# Patient Record
Sex: MALE | Race: WHITE | NOT HISPANIC OR LATINO | Employment: UNEMPLOYED | URBAN - METROPOLITAN AREA
[De-identification: names, ages, dates, MRNs, and addresses within clinical notes are randomized per-mention and may not be internally consistent; named-entity substitution may affect disease eponyms.]

---

## 2017-01-11 ENCOUNTER — OFFICE VISIT (OUTPATIENT)
Dept: ENDOCRINOLOGY | Facility: CLINIC | Age: 57
End: 2017-01-11
Payer: COMMERCIAL

## 2017-01-11 VITALS
BODY MASS INDEX: 30.48 KG/M2 | DIASTOLIC BLOOD PRESSURE: 80 MMHG | SYSTOLIC BLOOD PRESSURE: 166 MMHG | HEART RATE: 64 BPM | RESPIRATION RATE: 14 BRPM | WEIGHT: 225 LBS | HEIGHT: 72 IN

## 2017-01-11 DIAGNOSIS — Z79.4 CONTROLLED TYPE 2 DIABETES MELLITUS WITHOUT COMPLICATION, WITH LONG-TERM CURRENT USE OF INSULIN (H): Primary | ICD-10-CM

## 2017-01-11 DIAGNOSIS — E11.9 CONTROLLED TYPE 2 DIABETES MELLITUS WITHOUT COMPLICATION, WITH LONG-TERM CURRENT USE OF INSULIN (H): Primary | ICD-10-CM

## 2017-01-11 PROCEDURE — 99214 OFFICE O/P EST MOD 30 MIN: CPT | Performed by: CLINICAL NURSE SPECIALIST

## 2017-01-11 RX ORDER — METFORMIN HCL 500 MG
2000 TABLET, EXTENDED RELEASE 24 HR ORAL
Qty: 360 TABLET | Refills: 1 | Status: SHIPPED | OUTPATIENT
Start: 2017-01-11 | End: 2018-03-27

## 2017-01-11 NOTE — MR AVS SNAPSHOT
After Visit Summary   1/11/2017    Saji Iqbal    MRN: [id number]           Patient Information     Date Of Birth          1960        Visit Information        Provider Department      1/11/2017 4:00 PM Radha Gomez APRN [facility]        Today's Diagnoses     Controlled type 2 diabetes mellitus without complication, with long-term current use of insulin (H)    -  1       Care Instructions      Once you use up your current supply of Metformin, you can change to the Metformin  mg tablets and take all 4 tablets (2000 gm) at one time with dinner.    Please increase the Lantus to 22 units twice daily.     If your blood sugars continue over 200, message me with recent readings and I'll let you know what changes to make.  Call to schedule an appointment with diabetes ed.    Follow up in 1 month        Follow-ups after your visit        Additional Services     DIABETES EDUCATOR REFERRAL       DIABETES SELF MANAGEMENT TRAINING (DSMT)      Your provider has referred you to Diabetes Education: [facility]: Diabetes Education - All [facility] [phone]   [email or web address]    Type of training and number of hours: Previous Diagnosis: Follow-up DSMT - 2 hours.    Medicare covers: 10 hours of initial DSMT in 12 month period from the time of first visit, plus 2 hours of follow-up DSMT annually, and additional hours as requested for insulin training.    Diabetes Type: Type 2 - On Insulin             Diabetes Co-Morbidities: none               A1C Goal:  <7.0       A1C is: A1C   *   12/9/2016    Value: >15.0  Results confirmed by repeat test      If an urgent visit is needed or A1C is above 12, Care Team to call the Diabetes Education Team at [phone] or send an In Basket message to the Diabetes Education Pool (P DIAB ED-PATIENT CARE).    Diabetes Education Topics: Comprehensive Knowledge Assessment and  Instruction    Special Educational Needs Requiring Individual DSMT: None       MEDICAL NUTRITION THERAPY (MNT) for Diabetes    Medical Nutrition Therapy with a Registered Dietitian can be provided in coordination with Diabetes Self-Management Training to assist in achieving optimal diabetes management.    MNT Type and Hours: Previous diagnosis: Annual follow-up MNT - 2 hours                       Medicare will cover: 3 hours initial MNT in 12 month period after first visit, plus 2 hours of follow-up MNT annually    Please be aware that coverage of these services is subject to the terms and limitations of your health insurance plan.  Call member services at your health plan to determine Diabetes Self-Management Training benefits and ask which blood glucose monitor brands are covered by your plan.      Please bring the following with you to your appointment:    (1)  List of current medications   (2)  List of Blood Glucose Monitor brands that are covered by your insurance plan  (3)  Blood Glucose Monitor and log book  (4)   Food records for the 3 days prior to your visit    The Certified Diabetes Educator may make diabetes medication adjustments per the CDE Protocol and Collaborative Practice Agreement.                  Who to contact     If you have questions or need follow up information about today's clinic visit or your schedule please contact [facility] directly at [phone].  Normal or non-critical lab and imaging results will be communicated to you by MyChart, letter or phone within 4 business days after the clinic has received the results. If you do not hear from us within 7 days, please contact the clinic through MyChart or phone. If you have a critical or abnormal lab result, we will notify you by phone as soon as possible.  Submit refill requests through [facility] or call your pharmacy and they will forward the refill request to us. Please allow 3 business days for your refill to be  "completed.          Additional Information About Your Visit        [employer] Information     [employer] lets you send messages to your doctor, view your test results, renew your prescriptions, schedule appointments and more. To sign up, go to [email or web address] . Click on \"Log in\" on the left side of the screen, which will take you to the Welcome page. Then click on \"Sign up Now\" on the right side of the page.     You will be asked to enter the access code listed below, as well as some personal information. Please follow the directions to create your username and password.     Your access code is: [email or web address]  Expires: 3/9/2017 10:50 AM     Your access code will  in 90 days. If you need help or a new code, please call your [facility] clinic or [phone].        Care EveryWhere ID     This is your Care EveryWhere ID. This could be used by other organizations to access your [facility] medical records  [id number]        Your Vitals Were     Pulse Respirations Height BMI (Body Mass Index)          64 14 1.829 m (6') 30.51 kg/m2         Blood Pressure from Last 3 Encounters:   17 166/80   16 147/98   16 150/98    Weight from Last 3 Encounters:   17 102.059 kg (225 lb)   16 100.472 kg (221 lb 8 oz)   16 97.523 kg (215 lb)              We Performed the Following     DIABETES EDUCATOR REFERRAL          Today's Medication Changes          These changes are accurate as of: 17  4:34 PM.  If you have any questions, ask your nurse or doctor.               Start taking these medicines.        Dose/Directions    metFORMIN 500 MG 24 hr tablet   Commonly known as:  GLUCOPHAGE-XR   Used for:  Controlled type 2 diabetes mellitus without complication, with long-term current use of insulin (H)   Replaces:  metFORMIN 500 MG tablet   Started by:  Radha Gomez APRN CNP        Dose:  2000 mg   Take 4 tablets (2,000 mg) by mouth daily (with dinner) Please do not dispense until requested " by patient.   Quantity:  360 tablet   Refills:  1         Stop taking these medicines if you haven't already. Please contact your care team if you have questions.     metFORMIN 500 MG tablet   Commonly known as:  GLUCOPHAGE   Replaced by:  metFORMIN 500 MG 24 hr tablet   Stopped by:  Radha Gomez APRN CNP                Where to get your medicines      These medications were sent to [facility] [address] [facility]  [address] [facility] [address]     Phone:  [phone]    - metFORMIN 500 MG 24 hr tablet             Primary Care Provider Office Phone # Fax #    Roxanne Shankar [phone]       [facility] [address] [facility] [address]        Thank you!     Thank you for choosing [facility]  for your care. Our goal is always to provide you with excellent care. Hearing back from our patients is one way we can continue to improve our services. Please take a few minutes to complete the written survey that you may receive in the mail after your visit with us. Thank you!             Your Updated Medication List - Protect others around you: Learn how to safely use, store and throw away your medicines at www.disposemymeds.org.          This list is accurate as of: 1/11/17  4:34 PM.  Always use your most recent med list.                   Brand Name Dispense Instructions for use    aspirin 81 MG tablet      Take by mouth daily       atorvastatin 20 MG tablet    LIPITOR    30 tablet    Take 1 tablet (20 mg) by mouth daily       blood glucose lancets standard    no brand specified    1 Box    Use to test blood sugar 1 times daily or as directed.       blood glucose monitoring lancets     2 Box    Use to test blood sugar 4 times daily or as directed.       blood glucose monitoring meter device kit     1 kit    Use to test blood sugar every morning prior to eating.       blood glucose monitoring test strip    no brand specified    150  Box    Use to test blood sugar 4 times daily or as directed. Please dispense Accu Chek Ana plus test strips or per patient preference if using a different brand       clopidogrel 75 MG tablet    PLAVIX    30 tablet    Take 1 tablet (75 mg) by mouth daily       insulin glargine 100 UNIT/ML injection    LANTUS SOLOSTAR    3 Month    Increase as directed to max dose of 20 units bid.       insulin pen needle 31G X 8 MM    ULTICARE SHORT    100 each    Use 19 daily or as directed, use with lantus, dispense per pt insurance per pt preference       lisinopril 5 MG tablet    PRINIVIL/ZESTRIL    30 tablet    Take 1 tablet (5 mg) by mouth daily       metFORMIN 500 MG 24 hr tablet    GLUCOPHAGE-XR    360 tablet    Take 4 tablets (2,000 mg) by mouth daily (with dinner) Please do not dispense until requested by patient.       metoprolol 25 MG tablet    LOPRESSOR    60 tablet    Take 1 tablet (25 mg) by mouth 2 times daily       omeprazole 20 MG CR capsule    priLOSEC    30 capsule    Take 1 capsule (20 mg) by mouth daily

## 2017-01-11 NOTE — PATIENT INSTRUCTIONS
Once you use up your current supply of Metformin, you can change to the Metformin  mg tablets and take all 4 tablets (2000 gm) at one time with dinner.    Please increase the Lantus to 22 units twice daily.     If your blood sugars continue over 200, message me with recent readings and I'll let you know what changes to make.  Call to schedule an appointment with diabetes ed.    Follow up in 1 month

## 2017-01-11 NOTE — PROGRESS NOTES
Name: Saji Iqbal  Seen at the request of Roxanne Shankar for Diabetes (Last seen12/26/2016).  HPI:  Saji Iqbal is a 56 year old male who presents for the evaluation/management of:    1. Type 2 DM:  Originally diagnosed:  In 2013.  Started Metformin and blood sugars well controlled,  and rarely over 140.  Thought he didn't need to worry about testing his blood sugars regularly so was not testing the past couple of years.  Recently noted unexplained weight loss, polyuria, polydipsia, and fatigue.  Presented to his PCP for further evaluation and was noted to have A1c > 15%.  Does report excess ETOH use due to depression since relocating to [place] for a job.  No ETOH use since severely uncontrolled diabetes noted.    Current Regimen: Metformin 1000 mg bid, Lantus 20 units bid (5-7am and 9pm) -   BS checks: 2 times per day  Average Meter Download: Unable to download meter.  Review of meter memory: 7-d average (n11) - 186, 14-d average 234 (n24), 30-d average 242 (n30).  Recent fasting am readings - 133, 185, 181, 185, 198, 224.  Daytime/evening readings: 195, 137, 112, 317, 181, 243.    Complications:   Diabetes Complications  Description / Detail    Diabetic Retinopathy  No retinopathy, last exam 2014   CAD / PAD  Yes: stent x 2, last stent 7/2015   Neuropathy  No   Nephropathy / Microalbuminuria  No   Gastroparesis  No   Hypoglycemia Unawareness  No     2. Hypertension: Blood Pressure today:   BP Readings from Last 3 Encounters:   01/11/17 166/80   12/26/16 147/98   12/09/16 150/98   .  Blood pressure medications include Metoprolol 25 mg bid and lisinopril 5 mg qd.  Takes medications everyday without forgetting a dose.  Denies feeling lightheaded or dizzy.    3. Hyperlipidemia: Takes Atorvastatin 20 mg qd for lipid control.  Denies muscle aches of pains.       4. Prevention:  Flu Shot- 10/2016 - received at work  Pneumovax- 23 valent 7/2012, PVC 13 on 10/2015  Opthalmology-Yes, overdue, last exam > 1 year  ago  Dental-recommended q 6 months  ASA-Yes, 81 mg qd  Smoking- No  PMH/PSH:  Past Medical History   Diagnosis Date     Coronary artery disease      Hypertension      Diabetes mellitus, type II (H)      Depression      Cluster headache      Rotator cuff arthropathy      Hyperlipidemia      Past Surgical History   Procedure Laterality Date     Stent, coronary, yadira  2014, 2015     Arthroscopy shoulder       Family Hx:  Family History   Problem Relation Age of Onset     Coronary Artery Disease Mother      Coronary Artery Disease Father      Thyroid disease:  No       DM2: ?possibly brother with DM2 but no other known family history        Autoimmune: DM1, SLE, RA, Vitiligo: No    Social Hx:  Social History     Social History     Marital Status: Single     Spouse Name: N/A     Number of Children: N/A     Years of Education: N/A     Occupational History     [employer]       Social History Main Topics     Smoking status: Former Smoker -- 0.33 packs/day for 20 years     Types: Cigarettes     Quit date: 07/17/2015     Smokeless tobacco: Former User     Types: Snuff     Alcohol Use: 0.6 oz/week     1 Standard drinks or equivalent per week      Comment: 2-6 occasionally     Drug Use: No     Sexual Activity: Yes     Other Topics Concern     Caffeine Concern No     0-1 coffee daily     Special Diet Yes     low sodium, no red meat, no butter     Exercise Yes     walking 2-3 days per week     Social History Narrative          MEDICATIONS:  has a current medication list which includes the following prescription(s): metformin, insulin glargine, blood glucose monitoring, blood glucose monitoring, atorvastatin, clopidogrel, lisinopril, metoprolol, omeprazole, blood glucose monitoring, blood glucose, insulin pen needle, and aspirin.    ROS     ROS: 10 point ROS neg other than the symptoms noted above in the HPI.    Physical Exam   VS: /80 mmHg  Pulse 64  Resp 14  Ht 1.829 m (6')  Wt 102.059 kg (225 lb)   BMI 30.51 kg/m2  GENERAL: AXOX3, NAD, well dressed, answering questions appropriately, appears stated age.  HEENT: no exophthalmos, no proptosis, EOMI, no lig lag, no retraction  CV: RRR, no rubs, gallops, no murmurs  LUNGS: CTAB, no wheezes, rales, or rhonchi  ABDOMEN: soft, nontender, nondistended  EXTREMITIES: no edema, +pulses, no rashes, no lesions  NEUROLOGY: CN grossly intact, no tremors  MSK: grossly intact  SKIN: no rashes, no lesions    LABS:  A1c:   Component  Latest Ref Rn 12/9/2016   Hemoglobin A1C  4.3 - 6.0 % >15.0 (H) . . .     Component  Latest Ref Rng 12/26/2016   C-Peptide  0.9 - 6.9 ng/mL 3.5   Glucose  70 - 99 mg/dL 266 (H)   Glutamic Acid Decarboxylase Antibody <5.0 . . .   Islet Cell Antibody IgG <1:4 . . .     Basic Metabolic Panel:  !COMPREHENSIVE Latest Ref Rn 12/9/2016   SODIUM 133 - 144 mmol/L 137   POTASSIUM 3.4 - 5.3 mmol/L 4.6   CHLORIDE 94 - 109 mmol/L 99   BUN 7 - 30 mg/dL 12   Creatinine 0.66 - 1.25 mg/dL 0.75   Glucose 70 - 99 mg/dL 359 (H)   ANION GAP 3 - 14 mmol/L 12   CALCIUM 8.5 - 10.1 mg/dL 9.5   ALBUMIN 3.4 - 5.0 g/dL 4.3   PROTEIN, TOTAL 6.8 - 8.8 g/dL 7.2     LFTS/Cholesterol Panel:  !LIPID/HEPATIC Latest Ref Rn 12/9/2016   CHOLESTEROL <200 mg/dL 247 (H)   TRIGLYCERIDES <150 mg/dL 632 (H)   HDL CHOLESTEROL >39 mg/dL 30 (L)   LDL CHOLESTEROL DIRECT <100 mg/dL 134 (H)   LDL CHOLESTEROL, CALCULATED <100 mg/dL Cannot estimate LDL when triglyceride exceeds 400 mg/dL   NON HDL CHOLESTEROL <130 mg/dL 217 (H)   AST 0 - 45 U/L 33   ALT 0 - 70 U/L 61     Thyroid Function:   !THYROID Latest Ref Rng 12/9/2016   TSH 0.40 - 4.00 mU/L 0.89     Urine MicroAlbumin:  Component    Latest Ref Rn 12/9/2016   Creatinine Urine     86   Albumin Urine mg/L     16   Albumin Urine mg/g Cr    0 - 17 mg/g Cr 19.12 (H)     Vitamin D:    All pertinent notes, labs, and images personally reviewed by me.     A/P  Mr.David Iqbal is a 56 year old here for the evaluation/management of diabetes:    1. DM2 -  Uncontrolled.  Recent c-peptide 3.5 with glucose 266, and pancreatic antibodies were not elevated.  Control improved since starting insulin.  Increase Lantus to 22 units bid.  Change metformin to XR and take 2000 mg once daily.  Referral to diabetes ed provided for comprehensive diabetes education.  F/u in one month.    There is some variability among people, most will usually develop symptoms suggestive of hypoglycemia when blood glucose levels are lowered to the mid 60's. The first set of symptoms are called adrenergic. Patients may experience any of the following nervousness, sweating, intense hunger, trembling, weakness, palpitations, and difficulty speaking.   The acute management of hypoglycemia involves the rapid delivery of a source of easily absorbed sugar. Regular soda, juice, lifesavers, table sugar, are good options. 15 grams of glucose is the dose that is given, followed by an assessment of symptoms and a blood glucose check if possible. If after 10 minutes there is no improvement, another 10-15 grams should be given. This can be repeated up to three times. The equivalency of 10-15 grams of glucose (approximate servings) are: 3-5 hard candies, 3 teaspoons of sugar, or 1/2 cup of regular soda or juice.      2. Hypertension - BP elevated today.  Recommend he see his PCP for this.    3. Hyperlipidemia - On statin therapy.    Labs ordered today:   Orders Placed This Encounter   Procedures     DIABETES EDUCATOR REFERRAL       Radiology/Consults ordered today: DIABETES EDUCATOR REFERRAL    All questions were answered.  The patient indicates understanding of the above issues and agrees with the plan set forth.  Total face to face time greater than or equal to 25 minutes.       Follow-up:  1 month    Radha Gomez NP  Endocrinology  [facility]  CC: Roxanne Shankar

## 2017-01-11 NOTE — NURSING NOTE
Chief Complaint   Patient presents with     Diabetes     recheck       Initial /80 mmHg  Pulse 64  Resp 14  Ht 1.829 m (6')  Wt 102.059 kg (225 lb)  BMI 30.51 kg/m2 Estimated body mass index is 30.51 kg/(m^2) as calculated from the following:    Height as of this encounter: 1.829 m (6').    Weight as of this encounter: 102.059 kg (225 lb).  BP completed using cuff size: romel Solis CMA

## 2017-01-16 ENCOUNTER — TELEPHONE (OUTPATIENT)
Dept: FAMILY MEDICINE | Facility: CLINIC | Age: 57
End: 2017-01-16

## 2017-01-16 ENCOUNTER — TELEPHONE (OUTPATIENT)
Dept: ENDOCRINOLOGY | Facility: CLINIC | Age: 57
End: 2017-01-16

## 2017-01-16 NOTE — TELEPHONE ENCOUNTER
Panel Management Review      Patient has the following on his problem list:     Diabetes    ASA: Passed    Last A1C  A1C   *   12/9/2016    Value: >15.0  Results confirmed by repeat test    A1C tested: Failed    Last LDL:    CHOL      247   12/9/2016  HDL       30   12/9/2016  LDL   Cannot estimate LDL when triglyceride exceeds 400 mg/dL   12/9/2016  LDL      134   12/9/2016  TRIG      632   12/9/2016  No results found for this basename: ty  NHDL      217   12/9/2016    Is the patient on a Statin? YES             Is the patient on Aspirin? YES    Medications     HMG CoA Reductase Inhibitors    atorvastatin (LIPITOR) 20 MG tablet    Salicylates    aspirin 81 MG tablet          Last three blood pressure readings:  BP Readings from Last 3 Encounters:   01/11/17 166/80   12/26/16 147/98   12/09/16 150/98       Date of last diabetes office visit: 1/11/17 with Endo  12/9/16 with Cesar   Tobacco History:     History   Smoking status     Former Smoker -- 0.33 packs/day for 20 years     Types: Cigarettes     Quit date: 07/17/2015   Smokeless tobacco     Former User     Types: Snuff             Composite cancer screening  Chart review shows that this patient is due/due soon for the following Colonoscopy  Summary:    Patient is due/failing the following:   BP CHECK and COLONOSCOPY    Action needed:   Patient needs nurse only appointment.    Type of outreach:    Phone, left message for patient to call back.     Questions for provider review:    None                                                                                                                                    Dayan Henriquez CMA       Chart routed to Care Team .

## 2017-01-19 ENCOUNTER — OFFICE VISIT (OUTPATIENT)
Dept: FAMILY MEDICINE | Facility: CLINIC | Age: 57
End: 2017-01-19
Payer: COMMERCIAL

## 2017-01-19 VITALS
HEART RATE: 73 BPM | SYSTOLIC BLOOD PRESSURE: 124 MMHG | WEIGHT: 227.5 LBS | RESPIRATION RATE: 20 BRPM | OXYGEN SATURATION: 96 % | DIASTOLIC BLOOD PRESSURE: 80 MMHG | BODY MASS INDEX: 30.85 KG/M2 | TEMPERATURE: 98.9 F

## 2017-01-19 DIAGNOSIS — R68.89 FLU-LIKE SYMPTOMS: ICD-10-CM

## 2017-01-19 LAB
FLUAV+FLUBV AG SPEC QL: NORMAL
FLUAV+FLUBV AG SPEC QL: NORMAL
SPECIMEN SOURCE: NORMAL

## 2017-01-19 PROCEDURE — 99213 OFFICE O/P EST LOW 20 MIN: CPT | Performed by: FAMILY MEDICINE

## 2017-01-19 PROCEDURE — 87804 INFLUENZA ASSAY W/OPTIC: CPT | Performed by: FAMILY MEDICINE

## 2017-01-19 RX ORDER — AZITHROMYCIN 250 MG/1
TABLET, FILM COATED ORAL
Qty: 6 TABLET | Refills: 0 | Status: SHIPPED | OUTPATIENT
Start: 2017-01-19 | End: 2017-05-17

## 2017-01-19 RX ORDER — CODEINE PHOSPHATE AND GUAIFENESIN 10; 100 MG/5ML; MG/5ML
1 SOLUTION ORAL EVERY 4 HOURS PRN
Qty: 120 ML | Refills: 0 | Status: SHIPPED | OUTPATIENT
Start: 2017-01-19 | End: 2017-05-17

## 2017-01-19 ASSESSMENT — PAIN SCALES - GENERAL: PAINLEVEL: EXTREME PAIN (9)

## 2017-01-19 NOTE — NURSING NOTE
Chief Complaint   Patient presents with     Fever     Initial /80 mmHg  Pulse 73  Temp(Src) 98.9  F (37.2  C) (Oral)  Resp 20  Wt 227 lb 8 oz (103.193 kg)  SpO2 96% Estimated body mass index is 30.85 kg/(m^2) as calculated from the following:    Height as of 1/11/17: 6' (1.829 m).    Weight as of this encounter: 227 lb 8 oz (103.193 kg).  BP completed using cuff size large right arm.    Lisa Magill, CMA

## 2017-01-19 NOTE — MR AVS SNAPSHOT
"              After Visit Summary   2017    Saji Iqbal    MRN: [id number]           Patient Information     Date Of Birth          1960        Visit Information        Provider Department      2017 9:00 AM Asya Vincent MD [facility]        Today's Diagnoses     Flu-like symptoms            Follow-ups after your visit        Who to contact     If you have questions or need follow up information about today's clinic visit or your schedule please contact [facility] directly at [phone].  Normal or non-critical lab and imaging results will be communicated to you by [email or web address], letter or phone within 4 business days after the clinic has received the results. If you do not hear from us within 7 days, please contact the clinic through [email or web address] or phone. If you have a critical or abnormal lab result, we will notify you by phone as soon as possible.  Submit refill requests through [employer] or call your pharmacy and they will forward the refill request to us. Please allow 3 business days for your refill to be completed.          Additional Information About Your Visit        MyChart Information     [employer] lets you send messages to your doctor, view your test results, renew your prescriptions, schedule appointments and more. To sign up, go to [email or web address] . Click on \"Log in\" on the left side of the screen, which will take you to the Welcome page. Then click on \"Sign up Now\" on the right side of the page.     You will be asked to enter the access code listed below, as well as some personal information. Please follow the directions to create your username and password.     Your access code is: [email or web address]  Expires: 3/9/2017 10:50 AM     Your access code will  in 90 days. If you need help or a new code, please call your [facility] or [phone].        Care EveryWhere ID     This is your Care EveryWhere ID. This could be used by other " organizations to access your [place] medical records  [id number]        Your Vitals Were     Pulse Temperature Respirations Pulse Oximetry          73 98.9  F (37.2  C) (Oral) 20 96%         Blood Pressure from Last 3 Encounters:   01/19/17 124/80   01/11/17 166/80   12/26/16 147/98    Weight from Last 3 Encounters:   01/19/17 227 lb 8 oz (103.193 kg)   01/11/17 225 lb (102.059 kg)   12/26/16 221 lb 8 oz (100.472 kg)              We Performed the Following     Influenza A/B antigen          Today's Medication Changes          These changes are accurate as of: 1/19/17 10:05 AM.  If you have any questions, ask your nurse or doctor.               Start taking these medicines.        Dose/Directions    azithromycin 250 MG tablet   Commonly known as:  ZITHROMAX   Used for:  Flu-like symptoms   Started by:  Asya Vincent MD        Two tablets first day, then one tablet daily for four days.   Quantity:  6 tablet   Refills:  0       guaiFENesin-codeine 100-10 MG/5ML Soln solution   Commonly known as:  ROBITUSSIN AC   Used for:  Flu-like symptoms   Started by:  Asya Vincent MD        Dose:  1 tsp.   Take 5 mLs by mouth every 4 hours as needed for cough   Quantity:  120 mL   Refills:  0            Where to get your medicines      These medications were sent to [place], [place] - [address] [place] [address]     Phone:  [phone]    - azithromycin 250 MG tablet      Some of these will need a paper prescription and others can be bought over the counter.  Ask your nurse if you have questions.     Bring a paper prescription for each of these medications    - guaiFENesin-codeine 100-10 MG/5ML Soln solution             Primary Care Provider Office Phone # Fax #    Roxanne ROSIO Shankar [phone]       [facility] [address]  [facility] [address]        Thank you!     Thank you for choosing [facility]  for your  care. Our goal is always to provide you with excellent care. Hearing back from our patients is one way we can continue to improve our services. Please take a few minutes to complete the written survey that you may receive in the mail after your visit with us. Thank you!             Your Updated Medication List - Protect others around you: Learn how to safely use, store and throw away your medicines at www.disposemymeds.org.          This list is accurate as of: 1/19/17 10:05 AM.  Always use your most recent med list.                   Brand Name Dispense Instructions for use    aspirin 81 MG tablet      Take by mouth daily       atorvastatin 20 MG tablet    LIPITOR    30 tablet    Take 1 tablet (20 mg) by mouth daily       azithromycin 250 MG tablet    ZITHROMAX    6 tablet    Two tablets first day, then one tablet daily for four days.       blood glucose lancets standard    no brand specified    1 Box    Use to test blood sugar 1 times daily or as directed.       blood glucose monitoring lancets     2 Box    Use to test blood sugar 4 times daily or as directed.       blood glucose monitoring meter device kit     1 kit    Use to test blood sugar every morning prior to eating.       blood glucose monitoring test strip    no brand specified    150 Box    Use to test blood sugar 4 times daily or as directed. Please dispense Accu Chek Ana plus test strips or per patient preference if using a different brand       clopidogrel 75 MG tablet    PLAVIX    30 tablet    Take 1 tablet (75 mg) by mouth daily       guaiFENesin-codeine 100-10 MG/5ML Soln solution    ROBITUSSIN AC    120 mL    Take 5 mLs by mouth every 4 hours as needed for cough       insulin glargine 100 UNIT/ML injection    LANTUS SOLOSTAR    15 mL    22 units bid.       insulin pen needle 31G X 5 MM    B-D U/F    100 each    Use 2 daily or as directed.       lisinopril 5 MG tablet    PRINIVIL/ZESTRIL    30 tablet    Take 1 tablet (5 mg) by mouth daily        metFORMIN 500 MG 24 hr tablet    GLUCOPHAGE-XR    360 tablet    Take 4 tablets (2,000 mg) by mouth daily (with dinner) Please do not dispense until requested by patient.       metoprolol 25 MG tablet    LOPRESSOR    60 tablet    Take 1 tablet (25 mg) by mouth 2 times daily       omeprazole 20 MG CR capsule    priLOSEC    30 capsule    Take 1 capsule (20 mg) by mouth daily

## 2017-01-30 ENCOUNTER — DOCUMENTATION ONLY (OUTPATIENT)
Dept: CARDIOLOGY | Facility: CLINIC | Age: 57
End: 2017-01-30

## 2017-03-27 DIAGNOSIS — I10 BENIGN ESSENTIAL HYPERTENSION: ICD-10-CM

## 2017-03-27 DIAGNOSIS — K21.9 GASTROESOPHAGEAL REFLUX DISEASE, ESOPHAGITIS PRESENCE NOT SPECIFIED: ICD-10-CM

## 2017-03-27 DIAGNOSIS — E78.5 HYPERLIPIDEMIA LDL GOAL <100: ICD-10-CM

## 2017-03-27 DIAGNOSIS — I25.10 CORONARY ARTERY DISEASE INVOLVING NATIVE CORONARY ARTERY OF NATIVE HEART WITHOUT ANGINA PECTORIS: ICD-10-CM

## 2017-03-27 NOTE — TELEPHONE ENCOUNTER
clopidogrel (PLAVIX) 75 MG tablet           Last Written Prescription Date: 12/9/16  Last Fill Quantity: 30, # refills: 2    Last Office Visit with [facility] or  Health prescribing provider:  1/19/2017     Future Office Visit:       Lab Results   Component Value Date    WBC 6.8 12/09/2016     Lab Results   Component Value Date    RBC 4.93 12/09/2016     Lab Results   Component Value Date    HGB 16.4 12/09/2016     Lab Results   Component Value Date    HCT 46.8 12/09/2016     No components found for: MCT  Lab Results   Component Value Date    MCV 95 12/09/2016     Lab Results   Component Value Date    MCH 33.3 12/09/2016     Lab Results   Component Value Date    MCHC 35.0 12/09/2016     Lab Results   Component Value Date    RDW 11.6 12/09/2016     Lab Results   Component Value Date     12/09/2016     Lab Results   Component Value Date    AST 33 12/09/2016     Lab Results   Component Value Date    ALT 61 12/09/2016     Creatinine   Date Value Ref Range Status   12/09/2016 0.75 0.66 - 1.25 mg/dL Final     ________________________________________________________________________________  metoprolol (LOPRESSOR) 25 MG tablet      Last Written Prescription Date: 12/9/16  Last Fill Quantity: 60, # refills: 2    Last Office Visit with [facility], [facility] or  Health prescribing provider:  1/19/2017     Future Office Visit:        BP Readings from Last 3 Encounters:   01/19/17 124/80   01/11/17 166/80   12/26/16 (!) 147/98     ________________________________________________________________________________  lisinopril (PRINIVIL/ZESTRIL) 5 MG tablet      Last Written Prescription Date: 12/9/16  Last Fill Quantity: 30, # refills: 2  Last Office Visit with [facility], [facility] or [facility] prescribing provider: 1/19/2017         Potassium   Date Value Ref Range Status   12/09/2016 4.6 3.4 - 5.3 mmol/L Final     Creatinine   Date Value Ref Range Status   12/09/2016 0.75 0.66 - 1.25 mg/dL Final     BP Readings from Last 3 Encounters:   01/19/17  124/80   01/11/17 166/80   12/26/16 (!) 147/98     ________________________________________________________________________________  atorvastatin (LIPITOR) 20 MG tablet     Last Written Prescription Date: 12/9/16  Last Fill Quantity: 30, # refills: 2  Last Office Visit with [facility] or [facility] prescribing provider: 1/19/2017         Lab Results   Component Value Date    CHOL 247 12/09/2016     Lab Results   Component Value Date    HDL 30 12/09/2016     Lab Results   Component Value Date    LDL  12/09/2016     Cannot estimate LDL when triglyceride exceeds 400 mg/dL     12/09/2016     Lab Results   Component Value Date    TRIG 632 12/09/2016     No results found for: CHOLHDLRATIO  ________________________________________________________________________________  omeprazole (PRILOSEC) 20 MG CR capsule  Last Written Prescription Date: 12/9/16  Last Fill Quantity: 30,  # refills: 2   Last Office Visit with [facility], [facility] or [facility] prescribing provider:  1/19/2017                                              BILLY Colón  March 27, 2017  9:59 AM

## 2017-03-28 RX ORDER — LISINOPRIL 5 MG/1
5 TABLET ORAL DAILY
Qty: 30 TABLET | Refills: 8 | Status: SHIPPED | OUTPATIENT
Start: 2017-03-28 | End: 2018-03-27

## 2017-03-28 RX ORDER — METOPROLOL TARTRATE 25 MG/1
25 TABLET, FILM COATED ORAL 2 TIMES DAILY
Qty: 60 TABLET | Refills: 8 | Status: SHIPPED | OUTPATIENT
Start: 2017-03-28 | End: 2018-03-27

## 2017-03-28 RX ORDER — ATORVASTATIN CALCIUM 20 MG/1
20 TABLET, FILM COATED ORAL DAILY
Qty: 30 TABLET | Refills: 2 | Status: SHIPPED | OUTPATIENT
Start: 2017-03-28 | End: 2018-03-27

## 2017-03-28 RX ORDER — CLOPIDOGREL BISULFATE 75 MG/1
75 TABLET ORAL DAILY
Qty: 30 TABLET | Refills: 8 | Status: SHIPPED | OUTPATIENT
Start: 2017-03-28 | End: 2018-03-27

## 2017-05-17 ENCOUNTER — OFFICE VISIT (OUTPATIENT)
Dept: FAMILY MEDICINE | Facility: CLINIC | Age: 57
End: 2017-05-17
Payer: COMMERCIAL

## 2017-05-17 VITALS
BODY MASS INDEX: 32.64 KG/M2 | HEART RATE: 86 BPM | DIASTOLIC BLOOD PRESSURE: 82 MMHG | SYSTOLIC BLOOD PRESSURE: 130 MMHG | TEMPERATURE: 98.2 F | HEIGHT: 72 IN | RESPIRATION RATE: 16 BRPM | OXYGEN SATURATION: 95 % | WEIGHT: 241 LBS

## 2017-05-17 DIAGNOSIS — J00 ACUTE NASOPHARYNGITIS: Primary | ICD-10-CM

## 2017-05-17 DIAGNOSIS — Z13.5 SCREENING FOR DIABETIC RETINOPATHY: ICD-10-CM

## 2017-05-17 DIAGNOSIS — Z12.11 SCREEN FOR COLON CANCER: ICD-10-CM

## 2017-05-17 PROCEDURE — 99213 OFFICE O/P EST LOW 20 MIN: CPT | Performed by: FAMILY MEDICINE

## 2017-05-17 RX ORDER — CODEINE PHOSPHATE AND GUAIFENESIN 10; 100 MG/5ML; MG/5ML
2 SOLUTION ORAL EVERY 4 HOURS PRN
Qty: 120 ML | Refills: 1 | Status: SHIPPED | OUTPATIENT
Start: 2017-05-17 | End: 2017-07-14

## 2017-05-17 ASSESSMENT — ENCOUNTER SYMPTOMS
ABDOMINAL PAIN: 0
HEADACHES: 1
VOMITING: 0
COUGH: 1
NAUSEA: 0
CARDIOVASCULAR NEGATIVE: 1
SPUTUM PRODUCTION: 1
DIARRHEA: 0
FEVER: 0
SORE THROAT: 1
CHILLS: 1
CONSTIPATION: 0

## 2017-05-17 NOTE — NURSING NOTE
Chief Complaint   Patient presents with     URI       Initial /82 (BP Location: Right arm, Patient Position: Chair, Cuff Size: Adult Regular)  Pulse 86  Temp 98.2  F (36.8  C) (Oral)  Resp 16  Ht 6' (1.829 m)  Wt 241 lb (109.3 kg)  SpO2 95%  BMI 32.69 kg/m2 Estimated body mass index is 32.69 kg/(m^2) as calculated from the following:    Height as of this encounter: 6' (1.829 m).    Weight as of this encounter: 241 lb (109.3 kg).  Medication Reconciliation: complete     Dayan Henriquez, CMA

## 2017-05-17 NOTE — MR AVS SNAPSHOT
"              After Visit Summary   5/17/2017    Saji Ibqal    MRN: [id number]           Patient Information     Date Of Birth          1960        Visit Information        Provider Department      5/17/2017 1:00 PM Naseem Esparza MD [facility]        Today's Diagnoses     Acute nasopharyngitis    -  1    Screen for colon cancer        Screening for diabetic retinopathy           Follow-ups after your visit        Follow-up notes from your care team     Return in about 1 week (around 5/24/2017), or if symptoms worsen or fail to improve.      Who to contact     If you have questions or need follow up information about today's clinic visit or your schedule please contact [facility] directly at [phone].  Normal or non-critical lab and imaging results will be communicated to you by MyChart, letter or phone within 4 business days after the clinic has received the results. If you do not hear from us within 7 days, please contact the clinic through [employer] or phone. If you have a critical or abnormal lab result, we will notify you by phone as soon as possible.  Submit refill requests through [employer] or call your pharmacy and they will forward the refill request to us. Please allow 3 business days for your refill to be completed.          Additional Information About Your Visit        MyChart Information     [employer] lets you send messages to your doctor, view your test results, renew your prescriptions, schedule appointments and more. To sign up, go to [email or web address] . Click on \"Log in\" on the left side of the screen, which will take you to the Welcome page. Then click on \"Sign up Now\" on the right side of the page.     You will be asked to enter the access code listed below, as well as some personal information. Please follow the directions to create your username and password.     Your access code is: [email or web address]  Expires: 8/15/2017  1:28 PM     Your access " code will  in 90 days. If you need help or a new code, please call your [place] clinic or [phone].        Care EveryWhere ID     This is your Care EveryWhere ID. This could be used by other organizations to access your [place] medical records  [id number]        Your Vitals Were     Pulse Temperature Respirations Height Pulse Oximetry BMI (Body Mass Index)    86 98.2  F (36.8  C) (Oral) 16 6' (1.829 m) 95% 32.69 kg/m2       Blood Pressure from Last 3 Encounters:   17 130/82   17 124/80   17 166/80    Weight from Last 3 Encounters:   17 241 lb (109.3 kg)   17 227 lb 8 oz (103.2 kg)   17 225 lb (102.1 kg)              Today, you had the following     No orders found for display         Today's Medication Changes          These changes are accurate as of: 17  1:28 PM.  If you have any questions, ask your nurse or doctor.               These medicines have changed or have updated prescriptions.        Dose/Directions    guaiFENesin-codeine 100-10 MG/5ML Soln solution   Commonly known as:  ROBITUSSIN AC   This may have changed:  how much to take   Used for:  Acute nasopharyngitis   Changed by:  Naseem Esparza MD        Dose:  2 tsp.   Take 10 mLs by mouth every 4 hours as needed for cough   Quantity:  120 mL   Refills:  1         Stop taking these medicines if you haven't already. Please contact your care team if you have questions.     azithromycin 250 MG tablet   Commonly known as:  ZITHROMAX   Stopped by:  Naseem Esparza MD           blood glucose monitoring meter device kit   Stopped by:  Naseem Esparza MD                Where to get your medicines      Some of these will need a paper prescription and others can be bought over the counter.  Ask your nurse if you have questions.     Bring a paper prescription for each of these medications     guaiFENesin-codeine 100-10 MG/5ML Soln solution                Primary Care Provider Office Phone #  Fax #    Roxanne Shankar [phone]       [facility] [address]  [facility] [address]        Thank you!     Thank you for choosing [facility]  for your care. Our goal is always to provide you with excellent care. Hearing back from our patients is one way we can continue to improve our services. Please take a few minutes to complete the written survey that you may receive in the mail after your visit with us. Thank you!             Your Updated Medication List - Protect others around you: Learn how to safely use, store and throw away your medicines at www.disposemymeds.org.          This list is accurate as of: 5/17/17  1:28 PM.  Always use your most recent med list.                   Brand Name Dispense Instructions for use    aspirin 81 MG tablet      Take by mouth daily       atorvastatin 20 MG tablet    LIPITOR    30 tablet    Take 1 tablet (20 mg) by mouth daily       blood glucose lancets standard    no brand specified    1 Box    Use to test blood sugar 1 times daily or as directed.       blood glucose monitoring lancets     2 Box    Use to test blood sugar 4 times daily or as directed.       blood glucose monitoring test strip    no brand specified    150 Box    Use to test blood sugar 4 times daily or as directed. Please dispense Accu Chek Ana plus test strips or per patient preference if using a different brand       clopidogrel 75 MG tablet    PLAVIX    30 tablet    Take 1 tablet (75 mg) by mouth daily       guaiFENesin-codeine 100-10 MG/5ML Soln solution    ROBITUSSIN AC    120 mL    Take 10 mLs by mouth every 4 hours as needed for cough       insulin glargine 100 UNIT/ML injection    LANTUS SOLOSTAR    15 mL    22 units bid.       insulin pen needle 31G X 5 MM    B-D U/F    100 each    Use 2 daily or as directed.       lisinopril 5 MG tablet    PRINIVIL/ZESTRIL    30 tablet    Take 1 tablet (5 mg) by mouth daily       metFORMIN 500 MG 24 hr tablet     GLUCOPHAGE-XR    360 tablet    Take 4 tablets (2,000 mg) by mouth daily (with dinner) Please do not dispense until requested by patient.       metoprolol 25 MG tablet    LOPRESSOR    60 tablet    Take 1 tablet (25 mg) by mouth 2 times daily       omeprazole 20 MG CR capsule    priLOSEC    30 capsule    Take 1 capsule (20 mg) by mouth daily

## 2017-05-17 NOTE — PROGRESS NOTES
HPI    SUBJECTIVE:                                                    Saji Iqbal is a 57 year old male who presents to clinic today for the following health issues:      RESPIRATORY SYMPTOMS      Duration: 5-6 days    Description  nasal congestion, rhinorrhea, sore throat, facial pain/pressure, cough and fatigue/malaise    Severity: moderate    Accompanying signs and symptoms: None    History (predisposing factors):  Former smoker    Precipitating or alleviating factors: None    Therapies tried and outcome:  dayquil     Feels like he gets this same cold every three months or so.  Wondering about mold exposure - feels he may have mold in his apartment.  Started with a sore throat, sinus congestion, slighlty productive cough.  No leni fever, n/v.  Started four days ago.  Sweats with pretty much any exertion.    Does follow with Archana Gomez, reports sugars are much better.  Seeing 120-160s.  Is due to f/u with Radha.     Review of Systems   Constitutional: Positive for chills and malaise/fatigue. Negative for fever.   HENT: Positive for congestion and sore throat.    Respiratory: Positive for cough and sputum production.    Cardiovascular: Negative.    Gastrointestinal: Negative for abdominal pain, constipation, diarrhea, nausea and vomiting.   Skin: Negative for rash.   Neurological: Positive for headaches.         Physical Exam   Constitutional: He is well-developed, well-nourished, and in no distress. No distress.   HENT:   Right Ear: Tympanic membrane, external ear and ear canal normal.   Left Ear: Tympanic membrane, external ear and ear canal normal.   Mouth/Throat: Oropharynx is clear and moist. No oropharyngeal exudate.   Eyes: Conjunctivae are normal.   Neck: Neck supple.   Cardiovascular: Normal rate, regular rhythm and normal heart sounds.    Pulmonary/Chest: Effort normal and breath sounds normal.   Lymphadenopathy:     He has cervical adenopathy.   Skin: Skin is warm and dry. No rash noted.   Nursing  note and vitals reviewed.    (J00) Acute nasopharyngitis  (primary encounter diagnosis)  Comment: early, will russel resolve.  Mika is OK w/no abx.  Can call in if still ill in one week.  Plan: guaiFENesin-codeine (ROBITUSSIN AC) 100-10         MG/5ML SOLN solution            RTC in 1w prn    Naseem Esparza MD

## 2017-07-12 ENCOUNTER — TELEPHONE (OUTPATIENT)
Dept: FAMILY MEDICINE | Facility: CLINIC | Age: 57
End: 2017-07-12

## 2017-07-12 NOTE — TELEPHONE ENCOUNTER
PANEL MANAGEMENT REVIEW      Patient has the following on his problem list:     Diabetes    ASA: Passed    Last A1C  Lab Results   Component Value Date    A1C >15.0  Results confirmed by repeat test   12/09/2016     A1C tested: FAILED    Last LDL:    Lab Results   Component Value Date    CHOL 247 12/09/2016     Lab Results   Component Value Date    HDL 30 12/09/2016     Lab Results   Component Value Date    LDL  12/09/2016     Cannot estimate LDL when triglyceride exceeds 400 mg/dL     12/09/2016     Lab Results   Component Value Date    TRIG 632 12/09/2016     No results found for: CHOLHDLRATIO  Lab Results   Component Value Date    NHDL 217 12/09/2016       Is the patient on a Statin? YES             Is the patient on Aspirin? YES    Medications     HMG CoA Reductase Inhibitors    atorvastatin (LIPITOR) 20 MG tablet    Salicylates    aspirin 81 MG tablet          Last three blood pressure readings:  BP Readings from Last 3 Encounters:   05/17/17 130/82   01/19/17 124/80   01/11/17 166/80       Date of last diabetes office visit: 5/17/17     Tobacco History:     History   Smoking Status     Former Smoker     Packs/day: 0.33     Years: 20.00     Types: Cigarettes     Quit date: 7/17/2015   Smokeless Tobacco     Former User     Types: Snuff           Composite cancer screening  Chart review shows that this patient is due/due soon for the following Colonoscopy and Fecal Colorectal (FIT)  Summary:    Patient is due/failing the following:   A1C, COLONOSCOPY, FOLLOW UP and FIT    Action needed:   Patient needs office visit for Diabetes follow up, Colonoscopy or FIT testing, A1c. and Patient needs fasting lab only appointment    Type of outreach:scheduled appointment for 7/14/17 at 3:40 pm with JERICHO Joyce    Questions for provider review:    None                                                                                                                                    Shruti Rdz  MA       Chart routed to Care Team .

## 2017-07-14 ENCOUNTER — OFFICE VISIT (OUTPATIENT)
Dept: FAMILY MEDICINE | Facility: CLINIC | Age: 57
End: 2017-07-14
Payer: COMMERCIAL

## 2017-07-14 VITALS
HEART RATE: 84 BPM | WEIGHT: 238 LBS | TEMPERATURE: 98.2 F | DIASTOLIC BLOOD PRESSURE: 100 MMHG | SYSTOLIC BLOOD PRESSURE: 152 MMHG | BODY MASS INDEX: 32.28 KG/M2 | RESPIRATION RATE: 20 BRPM

## 2017-07-14 DIAGNOSIS — F40.10 SOCIAL ANXIETY DISORDER: Primary | ICD-10-CM

## 2017-07-14 DIAGNOSIS — I10 BENIGN ESSENTIAL HYPERTENSION: ICD-10-CM

## 2017-07-14 PROCEDURE — 99214 OFFICE O/P EST MOD 30 MIN: CPT | Performed by: PHYSICIAN ASSISTANT

## 2017-07-14 RX ORDER — PAROXETINE 20 MG/1
20 TABLET, FILM COATED ORAL AT BEDTIME
Qty: 30 TABLET | Refills: 1 | Status: SHIPPED | OUTPATIENT
Start: 2017-07-14 | End: 2017-12-27 | Stop reason: ALTCHOICE

## 2017-07-14 ASSESSMENT — ANXIETY QUESTIONNAIRES
7. FEELING AFRAID AS IF SOMETHING AWFUL MIGHT HAPPEN: NOT AT ALL
IF YOU CHECKED OFF ANY PROBLEMS ON THIS QUESTIONNAIRE, HOW DIFFICULT HAVE THESE PROBLEMS MADE IT FOR YOU TO DO YOUR WORK, TAKE CARE OF THINGS AT HOME, OR GET ALONG WITH OTHER PEOPLE: VERY DIFFICULT
3. WORRYING TOO MUCH ABOUT DIFFERENT THINGS: SEVERAL DAYS
5. BEING SO RESTLESS THAT IT IS HARD TO SIT STILL: SEVERAL DAYS
GAD7 TOTAL SCORE: 10
1. FEELING NERVOUS, ANXIOUS, OR ON EDGE: MORE THAN HALF THE DAYS
2. NOT BEING ABLE TO STOP OR CONTROL WORRYING: SEVERAL DAYS
6. BECOMING EASILY ANNOYED OR IRRITABLE: NEARLY EVERY DAY

## 2017-07-14 ASSESSMENT — PATIENT HEALTH QUESTIONNAIRE - PHQ9: 5. POOR APPETITE OR OVEREATING: MORE THAN HALF THE DAYS

## 2017-07-14 NOTE — PROGRESS NOTES
"  SUBJECTIVE:                                                    Saji Iqbal is a 57 year old male who presents to clinic today for the following health issues:      History of Present Illness   Depression & Anxiety Follow-up:     Depression/Anxiety:  Depression & Anxiety    Status since last visit::  Stable    Other associated symptoms of depression and anxiety::  YES    Significant life event::  YES    Current substance use::  Alcohol    Patient is here due to not liking his lifestyle right now.      Saji is here to discuss mood changes.  He stated he has been hospitalized 4 times for suicidal thoughts but is not currently suicidal, states he feels bored.  He admits to feeling paranoid.  He feels at work he may be being watched.  He spent some time in treatment in [place], [place] during summer 2014.  After this he   moved two years from [place] due to foreclosure.  He does not like his apartment, used to have a house and woodshop.  Does not enjoy what he used to enjoy.  He felt better when he was in treatment.  \"felt like he was wrapped in a blanket\".  His job right now is \"ok\".  He feels himslef shying away from social situations now.  Feeling rejected by family and friends.  Is currently off all Psych meds.      BP high due to toothache today.  Has a fear of the dentist.  Is taking all the diabetes and BP meds, blood thinner etc.      Problem list and histories reviewed & adjusted, as indicated.  Additional history: as documented      ROS:  Constitutional, HEENT, cardiovascular, pulmonary, gi and gu systems are negative, except as otherwise noted.      OBJECTIVE:   BP (!) 152/100 (BP Location: Right arm, Patient Position: Sitting, Cuff Size: Adult Large)  Pulse 84  Temp 98.2  F (36.8  C) (Oral)  Resp 20  Wt 238 lb (108 kg)  BMI 32.28 kg/m2  Body mass index is 32.28 kg/(m^2).  GENERAL: healthy, alert and no distress  MS: no gross musculoskeletal defects noted, no edema  SKIN: no suspicious lesions or " rashes  PSYCH: mentation appears normal and affect flat    Diagnostic Test Results:  none     ASSESSMENT/PLAN:   1. Social anxiety disorder  He is agreeable to start Paxil today.  I placed a referral also for therapy which I believe will be most helpful for Saji.  Follow up with me one month for medication check.    - PARoxetine (PAXIL) 20 MG tablet; Take 1 tablet (20 mg) by mouth At Bedtime  Dispense: 30 tablet; Refill: 1  - MENTAL HEALTH REFERRAL    2. Benign essential hypertension  - not controlled.  Discussed that he needs to be on his medication.  Will recheck at medication check.      Alcides Toledo PA-C  [facility]

## 2017-07-14 NOTE — NURSING NOTE
Chief Complaint   Patient presents with     MOOD CHANGES       Initial BP (!) 152/100 (BP Location: Right arm, Patient Position: Sitting, Cuff Size: Adult Large)  Pulse 84  Temp 98.2  F (36.8  C) (Oral)  Resp 20  Wt 238 lb (108 kg)  BMI 32.28 kg/m2 Estimated body mass index is 32.28 kg/(m^2) as calculated from the following:    Height as of 5/17/17: 6' (1.829 m).    Weight as of this encounter: 238 lb (108 kg).  Medication Reconciliation: complete   Shruti Rdz MA

## 2017-07-14 NOTE — MR AVS SNAPSHOT
After Visit Summary   7/14/2017    Saji Iqbal    MRN: [id number]           Patient Information     Date Of Birth          1960        Visit Information        Provider Department      7/14/2017 3:40 PM Alcides Toledo PA-C [facility]        Today's Diagnoses     Social anxiety disorder    -  1       Follow-ups after your visit        Additional Services     MENTAL HEALTH REFERRAL       Your provider has referred you to: [facility]: [facility] - Counseling (Individual/Couples/Family) - [facility] [phone]   [email or web address]   *Patient will be contacted by [facility] scheduling partner, [facility]), to schedule an appointment.  Patients may also call [facility] to schedule.    All scheduling is subject to the client's specific insurance plan & benefits, provider/location availability, and provider clinical specialities.  Please arrive 15 minutes early for your first appointment and bring your completed paperwork.    Please be aware that coverage of these services is subject to the terms and limitations of your health insurance plan.  Call member services at your health plan with any benefit or coverage questions.                  Follow-up notes from your care team     Return in about 1 month (around 8/14/2017).      Who to contact     If you have questions or need follow up information about today's clinic visit or your schedule please contact [facility] directly at [phone].  Normal or non-critical lab and imaging results will be communicated to you by MyChart, letter or phone within 4 business days after the clinic has received the results. If you do not hear from us within 7 days, please contact the clinic through MyChart or phone. If you have a critical or abnormal lab result, we will notify you by phone as soon as possible.  Submit refill  "requests through [employer] or call your pharmacy and they will forward the refill request to us. Please allow 3 business days for your refill to be completed.          Additional Information About Your Visit        Familink Information     Familink lets you send messages to your doctor, view your test results, renew your prescriptions, schedule appointments and more. To sign up, go to [email or web address] . Click on \"Log in\" on the left side of the screen, which will take you to the Welcome page. Then click on \"Sign up Now\" on the right side of the page.     You will be asked to enter the access code listed below, as well as some personal information. Please follow the directions to create your username and password.     Your access code is: [email or web address]  Expires: 8/15/2017  1:28 PM     Your access code will  in 90 days. If you need help or a new code, please call your [place] clinic or [phone].        Care EveryWhere ID     This is your Care EveryWhere ID. This could be used by other organizations to access your [facility] medical records  [id number]        Your Vitals Were     Pulse Temperature Respirations BMI (Body Mass Index)          84 98.2  F (36.8  C) (Oral) 20 32.28 kg/m2         Blood Pressure from Last 3 Encounters:   17 (!) 152/100   17 130/82   17 124/80    Weight from Last 3 Encounters:   17 238 lb (108 kg)   17 241 lb (109.3 kg)   17 227 lb 8 oz (103.2 kg)              We Performed the Following     MENTAL HEALTH REFERRAL          Today's Medication Changes          These changes are accurate as of: 17  4:24 PM.  If you have any questions, ask your nurse or doctor.               Start taking these medicines.        Dose/Directions    PARoxetine 20 MG tablet   Commonly known as:  PAXIL   Used for:  Social anxiety disorder   Started by:  Alcides Toledo PA-C        Dose:  20 mg   Take 1 tablet (20 mg) by mouth At Bedtime   Quantity:  30 tablet "   Refills:  1            Where to get your medicines      These medications were sent to [place], [place] - [address]  [address] [address]     Phone:  [phone]     PARoxetine 20 MG tablet                Primary Care Provider Office Phone # Fax #    Roxanne Shankar [phone]       [facility] [address]  [facility] [address]        Equal Access to Services     SAL CATHERINE : Hadii aad ku hadasho Soomaali, waaxda luqadaha, qaybta kaalmada adeegyada, waxay idiin hayaan adeeg kharablayne laallan richardson. So [facility] [phone].    ATENCIÓN: Si habla español, tiene a orellana disposición servicios gratuitos de asistencia lingüística. [facility] [phone].    We comply with applicable federal civil rights laws and [place] laws. We do not discriminate on the basis of race, color, national origin, age, disability sex, sexual orientation or gender identity.            Thank you!     Thank you for choosing [facility]  for your care. Our goal is always to provide you with excellent care. Hearing back from our patients is one way we can continue to improve our services. Please take a few minutes to complete the written survey that you may receive in the mail after your visit with us. Thank you!             Your Updated Medication List - Protect others around you: Learn how to safely use, store and throw away your medicines at www.disposemymeds.org.          This list is accurate as of: 7/14/17  4:24 PM.  Always use your most recent med list.                   Brand Name Dispense Instructions for use Diagnosis    aspirin 81 MG tablet      Take by mouth daily        atorvastatin 20 MG tablet    LIPITOR    30 tablet    Take 1 tablet (20 mg) by mouth daily    Hyperlipidemia LDL goal <100       blood glucose lancets standard    no brand specified    1 Box    Use to test blood sugar 1 times daily or as directed.    Type 2 diabetes mellitus without  complication, without long-term current use of insulin (H)       blood glucose monitoring lancets     2 Box    Use to test blood sugar 4 times daily or as directed.        blood glucose monitoring test strip    no brand specified    150 Box    Use to test blood sugar 4 times daily or as directed. Please dispense Accu Chek Ana plus test strips or per patient preference if using a different brand        clopidogrel 75 MG tablet    PLAVIX    30 tablet    Take 1 tablet (75 mg) by mouth daily    Coronary artery disease involving native coronary artery of native heart without angina pectoris       insulin glargine 100 UNIT/ML injection    LANTUS SOLOSTAR    15 mL    22 units bid.    Controlled type 2 diabetes mellitus without complication, with long-term current use of insulin (H)       insulin pen needle 31G X 5 MM    B-D U/F    100 each    Use 2 daily or as directed.    Controlled type 2 diabetes mellitus without complication, with long-term current use of insulin (H)       lisinopril 5 MG tablet    PRINIVIL/ZESTRIL    30 tablet    Take 1 tablet (5 mg) by mouth daily    Benign essential hypertension, Coronary artery disease involving native coronary artery of native heart without angina pectoris       metFORMIN 500 MG 24 hr tablet    GLUCOPHAGE-XR    360 tablet    Take 4 tablets (2,000 mg) by mouth daily (with dinner) Please do not dispense until requested by patient.    Controlled type 2 diabetes mellitus without complication, with long-term current use of insulin (H)       metoprolol 25 MG tablet    LOPRESSOR    60 tablet    Take 1 tablet (25 mg) by mouth 2 times daily    Coronary artery disease involving native coronary artery of native heart without angina pectoris, Benign essential hypertension       omeprazole 20 MG CR capsule    priLOSEC    30 capsule    Take 1 capsule (20 mg) by mouth daily    Gastroesophageal reflux disease, esophagitis presence not specified       PARoxetine 20 MG tablet    PAXIL    30  tablet    Take 1 tablet (20 mg) by mouth At Bedtime    Social anxiety disorder

## 2017-07-15 ASSESSMENT — PATIENT HEALTH QUESTIONNAIRE - PHQ9: SUM OF ALL RESPONSES TO PHQ QUESTIONS 1-9: 13

## 2017-07-15 ASSESSMENT — ANXIETY QUESTIONNAIRES: GAD7 TOTAL SCORE: 10

## 2017-09-06 ENCOUNTER — TELEPHONE (OUTPATIENT)
Dept: ENDOCRINOLOGY | Facility: CLINIC | Age: 57
End: 2017-09-06

## 2017-09-06 DIAGNOSIS — Z79.4 CONTROLLED TYPE 2 DIABETES MELLITUS WITHOUT COMPLICATION, WITH LONG-TERM CURRENT USE OF INSULIN (H): Primary | ICD-10-CM

## 2017-09-06 DIAGNOSIS — E11.9 CONTROLLED TYPE 2 DIABETES MELLITUS WITHOUT COMPLICATION, WITH LONG-TERM CURRENT USE OF INSULIN (H): Primary | ICD-10-CM

## 2017-09-06 RX ORDER — INSULIN GLARGINE 100 [IU]/ML
INJECTION, SOLUTION SUBCUTANEOUS
Qty: 15 ML | Refills: 1 | Status: SHIPPED | OUTPATIENT
Start: 2017-09-06 | End: 2018-03-14

## 2017-09-06 NOTE — TELEPHONE ENCOUNTER
Called patient.  Left message on voicemail that recent prescription refill is no longer covered by the patient's insurance.  Informed prescription was changed and faxed to his pharmacy.  Advised patient to call back for further details as well as to schedule an appointment with Radha Gomez NP.  Advised patient to return call to the [facility].  Jessica Gonzalez M.A.

## 2017-09-06 NOTE — TELEPHONE ENCOUNTER
Prescription changed to Basaglar.  Please inform patient.  Also, he's due for follow up, see if he wants to schedule.  Radha Gomez NP  Endocrinology

## 2017-09-06 NOTE — TELEPHONE ENCOUNTER
Saji Iqbal is a 57 year old male whose [facility] pharmacist calls with   Lantus is not covered.  Pt called pharmacy for ASAP refill yesterday. A fax was sent to [facility] yesterday  Biju is covered.    Please advise.   Alexsander Llanes RN

## 2017-09-12 NOTE — TELEPHONE ENCOUNTER
KATYA - FYI - Pt informed and scheduled.  Pt states that thing are going well on the new med. Shari Schoenberger, CMA

## 2017-12-26 ENCOUNTER — HOSPITAL ENCOUNTER (EMERGENCY)
Facility: CLINIC | Age: 57
Discharge: HOME | End: 2017-12-26
Attending: EMERGENCY MEDICINE | Admitting: EMERGENCY MEDICINE
Payer: COMMERCIAL

## 2017-12-26 VITALS
HEART RATE: 82 BPM | DIASTOLIC BLOOD PRESSURE: 100 MMHG | TEMPERATURE: 97.4 F | SYSTOLIC BLOOD PRESSURE: 149 MMHG | RESPIRATION RATE: 20 BRPM | OXYGEN SATURATION: 95 %

## 2017-12-26 DIAGNOSIS — F41.9 ANXIETY: ICD-10-CM

## 2017-12-26 DIAGNOSIS — F10.10 ALCOHOL ABUSE: ICD-10-CM

## 2017-12-26 LAB
ALBUMIN SERPL-MCNC: 3.9 G/DL (ref 3.4–5)
ALP SERPL-CCNC: 83 U/L (ref 40–150)
ALT SERPL W P-5'-P-CCNC: 94 U/L (ref 0–70)
ANION GAP SERPL CALCULATED.3IONS-SCNC: 8 MMOL/L (ref 3–14)
AST SERPL W P-5'-P-CCNC: 36 U/L (ref 0–45)
BASOPHILS # BLD AUTO: 0.1 10E9/L (ref 0–0.2)
BASOPHILS NFR BLD AUTO: 1.2 %
BILIRUB SERPL-MCNC: 0.9 MG/DL (ref 0.2–1.3)
BUN SERPL-MCNC: 11 MG/DL (ref 7–30)
CALCIUM SERPL-MCNC: 8.5 MG/DL (ref 8.5–10.1)
CHLORIDE SERPL-SCNC: 94 MMOL/L (ref 94–109)
CO2 SERPL-SCNC: 30 MMOL/L (ref 20–32)
CREAT SERPL-MCNC: 0.71 MG/DL (ref 0.66–1.25)
DIFFERENTIAL METHOD BLD: ABNORMAL
EOSINOPHIL # BLD AUTO: 0.2 10E9/L (ref 0–0.7)
EOSINOPHIL NFR BLD AUTO: 2.2 %
ERYTHROCYTE [DISTWIDTH] IN BLOOD BY AUTOMATED COUNT: 11.4 % (ref 10–15)
ETHANOL SERPL-MCNC: <0.01 G/DL
GFR SERPL CREATININE-BSD FRML MDRD: >90 ML/MIN/1.7M2
GLUCOSE SERPL-MCNC: 329 MG/DL (ref 70–99)
HCT VFR BLD AUTO: 43.4 % (ref 40–53)
HGB BLD-MCNC: 15.9 G/DL (ref 13.3–17.7)
IMM GRANULOCYTES # BLD: 0.1 10E9/L (ref 0–0.4)
IMM GRANULOCYTES NFR BLD: 0.6 %
INTERPRETATION ECG - MUSE: NORMAL
LYMPHOCYTES # BLD AUTO: 1.3 10E9/L (ref 0.8–5.3)
LYMPHOCYTES NFR BLD AUTO: 15.7 %
MAGNESIUM SERPL-MCNC: 2 MG/DL (ref 1.6–2.3)
MCH RBC QN AUTO: 34.3 PG (ref 26.5–33)
MCHC RBC AUTO-ENTMCNC: 36.6 G/DL (ref 31.5–36.5)
MCV RBC AUTO: 94 FL (ref 78–100)
MONOCYTES # BLD AUTO: 1 10E9/L (ref 0–1.3)
MONOCYTES NFR BLD AUTO: 11.7 %
NEUTROPHILS # BLD AUTO: 5.7 10E9/L (ref 1.6–8.3)
NEUTROPHILS NFR BLD AUTO: 68.6 %
NRBC # BLD AUTO: 0 10*3/UL
NRBC BLD AUTO-RTO: 0 /100
PLATELET # BLD AUTO: 174 10E9/L (ref 150–450)
POTASSIUM SERPL-SCNC: 4.1 MMOL/L (ref 3.4–5.3)
PROT SERPL-MCNC: 6.9 G/DL (ref 6.8–8.8)
RBC # BLD AUTO: 4.63 10E12/L (ref 4.4–5.9)
SODIUM SERPL-SCNC: 132 MMOL/L (ref 133–144)
TROPONIN I SERPL-MCNC: <0.015 UG/L (ref 0–0.04)
WBC # BLD AUTO: 8.2 10E9/L (ref 4–11)

## 2017-12-26 PROCEDURE — 96374 THER/PROPH/DIAG INJ IV PUSH: CPT

## 2017-12-26 PROCEDURE — 83735 ASSAY OF MAGNESIUM: CPT | Performed by: EMERGENCY MEDICINE

## 2017-12-26 PROCEDURE — 93005 ELECTROCARDIOGRAM TRACING: CPT

## 2017-12-26 PROCEDURE — 80053 COMPREHEN METABOLIC PANEL: CPT | Performed by: EMERGENCY MEDICINE

## 2017-12-26 PROCEDURE — 25000132 ZZH RX MED GY IP 250 OP 250 PS 637: Performed by: EMERGENCY MEDICINE

## 2017-12-26 PROCEDURE — 99285 EMERGENCY DEPT VISIT HI MDM: CPT | Mod: 25

## 2017-12-26 PROCEDURE — 85025 COMPLETE CBC W/AUTO DIFF WBC: CPT | Performed by: EMERGENCY MEDICINE

## 2017-12-26 PROCEDURE — 25000128 H RX IP 250 OP 636: Performed by: EMERGENCY MEDICINE

## 2017-12-26 PROCEDURE — 80320 DRUG SCREEN QUANTALCOHOLS: CPT | Performed by: EMERGENCY MEDICINE

## 2017-12-26 PROCEDURE — 84484 ASSAY OF TROPONIN QUANT: CPT | Performed by: EMERGENCY MEDICINE

## 2017-12-26 RX ORDER — LORAZEPAM 2 MG/ML
0.5 INJECTION INTRAMUSCULAR ONCE
Status: COMPLETED | OUTPATIENT
Start: 2017-12-26 | End: 2017-12-26

## 2017-12-26 RX ORDER — ASPIRIN 325 MG
325 TABLET ORAL ONCE
Status: COMPLETED | OUTPATIENT
Start: 2017-12-26 | End: 2017-12-26

## 2017-12-26 RX ORDER — LORAZEPAM 0.5 MG/1
0.25 TABLET ORAL EVERY 8 HOURS PRN
Qty: 5 TABLET | Refills: 0 | Status: SHIPPED | OUTPATIENT
Start: 2017-12-26 | End: 2018-03-27

## 2017-12-26 RX ADMIN — LORAZEPAM 0.5 MG: 2 INJECTION INTRAMUSCULAR; INTRAVENOUS at 10:12

## 2017-12-26 RX ADMIN — ASPIRIN 325 MG ORAL TABLET 325 MG: 325 PILL ORAL at 10:11

## 2017-12-26 ASSESSMENT — ENCOUNTER SYMPTOMS
NERVOUS/ANXIOUS: 1
SLEEP DISTURBANCE: 1

## 2017-12-26 NOTE — ED AVS SNAPSHOT
[facility] Emergency Department    [address]    [facility] [address]    Phone:  [phone]    Fax:  [phone]                                       Saji Iqbal   MRN: [id number]    Department:  [facility] Emergency Department   Date of Visit:  12/26/2017           Patient Information     Date Of Birth          1960        Your diagnoses for this visit were:     Anxiety     Alcohol abuse        You were seen by Russell Harrison DO.      Follow-up Information     Follow up with Abhay Ronquillo MD. Call in 2 days.    Why:  To discuss anxiety and alcohol use    Contact information:    [address] [place] [address]  [phone]          Follow up with [facility] Emergency Department.    Specialty:  EMERGENCY MEDICINE    Why:  If symptoms worsen    Contact information:    [address]  [facility] [address]  [phone]        Call [facility].    Specialty:  Family Medicine    Why:  For follow up if you need to establish care with a primary care clinic    Contact information:    [address] [address] [facility] [id number] [phone]        Discharge Instructions         Alcohol Abuse  Alcoholic drinks are harmful when you have too many of them. There is no set number of drinks that defines too much. Drinking that disrupts your life or your health is called alcohol abuse. Alcohol abuse can hurt your relationships with others. You may lose friends, a spouse, or even your job. You may be abusing alcohol if any of the following are true for you:    Duties at home or with  suffer because of drinking.    Duties at work or in school suffer because of drinking.    You have missed work or school because of drinking.    You use alcohol while driving or operating machinery.    You have legal problems such as arrests due to drinking.    You keep drinking even though it causes  "serious problems in your life.  Health effects  Alcohol abuse causes health problems. Sometimes this can happen after only drinking a  little.\" There is no set number of drinks or amount of alcohol that defines too much. The more you drink at one time, and the more often you drink determine both the short-term and long-term health effects. It affects all parts of your body and your health, including your:    Brain. Alcohol is a central nervous system depressant. It can damage parts of the brain that affect your balance, memory, thinking, and emotions. It can cause memory loss, blackouts, depression, agitation, sleep cycle changes, and seizures. These changes may or may not be reversible.    Heart and vascular system. Alcohol affects multiple areas. It can damage heart muscle causing cardiomyopathy, which is a weakening and stretching of the heart muscle. This can lead to trouble breathing, an irregular heartbeat, atrial fibrillation, leg swelling, and heart failure. Alcohol use makes the blood vessels stiffen causing high blood pressure. All of these problems increase your risk of having heart attacks or strokes.    Liver. Alcohol causes fat to build up in the liver, affecting its normal function. This increases the risk for hepatitis, leading to abdominal pain, appetite loss, jaundice, bleeding problems, liver fibrosis, and cirrhosis. This, in turn, can affect your ability to fight off infections, and can cause diabetes. The liver changes prevent it from removing toxins in your blood that can cause encephalopathy, which may show with confusion, altered level of consciousness, personality changes, memory loss, seizures, coma, and death.    Pancreas. Alcohol can cause inflammation of the pancreas, or pancreatitis. This can cause abdominal pain, fever, and diabetes.    Immune system. Alcohol weakens your immune system in a number of ways. It suppresses your immune system making it harder to fight infections and colds. " It also increases the chance of getting pneumonia and tuberculosis.    Cancer. Alcohol is a risk factor for developing cancer of the mouth, esophagus, pharynx, larynx, liver, and breast.    Sexual function. Alcohol can lead to sexual problems.  Home care  The following guidelines will help you deal with alcohol abuse:    Admit you have a problem with alcohol.    Ask for help from your healthcare provider and trusted family members or close friends.    Get help from people trained in dealing with alcohol abuse. This may be individual counseling or group therapy, or it may be a supervised alcohol treatment program.    Join a self-help group for alcohol abuse such as Alcoholics Anonymous (AA).    Avoid people who abuse alcohol or tempt you to drink.  Follow-up care  Follow up as advised by the healthcare provider, or as advised. Contact these groups to get help:    Alcoholics Anonymous (AA): Go to www.aa.org or check the phone book for meetings near you.    National Alcohol and Substance Abuse Information Center (NASAIC): 882.627.9142 www.addictioncarePraedicat.[employer]    National [place]oba on Alcoholism and Drug Dependence (NCADD): 948-GBE-TYND (176-8764) www.ncadd.org  Call 911  Call 911 if any of these occur:    Trouble breathing or slow irregular breathing    Chest pain    Sudden weakness on one side of your body or sudden trouble speaking    Heavy bleeding or vomiting blood    Very drowsy or trouble awakening    Fainting or loss of consciousness    Rapid heart rate    Seizure  When to seek medical care  Call your healthcare provider right away if any of these occur:     Confusion    Hallucinations (seeing, hearing, or feeling things that aren t there)    Pain in your upper abdomen that gets worse    Repeated vomiting or black or tarry stools    Severe shakiness  Date Last Reviewed: 6/1/2016 2000-2017 [employer]. [address] [place] [address], [place] [address]. All rights reserved. This information is not  intended as a substitute for professional medical care. Always follow your healthcare professional's instructions.        Anxiety Reaction  Anxiety is the feeling we all get when we think something bad might happen. It is a normal response to stress and usually causes only a mild reaction. When anxiety becomes more severe, it can interfere with daily life. In some cases, you may not even be aware of what it is you re anxious about. There may also be a genetic link or it may be a learned behavior in the home.  Both psychological and physical triggers cause stress reaction. It's often a response to fear or emotional stress, real or imagined. This stress may come from home, family, work, or social relationships.  During an anxiety reaction, you may feel:    Helpless    Nervous    Depressed    Irritable  Your body may show signs of anxiety in many ways. You may experience:    Dry mouth    Shakiness    Dizziness    Weakness    Trouble breathing    Breathing fast (hyperventilating)    Chest pressure    Sweating    Headache    Nausea    Diarrhea    Tiredness    Inability to sleep    Sexual problems  Home care    Try to locate the sources of stress in your life. They may not be obvious. These may include:    Daily hassles of life (traffic jams, missed appointments, car troubles, etc.)    Major life changes, both good (new baby, job promotion) and bad (loss of job, loss of loved one)    Overload: feeling that you have too many responsibilities and can't take care of all of them at once    Feeling helpless, feeling that your problems are beyond what you re able to solve    Notice how your body reacts to stress. Learn to listen to your body signals. This will help you take action before the stress becomes severe.    When you can, do something about the source of your stress. (Avoid hassles, limit the amount of change that happens in your life at one time and take a break when you feel overloaded).    Unfortunately, many  stressful situations can't be avoided. It is necessary to learn how to better manage stress. There are many proven methods that will reduce your anxiety. These include simple things like exercise, good nutrition and adequate rest. Also, there are certain techniques that are helpful:    Relaxation    Breathing exercises    Visualization    Biofeedback    Meditation  For more information about this, consult your doctor or go to a local bookstore and review the many books and tapes available on this subject.  Follow-up care  If you feel that your anxiety is not responding to self-help measures, contact your doctor or make an appointment with a counselor. You may need short-term psychological counseling and temporary medicine to help you manage stress.  Call 911  Call your healthcare provider right away if any of these occur:    Trouble breathing    Confusion    Drowsiness or trouble wakening    Fainting or loss of consciousness    Rapid heart rate    Seizure    New chest pain that becomes more severe, lasts longer, or spreads into your shoulder, arm, neck, jaw, or back  When to seek medical advice  Call your healthcare provider right away if any of these occur:    Your symptoms get worse    Severe headache not relieved by rest and mild pain reliever  Date Last Reviewed: 9/29/2015 2000-2017 [employer]. [address] [place] [address]. All rights reserved. This information is not intended as a substitute for professional medical care. Always follow your healthcare professional's instructions.          24 Hour Appointment Hotline       To make an appointment at any [facility], call [phone] ([phone]). If you don't have a family doctor or clinic, we will help you find one. [facility] clinics are conveniently located to serve the needs of you and your family.             Review of your medicines      START taking        Dose / Directions Last dose taken    LORazepam 0.5 MG tablet    Commonly known as:  ATIVAN   Dose:  0.25 mg   Quantity:  5 tablet        Take 0.5 tablets (0.25 mg) by mouth every 8 hours as needed for anxiety   Refills:  0          Our records show that you are taking the medicines listed below. If these are incorrect, please call your family doctor or clinic.        Dose / Directions Last dose taken    aspirin 81 MG tablet        Take by mouth daily   Refills:  0        atorvastatin 20 MG tablet   Commonly known as:  LIPITOR   Dose:  20 mg   Quantity:  30 tablet        Take 1 tablet (20 mg) by mouth daily   Refills:  2        BASAGLAR 100 UNIT/ML injection   Quantity:  15 mL        22 units bid   Refills:  1        blood glucose lancets standard   Commonly known as:  no brand specified   Quantity:  1 Box        Use to test blood sugar 1 times daily or as directed.   Refills:  3        blood glucose monitoring lancets   Quantity:  2 Box        Use to test blood sugar 4 times daily or as directed.   Refills:  3        blood glucose monitoring test strip   Commonly known as:  no brand specified   Quantity:  150 Box        Use to test blood sugar 4 times daily or as directed. Please dispense Accu Chek Ana plus test strips or per patient preference if using a different brand   Refills:  3        clopidogrel 75 MG tablet   Commonly known as:  PLAVIX   Dose:  75 mg   Quantity:  30 tablet        Take 1 tablet (75 mg) by mouth daily   Refills:  8        insulin pen needle 31G X 5 MM   Commonly known as:  B-D U/F   Quantity:  100 each        Use 2 daily or as directed.   Refills:  prn        lisinopril 5 MG tablet   Commonly known as:  PRINIVIL/ZESTRIL   Dose:  5 mg   Quantity:  30 tablet        Take 1 tablet (5 mg) by mouth daily   Refills:  8        metFORMIN 500 MG 24 hr tablet   Commonly known as:  GLUCOPHAGE-XR   Dose:  2000 mg   Quantity:  360 tablet        Take 4 tablets (2,000 mg) by mouth daily (with dinner) Please do not dispense until requested by patient.   Refills:  1         metoprolol 25 MG tablet   Commonly known as:  LOPRESSOR   Dose:  25 mg   Quantity:  60 tablet        Take 1 tablet (25 mg) by mouth 2 times daily   Refills:  8        omeprazole 20 MG CR capsule   Commonly known as:  priLOSEC   Dose:  20 mg   Quantity:  30 capsule        Take 1 capsule (20 mg) by mouth daily   Refills:  8        PARoxetine 20 MG tablet   Commonly known as:  PAXIL   Dose:  20 mg   Quantity:  30 tablet        Take 1 tablet (20 mg) by mouth At Bedtime   Refills:  1                Prescriptions were sent or printed at these locations (1 Prescription)                   Other Prescriptions                Printed at Department/Unit printer (1 of 1)         LORazepam (ATIVAN) 0.5 MG tablet                Procedures and tests performed during your visit     Alcohol ethyl    CBC with platelets differential    Comprehensive metabolic panel    EKG 12-lead, tracing only    Magnesium    Troponin I      Orders Needing Specimen Collection     None      Pending Results     No orders found from 12/24/2017 to 12/27/2017.            Pending Culture Results     No orders found from 12/24/2017 to 12/27/2017.            Pending Results Instructions     If you had any lab results that were not finalized at the time of your Discharge, you can call the ED Lab Result RN at [phone]. You will be contacted by this team for any positive Lab results or changes in treatment. The nurses are available 7 days a week from 10A to 6:30P.  You can leave a message 24 hours per day and they will return your call.        Test Results From Your Hospital Stay        12/26/2017 10:14 AM      Component Results     Component Value Ref Range & Units Status    WBC 8.2 4.0 - 11.0 10e9/L Final    RBC Count 4.63 4.4 - 5.9 10e12/L Final    Hemoglobin 15.9 13.3 - 17.7 g/dL Final    Hematocrit 43.4 40.0 - 53.0 % Final    MCV 94 78 - 100 fl Final    MCH 34.3 (H) 26.5 - 33.0 pg Final    MCHC 36.6 (H) 31.5 - 36.5 g/dL Final    RDW 11.4 10.0 - 15.0  % Final    Platelet Count 174 150 - 450 10e9/L Final    Diff Method Automated Method  Final    % Neutrophils 68.6 % Final    % Lymphocytes 15.7 % Final    % Monocytes 11.7 % Final    % Eosinophils 2.2 % Final    % Basophils 1.2 % Final    % Immature Granulocytes 0.6 % Final    Nucleated RBCs 0 0 /100 Final    Absolute Neutrophil 5.7 1.6 - 8.3 10e9/L Final    Absolute Lymphocytes 1.3 0.8 - 5.3 10e9/L Final    Absolute Monocytes 1.0 0.0 - 1.3 10e9/L Final    Absolute Eosinophils 0.2 0.0 - 0.7 10e9/L Final    Absolute Basophils 0.1 0.0 - 0.2 10e9/L Final    Abs Immature Granulocytes 0.1 0 - 0.4 10e9/L Final    Absolute Nucleated RBC 0.0  Final         12/26/2017 10:33 AM      Component Results     Component Value Ref Range & Units Status    Sodium 132 (L) 133 - 144 mmol/L Final    Potassium 4.1 3.4 - 5.3 mmol/L Final    Chloride 94 94 - 109 mmol/L Final    Carbon Dioxide 30 20 - 32 mmol/L Final    Anion Gap 8 3 - 14 mmol/L Final    Glucose 329 (H) 70 - 99 mg/dL Final    Urea Nitrogen 11 7 - 30 mg/dL Final    Creatinine 0.71 0.66 - 1.25 mg/dL Final    GFR Estimate >90 >60 mL/min/1.7m2 Final    Non  GFR Calc    GFR Estimate If Black >90 >60 mL/min/1.7m2 Final    African American GFR Calc    Calcium 8.5 8.5 - 10.1 mg/dL Final    Bilirubin Total 0.9 0.2 - 1.3 mg/dL Final    Albumin 3.9 3.4 - 5.0 g/dL Final    Protein Total 6.9 6.8 - 8.8 g/dL Final    Alkaline Phosphatase 83 40 - 150 U/L Final    ALT 94 (H) 0 - 70 U/L Final    AST 36 0 - 45 U/L Final         12/26/2017 10:33 AM      Component Results     Component Value Ref Range & Units Status    Troponin I ES <0.015 0.000 - 0.045 ug/L Final    The 99th percentile for upper reference range is 0.045 ug/L.  Troponin values   in the range of 0.045 - 0.120 ug/L may be associated with risks of adverse   clinical events.           12/26/2017 10:33 AM      Component Results     Component Value Ref Range & Units Status    Ethanol g/dL <0.01 <0.01 g/dL Final          12/26/2017 10:33 AM      Component Results     Component Value Ref Range & Units Status    Magnesium 2.0 1.6 - 2.3 mg/dL Final                Clinical Quality Measure: Blood Pressure Screening     Your blood pressure was checked while you were in the emergency department today. The last reading we obtained was  BP: 131/87 . Please read the guidelines below about what these numbers mean and what you should do about them.  If your systolic blood pressure (the top number) is less than 120 and your diastolic blood pressure (the bottom number) is less than 80, then your blood pressure is normal. There is nothing more that you need to do about it.  If your systolic blood pressure (the top number) is 120-139 or your diastolic blood pressure (the bottom number) is 80-89, your blood pressure may be higher than it should be. You should have your blood pressure rechecked within a year by a primary care provider.  If your systolic blood pressure (the top number) is 140 or greater or your diastolic blood pressure (the bottom number) is 90 or greater, you may have high blood pressure. High blood pressure is treatable, but if left untreated over time it can put you at risk for heart attack, stroke, or kidney failure. You should have your blood pressure rechecked by a primary care provider within the next 4 weeks.  If your provider in the emergency department today gave you specific instructions to follow-up with your doctor or provider even sooner than that, you should follow that instruction and not wait for up to 4 weeks for your follow-up visit.        Thank you for choosing [facility]       Thank you for choosing [facility] for your care. Our goal is always to provide you with excellent care. Hearing back from our patients is one way we can continue to improve our services. Please take a few minutes to complete the written survey that you may receive in the mail after you visit with us. Thank you!        MyChart Information      "[employer]iri lets you send messages to your doctor, view your test results, renew your prescriptions, schedule appointments and more. To sign up, go to [email or web address] . Click on \"Log in\" on the left side of the screen, which will take you to the Welcome page. Then click on \"Sign up Now\" on the right side of the page.     You will be asked to enter the access code listed below, as well as some personal information. Please follow the directions to create your username and password.     Your access code is: [email or web address]  Expires: 3/26/2018 11:23 AM     Your access code will  in 90 days. If you need help or a new code, please call your [place] clinic or [phone].        Care EveryWhere ID     This is your Care EveryWhere ID. This could be used by other organizations to access your [place] medical records  [id number]        Equal Access to Services     SAL CATHERINE AH: Hadii sidney longo Soshanna, waaxda lueliadaha, qaybta kaalmada adeivy, lukas jamil . So [facility] [phone].    ATENCIÓN: Si habla español, tiene a orellana disposición servicios gratuitos de asistencia lingüística. Llame al [phone].    We comply with applicable federal civil rights laws and [place] laws. We do not discriminate on the basis of race, color, national origin, age, disability, sex, sexual orientation, or gender identity.            After Visit Summary       This is your record. Keep this with you and show to your community pharmacist(s) and doctor(s) at your next visit.                  "

## 2017-12-26 NOTE — ED PROVIDER NOTES
"  History     Chief Complaint:  Anxiety    HPI   Saji Iqbal is a 57 year old male who presents to the emergency department today for evaluation of anxiety. The patient reports he woke this morning at 0030, eight hours prior to arrival, having a panic attack and has \"never had one this bad before.\" He describes his as his whole body was shaking and can \"feel it inside my chest.\" I also affects his breathing as he has been yawning, \"quite a bit.\" The patient does not drink daily, but drank a pint of 90 proof vodka yesterday, last drink at 1700 last night. He usually drinks a pint a week. Due to persistent symptoms, he presents to the ED for further evaluation. Upon presentation, his symptoms are overall better but he \"wants to relax and can't.\" He initially presented to [facility], however, referred to the ED for further evaluation for possible alcohol withdrawls. The patient denies chest pain and any other concerns. Of note, he does not take medications at home for anxiety, but used to take ativan in the past. Furthermore, patient states he recently ran out of insulin.     Allergies:  Honey     Medications:    insulin glargine (BASAGLAR KWIKPEN) 100 UNIT/ML injection  PARoxetine (PAXIL) 20 MG tablet  atorvastatin (LIPITOR) 20 MG tablet  omeprazole (PRILOSEC) 20 MG CR capsule  clopidogrel (PLAVIX) 75 MG tablet  metoprolol (LOPRESSOR) 25 MG tablet  lisinopril (PRINIVIL/ZESTRIL) 5 MG tablet  metFORMIN (GLUCOPHAGE-XR) 500 MG 24 hr tablet  aspirin 81 MG tablet    Past Medical History:    Cluster headache   CAD  Depression  Diabetes mellitus, type II  Hyperlipidemia   Hypertension  Tobacco abuse  Chronic GERD    Past Surgical History:    Arthroscopy shoulder  Stent, coronary, yadira    Family History:    CAD    Social History:  The patient was alone.  Smoking Status: Former (smokes while drinking)  Smokeless Tobacco: Former  Alcohol Use: Yes  Marital Status:  Single     Review of Systems " "  Psychiatric/Behavioral: Positive for sleep disturbance. The patient is nervous/anxious.         \"whole body shaking\"   All other systems reviewed and are negative.    Physical Exam   First Vitals:  BP: (!) 145/110  Pulse: 82  Temp: 97.4  F (36.3  C)  Resp: 20  SpO2: 98 %    Physical Exam  Constitutional: Patient appears well-developed and well-nourished.   HENT:   Head: No external signs of trauma noted.  Eyes: Pupils are equal, round, and reactive to light.   Cardiovascular: Normal rate, regular rhythm, normal heart sounds and intact distal pulses.    Pulmonary/Chest: Effort normal and breath sounds normal. No respiratory distress. No wheezes noted.   Abdominal: Soft. There is no tenderness. There is no rebound.   Neurological:                         Patient is alert and oriented to person, place, and time.                         Speech is fluent, cognition is normal.                        CN 2-12 intact (PERRL, EOMI, symmetric smile, equal eye squeeze and forehead raise, normal and equal sensation to bilateral forehead/cheek/chin, equal hearing to finger rub, midline tongue protrusion with nl side-to-side movement, normal shoulder shrug).                         RUE strength 5/5: , finger abd, wrist flex/ext, elbow flex/ext.                         LUE strength 5/5: , finger abd, wrist flex/ext, elbow flex/ext.                         RLE strength 5/5: ankle flex/ext, knee flex/ext, hip flex.                         LLE strength 5/5: ankle flex/ext, knee flex/ext, hip flex.                         Sensation equal in all 4 extremities.                         No arm drift.                          Cerebellar: Normal rapid alternating movements                                               ( finger-nose-finger, rapid pronation/supination, hand rolling)                                              Normal heel-to-shin                        There is a mild, but notable resting tremor in the B/L hands.     "                    Normal gait.   Skin: Skin is warm and dry.   Psychiatric: The patient appears anxious    Emergency Department Course     ECG:  Indication: Anxiety  Completed at 0919.  Read at 0921.   Normal sinus rhythm  Normal ECG  Low amplitude QRS in III o/w no change compared to 5/19/2016   Rate 78 bpm. UT interval 168. QRS duration 88. QT/QTc 392/446. P-R-T axes 8 28 41.    Laboratory:  Laboratory findings were communicated with the patient who voiced understanding of the findings.  CBC: WNL. (WBC 8.2, HGB 15.9, )   CMP:  (L), Glucose 329 (H), ALT 94 (H) o/w WNL (Creatinine 0.71)  Troponin (Collected 0949): <0.015  Alcohol ethyl: <0.01   Magnesium: 2.0    Interventions:  1012 Ativan 0.5mg IV  1101 Aspirin 325mg PO     Emergency Department Course:  Nursing notes and vitals reviewed.  IV was inserted and blood was drawn for laboratory testing, results above.  0905: I performed an exam of the patient as documented above.   1107: Patient rechecked and updated.   Findings and plan explained to the Patient. Patient discharged home with instructions regarding supportive care, medications, and reasons to return. The importance of close follow-up was reviewed. The patient was prescribed ativan.   I personally reviewed the laboratory results with the Patient and answered all related questions prior to discharge.    Impression & Plan      Medical Decision Making:  The patient presents to the ER for evaluation of a panic attack. Please see the HPI and exam for specifics. The patient states that he did drink yesterday, but does not drink daily. He went to [facility] and they were concerned he could be withdrawing from alcohol, so they sent him here. The patient did have some mild tremors at rest, but his neurological exam was otherwise normal. He denied any chest pain outright, but stated that he felt shaky everywhere.  The patient's blood work was normal. His EKG was normal and his troponin  was negative. The rest of his blood work was unremarkable. The patient's heart score is 3 which would place him at low risk for an adverse cardiac event, but again, he denied chest pain. I will discharge the patient with 0.25mg ativan q8h prn anxiety, but I encouraged him to follow up closely with his primary care provider in the outpatient setting and try to abstain from alcohol as well. Anticipatory guidance given prior to discharge.     Diagnosis:    ICD-10-CM    1. Anxiety F41.9    2. Alcohol abuse F10.10        Disposition:  Discharged to home    Discharge Medications:  New Prescriptions    LORAZEPAM (ATIVAN) 0.5 MG TABLET    Take 0.5 tablets (0.25 mg) by mouth every 8 hours as needed for anxiety     Scribe Disclosure:  I, Shraddha Hernandez, am serving as a scribe at 9:02 AM on 12/26/2017 to document services personally performed by Russell Harrison DO based on my observations and the provider's statements to me.     12/26/2017   [facility] EMERGENCY DEPARTMENT       Russell Harrison DO  12/26/17 1207

## 2017-12-26 NOTE — DISCHARGE INSTRUCTIONS
"  Alcohol Abuse  Alcoholic drinks are harmful when you have too many of them. There is no set number of drinks that defines too much. Drinking that disrupts your life or your health is called alcohol abuse. Alcohol abuse can hurt your relationships with others. You may lose friends, a spouse, or even your job. You may be abusing alcohol if any of the following are true for you:    Duties at home or with  suffer because of drinking.    Duties at work or in school suffer because of drinking.    You have missed work or school because of drinking.    You use alcohol while driving or operating machinery.    You have legal problems such as arrests due to drinking.    You keep drinking even though it causes serious problems in your life.  Health effects  Alcohol abuse causes health problems. Sometimes this can happen after only drinking a  little.\" There is no set number of drinks or amount of alcohol that defines too much. The more you drink at one time, and the more often you drink determine both the short-term and long-term health effects. It affects all parts of your body and your health, including your:    Brain. Alcohol is a central nervous system depressant. It can damage parts of the brain that affect your balance, memory, thinking, and emotions. It can cause memory loss, blackouts, depression, agitation, sleep cycle changes, and seizures. These changes may or may not be reversible.    Heart and vascular system. Alcohol affects multiple areas. It can damage heart muscle causing cardiomyopathy, which is a weakening and stretching of the heart muscle. This can lead to trouble breathing, an irregular heartbeat, atrial fibrillation, leg swelling, and heart failure. Alcohol use makes the blood vessels stiffen causing high blood pressure. All of these problems increase your risk of having heart attacks or strokes.    Liver. Alcohol causes fat to build up in the liver, affecting its normal function. This " increases the risk for hepatitis, leading to abdominal pain, appetite loss, jaundice, bleeding problems, liver fibrosis, and cirrhosis. This, in turn, can affect your ability to fight off infections, and can cause diabetes. The liver changes prevent it from removing toxins in your blood that can cause encephalopathy, which may show with confusion, altered level of consciousness, personality changes, memory loss, seizures, coma, and death.    Pancreas. Alcohol can cause inflammation of the pancreas, or pancreatitis. This can cause abdominal pain, fever, and diabetes.    Immune system. Alcohol weakens your immune system in a number of ways. It suppresses your immune system making it harder to fight infections and colds. It also increases the chance of getting pneumonia and tuberculosis.    Cancer. Alcohol is a risk factor for developing cancer of the mouth, esophagus, pharynx, larynx, liver, and breast.    Sexual function. Alcohol can lead to sexual problems.  Home care  The following guidelines will help you deal with alcohol abuse:    Admit you have a problem with alcohol.    Ask for help from your healthcare provider and trusted family members or close friends.    Get help from people trained in dealing with alcohol abuse. This may be individual counseling or group therapy, or it may be a supervised alcohol treatment program.    Join a self-help group for alcohol abuse such as Alcoholics Anonymous (AA).    Avoid people who abuse alcohol or tempt you to drink.  Follow-up care  Follow up as advised by the healthcare provider, or as advised. Contact these groups to get help:    Alcoholics Anonymous (AA): Go to www.aa.org or check the phone book for meetings near you.    National Alcohol and Substance Abuse Information Center (NASAIC): 825.499.2826 www.addictioncareoptions.com    National Smiley on Alcoholism and Drug Dependence (NCADD): 145-SJE-UTSM (150-0893) www.ncadd.org  Call 911  Call 911 if any of these  occur:    Trouble breathing or slow irregular breathing    Chest pain    Sudden weakness on one side of your body or sudden trouble speaking    Heavy bleeding or vomiting blood    Very drowsy or trouble awakening    Fainting or loss of consciousness    Rapid heart rate    Seizure  When to seek medical care  Call your healthcare provider right away if any of these occur:     Confusion    Hallucinations (seeing, hearing, or feeling things that aren t there)    Pain in your upper abdomen that gets worse    Repeated vomiting or black or tarry stools    Severe shakiness  Date Last Reviewed: 6/1/2016 2000-2017 [employer]. [address] [place] [id number]. All rights reserved. This information is not intended as a substitute for professional medical care. Always follow your healthcare professional's instructions.        Anxiety Reaction  Anxiety is the feeling we all get when we think something bad might happen. It is a normal response to stress and usually causes only a mild reaction. When anxiety becomes more severe, it can interfere with daily life. In some cases, you may not even be aware of what it is you re anxious about. There may also be a genetic link or it may be a learned behavior in the home.  Both psychological and physical triggers cause stress reaction. It's often a response to fear or emotional stress, real or imagined. This stress may come from home, family, work, or social relationships.  During an anxiety reaction, you may feel:    Helpless    Nervous    Depressed    Irritable  Your body may show signs of anxiety in many ways. You may experience:    Dry mouth    Shakiness    Dizziness    Weakness    Trouble breathing    Breathing fast (hyperventilating)    Chest pressure    Sweating    Headache    Nausea    Diarrhea    Tiredness    Inability to sleep    Sexual problems  Home care    Try to locate the sources of stress in your life. They may not be obvious. These may  include:    Daily hassles of life (traffic jams, missed appointments, car troubles, etc.)    Major life changes, both good (new baby, job promotion) and bad (loss of job, loss of loved one)    Overload: feeling that you have too many responsibilities and can't take care of all of them at once    Feeling helpless, feeling that your problems are beyond what you re able to solve    Notice how your body reacts to stress. Learn to listen to your body signals. This will help you take action before the stress becomes severe.    When you can, do something about the source of your stress. (Avoid hassles, limit the amount of change that happens in your life at one time and take a break when you feel overloaded).    Unfortunately, many stressful situations can't be avoided. It is necessary to learn how to better manage stress. There are many proven methods that will reduce your anxiety. These include simple things like exercise, good nutrition and adequate rest. Also, there are certain techniques that are helpful:    Relaxation    Breathing exercises    Visualization    Biofeedback    Meditation  For more information about this, consult your doctor or go to a local bookstore and review the many books and tapes available on this subject.  Follow-up care  If you feel that your anxiety is not responding to self-help measures, contact your doctor or make an appointment with a counselor. You may need short-term psychological counseling and temporary medicine to help you manage stress.  Call 911  Call your healthcare provider right away if any of these occur:    Trouble breathing    Confusion    Drowsiness or trouble wakening    Fainting or loss of consciousness    Rapid heart rate    Seizure    New chest pain that becomes more severe, lasts longer, or spreads into your shoulder, arm, neck, jaw, or back  When to seek medical advice  Call your healthcare provider right away if any of these occur:    Your symptoms get worse    Severe  headache not relieved by rest and mild pain reliever  Date Last Reviewed: 9/29/2015 2000-2017 [employer] [address] [place] [address], [place] [address]. All rights reserved. This information is not intended as a substitute for professional medical care. Always follow your healthcare professional's instructions.

## 2017-12-26 NOTE — LETTER
December 26, 2017      To Whom It May Concern:      Saji Iqbal was seen in our Emergency Department today, 12/26/17.  I expect his condition to improve over the next day.  He may return to work/school when improved.    Sincerely,        Alysa Mackey RN

## 2017-12-26 NOTE — ED NOTES
"Patient arrives complaining of \"having a panic attack\" since midnight last night. He is alert and oriented, ABCs intact.  "

## 2017-12-27 ENCOUNTER — OFFICE VISIT (OUTPATIENT)
Dept: FAMILY MEDICINE | Facility: CLINIC | Age: 57
End: 2017-12-27
Payer: COMMERCIAL

## 2017-12-27 VITALS
SYSTOLIC BLOOD PRESSURE: 122 MMHG | DIASTOLIC BLOOD PRESSURE: 104 MMHG | HEART RATE: 114 BPM | WEIGHT: 221.3 LBS | OXYGEN SATURATION: 96 % | RESPIRATION RATE: 12 BRPM | TEMPERATURE: 98.2 F | BODY MASS INDEX: 30.01 KG/M2

## 2017-12-27 DIAGNOSIS — G47.33 OSA (OBSTRUCTIVE SLEEP APNEA): Primary | ICD-10-CM

## 2017-12-27 DIAGNOSIS — F40.10 SOCIAL ANXIETY DISORDER: ICD-10-CM

## 2017-12-27 PROCEDURE — 99214 OFFICE O/P EST MOD 30 MIN: CPT | Performed by: FAMILY MEDICINE

## 2017-12-27 RX ORDER — ESCITALOPRAM OXALATE 10 MG/1
10 TABLET ORAL DAILY
Qty: 30 TABLET | Refills: 1 | Status: SHIPPED | OUTPATIENT
Start: 2017-12-27 | End: 2018-01-31

## 2017-12-27 ASSESSMENT — ENCOUNTER SYMPTOMS
TREMORS: 1
PALPITATIONS: 0
NERVOUS/ANXIOUS: 1
SHORTNESS OF BREATH: 0

## 2017-12-27 NOTE — PROGRESS NOTES
HPI    SUBJECTIVE:   Saji Iqbal is a 57 year old male who presents to clinic today for the following health issues:    ED/UC Followup:    Facility:  [facility]  Date of visit: 12/26/17  Reason for visit: Panic Attack  Current Status: shaky, lethargic, off-balance, dry mouth, loss of appetite         Requesting a letter that states it is okay for him to go back to work.  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  Tremor is biggest issue.  Hand are really shaking.  Feeling really anxious.  Has had several of these in the last few weeks - will last for three days.  Yesterday's was worst, felt as if he was having an heart attack.  Does take Paxil daily.  Had thought that his meds were being filled through us, although we show the last refill for this was 7/17 for a month.  Has used Ativan and finds it makes him sleep, but doesn't help much.  Interested in a referral to psychiatry.  Will drink a pint of liquor on weekend days, but never drinks on week days.  Has had this drinking pattern for years.  Sleep is terrible, doesn't use CPAP.  Also trouble staying asleep, up multiple times during the night.  Nightmares are an issue.  Feels he would be most interested in witching medications - has been on Paxil for a long time.  Has done individual counseling, has been hospitalized due to mood.  [place] that counseling helped.    Does feel socially isolated.  Doesn't pursue hobbies, mostly just works.      Review of Systems   Respiratory: Negative for shortness of breath.    Cardiovascular: Negative for palpitations.   Neurological: Positive for tremors.   Psychiatric/Behavioral: The patient is nervous/anxious.          Physical Exam   Constitutional: He is oriented to person, place, and time and well-developed, well-nourished, and in no distress.   Eyes: Conjunctivae and EOM are normal.   Cardiovascular: Normal rate, regular rhythm and normal heart sounds.    Pulmonary/Chest:  Effort normal and breath sounds normal.   Musculoskeletal: He exhibits no edema.   Neurological: He is alert and oriented to person, place, and time.   Skin: Skin is warm and dry.   Vitals reviewed.    (G47.33) NIKKI (obstructive sleep apnea)  (primary encounter diagnosis)  Comment: needs updated equipment, new mask  Plan: SLEEP EVALUATION & MANAGEMENT REFERRAL - [facility]          [phone] (Age 18 and up)            (F40.10) Social anxiety disorder  Comment: will swithc over to escitalopram, referred to CBT  Plan: escitalopram (LEXAPRO) 10 MG tablet              RTC in 1m    Naseem Esparza MD

## 2017-12-27 NOTE — LETTER
[address], [address]  [facility] [address]  Phone: [phone]  Fax: [phone]    December 27, 2017        Saji Iqbal  [address] [address]          To whom it may concern:    RE: Saji Iqbal    Patient was seen and treated today at our clinic.  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  He can return to full duties at that time.    Please contact me for questions or concerns.      Sincerely,        Naseem Esparza MD

## 2017-12-27 NOTE — NURSING NOTE
Chief Complaint   Patient presents with     Hospital F/U     Letter Request       Initial BP (!) 122/104  Pulse 114  Temp 98.2  F (36.8  C) (Oral)  Resp 12  Wt 221 lb 4.8 oz (100.4 kg)  SpO2 96%  BMI 30.01 kg/m2 Estimated body mass index is 30.01 kg/(m^2) as calculated from the following:    Height as of 5/17/17: 6' (1.829 m).    Weight as of this encounter: 221 lb 4.8 oz (100.4 kg).  Medication Reconciliation: complete   Yuli Russo CMA (AAMA)

## 2018-01-03 ENCOUNTER — TELEPHONE (OUTPATIENT)
Dept: FAMILY MEDICINE | Facility: CLINIC | Age: 58
End: 2018-01-03

## 2018-01-03 NOTE — TELEPHONE ENCOUNTER
FMLA forms faxed to Stefanie in HR at [phone] then forms were sent to [employer] and a temporary copy is kept in a folder at the [facility].      Oscar Haile   01/03/18 3:12 PM

## 2018-01-31 ENCOUNTER — OFFICE VISIT (OUTPATIENT)
Dept: FAMILY MEDICINE | Facility: CLINIC | Age: 58
End: 2018-01-31
Payer: COMMERCIAL

## 2018-01-31 VITALS
SYSTOLIC BLOOD PRESSURE: 144 MMHG | DIASTOLIC BLOOD PRESSURE: 86 MMHG | WEIGHT: 219 LBS | OXYGEN SATURATION: 96 % | HEIGHT: 72 IN | TEMPERATURE: 98.1 F | HEART RATE: 83 BPM | RESPIRATION RATE: 20 BRPM | BODY MASS INDEX: 29.66 KG/M2

## 2018-01-31 DIAGNOSIS — F40.10 SOCIAL ANXIETY DISORDER: ICD-10-CM

## 2018-01-31 PROCEDURE — 99213 OFFICE O/P EST LOW 20 MIN: CPT | Performed by: FAMILY MEDICINE

## 2018-01-31 RX ORDER — ESCITALOPRAM OXALATE 10 MG/1
10 TABLET ORAL DAILY
Qty: 90 TABLET | Refills: 1 | Status: SHIPPED | OUTPATIENT
Start: 2018-01-31 | End: 2018-03-27

## 2018-01-31 ASSESSMENT — ENCOUNTER SYMPTOMS
CONSTITUTIONAL NEGATIVE: 1
INSOMNIA: 0
DEPRESSION: 0
NERVOUS/ANXIOUS: 0
RESPIRATORY NEGATIVE: 1
CARDIOVASCULAR NEGATIVE: 1

## 2018-01-31 ASSESSMENT — ANXIETY QUESTIONNAIRES
7. FEELING AFRAID AS IF SOMETHING AWFUL MIGHT HAPPEN: NOT AT ALL
2. NOT BEING ABLE TO STOP OR CONTROL WORRYING: NOT AT ALL
1. FEELING NERVOUS, ANXIOUS, OR ON EDGE: SEVERAL DAYS
GAD7 TOTAL SCORE: 4
6. BECOMING EASILY ANNOYED OR IRRITABLE: NOT AT ALL
3. WORRYING TOO MUCH ABOUT DIFFERENT THINGS: NOT AT ALL
5. BEING SO RESTLESS THAT IT IS HARD TO SIT STILL: SEVERAL DAYS

## 2018-01-31 ASSESSMENT — PATIENT HEALTH QUESTIONNAIRE - PHQ9: 5. POOR APPETITE OR OVEREATING: MORE THAN HALF THE DAYS

## 2018-01-31 NOTE — MR AVS SNAPSHOT
"              After Visit Summary   2018    Saji Iqbal    MRN: [id number]           Patient Information     Date Of Birth          1960        Visit Information        Provider Department      2018 4:00 PM Naseem Esparza MD [facility]        Today's Diagnoses     Social anxiety disorder           Follow-ups after your visit        Follow-up notes from your care team     Return in about 6 months (around 2018).      Who to contact     If you have questions or need follow up information about today's clinic visit or your schedule please contact [facility] directly at [phone].  Normal or non-critical lab and imaging results will be communicated to you by AMEND[employer]hart, letter or phone within 4 business days after the clinic has received the results. If you do not hear from us within 7 days, please contact the clinic through AMENDIAhart or phone. If you have a critical or abnormal lab result, we will notify you by phone as soon as possible.  Submit refill requests through [employer] or call your pharmacy and they will forward the refill request to us. Please allow 3 business days for your refill to be completed.          Additional Information About Your Visit        MyChart Information     [employer] lets you send messages to your doctor, view your test results, renew your prescriptions, schedule appointments and more. To sign up, go to [email or web address] . Click on \"Log in\" on the left side of the screen, which will take you to the Welcome page. Then click on \"Sign up Now\" on the right side of the page.     You will be asked to enter the access code listed below, as well as some personal information. Please follow the directions to create your username and password.     Your access code is: [email or web address]  Expires: 3/26/2018 11:23 AM     Your access code will  in 90 days. If you need help or a new code, please call your [facility] or [phone].      " "  Care EveryWhere ID     This is your Care EveryWhere ID. This could be used by other organizations to access your [facility] medical records  [id number]        Your Vitals Were     Pulse Temperature Respirations Height Pulse Oximetry BMI (Body Mass Index)    83 98.1  F (36.7  C) (Oral) 20 5' 11.5\" (1.816 m) 96% 30.12 kg/m2       Blood Pressure from Last 3 Encounters:   01/31/18 144/86   12/27/17 (!) 122/104   12/26/17 (!) 149/100    Weight from Last 3 Encounters:   01/31/18 219 lb (99.3 kg)   12/27/17 221 lb 4.8 oz (100.4 kg)   07/14/17 238 lb (108 kg)              Today, you had the following     No orders found for display         Where to get your medicines      These medications were sent to [address] [address]     Phone:  [phone]     escitalopram 10 MG tablet          Primary Care Provider Office Phone # Fax #    Abhay Ronquillo [phone]       [address] [place] [address]        Equal Access to Services     KWADWO CATHERINE AH: Hadii sidney longo Soshanna, waaxda luqadaha, qaybta kaalmada adeegyada, lukas richardson. So [facility] [phone].    ATENCIÓN: Si habla español, tiene a orellana disposición servicios gratuitos de asistencia lingüística. Llame al [phone].    We comply with applicable federal civil rights laws and [place] laws. We do not discriminate on the basis of race, color, national origin, age, disability, sex, sexual orientation, or gender identity.            Thank you!     Thank you for choosing [facility]  for your care. Our goal is always to provide you with excellent care. Hearing back from our patients is one way we can continue to improve our services. Please take a few minutes to complete the written survey that you may receive in the mail after your visit with us. Thank you!             Your Updated Medication " List - Protect others around you: Learn how to safely use, store and throw away your medicines at www.disposemymeds.org.          This list is accurate as of 1/31/18  4:43 PM.  Always use your most recent med list.                   Brand Name Dispense Instructions for use Diagnosis    aspirin 81 MG tablet      Take by mouth daily        atorvastatin 20 MG tablet    LIPITOR    30 tablet    Take 1 tablet (20 mg) by mouth daily    Hyperlipidemia LDL goal <100       BASAGLAR 100 UNIT/ML injection     15 mL    22 units bid    Controlled type 2 diabetes mellitus without complication, with long-term current use of insulin (H)       blood glucose lancets standard    no brand specified    1 Box    Use to test blood sugar 1 times daily or as directed.    Type 2 diabetes mellitus without complication, without long-term current use of insulin (H)       blood glucose monitoring lancets     2 Box    Use to test blood sugar 4 times daily or as directed.        blood glucose monitoring test strip    no brand specified    150 Box    Use to test blood sugar 4 times daily or as directed. Please dispense Accu Chek Ana plus test strips or per patient preference if using a different brand        clopidogrel 75 MG tablet    PLAVIX    30 tablet    Take 1 tablet (75 mg) by mouth daily    Coronary artery disease involving native coronary artery of native heart without angina pectoris       escitalopram 10 MG tablet    LEXAPRO    90 tablet    Take 1 tablet (10 mg) by mouth daily    Social anxiety disorder       insulin pen needle 31G X 5 MM    B-D U/F    100 each    Use 2 daily or as directed.    Controlled type 2 diabetes mellitus without complication, with long-term current use of insulin (H)       lisinopril 5 MG tablet    PRINIVIL/ZESTRIL    30 tablet    Take 1 tablet (5 mg) by mouth daily    Benign essential hypertension, Coronary artery disease involving native coronary artery of native heart without angina pectoris       LORazepam  0.5 MG tablet    ATIVAN    5 tablet    Take 0.5 tablets (0.25 mg) by mouth every 8 hours as needed for anxiety        metFORMIN 500 MG 24 hr tablet    GLUCOPHAGE-XR    360 tablet    Take 4 tablets (2,000 mg) by mouth daily (with dinner) Please do not dispense until requested by patient.    Controlled type 2 diabetes mellitus without complication, with long-term current use of insulin (H)       metoprolol tartrate 25 MG tablet    LOPRESSOR    60 tablet    Take 1 tablet (25 mg) by mouth 2 times daily    Coronary artery disease involving native coronary artery of native heart without angina pectoris, Benign essential hypertension       omeprazole 20 MG CR capsule    priLOSEC    30 capsule    Take 1 capsule (20 mg) by mouth daily    Gastroesophageal reflux disease, esophagitis presence not specified

## 2018-01-31 NOTE — PROGRESS NOTES
History of Present Illness     Depression & Anxiety Follow-up:     Depression/Anxiety:  Anxiety only    Status since last visit::  Improved    Other associated symptoms of anxiety::  None    Significant life event::  No    Current substance use::  None    Depression symptoms::  None       Today's PHQ-9         PHQ-9 Total Score:         PHQ-9 Q9 Suicidal ideation:       Thoughts of suicide or self harm:      Self-harm Plan:        Self-harm Action:          Safety concerns for self or others:            Diet:  Low fat/cholesterol  Taking medications regularly:  Yes  Medication side effects:  Other       Generally feeling more at ease around people, less anxiety.  But notes persistent urge to yawn that doesn't ever go away.  Also feel likes he has trouble swallowing, gets worse the more he thinks about about it.  Does concede that the good outweighs the bad.  Also notes less alcohol consumption since starting.      Review of Systems   Constitutional: Negative.    HENT:        Yawning, difficulty swallowing   Respiratory: Negative.    Cardiovascular: Negative.    Psychiatric/Behavioral: Negative for depression. The patient is not nervous/anxious and does not have insomnia.          Physical Exam   Constitutional: He is oriented to person, place, and time and well-developed, well-nourished, and in no distress.   Eyes: Conjunctivae and EOM are normal.   Cardiovascular: Normal rate, regular rhythm and normal heart sounds.    Pulmonary/Chest: Effort normal and breath sounds normal.   Musculoskeletal: He exhibits no edema.   Neurological: He is alert and oriented to person, place, and time.   Skin: Skin is warm and dry.   Vitals reviewed.    (F40.10) Social anxiety disorder  Comment: will continue on this dose for now, if side fx become bothersome we can switch  Plan: escitalopram (LEXAPRO) 10 MG tablet              RTC in 6m    Naseem Esparza MD

## 2018-02-01 ASSESSMENT — PATIENT HEALTH QUESTIONNAIRE - PHQ9: SUM OF ALL RESPONSES TO PHQ QUESTIONS 1-9: 4

## 2018-02-01 ASSESSMENT — ANXIETY QUESTIONNAIRES: GAD7 TOTAL SCORE: 4

## 2018-02-12 ENCOUNTER — TELEPHONE (OUTPATIENT)
Dept: FAMILY MEDICINE | Facility: CLINIC | Age: 58
End: 2018-02-12

## 2018-02-12 PROBLEM — E78.1 HYPERTRIGLYCERIDEMIA: Status: ACTIVE | Noted: 2018-02-12

## 2018-02-12 NOTE — TELEPHONE ENCOUNTER
Panel Management Review      Patient has the following on his problem list:     Diabetes    ASA: Passed    Last A1C  Lab Results   Component Value Date    A1C >15.0  Results confirmed by repeat test   12/09/2016     A1C tested: FAILED    Last LDL:    Lab Results   Component Value Date    CHOL 247 12/09/2016     Lab Results   Component Value Date    HDL 30 12/09/2016     Lab Results   Component Value Date    LDL  12/09/2016     Cannot estimate LDL when triglyceride exceeds 400 mg/dL     12/09/2016     Lab Results   Component Value Date    TRIG 632 12/09/2016     No results found for: CHOLHDLRATIO  Lab Results   Component Value Date    NHDL 217 12/09/2016       Is the patient on a Statin? NO             Is the patient on Aspirin? YES    Medications     HMG CoA Reductase Inhibitors    atorvastatin (LIPITOR) 20 MG tablet    Salicylates    aspirin 81 MG tablet          Last three blood pressure readings:  BP Readings from Last 3 Encounters:   01/31/18 144/86   12/27/17 (!) 122/104   12/26/17 (!) 149/100       Date of last diabetes office visit: 12/9/16; seen through endocrinology on 1/11/17.     Tobacco History:     History   Smoking Status     Former Smoker     Packs/day: 0.33     Years: 20.00     Types: Cigarettes     Quit date: 7/17/2015   Smokeless Tobacco     Former User     Types: Snuff           Composite cancer screening  Chart review shows that this patient is due/due soon for the following Colonoscopy  Summary:    Patient is due/failing the following:   A1C and COLONOSCOPY    Action needed:   Patient needs office visit for Diabetes follow up.    Type of outreach:    Phone, left message for patient to call back.     Questions for provider review:    None                                                                                                                                    Yuli Russo CMA ([facility])    Chart routed to Care Team.

## 2018-02-12 NOTE — TELEPHONE ENCOUNTER
Pt returned call, given message below. Appointment scheduled.    Next 5 appointments (look out 90 days)     Feb 16, 2018  2:40 PM CST   Office Visit with Naseem Esparza MD   [facility]    [address] [place] [address], [facility] [address]  [facility] [address] [phone]                   Oscar Haile   02/12/18 3:10 PM

## 2018-03-14 ENCOUNTER — OFFICE VISIT (OUTPATIENT)
Dept: FAMILY MEDICINE | Facility: CLINIC | Age: 58
End: 2018-03-14
Payer: COMMERCIAL

## 2018-03-14 VITALS
OXYGEN SATURATION: 96 % | TEMPERATURE: 98.3 F | DIASTOLIC BLOOD PRESSURE: 80 MMHG | BODY MASS INDEX: 29.29 KG/M2 | WEIGHT: 213 LBS | RESPIRATION RATE: 16 BRPM | SYSTOLIC BLOOD PRESSURE: 132 MMHG | HEART RATE: 88 BPM

## 2018-03-14 DIAGNOSIS — F40.10 SOCIAL ANXIETY DISORDER: Primary | ICD-10-CM

## 2018-03-14 PROCEDURE — 99214 OFFICE O/P EST MOD 30 MIN: CPT | Performed by: NURSE PRACTITIONER

## 2018-03-14 RX ORDER — SULFAMETHOXAZOLE/TRIMETHOPRIM 800-160 MG
TABLET ORAL
COMMUNITY
Start: 2018-03-11 | End: 2018-03-27

## 2018-03-14 RX ORDER — HYDROXYZINE HYDROCHLORIDE 25 MG/1
25-50 TABLET, FILM COATED ORAL EVERY 6 HOURS PRN
Qty: 60 TABLET | Refills: 0 | Status: SHIPPED | OUTPATIENT
Start: 2018-03-14 | End: 2018-03-27

## 2018-03-14 RX ORDER — INSULIN GLARGINE 100 [IU]/ML
INJECTION, SOLUTION SUBCUTANEOUS
Qty: 15 ML | Refills: 0 | Status: SHIPPED | OUTPATIENT
Start: 2018-03-14 | End: 2018-03-27

## 2018-03-14 RX ORDER — INSULIN GLARGINE 100 [IU]/ML
INJECTION, SOLUTION SUBCUTANEOUS
Qty: 15 ML | Refills: 1 | Status: CANCELLED | OUTPATIENT
Start: 2018-03-14

## 2018-03-14 RX ORDER — BUPROPION HYDROCHLORIDE 150 MG/1
150 TABLET ORAL EVERY MORNING
Qty: 30 TABLET | Refills: 1 | Status: SHIPPED | OUTPATIENT
Start: 2018-03-14 | End: 2018-03-27

## 2018-03-14 ASSESSMENT — ANXIETY QUESTIONNAIRES
7. FEELING AFRAID AS IF SOMETHING AWFUL MIGHT HAPPEN: MORE THAN HALF THE DAYS
2. NOT BEING ABLE TO STOP OR CONTROL WORRYING: MORE THAN HALF THE DAYS
GAD7 TOTAL SCORE: 11
IF YOU CHECKED OFF ANY PROBLEMS ON THIS QUESTIONNAIRE, HOW DIFFICULT HAVE THESE PROBLEMS MADE IT FOR YOU TO DO YOUR WORK, TAKE CARE OF THINGS AT HOME, OR GET ALONG WITH OTHER PEOPLE: EXTREMELY DIFFICULT
6. BECOMING EASILY ANNOYED OR IRRITABLE: MORE THAN HALF THE DAYS
3. WORRYING TOO MUCH ABOUT DIFFERENT THINGS: SEVERAL DAYS
1. FEELING NERVOUS, ANXIOUS, OR ON EDGE: SEVERAL DAYS
5. BEING SO RESTLESS THAT IT IS HARD TO SIT STILL: SEVERAL DAYS

## 2018-03-14 ASSESSMENT — PATIENT HEALTH QUESTIONNAIRE - PHQ9: 5. POOR APPETITE OR OVEREATING: MORE THAN HALF THE DAYS

## 2018-03-14 NOTE — PROGRESS NOTES
"    SUBJECTIVE:   Saji Iqbal is a 57 year old male who presents to clinic today for the following health issues:      Anxiety Follow-Up    Status since last visit: Worsened woke up on Monday with panic    Other associated symptoms:shaking, sweating, chills, not sleeping    Complicating factors:   Significant life event: No   Current substance abuse: None  Depression symptoms: No  RADHA-7 SCORE 7/14/2017 1/31/2018 3/14/2018   Total Score 10 4 11     PHQ-9 SCORE 7/14/2017 1/31/2018 3/14/2018   Total Score 13 4 12       RADHA-7  Anxiety and mood has been bad the last three days.  It has been disrupting his sleep schedule.  Sleeping poorly; waking frequently.  Therapy has helped in the past.  His job is stressful, thinks this is a part of the problem.  He has to do other tasks he doesn't know as well when co-workers are out.      Has has not been checking his blood sugars regularly.  When he was taking his insulin blood sugars ranged from 110-140.  He did check one the other day and it was in the low 300s.  He has been out of insulin for \"months\" because \"I was being stupid about it\".  He reports he has not been checking his blood sugars because he is afraid of the numbers he will see.  On bactrim for a UTI.  Told infection is due to sugar in his urine; uncontrolled diabetes.  Knows he needs to see Dr. Esparza.          Problem list and histories reviewed & adjusted, as indicated.  Additional history: as documented    Current Outpatient Prescriptions   Medication Sig Dispense Refill     sulfamethoxazole-trimethoprim (BACTRIM DS/SEPTRA DS) 800-160 MG per tablet        buPROPion (WELLBUTRIN XL) 150 MG 24 hr tablet Take 1 tablet (150 mg) by mouth every morning 30 tablet 1     hydrOXYzine (ATARAX) 25 MG tablet Take 1-2 tablets (25-50 mg) by mouth every 6 hours as needed for anxiety 60 tablet 0     BASAGLAR 100 UNIT/ML injection 20 units twice daily 15 mL 0     omeprazole (PRILOSEC) 20 MG CR capsule Take 1 capsule (20 mg) by " mouth daily 90 capsule 1     escitalopram (LEXAPRO) 10 MG tablet Take 1 tablet (10 mg) by mouth daily 90 tablet 1     atorvastatin (LIPITOR) 20 MG tablet Take 1 tablet (20 mg) by mouth daily 30 tablet 2     clopidogrel (PLAVIX) 75 MG tablet Take 1 tablet (75 mg) by mouth daily 30 tablet 8     metoprolol (LOPRESSOR) 25 MG tablet Take 1 tablet (25 mg) by mouth 2 times daily 60 tablet 8     lisinopril (PRINIVIL/ZESTRIL) 5 MG tablet Take 1 tablet (5 mg) by mouth daily 30 tablet 8     insulin pen needle (B-D U/F) 31G X 5 MM Use 2 daily or as directed. 100 each prn     metFORMIN (GLUCOPHAGE-XR) 500 MG 24 hr tablet Take 4 tablets (2,000 mg) by mouth daily (with dinner) Please do not dispense until requested by patient. 360 tablet 1     blood glucose monitoring (NO BRAND SPECIFIED) test strip Use to test blood sugar 4 times daily or as directed. Please dispense Accu Chek Ana plus test strips or per patient preference if using a different brand 150 Box 3     blood glucose monitoring (ACCU-CHEK FASTCLIX) lancets Use to test blood sugar 4 times daily or as directed. 2 Box 3     blood glucose (NO BRAND SPECIFIED) lancets standard Use to test blood sugar 1 times daily or as directed. 1 Box 3     aspirin 81 MG tablet Take by mouth daily       LORazepam (ATIVAN) 0.5 MG tablet Take 0.5 tablets (0.25 mg) by mouth every 8 hours as needed for anxiety (Patient not taking: Reported on 3/14/2018) 5 tablet 0     Allergies   Allergen Reactions     Honey Other (See Comments)     Throat irritation       Reviewed and updated as needed this visit by clinical staff  Tobacco  Allergies  Meds  Problems  Med Hx  Surg Hx  Fam Hx  Soc Hx        Reviewed and updated as needed this visit by Provider  Tobacco  Allergies  Meds  Problems  Med Hx  Surg Hx  Fam Hx  Soc Hx          ROS:  Constitutional, HEENT, cardiovascular, pulmonary, gi and gu systems are negative, except as otherwise noted.    OBJECTIVE:     /80 (BP Location: Right  arm, Patient Position: Sitting, Cuff Size: Adult Regular)  Pulse 88  Temp 98.3  F (36.8  C) (Oral)  Resp 16  Wt 213 lb (96.6 kg)  SpO2 96%  BMI 29.29 kg/m2  Body mass index is 29.29 kg/(m^2).  GENERAL: well nourished, alert and no distress  EYES: Eyes grossly normal to inspection, PERRL and conjunctivae and sclerae normal  MS: no gross musculoskeletal defects noted  NEURO: Normal strength and tone, mentation intact and speech normal  PSYCH: mentation appears normal, affect normal/bright    Diagnostic Test Results:  none     ASSESSMENT/PLAN:   1. Social anxiety disorder  Worsening mood and anxiety.  Will add in wellbutrin.  Monitor anxiety closely; if worsening anxiety will need to try something else.  Follow up in 1 month.   - buPROPion (WELLBUTRIN XL) 150 MG 24 hr tablet; Take 1 tablet (150 mg) by mouth every morning  Dispense: 30 tablet; Refill: 1  - hydrOXYzine (ATARAX) 25 MG tablet; Take 1-2 tablets (25-50 mg) by mouth every 6 hours as needed for anxiety  Dispense: 60 tablet; Refill: 0    2. Uncontrolled diabetes mellitus type 2 without complications, unspecified long term insulin use status (H)  Due for follow up.  Needs to be checking blood sugars.  He made appt today to see his PCP next week.    - BASAGLAR 100 UNIT/ML injection; 20 units twice daily  Dispense: 15 mL; Refill: 0        JACKIE [facility]

## 2018-03-14 NOTE — MR AVS SNAPSHOT
"              After Visit Summary   3/14/2018    Saji Iqbal    MRN: [id number]           Patient Information     Date Of Birth          1960        Visit Information        Provider Department      3/14/2018 9:00 AM Deann Campos APRN CNP [facility]        Today's Diagnoses     Social anxiety disorder    -  1    Controlled type 2 diabetes mellitus without complication, with long-term current use of insulin (H)           Follow-ups after your visit        Follow-up notes from your care team     Return in about 4 weeks (around 4/11/2018) for anxiety .      Who to contact     If you have questions or need follow up information about today's clinic visit or your schedule please contact [facility] directly at [phone].  Normal or non-critical lab and imaging results will be communicated to you by [employer], letter or phone within 4 business days after the clinic has received the results. If you do not hear from us within 7 days, please contact the clinic through [employer] or phone. If you have a critical or abnormal lab result, we will notify you by phone as soon as possible.  Submit refill requests through [employer] or call your pharmacy and they will forward the refill request to us. Please allow 3 business days for your refill to be completed.          Additional Information About Your Visit        MyChart Information     [employer] lets you send messages to your doctor, view your test results, renew your prescriptions, schedule appointments and more. To sign up, go to [email or web address] . Click on \"Log in\" on the left side of the screen, which will take you to the Welcome page. Then click on \"Sign up Now\" on the right side of the page.     You will be asked to enter the access code listed below, as well as some personal information. Please follow the directions to create your username and password.     Your access code is: [email or web address]  Expires: 3/26/2018 12:23 PM   "   Your access code will  in 90 days. If you need help or a new code, please call your [place] clinic or [phone].        Care EveryWhere ID     This is your Care EveryWhere ID. This could be used by other organizations to access your [place] medical records  [id number]        Your Vitals Were     Pulse Temperature Respirations Pulse Oximetry BMI (Body Mass Index)       88 98.3  F (36.8  C) (Oral) 16 96% 29.29 kg/m2        Blood Pressure from Last 3 Encounters:   18 132/80   18 144/86   17 (!) 122/104    Weight from Last 3 Encounters:   18 213 lb (96.6 kg)   18 219 lb (99.3 kg)   17 221 lb 4.8 oz (100.4 kg)              Today, you had the following     No orders found for display         Today's Medication Changes          These changes are accurate as of 3/14/18  9:41 AM.  If you have any questions, ask your nurse or doctor.               Start taking these medicines.        Dose/Directions    buPROPion 150 MG 24 hr tablet   Commonly known as:  WELLBUTRIN XL   Used for:  Social anxiety disorder   Started by:  Deann Campos APRN CNP        Dose:  150 mg   Take 1 tablet (150 mg) by mouth every morning   Quantity:  30 tablet   Refills:  1       hydrOXYzine 25 MG tablet   Commonly known as:  ATARAX   Used for:  Social anxiety disorder   Started by:  Deann Campos APRN CNP        Dose:  25-50 mg   Take 1-2 tablets (25-50 mg) by mouth every 6 hours as needed for anxiety   Quantity:  60 tablet   Refills:  0         These medicines have changed or have updated prescriptions.        Dose/Directions    BASAGLAR 100 UNIT/ML injection   This may have changed:  additional instructions   Used for:  Controlled type 2 diabetes mellitus without complication, with long-term current use of insulin (H)   Changed by:  Deann Campos APRN CNP        20 units twice daily   Quantity:  15 mL   Refills:  0            Where to get your medicines      These medications  were sent to [place], [place] - [address] [facility]  [address] [facility] [address]     Phone:  [phone]     BASAGLAR 100 UNIT/ML injection    buPROPion 150 MG 24 hr tablet    hydrOXYzine 25 MG tablet                Primary Care Provider Office Phone # Fax #    Abhay Ronquillo [phone]       [address] [place] [address]        Equal Access to Services     SAL CATHERINE : Hadii aad ku hadasho Soomaali, waaxda luqadaha, qaybta kaalmada adeegyada, waxay idiin hayaan adeeg kharash la'louisa . So [facility] [phone].    ATENCIÓN: Si habla español, tiene a orellana disposición servicios gratuitos de asistencia lingüística. [facility] [phone].    We comply with applicable federal civil rights laws and [place] laws. We do not discriminate on the basis of race, color, national origin, age, disability, sex, sexual orientation, or gender identity.            Thank you!     Thank you for choosing [facility]  for your care. Our goal is always to provide you with excellent care. Hearing back from our patients is one way we can continue to improve our services. Please take a few minutes to complete the written survey that you may receive in the mail after your visit with us. Thank you!             Your Updated Medication List - Protect others around you: Learn how to safely use, store and throw away your medicines at www.disposemymeds.org.          This list is accurate as of 3/14/18  9:41 AM.  Always use your most recent med list.                   Brand Name Dispense Instructions for use Diagnosis    aspirin 81 MG tablet      Take by mouth daily        atorvastatin 20 MG tablet    LIPITOR    30 tablet    Take 1 tablet (20 mg) by mouth daily    Hyperlipidemia LDL goal <100       BASAGLAR 100 UNIT/ML injection     15 mL    20 units twice daily    Controlled type 2 diabetes mellitus without complication, with long-term current use of  insulin (H)       blood glucose lancets standard    no brand specified    1 Box    Use to test blood sugar 1 times daily or as directed.    Type 2 diabetes mellitus without complication, without long-term current use of insulin (H)       blood glucose monitoring lancets     2 Box    Use to test blood sugar 4 times daily or as directed.        blood glucose monitoring test strip    no brand specified    150 Box    Use to test blood sugar 4 times daily or as directed. Please dispense Accu Chek Ana plus test strips or per patient preference if using a different brand        buPROPion 150 MG 24 hr tablet    WELLBUTRIN XL    30 tablet    Take 1 tablet (150 mg) by mouth every morning    Social anxiety disorder       clopidogrel 75 MG tablet    PLAVIX    30 tablet    Take 1 tablet (75 mg) by mouth daily    Coronary artery disease involving native coronary artery of native heart without angina pectoris       escitalopram 10 MG tablet    LEXAPRO    90 tablet    Take 1 tablet (10 mg) by mouth daily    Social anxiety disorder       hydrOXYzine 25 MG tablet    ATARAX    60 tablet    Take 1-2 tablets (25-50 mg) by mouth every 6 hours as needed for anxiety    Social anxiety disorder       insulin pen needle 31G X 5 MM    B-D U/F    100 each    Use 2 daily or as directed.    Controlled type 2 diabetes mellitus without complication, with long-term current use of insulin (H)       lisinopril 5 MG tablet    PRINIVIL/ZESTRIL    30 tablet    Take 1 tablet (5 mg) by mouth daily    Benign essential hypertension, Coronary artery disease involving native coronary artery of native heart without angina pectoris       LORazepam 0.5 MG tablet    ATIVAN    5 tablet    Take 0.5 tablets (0.25 mg) by mouth every 8 hours as needed for anxiety        metFORMIN 500 MG 24 hr tablet    GLUCOPHAGE-XR    360 tablet    Take 4 tablets (2,000 mg) by mouth daily (with dinner) Please do not dispense until requested by patient.    Controlled type 2 diabetes  mellitus without complication, with long-term current use of insulin (H)       metoprolol tartrate 25 MG tablet    LOPRESSOR    60 tablet    Take 1 tablet (25 mg) by mouth 2 times daily    Coronary artery disease involving native coronary artery of native heart without angina pectoris, Benign essential hypertension       omeprazole 20 MG CR capsule    priLOSEC    90 capsule    Take 1 capsule (20 mg) by mouth daily    Gastroesophageal reflux disease, esophagitis presence not specified       sulfamethoxazole-trimethoprim 800-160 MG per tablet    BACTRIM DS/SEPTRA DS

## 2018-03-15 ASSESSMENT — PATIENT HEALTH QUESTIONNAIRE - PHQ9: SUM OF ALL RESPONSES TO PHQ QUESTIONS 1-9: 12

## 2018-03-15 ASSESSMENT — ANXIETY QUESTIONNAIRES: GAD7 TOTAL SCORE: 11

## 2018-03-27 ENCOUNTER — OFFICE VISIT (OUTPATIENT)
Dept: FAMILY MEDICINE | Facility: CLINIC | Age: 58
End: 2018-03-27
Payer: COMMERCIAL

## 2018-03-27 VITALS
TEMPERATURE: 98.6 F | DIASTOLIC BLOOD PRESSURE: 88 MMHG | BODY MASS INDEX: 30.24 KG/M2 | WEIGHT: 219.9 LBS | SYSTOLIC BLOOD PRESSURE: 136 MMHG | RESPIRATION RATE: 14 BRPM | OXYGEN SATURATION: 97 % | HEART RATE: 68 BPM

## 2018-03-27 DIAGNOSIS — Z13.89 SCREENING FOR DIABETIC PERIPHERAL NEUROPATHY: ICD-10-CM

## 2018-03-27 DIAGNOSIS — Z79.4 CONTROLLED TYPE 2 DIABETES MELLITUS WITH HYPERGLYCEMIA, WITH LONG-TERM CURRENT USE OF INSULIN (H): Primary | ICD-10-CM

## 2018-03-27 DIAGNOSIS — E11.65 CONTROLLED TYPE 2 DIABETES MELLITUS WITH HYPERGLYCEMIA, WITH LONG-TERM CURRENT USE OF INSULIN (H): Primary | ICD-10-CM

## 2018-03-27 DIAGNOSIS — Z12.11 SCREEN FOR COLON CANCER: ICD-10-CM

## 2018-03-27 DIAGNOSIS — F40.10 SOCIAL ANXIETY DISORDER: ICD-10-CM

## 2018-03-27 DIAGNOSIS — K21.9 GASTROESOPHAGEAL REFLUX DISEASE, ESOPHAGITIS PRESENCE NOT SPECIFIED: ICD-10-CM

## 2018-03-27 DIAGNOSIS — E78.5 HYPERLIPIDEMIA LDL GOAL <100: ICD-10-CM

## 2018-03-27 DIAGNOSIS — I10 BENIGN ESSENTIAL HYPERTENSION: ICD-10-CM

## 2018-03-27 DIAGNOSIS — I25.10 CORONARY ARTERY DISEASE INVOLVING NATIVE CORONARY ARTERY OF NATIVE HEART WITHOUT ANGINA PECTORIS: ICD-10-CM

## 2018-03-27 LAB — HBA1C MFR BLD: 11.2 % (ref 4.3–6)

## 2018-03-27 PROCEDURE — 36415 COLL VENOUS BLD VENIPUNCTURE: CPT | Performed by: FAMILY MEDICINE

## 2018-03-27 PROCEDURE — 82043 UR ALBUMIN QUANTITATIVE: CPT | Performed by: FAMILY MEDICINE

## 2018-03-27 PROCEDURE — 83036 HEMOGLOBIN GLYCOSYLATED A1C: CPT | Performed by: FAMILY MEDICINE

## 2018-03-27 PROCEDURE — 99214 OFFICE O/P EST MOD 30 MIN: CPT | Performed by: FAMILY MEDICINE

## 2018-03-27 PROCEDURE — 80061 LIPID PANEL: CPT | Performed by: FAMILY MEDICINE

## 2018-03-27 PROCEDURE — 99207 C FOOT EXAM  NO CHARGE: CPT | Performed by: FAMILY MEDICINE

## 2018-03-27 RX ORDER — BUPROPION HYDROCHLORIDE 150 MG/1
150 TABLET ORAL EVERY MORNING
Qty: 30 TABLET | Refills: 1 | Status: SHIPPED | OUTPATIENT
Start: 2018-03-27 | End: 2018-03-27

## 2018-03-27 RX ORDER — BUPROPION HYDROCHLORIDE 150 MG/1
300 TABLET ORAL EVERY MORNING
Qty: 60 TABLET | Refills: 1 | Status: SHIPPED | OUTPATIENT
Start: 2018-03-27 | End: 2018-12-14

## 2018-03-27 RX ORDER — LISINOPRIL 5 MG/1
5 TABLET ORAL DAILY
Qty: 30 TABLET | Refills: 8 | Status: SHIPPED | OUTPATIENT
Start: 2018-03-27 | End: 2019-01-11

## 2018-03-27 RX ORDER — METFORMIN HCL 500 MG
2000 TABLET, EXTENDED RELEASE 24 HR ORAL
Qty: 360 TABLET | Refills: 1 | Status: SHIPPED | OUTPATIENT
Start: 2018-03-27 | End: 2018-08-06

## 2018-03-27 RX ORDER — METOPROLOL TARTRATE 25 MG/1
25 TABLET, FILM COATED ORAL 2 TIMES DAILY
Qty: 60 TABLET | Refills: 8 | Status: SHIPPED | OUTPATIENT
Start: 2018-03-27 | End: 2019-01-11

## 2018-03-27 RX ORDER — INSULIN GLARGINE 100 [IU]/ML
INJECTION, SOLUTION SUBCUTANEOUS
Qty: 15 ML | Refills: 0 | Status: SHIPPED | OUTPATIENT
Start: 2018-03-27 | End: 2018-08-06

## 2018-03-27 RX ORDER — ESCITALOPRAM OXALATE 10 MG/1
10 TABLET ORAL DAILY
Qty: 90 TABLET | Refills: 1 | Status: SHIPPED | OUTPATIENT
Start: 2018-03-27 | End: 2018-12-14

## 2018-03-27 RX ORDER — CLOPIDOGREL BISULFATE 75 MG/1
75 TABLET ORAL DAILY
Qty: 30 TABLET | Refills: 8 | Status: SHIPPED | OUTPATIENT
Start: 2018-03-27 | End: 2019-01-11

## 2018-03-27 RX ORDER — ATORVASTATIN CALCIUM 20 MG/1
20 TABLET, FILM COATED ORAL DAILY
Qty: 30 TABLET | Refills: 2 | Status: SHIPPED | OUTPATIENT
Start: 2018-03-27 | End: 2019-01-11

## 2018-03-27 ASSESSMENT — ENCOUNTER SYMPTOMS
CARDIOVASCULAR NEGATIVE: 1
BLURRED VISION: 0
TINGLING: 0
SENSORY CHANGE: 0
DOUBLE VISION: 1
RESPIRATORY NEGATIVE: 1
CONSTITUTIONAL NEGATIVE: 1

## 2018-03-27 NOTE — NURSING NOTE
"Chief Complaint   Patient presents with     Diabetes       Initial /88 (BP Location: Right arm, Patient Position: Chair, Cuff Size: Adult Large)  Pulse 68  Temp 98.6  F (37  C) (Oral)  Resp 14  Wt 219 lb 14.4 oz (99.7 kg)  SpO2 97%  BMI 30.24 kg/m2 Estimated body mass index is 30.24 kg/(m^2) as calculated from the following:    Height as of 1/31/18: 5' 11.5\" (1.816 m).    Weight as of this encounter: 219 lb 14.4 oz (99.7 kg).  Medication Reconciliation: complete   Yuli Russo CMA (AAMA)      "

## 2018-03-27 NOTE — MR AVS SNAPSHOT
After Visit Summary   3/27/2018    Saji Iqbal    MRN: [id number]           Patient Information     Date Of Birth          1960        Visit Information        Provider Department      3/27/2018 4:00 PM Naseem Esparza MD [facility]        Today's Diagnoses     Controlled type 2 diabetes mellitus with hyperglycemia, with long-term current use of insulin (H)    -  1    Screen for colon cancer        Screening for diabetic peripheral neuropathy        Social anxiety disorder        Gastroesophageal reflux disease, esophagitis presence not specified        Hyperlipidemia LDL goal <100        Coronary artery disease involving native coronary artery of native heart without angina pectoris        Benign essential hypertension           Follow-ups after your visit        Additional Services     GASTROENTEROLOGY ADULT REF PROCEDURE ONLY [facility] [phone]; [facility]       Last Lab Result: Creatinine (mg/dL)       Date                     Value                 12/26/2017               0.71             ----------  There is no height or weight on file to calculate BMI.     Needed:  No  Language:  English    Patient will be contacted to schedule procedure.     Please be aware that coverage of these services is subject to the terms and limitations of your health insurance plan.  Call member services at your health plan with any benefit or coverage questions.  Any procedures must be performed at a [facility] facility OR coordinated by your clinic's referral office.    Please bring the following with you to your appointment:    (1) Any X-Rays, CTs or MRIs which have been performed.  Contact the facility where they were done to arrange for  prior to your scheduled appointment.    (2) List of current medications   (3) This referral request   (4) Any documents/labs given to you for this referral                  Follow-up notes from your care team   "   Return in about 1 month (around 2018).      Who to contact     If you have questions or need follow up information about today's clinic visit or your schedule please contact [facility] directly at [phone].  Normal or non-critical lab and imaging results will be communicated to you by [employer], letter or phone within 4 business days after the clinic has received the results. If you do not hear from us within 7 days, please contact the clinic through [employer] or phone. If you have a critical or abnormal lab result, we will notify you by phone as soon as possible.  Submit refill requests through [employer] or call your pharmacy and they will forward the refill request to us. Please allow 3 business days for your refill to be completed.          Additional Information About Your Visit        [employer] Information     [employer] lets you send messages to your doctor, view your test results, renew your prescriptions, schedule appointments and more. To sign up, go to [email or web address] . Click on \"Log in\" on the left side of the screen, which will take you to the Welcome page. Then click on \"Sign up Now\" on the right side of the page.     You will be asked to enter the access code listed below, as well as some personal information. Please follow the directions to create your username and password.     Your access code is: [email or web address]  Expires: 2018  4:55 PM     Your access code will  in 90 days. If you need help or a new code, please call your [facility] or [phone].        Care EveryWhere ID     This is your Care EveryWhere ID. This could be used by other organizations to access your [place] medical records  [id number]        Your Vitals Were     Pulse Temperature Respirations Pulse Oximetry BMI (Body Mass Index)       68 98.6  F (37  C) (Oral) 14 97% 30.24 kg/m2        Blood Pressure from Last 3 Encounters:   18 136/88   18 132/80   18 144/86    Weight " from Last 3 Encounters:   03/27/18 219 lb 14.4 oz (99.7 kg)   03/14/18 213 lb (96.6 kg)   01/31/18 219 lb (99.3 kg)              We Performed the Following     Albumin Random Urine Quantitative with Creat Ratio     FOOT EXAM  NO CHARGE [17283.114]     GASTROENTEROLOGY ADULT REF PROCEDURE ONLY [facility] Joseph [phone]; [facility]     HEMOGLOBIN A1C     Lipid panel reflex to direct LDL Non-fasting          Today's Medication Changes          These changes are accurate as of 3/27/18  4:55 PM.  If you have any questions, ask your nurse or doctor.               Start taking these medicines.        Dose/Directions    buPROPion 150 MG 24 hr tablet   Commonly known as:  WELLBUTRIN XL   Used for:  Social anxiety disorder   Started by:  Naseem Esparza MD        Dose:  300 mg   Take 2 tablets (300 mg) by mouth every morning   Quantity:  60 tablet   Refills:  1            Where to get your medicines      These medications were sent to [place] [id number]     Phone:  [phone]     atorvastatin 20 MG tablet    BASAGLAR 100 UNIT/ML injection    buPROPion 150 MG 24 hr tablet    clopidogrel 75 MG tablet    escitalopram 10 MG tablet    insulin pen needle 31G X 5 MM    lisinopril 5 MG tablet    metFORMIN 500 MG 24 hr tablet    metoprolol tartrate 25 MG tablet    omeprazole 20 MG CR capsule                Primary Care Provider Office Phone # Fax #    Naseem Esparza [phone]  [facility] [address]        Equal Access to Services     [facility] FLORIN AH: Hadii aad ku hadasho Soomaali, waaxda luqadaha, qaybta kaalmada adeegyada, waxay idiin hayqianan sabina padilla'louisa richardson. So [facility] [phone].    ATENCIÓN: Si habla español, tiene a orellana disposición servicios gratuitos de asistencia lingüística. Llame al [phone].    We comply with applicable federal civil rights laws and  [place] laws. We do not discriminate on the basis of race, color, national origin, age, disability, sex, sexual orientation, or gender identity.            Thank you!     Thank you for choosing [facility]  for your care. Our goal is always to provide you with excellent care. Hearing back from our patients is one way we can continue to improve our services. Please take a few minutes to complete the written survey that you may receive in the mail after your visit with us. Thank you!             Your Updated Medication List - Protect others around you: Learn how to safely use, store and throw away your medicines at www.disposemymeds.org.          This list is accurate as of 3/27/18  4:55 PM.  Always use your most recent med list.                   Brand Name Dispense Instructions for use Diagnosis    aspirin 81 MG tablet      Take by mouth daily        atorvastatin 20 MG tablet    LIPITOR    30 tablet    Take 1 tablet (20 mg) by mouth daily    Hyperlipidemia LDL goal <100       BASAGLAR 100 UNIT/ML injection     15 mL    20 units twice daily    Controlled type 2 diabetes mellitus with hyperglycemia, with long-term current use of insulin (H)       blood glucose lancets standard    no brand specified    1 Box    Use to test blood sugar 1 times daily or as directed.    Type 2 diabetes mellitus without complication, without long-term current use of insulin (H)       blood glucose monitoring lancets     2 Box    Use to test blood sugar 4 times daily or as directed.        blood glucose monitoring test strip    no brand specified    150 Box    Use to test blood sugar 4 times daily or as directed. Please dispense Accu Chek Ana plus test strips or per patient preference if using a different brand        buPROPion 150 MG 24 hr tablet    WELLBUTRIN XL    60 tablet    Take 2 tablets (300 mg) by mouth every morning    Social anxiety disorder       clopidogrel 75 MG tablet    PLAVIX    30 tablet    Take 1  tablet (75 mg) by mouth daily    Coronary artery disease involving native coronary artery of native heart without angina pectoris       escitalopram 10 MG tablet    LEXAPRO    90 tablet    Take 1 tablet (10 mg) by mouth daily    Social anxiety disorder       insulin pen needle 31G X 5 MM    B-D U/F    100 each    Use 2 daily or as directed.    Controlled type 2 diabetes mellitus with hyperglycemia, with long-term current use of insulin (H)       lisinopril 5 MG tablet    PRINIVIL/ZESTRIL    30 tablet    Take 1 tablet (5 mg) by mouth daily    Benign essential hypertension, Coronary artery disease involving native coronary artery of native heart without angina pectoris       metFORMIN 500 MG 24 hr tablet    GLUCOPHAGE-XR    360 tablet    Take 4 tablets (2,000 mg) by mouth daily (with dinner) Please do not dispense until requested by patient.    Controlled type 2 diabetes mellitus with hyperglycemia, with long-term current use of insulin (H)       metoprolol tartrate 25 MG tablet    LOPRESSOR    60 tablet    Take 1 tablet (25 mg) by mouth 2 times daily    Coronary artery disease involving native coronary artery of native heart without angina pectoris, Benign essential hypertension       omeprazole 20 MG CR capsule    priLOSEC    90 capsule    Take 1 capsule (20 mg) by mouth daily    Gastroesophageal reflux disease, esophagitis presence not specified

## 2018-03-27 NOTE — PROGRESS NOTES
HPI    SUBJECTIVE:   Saji Iqbal is a 57 year old male who presents to clinic today for the following health issues:    Diabetes Follow-up    Patient is checking blood sugars: once daily.  Results are as follows:         am - 169    Diabetic concerns: None and other - metformin causing diarrhea     Symptoms of hypoglycemia (low blood sugar): none     Paresthesias (numbness or burning in feet) or sores: No     Date of last diabetic eye exam: 2014    BP Readings from Last 2 Encounters:   03/14/18 132/80   01/31/18 144/86     Hemoglobin A1C (%)   Date Value   12/09/2016 >15.0  Results confirmed by repeat test   (H)     LDL Cholesterol Calculated (mg/dL)   Date Value   12/09/2016     Cannot estimate LDL when triglyceride exceeds 400 mg/dL     LDL Cholesterol Direct (mg/dL)   Date Value   12/09/2016 134 (H)     Seen a couple of weeks ago for UTI, better after abx.    Did recently restart insulin.  Notes that he has sleep walking issues and suspects he eats at night.  Only recently started checking blood sugars.  Noting that since restarting insulin BS are better.  Is taking 20u BID.  Did restart about 10 days ago.  Notes that he often has diarrhea, suspects from metformin.  More likely to happen in the evening. Minimal dairy intake.  On Metformin XR, takes 1000mg BID.    Last full eye exam was about 4 years ago.    No numbness or burning in feet, no CP, dyspnea, HA, vision changes.    Did do a FIT test several years ago, willing to do colonoscopy.    More issues with anxiety, did start Wellbutrin about two weeks ago, not noticing much benefit as of yet.      Review of Systems   Constitutional: Negative.    Eyes: Positive for double vision. Negative for blurred vision.   Respiratory: Negative.    Cardiovascular: Negative.    Neurological: Negative for tingling and sensory change.         Physical Exam   Constitutional: He is oriented to person, place, and time and well-developed, well-nourished, and in no distress.    Eyes: Conjunctivae and EOM are normal.   Cardiovascular: Normal rate, regular rhythm and normal heart sounds.    Pulses:       Dorsalis pedis pulses are 2+ on the right side, and 2+ on the left side.   Pulmonary/Chest: Effort normal and breath sounds normal.   Musculoskeletal: He exhibits no edema.   Neurological: He is alert and oriented to person, place, and time. He displays no weakness. Gait normal.   Reflex Scores:       Patellar reflexes are 2+ on the right side and 2+ on the left side.       Achilles reflexes are 2+ on the right side and 2+ on the left side.  Nl filament and proprioception in feet FABIO   Skin: Skin is warm and dry.   Skin on feet healthy.  No wounds, callous, onychomycosis.   Vitals reviewed.    (E11.65,  Z79.4) Controlled type 2 diabetes mellitus with hyperglycemia, with long-term current use of insulin (H)  (primary encounter diagnosis)  Comment: just restarted LA insulin, will wait and see how this affects Ac, which is improved although still quite high.  Can try taking metfomrin as single am dose.  Plan: BASAGLAR 100 UNIT/ML injection, metFORMIN         (GLUCOPHAGE-XR) 500 MG 24 hr tablet, insulin         pen needle (B-D U/F) 31G X 5 MM            (Z12.11) Screen for colon cancer  Comment:   Plan: GASTROENTEROLOGY ADULT REF PROCEDURE ONLY         [facility] [phone]; [facility]            (Z13.89) Screening for diabetic peripheral neuropathy  Comment:   Plan: FOOT EXAM  NO CHARGE [19204.114], HEMOGLOBIN         A1C, Lipid panel reflex to direct LDL         Non-fasting, Albumin Random Urine Quantitative         with Creat Ratio            (F40.10) Social anxiety disorder  Comment: try doubling wellbutrin  Plan: escitalopram (LEXAPRO) 10 MG tablet, buPROPion         (WELLBUTRIN XL) 150 MG 24 hr tablet,         DISCONTINUED: buPROPion (WELLBUTRIN XL) 150 MG         24 hr tablet            (K21.9) Gastroesophageal reflux disease, esophagitis presence not  specified  Comment: refil  Plan: omeprazole (PRILOSEC) 20 MG CR capsule            (E78.5) Hyperlipidemia LDL goal <100  Comment: rechecking today, refill  Plan: atorvastatin (LIPITOR) 20 MG tablet            (I25.10) Coronary artery disease involving native coronary artery of native heart without angina pectoris  Comment: refills  Plan: clopidogrel (PLAVIX) 75 MG tablet, metoprolol         tartrate (LOPRESSOR) 25 MG tablet, lisinopril         (PRINIVIL/ZESTRIL) 5 MG tablet            (I10) Benign essential hypertension  Comment: refills  Plan: metoprolol tartrate (LOPRESSOR) 25 MG tablet,         lisinopril (PRINIVIL/ZESTRIL) 5 MG tablet              RTC in 1m for mood, 3m for DM    Naseem Esparza MD

## 2018-03-28 LAB
CHOLEST SERPL-MCNC: 228 MG/DL
CREAT UR-MCNC: 247 MG/DL
HDLC SERPL-MCNC: 32 MG/DL
LDLC SERPL CALC-MCNC: 121 MG/DL
MICROALBUMIN UR-MCNC: 18 MG/L
MICROALBUMIN/CREAT UR: 7.29 MG/G CR (ref 0–17)
NONHDLC SERPL-MCNC: 196 MG/DL
TRIGL SERPL-MCNC: 374 MG/DL

## 2018-06-11 ENCOUNTER — TELEPHONE (OUTPATIENT)
Dept: FAMILY MEDICINE | Facility: CLINIC | Age: 58
End: 2018-06-11

## 2018-07-10 ENCOUNTER — TRANSFERRED RECORDS (OUTPATIENT)
Dept: HEALTH INFORMATION MANAGEMENT | Facility: CLINIC | Age: 58
End: 2018-07-10

## 2018-07-11 ENCOUNTER — TRANSFERRED RECORDS (OUTPATIENT)
Dept: HEALTH INFORMATION MANAGEMENT | Facility: CLINIC | Age: 58
End: 2018-07-11

## 2018-07-24 ENCOUNTER — TELEPHONE (OUTPATIENT)
Dept: FAMILY MEDICINE | Facility: CLINIC | Age: 58
End: 2018-07-24

## 2018-07-24 NOTE — TELEPHONE ENCOUNTER
Panel Management Review      Patient has the following on his problem list:     Depression / Dysthymia review    Measure:  Needs PHQ-9 score of 4 or less during index window.  Administer PHQ-9 and if score is 5 or more, send encounter to provider for next steps.    5 - 7 month window range:     PHQ-9 SCORE 7/14/2017 1/31/2018 3/14/2018   Total Score 13 4 12       If PHQ-9 recheck is 5 or more, route to provider for next steps.    Patient is due for:  PHQ9    Diabetes    ASA: Passed    Last A1C  Lab Results   Component Value Date    A1C 11.2 03/27/2018    A1C >15.0  Results confirmed by repeat test   12/09/2016     A1C tested: FAILED    Last LDL:    Lab Results   Component Value Date    CHOL 228 03/27/2018     Lab Results   Component Value Date    HDL 32 03/27/2018     Lab Results   Component Value Date     03/27/2018     Lab Results   Component Value Date    TRIG 374 03/27/2018     No results found for: DRE  Lab Results   Component Value Date    NHDL 196 03/27/2018       Is the patient on a Statin? YES             Is the patient on Aspirin? YES    Medications     HMG CoA Reductase Inhibitors    atorvastatin (LIPITOR) 20 MG tablet    Salicylates    aspirin 81 MG tablet          Last three blood pressure readings:  BP Readings from Last 3 Encounters:   03/27/18 136/88   03/14/18 132/80   01/31/18 144/86       Date of last diabetes office visit: 3/27/18     Tobacco History:     History   Smoking Status     Former Smoker     Packs/day: 0.33     Years: 20.00     Types: Cigarettes     Quit date: 7/17/2015   Smokeless Tobacco     Former User     Types: Snuff           IVD   ASA: Passed    Last LDL:    Lab Results   Component Value Date    CHOL 228 03/27/2018     Lab Results   Component Value Date    HDL 32 03/27/2018     Lab Results   Component Value Date     03/27/2018     Lab Results   Component Value Date    TRIG 374 03/27/2018      No results found for: DRE     Is the patient on a  Statin? YES   Is the patient on Aspirin? YES                  Medications     HMG CoA Reductase Inhibitors    atorvastatin (LIPITOR) 20 MG tablet    Salicylates    aspirin 81 MG tablet          Last three blood pressure readings:  BP Readings from Last 3 Encounters:   03/27/18 136/88   03/14/18 132/80   01/31/18 144/86        Tobacco History:     History   Smoking Status     Former Smoker     Packs/day: 0.33     Years: 20.00     Types: Cigarettes     Quit date: 7/17/2015   Smokeless Tobacco     Former User     Types: Snuff       Hypertension   Last three blood pressure readings:  BP Readings from Last 3 Encounters:   03/27/18 136/88   03/14/18 132/80   01/31/18 144/86     Blood pressure: Passed    HTN Guidelines:  Age 18-59 BP range:  Less than 140/90  Age 60-85 with Diabetes:  Less than 140/90  Age 60-85 without Diabetes:  less than 150/90      Composite cancer screening  Chart review shows that this patient is due/due soon for the following Colonoscopy  Summary:    Patient is due/failing the following:   A1C and COLONOSCOPY    Action needed:   Patient needs office visit for diabetes follow up with A1C.    Type of outreach:    Phone, left message for patient to call back.     Questions for provider review:    None                                                                                                                                    Yuli Russo CMA (AAMA)    Chart routed to Care Team.

## 2018-08-03 NOTE — PROGRESS NOTES
"HPI    SUBJECTIVE:   Saji Iqbal is a 58 year old male who presents to clinic today for the following health issues:    Diabetes Follow-up      Patient is checking blood sugars: { :351253}    Diabetic concerns: {Diabetic Concerns:969684::\"None\"}     Symptoms of hypoglycemia (low blood sugar): { :146298::\"none\"}     Paresthesias (numbness or burning in feet) or sores: { :448064::\"No\"}     Date of last diabetic eye exam: ***    BP Readings from Last 2 Encounters:   03/27/18 136/88   03/14/18 132/80     Hemoglobin A1C (%)   Date Value   03/27/2018 11.2 (H)   12/09/2016 >15.0  Results confirmed by repeat test   (H)     LDL Cholesterol Calculated (mg/dL)   Date Value   03/27/2018 121 (H)   12/09/2016     Cannot estimate LDL when triglyceride exceeds 400 mg/dL     LDL Cholesterol Direct (mg/dL)   Date Value   12/09/2016 134 (H)       Diabetes Management Resources  {Depression and Anxiety Followup:492508}    Amount of exercise or physical activity: {Exercise frequency days per week:970347}    Problems taking medications regularly: {Med Problems:974969::\"No\"}    Medication side effects: {CHRONIC MED SIDE EFFECTS:962956::\"none\"}    Diet: { :881632}        {additional problems for provider to add:400586}    Problem list and histories reviewed & adjusted, as indicated.  Additional history: {NONE - AS DOCUMENTED:904404::\"as documented\"}    {HIST REVIEW/ LINKS 2:031250}    Reviewed and updated as needed this visit by clinical staff       Reviewed and updated as needed this visit by Provider         {PROVIDER CHARTING PREFERENCE:057673}      ROS      Physical Exam      "

## 2018-08-04 DIAGNOSIS — E11.65 CONTROLLED TYPE 2 DIABETES MELLITUS WITH HYPERGLYCEMIA, WITH LONG-TERM CURRENT USE OF INSULIN (H): ICD-10-CM

## 2018-08-04 DIAGNOSIS — Z79.4 CONTROLLED TYPE 2 DIABETES MELLITUS WITH HYPERGLYCEMIA, WITH LONG-TERM CURRENT USE OF INSULIN (H): ICD-10-CM

## 2018-08-06 ENCOUNTER — OFFICE VISIT (OUTPATIENT)
Dept: FAMILY MEDICINE | Facility: CLINIC | Age: 58
End: 2018-08-06
Payer: COMMERCIAL

## 2018-08-06 VITALS
TEMPERATURE: 98.1 F | DIASTOLIC BLOOD PRESSURE: 88 MMHG | BODY MASS INDEX: 30.93 KG/M2 | SYSTOLIC BLOOD PRESSURE: 150 MMHG | WEIGHT: 224.9 LBS | RESPIRATION RATE: 16 BRPM | OXYGEN SATURATION: 96 % | HEART RATE: 94 BPM

## 2018-08-06 DIAGNOSIS — F40.10 SOCIAL ANXIETY DISORDER: ICD-10-CM

## 2018-08-06 DIAGNOSIS — Z79.4 TYPE 2 DIABETES MELLITUS WITH HYPERGLYCEMIA, WITH LONG-TERM CURRENT USE OF INSULIN (H): Primary | ICD-10-CM

## 2018-08-06 DIAGNOSIS — I10 BENIGN ESSENTIAL HYPERTENSION: ICD-10-CM

## 2018-08-06 DIAGNOSIS — E11.65 TYPE 2 DIABETES MELLITUS WITH HYPERGLYCEMIA, WITH LONG-TERM CURRENT USE OF INSULIN (H): Primary | ICD-10-CM

## 2018-08-06 PROBLEM — I71.40 ABDOMINAL AORTIC ANEURYSM (AAA) WITHOUT RUPTURE (H): Status: ACTIVE | Noted: 2018-08-06

## 2018-08-06 LAB — HBA1C MFR BLD: 12.6 % (ref 0–5.6)

## 2018-08-06 PROCEDURE — 99214 OFFICE O/P EST MOD 30 MIN: CPT | Performed by: NURSE PRACTITIONER

## 2018-08-06 PROCEDURE — 36415 COLL VENOUS BLD VENIPUNCTURE: CPT | Performed by: NURSE PRACTITIONER

## 2018-08-06 PROCEDURE — 83036 HEMOGLOBIN GLYCOSYLATED A1C: CPT | Performed by: NURSE PRACTITIONER

## 2018-08-06 RX ORDER — METFORMIN HCL 500 MG
2000 TABLET, EXTENDED RELEASE 24 HR ORAL
Qty: 360 TABLET | Refills: 1 | Status: SHIPPED | OUTPATIENT
Start: 2018-08-06 | End: 2019-01-11 | Stop reason: SINTOL

## 2018-08-06 RX ORDER — INSULIN GLARGINE 100 [IU]/ML
INJECTION, SOLUTION SUBCUTANEOUS
Qty: 15 ML | Refills: 1 | Status: SHIPPED | OUTPATIENT
Start: 2018-08-06 | End: 2018-11-17

## 2018-08-06 NOTE — NURSING NOTE
"Chief Complaint   Patient presents with     Diabetes     Depression     Initial BP (!) 156/98 (BP Location: Left arm, Patient Position: Chair, Cuff Size: Adult Regular)  Pulse 94  Temp 98.1  F (36.7  C) (Oral)  Resp 16  Wt 224 lb 14.4 oz (102 kg)  SpO2 96%  BMI 30.93 kg/m2 Estimated body mass index is 30.93 kg/(m^2) as calculated from the following:    Height as of 1/31/18: 5' 11.5\" (1.816 m).    Weight as of this encounter: 224 lb 14.4 oz (102 kg).  BP completed using cuff size regular left arm    Lisa Magill, CMA    "

## 2018-08-06 NOTE — MR AVS SNAPSHOT
After Visit Summary   8/6/2018    Saji Iqbal    MRN: [id number]           Patient Information     Date Of Birth          1960        Visit Information        Provider Department      8/6/2018 3:20 PM Deann Campos APRN [facility]        Today's Diagnoses     Screen for colon cancer    -  1    Screening for HIV (human immunodeficiency virus)        Type 2 diabetes mellitus with hyperglycemia, with long-term current use of insulin (H)        Controlled type 2 diabetes mellitus with hyperglycemia, with long-term current use of insulin (H)        Social anxiety disorder        Abdominal aortic aneurysm (AAA) without rupture (H)           Follow-ups after your visit        Additional Services     DIABETES EDUCATOR REFERRAL       DIABETES SELF MANAGEMENT TRAINING (DSMT)      Your provider has referred you to Diabetes Education: [facility]: Diabetes Education - [facility] [phone]   [email or web address]     If an urgent visit is needed or A1C is above 12, Care Team to call the Diabetes  Education Team at [phone] or send an In Basket message to the Diabetes Education Pool (P DIAB ED-PATIENT CARE).    A  will call you to make your appointment. If it has been more than 3 business days since your referral was placed, please call the above phone number to schedule.    Type of training and number of hours: Previous Diagnosis: Follow-up DSMT - 2 hours.    Diabetes Type: Type 2 - On Insulin   Medicare covers: 10 hours of initial DSMT in 12 month period from the time of first visit, plus 2 hours of follow-up DSMT annually, and additional hours as requested for insulin training.         Diabetes Co-Morbidities: atherosclerotic cardiovascular disease, dyslipidemia and hypertension               A1C Goal:  <8.0       A1C is: Lab Results       Component                Value               Date                       A1C                       12.6                08/06/2018              MEDICAL NUTRITION THERAPY (MNT) for Diabetes    Medical Nutrition Therapy with a Registered Dietitian can be provided in coordination with Diabetes Self-Management Training to assist in achieving optimal diabetes management.    MNT Type and Hours: Do not initiate MNT at this time.                       Medicare will cover: 3 hours initial MNT in 12 month period after first visit, plus 2 hours of follow-up MNT annually        Diabetes Education Topics: Comprehensive Knowledge Assessment and Instruction    Special Educational Needs Requiring Individual DSMT: None      Please be aware that coverage of these services is subject to the terms and limitations of your health insurance plan.  Call member services at your health plan to determine Diabetes Self-Management Training (Codes  and ) and Medical Nutrition Therapy (Codes 84352 and 92501) benefits and ask which blood glucose monitor brands are covered by your plan.  Please bring the following with you to your appointment:    (1)  List of current medications   (2)  List of Blood Glucose Monitor brands that are covered by your insurance plan  (3)  Blood Glucose Monitor and log book  (4)   Food records for the 3 days prior to your visit    The Certified Diabetes Educator may make diabetes medication adjustments per the CDE Protocol and Collaborative Practice Agreement.            MENTAL HEALTH REFERRAL  - Adult; Psychiatry and Medication Management; Psychiatry; Other: [facility] [phone]; We will contact you to schedule the appointment or please call with any questions       All scheduling is subject to the client's specific insurance plan & benefits, provider/location availability, and provider clinical specialities.  Please arrive 15 minutes early for your first appointment and bring your completed paperwork.    Please be aware that coverage of these services is subject  to the terms and limitations of your health insurance plan.  Call member services at your health plan with any benefit or coverage questions.                            Follow-up notes from your care team     Return in about 3 months (around 11/6/2018) for diabetes follow up.      Future tests that were ordered for you today     Open Future Orders        Priority Expected Expires Ordered    US abdominal aorta limited Routine  8/6/2019 8/6/2018            Who to contact     If you have questions or need follow up information about today's clinic visit or your schedule please contact [facility] directly at [phone].  Normal or non-critical lab and imaging results will be communicated to you by MyChart, letter or phone within 4 business days after the clinic has received the results. If you do not hear from us within 7 days, please contact the clinic through MyChart or phone. If you have a critical or abnormal lab result, we will notify you by phone as soon as possible.  Submit refill requests through [employer] or call your pharmacy and they will forward the refill request to us. Please allow 3 business days for your refill to be completed.          Additional Information About Your Visit        Care EveryWhere ID     This is your Care EveryWhere ID. This could be used by other organizations to access your [facility] medical records  [id number]        Your Vitals Were     Pulse Temperature Respirations Pulse Oximetry BMI (Body Mass Index)       94 98.1  F (36.7  C) (Oral) 16 96% 30.93 kg/m2        Blood Pressure from Last 3 Encounters:   08/06/18 150/88   03/27/18 136/88   03/14/18 132/80    Weight from Last 3 Encounters:   08/06/18 224 lb 14.4 oz (102 kg)   03/27/18 219 lb 14.4 oz (99.7 kg)   03/14/18 213 lb (96.6 kg)              We Performed the Following     DIABETES EDUCATOR REFERRAL     HEMOGLOBIN A1C     MENTAL HEALTH REFERRAL  - Adult; Psychiatry and Medication Management; Psychiatry; Other:  [facility] [phone]; We will contact you to schedule the appointment or please call with any questions          Today's Medication Changes          These changes are accurate as of 8/6/18  4:28 PM.  If you have any questions, ask your nurse or doctor.               These medicines have changed or have updated prescriptions.        Dose/Directions    metFORMIN 500 MG 24 hr tablet   Commonly known as:  GLUCOPHAGE-XR   This may have changed:  additional instructions   Used for:  Controlled type 2 diabetes mellitus with hyperglycemia, with long-term current use of insulin (H)   Changed by:  Deann Campos APRN CNP        Dose:  2000 mg   Take 4 tablets (2,000 mg) by mouth daily (with dinner)   Quantity:  360 tablet   Refills:  1            Where to get your medicines      These medications were sent to [place] [id number]     Phone:  [phone]     BASAGLAR 100 UNIT/ML injection    insulin pen needle 31G X 5 MM    metFORMIN 500 MG 24 hr tablet         Some of these will need a paper prescription and others can be bought over the counter.  Ask your nurse if you have questions.     Bring a paper prescription for each of these medications     blood glucose monitoring test strip                Primary Care Provider Office Phone # Fax #    Naseem Esparza [phone]       [id number]  [address] [facility] [address]        Equal Access to Services     SAL CATHERINE AH: Hadii sidney boston hadasho Soomaali, waaxda luqadaha, qaybta kaalmada adeegyada, lukas palomares haylouisa richardson. So [facility] [phone].    ATENCIÓN: Si habla español, tiene a orellana disposición servicios gratuitos de asistencia lingüística. Llame al [phone].    We comply with applicable federal civil rights laws and [place] laws. We do not discriminate on the basis of race, color, national origin, age, disability,  sex, sexual orientation, or gender identity.            Thank you!     Thank you for choosing [facility]  for your care. Our goal is always to provide you with excellent care. Hearing back from our patients is one way we can continue to improve our services. Please take a few minutes to complete the written survey that you may receive in the mail after your visit with us. Thank you!             Your Updated Medication List - Protect others around you: Learn how to safely use, store and throw away your medicines at www.disposemymeds.org.          This list is accurate as of 8/6/18  4:28 PM.  Always use your most recent med list.                   Brand Name Dispense Instructions for use Diagnosis    aspirin 81 MG tablet      Take by mouth daily        atorvastatin 20 MG tablet    LIPITOR    30 tablet    Take 1 tablet (20 mg) by mouth daily    Hyperlipidemia LDL goal <100       BASAGLAR 100 UNIT/ML injection     15 mL    20 units twice daily    Controlled type 2 diabetes mellitus with hyperglycemia, with long-term current use of insulin (H)       blood glucose lancets standard    no brand specified    1 Box    Use to test blood sugar 1 times daily or as directed.    Type 2 diabetes mellitus without complication, without long-term current use of insulin (H)       blood glucose monitoring lancets     2 Box    Use to test blood sugar 4 times daily or as directed.        blood glucose monitoring test strip    no brand specified    150 Box    Use to test blood sugar 4 times daily or as directed. Please dispense Accu Chek Ana plus test strips or per patient preference if using a different brand    Type 2 diabetes mellitus with hyperglycemia, with long-term current use of insulin (H)       buPROPion 150 MG 24 hr tablet    WELLBUTRIN XL    60 tablet    Take 2 tablets (300 mg) by mouth every morning    Social anxiety disorder       clopidogrel 75 MG tablet    PLAVIX    30 tablet    Take 1 tablet (75 mg) by  mouth daily    Coronary artery disease involving native coronary artery of native heart without angina pectoris       escitalopram 10 MG tablet    LEXAPRO    90 tablet    Take 1 tablet (10 mg) by mouth daily    Social anxiety disorder       insulin pen needle 31G X 5 MM    B-D U/F    100 each    Use 2 daily or as directed.    Type 2 diabetes mellitus with hyperglycemia, with long-term current use of insulin (H)       lisinopril 5 MG tablet    PRINIVIL/ZESTRIL    30 tablet    Take 1 tablet (5 mg) by mouth daily    Benign essential hypertension, Coronary artery disease involving native coronary artery of native heart without angina pectoris       metFORMIN 500 MG 24 hr tablet    GLUCOPHAGE-XR    360 tablet    Take 4 tablets (2,000 mg) by mouth daily (with dinner)    Controlled type 2 diabetes mellitus with hyperglycemia, with long-term current use of insulin (H)       metoprolol tartrate 25 MG tablet    LOPRESSOR    60 tablet    Take 1 tablet (25 mg) by mouth 2 times daily    Coronary artery disease involving native coronary artery of native heart without angina pectoris, Benign essential hypertension       omeprazole 20 MG CR capsule    priLOSEC    90 capsule    Take 1 capsule (20 mg) by mouth daily    Gastroesophageal reflux disease, esophagitis presence not specified

## 2018-08-06 NOTE — Clinical Note
Please abstract the following data from this visit with this patient into the appropriate field in Epic:  Eye exam with ophthalmology on this date: 7/11/2018 at [facility]

## 2018-08-06 NOTE — PROGRESS NOTES
SUBJECTIVE:   Saji Iqbal is a 58 year old male who presents to clinic today for the following health issues:    Diabetes     Diabetes:     Frequency of checking blood sugars::  Rarely    Diabetic concerns::  None    Hypoglycemia symptoms::  None    Paraesthesia present::  No    Eye Exam in the last year::  NO    Diabetes Management Resources    Diet:  Low fat/cholesterol  Taking medications regularly:  Yes  Medication side effects:  Other  Additional concerns today:  YES  Depression     Diabetes:     Frequency of checking blood sugars::  Rarely    Diabetic concerns::  None    Hypoglycemia symptoms::  None    Paraesthesia present::  No    Eye Exam in the last year::  NO    Diabetes Management Resources    Diet:  Low fat/cholesterol  Taking medications regularly:  Yes  Medication side effects:  Other  Additional concerns today:  YES  History of Present Illness     Diabetes:     Frequency of checking blood sugars::  Rarely    Diabetic concerns::  None    Hypoglycemia symptoms::  None    Paraesthesia present::  No    Eye Exam in the last year::  NO    Diabetes Management Resources    Diet:  Low fat/cholesterol  Taking medications regularly:  Yes  Medication side effects:  Other  Additional concerns today:  YES    Lab Results   Component Value Date    A1C 12.6 08/06/2018    A1C 11.2 03/27/2018    A1C >15.0  Results confirmed by repeat test   12/09/2016       Has only been using his insulin once a day as opposed to twice daily.  He is taking metformin about 4 days/week.  He has not been checking his blood sugars lately.  He does not have any test strips.  He knows he needs to be taking better care of his diabetes and would really like to do this.  He has stopped taking his depression medications because he was having trouble swallowing and this was believed to be a side effect.  He has tried several medications for mood in the past.  He reports his mood and lack of motivation are what keeps him from taking care of his  health.  He would really like to improve his overall health.  He recently had an eye exam at the [facility].      Lab Results   Component Value Date    A1C 12.6 08/06/2018    A1C 11.2 03/27/2018    A1C >15.0  Results confirmed by repeat test   12/09/2016         Problem list and histories reviewed & adjusted, as indicated.  Additional history: as documented        Current Outpatient Prescriptions   Medication Sig Dispense Refill     aspirin 81 MG tablet Take by mouth daily       atorvastatin (LIPITOR) 20 MG tablet Take 1 tablet (20 mg) by mouth daily 30 tablet 2     BASAGLAR 100 UNIT/ML injection 20 units twice daily 15 mL 1     blood glucose monitoring (NO BRAND SPECIFIED) test strip Use to test blood sugar 4 times daily or as directed. Please dispense Accu Chek Ana plus test strips or per patient preference if using a different brand 150 Box 3     clopidogrel (PLAVIX) 75 MG tablet Take 1 tablet (75 mg) by mouth daily 30 tablet 8     insulin pen needle (B-D U/F) 31G X 5 MM Use 2 daily or as directed. 100 each prn     lisinopril (PRINIVIL/ZESTRIL) 5 MG tablet Take 1 tablet (5 mg) by mouth daily 30 tablet 8     metFORMIN (GLUCOPHAGE-XR) 500 MG 24 hr tablet Take 4 tablets (2,000 mg) by mouth daily (with dinner) 360 tablet 1     metoprolol tartrate (LOPRESSOR) 25 MG tablet Take 1 tablet (25 mg) by mouth 2 times daily 60 tablet 8     omeprazole (PRILOSEC) 20 MG CR capsule Take 1 capsule (20 mg) by mouth daily 90 capsule 1     blood glucose (NO BRAND SPECIFIED) lancets standard Use to test blood sugar 1 times daily or as directed. (Patient not taking: Reported on 8/6/2018) 1 Box 3     blood glucose monitoring (ACCU-CHEK FASTCLIX) lancets Use to test blood sugar 4 times daily or as directed. (Patient not taking: Reported on 8/6/2018) 2 Box 3     buPROPion (WELLBUTRIN XL) 150 MG 24 hr tablet Take 2 tablets (300 mg) by mouth every morning (Patient not taking: Reported on 8/6/2018) 60 tablet 1     escitalopram  (LEXAPRO) 10 MG tablet Take 1 tablet (10 mg) by mouth daily (Patient not taking: Reported on 8/6/2018) 90 tablet 1     Allergies   Allergen Reactions     Honey Other (See Comments)     Throat irritation     BP Readings from Last 3 Encounters:   08/06/18 150/88   03/27/18 136/88   03/14/18 132/80    Wt Readings from Last 3 Encounters:   08/06/18 224 lb 14.4 oz (102 kg)   03/27/18 219 lb 14.4 oz (99.7 kg)   03/14/18 213 lb (96.6 kg)                    ROS:  Constitutional, HEENT, cardiovascular, pulmonary, gi and gu systems are negative, except as otherwise noted.    OBJECTIVE:     /88  Pulse 94  Temp 98.1  F (36.7  C) (Oral)  Resp 16  Wt 224 lb 14.4 oz (102 kg)  SpO2 96%  BMI 30.93 kg/m2  Body mass index is 30.93 kg/(m^2).  GENERAL: healthy, alert and no distress  EYES: Eyes grossly normal to inspection, PERRL and conjunctivae and sclerae normal  NECK: no adenopathy, no asymmetry, masses, or scars and thyroid normal to palpation  RESP: lungs clear to auscultation - no rales, rhonchi or wheezes  CV: regular rate and rhythm, normal S1 S2, no S3 or S4, no murmur, click or rub, no peripheral edema and peripheral pulses strong  MS: no gross musculoskeletal defects noted  PSYCH: mentation appears normal, affect normal/bright, appears frustrated, anxious    Diagnostic Test Results:  A1c 12.6    ASSESSMENT/PLAN:   1. Type 2 diabetes mellitus with hyperglycemia, with long-term current use of insulin (H)  He is not taking his medications as he should.  Long discussion with Saji about the importance of adhering to treatment plan to achieve better control of his diabetes.  He verbalizes understanding.    - HEMOGLOBIN A1C  - DIABETES EDUCATOR REFERRAL  - blood glucose monitoring (NO BRAND SPECIFIED) test strip; Use to test blood sugar 4 times daily or as directed. Please dispense Accu Chek Ana plus test strips or per patient preference if using a different brand  Dispense: 150 Box; Refill: 3  - insulin pen needle  (B-D U/F) 31G X 5 MM; Use 2 daily or as directed.  Dispense: 100 each; Refill: prn  - BASAGLAR 100 UNIT/ML injection; 20 units twice daily  Dispense: 15 mL; Refill: 1  - metFORMIN (GLUCOPHAGE-XR) 500 MG 24 hr tablet; Take 4 tablets (2,000 mg) by mouth daily (with dinner)  Dispense: 360 tablet; Refill: 1    2. Social anxiety disorder  Off medications.  Would like to see psych.  Referral placed.    - MENTAL HEALTH REFERRAL  - Adult; Psychiatry and Medication Management; Psychiatry; Other: [facility] [phone]; We will contact you to schedule the appointment or please call with any questions    3. Benign essential hypertension  Elevated today.  Suspect anxiety is contributing to this.  Recheck BP in 2 weeks.      Please abstract the following data from this visit with this patient into the appropriate field in Epic:    Eye exam with ophthalmology on this date: 7/11/2018 at [facility]    F/u 3 mos    JACKIE [facility]

## 2018-08-06 NOTE — TELEPHONE ENCOUNTER
"Requested Prescriptions   Pending Prescriptions Disp Refills     BASAGLAR 100 UNIT/ML injection [Pharmacy Med Name: BASAGLAR KWIKPEN 100UNIT/ML SOPN] 15 mL 0    Last Written Prescription Date:  3/27/18  Last Fill Quantity: 15mL  ,  # refills: 0   Last Office Visit: 3/27/2018   Future Office Visit:    Next 5 appointments (look out 90 days)     Aug 06, 2018  3:20 PM CDT   SHORT with JACKIE Monaco CNP   [facility]    [address] [place] [address], [address]  [facility] [address] [phone]                  Sig: INJECT 20 UNITS UNDER THE SKIN TWICE A DAY    Long Acting Insulin Protocol Failed    8/4/2018 12:16 PM       Failed - HgbA1C in past 3 or 6 months    If HgbA1C is 8 or greater, it needs to be on file within the past 3 months.  If less than 8, must be on file within the past 6 months.     Recent Labs   Lab Test  03/27/18   1616   A1C  11.2*            Passed - Blood pressure less than 140/90 in past 6 months    BP Readings from Last 3 Encounters:   03/27/18 136/88   03/14/18 132/80   01/31/18 144/86                Passed - LDL on file in past 12 months    Recent Labs   Lab Test  03/27/18   1616   LDL  121*            Passed - Microalbumin on file in past 12 months    Recent Labs   Lab Test  03/27/18   1618   MICROL  18   UMALCR  7.29            Passed - Serum creatinine on file in past 12 months    Recent Labs   Lab Test  12/26/17   0949   CR  0.71            Passed - Patient is age 18 or older       Passed - Recent (6 mo) or future (30 days) visit within the authorizing provider's specialty    Patient had office visit in the last 6 months or has a visit in the next 30 days with authorizing provider or within the authorizing provider's specialty.  See \"Patient Info\" tab in inbasket, or \"Choose Columns\" in Meds & Orders section of the refill encounter.              "

## 2018-08-07 RX ORDER — INSULIN GLARGINE 100 [IU]/ML
INJECTION, SOLUTION SUBCUTANEOUS
Qty: 15 ML | Refills: 0
Start: 2018-08-07

## 2018-09-04 PROBLEM — I71.40 ABDOMINAL AORTIC ANEURYSM (AAA) WITHOUT RUPTURE (H): Status: RESOLVED | Noted: 2018-08-06 | Resolved: 2018-09-04

## 2018-10-06 ENCOUNTER — NURSE TRIAGE (OUTPATIENT)
Dept: NURSING | Facility: CLINIC | Age: 58
End: 2018-10-06

## 2018-10-06 NOTE — TELEPHONE ENCOUNTER
Reason for Disposition    [1] Eye has been washed out > 30 minutes ago AND [2] tearing or blinking persists    Additional Information    Negative: Foreign body (FB) stuck on eyeball  (Exception: contact lens)    Negative: [1] Sharp FB (even if FB was removed) AND [2] any pain present now    Negative: [1] Eye has been washed out > 30 minutes ago AND [2] feels like FB is still present    Negative: [1] Eye has been washed out > 30 minutes ago AND [2] pain persists AND [3] more than mild    Protocols used: EYE - FOREIGN BODY-ADULT-

## 2018-10-06 NOTE — TELEPHONE ENCOUNTER
This morning something got into his eye right.  Saji is having constant blinking and does not want to go to ED.  Saji states he will go to urgent care or clinic on Monday but will not go to Ed.

## 2018-11-17 DIAGNOSIS — E11.65 TYPE 2 DIABETES MELLITUS WITH HYPERGLYCEMIA, WITH LONG-TERM CURRENT USE OF INSULIN (H): ICD-10-CM

## 2018-11-17 DIAGNOSIS — Z79.4 TYPE 2 DIABETES MELLITUS WITH HYPERGLYCEMIA, WITH LONG-TERM CURRENT USE OF INSULIN (H): ICD-10-CM

## 2018-11-19 NOTE — TELEPHONE ENCOUNTER
"Requested Prescriptions   Pending Prescriptions Disp Refills     insulin glargine (BASAGLAR KWIKPEN) 100 UNIT/ML pen [Pharmacy Med Name: BASAGLAR KWIKPEN 100UNIT/ML SOPN] 15 mL 1    Last Written Prescription Date:  8/6/18  Last Fill Quantity: 15mL   ,  # refills: 1   Last Office Visit: 8/6/2018 Strong       Return in about 3 months (around 11/6/2018) for diabetes follow up.     Future Office Visit:      Sig: INJECT 20 UNITS UNDER THE SKIN TWICE A DAY    Long Acting Insulin Protocol Failed    11/17/2018 11:02 AM       Failed - Blood pressure less than 140/90 in past 6 months    BP Readings from Last 3 Encounters:   08/06/18 150/88   03/27/18 136/88   03/14/18 132/80                Failed - HgbA1C in past 3 or 6 months    If HgbA1C is 8 or greater, it needs to be on file within the past 3 months.  If less than 8, must be on file within the past 6 months.     Recent Labs   Lab Test  08/06/18   1518   A1C  12.6*            Passed - LDL on file in past 12 months    Recent Labs   Lab Test  03/27/18   1616   LDL  121*            Passed - Microalbumin on file in past 12 months    Recent Labs   Lab Test  03/27/18   1618   MICROL  18   UMALCR  7.29            Passed - Serum creatinine on file in past 12 months    Recent Labs   Lab Test  12/26/17   0949   CR  0.71            Passed - Patient is age 18 or older       Passed - Recent (6 mo) or future (30 days) visit within the authorizing provider's specialty    Patient had office visit in the last 6 months or has a visit in the next 30 days with authorizing provider or within the authorizing provider's specialty.  See \"Patient Info\" tab in inbasket, or \"Choose Columns\" in Meds & Orders section of the refill encounter.              "

## 2018-11-20 RX ORDER — BLOOD-GLUCOSE METER
EACH MISCELLANEOUS
Qty: 1 KIT | Refills: 0 | Status: SHIPPED | OUTPATIENT
Start: 2018-11-20 | End: 2023-02-17

## 2018-11-20 RX ORDER — INSULIN GLARGINE 100 [IU]/ML
INJECTION, SOLUTION SUBCUTANEOUS
Qty: 15 ML | Refills: 0 | Status: SHIPPED | OUTPATIENT
Start: 2018-11-20 | End: 2019-01-23

## 2018-11-20 NOTE — TELEPHONE ENCOUNTER
Spoke with patient.  Appointment scheduled for 11/23/2018.  Will give refill to get patient through.    Patient states that his glucose machine is not working.  Keeps losing data and is not staying charged.  Requesting rx to be sent to pharmacy for new machine.  Uses Accu-chek Ana Plus.    Kyleigh Gallego RN

## 2018-11-23 ENCOUNTER — OFFICE VISIT (OUTPATIENT)
Dept: FAMILY MEDICINE | Facility: CLINIC | Age: 58
End: 2018-11-23
Payer: COMMERCIAL

## 2018-11-23 VITALS
DIASTOLIC BLOOD PRESSURE: 102 MMHG | HEART RATE: 100 BPM | RESPIRATION RATE: 20 BRPM | SYSTOLIC BLOOD PRESSURE: 162 MMHG | WEIGHT: 228.3 LBS | BODY MASS INDEX: 31.4 KG/M2 | TEMPERATURE: 98.6 F

## 2018-11-23 DIAGNOSIS — E11.9 CONTROLLED TYPE 2 DIABETES MELLITUS WITHOUT COMPLICATION, WITH LONG-TERM CURRENT USE OF INSULIN (H): Primary | ICD-10-CM

## 2018-11-23 DIAGNOSIS — L40.9 PSORIASIS: ICD-10-CM

## 2018-11-23 DIAGNOSIS — Z79.4 CONTROLLED TYPE 2 DIABETES MELLITUS WITHOUT COMPLICATION, WITH LONG-TERM CURRENT USE OF INSULIN (H): Primary | ICD-10-CM

## 2018-11-23 LAB — HBA1C MFR BLD: 10.6 % (ref 0–5.6)

## 2018-11-23 PROCEDURE — 84443 ASSAY THYROID STIM HORMONE: CPT | Performed by: FAMILY MEDICINE

## 2018-11-23 PROCEDURE — 83036 HEMOGLOBIN GLYCOSYLATED A1C: CPT | Performed by: FAMILY MEDICINE

## 2018-11-23 PROCEDURE — 36415 COLL VENOUS BLD VENIPUNCTURE: CPT | Performed by: FAMILY MEDICINE

## 2018-11-23 PROCEDURE — 99214 OFFICE O/P EST MOD 30 MIN: CPT | Performed by: FAMILY MEDICINE

## 2018-11-23 RX ORDER — PREDNISONE 20 MG/1
40 TABLET ORAL DAILY
Qty: 10 TABLET | Refills: 0 | Status: SHIPPED | OUTPATIENT
Start: 2018-11-23 | End: 2018-11-28

## 2018-11-23 RX ORDER — BETAMETHASONE DIPROPIONATE 0.5 MG/G
CREAM TOPICAL
Qty: 50 G | Refills: 1 | Status: SHIPPED | OUTPATIENT
Start: 2018-11-23 | End: 2020-10-20

## 2018-11-23 ASSESSMENT — ENCOUNTER SYMPTOMS
CONSTITUTIONAL NEGATIVE: 1
RESPIRATORY NEGATIVE: 1
NEUROLOGICAL NEGATIVE: 1
CARDIOVASCULAR NEGATIVE: 1

## 2018-11-23 NOTE — PROGRESS NOTES
HPI    SUBJECTIVE:   Saji Iqbal is a 58 year old male who presents to clinic today for the following health issues:    Concern - Psoriasis outbreak  Onset: began approximately 2 years ago    Description:   Red, scaly, itchy patches    Intensity: severe    Progression of Symptoms:  worsening    Accompanying Signs & Symptoms:  Stress & anxiety    Previous history of similar problem:   ongoing    Precipitating factors:   Worsened by: hot shower, stress    Alleviating factors:  Improved by: None    Therapies Tried and outcome: cortisone cream, Bendadryl    Long standing psoriasis.  Flared in the last 6m.  OTCs had previously been helpful, not working so well right now.  Notes that mostly on forearms, lower legs, scalp.  Also notes two sores on tongue that tend to weep.  Not tender or sore.      Feels that anti-depressant contribute to having some difficulty swallowing.    Notes that he still has persistent GI issues from metformin.  Bad diarrhea.  Has tried various formulations and dosing schedule, not helpful    Review of Systems   Constitutional: Negative.    Respiratory: Negative.    Cardiovascular: Negative.    Skin: Positive for itching and rash.   Neurological: Negative.          Physical Exam   Constitutional: He is oriented to person, place, and time and well-developed, well-nourished, and in no distress.   Eyes: Conjunctivae and EOM are normal.   Cardiovascular: Normal rate, regular rhythm and normal heart sounds.    Pulmonary/Chest: Effort normal and breath sounds normal.   Musculoskeletal: He exhibits no edema.   Neurological: He is alert and oriented to person, place, and time.   Skin: Skin is warm and dry.   Numerous psoriatic plaques on fore arms, legs, scalp, EAC   Vitals reviewed.    (E11.9,  Z79.4) Controlled type 2 diabetes mellitus without complication, with long-term current use of insulin (H)  (primary encounter diagnosis)  Comment: switching to invokana, may stop metformin  Plan: HEMOGLOBIN A1C,  TSH WITH FREE T4 REFLEX,         canagliflozin (INVOKANA) 300 MG tablet            (L40.9) Psoriasis  Comment: will do short oral burst, topcal.  If not imprvied w/i 2w will refer to derm  Plan: augmented betamethasone dipropionate         (DIPROLENE-AF) 0.05 % cream, predniSONE         (DELTASONE) 20 MG tablet              RTC in     Naseem Esparza MD

## 2018-11-23 NOTE — MR AVS SNAPSHOT
After Visit Summary   11/23/2018    Saji Iqbal    MRN: [id number]           Patient Information     Date Of Birth          1960        Visit Information        Provider Department      11/23/2018 9:20 AM Naseem Esparza MD [facility]        Today's Diagnoses     Controlled type 2 diabetes mellitus without complication, with long-term current use of insulin (H)    -  1    Psoriasis           Follow-ups after your visit        Follow-up notes from your care team     Return in about 3 months (around 2/23/2019) for depression/anxiety.      Who to contact     If you have questions or need follow up information about today's clinic visit or your schedule please contact [facility] directly at [phone].  Normal or non-critical lab and imaging results will be communicated to you by MyChart, letter or phone within 4 business days after the clinic has received the results. If you do not hear from us within 7 days, please contact the clinic through MyChart or phone. If you have a critical or abnormal lab result, we will notify you by phone as soon as possible.  Submit refill requests through [employer] or call your pharmacy and they will forward the refill request to us. Please allow 3 business days for your refill to be completed.          Additional Information About Your Visit        Care EveryWhere ID     This is your Care EveryWhere ID. This could be used by other organizations to access your [facility] medical records  [id number]        Your Vitals Were     Pulse Temperature Respirations BMI (Body Mass Index)          100 98.6  F (37  C) (Oral) 20 31.4 kg/m2         Blood Pressure from Last 3 Encounters:   11/23/18 (!) 162/102   08/06/18 150/88   03/27/18 136/88    Weight from Last 3 Encounters:   11/23/18 228 lb 4.8 oz (103.6 kg)   08/06/18 224 lb 14.4 oz (102 kg)   03/27/18 219 lb 14.4 oz (99.7 kg)              We Performed the Following     HEMOGLOBIN A1C      TSH WITH FREE T4 REFLEX          Today's Medication Changes          These changes are accurate as of 11/23/18  9:53 AM.  If you have any questions, ask your nurse or doctor.               Start taking these medicines.        Dose/Directions    augmented betamethasone dipropionate 0.05 % cream   Commonly known as:  DIPROLENE-AF   Used for:  Psoriasis        Apply sparingly to affected area  twice daily for 14 days.  Do not apply to face.   Quantity:  50 g   Refills:  1       canagliflozin 300 MG tablet   Commonly known as:  INVOKANA   Used for:  Controlled type 2 diabetes mellitus without complication, with long-term current use of insulin (H)        Dose:  300 mg   Take 1 tablet (300 mg) by mouth every morning (before breakfast)   Quantity:  90 tablet   Refills:  1       predniSONE 20 MG tablet   Commonly known as:  DELTASONE   Used for:  Psoriasis        Dose:  40 mg   Take 2 tablets (40 mg) by mouth daily for 5 days   Quantity:  10 tablet   Refills:  0            Where to get your medicines      These medications were sent to [place] [address]     Phone:  [phone]     augmented betamethasone dipropionate 0.05 % cream    canagliflozin 300 MG tablet    predniSONE 20 MG tablet                Primary Care Provider Office Phone # Fax #    Naseem Keny Esparza [phone]       [address] [place] [address] [facility] [address]        Equal Access to Services     SAL CATHERINE AH: Hadii sidney longo Soshanna, waaxda luqadaha, qaybta kaalmada adeegyada, lukas richardson. So [facility] [phone].    ATENCIÓN: Si habla español, tiene a orellana disposición servicios gratuitos de asistencia lingüística. Llame al [phone].    We comply with applicable federal civil rights laws and [place] laws. We do not discriminate on the basis of race, color, national origin, age, disability, sex, sexual orientation,  or gender identity.            Thank you!     Thank you for choosing [facility]  for your care. Our goal is always to provide you with excellent care. Hearing back from our patients is one way we can continue to improve our services. Please take a few minutes to complete the written survey that you may receive in the mail after your visit with us. Thank you!             Your Updated Medication List - Protect others around you: Learn how to safely use, store and throw away your medicines at www.disposemymeds.org.          This list is accurate as of 11/23/18  9:53 AM.  Always use your most recent med list.                   Brand Name Dispense Instructions for use Diagnosis    aspirin 81 MG tablet    ASA     Take by mouth daily        atorvastatin 20 MG tablet    LIPITOR    30 tablet    Take 1 tablet (20 mg) by mouth daily    Hyperlipidemia LDL goal <100       augmented betamethasone dipropionate 0.05 % cream    DIPROLENE-AF    50 g    Apply sparingly to affected area  twice daily for 14 days.  Do not apply to face.    Psoriasis       blood glucose lancets standard    no brand specified    1 Box    Use to test blood sugar 1 times daily or as directed.    Type 2 diabetes mellitus without complication, without long-term current use of insulin (H)       blood glucose monitoring lancets     2 Box    Use to test blood sugar 4 times daily or as directed.        blood glucose monitoring meter device kit     1 kit    Use to test blood sugar 4 times daily or as directed. (pts machine is not working)    Type 2 diabetes mellitus with hyperglycemia, with long-term current use of insulin (H)       blood glucose monitoring test strip    no brand specified    150 Box    Use to test blood sugar 4 times daily or as directed. Please dispense Accu Chek Ana plus test strips or per patient preference if using a different brand    Type 2 diabetes mellitus with hyperglycemia, with long-term current use of insulin (H)        buPROPion 150 MG 24 hr tablet    WELLBUTRIN XL    60 tablet    Take 2 tablets (300 mg) by mouth every morning    Social anxiety disorder       canagliflozin 300 MG tablet    INVOKANA    90 tablet    Take 1 tablet (300 mg) by mouth every morning (before breakfast)    Controlled type 2 diabetes mellitus without complication, with long-term current use of insulin (H)       clopidogrel 75 MG tablet    PLAVIX    30 tablet    Take 1 tablet (75 mg) by mouth daily    Coronary artery disease involving native coronary artery of native heart without angina pectoris       escitalopram 10 MG tablet    LEXAPRO    90 tablet    Take 1 tablet (10 mg) by mouth daily    Social anxiety disorder       insulin glargine 100 UNIT/ML pen     15 mL    INJECT 20 UNITS UNDER THE SKIN TWICE A DAY    Type 2 diabetes mellitus with hyperglycemia, with long-term current use of insulin (H)       insulin pen needle 31G X 5 MM miscellaneous    B-D U/F    100 each    Use 2 daily or as directed.    Type 2 diabetes mellitus with hyperglycemia, with long-term current use of insulin (H)       lisinopril 5 MG tablet    PRINIVIL/ZESTRIL    30 tablet    Take 1 tablet (5 mg) by mouth daily    Benign essential hypertension, Coronary artery disease involving native coronary artery of native heart without angina pectoris       metFORMIN 500 MG 24 hr tablet    GLUCOPHAGE-XR    360 tablet    Take 4 tablets (2,000 mg) by mouth daily (with dinner)    Type 2 diabetes mellitus with hyperglycemia, with long-term current use of insulin (H)       metoprolol tartrate 25 MG tablet    LOPRESSOR    60 tablet    Take 1 tablet (25 mg) by mouth 2 times daily    Coronary artery disease involving native coronary artery of native heart without angina pectoris, Benign essential hypertension       omeprazole 20 MG CR capsule    priLOSEC    90 capsule    Take 1 capsule (20 mg) by mouth daily    Gastroesophageal reflux disease, esophagitis presence not specified       predniSONE 20  MG tablet    DELTASONE    10 tablet    Take 2 tablets (40 mg) by mouth daily for 5 days    Psoriasis

## 2018-11-24 LAB — TSH SERPL DL<=0.005 MIU/L-ACNC: 1.69 MU/L (ref 0.4–4)

## 2018-12-12 ENCOUNTER — TELEPHONE (OUTPATIENT)
Dept: FAMILY MEDICINE | Facility: CLINIC | Age: 58
End: 2018-12-12

## 2018-12-12 NOTE — TELEPHONE ENCOUNTER
"Pt calling woke up this am feeling dizzy, lightheaded and nauseous.     When asked about BS readings pt states \"I would guess they are pretty low I have not eaten anything\"       Pt has not been able to get out of bed due to symptoms.  He has take all daily DM medications insulin.      Advised with symptoms pt is reporting, not eating, unable to stand or get out of bed should call 911 as he is home alone.   Pt declined this.  Writer advised if he has low BS could go into diabetic coma or have seizures.  Pt still declines 911.   He states he will call daughter to take him to ER.       Writer advised if he does this needs to go in RUBY Ibrahim RN    "

## 2018-12-14 ENCOUNTER — OFFICE VISIT (OUTPATIENT)
Dept: URGENT CARE | Facility: URGENT CARE | Age: 58
End: 2018-12-14
Payer: COMMERCIAL

## 2018-12-14 VITALS
TEMPERATURE: 98.6 F | SYSTOLIC BLOOD PRESSURE: 128 MMHG | OXYGEN SATURATION: 98 % | DIASTOLIC BLOOD PRESSURE: 80 MMHG | HEART RATE: 104 BPM

## 2018-12-14 DIAGNOSIS — A08.4 VIRAL GASTROENTERITIS: Primary | ICD-10-CM

## 2018-12-14 LAB
ALBUMIN SERPL-MCNC: 3.6 G/DL (ref 3.4–5)
ALP SERPL-CCNC: 65 U/L (ref 40–150)
ALT SERPL W P-5'-P-CCNC: 74 U/L (ref 0–70)
ANION GAP SERPL CALCULATED.3IONS-SCNC: 13 MMOL/L (ref 3–14)
AST SERPL W P-5'-P-CCNC: 51 U/L (ref 0–45)
BILIRUB SERPL-MCNC: 0.9 MG/DL (ref 0.2–1.3)
BUN SERPL-MCNC: 13 MG/DL (ref 7–30)
CALCIUM SERPL-MCNC: 9.1 MG/DL (ref 8.5–10.1)
CHLORIDE SERPL-SCNC: 100 MMOL/L (ref 94–109)
CO2 SERPL-SCNC: 26 MMOL/L (ref 20–32)
CREAT SERPL-MCNC: 1 MG/DL (ref 0.66–1.25)
ERYTHROCYTE [DISTWIDTH] IN BLOOD BY AUTOMATED COUNT: 11.8 % (ref 10–15)
FLUAV+FLUBV AG SPEC QL: NEGATIVE
FLUAV+FLUBV AG SPEC QL: NEGATIVE
GFR SERPL CREATININE-BSD FRML MDRD: 77 ML/MIN/1.7M2
GLUCOSE SERPL-MCNC: 222 MG/DL (ref 70–99)
HCT VFR BLD AUTO: 49.5 % (ref 40–53)
HGB BLD-MCNC: 17 G/DL (ref 13.3–17.7)
MCH RBC QN AUTO: 34.1 PG (ref 26.5–33)
MCHC RBC AUTO-ENTMCNC: 34.3 G/DL (ref 31.5–36.5)
MCV RBC AUTO: 99 FL (ref 78–100)
PLATELET # BLD AUTO: 160 10E9/L (ref 150–450)
POTASSIUM SERPL-SCNC: 4.3 MMOL/L (ref 3.4–5.3)
PROT SERPL-MCNC: 7 G/DL (ref 6.8–8.8)
RBC # BLD AUTO: 4.98 10E12/L (ref 4.4–5.9)
SODIUM SERPL-SCNC: 139 MMOL/L (ref 133–144)
SPECIMEN SOURCE: NORMAL
WBC # BLD AUTO: 7 10E9/L (ref 4–11)

## 2018-12-14 PROCEDURE — 99214 OFFICE O/P EST MOD 30 MIN: CPT | Performed by: PHYSICIAN ASSISTANT

## 2018-12-14 PROCEDURE — 36415 COLL VENOUS BLD VENIPUNCTURE: CPT | Performed by: PHYSICIAN ASSISTANT

## 2018-12-14 PROCEDURE — 85027 COMPLETE CBC AUTOMATED: CPT | Performed by: PHYSICIAN ASSISTANT

## 2018-12-14 PROCEDURE — 87804 INFLUENZA ASSAY W/OPTIC: CPT | Performed by: PHYSICIAN ASSISTANT

## 2018-12-14 PROCEDURE — 80053 COMPREHEN METABOLIC PANEL: CPT | Performed by: PHYSICIAN ASSISTANT

## 2018-12-14 NOTE — PROGRESS NOTES
SUBJECTIVE:  Chief Complaint   Patient presents with     Urgent Care     Abdominal Pain     stomach cramps x wednesday, chills, fatigue, body aches, diarrhea, nausea on and off     Saji Iqbal is a 58 year old male whose symptoms began 2 days ago and include nausea and diarrhea. Also admits to some fatigue and body aches.   Symptoms are sudden onset, still present and constant and moderate.    Aggravating factors: Eating causes him to have a bowel movement shortly after. He feels like he is not digesting his food.    Alleviating factors:Nothing. Has been drinking Gatorade and eating soup  Associated symptoms:  Pain:No  Fever: no noted fevers  Diarrhea:  consists of 12+ stools/day and is persisting  Stools: frequent and watery. Notes that they are yellow in color.   Appetite: decreased  Risk factors: A coworker had similar symptoms a few weeks ago. Patient denies possible bad food exposure, travel , recent antibiotic use, recent hospitalization, recent medication changes and hx of IBS.   He does not have abdominal pain, Chest pain, URI symptoms, SOB, urinary frequency or urgency, numbness or tingling in extremities or rash.       Past Medical History:   Diagnosis Date     Cluster headache      Coronary artery disease      Depression      Diabetes mellitus, type II (H)      Hyperlipidemia      Hypertension      Rotator cuff arthropathy    .  Current Outpatient Medications   Medication Sig Dispense Refill     aspirin 81 MG tablet Take by mouth daily       atorvastatin (LIPITOR) 20 MG tablet Take 1 tablet (20 mg) by mouth daily 30 tablet 2     augmented betamethasone dipropionate (DIPROLENE-AF) 0.05 % cream Apply sparingly to affected area  twice daily for 14 days.  Do not apply to face. 50 g 1     blood glucose (NO BRAND SPECIFIED) lancets standard Use to test blood sugar 1 times daily or as directed. 1 Box 3     blood glucose monitoring (ACCU-CHEK NORI PLUS) meter device kit Use to test blood sugar 4 times daily or  as directed. (pts machine is not working) 1 kit 0     blood glucose monitoring (ACCU-CHEK FASTCLIX) lancets Use to test blood sugar 4 times daily or as directed. 2 Box 3     blood glucose monitoring (NO BRAND SPECIFIED) test strip Use to test blood sugar 4 times daily or as directed. Please dispense Accu Chek Ana plus test strips or per patient preference if using a different brand 150 Box 3     canagliflozin (INVOKANA) 300 MG tablet Take 1 tablet (300 mg) by mouth every morning (before breakfast) 90 tablet 1     clopidogrel (PLAVIX) 75 MG tablet Take 1 tablet (75 mg) by mouth daily 30 tablet 8     insulin glargine (BASAGLAR KWIKPEN) 100 UNIT/ML pen INJECT 20 UNITS UNDER THE SKIN TWICE A DAY 15 mL 0     insulin pen needle (B-D U/F) 31G X 5 MM Use 2 daily or as directed. 100 each prn     lisinopril (PRINIVIL/ZESTRIL) 5 MG tablet Take 1 tablet (5 mg) by mouth daily 30 tablet 8     metFORMIN (GLUCOPHAGE-XR) 500 MG 24 hr tablet Take 4 tablets (2,000 mg) by mouth daily (with dinner) 360 tablet 1     metoprolol tartrate (LOPRESSOR) 25 MG tablet Take 1 tablet (25 mg) by mouth 2 times daily 60 tablet 8     omeprazole (PRILOSEC) 20 MG CR capsule Take 1 capsule (20 mg) by mouth daily 90 capsule 1     Social History     Tobacco Use     Smoking status: Former Smoker     Packs/day: 0.33     Years: 20.00     Pack years: 6.60     Types: Cigarettes     Last attempt to quit: 7/17/2015     Years since quitting: 3.4     Smokeless tobacco: Former User     Types: Snuff   Substance Use Topics     Alcohol use: Yes     Alcohol/week: 0.6 oz     Types: 1 Standard drinks or equivalent per week     Comment: 2-6 occasionally       ROS:  Review of systems negative except as stated above.    OBJECTIVE:  /80 (BP Location: Right arm, Patient Position: Chair, Cuff Size: Adult Regular)   Pulse 112   Temp 98.6  F (37  C) (Tympanic)   SpO2 98%   GENERAL APPEARANCE: healthy, alert and no distress  EYES: EOMI,  PERRL, conjunctiva clear  HENT:  ear canals and TM's normal.  Nose and mouth without ulcers, erythema or lesions  NECK: supple, nontender, no lymphadenopathy  RESP: lungs clear to auscultation - no rales, rhonchi or wheezes  CV: regular rates and rhythm, normal S1 S2, no murmur noted  ABDOMEN:  soft, nontender, no HSM or masses and bowel sounds normal. NO rebound tenderness, rigidity or guarding.   NEURO: Normal strength and tone, sensory exam grossly normal,  normal speech and mentation  SKIN: Multiple erythematous scaly plaques on UE B/L. NO secondary sign of infection.     Results for orders placed or performed in visit on 12/14/18   CBC with platelets   Result Value Ref Range    WBC 7.0 4.0 - 11.0 10e9/L    RBC Count 4.98 4.4 - 5.9 10e12/L    Hemoglobin 17.0 13.3 - 17.7 g/dL    Hematocrit 49.5 40.0 - 53.0 %    MCV 99 78 - 100 fl    MCH 34.1 (H) 26.5 - 33.0 pg    MCHC 34.3 31.5 - 36.5 g/dL    RDW 11.8 10.0 - 15.0 %    Platelet Count 160 150 - 450 10e9/L   Comprehensive metabolic panel   Result Value Ref Range    Sodium 139 133 - 144 mmol/L    Potassium 4.3 3.4 - 5.3 mmol/L    Chloride 100 94 - 109 mmol/L    Carbon Dioxide 26 20 - 32 mmol/L    Anion Gap 13 3 - 14 mmol/L    Glucose 222 (H) 70 - 99 mg/dL    Urea Nitrogen 13 7 - 30 mg/dL    Creatinine 1.00 0.66 - 1.25 mg/dL    GFR Estimate 77 >60 mL/min/1.7m2    GFR Estimate If Black >90 >60 mL/min/1.7m2    Calcium 9.1 8.5 - 10.1 mg/dL    Bilirubin Total 0.9 0.2 - 1.3 mg/dL    Albumin 3.6 3.4 - 5.0 g/dL    Protein Total 7.0 6.8 - 8.8 g/dL    Alkaline Phosphatase 65 40 - 150 U/L    ALT 74 (H) 0 - 70 U/L    AST 51 (H) 0 - 45 U/L   Influenza A/B antigen   Result Value Ref Range    Influenza A/B Agn Specimen Nasal     Influenza A Negative NEG^Negative    Influenza B Negative NEG^Negative       ASSESSMENT / PLAN:  1. Viral gastroenteritis  Patient with diarrhea for the last 2 days. Patient has been drinking fluids, but has not really been eating. A broad differential was considered including  diverticulitis, appendicitis and cholecystitis which I think are all less likely given benign abdominal exam, absense of fever and normal WBC count. I also considered bacterial diarrhea, but no hemorrhagic stools. He does not have signs of dehydration, MMM. I advised taking Imodium starting today for the next couple of days. Follow-up in clinic if diarrhea persists for 5+ days. To ED if development of severe abdominal pain, unable to hold down fluids or fever present.   - Influenza A/B antigen  - CBC with platelets  - Comprehensive metabolic panel    I have discussed the patient's diagnosis and my plan of treatment with the patient. We went over any labs or imaging. Patient is aware to come back in with worsening symptoms or if no relief despite treatment plan.  Patient verbalizes understanding. All questions were addressed and answered.     Sophie Nowak PA-C

## 2018-12-14 NOTE — LETTER
[facility]  [address]  [address]  [place] [address]  Phone: [phone]  Fax: [phone]    December 14, 2018        Saji Iqbal  [address] [address]          To whom it may concern:    RE: Saji Iqbal    Patient was seen and treated today at our clinic. Please excuse from work on 12/12/2018 - 12/14/2018.    Please contact me for questions or concerns.      Sincerely,        Sophie Nowak PA-C

## 2018-12-14 NOTE — PATIENT INSTRUCTIONS
"  Patient Education     Viral Gastroenteritis (Adult)    Gastroenteritis is commonly called the \"stomach flu,\" although it has nothing to do with influenza. It is most often caused by a virus that affects the stomach and intestinal tract and usually lasts from 2 to 7 days. Common viruses causing gastroenteritis include norovirus, rotavirus, and hepatitis A. Non-viral causes of gastroenteritis include bacteria, parasites, and toxins.  The danger from repeated vomiting or diarrhea is dehydration. This is the loss of too much fluid from the body. When this occurs, body fluids must be replaced. Antibiotics don't help with this illness because it is usually viral. Simple home treatment will be helpful.  Symptoms of viral gastroenteritis may include:    Watery, loose stools    Stomach pain or abdominal cramps    Fever and chills    Nausea and vomiting    Loss of bowel control    Headache  Home care  Gastroenteritis is transmitted by contact with the stool or vomit of an infected person. This can occur from person to person or from contact with a contaminated surface.  Follow these guidelines when caring for yourself at home:    If symptoms are severe, rest at home for the next 24 hours or until you are feeling better.    Wash your hands with soap and water or use alcohol-based  to prevent the spread of infection. Wash your hands after touching anyone who is sick.    Wash your hands or use alcohol-based  after using the toilet and before meals. Clean the toilet after each use.  Remember these tips when preparing food:    People with diarrhea should not prepare or serve food to others. When preparing foods, wash your hands before and after.    Wash your hands after using cutting boards, countertops, knives, or utensils that have been in contact with raw food.    Dry your hands with a single use towel.    Keep uncooked meats away from cooked and ready-to-eat foods.  Medicine  You may use acetaminophen or " NSAID medicines like ibuprofen or naproxen to control fever unless another medicine was given. If you have chronic liver or kidney disease, talk with your healthcare provider before using these medicines. Also talk with your provider if you've had a stomach ulcer or gastrointestinal bleeding. Don't give aspirin to anyone under 18 years of age who is ill with a fever. It may cause severe liver damage. Don't use NSAIDS is you are already taking one for another condition (like arthritis) or are on aspirin (such as for heart disease or after a stroke).  If medicine for vomiting or diarrhea are prescribed, take these only as directed. Nausea and diarrhea medicines are generally OK unless you have bleeding, fever, or severe abdominal pain.  Diet  Follow these guidelines for food:    Water and liquids are important so you don't get dehydrated. Drink a small amount at a time or suck on ice chips if you are vomiting.    If you eat, avoid fatty, greasy, spicy, or fried foods.    Don't eat dairy if you have diarrhea. This can make diarrhea worse.    Avoid tobacco, alcohol, and caffeine which may worsen symptoms.  During the first 24 hours (the first full day), follow the diet below:    Beverages. Sports drinks, soft drinks without caffeine, ginger ale, mineral water (plain or flavored), decaffeinated tea and coffee. If you are very dehydrated, sports drinks aren't a good choice. They have too much sugar and not enough electrolytes. In this case, commercially available products called oral rehydration solutions, are best.    Soups. Eat clear broth, consommé, and bouillon.    Desserts. Eat gelatin, ice pops, and fruit juice bars.  During the next 24 hours (the second day), you may add the following to the above:    Hot cereal, plain toast, bread, rolls, and crackers    Plain noodles, rice, mashed potatoes, chicken noodle or rice soup    Unsweetened canned fruit (avoid pineapple), bananas    Limit fat intake to less than 15 grams  per day. Do this by avoiding margarine, butter, oils, mayonnaise, sauces, gravies, fried foods, peanut butter, meat, poultry, and fish.    Limit fiber and avoid raw or cooked vegetables, fresh fruits (except bananas), and bran cereals.    Limit caffeine and chocolate. Don't use spices or seasonings other than salt.    Limit dairy products.    Avoid alcohol.  During the next 24 hours:    Gradually resume a normal diet as you feel better and your symptoms improve.    If at any time it starts getting worse again, go back to clear liquids until you feel better.  Follow-up care  Follow up with your healthcare provider, or as advised. Call your provider if you don't get better within 24 hours or if diarrhea lasts more than a week. Also follow up if you are unable to keep down liquids and get dehydrated. If a stool (diarrhea) sample was taken, call as directed for the results.  Call 911  Call 911 if any of these occur:    Trouble breathing    Chest pain    Confused    Severe drowsiness or trouble awakening    Fainting or loss of consciousness    Rapid heart rate    Seizure    Stiff neck   When to seek medical advice  Call your healthcare provider right away if any of these occur:    Abdominal pain that gets worse    Continued vomiting (unable to keep liquids down)    Frequent diarrhea (more than 5 times a day)    Blood in vomit or stool (black or red color)    Dark urine, reduced urine output, or extreme thirst    Weakness or dizziness    Drowsiness    Fever of 100.4 F (38 C) or higher, or as directed by your healthcare provider    New rash  Date Last Reviewed: 6/1/2018 2000-2018 [employer]. [address] [place] [address], [place] [address]. All rights reserved. This information is not intended as a substitute for professional medical care. Always follow your healthcare professional's instructions.

## 2019-01-11 ENCOUNTER — OFFICE VISIT (OUTPATIENT)
Dept: FAMILY MEDICINE | Facility: CLINIC | Age: 59
End: 2019-01-11
Payer: COMMERCIAL

## 2019-01-11 VITALS
RESPIRATION RATE: 20 BRPM | WEIGHT: 229.2 LBS | HEART RATE: 100 BPM | TEMPERATURE: 98.6 F | BODY MASS INDEX: 31.04 KG/M2 | DIASTOLIC BLOOD PRESSURE: 86 MMHG | HEIGHT: 72 IN | OXYGEN SATURATION: 95 % | SYSTOLIC BLOOD PRESSURE: 138 MMHG

## 2019-01-11 DIAGNOSIS — L40.9 PSORIASIS: ICD-10-CM

## 2019-01-11 DIAGNOSIS — I10 BENIGN ESSENTIAL HYPERTENSION: ICD-10-CM

## 2019-01-11 DIAGNOSIS — Z79.4 CONTROLLED TYPE 2 DIABETES MELLITUS WITHOUT COMPLICATION, WITH LONG-TERM CURRENT USE OF INSULIN (H): ICD-10-CM

## 2019-01-11 DIAGNOSIS — K21.9 GASTROESOPHAGEAL REFLUX DISEASE, ESOPHAGITIS PRESENCE NOT SPECIFIED: ICD-10-CM

## 2019-01-11 DIAGNOSIS — Z12.11 SCREEN FOR COLON CANCER: Primary | ICD-10-CM

## 2019-01-11 DIAGNOSIS — E78.5 HYPERLIPIDEMIA LDL GOAL <100: ICD-10-CM

## 2019-01-11 DIAGNOSIS — E11.9 CONTROLLED TYPE 2 DIABETES MELLITUS WITHOUT COMPLICATION, WITH LONG-TERM CURRENT USE OF INSULIN (H): ICD-10-CM

## 2019-01-11 DIAGNOSIS — I25.10 CORONARY ARTERY DISEASE INVOLVING NATIVE CORONARY ARTERY OF NATIVE HEART WITHOUT ANGINA PECTORIS: ICD-10-CM

## 2019-01-11 LAB — HBA1C MFR BLD: 9.4 % (ref 0–5.6)

## 2019-01-11 PROCEDURE — 83036 HEMOGLOBIN GLYCOSYLATED A1C: CPT | Performed by: FAMILY MEDICINE

## 2019-01-11 PROCEDURE — 99214 OFFICE O/P EST MOD 30 MIN: CPT | Performed by: FAMILY MEDICINE

## 2019-01-11 PROCEDURE — 36415 COLL VENOUS BLD VENIPUNCTURE: CPT | Performed by: FAMILY MEDICINE

## 2019-01-11 RX ORDER — CLOPIDOGREL BISULFATE 75 MG/1
75 TABLET ORAL DAILY
Qty: 90 TABLET | Refills: 1 | Status: SHIPPED | OUTPATIENT
Start: 2019-01-11 | End: 2019-05-13

## 2019-01-11 RX ORDER — ATORVASTATIN CALCIUM 20 MG/1
20 TABLET, FILM COATED ORAL DAILY
Qty: 90 TABLET | Refills: 3 | Status: SHIPPED | OUTPATIENT
Start: 2019-01-11 | End: 2020-09-17

## 2019-01-11 RX ORDER — LISINOPRIL 5 MG/1
5 TABLET ORAL DAILY
Qty: 90 TABLET | Refills: 3 | Status: SHIPPED | OUTPATIENT
Start: 2019-01-11 | End: 2020-02-06

## 2019-01-11 RX ORDER — METOPROLOL TARTRATE 25 MG/1
25 TABLET, FILM COATED ORAL 2 TIMES DAILY
Qty: 180 TABLET | Refills: 1 | Status: SHIPPED | OUTPATIENT
Start: 2019-01-11 | End: 2019-07-26

## 2019-01-11 ASSESSMENT — ANXIETY QUESTIONNAIRES
6. BECOMING EASILY ANNOYED OR IRRITABLE: SEVERAL DAYS
7. FEELING AFRAID AS IF SOMETHING AWFUL MIGHT HAPPEN: NOT AT ALL
2. NOT BEING ABLE TO STOP OR CONTROL WORRYING: NOT AT ALL
GAD7 TOTAL SCORE: 3
5. BEING SO RESTLESS THAT IT IS HARD TO SIT STILL: NOT AT ALL
3. WORRYING TOO MUCH ABOUT DIFFERENT THINGS: NOT AT ALL
1. FEELING NERVOUS, ANXIOUS, OR ON EDGE: MORE THAN HALF THE DAYS

## 2019-01-11 ASSESSMENT — ENCOUNTER SYMPTOMS
BLURRED VISION: 0
DOUBLE VISION: 0
ROS SKIN COMMENTS: PSORIASIS
CONSTITUTIONAL NEGATIVE: 1
SHORTNESS OF BREATH: 0
PALPITATIONS: 0
HEADACHES: 0

## 2019-01-11 ASSESSMENT — PATIENT HEALTH QUESTIONNAIRE - PHQ9
SUM OF ALL RESPONSES TO PHQ QUESTIONS 1-9: 3
5. POOR APPETITE OR OVEREATING: NOT AT ALL

## 2019-01-11 ASSESSMENT — MIFFLIN-ST. JEOR: SCORE: 1898.67

## 2019-01-11 NOTE — NURSING NOTE
"Chief Complaint   Patient presents with     Diabetes     Initial /86 (BP Location: Right arm, Patient Position: Sitting, Cuff Size: Adult Regular)   Pulse 100   Temp 98.6  F (37  C) (Oral)   Resp 20   Ht 1.822 m (5' 11.75\")   Wt 104 kg (229 lb 3.2 oz)   SpO2 95%   BMI 31.30 kg/m   Estimated body mass index is 31.3 kg/m  as calculated from the following:    Height as of this encounter: 1.822 m (5' 11.75\").    Weight as of this encounter: 104 kg (229 lb 3.2 oz).  BP completed using cuff size regular right arm    Lisa Magill, CMA    "

## 2019-01-11 NOTE — PROGRESS NOTES
History of Present Illness     Diabetes:     Frequency of checking blood sugars::  2 times a day    Diabetic concerns::  Blood sugar frequently over 200    Hypoglycemia symptoms::  None    Paraesthesia present::  No    Eye Exam in the last year::  NO    Diabetes Management Resources    Diet:  Diabetic  Frequency of exercise:  None  Taking medications regularly:  Yes  Additional concerns today:  Yes        BP Readings from Last 2 Encounters:   01/11/19 138/86   12/14/18 128/80     Hemoglobin A1C (%)   Date Value   11/23/2018 10.6 (H)   08/06/2018 12.6 (H)     LDL Cholesterol Calculated (mg/dL)   Date Value   03/27/2018 121 (H)   12/09/2016     Cannot estimate LDL when triglyceride exceeds 400 mg/dL     LDL Cholesterol Direct (mg/dL)   Date Value   12/09/2016 134 (H)       Admits that diet is really bad, is trying to improve.    New med is much better from side effect poit of view.  Noting a bit more and darker urine, otherwise no concerns.    Psoraisis has gotten really bad.  Recent steroid burst not helpful.  Would like derm referral.        Review of Systems   Constitutional: Negative.    Eyes: Negative for blurred vision and double vision.   Respiratory: Negative for shortness of breath.    Cardiovascular: Negative for chest pain and palpitations.   Skin:        psoriasis   Neurological: Negative for headaches.         Physical Exam   Constitutional: He is oriented to person, place, and time.   Eyes: Conjunctivae and EOM are normal.   Cardiovascular: Normal rate, regular rhythm and normal heart sounds.   Pulmonary/Chest: Effort normal and breath sounds normal.   Musculoskeletal: He exhibits no edema.   Neurological: He is alert and oriented to person, place, and time.   Skin: Skin is warm and dry.   Extensive psoriatic plaques noted, zara on FABIO lower arms.   Vitals reviewed.      (Z12.11) Screen for colon cancer  (primary encounter diagnosis)  Comment:   Plan:     (K21.9) Gastroesophageal reflux disease,  esophagitis presence not specified  Comment: refill,   Plan: omeprazole (PRILOSEC) 20 MG DR capsule            (I25.10) Coronary artery disease involving native coronary artery of native heart without angina pectoris  Comment: refills, no sx  Plan: metoprolol tartrate (LOPRESSOR) 25 MG tablet,         lisinopril (PRINIVIL/ZESTRIL) 5 MG tablet,         clopidogrel (PLAVIX) 75 MG tablet            (I10) Benign essential hypertension  Comment:   Plan: metoprolol tartrate (LOPRESSOR) 25 MG tablet,         lisinopril (PRINIVIL/ZESTRIL) 5 MG tablet            (E11.9,  Z79.4) Controlled type 2 diabetes mellitus without complication, with long-term current use of insulin (H)  Comment: tolerating med well, check intermediate A1c  Plan: canagliflozin (INVOKANA) 300 MG tablet,         Hemoglobin A1c            (E78.5) Hyperlipidemia LDL goal <100  Comment:   Plan: atorvastatin (LIPITOR) 20 MG tablet            (L40.9) Psoriasis  Comment:   Plan: DERMATOLOGY REFERRAL              RTC in 6w for 3m f/u    Naseem Esparza MD

## 2019-01-12 ASSESSMENT — ANXIETY QUESTIONNAIRES: GAD7 TOTAL SCORE: 3

## 2019-01-15 ENCOUNTER — TELEPHONE (OUTPATIENT)
Dept: FAMILY MEDICINE | Facility: CLINIC | Age: 59
End: 2019-01-15

## 2019-01-15 NOTE — TELEPHONE ENCOUNTER
Patient wants to know if he can take Benadryl for itching that he is having.  Please call to advise.

## 2019-01-15 NOTE — TELEPHONE ENCOUNTER
That would be fine.  It might make him pretty drowsy.  If so Claritin or Allegra might work and not make him sleepy.

## 2019-01-15 NOTE — TELEPHONE ENCOUNTER
Patient called back, informed him of Dr. Paul response. Patient will try medications suggested. Ruth Behrens.

## 2019-01-23 DIAGNOSIS — Z79.4 TYPE 2 DIABETES MELLITUS WITH HYPERGLYCEMIA, WITH LONG-TERM CURRENT USE OF INSULIN (H): ICD-10-CM

## 2019-01-23 DIAGNOSIS — E11.65 TYPE 2 DIABETES MELLITUS WITH HYPERGLYCEMIA, WITH LONG-TERM CURRENT USE OF INSULIN (H): ICD-10-CM

## 2019-01-23 RX ORDER — INSULIN GLARGINE 100 [IU]/ML
21 INJECTION, SOLUTION SUBCUTANEOUS 2 TIMES DAILY
Qty: 15 ML | Refills: 3 | Status: SHIPPED | OUTPATIENT
Start: 2019-01-23 | End: 2019-11-12

## 2019-01-23 NOTE — TELEPHONE ENCOUNTER
"Requested Prescriptions   Pending Prescriptions Disp Refills     insulin glargine (BASAGLAR KWIKPEN) 100 UNIT/ML pen [Pharmacy Med Name: BASAGLAR KWIKPEN 100UNIT/ML SOPN] 15 mL 0     Sig: INJECT 20 UNITS UNDER THE SKIN TWICE A DAY    Long Acting Insulin Protocol Passed - 1/23/2019  3:13 PM       Passed - Blood pressure less than 140/90 in past 6 months    BP Readings from Last 3 Encounters:   01/11/19 138/86   12/14/18 128/80   11/23/18 (!) 162/102                Passed - LDL on file in past 12 months    Recent Labs   Lab Test 03/27/18  1616   *            Passed - Microalbumin on file in past 12 months    Recent Labs   Lab Test 03/27/18  1618   MICROL 18   UMALCR 7.29            Passed - Serum creatinine on file in past 12 months    Recent Labs   Lab Test 12/14/18  0942   CR 1.00            Passed - HgbA1C in past 3 or 6 months    If HgbA1C is 8 or greater, it needs to be on file within the past 3 months.  If less than 8, must be on file within the past 6 months.     Recent Labs   Lab Test 01/11/19  1526   A1C 9.4*            Passed - Medication is active on med list       Passed - Patient is age 18 or older       Passed - Recent (6 mo) or future (30 days) visit within the authorizing provider's specialty    Patient had office visit in the last 6 months or has a visit in the next 30 days with authorizing provider or within the authorizing provider's specialty.  See \"Patient Info\" tab in inbasket, or \"Choose Columns\" in Meds & Orders section of the refill encounter.            "

## 2019-01-23 NOTE — TELEPHONE ENCOUNTER
Routing refill request to provider for review/approval because:  Pt states he is using 21 U BID     He is out and needs today     Please advise     Briana Ibrahim RN

## 2019-02-04 ENCOUNTER — OFFICE VISIT (OUTPATIENT)
Dept: FAMILY MEDICINE | Facility: CLINIC | Age: 59
End: 2019-02-04
Payer: COMMERCIAL

## 2019-02-04 VITALS
OXYGEN SATURATION: 96 % | DIASTOLIC BLOOD PRESSURE: 78 MMHG | SYSTOLIC BLOOD PRESSURE: 136 MMHG | HEART RATE: 95 BPM | WEIGHT: 226 LBS | BODY MASS INDEX: 30.61 KG/M2 | TEMPERATURE: 98.2 F | HEIGHT: 72 IN

## 2019-02-04 DIAGNOSIS — F41.0 PANIC: Primary | ICD-10-CM

## 2019-02-04 DIAGNOSIS — F41.9 ANXIETY: ICD-10-CM

## 2019-02-04 PROCEDURE — 99214 OFFICE O/P EST MOD 30 MIN: CPT | Performed by: NURSE PRACTITIONER

## 2019-02-04 RX ORDER — HYDROXYZINE HYDROCHLORIDE 25 MG/1
TABLET, FILM COATED ORAL
Qty: 60 TABLET | Refills: 0 | Status: SHIPPED | OUTPATIENT
Start: 2019-02-04 | End: 2020-10-16

## 2019-02-04 RX ORDER — BUPROPION HYDROCHLORIDE 150 MG/1
150 TABLET ORAL EVERY MORNING
Qty: 30 TABLET | Refills: 1 | Status: SHIPPED | OUTPATIENT
Start: 2019-02-04 | End: 2020-10-15

## 2019-02-04 ASSESSMENT — ANXIETY QUESTIONNAIRES
2. NOT BEING ABLE TO STOP OR CONTROL WORRYING: SEVERAL DAYS
7. FEELING AFRAID AS IF SOMETHING AWFUL MIGHT HAPPEN: SEVERAL DAYS
3. WORRYING TOO MUCH ABOUT DIFFERENT THINGS: SEVERAL DAYS
1. FEELING NERVOUS, ANXIOUS, OR ON EDGE: SEVERAL DAYS
5. BEING SO RESTLESS THAT IT IS HARD TO SIT STILL: SEVERAL DAYS
6. BECOMING EASILY ANNOYED OR IRRITABLE: NOT AT ALL
GAD7 TOTAL SCORE: 6
IF YOU CHECKED OFF ANY PROBLEMS ON THIS QUESTIONNAIRE, HOW DIFFICULT HAVE THESE PROBLEMS MADE IT FOR YOU TO DO YOUR WORK, TAKE CARE OF THINGS AT HOME, OR GET ALONG WITH OTHER PEOPLE: VERY DIFFICULT

## 2019-02-04 ASSESSMENT — MIFFLIN-ST. JEOR: SCORE: 1884.16

## 2019-02-04 ASSESSMENT — PATIENT HEALTH QUESTIONNAIRE - PHQ9: 5. POOR APPETITE OR OVEREATING: SEVERAL DAYS
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
## 2019-02-05 ASSESSMENT — ANXIETY QUESTIONNAIRES: GAD7 TOTAL SCORE: 6

## 2019-04-01 ENCOUNTER — TELEPHONE (OUTPATIENT)
Dept: FAMILY MEDICINE | Facility: CLINIC | Age: 59
End: 2019-04-01

## 2019-04-01 DIAGNOSIS — E11.65 TYPE 2 DIABETES MELLITUS WITH HYPERGLYCEMIA, WITH LONG-TERM CURRENT USE OF INSULIN (H): ICD-10-CM

## 2019-04-01 DIAGNOSIS — Z79.4 TYPE 2 DIABETES MELLITUS WITH HYPERGLYCEMIA, WITH LONG-TERM CURRENT USE OF INSULIN (H): ICD-10-CM

## 2019-04-01 NOTE — TELEPHONE ENCOUNTER
Message from Pharm- New insurance, Lantus is preferred.  Please send new RX.      insulin glargine (BASAGLAR KWIKPEN) 100 UNIT/ML pen         Last Written Prescription Date: 1/23/19  Last Fill Quantity: 15mL  , # refills: 3  Last Office Visit with G, P or [facility] prescribing provider:  2/4/2019  Strong      Return in about 2 weeks (around 2/18/2019) for anxiety/panic.             BP Readings from Last 3 Encounters:   02/04/19 136/78   01/11/19 138/86   12/14/18 128/80     Lab Results   Component Value Date    MICROL 18 03/27/2018     Lab Results   Component Value Date    UMALCR 7.29 03/27/2018     Creatinine   Date Value Ref Range Status   12/14/2018 1.00 0.66 - 1.25 mg/dL Final   ]  GFR Estimate   Date Value Ref Range Status   12/14/2018 77 >60 mL/min/1.7m2 Final   12/26/2017 >90 >60 mL/min/1.7m2 Final     Comment:     Non  GFR Calc   12/09/2016 >90  Non  GFR Calc   >60 mL/min/1.7m2 Final     GFR Estimate If Black   Date Value Ref Range Status   12/14/2018 >90 >60 mL/min/1.7m2 Final   12/26/2017 >90 >60 mL/min/1.7m2 Final     Comment:      GFR Calc   12/09/2016 >90   GFR Calc   >60 mL/min/1.7m2 Final     Lab Results   Component Value Date    CHOL 228 03/27/2018     Lab Results   Component Value Date    HDL 32 03/27/2018     Lab Results   Component Value Date     03/27/2018     Lab Results   Component Value Date    TRIG 374 03/27/2018     No results found for: CHOLHDLRATIO  Lab Results   Component Value Date    AST 51 12/14/2018     Lab Results   Component Value Date    ALT 74 12/14/2018     Lab Results   Component Value Date    A1C 9.4 01/11/2019    A1C 10.6 11/23/2018    A1C 12.6 08/06/2018    A1C 11.2 03/27/2018    A1C >15.0  Results confirmed by repeat test   12/09/2016     Potassium   Date Value Ref Range Status   12/14/2018 4.3 3.4 - 5.3 mmol/L Final         BILLY Colón  April 1, 2019  10:29 AM

## 2019-05-02 ENCOUNTER — TELEPHONE (OUTPATIENT)
Dept: FAMILY MEDICINE | Facility: CLINIC | Age: 59
End: 2019-05-02

## 2019-05-02 NOTE — TELEPHONE ENCOUNTER
Panel Management Review      Patient has the following on his problem list:     Diabetes    ASA: Passed    Last A1C  Lab Results   Component Value Date    A1C 9.4 01/11/2019    A1C 10.6 11/23/2018    A1C 12.6 08/06/2018    A1C 11.2 03/27/2018    A1C >15.0  Results confirmed by repeat test   12/09/2016     A1C tested: FAILED    Last LDL:    Lab Results   Component Value Date    CHOL 228 03/27/2018     Lab Results   Component Value Date    HDL 32 03/27/2018     Lab Results   Component Value Date     03/27/2018     Lab Results   Component Value Date    TRIG 374 03/27/2018     No results found for: CHOLJO-ANNO  Lab Results   Component Value Date    NHDL 196 03/27/2018       Is the patient on a Statin? YES             Is the patient on Aspirin? YES    Medications     HMG CoA Reductase Inhibitors     atorvastatin (LIPITOR) 20 MG tablet       Salicylates     aspirin 81 MG tablet             Last three blood pressure readings:  BP Readings from Last 3 Encounters:   02/04/19 136/78   01/11/19 138/86   12/14/18 128/80       Date of last diabetes office visit: 11/23/18     Tobacco History:     History   Smoking Status     Former Smoker     Packs/day: 0.33     Years: 20.00     Types: Cigarettes     Quit date: 7/17/2015   Smokeless Tobacco     Former User     Types: Snuff           IVD   ASA: Passed    Last LDL:    Lab Results   Component Value Date    CHOL 228 03/27/2018     Lab Results   Component Value Date    HDL 32 03/27/2018     Lab Results   Component Value Date     03/27/2018     Lab Results   Component Value Date    TRIG 374 03/27/2018      No results found for: CHOLDARIENLRATIO     Is the patient on a Statin? YES   Is the patient on Aspirin? YES                  Medications     HMG CoA Reductase Inhibitors     atorvastatin (LIPITOR) 20 MG tablet       Salicylates     aspirin 81 MG tablet             Last three blood pressure readings:  BP Readings from Last 3 Encounters:   02/04/19 136/78   01/11/19 138/86    12/14/18 128/80        Tobacco History:     History   Smoking Status     Former Smoker     Packs/day: 0.33     Years: 20.00     Types: Cigarettes     Quit date: 7/17/2015   Smokeless Tobacco     Former User     Types: Snuff       Hypertension   Last three blood pressure readings:  BP Readings from Last 3 Encounters:   02/04/19 136/78   01/11/19 138/86   12/14/18 128/80     Blood pressure: Passed    HTN Guidelines:  Less than 140/90      Composite cancer screening  Chart review shows that this patient is due/due soon for the following Colonoscopy  Summary:    Patient is due/failing the following:   A1C and COLONOSCOPY    Action needed:   Patient needs office visit for diabetes follow up.    Type of outreach:    Phone, spoke to patient.  Appointment schedule.    Questions for provider review:    None                                                                                                                                    Yuli Russo CMA ([facility])

## 2019-05-13 ENCOUNTER — OFFICE VISIT (OUTPATIENT)
Dept: FAMILY MEDICINE | Facility: CLINIC | Age: 59
End: 2019-05-13
Payer: COMMERCIAL

## 2019-05-13 VITALS
SYSTOLIC BLOOD PRESSURE: 162 MMHG | BODY MASS INDEX: 31 KG/M2 | DIASTOLIC BLOOD PRESSURE: 88 MMHG | WEIGHT: 227 LBS | TEMPERATURE: 99.2 F | HEART RATE: 88 BPM | RESPIRATION RATE: 24 BRPM

## 2019-05-13 DIAGNOSIS — Z79.4 CONTROLLED TYPE 2 DIABETES MELLITUS WITHOUT COMPLICATION, WITH LONG-TERM CURRENT USE OF INSULIN (H): ICD-10-CM

## 2019-05-13 DIAGNOSIS — I25.10 CORONARY ARTERY DISEASE INVOLVING NATIVE CORONARY ARTERY OF NATIVE HEART WITHOUT ANGINA PECTORIS: ICD-10-CM

## 2019-05-13 DIAGNOSIS — L40.9 PSORIASIS: ICD-10-CM

## 2019-05-13 DIAGNOSIS — E11.65 TYPE 2 DIABETES MELLITUS WITH HYPERGLYCEMIA, WITH LONG-TERM CURRENT USE OF INSULIN (H): ICD-10-CM

## 2019-05-13 DIAGNOSIS — Z12.11 SCREEN FOR COLON CANCER: ICD-10-CM

## 2019-05-13 DIAGNOSIS — Z79.4 CONTROLLED TYPE 2 DIABETES MELLITUS WITH HYPERGLYCEMIA, WITH LONG-TERM CURRENT USE OF INSULIN (H): ICD-10-CM

## 2019-05-13 DIAGNOSIS — E11.9 CONTROLLED TYPE 2 DIABETES MELLITUS WITHOUT COMPLICATION, WITH LONG-TERM CURRENT USE OF INSULIN (H): ICD-10-CM

## 2019-05-13 DIAGNOSIS — Z13.89 SCREENING FOR DIABETIC PERIPHERAL NEUROPATHY: ICD-10-CM

## 2019-05-13 DIAGNOSIS — E11.65 CONTROLLED TYPE 2 DIABETES MELLITUS WITH HYPERGLYCEMIA, WITH LONG-TERM CURRENT USE OF INSULIN (H): ICD-10-CM

## 2019-05-13 DIAGNOSIS — Z79.4 TYPE 2 DIABETES MELLITUS WITH HYPERGLYCEMIA, WITH LONG-TERM CURRENT USE OF INSULIN (H): ICD-10-CM

## 2019-05-13 DIAGNOSIS — E78.5 HYPERLIPIDEMIA LDL GOAL <100: Primary | ICD-10-CM

## 2019-05-13 LAB — HBA1C MFR BLD: 8.4 % (ref 0–5.6)

## 2019-05-13 PROCEDURE — 36415 COLL VENOUS BLD VENIPUNCTURE: CPT | Performed by: FAMILY MEDICINE

## 2019-05-13 PROCEDURE — 83036 HEMOGLOBIN GLYCOSYLATED A1C: CPT | Performed by: FAMILY MEDICINE

## 2019-05-13 PROCEDURE — 80061 LIPID PANEL: CPT | Performed by: FAMILY MEDICINE

## 2019-05-13 PROCEDURE — 99214 OFFICE O/P EST MOD 30 MIN: CPT | Performed by: FAMILY MEDICINE

## 2019-05-13 PROCEDURE — 99207 C FOOT EXAM  NO CHARGE: CPT | Mod: 25 | Performed by: FAMILY MEDICINE

## 2019-05-13 PROCEDURE — 82043 UR ALBUMIN QUANTITATIVE: CPT | Performed by: FAMILY MEDICINE

## 2019-05-13 RX ORDER — ERGOCALCIFEROL 1.25 MG/1
50000 CAPSULE, LIQUID FILLED ORAL WEEKLY
Qty: 50 CAPSULE | Refills: 0 | Status: ON HOLD | OUTPATIENT
Start: 2019-05-13 | End: 2020-10-23

## 2019-05-13 RX ORDER — CLOPIDOGREL BISULFATE 75 MG/1
75 TABLET ORAL DAILY
Qty: 90 TABLET | Refills: 1 | Status: SHIPPED | OUTPATIENT
Start: 2019-05-13 | End: 2019-11-12

## 2019-05-13 ASSESSMENT — ENCOUNTER SYMPTOMS
DOUBLE VISION: 0
BLURRED VISION: 0
TINGLING: 0
PALPITATIONS: 0
CONSTITUTIONAL NEGATIVE: 1
HEADACHES: 0
SENSORY CHANGE: 0
SHORTNESS OF BREATH: 0

## 2019-05-13 NOTE — PROGRESS NOTES
"HPI    SUBJECTIVE:   Saji Iqbal is a 59 year old male who presents to clinic today for the following   health issues:    Diabetes Follow-up      Patient is checking blood sugars: rarely.  Results range from 60 to 260    Diabetic concerns: None     Symptoms of hypoglycemia (low blood sugar): dizzy, confusion     Paresthesias (numbness or burning in feet) or sores: Yes both feet     Date of last diabetic eye exam: one year ago    BP Readings from Last 2 Encounters:   05/13/19 162/88   02/04/19 136/78     Hemoglobin A1C (%)   Date Value   05/13/2019 8.4 (H)   01/11/2019 9.4 (H)     LDL Cholesterol Calculated (mg/dL)   Date Value   03/27/2018 121 (H)   12/09/2016     Cannot estimate LDL when triglyceride exceeds 400 mg/dL     LDL Cholesterol Direct (mg/dL)   Date Value   12/09/2016 134 (H)       Diabetes Management Resources    Amount of exercise or physical activity: \"not enough\"    Problems taking medications regularly: No    Medication side effects: none    Diet: diabetic    No issues with meds, tolerating Invokana well.  Denies CP, palpitations, edema, dyspnea, HA, vision changes.  No numbness and tingling in feet.  Does exercise by walking at work - can be up to 8 miles a day.  Admits that diet is poor, eats for convenience more than anything else.    Was told that previous derm referral was peds only and needing new referral for derm.  Would also like to try high dose weekly vitamin D as this has been helpful    Review of Systems   Constitutional: Negative.    Eyes: Negative for blurred vision and double vision.   Respiratory: Negative for shortness of breath.    Cardiovascular: Negative for chest pain and palpitations.   Neurological: Negative for tingling, sensory change and headaches.         Physical Exam   Constitutional: He is oriented to person, place, and time.   Eyes: Conjunctivae and EOM are normal.   Cardiovascular: Normal rate, regular rhythm and normal heart sounds.   Pulses:       Dorsalis pedis " pulses are 2+ on the right side, and 2+ on the left side.   Pulmonary/Chest: Effort normal and breath sounds normal.   Musculoskeletal: He exhibits no edema.   Neurological: He is alert and oriented to person, place, and time. Gait normal.   Reflex Scores:       Patellar reflexes are 2+ on the right side and 2+ on the left side.       Achilles reflexes are 2+ on the right side and 2+ on the left side.  Nl filament and proprioception in feet FABIO   Skin: Skin is warm and dry.   Skin on feet healthy.  No wounds, callous, onychomycosis.  Extensive psoriatic plaques noted on fore arms, lower legs.   Vitals reviewed.    (E78.5) Hyperlipidemia LDL goal <100  (primary encounter diagnosis)  Comment: testing today  Plan: Lipid panel reflex to direct LDL Fasting            (Z12.11) Screen for colon cancer  Comment: Had previously sent in this last winter, frozen in transit, will redo.  Plan: Fecal colorectal cancer screen FIT - Future         (S+30)            (Z13.89) Screening for diabetic peripheral neuropathy  Comment: normal exam  Plan: FOOT EXAM  NO CHARGE [85016.114], HEMOGLOBIN         A1C, Albumin Random Urine Quantitative with         Creat Ratio            (L40.9) Psoriasis  Comment: will refer to [facility]  Plan: DERMATOLOGY REFERRAL, vitamin D2         (ERGOCALCIFEROL) 99159 units (1250 mcg) capsule            (E11.65,  Z79.4) Controlled type 2 diabetes mellitus with hyperglycemia, with long-term current use of insulin (H)  Comment: improved with invokana, will focus on diet, may consider Viktoza or bydureon as next step  Plan:canagliflozin (INVOKANA) 300 MG tablet      (I25.10) Coronary artery disease involving native coronary artery of native heart without angina pectoris  Comment:   Plan: clopidogrel (PLAVIX) 75 MG tablet            (E11.65,  Z79.4) Type 2 diabetes mellitus with hyperglycemia, with long-term current use of insulin (H)  Comment:   Plan: insulin glargine (LANTUS SOLOSTAR PEN) 100         UNIT/ML  pen              RTC in     Naseem Esparza MD

## 2019-05-13 NOTE — LETTER
May 20, 2019      Saji Iqbal  [address]   [place] [address]        Dear ,    We are writing to inform you of your test results.    Your cholesterol jumped quite a bit since last year.  How have you been with taking you atorvastatin (Lipitor)?  If you are experiencing problems with this, let me know.  If you are taking it regularly we should check your cholesterol again in 3 months.     Resulted Orders   HEMOGLOBIN A1C   Result Value Ref Range    Hemoglobin A1C 8.4 (H) 0 - 5.6 %      Comment:      Normal <5.7% Prediabetes 5.7-6.4%  Diabetes 6.5% or higher - adopted from ADA   consensus guidelines.     Lipid panel reflex to direct LDL Fasting   Result Value Ref Range    Cholesterol 299 (H) <200 mg/dL      Comment:      Desirable:       <200 mg/dl    Triglycerides 322 (H) <150 mg/dL      Comment:      Borderline high:  150-199 mg/dl  High:             200-499 mg/dl  Very high:       >499 mg/dl  Fasting specimen      HDL Cholesterol 53 >39 mg/dL    LDL Cholesterol Calculated 182 (H) <100 mg/dL      Comment:      Above desirable:  100-129 mg/dl  Borderline High:  130-159 mg/dL  High:             160-189 mg/dL  Very high:       >189 mg/dl      Non HDL Cholesterol 246 (H) <130 mg/dL      Comment:      Above Desirable:  130-159 mg/dl  Borderline high:  160-189 mg/dl  High:             190-219 mg/dl  Very high:       >219 mg/dl     Albumin Random Urine Quantitative with Creat Ratio   Result Value Ref Range    Creatinine Urine 167 mg/dL    Albumin Urine mg/L 30 mg/L    Albumin Urine mg/g Cr 18.20 (H) 0 - 17 mg/g Cr       If you have any questions or concerns, please call the clinic at the number listed above.       Sincerely,        Naseem Esparza MD

## 2019-05-15 LAB
CHOLEST SERPL-MCNC: 299 MG/DL
CREAT UR-MCNC: 167 MG/DL
HDLC SERPL-MCNC: 53 MG/DL
LDLC SERPL CALC-MCNC: 182 MG/DL
MICROALBUMIN UR-MCNC: 30 MG/L
MICROALBUMIN/CREAT UR: 18.2 MG/G CR (ref 0–17)
NONHDLC SERPL-MCNC: 246 MG/DL
TRIGL SERPL-MCNC: 322 MG/DL

## 2019-07-09 ENCOUNTER — OFFICE VISIT (OUTPATIENT)
Dept: FAMILY MEDICINE | Facility: CLINIC | Age: 59
End: 2019-07-09
Payer: COMMERCIAL

## 2019-07-09 VITALS — DIASTOLIC BLOOD PRESSURE: 80 MMHG | SYSTOLIC BLOOD PRESSURE: 138 MMHG

## 2019-07-09 DIAGNOSIS — Z51.81 ENCOUNTER FOR THERAPEUTIC DRUG MONITORING: ICD-10-CM

## 2019-07-09 DIAGNOSIS — L40.9 PSORIASIS: Primary | ICD-10-CM

## 2019-07-09 PROCEDURE — 99214 OFFICE O/P EST MOD 30 MIN: CPT | Performed by: FAMILY MEDICINE

## 2019-07-09 RX ORDER — CLOBETASOL PROPIONATE 0.5 MG/G
CREAM TOPICAL 2 TIMES DAILY
Qty: 120 G | Refills: 3 | Status: SHIPPED | OUTPATIENT
Start: 2019-07-09 | End: 2020-10-20

## 2019-07-09 NOTE — PATIENT INSTRUCTIONS
FUTURE APPOINTMENTS  Follow up per insurance coverage decision of phototherapy.  Follow up in 6 weeks with Dr. Lala    Check with your insurance company for coverage of narrowband (NBUVB) phototherapy, CPT billing codes 98651 and 60726, to treat psoriasis on the trunk, arms, legs with a TBSA% of 30% recalcitrant to multiple topical treatments, oral steroids, and oral antihistamines.    Plan for approximately 25-29 treatments at a frequency of 2-3 times per week.    Call back to the [facility] RN directly at [phone] once you have found out.    TOPICAL STEROID INSTRUCTIONS  Clobetasol propionate 0.05% cream.  Apply two times per day for 10-14 days. Then, use only when needed.  1. Wash hands before applying topical steroid.  2. Apply sparingly (just enough to rub in) onto affected areas of the arms, trunk, and legs.    This higher strength steroid should never be used on face nor groin.    After the initial treatment, topical steroid may be used as needed for flare-ups but only for short-term treatment. If you are using this for prolonged periods of time to control flare-ups, return to clinic for re-evaluation of treatment.    Keep in mind to also regularly use moisturizer, as this preventative measure can help maintain your skin's natural protective moisture barrier.    Continue taking your Vitamin D supplement.

## 2019-07-09 NOTE — LETTER
7/9/2019         RE: Saji Iqbal  [address] [address]        Dear Colleague,    Thank you for referring your patient, Saji Iqbal, to the [facility]. Please see a copy of my visit note below.    [facility] - PRIMARY CARE SKIN    CC: psoriasis  SUBJECTIVE:   Saji Iqbal is a(n) 59 year old male who presents to clinic today because of psoriasis that started approximately 10 years ago.    Areas of skin involvement: Symptoms first began on the scalp. Forearms over the last 3 years. Generalized including the groin and buttocks in the last 1 year.  Areas on the shins are most intense and keep him awake at night.    Joint aches: cramping in his hands but no other joint aches.  Co-morbidities: type 2 diabetes mellitus, tobacco use, alcohol use. He admits to consuming a couple strong drinks every day.    Symptoms appear to be: worsening.  Aggravating factors: none identified.  Relieving factors: none identified.    Therapies tried:   Topical steroids - augmented betamethasone dipropionate 0.05% cream   Antihistamines - Benadryl  Oral steroid - prednisone for 1 week  Vitamin D supplementation 54279 international unit(s)/week    He notes that his job is very challenging and he has difficulty sleeping and regulating body temperature due to the effects of psoriasis.    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    Patient Active Problem List   Diagnosis     Tobacco abuse     Coronary artery disease involving native coronary artery without angina pectoris     Hyperlipidemia LDL goal <100     Benign essential hypertension     Obesity due to excess calories     Type 2 diabetes mellitus without complication (H)     Chronic gastroesophageal reflux disease     Controlled type 2 diabetes mellitus with hyperglycemia, with long-term current use of  insulin (H)     NIKKI (obstructive sleep apnea)     Hypertriglyceridemia     Psoriasis       Past Medical History:   Diagnosis Date     Cluster headache      Coronary artery disease      Depression      Diabetes mellitus, type II (H)      Hyperlipidemia      Hypertension      Rotator cuff arthropathy     Past Surgical History:   Procedure Laterality Date     ARTHROSCOPY SHOULDER       STENT, CORONARY, ESAU  2014, 2015      Social History     Tobacco Use     Smoking status: Former Smoker     Packs/day: 0.33     Years: 20.00     Pack years: 6.60     Types: Cigarettes     Last attempt to quit: 7/17/2015     Years since quitting: 3.9     Smokeless tobacco: Former User     Types: Snuff   Substance Use Topics     Alcohol use: Yes     Alcohol/week: 0.6 oz     Types: 1 Standard drinks or equivalent per week     Comment: 2-6 occasionally     Drug use: No    Family History     Problem (# of Occurrences) Relation (Name,Age of Onset)    Anuerysm (1) Sister    Cerebrovascular Disease (1) Brother    Coronary Artery Disease (2) Mother, Father           Current Outpatient Medications   Medication Sig Dispense Refill     aspirin 81 MG tablet Take by mouth daily       atorvastatin (LIPITOR) 20 MG tablet Take 1 tablet (20 mg) by mouth daily 90 tablet 3     augmented betamethasone dipropionate (DIPROLENE-AF) 0.05 % cream Apply sparingly to affected area  twice daily for 14 days.  Do not apply to face. 50 g 1     blood glucose (NO BRAND SPECIFIED) lancets standard Use to test blood sugar 1 times daily or as directed. 1 Box 3     blood glucose monitoring (ACCU-CHEK NORI PLUS) meter device kit Use to test blood sugar 4 times daily or as directed. (pts machine is not working) 1 kit 0     blood glucose monitoring (ACCU-CHEK FASTCLIX) lancets Use to test blood sugar 4 times daily or as directed. 2 Box 3     blood glucose monitoring (NO BRAND SPECIFIED) test strip Use to test blood sugar 4 times daily or as directed. Please dispense Accu Chek  Ana plus test strips or per patient preference if using a different brand 150 Box 3     buPROPion (WELLBUTRIN XL) 150 MG 24 hr tablet Take 1 tablet (150 mg) by mouth every morning 30 tablet 1     canagliflozin (INVOKANA) 300 MG tablet Take 1 tablet (300 mg) by mouth every morning (before breakfast) 90 tablet 1     clobetasol (TEMOVATE) 0.05 % external cream Apply topically 2 times daily Apply to AA on arms, trunk, legs bid 120 g 3     clopidogrel (PLAVIX) 75 MG tablet Take 1 tablet (75 mg) by mouth daily 90 tablet 1     hydrOXYzine (ATARAX) 25 MG tablet Take 1-2 tablets by mouth every 6 hours as needed for anxiety 60 tablet 0     insulin glargine (BASAGLAR KWIKPEN) 100 UNIT/ML pen Inject 21 Units Subcutaneous 2 times daily 15 mL 3     insulin glargine (LANTUS SOLOSTAR PEN) 100 UNIT/ML pen Inject 21 Units Subcutaneous 2 times daily 15 mL 11     insulin pen needle (B-D U/F) 31G X 5 MM Use 2 daily or as directed. 100 each prn     lisinopril (PRINIVIL/ZESTRIL) 5 MG tablet Take 1 tablet (5 mg) by mouth daily 90 tablet 3     metoprolol tartrate (LOPRESSOR) 25 MG tablet Take 1 tablet (25 mg) by mouth 2 times daily 180 tablet 1     omeprazole (PRILOSEC) 20 MG DR capsule Take 1 capsule (20 mg) by mouth daily 90 capsule 1     vitamin D2 (ERGOCALCIFEROL) 60521 units (1250 mcg) capsule Take 1 capsule (50,000 Units) by mouth once a week 50 capsule 0     amoxicillin-clavulanate (AUGMENTIN) 875-125 MG tablet            Allergies   Allergen Reactions     Metformin Diarrhea     Honey Other (See Comments)     Throat irritation        INTEGUMENTARY/SKIN: POSITIVE for pruritus, rash and scaling  ROS: 14 point review of systems was negative except the symptoms listed above in the HPI.    This document serves as a record of the services and decisions personally performed and made by Jessica Lala MD and was created by Kei Cerda, a trained medical scribe, based on personal observations and provider statements to the medical  claire.  July 9, 2019 2:33 PM   Kei Cerda    OBJECTIVE:   GENERAL: alert and no distress.  SKIN: Workman Skin Type - II.  Scalp, Face, Neck, Trunk, Arms, Legs, Buttocks and Groin examined. The dermatoscope was used to help evaluate pigmented lesions.  Skin Pertinent Findings:  Generalized thick scaly erythematous plaques on the legs, arms, back, chest, abdomen, and frontal and lateral sides of scalp. Scaling in ears.    Fingernails: no pitting    Total Body Surface Area % involved: 15%    Diagnostic Test Results:  Labs reviewed in Epic  No results found for this or any previous visit (from the past 24 hour(s)).    ASSESSMENT:     Encounter Diagnoses   Name Primary?     Psoriasis Yes     Encounter for therapeutic drug monitoring      MDM: Because of the BSA involved with the psoriasis topical treatment is not realistic as a primary treatment.  Discussion regarding etiology, spectrum of psoriasis, treatment options, aggravating factors.  Saji requests treatment with NBUVB phototherapy after discussion of treatment options also including methotrexate, biologics.    PLAN:   Patient Instructions   FUTURE APPOINTMENTS  Follow up per insurance coverage decision of phototherapy.  Follow up in 6 weeks with Dr. Lala    Check with your insurance company for coverage of narrowband (NBUVB) phototherapy, CPT billing codes 66290 and 46016, to treat psoriasis on the trunk, arms, legs with a TBSA% of 30% recalcitrant to multiple topical treatments, oral steroids, and oral antihistamines.    Plan for approximately 25-29 treatments at a frequency of 2-3 times per week.    Call back to the [facility] RN directly at [phone] once you have found out.    TOPICAL STEROID INSTRUCTIONS  Clobetasol propionate 0.05% cream.  Apply two times per day for 10-14 days. Then, use only when needed.  1. Wash hands before applying topical steroid.  2. Apply sparingly (just enough to rub in) onto affected areas  of the arms, trunk, and legs.    This higher strength steroid should never be used on face nor groin.    After the initial treatment, topical steroid may be used as needed for flare-ups but only for short-term treatment. If you are using this for prolonged periods of time to control flare-ups, return to clinic for re-evaluation of treatment.    Keep in mind to also regularly use moisturizer, as this preventative measure can help maintain your skin's natural protective moisture barrier.    Continue taking your Vitamin D supplement.    The patient was counseled to use products free of fragrance, dyes, and plants. The importance of using bland cleansers and the regular use of heavy bland emollient creams was impressed upon the patient.    TT: 25 minutes.  CT: 20 minutes.    The information in this document, created by the medical scribe for me, accurately reflects the services I personally performed and the decisions made by me. I have reviewed and approved this document for accuracy prior to leaving the patient care area.  July 9, 2019 2:33 PM  Jessica Lala MD  [facility] [place]    Again, thank you for allowing me to participate in the care of your patient.        Sincerely,        Jessica Lala MD

## 2019-07-09 NOTE — PROGRESS NOTES
[facility] - PRIMARY CARE SKIN    CC: psoriasis  SUBJECTIVE:   Saji Iqbal is a(n) 59 year old male who presents to clinic today because of psoriasis that started approximately 10 years ago.    Areas of skin involvement: Symptoms first began on the scalp. Forearms over the last 3 years. Generalized including the groin and buttocks in the last 1 year.  Areas on the shins are most intense and keep him awake at night.    Joint aches: cramping in his hands but no other joint aches.  Co-morbidities: type 2 diabetes mellitus, tobacco use, alcohol use. He admits to consuming a couple strong drinks every day.    Symptoms appear to be: worsening.  Aggravating factors: none identified.  Relieving factors: none identified.    Therapies tried:   Topical steroids - augmented betamethasone dipropionate 0.05% cream   Antihistamines - Benadryl  Oral steroid - prednisone for 1 week  Vitamin D supplementation 58017 international unit(s)/week    He notes that his job is very challenging and he has difficulty sleeping and regulating body temperature due to the effects of psoriasis.    Family Medical History  Skin cancer: NO  Eczema Psoriasis Autoimmune   NO YES - in mother on scalp and in father on shins NO     Occupation:  for [employer] (indoor).    Patient Active Problem List   Diagnosis     Tobacco abuse     Coronary artery disease involving native coronary artery without angina pectoris     Hyperlipidemia LDL goal <100     Benign essential hypertension     Obesity due to excess calories     Type 2 diabetes mellitus without complication (H)     Chronic gastroesophageal reflux disease     Controlled type 2 diabetes mellitus with hyperglycemia, with long-term current use of insulin (H)     NIKKI (obstructive sleep apnea)     Hypertriglyceridemia     Psoriasis       Past Medical History:   Diagnosis Date     Cluster headache      Coronary artery disease      Depression      Diabetes mellitus, type II (H)       Hyperlipidemia      Hypertension      Rotator cuff arthropathy     Past Surgical History:   Procedure Laterality Date     ARTHROSCOPY SHOULDER       STENT, CORONARY, ESAU  2014, 2015      Social History     Tobacco Use     Smoking status: Former Smoker     Packs/day: 0.33     Years: 20.00     Pack years: 6.60     Types: Cigarettes     Last attempt to quit: 7/17/2015     Years since quitting: 3.9     Smokeless tobacco: Former User     Types: Snuff   Substance Use Topics     Alcohol use: Yes     Alcohol/week: 0.6 oz     Types: 1 Standard drinks or equivalent per week     Comment: 2-6 occasionally     Drug use: No    Family History     Problem (# of Occurrences) Relation (Name,Age of Onset)    Anuerysm (1) Sister    Cerebrovascular Disease (1) Brother    Coronary Artery Disease (2) Mother, Father           Current Outpatient Medications   Medication Sig Dispense Refill     aspirin 81 MG tablet Take by mouth daily       atorvastatin (LIPITOR) 20 MG tablet Take 1 tablet (20 mg) by mouth daily 90 tablet 3     augmented betamethasone dipropionate (DIPROLENE-AF) 0.05 % cream Apply sparingly to affected area  twice daily for 14 days.  Do not apply to face. 50 g 1     blood glucose (NO BRAND SPECIFIED) lancets standard Use to test blood sugar 1 times daily or as directed. 1 Box 3     blood glucose monitoring (ACCU-CHEK NORI PLUS) meter device kit Use to test blood sugar 4 times daily or as directed. (pts machine is not working) 1 kit 0     blood glucose monitoring (ACCU-CHEK FASTCLIX) lancets Use to test blood sugar 4 times daily or as directed. 2 Box 3     blood glucose monitoring (NO BRAND SPECIFIED) test strip Use to test blood sugar 4 times daily or as directed. Please dispense Accu Chek Nori plus test strips or per patient preference if using a different brand 150 Box 3     buPROPion (WELLBUTRIN XL) 150 MG 24 hr tablet Take 1 tablet (150 mg) by mouth every morning 30 tablet 1     canagliflozin (INVOKANA) 300 MG tablet  Take 1 tablet (300 mg) by mouth every morning (before breakfast) 90 tablet 1     clobetasol (TEMOVATE) 0.05 % external cream Apply topically 2 times daily Apply to AA on arms, trunk, legs bid 120 g 3     clopidogrel (PLAVIX) 75 MG tablet Take 1 tablet (75 mg) by mouth daily 90 tablet 1     hydrOXYzine (ATARAX) 25 MG tablet Take 1-2 tablets by mouth every 6 hours as needed for anxiety 60 tablet 0     insulin glargine (BASAGLAR KWIKPEN) 100 UNIT/ML pen Inject 21 Units Subcutaneous 2 times daily 15 mL 3     insulin glargine (LANTUS SOLOSTAR PEN) 100 UNIT/ML pen Inject 21 Units Subcutaneous 2 times daily 15 mL 11     insulin pen needle (B-D U/F) 31G X 5 MM Use 2 daily or as directed. 100 each prn     lisinopril (PRINIVIL/ZESTRIL) 5 MG tablet Take 1 tablet (5 mg) by mouth daily 90 tablet 3     metoprolol tartrate (LOPRESSOR) 25 MG tablet Take 1 tablet (25 mg) by mouth 2 times daily 180 tablet 1     omeprazole (PRILOSEC) 20 MG DR capsule Take 1 capsule (20 mg) by mouth daily 90 capsule 1     vitamin D2 (ERGOCALCIFEROL) 82086 units (1250 mcg) capsule Take 1 capsule (50,000 Units) by mouth once a week 50 capsule 0     amoxicillin-clavulanate (AUGMENTIN) 875-125 MG tablet            Allergies   Allergen Reactions     Metformin Diarrhea     Honey Other (See Comments)     Throat irritation        INTEGUMENTARY/SKIN: POSITIVE for pruritus, rash and scaling  ROS: 14 point review of systems was negative except the symptoms listed above in the HPI.    This document serves as a record of the services and decisions personally performed and made by Jessica Lala MD and was created by Kei Cerda, a trained medical scribe, based on personal observations and provider statements to the medical scribe.  July 9, 2019 2:33 PM   Kei Cerda    OBJECTIVE:   GENERAL: alert and no distress.  SKIN: Workman Skin Type - II.  Scalp, Face, Neck, Trunk, Arms, Legs, Buttocks and Groin examined. The dermatoscope was used to help evaluate  pigmented lesions.  Skin Pertinent Findings:  Generalized thick scaly erythematous plaques on the legs, arms, back, chest, abdomen, and frontal and lateral sides of scalp. Scaling in ears.    Fingernails: no pitting    Total Body Surface Area % involved: 15%    Diagnostic Test Results:  Labs reviewed in Epic  No results found for this or any previous visit (from the past 24 hour(s)).    ASSESSMENT:     Encounter Diagnoses   Name Primary?     Psoriasis Yes     Encounter for therapeutic drug monitoring      MDM: Because of the BSA involved with the psoriasis topical treatment is not realistic as a primary treatment.  Discussion regarding etiology, spectrum of psoriasis, treatment options, aggravating factors.  Saji requests treatment with NBUVB phototherapy after discussion of treatment options also including methotrexate, biologics.    PLAN:   Patient Instructions   FUTURE APPOINTMENTS  Follow up per insurance coverage decision of phototherapy.  Follow up in 6 weeks with Dr. Lala    Check with your insurance company for coverage of narrowband (NBUVB) phototherapy, CPT billing codes 31455 and 27095, to treat psoriasis on the trunk, arms, legs with a TBSA% of 30% recalcitrant to multiple topical treatments, oral steroids, and oral antihistamines.    Plan for approximately 25-29 treatments at a frequency of 2-3 times per week.    Call back to the [facility] RN directly at [phone] once you have found out.    TOPICAL STEROID INSTRUCTIONS  Clobetasol propionate 0.05% cream.  Apply two times per day for 10-14 days. Then, use only when needed.  1. Wash hands before applying topical steroid.  2. Apply sparingly (just enough to rub in) onto affected areas of the arms, trunk, and legs.    This higher strength steroid should never be used on face nor groin.    After the initial treatment, topical steroid may be used as needed for flare-ups but only for short-term treatment. If you are using  this for prolonged periods of time to control flare-ups, return to clinic for re-evaluation of treatment.    Keep in mind to also regularly use moisturizer, as this preventative measure can help maintain your skin's natural protective moisture barrier.    Continue taking your Vitamin D supplement.    The patient was counseled to use products free of fragrance, dyes, and plants. The importance of using bland cleansers and the regular use of heavy bland emollient creams was impressed upon the patient.    TT: 25 minutes.  CT: 20 minutes.    The information in this document, created by the medical scribe for me, accurately reflects the services I personally performed and the decisions made by me. I have reviewed and approved this document for accuracy prior to leaving the patient care area.  July 9, 2019 2:33 PM  Jessica Lala MD  [facility]

## 2019-07-10 ENCOUNTER — TELEPHONE (OUTPATIENT)
Dept: FAMILY MEDICINE | Facility: CLINIC | Age: 59
End: 2019-07-10

## 2019-07-11 NOTE — TELEPHONE ENCOUNTER
Left voice mail for patient to call back to schedule light thibodeaux therapy. Advised patient Yasmeen is only in clinic Mon-Thursday.    Thank you  Yasmeen Mcclellan

## 2019-07-22 ENCOUNTER — TELEPHONE (OUTPATIENT)
Dept: FAMILY MEDICINE | Facility: CLINIC | Age: 59
End: 2019-07-22

## 2019-07-22 NOTE — TELEPHONE ENCOUNTER
Left voice message for patient to return skin call to schedule light thibodeaux appts.     Thank you  Yasmeen Mcclellan

## 2019-07-22 NOTE — TELEPHONE ENCOUNTER
Patient notified of providers message, patient has no further questions.    Mitzi JACKSONRN BSN  [facility]  [phone]

## 2019-07-22 NOTE — TELEPHONE ENCOUNTER
Patient asking if she should shave his head for the photo therapy. Advised patient no, not necessary, but patient would like to clarify with Dr. Lala. Will the light be able to get through to his skin on the scalp? His issue mainly is on his scalp.  [phone] (home)   Ok to leave detailed message: yes  Thank you  Yasmeen Mcclellan

## 2019-07-22 NOTE — TELEPHONE ENCOUNTER
Reason for call:  Form   Our goal is to have forms completed within 72 hours, however some forms may require a visit or additional information.     Who is the form from? Patient  Where did the form come from? form was faxed in  What clinic location was the form placed at? [facility]   Where was the form placed? Given to physician  What number is listed as a contact on the form? Call pt    Phone call message - patient request for a letter, form or note:     Date needed: as soon as possible, due by 7/28  Please call the patient when completed, [phone]. Pt plans to  7/23 PM  Has the patient signed a consent form for release of information? NO    Additional comments: call patient when form is complete, have pt sign and make copy for chart.    Type of letter, form or note: medical    Phone number to reach patient:  Cell number on file:    Telephone Information:   Mobile [phone]   Best Time:  Any  Can we leave a detailed message on this number?  YES      Oscar Haile   07/22/19 1:48 PM

## 2019-07-22 NOTE — TELEPHONE ENCOUNTER
Please call back and let him know that he could shave his hair, altho his hair is short there still would be good exposure.     THanks,   Jessica Lala M.D.

## 2019-07-23 NOTE — TELEPHONE ENCOUNTER
Patient called to check status.  Let him know that it was just completed and should be ready to pickup.  He said he would come by tomorrow to pickup.

## 2019-07-26 DIAGNOSIS — I10 BENIGN ESSENTIAL HYPERTENSION: ICD-10-CM

## 2019-07-26 DIAGNOSIS — K21.9 GASTROESOPHAGEAL REFLUX DISEASE, ESOPHAGITIS PRESENCE NOT SPECIFIED: ICD-10-CM

## 2019-07-26 DIAGNOSIS — I25.10 CORONARY ARTERY DISEASE INVOLVING NATIVE CORONARY ARTERY OF NATIVE HEART WITHOUT ANGINA PECTORIS: ICD-10-CM

## 2019-07-26 NOTE — TELEPHONE ENCOUNTER
"Requested Prescriptions   Pending Prescriptions Disp Refills     metoprolol tartrate (LOPRESSOR) 25 MG tablet [Pharmacy Med Name: METOPROLOL TARTRATE 25MG TABS] 180 tablet 1     Sig: TAKE ONE TABLET BY MOUTH TWICE A DAY   Last Written Prescription Date:  1/11/19  Last Fill Quantity: 180,  # refills: 1   Last Office Visit: 7/9/2019 [place]      Return in about 3 months (around 8/13/2019) for Diabetes.     Future Office Visit:    Next 5 appointments (look out 90 days)    Aug 13, 2019  4:40 PM CDT  SHORT with Naseem Esparza MD  [facility] [address] [place] [address], [facility] [address]  [facility] [address] [phone]   Aug 20, 2019 11:00 AM CDT  [place] Visit with Jessica Lala MD  [facility] [place] [address] [place] [id number] [phone]             Beta-Blockers Protocol Passed - 7/26/2019  5:38 AM        Passed - Blood pressure under 140/90 in past 12 months     BP Readings from Last 3 Encounters:   07/09/19 138/80   05/13/19 162/88   02/04/19 136/78                 Passed - Patient is age 6 or older        Passed - Recent (12 mo) or future (30 days) visit within the authorizing provider's specialty     Patient had office visit in the last 12 months or has a visit in the next 30 days with authorizing provider or within the authorizing provider's specialty.  See \"Patient Info\" tab in inbasket, or \"Choose Columns\" in Meds & Orders section of the refill encounter.              Passed - Medication is active on med list   ________________________________________________________________________       omeprazole (PRILOSEC) 20 MG DR capsule [Pharmacy Med Name: OMEPRAZOLE 20MG CPDR] 90 capsule 1     Sig: TAKE ONE CAPSULE BY MOUTH EVERY DAY   Last Written Prescription Date:  1/11/19  Last Fill Quantity: 90,  # refills: 1   Last Office Visit: 7/9/2019   Future Office Visit:    Next 5 appointments " "(look out 90 days)    Aug 13, 2019  4:40 PM CDT  SHORT with Naseem Esparza MD  [facility] ([facility]) [address] [place] [address], [address]  [facility] [address] [phone]   Aug 20, 2019 11:00 AM CDT  Return Visit with Jessica Lala MD  [facility] ([facility]) [address] [place] [address] [phone]             PPI Protocol Failed - 7/26/2019  5:38 AM        Failed - Not on Clopidogrel (unless Pantoprazole ordered)        Passed - No diagnosis of osteoporosis on record        Passed - Recent (12 mo) or future (30 days) visit within the authorizing provider's specialty     Patient had office visit in the last 12 months or has a visit in the next 30 days with authorizing provider or within the authorizing provider's specialty.  See \"Patient Info\" tab in inbasket, or \"Choose Columns\" in Meds & Orders section of the refill encounter.              Passed - Medication is active on med list        Passed - Patient is age 18 or older        "

## 2019-07-30 RX ORDER — METOPROLOL TARTRATE 25 MG/1
TABLET, FILM COATED ORAL
Qty: 180 TABLET | Refills: 0 | Status: SHIPPED | OUTPATIENT
Start: 2019-07-30 | End: 2019-11-12

## 2019-07-30 NOTE — TELEPHONE ENCOUNTER
Prescription approved per [facility] Refill Protocol  Roselyn Doss RN BS

## 2019-08-20 ENCOUNTER — OFFICE VISIT (OUTPATIENT)
Dept: FAMILY MEDICINE | Facility: CLINIC | Age: 59
End: 2019-08-20
Payer: COMMERCIAL

## 2019-08-20 VITALS — SYSTOLIC BLOOD PRESSURE: 138 MMHG | DIASTOLIC BLOOD PRESSURE: 88 MMHG

## 2019-08-20 DIAGNOSIS — L40.9 PSORIASIS: Primary | ICD-10-CM

## 2019-08-20 DIAGNOSIS — Z51.89 TREATMENT: ICD-10-CM

## 2019-08-20 PROCEDURE — 99213 OFFICE O/P EST LOW 20 MIN: CPT | Performed by: FAMILY MEDICINE

## 2019-08-20 NOTE — LETTER
8/20/2019         RE: Saji Iqbal  [address] [address]        Dear Colleague,    Thank you for referring your patient, Saji Iqbal, to the [facility]. Please see a copy of my visit note below.    [facility] - PRIMARY CARE SKIN    CC: psoriasis  SUBJECTIVE:   Saji Iqbal is a(n) 59 year old male who presents to clinic today for follow-up of chronic psoriasis that started approximately 10 years ago.    Current treatment: clobetasol propionate 0.05% cream  Insurance has approved NBUVB  Response to treatment: He has not had any improvement although he has had mild relief of itchiness.  Side effects of treatment noted: None.     Areas of skin involvement: generalized - scalp, arms, groin, buttocks, legs    Joint aches: cramping in his hands but no other joint aches.  Co-morbidities: type 2 diabetes mellitus, tobacco use, alcohol use. He admits to consuming a couple strong drinks every day.    Previous Therapies tried:   Topical steroids - augmented betamethasone dipropionate 0.05% cream, clobetasol propionate 0.05% cream  Antihistamines - Benadryl  Oral steroid - prednisone for 1 week  Vitamin D supplementation 44788 international unit(s)/week    He has previously noted that his job is very challenging and he has difficulty sleeping and regulating body temperature due to the effects of psoriasis.    Family Medical History  Skin cancer: NO  Eczema Psoriasis Autoimmune   NO YES - in mother on scalp and in father on shins NO     Occupation:  for [employer] (indoor).    Patient Active Problem List   Diagnosis     Tobacco abuse     Coronary artery disease involving native coronary artery without angina pectoris     Hyperlipidemia LDL goal <100     Benign essential hypertension     Obesity due to excess calories     Type 2 diabetes mellitus without complication (H)     Chronic gastroesophageal reflux disease     Controlled type 2 diabetes mellitus with  hyperglycemia, with long-term current use of insulin (H)     NIKKI (obstructive sleep apnea)     Hypertriglyceridemia     Psoriasis       Past Medical History:   Diagnosis Date     Cluster headache      Coronary artery disease      Depression      Diabetes mellitus, type II (H)      Hyperlipidemia      Hypertension      Rotator cuff arthropathy     Past Surgical History:   Procedure Laterality Date     ARTHROSCOPY SHOULDER       STENT, CORONARY, ESAU  ,       Social History     Tobacco Use     Smoking status: Former Smoker     Packs/day: 0.33     Years: 20.00     Pack years: 6.60     Types: Cigarettes     Last attempt to quit: 2015     Years since quittin.0     Smokeless tobacco: Former User     Types: Snuff   Substance Use Topics     Alcohol use: Yes     Alcohol/week: 0.6 oz     Types: 1 Standard drinks or equivalent per week     Comment: 2-6 occasionally     Drug use: No    Family History     Problem (# of Occurrences) Relation (Name,Age of Onset)    Anuerysm (1) Sister    Cerebrovascular Disease (1) Brother    Coronary Artery Disease (2) Mother, Father           Current Outpatient Medications   Medication Sig Dispense Refill     aspirin 81 MG tablet Take by mouth daily       atorvastatin (LIPITOR) 20 MG tablet Take 1 tablet (20 mg) by mouth daily 90 tablet 3     blood glucose (NO BRAND SPECIFIED) lancets standard Use to test blood sugar 1 times daily or as directed. 1 Box 3     blood glucose monitoring (ACCU-CHEK NORI PLUS) meter device kit Use to test blood sugar 4 times daily or as directed. (pts machine is not working) 1 kit 0     blood glucose monitoring (ACCU-CHEK FASTCLIX) lancets Use to test blood sugar 4 times daily or as directed. 2 Box 3     blood glucose monitoring (NO BRAND SPECIFIED) test strip Use to test blood sugar 4 times daily or as directed. Please dispense Accu Chek Nori plus test strips or per patient preference if using a different brand 150 Box 3     canagliflozin (INVOKANA)  300 MG tablet Take 1 tablet (300 mg) by mouth every morning (before breakfast) 90 tablet 1     clobetasol (TEMOVATE) 0.05 % external cream Apply topically 2 times daily Apply to AA on arms, trunk, legs bid 120 g 3     insulin glargine (BASAGLAR KWIKPEN) 100 UNIT/ML pen Inject 21 Units Subcutaneous 2 times daily 15 mL 3     insulin glargine (LANTUS SOLOSTAR PEN) 100 UNIT/ML pen Inject 21 Units Subcutaneous 2 times daily 15 mL 11     insulin pen needle (B-D U/F) 31G X 5 MM Use 2 daily or as directed. 100 each prn     lisinopril (PRINIVIL/ZESTRIL) 5 MG tablet Take 1 tablet (5 mg) by mouth daily 90 tablet 3     metoprolol tartrate (LOPRESSOR) 25 MG tablet TAKE ONE TABLET BY MOUTH TWICE A  tablet 0     omeprazole (PRILOSEC) 20 MG DR capsule TAKE ONE CAPSULE BY MOUTH EVERY DAY 90 capsule 0     vitamin D2 (ERGOCALCIFEROL) 30204 units (1250 mcg) capsule Take 1 capsule (50,000 Units) by mouth once a week 50 capsule 0     amoxicillin-clavulanate (AUGMENTIN) 875-125 MG tablet        augmented betamethasone dipropionate (DIPROLENE-AF) 0.05 % cream Apply sparingly to affected area  twice daily for 14 days.  Do not apply to face. (Patient not taking: Reported on 8/20/2019) 50 g 1     buPROPion (WELLBUTRIN XL) 150 MG 24 hr tablet Take 1 tablet (150 mg) by mouth every morning (Patient not taking: Reported on 8/20/2019) 30 tablet 1     clopidogrel (PLAVIX) 75 MG tablet Take 1 tablet (75 mg) by mouth daily (Patient not taking: Reported on 8/20/2019) 90 tablet 1     hydrOXYzine (ATARAX) 25 MG tablet Take 1-2 tablets by mouth every 6 hours as needed for anxiety (Patient not taking: Reported on 8/20/2019) 60 tablet 0         Allergies   Allergen Reactions     Metformin Diarrhea     Honey Other (See Comments)     Throat irritation        ROS: 14 point review of systems was negative except the symptoms listed above in the HPI.    This document serves as a record of the services and decisions personally performed and made by Jessica  MARIBELL Lala MD and was created by Kei Cerda, a trained medical scribe, based on personal observations and provider statements to the medical scribe.  August 20, 2019 10:43 AM   Kei Cerda    OBJECTIVE:   GENERAL: alert and no distress.  SKIN: Workman Skin Type - I.  Face, Arms, Trunk, Legs examined. The dermatoscope was used to help evaluate pigmented lesions.  Skin Pertinent Findings:  Multiple erythematous scaly plaques on elbows, trunk, legs, scalp.    Diagnostic Test Results:  No results found for this or any previous visit (from the past 24 hour(s)).    ASSESSMENT:     Encounter Diagnoses   Name Primary?     Psoriasis Yes     Treatment      MDM: Because of the BSA involved with the psoriasis topical treatment is not realistic as a primary treatment.  Discussion regarding etiology, spectrum of psoriasis, treatment options, aggravating factors.    PLAN:   Patient Instructions   FUTURE APPOINTMENTS  Follow up three times weekly for NBUVB phototherapy.    Continue taking your Vitamin D supplement.    Continue using clobetasol propionate 0.05% cream as previously instructed.    TT: 20 minutes.  CT: 15 minutes.    The information in this document, created by the medical scribe for me, accurately reflects the services I personally performed and the decisions made by me. I have reviewed and approved this document for accuracy prior to leaving the patient care area.  August 20, 2019 10:43 AM  Jessica Lala MD  [facility], thank you for allowing me to participate in the care of your patient.        Sincerely,        Jessica Lala MD

## 2019-08-20 NOTE — PROGRESS NOTES
[facility] - PRIMARY CARE SKIN    CC: psoriasis  SUBJECTIVE:   Saji Iqbal is a(n) 59 year old male who presents to clinic today for follow-up of chronic psoriasis that started approximately 10 years ago.    Current treatment: clobetasol propionate 0.05% cream  Insurance has approved NBUVB  Response to treatment: He has not had any improvement although he has had mild relief of itchiness.  Side effects of treatment noted: None.     Areas of skin involvement: generalized - scalp, arms, groin, buttocks, legs    Joint aches: cramping in his hands but no other joint aches.  Co-morbidities: type 2 diabetes mellitus, tobacco use, alcohol use. He admits to consuming a couple strong drinks every day.    Previous Therapies tried:   Topical steroids - augmented betamethasone dipropionate 0.05% cream, clobetasol propionate 0.05% cream  Antihistamines - Benadryl  Oral steroid - prednisone for 1 week  Vitamin D supplementation 92396 international unit(s)/week    He has previously noted that his job is very challenging and he has difficulty sleeping and regulating body temperature due to the effects of psoriasis.    Family Medical History  Skin cancer: NO  Eczema Psoriasis Autoimmune   NO YES - in mother on scalp and in father on shins NO     Occupation:  for [employer] (indoor).    Patient Active Problem List   Diagnosis     Tobacco abuse     Coronary artery disease involving native coronary artery without angina pectoris     Hyperlipidemia LDL goal <100     Benign essential hypertension     Obesity due to excess calories     Type 2 diabetes mellitus without complication (H)     Chronic gastroesophageal reflux disease     Controlled type 2 diabetes mellitus with hyperglycemia, with long-term current use of insulin (H)     NIKKI (obstructive sleep apnea)     Hypertriglyceridemia     Psoriasis       Past Medical History:   Diagnosis Date     Cluster headache      Coronary artery disease      Depression       Diabetes mellitus, type II (H)      Hyperlipidemia      Hypertension      Rotator cuff arthropathy     Past Surgical History:   Procedure Laterality Date     ARTHROSCOPY SHOULDER       STENT, CORONARY, ESAU  ,       Social History     Tobacco Use     Smoking status: Former Smoker     Packs/day: 0.33     Years: 20.00     Pack years: 6.60     Types: Cigarettes     Last attempt to quit: 2015     Years since quittin.0     Smokeless tobacco: Former User     Types: Snuff   Substance Use Topics     Alcohol use: Yes     Alcohol/week: 0.6 oz     Types: 1 Standard drinks or equivalent per week     Comment: 2-6 occasionally     Drug use: No    Family History     Problem (# of Occurrences) Relation (Name,Age of Onset)    Anuerysm (1) Sister    Cerebrovascular Disease (1) Brother    Coronary Artery Disease (2) Mother, Father           Current Outpatient Medications   Medication Sig Dispense Refill     aspirin 81 MG tablet Take by mouth daily       atorvastatin (LIPITOR) 20 MG tablet Take 1 tablet (20 mg) by mouth daily 90 tablet 3     blood glucose (NO BRAND SPECIFIED) lancets standard Use to test blood sugar 1 times daily or as directed. 1 Box 3     blood glucose monitoring (ACCU-CHEK NORI PLUS) meter device kit Use to test blood sugar 4 times daily or as directed. (pts machine is not working) 1 kit 0     blood glucose monitoring (ACCU-CHEK FASTCLIX) lancets Use to test blood sugar 4 times daily or as directed. 2 Box 3     blood glucose monitoring (NO BRAND SPECIFIED) test strip Use to test blood sugar 4 times daily or as directed. Please dispense Accu Chek Nori plus test strips or per patient preference if using a different brand 150 Box 3     canagliflozin (INVOKANA) 300 MG tablet Take 1 tablet (300 mg) by mouth every morning (before breakfast) 90 tablet 1     clobetasol (TEMOVATE) 0.05 % external cream Apply topically 2 times daily Apply to AA on arms, trunk, legs bid 120 g 3     insulin glargine (BASAGLAR  KWIKPEN) 100 UNIT/ML pen Inject 21 Units Subcutaneous 2 times daily 15 mL 3     insulin glargine (LANTUS SOLOSTAR PEN) 100 UNIT/ML pen Inject 21 Units Subcutaneous 2 times daily 15 mL 11     insulin pen needle (B-D U/F) 31G X 5 MM Use 2 daily or as directed. 100 each prn     lisinopril (PRINIVIL/ZESTRIL) 5 MG tablet Take 1 tablet (5 mg) by mouth daily 90 tablet 3     metoprolol tartrate (LOPRESSOR) 25 MG tablet TAKE ONE TABLET BY MOUTH TWICE A  tablet 0     omeprazole (PRILOSEC) 20 MG DR capsule TAKE ONE CAPSULE BY MOUTH EVERY DAY 90 capsule 0     vitamin D2 (ERGOCALCIFEROL) 14137 units (1250 mcg) capsule Take 1 capsule (50,000 Units) by mouth once a week 50 capsule 0     amoxicillin-clavulanate (AUGMENTIN) 875-125 MG tablet        augmented betamethasone dipropionate (DIPROLENE-AF) 0.05 % cream Apply sparingly to affected area  twice daily for 14 days.  Do not apply to face. (Patient not taking: Reported on 8/20/2019) 50 g 1     buPROPion (WELLBUTRIN XL) 150 MG 24 hr tablet Take 1 tablet (150 mg) by mouth every morning (Patient not taking: Reported on 8/20/2019) 30 tablet 1     clopidogrel (PLAVIX) 75 MG tablet Take 1 tablet (75 mg) by mouth daily (Patient not taking: Reported on 8/20/2019) 90 tablet 1     hydrOXYzine (ATARAX) 25 MG tablet Take 1-2 tablets by mouth every 6 hours as needed for anxiety (Patient not taking: Reported on 8/20/2019) 60 tablet 0         Allergies   Allergen Reactions     Metformin Diarrhea     Honey Other (See Comments)     Throat irritation        ROS: 14 point review of systems was negative except the symptoms listed above in the HPI.    This document serves as a record of the services and decisions personally performed and made by Jessica Lala MD and was created by Kei Cerda, a trained medical scribe, based on personal observations and provider statements to the medical scribe.  August 20, 2019 10:43 AM   Kei Cerda    OBJECTIVE:   GENERAL: alert and no distress.  SKIN:  Workman Skin Type - I.  Face, Arms, Trunk, Legs examined. The dermatoscope was used to help evaluate pigmented lesions.  Skin Pertinent Findings:  Multiple erythematous scaly plaques on elbows, trunk, legs, scalp.    Diagnostic Test Results:  No results found for this or any previous visit (from the past 24 hour(s)).    ASSESSMENT:     Encounter Diagnoses   Name Primary?     Psoriasis Yes     Treatment      MDM: Because of the BSA involved with the psoriasis topical treatment is not realistic as a primary treatment.  Discussion regarding etiology, spectrum of psoriasis, treatment options, aggravating factors.    PLAN:   Patient Instructions   FUTURE APPOINTMENTS  Follow up three times weekly for NBUVB phototherapy.    Continue taking your Vitamin D supplement.    Continue using clobetasol propionate 0.05% cream as previously instructed.    TT: 20 minutes.  CT: 15 minutes.    The information in this document, created by the medical scribe for me, accurately reflects the services I personally performed and the decisions made by me. I have reviewed and approved this document for accuracy prior to leaving the patient care area.  August 20, 2019 10:43 AM  Jessica Lala MD  [facility]

## 2019-08-20 NOTE — PATIENT INSTRUCTIONS
FUTURE APPOINTMENTS  Follow up three times weekly for NBUVB phototherapy.    Continue taking your Vitamin D supplement.    Continue using clobetasol propionate 0.05% cream as previously instructed.

## 2019-08-28 ENCOUNTER — OFFICE VISIT (OUTPATIENT)
Dept: FAMILY MEDICINE | Facility: CLINIC | Age: 59
End: 2019-08-28
Payer: COMMERCIAL

## 2019-08-28 DIAGNOSIS — L40.9 PSORIASIS: ICD-10-CM

## 2019-08-28 DIAGNOSIS — Z51.89 TREATMENT: ICD-10-CM

## 2019-08-28 PROCEDURE — 96910 PHOTCHMTX TAR&UVB/PTRLTM&UVB: CPT | Performed by: FAMILY MEDICINE

## 2019-08-28 NOTE — LETTER
8/28/2019         RE: Saij Iqbal  [address] [address]        Dear Colleague,    Thank you for referring your patient, Saji Iqbal, to the [facility] [place]. Please see a copy of my visit note below.    NB-UVB treatment for psoriasis  Treatment #1  No issues   photoproection on eyes, lips, nipples  111.0 mj 0min 12 seconds today  Mineral oil applied  Goal three times weekly    No complications      Again, thank you for allowing me to participate in the care of your patient.        Sincerely,        Jessica Lala MD

## 2019-08-28 NOTE — PROGRESS NOTES
NB-UVB treatment for psoriasis  Treatment #1  No issues   photoproection on eyes, lips, nipples  111.0 mj 0min 12 seconds today  Mineral oil applied  Goal three times weekly    No complications

## 2019-09-04 ENCOUNTER — OFFICE VISIT (OUTPATIENT)
Dept: FAMILY MEDICINE | Facility: CLINIC | Age: 59
End: 2019-09-04
Payer: COMMERCIAL

## 2019-09-04 DIAGNOSIS — L40.9 PSORIASIS: ICD-10-CM

## 2019-09-04 DIAGNOSIS — Z51.89 TREATMENT: ICD-10-CM

## 2019-09-04 PROCEDURE — 96910 PHOTCHMTX TAR&UVB/PTRLTM&UVB: CPT | Performed by: FAMILY MEDICINE

## 2019-09-04 NOTE — LETTER
9/4/2019         RE: Saji Iqbal  [address] [address]        Dear Colleague,    Thank you for referring your patient, Saji Iqbal, to the [facility] [place]. Please see a copy of my visit note below.    NB-UVB treatment for psoriasis  Treatment #2  No issues   photoproection on eyes, lips, nipples  118.0 mj 0min 13 seconds today  Mineral oil applied  Goal three times weekly    No complications      Again, thank you for allowing me to participate in the care of your patient.        Sincerely,        Jessica Lala MD

## 2019-09-09 ENCOUNTER — OFFICE VISIT (OUTPATIENT)
Dept: FAMILY MEDICINE | Facility: CLINIC | Age: 59
End: 2019-09-09
Payer: COMMERCIAL

## 2019-09-09 DIAGNOSIS — L40.9 PSORIASIS: ICD-10-CM

## 2019-09-09 DIAGNOSIS — Z51.89 TREATMENT: ICD-10-CM

## 2019-09-09 PROCEDURE — 96910 PHOTCHMTX TAR&UVB/PTRLTM&UVB: CPT | Performed by: FAMILY MEDICINE

## 2019-09-09 NOTE — LETTER
9/9/2019         RE: Saji Iqbal  [address] [address]        Dear Colleague,    Thank you for referring your patient, Saji Iqbal, to the [facility] [place]. Please see a copy of my visit note below.    NB-UVB treatment for psoriasis  Treatment #2  No issues   photoproection on eyes, lips, nipples  129  mj 0min 13 seconds today  Mineral oil applied  Goal three times weekly    No complications      Again, thank you for allowing me to participate in the care of your patient.        Sincerely,        Jessica Lala MD

## 2019-09-10 NOTE — PROGRESS NOTES
NB-UVB treatment for psoriasis  Treatment #2  No issues   photoproection on eyes, lips, nipples  129  mj 0min 13 seconds today  Mineral oil applied  Goal three times weekly    No complications

## 2019-09-13 ENCOUNTER — OFFICE VISIT (OUTPATIENT)
Dept: FAMILY MEDICINE | Facility: CLINIC | Age: 59
End: 2019-09-13
Payer: COMMERCIAL

## 2019-09-13 DIAGNOSIS — L40.9 PSORIASIS: Primary | ICD-10-CM

## 2019-09-13 PROCEDURE — 96910 PHOTCHMTX TAR&UVB/PTRLTM&UVB: CPT | Performed by: PHYSICIAN ASSISTANT

## 2019-09-13 NOTE — LETTER
9/13/2019         RE: Saji Iqbal  [address] [address]        Dear Colleague,    Thank you for referring your patient, Saji Iqbal, to the [facility]. Please see a copy of my visit note below.    NB-UVB treatment for psoriasis  Treatment #3  No issues   photoprotection on eyes, lips, nipples  141 mj 0min 15 seconds today  Mineral oil applied  Goal 2-3 times weekly for a total of 12 weeks    Last treatment:  No burning  No complications      Again, thank you for allowing me to participate in the care of your patient.        Sincerely,        Sherri Juan PA-C

## 2019-09-13 NOTE — PROGRESS NOTES
NB-UVB treatment for psoriasis  Treatment #3  No issues   photoprotection on eyes, lips, nipples  141 mj 0min 15 seconds today  Mineral oil applied  Goal 2-3 times weekly for a total of 12 weeks    Last treatment:  No burning  No complications

## 2019-09-19 ENCOUNTER — TELEPHONE (OUTPATIENT)
Dept: FAMILY MEDICINE | Facility: CLINIC | Age: 59
End: 2019-09-19

## 2019-09-19 NOTE — TELEPHONE ENCOUNTER
Reason for Call:  Other appointment    Detailed comments: Patient calling re: [facility] appointment. He is going out of town 9/20/19 and would like to know if he can be rescheduled for earlier in the day around 10AM instead.    Phone Number Patient can be reached at: Other phone number:  [phone] (work phone) does not have cell phone with him today    Best Time: anytime    Can we leave a detailed message on this number? NO    Call taken on 9/19/2019 at 11:56 AM by Alex MCGEE

## 2019-09-19 NOTE — TELEPHONE ENCOUNTER
Patient returned phone call, appointment rescheduled to 11:20AM 9/20/19. Closing encounter.    Alex MCGEE  Patient Representative - Prior Diaz

## 2019-09-19 NOTE — TELEPHONE ENCOUNTER
Called patient. No answer, mailbox full.   appt can be moved to any time, as long as no patient is scheduled.    Thank you  Yasmeen Mcclellan

## 2019-09-20 ENCOUNTER — OFFICE VISIT (OUTPATIENT)
Dept: FAMILY MEDICINE | Facility: CLINIC | Age: 59
End: 2019-09-20
Payer: COMMERCIAL

## 2019-09-20 DIAGNOSIS — L40.9 PSORIASIS: Primary | ICD-10-CM

## 2019-09-20 PROCEDURE — 96910 PHOTCHMTX TAR&UVB/PTRLTM&UVB: CPT | Performed by: PHYSICIAN ASSISTANT

## 2019-09-20 NOTE — PROGRESS NOTES
NB-UVB treatment for psoriasis  Treatment #4  No issues   photoprotection on eyes, lips, nipples  130 mj 0min 14 seconds today  Mineral oil applied  Goal 2-3 times weekly for a total of 12 weeks    Last treatment:  Some burning on legs  No complications

## 2019-09-20 NOTE — LETTER
9/20/2019         RE: Saji Iqbal  [address] [address]        Dear Colleague,    Thank you for referring your patient, Saji Iqbal, to the [facility]. Please see a copy of my visit note below.    NB-UVB treatment for psoriasis  Treatment #4  No issues   photoprotection on eyes, lips, nipples  130 mj 0min 14 seconds today  Mineral oil applied  Goal 2-3 times weekly for a total of 12 weeks    Last treatment:  Some burning on legs  No complications      Again, thank you for allowing me to participate in the care of your patient.        Sincerely,        Sherri Juan PA-C

## 2019-09-23 DIAGNOSIS — E11.65 TYPE 2 DIABETES MELLITUS WITH HYPERGLYCEMIA, WITH LONG-TERM CURRENT USE OF INSULIN (H): ICD-10-CM

## 2019-09-23 DIAGNOSIS — Z79.4 TYPE 2 DIABETES MELLITUS WITH HYPERGLYCEMIA, WITH LONG-TERM CURRENT USE OF INSULIN (H): ICD-10-CM

## 2019-09-23 NOTE — LETTER
[facility]  September 24, 2019      [place] [address]           Phone :  [phone]          Fax:  [phone]  Saji Rasheed  [address] [address]      Dear Saji,    We recently received a call from your pharmacy requesting a refill of your medication - insulin needles.    A review of your chart indicates that an appointment is required with your provider for your 3 month diabetes check. Please call the clinic at [phone] to schedule your appointment.    We have authorized one refill of your medication to allow time for you to schedule your appointment.    Taking care of your health is important to us, and ongoing visits with your provider are vital to your care.  We look forward to seeing you in the near future.        Sincerely,        Naseem Esparza MD / Kyleigh Gallego RN

## 2019-09-24 RX ORDER — PEN NEEDLE, DIABETIC 29 G X1/2"
NEEDLE, DISPOSABLE MISCELLANEOUS
Qty: 100 EACH | Refills: 0 | Status: SHIPPED | OUTPATIENT
Start: 2019-09-24 | End: 2019-12-03

## 2019-09-24 NOTE — TELEPHONE ENCOUNTER
"Requested Prescriptions   Pending Prescriptions Disp Refills     ULTICARE PEN NEEDLES 31G X 5 MM miscellaneous [Pharmacy Med Name: ULTICARE PEN NEEDLE 31G X 5 MM MISC] 100 each prn     Sig: USE 2 DAILY OR AS DIRECTED   Last Written Prescription Date:  8/6/18  Last Fill Quantity: 100 each ,  # refills: PRN   Last Office Visit:5/13/19 Esparza      Return in about 3 months (around 8/13/2019) for Diabetes.     Future Office Visit:    Next 5 appointments (look out 90 days)    Sep 25, 2019  4:00 PM CDT  Return Visit with Jessica Lala MD  [facility] ([facility] [address] [place] [id number]  [phone]   Sep 27, 2019  1:40 PM CDT  Return Visit with EC SKIN PROC RM  [facility] ([facility]) [address] [place] [id number]  [phone]   Dec 04, 2019  4:00 PM CST  Return Visit with Jessica Lala MD  [facility] ([facility]) [address] [place] [id number]  [phone]             Diabetic Supplies Protocol Passed - 9/23/2019  4:25 PM        Passed - Medication is active on med list        Passed - Patient is 18 years of age or older        Passed - Recent (6 mo) or future (30 days) visit within the authorizing provider's specialty     Patient had office visit in the last 6 months or has a visit in the next 30 days with authorizing provider.  See \"Patient Info\" tab in inbasket, or \"Choose Columns\" in Meds & Orders section of the refill encounter.            "

## 2019-09-24 NOTE — TELEPHONE ENCOUNTER
Medication is being filled for 1 time refill only due to:  Patient needs to be seen because he is due for his 3 month diabetes check. Letter sent to patient.   Kyleigh Gallego RN

## 2019-09-25 ENCOUNTER — OFFICE VISIT (OUTPATIENT)
Dept: FAMILY MEDICINE | Facility: CLINIC | Age: 59
End: 2019-09-25
Payer: COMMERCIAL

## 2019-09-25 DIAGNOSIS — Z51.89 TREATMENT: ICD-10-CM

## 2019-09-25 DIAGNOSIS — L40.9 PSORIASIS: ICD-10-CM

## 2019-09-25 PROCEDURE — 96910 PHOTCHMTX TAR&UVB/PTRLTM&UVB: CPT | Performed by: FAMILY MEDICINE

## 2019-09-25 NOTE — LETTER
9/25/2019         RE: Saji Iqbal  [address] [facility] [address]  [facility] [address]        Dear Colleague,    [facility] Please see a copy of my visit note below.    NB-UVB treatment for psoriasis  Treatment #5  No issues   photoprotection on eyes, lips, nipples  149mj 0min 16seconds today  Mineral oil applied  Goal 2-3 times weekly for a total of 12 weeks    Last treatment:  Some burning on legs  No complications    Plan : making the treatment appointments has been difficult may need to consider biologic.      Again, thank you for allowing me to participate in the care of your patient.        Sincerely,        Jessica Lala MD

## 2019-09-25 NOTE — PROGRESS NOTES
NB-UVB treatment for psoriasis  Treatment #5  No issues   photoprotection on eyes, lips, nipples  149mj 0min 16seconds today  Mineral oil applied  Goal 2-3 times weekly for a total of 12 weeks    Last treatment:  Some burning on legs  No complications    Plan : making the treatment appointments has been difficult may need to consider biologic.

## 2019-10-01 ENCOUNTER — OFFICE VISIT (OUTPATIENT)
Dept: FAMILY MEDICINE | Facility: CLINIC | Age: 59
End: 2019-10-01
Payer: COMMERCIAL

## 2019-10-01 DIAGNOSIS — L40.9 PSORIASIS: ICD-10-CM

## 2019-10-01 DIAGNOSIS — Z51.89 TREATMENT: ICD-10-CM

## 2019-10-01 PROCEDURE — 96910 PHOTCHMTX TAR&UVB/PTRLTM&UVB: CPT | Performed by: FAMILY MEDICINE

## 2019-10-01 NOTE — LETTER
10/1/2019         RE: Saji Iqbal  [address] [address]        Dear Colleague,    Thank you for referring your patient, Saji Iqbal, to the [facility]. Please see a copy of my visit note below.    NB-UVB treatment for psoriasis  Treatment #6  No issues   photoprotection on eyes, lips, nipples  159 mJ 0min 17 seconds today  Mineral oil applied  Goal 2-3 times weekly for a total of 12 weeks    Last treatment:  No complications    Plan : making the treatment appointments has been difficult may need to consider biologic.      Again, thank you for allowing me to participate in the care of your patient.        Sincerely,        Jessica Lala MD

## 2019-10-01 NOTE — PROGRESS NOTES
NB-UVB treatment for psoriasis  Treatment #6  No issues   photoprotection on eyes, lips, nipples  159 mJ 0min 17 seconds today  Mineral oil applied  Goal 2-3 times weekly for a total of 12 weeks    Last treatment:  No complications    Plan : making the treatment appointments has been difficult may need to consider biologic.

## 2019-10-03 ENCOUNTER — OFFICE VISIT (OUTPATIENT)
Dept: FAMILY MEDICINE | Facility: CLINIC | Age: 59
End: 2019-10-03
Payer: COMMERCIAL

## 2019-10-03 DIAGNOSIS — L40.9 PSORIASIS: ICD-10-CM

## 2019-10-03 DIAGNOSIS — Z51.89 TREATMENT: ICD-10-CM

## 2019-10-03 PROCEDURE — 96910 PHOTCHMTX TAR&UVB/PTRLTM&UVB: CPT | Performed by: FAMILY MEDICINE

## 2019-10-03 NOTE — PROGRESS NOTES
NB-UVB treatment for psoriasis  Treatment #6  No issues   photoprotection on eyes, lips, nipples  174 mJ 0min 18 seconds today  Mineral oil applied  Goal 2-3 times weekly for a total of 12 weeks    Last treatment:  No complications    Plan : making the treatment appointments has been difficult may need to consider biologic.

## 2019-10-03 NOTE — LETTER
10/3/2019         RE: Saji Iqbal  [address] [facility] [address]  [facility] [address]        Dear Colleague,    Thank you for referring your patient, Saji Iqbal, to the [facility]. Please see a copy of my visit note below.    NB-UVB treatment for psoriasis  Treatment #6  No issues   photoprotection on eyes, lips, nipples  174 mJ 0min 18 seconds today  Mineral oil applied  Goal 2-3 times weekly for a total of 12 weeks    Last treatment:  No complications    Plan : making the treatment appointments has been difficult may need to consider biologic.      Again, thank you for allowing me to participate in the care of your patient.        Sincerely,        Jessica Lala MD

## 2019-10-04 ENCOUNTER — OFFICE VISIT (OUTPATIENT)
Dept: FAMILY MEDICINE | Facility: CLINIC | Age: 59
End: 2019-10-04
Payer: COMMERCIAL

## 2019-10-04 DIAGNOSIS — L40.9 PSORIASIS: Primary | ICD-10-CM

## 2019-10-04 PROCEDURE — 96910 PHOTCHMTX TAR&UVB/PTRLTM&UVB: CPT | Performed by: PHYSICIAN ASSISTANT

## 2019-10-04 NOTE — LETTER
10/4/2019         RE: Saji Iqbal  [address] [address]        Dear Colleague,    Thank you for referring your patient, Saji Iqbal, to the [facility]. Please see a copy of my visit note below.    NB-UVB treatment for psoriasis  Treatment #7  No issues   photoprotection on eyes, lips, nipples  188 mj 0min 21 seconds today  Mineral oil applied  Goal 2-3 times weekly for a total of 12 weeks    Last treatment:  No burning  No complications      Again, thank you for allowing me to participate in the care of your patient.        Sincerely,        Sherri Juan PA-C

## 2019-10-04 NOTE — PROGRESS NOTES
NB-UVB treatment for psoriasis  Treatment #7  No issues   photoprotection on eyes, lips, nipples  188 mj 0min 21 seconds today  Mineral oil applied  Goal 2-3 times weekly for a total of 12 weeks    Last treatment:  No burning  No complications

## 2019-10-10 ENCOUNTER — OFFICE VISIT (OUTPATIENT)
Dept: FAMILY MEDICINE | Facility: CLINIC | Age: 59
End: 2019-10-10
Payer: COMMERCIAL

## 2019-10-10 VITALS — SYSTOLIC BLOOD PRESSURE: 122 MMHG | DIASTOLIC BLOOD PRESSURE: 78 MMHG

## 2019-10-10 DIAGNOSIS — L40.9 PSORIASIS: Primary | ICD-10-CM

## 2019-10-10 PROCEDURE — 87389 HIV-1 AG W/HIV-1&-2 AB AG IA: CPT | Performed by: FAMILY MEDICINE

## 2019-10-10 PROCEDURE — 99213 OFFICE O/P EST LOW 20 MIN: CPT | Performed by: FAMILY MEDICINE

## 2019-10-10 PROCEDURE — 87340 HEPATITIS B SURFACE AG IA: CPT | Performed by: FAMILY MEDICINE

## 2019-10-10 PROCEDURE — 86803 HEPATITIS C AB TEST: CPT | Performed by: FAMILY MEDICINE

## 2019-10-10 PROCEDURE — 86481 TB AG RESPONSE T-CELL SUSP: CPT | Performed by: FAMILY MEDICINE

## 2019-10-10 PROCEDURE — 86706 HEP B SURFACE ANTIBODY: CPT | Performed by: FAMILY MEDICINE

## 2019-10-10 PROCEDURE — 36415 COLL VENOUS BLD VENIPUNCTURE: CPT | Performed by: FAMILY MEDICINE

## 2019-10-10 NOTE — LETTER
10/10/2019         RE: Saji Iqbal  19 [address] [address]        Dear Colleague,    Thank you for referring your patient, Saji Iqbal, to the [facility] [place]. Please see a copy of my visit note below.    [facility] - PRIMARY CARE SKIN    CC: psoriasis  SUBJECTIVE:   Saji Iqbal is a(n) 59 year old male who presents to clinic today for follow-up of chronic psoriasis that started approximately 10 years ago. Recently started NBUVB treatments but difficult to make the appointment and has continued to develop more plaques. Here to discuss other alternatives.    Current treatment: clobetasol propionate 0.05% cream  NBUVB treatments : he  has not seen a difference, developing more areas but has been very difficult to get in to the clinic for treatments.  Response to treatment: He has not had any improvement although he has had mild relief of itchiness.  Side effects of treatment noted: None.     Areas of skin involvement: generalized - scalp, arms, groin, buttocks, legs    Joint aches: cramping in his hands but no other joint aches.  Co-morbidities: type 2 diabetes mellitus, tobacco use, alcohol use. He admits to consuming a couple strong drinks every day.    Previous Therapies tried:   Topical steroids - augmented betamethasone dipropionate 0.05% cream, clobetasol propionate 0.05% cream  Antihistamines - Benadryl  Oral steroid - prednisone for 1 week  Vitamin D supplementation 37030 international unit(s)/week      Family Medical History  Skin cancer: NO  Eczema Psoriasis Autoimmune   NO YES - in mother on scalp and in father on shins NO     Occupation:  for [employer] (indoor).  Does drink alcohol on the weekends    Patient Active Problem List   Diagnosis     Tobacco abuse     Coronary artery disease involving native coronary artery without angina pectoris     Hyperlipidemia LDL goal <100     Benign essential hypertension     Obesity due to excess calories      Type 2 diabetes mellitus without complication (H)     Chronic gastroesophageal reflux disease     Controlled type 2 diabetes mellitus with hyperglycemia, with long-term current use of insulin (H)     NIKKI (obstructive sleep apnea)     Hypertriglyceridemia     Psoriasis       Past Medical History:   Diagnosis Date     Cluster headache      Coronary artery disease      Depression      Diabetes mellitus, type II (H)      Hyperlipidemia      Hypertension      Rotator cuff arthropathy     Past Surgical History:   Procedure Laterality Date     ARTHROSCOPY SHOULDER       STENT, CORONARY, ESAU  ,       Social History     Tobacco Use     Smoking status: Former Smoker     Packs/day: 0.33     Years: 20.00     Pack years: 6.60     Types: Cigarettes     Last attempt to quit: 2015     Years since quittin.2     Smokeless tobacco: Former User     Types: Snuff   Substance Use Topics     Alcohol use: Yes     Alcohol/week: 1.0 standard drinks     Types: 1 Standard drinks or equivalent per week     Comment: 2-6 occasionally     Drug use: No    Family History     Problem (# of Occurrences) Relation (Name,Age of Onset)    Anuerysm (1) Sister    Cerebrovascular Disease (1) Brother    Coronary Artery Disease (2) Mother, Father           Current Outpatient Medications   Medication Sig Dispense Refill     amoxicillin-clavulanate (AUGMENTIN) 875-125 MG tablet        aspirin 81 MG tablet Take by mouth daily       atorvastatin (LIPITOR) 20 MG tablet Take 1 tablet (20 mg) by mouth daily 90 tablet 3     augmented betamethasone dipropionate (DIPROLENE-AF) 0.05 % cream Apply sparingly to affected area  twice daily for 14 days.  Do not apply to face. 50 g 1     blood glucose (NO BRAND SPECIFIED) lancets standard Use to test blood sugar 1 times daily or as directed. 1 Box 3     blood glucose monitoring (ACCU-CHEK NORI PLUS) meter device kit Use to test blood sugar 4 times daily or as directed. (pts machine is not working) 1 kit 0      blood glucose monitoring (ACCU-CHEK FASTCLIX) lancets Use to test blood sugar 4 times daily or as directed. 2 Box 3     blood glucose monitoring (NO BRAND SPECIFIED) test strip Use to test blood sugar 4 times daily or as directed. Please dispense Accu Chek Ana plus test strips or per patient preference if using a different brand 150 Box 3     buPROPion (WELLBUTRIN XL) 150 MG 24 hr tablet Take 1 tablet (150 mg) by mouth every morning 30 tablet 1     canagliflozin (INVOKANA) 300 MG tablet Take 1 tablet (300 mg) by mouth every morning (before breakfast) 90 tablet 1     clobetasol (TEMOVATE) 0.05 % external cream Apply topically 2 times daily Apply to AA on arms, trunk, legs bid 120 g 3     clopidogrel (PLAVIX) 75 MG tablet Take 1 tablet (75 mg) by mouth daily 90 tablet 1     hydrOXYzine (ATARAX) 25 MG tablet Take 1-2 tablets by mouth every 6 hours as needed for anxiety 60 tablet 0     insulin glargine (BASAGLAR KWIKPEN) 100 UNIT/ML pen Inject 21 Units Subcutaneous 2 times daily 15 mL 3     insulin glargine (LANTUS SOLOSTAR PEN) 100 UNIT/ML pen Inject 21 Units Subcutaneous 2 times daily 15 mL 11     lisinopril (PRINIVIL/ZESTRIL) 5 MG tablet Take 1 tablet (5 mg) by mouth daily 90 tablet 3     metoprolol tartrate (LOPRESSOR) 25 MG tablet TAKE ONE TABLET BY MOUTH TWICE A  tablet 0     omeprazole (PRILOSEC) 20 MG DR capsule TAKE ONE CAPSULE BY MOUTH EVERY DAY 90 capsule 0     ULTICARE PEN NEEDLES 31G X 5 MM miscellaneous USE 2 DAILY OR AS DIRECTED 100 each 0     vitamin D2 (ERGOCALCIFEROL) 39425 units (1250 mcg) capsule Take 1 capsule (50,000 Units) by mouth once a week 50 capsule 0         Allergies   Allergen Reactions     Metformin Diarrhea     Honey Other (See Comments)     Throat irritation        ROS: 14 point review of systems was negative except the symptoms listed above in the HPI.        OBJECTIVE:   GENERAL: alert and no distress.  SKIN: Workman Skin Type - I.  Face, Arms, Trunk, Legs examined. The  dermatoscope was used to help evaluate pigmented lesions.  Skin Pertinent Findings:  Multiple erythematous scaly plaques on elbows, trunk, legs, scalp.    Diagnostic Test Results:  No results found for this or any previous visit (from the past 24 hour(s)).  MDM : discussed methotrexate, otezla, Humira treatments, potential complications or side effects.  ASSESSMENT:     Encounter Diagnosis   Name Primary?     Psoriasis Yes     MDM: Because of the BSA involved with the psoriasis topical treatment is not realistic as a primary treatment.  Discussion regarding etiology, spectrum of psoriasis, treatment options, aggravating factors.    PLAN:   Patient Instructions   When I start the methotrexate:          12.5 mg methotrexate ONCE PER WEEK           Folic acid 1 gm every day except the day you take the methotrexate           No alcohol intake when prescribed methotrexate  Side effects:    MOST patients do not experience side effects, and if present, these minor side effects improve over time as the body adjusts.    Increased sensitivity of the skin to the sunlight. Therefore, limit sun exposure and use at least a 50-75 SPF and reapply every 2 hours.    Nausea or vomiting    Abnormalities of liver function tests - liver function blood tests (LFT) will be ordered to watch your liver. These side effects are more likely to occur at higher doses.    About 1-3% of patients develop mouth sores, rashes, diarrhea or abnormalities of their blood counts.    Methotrexate may cause scarring (cirrhosis) of the liver, but this side effects is rare and is most likely to occur in patients who already have liver problems or are already taking drugs that are toxic to the liver.    Lung problems (persistent cough or unexplained shortness of breath) can occur while taking methotrexate. These symptoms are more common in people with poor lung function. Persistent cough or shortness of breath should be reported to your doctor.    Slow hair  loss is seen in some patients, but the hair grows back after the medication is stopped.        TT: 20 minutes.  CT: 15 minutes.    The information in this document, created by the medical scribe for me, accurately reflects the services I personally performed and the decisions made by me. I have reviewed and approved this document for accuracy prior to leaving the patient care area.  August 20, 2019 10:43 AM  Jessica Lala MD  [facility] [place]    Again, thank you for allowing me to participate in the care of your patient.        Sincerely,        Jessica Lala MD

## 2019-10-10 NOTE — PROGRESS NOTES
[facility] - PRIMARY CARE SKIN    CC: psoriasis  SUBJECTIVE:   Saji Iqbal is a(n) 59 year old male who presents to clinic today for follow-up of chronic psoriasis that started approximately 10 years ago. Recently started NBUVB treatments but difficult to make the appointment and has continued to develop more plaques. Here to discuss other alternatives.    Current treatment: clobetasol propionate 0.05% cream  NBUVB treatments : he  has not seen a difference, developing more areas but has been very difficult to get in to the clinic for treatments.  Response to treatment: He has not had any improvement although he has had mild relief of itchiness.  Side effects of treatment noted: None.     Areas of skin involvement: generalized - scalp, arms, groin, buttocks, legs    Joint aches: cramping in his hands but no other joint aches.  Co-morbidities: type 2 diabetes mellitus, tobacco use, alcohol use. He admits to consuming a couple strong drinks every day.    Previous Therapies tried:   Topical steroids - augmented betamethasone dipropionate 0.05% cream, clobetasol propionate 0.05% cream  Antihistamines - Benadryl  Oral steroid - prednisone for 1 week  Vitamin D supplementation 40178 international unit(s)/week      Family Medical History  Skin cancer: NO  Eczema Psoriasis Autoimmune   NO YES - in mother on scalp and in father on shins NO     Occupation:  for [employer] (indoor).  Does drink alcohol on the weekends    Patient Active Problem List   Diagnosis     Tobacco abuse     Coronary artery disease involving native coronary artery without angina pectoris     Hyperlipidemia LDL goal <100     Benign essential hypertension     Obesity due to excess calories     Type 2 diabetes mellitus without complication (H)     Chronic gastroesophageal reflux disease     Controlled type 2 diabetes mellitus with hyperglycemia, with long-term current use of insulin (H)     NIKKI (obstructive sleep apnea)      Hypertriglyceridemia     Psoriasis       Past Medical History:   Diagnosis Date     Cluster headache      Coronary artery disease      Depression      Diabetes mellitus, type II (H)      Hyperlipidemia      Hypertension      Rotator cuff arthropathy     Past Surgical History:   Procedure Laterality Date     ARTHROSCOPY SHOULDER       STENT, CORONARY, ESAU  ,       Social History     Tobacco Use     Smoking status: Former Smoker     Packs/day: 0.33     Years: 20.00     Pack years: 6.60     Types: Cigarettes     Last attempt to quit: 2015     Years since quittin.2     Smokeless tobacco: Former User     Types: Snuff   Substance Use Topics     Alcohol use: Yes     Alcohol/week: 1.0 standard drinks     Types: 1 Standard drinks or equivalent per week     Comment: 2-6 occasionally     Drug use: No    Family History     Problem (# of Occurrences) Relation (Name,Age of Onset)    Anuerysm (1) Sister    Cerebrovascular Disease (1) Brother    Coronary Artery Disease (2) Mother, Father           Current Outpatient Medications   Medication Sig Dispense Refill     amoxicillin-clavulanate (AUGMENTIN) 875-125 MG tablet        aspirin 81 MG tablet Take by mouth daily       atorvastatin (LIPITOR) 20 MG tablet Take 1 tablet (20 mg) by mouth daily 90 tablet 3     augmented betamethasone dipropionate (DIPROLENE-AF) 0.05 % cream Apply sparingly to affected area  twice daily for 14 days.  Do not apply to face. 50 g 1     blood glucose (NO BRAND SPECIFIED) lancets standard Use to test blood sugar 1 times daily or as directed. 1 Box 3     blood glucose monitoring (ACCU-CHEK NORI PLUS) meter device kit Use to test blood sugar 4 times daily or as directed. (pts machine is not working) 1 kit 0     blood glucose monitoring (ACCU-CHEK FASTCLIX) lancets Use to test blood sugar 4 times daily or as directed. 2 Box 3     blood glucose monitoring (NO BRAND SPECIFIED) test strip Use to test blood sugar 4 times daily or as directed.  Please dispense Accu Chek Ana plus test strips or per patient preference if using a different brand 150 Box 3     buPROPion (WELLBUTRIN XL) 150 MG 24 hr tablet Take 1 tablet (150 mg) by mouth every morning 30 tablet 1     canagliflozin (INVOKANA) 300 MG tablet Take 1 tablet (300 mg) by mouth every morning (before breakfast) 90 tablet 1     clobetasol (TEMOVATE) 0.05 % external cream Apply topically 2 times daily Apply to AA on arms, trunk, legs bid 120 g 3     clopidogrel (PLAVIX) 75 MG tablet Take 1 tablet (75 mg) by mouth daily 90 tablet 1     hydrOXYzine (ATARAX) 25 MG tablet Take 1-2 tablets by mouth every 6 hours as needed for anxiety 60 tablet 0     insulin glargine (BASAGLAR KWIKPEN) 100 UNIT/ML pen Inject 21 Units Subcutaneous 2 times daily 15 mL 3     insulin glargine (LANTUS SOLOSTAR PEN) 100 UNIT/ML pen Inject 21 Units Subcutaneous 2 times daily 15 mL 11     lisinopril (PRINIVIL/ZESTRIL) 5 MG tablet Take 1 tablet (5 mg) by mouth daily 90 tablet 3     metoprolol tartrate (LOPRESSOR) 25 MG tablet TAKE ONE TABLET BY MOUTH TWICE A  tablet 0     omeprazole (PRILOSEC) 20 MG DR capsule TAKE ONE CAPSULE BY MOUTH EVERY DAY 90 capsule 0     ULTICARE PEN NEEDLES 31G X 5 MM miscellaneous USE 2 DAILY OR AS DIRECTED 100 each 0     vitamin D2 (ERGOCALCIFEROL) 85653 units (1250 mcg) capsule Take 1 capsule (50,000 Units) by mouth once a week 50 capsule 0         Allergies   Allergen Reactions     Metformin Diarrhea     Honey Other (See Comments)     Throat irritation        ROS: 14 point review of systems was negative except the symptoms listed above in the HPI.        OBJECTIVE:   GENERAL: alert and no distress.  SKIN: Workman Skin Type - I.  Face, Arms, Trunk, Legs examined. The dermatoscope was used to help evaluate pigmented lesions.  Skin Pertinent Findings:  Multiple erythematous scaly plaques on elbows, trunk, legs, scalp.    Diagnostic Test Results:  No results found for this or any previous visit  (from the past 24 hour(s)).  MDM : discussed methotrexate, otezla, Humira treatments, potential complications or side effects.  ASSESSMENT:     Encounter Diagnosis   Name Primary?     Psoriasis Yes     MDM: Because of the BSA involved with the psoriasis topical treatment is not realistic as a primary treatment.  Discussion regarding etiology, spectrum of psoriasis, treatment options, aggravating factors.    PLAN:   Patient Instructions   When I start the methotrexate:          12.5 mg methotrexate ONCE PER WEEK           Folic acid 1 gm every day except the day you take the methotrexate           No alcohol intake when prescribed methotrexate  Side effects:    MOST patients do not experience side effects, and if present, these minor side effects improve over time as the body adjusts.    Increased sensitivity of the skin to the sunlight. Therefore, limit sun exposure and use at least a 50-75 SPF and reapply every 2 hours.    Nausea or vomiting    Abnormalities of liver function tests - liver function blood tests (LFT) will be ordered to watch your liver. These side effects are more likely to occur at higher doses.    About 1-3% of patients develop mouth sores, rashes, diarrhea or abnormalities of their blood counts.    Methotrexate may cause scarring (cirrhosis) of the liver, but this side effects is rare and is most likely to occur in patients who already have liver problems or are already taking drugs that are toxic to the liver.    Lung problems (persistent cough or unexplained shortness of breath) can occur while taking methotrexate. These symptoms are more common in people with poor lung function. Persistent cough or shortness of breath should be reported to your doctor.    Slow hair loss is seen in some patients, but the hair grows back after the medication is stopped.        TT: 20 minutes.  CT: 15 minutes.    The information in this document, created by the medical scribe for me, accurately reflects the  services I personally performed and the decisions made by me. I have reviewed and approved this document for accuracy prior to leaving the patient care area.  August 20, 2019 10:43 AM  Jessica Lala MD  [facility] [place]

## 2019-10-10 NOTE — PATIENT INSTRUCTIONS
When I start the methotrexate:          12.5 mg methotrexate ONCE PER WEEK           Folic acid 1 gm every day except the day you take the methotrexate           No alcohol intake when prescribed methotrexate  Side effects:    MOST patients do not experience side effects, and if present, these minor side effects improve over time as the body adjusts.    Increased sensitivity of the skin to the sunlight. Therefore, limit sun exposure and use at least a 50-75 SPF and reapply every 2 hours.    Nausea or vomiting    Abnormalities of liver function tests - liver function blood tests (LFT) will be ordered to watch your liver. These side effects are more likely to occur at higher doses.    About 1-3% of patients develop mouth sores, rashes, diarrhea or abnormalities of their blood counts.    Methotrexate may cause scarring (cirrhosis) of the liver, but this side effects is rare and is most likely to occur in patients who already have liver problems or are already taking drugs that are toxic to the liver.    Lung problems (persistent cough or unexplained shortness of breath) can occur while taking methotrexate. These symptoms are more common in people with poor lung function. Persistent cough or shortness of breath should be reported to your doctor.    Slow hair loss is seen in some patients, but the hair grows back after the medication is stopped.

## 2019-10-11 LAB
HBV SURFACE AB SERPL IA-ACNC: 0.03 M[IU]/ML
HBV SURFACE AG SERPL QL IA: NONREACTIVE
HCV AB SERPL QL IA: NONREACTIVE
HIV 1+2 AB+HIV1 P24 AG SERPL QL IA: NONREACTIVE

## 2019-10-13 LAB
GAMMA INTERFERON BACKGROUND BLD IA-ACNC: 0.04 IU/ML
M TB IFN-G BLD-IMP: NEGATIVE
M TB IFN-G CD4+ BCKGRND COR BLD-ACNC: >10 IU/ML
MITOGEN IGNF BCKGRD COR BLD-ACNC: 0 IU/ML
MITOGEN IGNF BCKGRD COR BLD-ACNC: 0.01 IU/ML

## 2019-10-14 ENCOUNTER — TELEPHONE (OUTPATIENT)
Dept: FAMILY MEDICINE | Facility: CLINIC | Age: 59
End: 2019-10-14

## 2019-10-14 DIAGNOSIS — L40.9 PSORIASIS: Primary | ICD-10-CM

## 2019-10-14 RX ORDER — METHOTREXATE 2.5 MG/1
TABLET ORAL
Qty: 20 TABLET | Refills: 0 | Status: SHIPPED | OUTPATIENT
Start: 2019-10-14 | End: 2020-04-13

## 2019-10-14 RX ORDER — FOLIC ACID 1 MG/1
1 TABLET ORAL DAILY
Qty: 100 TABLET | Refills: 3 | Status: SHIPPED | OUTPATIENT
Start: 2019-10-14 | End: 2020-04-13

## 2019-10-14 NOTE — TELEPHONE ENCOUNTER
Mail box is full unable to leave a voice mail    Mitzi HUGHESRN BSN  [facility]  [phone]

## 2019-10-14 NOTE — TELEPHONE ENCOUNTER
----- Message from Jessica Lala MD sent at 10/14/2019  9:56 AM CDT -----  Start methotrexate 12.5 mg once per week  Folic acid 1 gm supplement every day of the week except for the day taking the methotrexate  Recheck labs one week after first dose MUST BE DONE !      Thank you,  Jessica Lala M.D.

## 2019-10-14 NOTE — LETTER
October 15, 2019    Saji Iqbal  [address] [address]        Dear Saji,    This is a letter regarding your completed lab results. The tested values are below and were normal.    Office Visit on 10/10/2019   Component Date Value Ref Range Status     Hepatitis B Surface Antibody 10/10/2019 0.03  <8.00 m[IU]/mL Final     Hep B Surface Agn 10/10/2019 Nonreactive  NR^Nonreactive Final     Hepatitis C Antibody 10/10/2019 Nonreactive  NR^Nonreactive Final     HIV Antigen Antibody Combo 10/10/2019 Nonreactive  NR^Nonreactive     Final     Quantiferon-TB Gold Plus Result 10/10/2019 Negative  NEG^Negative Final     TB1 Ag minus Nil Value 10/10/2019 0.00  IU/mL Final     TB2 Ag minus Nil Value 10/10/2019 0.01  IU/mL Final     Mitogen minus Nil Result 10/10/2019 >10.00  IU/mL Final     Nil Result 10/10/2019 0.04  IU/mL Final         Thank you for allowing me to be involved in your health care and for choosing [facility].  If you have any questions or concerns please feel free to contact me at [phone].      Sincerely,      Jessica Lala M.D.

## 2019-10-15 NOTE — TELEPHONE ENCOUNTER
Called patient and advised of normal labs- he wants the labs sent to him-letter created  patient is going start the medication on Friday 10/18/19- due to potential potential effects- doesn't want to miss work  Advised of the need for lab one week later- appointment scheduled at the [place] lab for 10/25/19  Patient also wants to know if he had any dietary restriction while taking the methotrexate other than alcohol?  Advised I would check with provider and call him back.    Ok to leave a detailed voicemail    Mitzi HUGHESRN BSN  [facility]  [phone]

## 2019-10-16 NOTE — TELEPHONE ENCOUNTER
Patient notified of test results and providers message, patient has no further questions.    Mitzi HUGHESRN BSN  [facility]  [phone]

## 2019-11-12 ENCOUNTER — OFFICE VISIT (OUTPATIENT)
Dept: FAMILY MEDICINE | Facility: CLINIC | Age: 59
End: 2019-11-12
Payer: COMMERCIAL

## 2019-11-12 VITALS
RESPIRATION RATE: 18 BRPM | HEIGHT: 72 IN | HEART RATE: 94 BPM | DIASTOLIC BLOOD PRESSURE: 82 MMHG | BODY MASS INDEX: 30.75 KG/M2 | OXYGEN SATURATION: 93 % | SYSTOLIC BLOOD PRESSURE: 134 MMHG | TEMPERATURE: 98.6 F | WEIGHT: 227 LBS

## 2019-11-12 DIAGNOSIS — K21.9 GASTROESOPHAGEAL REFLUX DISEASE, ESOPHAGITIS PRESENCE NOT SPECIFIED: ICD-10-CM

## 2019-11-12 DIAGNOSIS — I25.10 CORONARY ARTERY DISEASE INVOLVING NATIVE CORONARY ARTERY OF NATIVE HEART WITHOUT ANGINA PECTORIS: ICD-10-CM

## 2019-11-12 DIAGNOSIS — E11.65 TYPE 2 DIABETES MELLITUS WITH HYPERGLYCEMIA, WITH LONG-TERM CURRENT USE OF INSULIN (H): Primary | ICD-10-CM

## 2019-11-12 DIAGNOSIS — I10 BENIGN ESSENTIAL HYPERTENSION: ICD-10-CM

## 2019-11-12 DIAGNOSIS — Z79.4 TYPE 2 DIABETES MELLITUS WITH HYPERGLYCEMIA, WITH LONG-TERM CURRENT USE OF INSULIN (H): Primary | ICD-10-CM

## 2019-11-12 LAB — HBA1C MFR BLD: 8.3 % (ref 0–5.6)

## 2019-11-12 PROCEDURE — 99214 OFFICE O/P EST MOD 30 MIN: CPT | Performed by: FAMILY MEDICINE

## 2019-11-12 PROCEDURE — 83036 HEMOGLOBIN GLYCOSYLATED A1C: CPT | Performed by: FAMILY MEDICINE

## 2019-11-12 PROCEDURE — 36415 COLL VENOUS BLD VENIPUNCTURE: CPT | Performed by: FAMILY MEDICINE

## 2019-11-12 RX ORDER — CLOPIDOGREL BISULFATE 75 MG/1
75 TABLET ORAL DAILY
Qty: 90 TABLET | Refills: 1 | Status: SHIPPED | OUTPATIENT
Start: 2019-11-12 | End: 2020-10-20

## 2019-11-12 RX ORDER — METOPROLOL TARTRATE 25 MG/1
25 TABLET, FILM COATED ORAL 2 TIMES DAILY
Qty: 180 TABLET | Refills: 1 | Status: SHIPPED | OUTPATIENT
Start: 2019-11-12 | End: 2020-02-06

## 2019-11-12 ASSESSMENT — ENCOUNTER SYMPTOMS
PALPITATIONS: 0
CONSTITUTIONAL NEGATIVE: 1
HEADACHES: 0
SHORTNESS OF BREATH: 0

## 2019-11-12 ASSESSMENT — MIFFLIN-ST. JEOR: SCORE: 1883.7

## 2019-11-12 NOTE — PATIENT INSTRUCTIONS
Check morning (fasting) blood sugar.  For readings over 120, add 2u to your insulin (1u with the morning dose, 1u with the afternoon).  Do not make another adjustment for at least 3 days.

## 2019-11-12 NOTE — PROGRESS NOTES
"Subjective     Saji Iqbal is a 59 year old male who presents to clinic today for the following health issues:    History of Present Illness        Diabetes:   He presents for follow up of diabetes.  He is checking home blood glucose a few times a month. He checks blood glucose before and after meals.  Blood glucose is sometimes over 200 and never under 70. When his blood glucose is low, the patient is asymptomatic for confusion, blurred vision, lethargy and reports not feeling dizzy, shaky, or weak.  He has no concerns regarding his diabetes at this time.  He is having numbness in feet. The patient has not had a diabetic eye exam in the last 12 months.     Diabetes Management Resources    He eats 2-3 servings of fruits and vegetables daily.He consumes 2 sweetened beverage(s) daily.  He is taking medications regularly.     Denies CP, palpitations, edema, dyspnea, HA, vision changes.  Noting often feeling like sock balling up on his feet, but they never are.  Notes that FBG this am was 156.  Also checks before bed.    Reviewed and updated as needed this visit by Provider         Review of Systems   Constitutional: Negative.    Eyes: Negative for visual disturbance.   Respiratory: Negative for shortness of breath.    Cardiovascular: Negative for chest pain, palpitations and peripheral edema.   Neurological: Negative for headaches.            Objective    /82 (BP Location: Right arm, Patient Position: Sitting, Cuff Size: Adult Large)   Pulse 94   Temp 98.6  F (37  C) (Oral)   Resp 18   Ht 1.822 m (5' 11.75\")   Wt 103 kg (227 lb)   SpO2 93%   BMI 31.00 kg/m    Body mass index is 31 kg/m .  Physical Exam  Vitals signs reviewed.   Eyes:      Conjunctiva/sclera: Conjunctivae normal.   Cardiovascular:      Rate and Rhythm: Normal rate and regular rhythm.      Pulses:           Dorsalis pedis pulses are 2+ on the right side and 2+ on the left side.      Heart sounds: Normal heart sounds.   Pulmonary:      " Effort: Pulmonary effort is normal.      Breath sounds: Normal breath sounds.   Skin:     General: Skin is warm and dry.      Comments: Skin on feet healthy.  No wounds, callous, onychomycosis.   Neurological:      Mental Status: He is alert and oriented to person, place, and time.      Gait: Gait normal.      Deep Tendon Reflexes:      Reflex Scores:       Patellar reflexes are 2+ on the right side and 2+ on the left side.       Achilles reflexes are 2+ on the right side and 2+ on the left side.     Comments: Nl filament and proprioception in feet FABIO          Assessment and Plan    (E11.65,  Z79.4) Type 2 diabetes mellitus with hyperglycemia, with long-term current use of insulin (H)  (primary encounter diagnosis)  Comment: intstructions to increase LA insulin 2u, q3d for FBG> 120  Plan: Hemoglobin A1c, canagliflozin (INVOKANA) 300 MG        tablet            (I25.10) Coronary artery disease involving native coronary artery of native heart without angina pectoris  Comment: refills  Plan: metoprolol tartrate (LOPRESSOR) 25 MG tablet,         clopidogrel (PLAVIX) 75 MG tablet            (I10) Benign essential hypertension  Comment:   Plan: metoprolol tartrate (LOPRESSOR) 25 MG tablet            (K21.9) Gastroesophageal reflux disease, esophagitis presence not specified  Comment:   Plan: omeprazole (PRILOSEC) 20 MG DR capsule              RTC in     Naseem Esparza MD

## 2019-12-03 DIAGNOSIS — Z79.4 TYPE 2 DIABETES MELLITUS WITH HYPERGLYCEMIA, WITH LONG-TERM CURRENT USE OF INSULIN (H): ICD-10-CM

## 2019-12-03 DIAGNOSIS — E11.65 TYPE 2 DIABETES MELLITUS WITH HYPERGLYCEMIA, WITH LONG-TERM CURRENT USE OF INSULIN (H): ICD-10-CM

## 2019-12-04 RX ORDER — PEN NEEDLE, DIABETIC 29 G X1/2"
NEEDLE, DISPOSABLE MISCELLANEOUS
Qty: 100 EACH | Refills: 3 | Status: SHIPPED | OUTPATIENT
Start: 2019-12-04 | End: 2020-08-24

## 2019-12-04 NOTE — TELEPHONE ENCOUNTER
Prescription approved per [facility] Refill Protocol.  Ben Benítez RN, BSN

## 2019-12-27 ENCOUNTER — TELEPHONE (OUTPATIENT)
Dept: FAMILY MEDICINE | Facility: CLINIC | Age: 59
End: 2019-12-27

## 2019-12-27 DIAGNOSIS — Z79.4 TYPE 2 DIABETES MELLITUS WITH HYPERGLYCEMIA, WITH LONG-TERM CURRENT USE OF INSULIN (H): ICD-10-CM

## 2019-12-27 DIAGNOSIS — E11.65 TYPE 2 DIABETES MELLITUS WITH HYPERGLYCEMIA, WITH LONG-TERM CURRENT USE OF INSULIN (H): ICD-10-CM

## 2019-12-28 NOTE — TELEPHONE ENCOUNTER
"Requested Prescriptions   Pending Prescriptions Disp Refills     insulin glargine (LANTUS PEN) 100 UNIT/ML pen 15 mL 11     Sig: Inject 21 Units Subcutaneous 2 times daily       Long Acting Insulin Protocol Failed - 12/27/2019  4:09 PM        Failed - Serum creatinine on file in past 12 months     Recent Labs   Lab Test 12/14/18  0942   CR 1.00             Passed - Blood pressure less than 140/90 in past 6 months     BP Readings from Last 3 Encounters:   11/12/19 134/82   10/10/19 122/78   08/20/19 138/88                 Passed - LDL on file in past 12 months     Recent Labs   Lab Test 05/13/19  1547   *             Passed - Microalbumin on file in past 12 months     Recent Labs   Lab Test 05/13/19  1548   MICROL 30   UMALCR 18.20*             Passed - HgbA1C in past 3 or 6 months     If HgbA1C is 8 or greater, it needs to be on file within the past 3 months.  If less than 8, must be on file within the past 6 months.     Recent Labs   Lab Test 11/12/19  1439   A1C 8.3*             Passed - Medication is active on med list        Passed - Patient is age 18 or older        Passed - Recent (6 mo) or future (30 days) visit within the authorizing provider's specialty     Patient had office visit in the last 6 months or has a visit in the next 30 days with authorizing provider or within the authorizing provider's specialty.  See \"Patient Info\" tab in inbasket, or \"Choose Columns\" in Meds & Orders section of the refill encounter.            Prescription approved per [facility] Refill Protocol.  Kyleigh Gallego RN  "

## 2020-02-06 ENCOUNTER — TELEPHONE (OUTPATIENT)
Dept: FAMILY MEDICINE | Facility: CLINIC | Age: 60
End: 2020-02-06

## 2020-02-06 DIAGNOSIS — I10 BENIGN ESSENTIAL HYPERTENSION: ICD-10-CM

## 2020-02-06 DIAGNOSIS — Z79.4 TYPE 2 DIABETES MELLITUS WITH HYPERGLYCEMIA, WITH LONG-TERM CURRENT USE OF INSULIN (H): ICD-10-CM

## 2020-02-06 DIAGNOSIS — K21.9 GASTROESOPHAGEAL REFLUX DISEASE, ESOPHAGITIS PRESENCE NOT SPECIFIED: ICD-10-CM

## 2020-02-06 DIAGNOSIS — I25.10 CORONARY ARTERY DISEASE INVOLVING NATIVE CORONARY ARTERY OF NATIVE HEART WITHOUT ANGINA PECTORIS: ICD-10-CM

## 2020-02-06 DIAGNOSIS — E11.65 TYPE 2 DIABETES MELLITUS WITH HYPERGLYCEMIA, WITH LONG-TERM CURRENT USE OF INSULIN (H): ICD-10-CM

## 2020-02-06 RX ORDER — METOPROLOL TARTRATE 25 MG/1
25 TABLET, FILM COATED ORAL 2 TIMES DAILY
Qty: 180 TABLET | Refills: 1 | Status: SHIPPED | OUTPATIENT
Start: 2020-02-06 | End: 2021-01-07

## 2020-02-06 RX ORDER — LISINOPRIL 5 MG/1
5 TABLET ORAL DAILY
Qty: 90 TABLET | Refills: 1 | Status: SHIPPED | OUTPATIENT
Start: 2020-02-06 | End: 2020-04-09

## 2020-02-06 NOTE — TELEPHONE ENCOUNTER
Pt calls needs meds transferred to pharmacy   Next 5 appointments (look out 90 days)    Feb 17, 2020  3:40 PM CST  SHORT with Naseem Esp[facility]za [facility]  [facility] [address] [place] [address], [address] [address]  [phone]      Prescription approved per [facility] refill protocol  Pauly Black RN on 2/6/2020 at 11:28 AM

## 2020-02-27 DIAGNOSIS — Z79.4 TYPE 2 DIABETES MELLITUS WITH HYPERGLYCEMIA, WITH LONG-TERM CURRENT USE OF INSULIN (H): ICD-10-CM

## 2020-02-27 DIAGNOSIS — E11.65 TYPE 2 DIABETES MELLITUS WITH HYPERGLYCEMIA, WITH LONG-TERM CURRENT USE OF INSULIN (H): ICD-10-CM

## 2020-02-27 NOTE — TELEPHONE ENCOUNTER
"Patient due for 3 month diabetes check with lab work.    Left a detailed message on patients voice mail to call the clinic back and schedule an appointment.    Kyleigh Gallego RN      LANTUS SOLOSTAR 100 UNIT/ML soln  Last Written Prescription Date:  12/28/2019  Last Fill Quantity: 15ml,  # refills: 1   Last office visit:   11/12/2019   Future Office Visit:    Requested Prescriptions   Pending Prescriptions Disp Refills     LANTUS SOLOSTAR 100 UNIT/ML soln [Pharmacy Med Name: Lantus SoloStar Subcutaneous Solution Pen-injector 100 UNIT/ML] 15 mL 0     Sig: Inject 21 Units Subcutaneous 2 times daily       Long Acting Insulin Protocol Failed - 2/27/2020  7:03 AM        Failed - Serum creatinine on file in past 12 months     Recent Labs   Lab Test 12/14/18  0942   CR 1.00             Failed - HgbA1C in past 3 or 6 months     If HgbA1C is 8 or greater, it needs to be on file within the past 3 months.  If less than 8, must be on file within the past 6 months.     Recent Labs   Lab Test 11/12/19  1439   A1C 8.3*             Passed - Blood pressure less than 140/90 in past 6 months     BP Readings from Last 3 Encounters:   11/12/19 134/82   10/10/19 122/78   08/20/19 138/88                 Passed - LDL on file in past 12 months     Recent Labs   Lab Test 05/13/19  1547   *             Passed - Microalbumin on file in past 12 months     Recent Labs   Lab Test 05/13/19  1548   MICROL 30   UMALCR 18.20*             Passed - Medication is active on med list        Passed - Patient is age 18 or older        Passed - Recent (6 mo) or future (30 days) visit within the authorizing provider's specialty     Patient had office visit in the last 6 months or has a visit in the next 30 days with authorizing provider or within the authorizing provider's specialty.  See \"Patient Info\" tab in inbasket, or \"Choose Columns\" in Meds & Orders section of the refill encounter.              "

## 2020-03-03 ENCOUNTER — TELEPHONE (OUTPATIENT)
Dept: FAMILY MEDICINE | Facility: CLINIC | Age: 60
End: 2020-03-03

## 2020-03-03 NOTE — TELEPHONE ENCOUNTER
Panel Management Review      Patient has the following on his problem list:     Diabetes    ASA: Passed    Last A1C  Lab Results   Component Value Date    A1C 8.3 11/12/2019    A1C 8.4 05/13/2019    A1C 9.4 01/11/2019    A1C 10.6 11/23/2018    A1C 12.6 08/06/2018     A1C tested: FAILED    Last LDL:    Lab Results   Component Value Date    CHOL 299 05/13/2019     Lab Results   Component Value Date    HDL 53 05/13/2019     Lab Results   Component Value Date     05/13/2019     Lab Results   Component Value Date    TRIG 322 05/13/2019     No results found for: CHOLHDLRATIO  Lab Results   Component Value Date    NHDL 246 05/13/2019       Is the patient on a Statin? YES             Is the patient on Aspirin? YES    Medications     HMG CoA Reductase Inhibitors     atorvastatin (LIPITOR) 20 MG tablet       Salicylates     aspirin 81 MG tablet             Last three blood pressure readings:  BP Readings from Last 3 Encounters:   11/12/19 134/82   10/10/19 122/78   08/20/19 138/88       Date of last diabetes office visit: 11/12/19     Tobacco History:     History   Smoking Status     Former Smoker     Packs/day: 0.33     Years: 20.00     Types: Cigarettes     Quit date: 7/17/2015   Smokeless Tobacco     Never Used         Hypertension   Last three blood pressure readings:  BP Readings from Last 3 Encounters:   11/12/19 134/82   10/10/19 122/78   08/20/19 138/88     Blood pressure: Passed    HTN Guidelines:  Less than 140/90      Composite cancer screening  Chart review shows that this patient is due/due soon for the following Fecal Colorectal (FIT)  Summary:    Patient is due/failing the following:   BMP, A1C, FOLLOW UP and FIT    Action needed:   Patient needs office visit for DIABETES MANAGEMENT.    Type of outreach:    Phone, left message for patient to call back.     Questions for provider review:    None                                                                                                                                     Lisa Magill, [facility]       Chart routed to Care Team .

## 2020-03-13 RX ORDER — INSULIN GLARGINE 100 [IU]/ML
INJECTION, SOLUTION SUBCUTANEOUS
Qty: 15 ML | Refills: 0 | Status: SHIPPED | OUTPATIENT
Start: 2020-03-13 | End: 2020-05-15

## 2020-03-16 ENCOUNTER — NURSE TRIAGE (OUTPATIENT)
Dept: NURSING | Facility: CLINIC | Age: 60
End: 2020-03-16

## 2020-03-16 ENCOUNTER — NURSE TRIAGE (OUTPATIENT)
Dept: FAMILY MEDICINE | Facility: CLINIC | Age: 60
End: 2020-03-16

## 2020-03-16 NOTE — TELEPHONE ENCOUNTER
On Monday he got a headache and a cough. No fever . Should I go to work ?  The headache is gone. The cough is a lot better, but there I feel like I am getting over it.  Dry  Non productive cough.  Info about good handwashing and no hand shaking at work.    Loretta Farrell RN/ [facility]        Additional Information    Negative: Bluish (or gray) lips or face    Negative: Severe difficulty breathing (e.g., struggling for each breath, speaks in single words)    Negative: Rapid onset of cough and has hives    Negative: Coughing started suddenly after medicine, an allergic food or bee sting    Negative: Difficulty breathing after exposure to flames, smoke, or fumes    Negative: Sounds like a life-threatening emergency to the triager    Negative: Previous asthma attacks and this feels like asthma attack    Negative: Chest pain present when not coughing    Negative: Difficulty breathing    Negative: Passed out (i.e., fainted, collapsed and was not responding)    Negative: Patient sounds very sick or weak to the triager    Negative: Coughed up > 1 tablespoon (15 ml) blood (Exception: blood-tinged sputum)    Negative: Fever > 100.0 F (37.8 C) and bedridden (e.g., nursing home patient, stroke, chronic illness, recovering from surgery)    Negative: Fever > 101 F (38.3 C) and over 60 years of age    Negative: Fever > 103 F (39.4 C)    Negative: Fever > 100.0 F (37.8 C) and has diabetes mellitus or a weak immune system (e.g., HIV positive, cancer chemotherapy, organ transplant, splenectomy, chronic steroids)    Negative: Wheezing is present    Negative: Increasing ankle swelling    SEVERE coughing spells (e.g., whooping sound after coughing, vomiting after coughing)    Protocols used: COUGH-A-[place]

## 2020-03-16 NOTE — TELEPHONE ENCOUNTER
Pt advised to try [employer] as his employer does not want him to work with cough.     Pt was coughing almost non stop during phone call     Pt declined to try OTC cough med or not any medication for SAHA    Briana Ibrahim RN

## 2020-03-16 NOTE — TELEPHONE ENCOUNTER
"  Answer Assessment - Initial Assessment Questions  1. ONSET: \"When did the cough begin?\"       Less than 24 hours  2. SEVERITY: \"How bad is the cough today?\"       Not too bad. Dry cough   3. RESPIRATORY DISTRESS: \"Describe your breathing.\"       None   4. FEVER: \"Do you have a fever?\" If so, ask: \"What is your temperature, how was it measured, and when did it start?\"      Not checked - no chills or sweats no body aches   5. HEMOPTYSIS: \"Are you coughing up any blood?\" If so ask: \"How much?\" (flecks, streaks, tablespoons, etc.)      None   6. TREATMENT: \"What have you done so far to treat the cough?\" (e.g., meds, fluids, humidifier)      None   7. CARDIAC HISTORY: \"Do you have any history of heart disease?\" (e.g., heart attack, congestive heart failure)      2 stents   8. LUNG HISTORY: \"Do you have any history of lung disease?\"  (e.g., pulmonary embolus, asthma, emphysema)      None   9. PE RISK FACTORS: \"Do you have a history of blood clots?\" (or: recent major surgery, recent prolonged travel, bedridden )      Pt is sleeping a lot -  No recent travel   10. OTHER SYMPTOMS: \"Do you have any other symptoms? (e.g., runny nose, wheezing, chest pain)        Pt sleeping a lot has had shift in work hours   HA   11. PREGNANCY: \"Is there any chance you are pregnant?\" \"When was your last menstrual period?\"        NA  12. TRAVEL: \"Have you traveled out of the country in the last month?\" (e.g., travel history, exposures)        [place]    Answer Assessment - Initial Assessment Questions  1. LOCATION: \"Where does it hurt?\"       Forehead   2. ONSET: \"When did the headache start?\" (Minutes, hours or days)       This am last 3 hours improved with sleep   3. PATTERN: \"Does the pain come and go, or has it been constant since it started?\"      Steady   4. SEVERITY: \"How bad is the pain?\" and \"What does it keep you from doing?\"  (e.g., Scale 1-10; mild, moderate, or severe)    - MILD (1-3): doesn't interfere with normal activities     - " "MODERATE (4-7): interferes with normal activities or awakens from sleep     - SEVERE (8-10): excruciating pain, unable to do any normal activities         3/10   5. RECURRENT SYMPTOM: \"Have you ever had headaches before?\" If so, ask: \"When was the last time?\" and \"What happened that time?\"       Pt use to have cluster HA   6. CAUSE: \"What do you think is causing the headache?\"      Pt feels may be from coughing   7. MIGRAINE: \"Have you been diagnosed with migraine headaches?\" If so, ask: \"Is this headache similar?\"       Cluster HA in past   8. HEAD INJURY: \"Has there been any recent injury to the head?\"       No   9. OTHER SYMPTOMS: \"Do you have any other symptoms?\" (fever, stiff neck, eye pain, sore throat, cold symptoms)      Diarrhea Sunday night, cough,  DENIES- light sound sensitivity, no N/V    10. PREGNANCY: \"Is there any chance you are pregnant?\" \"When was your last menstrual period?\"        NA    Protocols used: COUGH-A-[place], HEADACHE-A-[place]    "

## 2020-03-16 NOTE — TELEPHONE ENCOUNTER
General Call:   Who is calling:  Mika  Reason for Call:  Patient is calling in he has a cough and headache and needs medical advise. He has not traveled and is unsure if he has been exposed to COVID-19.  Phone:[phone]  Annika Streeter

## 2020-04-09 ENCOUNTER — TELEPHONE (OUTPATIENT)
Dept: FAMILY MEDICINE | Facility: CLINIC | Age: 60
End: 2020-04-09

## 2020-04-09 DIAGNOSIS — I25.10 CORONARY ARTERY DISEASE INVOLVING NATIVE CORONARY ARTERY OF NATIVE HEART WITHOUT ANGINA PECTORIS: ICD-10-CM

## 2020-04-09 DIAGNOSIS — I10 BENIGN ESSENTIAL HYPERTENSION: ICD-10-CM

## 2020-04-09 RX ORDER — LISINOPRIL 5 MG/1
5 TABLET ORAL DAILY
Qty: 90 TABLET | Refills: 1 | COMMUNITY
Start: 2020-04-09 | End: 2020-08-21

## 2020-04-09 NOTE — TELEPHONE ENCOUNTER
Reason for Call:  Other prescription    Detailed comments: Pt is calling to speak with a nurse. Wondering if they can get a topical cream or some type of script for psoriasis. States it is getting worse, most likely due to stress. Would prefer not to have to come into the clinic for an OV. Please call back when possible. Thanks!    Phone Number Patient can be reached at: Cell number on file:    Telephone Information:   Mobile [phone]       Best Time: any    Can we leave a detailed message on this number? YES    Call taken on 4/9/2020 at 12:12 PM by Yael Gilmore

## 2020-04-09 NOTE — TELEPHONE ENCOUNTER
Called patient he did not continue methotrexate- states that it worked well but he did not continue due to all of his other medical problems  Advised that he will need to schedule with the provider to discuss treatment options.  Send mychart code via text. Advised that he will need to send photos- I will call him to schedule an appointment when I get photos    Thank you,    Mitzi JACKSONRN BSN  [facility]  [phone]  [facility]  [phone]

## 2020-04-13 ENCOUNTER — VIRTUAL VISIT (OUTPATIENT)
Dept: FAMILY MEDICINE | Facility: CLINIC | Age: 60
End: 2020-04-13
Payer: COMMERCIAL

## 2020-04-13 ENCOUNTER — MYC MEDICAL ADVICE (OUTPATIENT)
Dept: FAMILY MEDICINE | Facility: CLINIC | Age: 60
End: 2020-04-13

## 2020-04-13 DIAGNOSIS — L40.9 PSORIASIS: Primary | ICD-10-CM

## 2020-04-13 PROCEDURE — 99214 OFFICE O/P EST MOD 30 MIN: CPT | Mod: TEL | Performed by: FAMILY MEDICINE

## 2020-04-13 RX ORDER — METHOTREXATE 2.5 MG/1
TABLET ORAL
Qty: 20 TABLET | Refills: 0 | Status: SHIPPED | OUTPATIENT
Start: 2020-04-13 | End: 2020-04-28

## 2020-04-13 RX ORDER — BETAMETHASONE DIPROPIONATE 0.5 MG/G
OINTMENT, AUGMENTED TOPICAL
Qty: 50 G | Refills: 3 | Status: SHIPPED | OUTPATIENT
Start: 2020-04-13 | End: 2022-07-07

## 2020-04-13 RX ORDER — FOLIC ACID 1 MG/1
1 TABLET ORAL DAILY
Qty: 100 TABLET | Refills: 3 | Status: SHIPPED | OUTPATIENT
Start: 2020-04-13 | End: 2020-10-15

## 2020-04-13 NOTE — PROGRESS NOTES
"Saji Iqbal is a 60 year old male who is being evaluated via a billable telephone visit.      The patient has been notified of following:     \"This telephone visit will be conducted via a call between you and your physician/provider. We have found that certain health care needs can be provided without the need for a physical exam.  This service lets us provide the care you need with a short phone conversation.  If a prescription is necessary we can send it directly to your pharmacy.  If lab work is needed we can place an order for that and you can then stop by our lab to have the test done at a later time.    Telephone visits are billed at different rates depending on your insurance coverage. During this emergency period, for some insurers they may be billed the same as an in-person visit.  Please reach out to your insurance provider with any questions.    If during the course of the call the physician/provider feels a telephone visit is not appropriate, you will not be charged for this service.\"    Patient has given verbal consent for Telephone visit?  Yes    How would you [facility] your [facility]? [facility]      [facility] - PRIMARY CARE SK[facility]    CC: psoriasis  SUBJECTIVE:   Saji Iqbal is a(n) 59 year old male who is on billable phone visit because of the COVID-19 pandemic.He has had chronic psoriasis that started approximately 10 years ago. Most recently was taking methotrexate which was effective but he stopped for some reason. He is willing to restart the methotrexate but would also like to start topical steroids. The itching has been intense. He admits that compliance has been an issue.     Current treatment:  NBUVB treatments : he  has not seen a difference, developing more areas but has been very difficult to get in to the clinic for treatments.  Response to treatment: He has not had any improvement although he has had mild relief of itchiness.  Side effects of treatment noted: None.     Areas of skin " involvement: generalized - scalp, arms, legs    Joint aches: ?  Co-morbidities: type 2 diabetes mellitus, tobacco use, alcohol use. He admits to consuming a couple strong drinks every day.    Previous Therapies tried:   Topical steroids - augmented betamethasone dipropionate 0.05% cream, clobetasol propionate 0.05% cream , NBVUB ( hard to make repeated visits ) , methotrexate ( was helping the psoriasis but stopped because of noncompliance )    Antihistamines - Benadryl  Oral steroid - prednisone for 1 week  Vitamin D supplementation 65468 international unit(s)/week      Family Medical History  Skin cancer: NO  Eczema Psoriasis Autoimmune   NO YES - in mother on scalp and in father on shins NO     Occupation:  for [email or web address] (indoor).  Does drink alcohol on the weekends    Patient Active Problem List   Diagnosis     Tobacco abuse     Coronary artery disease involving native coronary artery without angina pectoris     Hyperlipidemia LDL goal <100     Benign essential hypertension     Obesity due to excess calories     Type 2 diabetes mellitus without complication (H)     Chronic gastroesophageal reflux disease     Controlled type 2 diabetes mellitus with hyperglycemia, with long-term current use of insulin (H)     NIKKI (obstructive sleep apnea)     Hypertriglyceridemia     Psoriasis       Past Medical History:   Diagnosis Date     Cluster headache      Coronary artery disease      Depression      Diabetes mellitus, type II (H)      Hyperlipidemia      Hypertension      Rotator cuff arthropathy     Past Surgical History:   Procedure Laterality Date     ARTHROSCOPY SHOULDER       STENT, CORONARY, ESAU  ,       Social History     Tobacco Use     Smoking status: Former Smoker     Packs/day: 0.33     Years: 20.00     Pack years: 6.60     Types: Cigarettes     Last attempt to quit: 2015     Years since quittin.7     Smokeless tobacco: Never Used   Substance Use Topics     Alcohol use: Yes      Alcohol/week: 1.0 standard drinks     Types: 1 Standard drinks or equivalent per week     Comment: 2-6 occasionally     Drug use: No    Family History     Problem (# of Occurrences) Relation (Name,Age of Onset)    Anuerysm (1) Sister    Cerebrovascular Disease (1) Brother    Coronary Artery Disease (2) Mother, Father           Current Outpatient Medications   Medication Sig Dispense Refill     aspirin 81 MG tablet Take by mouth daily       atorvastatin (LIPITOR) 20 MG tablet Take 1 tablet (20 mg) by mouth daily 90 tablet 3     augmented betamethasone dipropionate (DIPROLENE-AF) 0.05 % cream Apply sparingly to affected area  twice daily for 14 days.  Do not apply to face. (Patient not taking: Reported on 11/12/2019) 50 g 1     blood glucose (NO BRAND SPECIFIED) lancets standard Use to test blood sugar 1 times daily or as directed. 1 Box 3     blood glucose monitoring (ACCU-CHEK NORI PLUS) meter device kit Use to test blood sugar 4 times daily or as directed. (pts machine is not working) 1 kit 0     blood glucose monitoring (ACCU-CHEK FASTCLIX) lancets Use to test blood sugar 4 times daily or as directed. 2 Box 3     blood glucose monitoring (NO BRAND SPECIFIED) test strip Use to test blood sugar 4 times daily or as directed. Please dispense Accu Chek Nori plus test strips or per patient preference if using a different brand 150 Box 3     buPROPion (WELLBUTRIN XL) 150 MG 24 hr tablet Take 1 tablet (150 mg) by mouth every morning 30 tablet 1     canagliflozin (INVOKANA) 300 MG tablet Take 1 tablet (300 mg) by mouth every morning (before breakfast) . 90 tablet 0     clobetasol (TEMOVATE) 0.05 % external cream Apply topically 2 times daily Apply to AA on arms, trunk, legs bid 120 g 3     clopidogrel (PLAVIX) 75 MG tablet Take 1 tablet (75 mg) by mouth daily 90 tablet 1     folic acid (FOLVITE) 1 MG tablet Take 1 tablet (1 mg) by mouth daily 100 tablet 3     hydrOXYzine (ATARAX) 25 MG tablet Take 1-2 tablets by mouth  every 6 hours as needed for anxiety (Patient not taking: Reported on 11/12/2019) 60 tablet 0     LANTUS SOLOSTAR 100 UNIT/ML soln Inject 21 Units Subcutaneous 2 times daily  15 mL 0     lisinopril (ZESTRIL) 5 MG tablet Take 1 tablet (5 mg) by mouth daily 90 tablet 1     methotrexate sodium 2.5 MG TABS 5 tablets ( 12.5 mg ) once per week 20 tablet 0     metoprolol tartrate (LOPRESSOR) 25 MG tablet Take 1 tablet (25 mg) by mouth 2 times daily 180 tablet 1     omeprazole (PRILOSEC) 20 MG DR capsule Take 1 capsule (20 mg) by mouth daily 90 capsule 0     ULTICARE PEN NEEDLES 31G X 5 MM miscellaneous USE 2 DAILY OR AS DIRECTED 100 each 3     vitamin D2 (ERGOCALCIFEROL) 00090 units (1250 mcg) capsule Take 1 capsule (50,000 Units) by mouth once a week 50 capsule 0         Allergies   Allergen Reactions     Metformin Diarrhea     Honey Other (See Comments)     Throat irritation        ROS: 14 point review of systems was negative except the symptoms listed above in the HPI.        OBJECTIVE:   GENERAL: no distress, seems a little groggy because of a hydroxyzine 25 mg he recently took for anxiety.  SKIN: Workman Skin Type - I.  Face, Arms, Trunk, Legs examined. The dermatoscope was used to help evaluate pigmented lesions.  Skin Pertinent Findings:  Multiple large erythematous scaly plaques on elbows, trunk, legs     Diagnostic Test Results:  No results found for this or any previous visit (from the past 24 hour(s)).  MDM : discussed methotrexate, otezla, Humira treatments, potential complications or side effects.  ASSESSMENT:     Encounter Diagnosis   Name Primary?     Psoriasis Yes     MDM: Because of the BSA involved with the psoriasis topical treatment is not realistic as a primary treatment is not realistic and will restart the methotrexate, as long as compliance remains consistent.  Discussion regarding etiology, spectrum of psoriasis, treatment options, aggravating factors.    PLAN:   Patient Instructions    Methotrexate 12.5 mg once per week   Folic acid 1 gm daily except the day you take the methotrexate  LAB DRAW in one week from the time you start the methotrexate  Schedule virtual visit in two weeks    TT: 30 minutes.with completion of the note.

## 2020-04-13 NOTE — TELEPHONE ENCOUNTER
Called patient- advised to download AW touch point.    appt scheduled    Mitzi HUGHESRN BSN  [facility]  [phone]

## 2020-04-28 ENCOUNTER — VIRTUAL VISIT (OUTPATIENT)
Dept: FAMILY MEDICINE | Facility: CLINIC | Age: 60
End: 2020-04-28
Payer: COMMERCIAL

## 2020-04-28 ENCOUNTER — MYC MEDICAL ADVICE (OUTPATIENT)
Dept: FAMILY MEDICINE | Facility: CLINIC | Age: 60
End: 2020-04-28

## 2020-04-28 DIAGNOSIS — L40.9 PSORIASIS: Primary | ICD-10-CM

## 2020-04-28 PROCEDURE — 99213 OFFICE O/P EST LOW 20 MIN: CPT | Mod: TEL | Performed by: FAMILY MEDICINE

## 2020-04-28 RX ORDER — METHOTREXATE 2.5 MG/1
TABLET ORAL
Qty: 24 TABLET | Refills: 0 | Status: SHIPPED | OUTPATIENT
Start: 2020-04-28 | End: 2020-09-17

## 2020-04-28 NOTE — PROGRESS NOTES
"Saji Iqbal is a 60 year old male who is being evaluated via a billable telephone visit.      The patient has been notified of following:     \"This telephonevisit will be conducted via a call between you and your physician/provider. We have found that certain health care needs can be provided without the need for an in-person physical exam.  This service lets us provide the care you need with a video conversation.  If a prescription is necessary we can send it directly to your pharmacy.  If lab work is needed we can place an order for that and you can then stop by our lab to have the test done at a later time.    Trlrphonr visits are billed at different rates depending on your insurance coverage.  Please reach out to your insurance provider with any questions.    If during the course of the call the physician/provider feels a video visit is not appropriate, you will not be charged for this service.\"    Patient has given verbal consent for Telephone visit? Yes    How would you like to obtain your AVS? [facility] - PRIMARY CARE SKIN    CC: psoriasis  SUBJECTIVE:   Saji Iqbal is a(n) 59 year old male who is on billable phone visit  because of the COVID-19 pandemic.He has had chronic psoriasis that started approximately 10 years ago.Methotrexate was started 2 weeks ago.     Current treatment: methotrexate 2.5 mg 5 tablets ( 12.5 mg )- 04/13/2020 start date . He takes methotrexate on Mondays . Trying to keep up with the topical steroids but this has been difficult because of the area involved.    Response to treatment: no change  Side effects of treatment noted: no GI side effects, cough     Areas of skin involvement: generalized - scalp, arms, legs    Joint aches: ?  Co-morbidities: type 2 diabetes mellitus, tobacco use, alcohol use. He admits to consuming a couple strong drinks every day.    Previous Therapies tried:   Topical steroids - augmented betamethasone dipropionate 0.05% cream, clobetasol " propionate 0.05% cream , NBVUB ( hard to make repeated visits ) , methotrexate ( was helping the psoriasis but stopped because of noncompliance )    Antihistamines - Benadryl  Oral steroid - prednisone for 1 week  Vitamin D supplementation 41587 international unit(s)/week              NBUVB treatments : he  has not seen a difference, developing more areas but has been very difficult to get in to the clinic for treatments.      Family Medical History  Skin cancer: NO  Eczema Psoriasis Autoimmune   NO YES - in mother on scalp and in father on shins NO     Occupation:  for [employer] (indoor).  Does drink alcohol on the weekends    Patient Active Problem List   Diagnosis     Tobacco abuse     Coronary artery disease involving native coronary artery without angina pectoris     Hyperlipidemia LDL goal <100     Benign essential hypertension     Obesity due to excess calories     Type 2 diabetes mellitus without complication (H)     Chronic gastroesophageal reflux disease     Controlled type 2 diabetes mellitus with hyperglycemia, with long-term current use of insulin (H)     NIKKI (obstructive sleep apnea)     Hypertriglyceridemia     Psoriasis       Past Medical History:   Diagnosis Date     Cluster headache      Coronary artery disease      Depression      Diabetes mellitus, type II (H)      Hyperlipidemia      Hypertension      Rotator cuff arthropathy     Past Surgical History:   Procedure Laterality Date     ARTHROSCOPY SHOULDER       STENT, CORONARY, ESAU  ,       Social History     Tobacco Use     Smoking status: Former Smoker     Packs/day: 0.33     Years: 20.00     Pack years: 6.60     Types: Cigarettes     Last attempt to quit: 2015     Years since quittin.7     Smokeless tobacco: Never Used   Substance Use Topics     Alcohol use: Yes     Alcohol/week: 1.0 standard drinks     Types: 1 Standard drinks or equivalent per week     Comment: 2-6 occasionally     Drug use: No    Family  History     Problem (# of Occurrences) Relation (Name,Age of Onset)    Avinashysm (1) Sister    Cerebrovascular Disease (1) Brother    Coronary Artery Disease (2) Mother, Father           Current Outpatient Medications   Medication Sig Dispense Refill     aspirin 81 MG tablet Take by mouth daily       atorvastatin (LIPITOR) 20 MG tablet Take 1 tablet (20 mg) by mouth daily 90 tablet 3     augmented betamethasone dipropionate (DIPROLENE-AF) 0.05 % cream Apply sparingly to affected area  twice daily for 14 days.  Do not apply to face. 50 g 1     augmented betamethasone dipropionate (DIPROLENE-AF) 0.05 % external ointment Apply two times per day for 14 days to arms, legs or trunk. Then when needed 50 g 3     blood glucose (NO BRAND SPECIFIED) lancets standard Use to test blood sugar 1 times daily or as directed. 1 Box 3     blood glucose monitoring (ACCU-CHEK NORI PLUS) meter device kit Use to test blood sugar 4 times daily or as directed. (pts machine is not working) 1 kit 0     blood glucose monitoring (ACCU-CHEK FASTCLIX) lancets Use to test blood sugar 4 times daily or as directed. 2 Box 3     blood glucose monitoring (NO BRAND SPECIFIED) test strip Use to test blood sugar 4 times daily or as directed. Please dispense Accu Chek Nori plus test strips or per patient preference if using a different brand 150 Box 3     buPROPion (WELLBUTRIN XL) 150 MG 24 hr tablet Take 1 tablet (150 mg) by mouth every morning 30 tablet 1     canagliflozin (INVOKANA) 300 MG tablet Take 1 tablet (300 mg) by mouth every morning (before breakfast) . 90 tablet 0     clobetasol (TEMOVATE) 0.05 % external cream Apply topically 2 times daily Apply to AA on arms, trunk, legs bid 120 g 3     clopidogrel (PLAVIX) 75 MG tablet Take 1 tablet (75 mg) by mouth daily 90 tablet 1     folic acid (FOLVITE) 1 MG tablet Take 1 tablet (1 mg) by mouth daily 100 tablet 3     hydrOXYzine (ATARAX) 25 MG tablet Take 1-2 tablets by mouth every 6 hours as needed  for anxiety 60 tablet 0     LANTUS SOLOSTAR 100 UNIT/ML soln Inject 21 Units Subcutaneous 2 times daily  15 mL 0     lisinopril (ZESTRIL) 5 MG tablet Take 1 tablet (5 mg) by mouth daily 90 tablet 1     methotrexate sodium 2.5 MG TABS 5 tablets ( 12.5 mg ) once per week 20 tablet 0     metoprolol tartrate (LOPRESSOR) 25 MG tablet Take 1 tablet (25 mg) by mouth 2 times daily 180 tablet 1     omeprazole (PRILOSEC) 20 MG DR capsule Take 1 capsule (20 mg) by mouth daily 90 capsule 0     ULTICARE PEN NEEDLES 31G X 5 MM miscellaneous USE 2 DAILY OR AS DIRECTED 100 each 3     vitamin D2 (ERGOCALCIFEROL) 59229 units (1250 mcg) capsule Take 1 capsule (50,000 Units) by mouth once a week 50 capsule 0         Allergies   Allergen Reactions     Metformin Diarrhea     Honey Other (See Comments)     Throat irritation        ROS: 14 point review of systems was negative except the symptoms listed above in the HPI.        OBJECTIVE:   GENERAL: no distress, seems a little groggy because of a hydroxyzine 25 mg he recently took for anxiety.  SKIN: Workman Skin Type - I.  Face, Arms, Trunk, Legs examined. The dermatoscope was used to help evaluate pigmented lesions.  Skin Pertinent Findings:  Digital photos reviewed : Multiple large erythematous scaly plaques on elbows, trunk, legs     Diagnostic Test Results:  No results found for this or any previous visit (from the past 24 hour(s)).  MDM : discussed methotrexate, otezla, Humira treatments, potential complications or side effects.  ASSESSMENT:     Encounter Diagnosis   Name Primary?     Psoriasis Yes     MDM:continue with methotrexate    PLAN:   Patient Instructions   Change dosage methotrexate to 2.5 mg 6 tablets ( 15 mg ) on Mondays .  Lab work one week after the change dosage  Virtual / phone call in 3 weeks.     TT: 20 minutes

## 2020-04-28 NOTE — PATIENT INSTRUCTIONS
Change dosage methotrexate to 2.5 mg 6 tablets ( 15 mg ) on Mondays .  Lab work one week after the change dosage  Virtual / phone call in 3 weeks.

## 2020-05-05 ENCOUNTER — TELEPHONE (OUTPATIENT)
Dept: FAMILY MEDICINE | Facility: CLINIC | Age: 60
End: 2020-05-05

## 2020-05-05 DIAGNOSIS — L29.9 PRURITIC DISORDER: Primary | ICD-10-CM

## 2020-05-05 RX ORDER — GABAPENTIN 100 MG/1
100 CAPSULE ORAL 3 TIMES DAILY
Qty: 90 CAPSULE | Refills: 0 | Status: SHIPPED | OUTPATIENT
Start: 2020-05-05 | End: 2020-06-01

## 2020-05-05 NOTE — TELEPHONE ENCOUNTER
Mika called back for FMLA forms, he would like to MD to use the date on the fax which is 11/20/19.  SKYLAR KIMBROUGH MA

## 2020-05-05 NOTE — TELEPHONE ENCOUNTER
Reason for Call:  Mika thinks the increased anxiety he is feeling due to Covid-19 is making his psoriasis worse. He would like a recommendation of something he could use to alleviate the itching. He said it gets so bad he is having trouble concentrating at work.    He also would like to know how essential it is to have his labs checked right now. He would like to know if he could put it off so that he doesn't need to come into the clinic yet.    Please call to advise.    Best phone number to reach pt at is: [phone]  Ok to leave a message with medical info? yes    Dolores Hand  Patient Representative

## 2020-05-05 NOTE — TELEPHONE ENCOUNTER
Called and talked to Mika :      Add gabapentin 100 mg tid for help with the pruritis- will need to increase the dosage     Methotrexate was just started 2 weeks ago, continue to increase dosage as needed, explained it will take 4-6 weeks to see an improvement    FMLA form is filled out.         Jessica Lala M.D.

## 2020-05-05 NOTE — TELEPHONE ENCOUNTER
Pt called back and states he also sent [facility] papers to fill out for his psoriasis. Please call him back if there is an issue filing them out. He can be reached at [phone] Thank you  Kimberly Llamas,

## 2020-05-06 NOTE — TELEPHONE ENCOUNTER
faxed completed forms to number listed        Mitzi HUGHESRN  [facility]  [phone]

## 2020-05-11 DIAGNOSIS — L40.9 PSORIASIS: ICD-10-CM

## 2020-05-11 LAB
BASOPHILS # BLD AUTO: 0.1 10E9/L (ref 0–0.2)
BASOPHILS NFR BLD AUTO: 0.9 %
DIFFERENTIAL METHOD BLD: ABNORMAL
EOSINOPHIL # BLD AUTO: 0.3 10E9/L (ref 0–0.7)
EOSINOPHIL NFR BLD AUTO: 3.4 %
ERYTHROCYTE [DISTWIDTH] IN BLOOD BY AUTOMATED COUNT: 12.5 % (ref 10–15)
HCT VFR BLD AUTO: 43.4 % (ref 40–53)
HGB BLD-MCNC: 15.1 G/DL (ref 13.3–17.7)
LYMPHOCYTES # BLD AUTO: 1.4 10E9/L (ref 0.8–5.3)
LYMPHOCYTES NFR BLD AUTO: 15.7 %
MCH RBC QN AUTO: 34.7 PG (ref 26.5–33)
MCHC RBC AUTO-ENTMCNC: 34.8 G/DL (ref 31.5–36.5)
MCV RBC AUTO: 100 FL (ref 78–100)
MONOCYTES # BLD AUTO: 1 10E9/L (ref 0–1.3)
MONOCYTES NFR BLD AUTO: 11 %
NEUTROPHILS # BLD AUTO: 6.2 10E9/L (ref 1.6–8.3)
NEUTROPHILS NFR BLD AUTO: 69 %
PLATELET # BLD AUTO: 137 10E9/L (ref 150–450)
RBC # BLD AUTO: 4.35 10E12/L (ref 4.4–5.9)
WBC # BLD AUTO: 9 10E9/L (ref 4–11)

## 2020-05-11 PROCEDURE — 80053 COMPREHEN METABOLIC PANEL: CPT | Performed by: FAMILY MEDICINE

## 2020-05-11 PROCEDURE — 36415 COLL VENOUS BLD VENIPUNCTURE: CPT | Performed by: FAMILY MEDICINE

## 2020-05-11 PROCEDURE — 85025 COMPLETE CBC W/AUTO DIFF WBC: CPT | Performed by: FAMILY MEDICINE

## 2020-05-12 ENCOUNTER — TELEPHONE (OUTPATIENT)
Dept: FAMILY MEDICINE | Facility: CLINIC | Age: 60
End: 2020-05-12

## 2020-05-12 LAB
ALBUMIN SERPL-MCNC: 3.9 G/DL (ref 3.4–5)
ALP SERPL-CCNC: 70 U/L (ref 40–150)
ALT SERPL W P-5'-P-CCNC: 133 U/L (ref 0–70)
ANION GAP SERPL CALCULATED.3IONS-SCNC: 9 MMOL/L (ref 3–14)
AST SERPL W P-5'-P-CCNC: 68 U/L (ref 0–45)
BILIRUB SERPL-MCNC: 0.6 MG/DL (ref 0.2–1.3)
BUN SERPL-MCNC: 16 MG/DL (ref 7–30)
CALCIUM SERPL-MCNC: 9 MG/DL (ref 8.5–10.1)
CHLORIDE SERPL-SCNC: 103 MMOL/L (ref 94–109)
CO2 SERPL-SCNC: 24 MMOL/L (ref 20–32)
CREAT SERPL-MCNC: 0.65 MG/DL (ref 0.66–1.25)
GFR SERPL CREATININE-BSD FRML MDRD: >90 ML/MIN/{1.73_M2}
GLUCOSE SERPL-MCNC: 233 MG/DL (ref 70–99)
POTASSIUM SERPL-SCNC: 4.3 MMOL/L (ref 3.4–5.3)
PROT SERPL-MCNC: 7.6 G/DL (ref 6.8–8.8)
SODIUM SERPL-SCNC: 136 MMOL/L (ref 133–144)

## 2020-05-12 NOTE — TELEPHONE ENCOUNTER
Called patient:  States that he has never been spoken to about his LFT's being elevated- advised that they were elevated in 2018 and Dr. Lala discussed with him about alcohol and methotrexate and LFT's    Advised patient that methotrexate/alchol/tylenol/virus' can all cause an elevated LFT- but he has admitted to drinking alcohol- so this may be the cause    Patient did verbalize understanding he will try to avoid alcohol for the next week and get repeat labs on Monday 5/18/20- appointment scheduled      Advised to avoid alcohol and acetaminophen    Patient wants to know if he should take the methotrexate on Monday- labs will not be back by then?    Ok to leave a voice mail if patient doesn't     Mitzi JACKSON RN  [facility]  [phone]

## 2020-05-12 NOTE — TELEPHONE ENCOUNTER
----- Message from Jessica Lala MD sent at 5/12/2020 10:00 AM CDT -----  Please call Mika     His liver function tests are elevated therefore I would like him to do repeat lab work in one week. No alcohol for this week. Just FYI - this issue of alcohol usage with taking methotrexate was discussed prior to starting the medication.       Thank you,   Jessica Lala M.D.

## 2020-05-13 NOTE — TELEPHONE ENCOUNTER
Left detailed message on voice mail- Ok per patient- to hold methotrexate until labs are back.    Mitzi HUGHESRN BSN  [facility]  [phone]

## 2020-05-15 DIAGNOSIS — E11.65 TYPE 2 DIABETES MELLITUS WITH HYPERGLYCEMIA, WITH LONG-TERM CURRENT USE OF INSULIN (H): ICD-10-CM

## 2020-05-15 DIAGNOSIS — Z79.4 TYPE 2 DIABETES MELLITUS WITH HYPERGLYCEMIA, WITH LONG-TERM CURRENT USE OF INSULIN (H): ICD-10-CM

## 2020-05-15 DIAGNOSIS — K21.9 GASTROESOPHAGEAL REFLUX DISEASE, ESOPHAGITIS PRESENCE NOT SPECIFIED: ICD-10-CM

## 2020-05-15 RX ORDER — INSULIN GLARGINE 100 [IU]/ML
INJECTION, SOLUTION SUBCUTANEOUS
Qty: 15 ML | Refills: 0 | Status: SHIPPED | OUTPATIENT
Start: 2020-05-15 | End: 2020-06-16

## 2020-05-15 NOTE — TELEPHONE ENCOUNTER
Pt calls, has one dose of lantus left, TC will schedule video visit now, refill extended  Prescription approved per [facility] Refill Protocol.  Joycelyn Tubbs RN, BSN  Message handled by Nurse Triage.

## 2020-05-26 DIAGNOSIS — L40.9 PSORIASIS: ICD-10-CM

## 2020-05-26 LAB
BASOPHILS # BLD AUTO: 0.1 10E9/L (ref 0–0.2)
BASOPHILS NFR BLD AUTO: 1.2 %
DIFFERENTIAL METHOD BLD: ABNORMAL
EOSINOPHIL # BLD AUTO: 0.3 10E9/L (ref 0–0.7)
EOSINOPHIL NFR BLD AUTO: 2.6 %
ERYTHROCYTE [DISTWIDTH] IN BLOOD BY AUTOMATED COUNT: 11.9 % (ref 10–15)
HCT VFR BLD AUTO: 49.2 % (ref 40–53)
HGB BLD-MCNC: 17 G/DL (ref 13.3–17.7)
LYMPHOCYTES # BLD AUTO: 1.3 10E9/L (ref 0.8–5.3)
LYMPHOCYTES NFR BLD AUTO: 13.1 %
MCH RBC QN AUTO: 33.9 PG (ref 26.5–33)
MCHC RBC AUTO-ENTMCNC: 34.6 G/DL (ref 31.5–36.5)
MCV RBC AUTO: 98 FL (ref 78–100)
MONOCYTES # BLD AUTO: 0.9 10E9/L (ref 0–1.3)
MONOCYTES NFR BLD AUTO: 9.2 %
NEUTROPHILS # BLD AUTO: 7 10E9/L (ref 1.6–8.3)
NEUTROPHILS NFR BLD AUTO: 73.9 %
PLATELET # BLD AUTO: 203 10E9/L (ref 150–450)
RBC # BLD AUTO: 5.02 10E12/L (ref 4.4–5.9)
WBC # BLD AUTO: 9.5 10E9/L (ref 4–11)

## 2020-05-26 PROCEDURE — 85025 COMPLETE CBC W/AUTO DIFF WBC: CPT | Performed by: FAMILY MEDICINE

## 2020-05-26 PROCEDURE — 80053 COMPREHEN METABOLIC PANEL: CPT | Performed by: FAMILY MEDICINE

## 2020-05-26 PROCEDURE — 36415 COLL VENOUS BLD VENIPUNCTURE: CPT | Performed by: FAMILY MEDICINE

## 2020-05-27 LAB
ALBUMIN SERPL-MCNC: 4 G/DL (ref 3.4–5)
ALP SERPL-CCNC: 76 U/L (ref 40–150)
ALT SERPL W P-5'-P-CCNC: 112 U/L (ref 0–70)
ANION GAP SERPL CALCULATED.3IONS-SCNC: 10 MMOL/L (ref 3–14)
AST SERPL W P-5'-P-CCNC: 81 U/L (ref 0–45)
BILIRUB SERPL-MCNC: 1 MG/DL (ref 0.2–1.3)
BUN SERPL-MCNC: 19 MG/DL (ref 7–30)
CALCIUM SERPL-MCNC: 9 MG/DL (ref 8.5–10.1)
CHLORIDE SERPL-SCNC: 102 MMOL/L (ref 94–109)
CO2 SERPL-SCNC: 22 MMOL/L (ref 20–32)
CREAT SERPL-MCNC: 0.87 MG/DL (ref 0.66–1.25)
GFR SERPL CREATININE-BSD FRML MDRD: >90 ML/MIN/{1.73_M2}
GLUCOSE SERPL-MCNC: 234 MG/DL (ref 70–99)
POTASSIUM SERPL-SCNC: 4.5 MMOL/L (ref 3.4–5.3)
PROT SERPL-MCNC: 7.7 G/DL (ref 6.8–8.8)
SODIUM SERPL-SCNC: 134 MMOL/L (ref 133–144)

## 2020-05-28 ENCOUNTER — TELEPHONE (OUTPATIENT)
Dept: FAMILY MEDICINE | Facility: CLINIC | Age: 60
End: 2020-05-28

## 2020-05-28 NOTE — TELEPHONE ENCOUNTER
Reason for Call:  Other returning call    Detailed comments: Mika is returning a call left for him. He would like a call back. Okay to leave a message as he's at work.    Phone Number Patient can be reached at: Cell number on file:    Telephone Information:   Mobile [phone]     Best Time: Anytime    Can we leave a detailed message on this number? YES    Call taken on 5/28/2020 at 1:28 PM by Mali Coombs

## 2020-05-28 NOTE — TELEPHONE ENCOUNTER
Notes recorded by Jessica Lala MD on 5/27/2020 at 4:04 PM CDT   Please call Mika ,     The LFT test are still elevated. Because of the persistent elevation I am not comfortable with continued use of the methotrexate , so next step would be biologic humira or Otezla. Set up virtual of phone visit to discuss. Office visit would also be ok.     Thank you,   Jessica Lala M.D.

## 2020-05-28 NOTE — TELEPHONE ENCOUNTER
Left detailed message on voicemail for patient with provider instruction.    Mitzi HUGHESRN BSN  [facility]  [phone]

## 2020-05-28 NOTE — TELEPHONE ENCOUNTER
Left message on answering machine for patient to call back.    Mitzi JACKSONRN  [facility]  [phone]

## 2020-05-30 DIAGNOSIS — L29.9 PRURITIC DISORDER: ICD-10-CM

## 2020-05-30 DIAGNOSIS — L40.9 PSORIASIS: ICD-10-CM

## 2020-06-01 RX ORDER — GABAPENTIN 100 MG/1
CAPSULE ORAL
Qty: 90 CAPSULE | Refills: 0 | Status: SHIPPED | OUTPATIENT
Start: 2020-06-01 | End: 2020-08-24

## 2020-06-01 NOTE — TELEPHONE ENCOUNTER
Requested Prescriptions   Pending Prescriptions Disp Refills     gabapentin (NEURONTIN) 100 MG capsule [Pharmacy Med Name: Gabapentin Oral Capsule 100 MG] 90 capsule 0     Sig: TAKE ONE CAPSULE BY MOUTH THREE TIMES DAILY       There is no refill protocol information for this order   Last Written Prescription Date:  5/5/20  Last Fill Quantity: 90,  # refills: 0   Last office visit: 10/10/2019 with prescribing provider: 4/28/20 Virtual    Future Office Visit:

## 2020-06-03 ENCOUNTER — NURSE TRIAGE (OUTPATIENT)
Dept: NURSING | Facility: CLINIC | Age: 60
End: 2020-06-03

## 2020-06-03 NOTE — TELEPHONE ENCOUNTER
"He feels \"off\". His employer requires him to report sx. He has had fellow employees that have been ill with coronavirus.   Since 10 am today he has had:   HA pain=\"1\" in the front of the head  Body aches: shoulders, neck, back and hips, collarbone=\"2\".   He has a \"fever blister\" in his mouth.   Plaque psoriasis, more red than usual.   He had a cough earlier today, no difficulty breathing.   No thermometer, he does not think he has a fever. No sweating, or chills noted.   Hx of type 2 diabetes and 2 coronary stents. -140 in the am.   Has access to the internet: [employer].   Triaged to a disposition of contact PCP now: He intends to do a virtual visit via [employer] now.     Reason for Disposition    HIGH RISK patient (e.g., age > 64 years, diabetes, heart or lung disease, weak immune system)    Additional Information    Negative: SEVERE difficulty breathing (e.g., struggling for each breath, speaks in single words)    Negative: Difficult to awaken or acting confused (e.g., disoriented, slurred speech)    Negative: Bluish (or gray) lips or face now    Negative: Shock suspected (e.g., cold/pale/clammy skin, too weak to stand, low BP, rapid pulse)    Negative: Sounds like a life-threatening emergency to the triager    Negative: [1] COVID-19 suspected (e.g., cough, fever, shortness of breath) AND [2] mild symptoms AND [3] public health department recommends testing    Negative: [1] COVID-19 exposure AND [2] no symptoms    Negative: COVID-19 and Breastfeeding, questions about    Negative: SEVERE or constant chest pain (Exception: mild central chest pain, present only when coughing)    Negative: MODERATE difficulty breathing (e.g., speaks in phrases, SOB even at rest, pulse 100-120)    Negative: Patient sounds very sick or weak to the triager    Negative: MILD difficulty breathing (e.g., minimal/no SOB at rest, SOB with walking, pulse <100)    Negative: Chest pain    Negative: Fever > 103 F (39.4 C)    Negative: [1] " Fever > 101 F (38.3 C) AND [2] age > 60    Negative: [1] Fever > 100.0 F (37.8 C) AND [2] bedridden (e.g., nursing home patient, CVA, chronic illness, recovering from surgery)    Protocols used: CORONAVIRUS (COVID-19) DIAGNOSED OR [id number] 4.22.20  COVID 19 Nurse Triage Plan/Patient Instructions    Please be aware that novel coronavirus (COVID-19) may be circulating in the community. If you develop symptoms such as fever, cough, or SOB or if you have concerns about the presence of another infection including coronavirus (COVID-19), please contact your health care provider or visit [email or web address].     Disposition/Instructions    Patient to have an [facility] Visit with a provider (Preferred option). Follow System Ambulatory Workflow for COVID 19.     To do this follow these instructions:    1. Go to the website [email or web address]  2. Create an account (you will need your insurance information)  3. Start a new visit  4. Choose your diagnosis (e.g. COVID19)  5. Fill out the information about your symptoms  6. A provider will reach out to you by text, phone call or video visit based on your request    Call Back If: Your symptoms worsen before you are able to complete your On[facility] Visit with a provider.    Thank you for limiting contact with others, wearing a simple mask to cover your cough, practice good hand hygiene habits and accessing our virtual services where possible to limit the spread of this virus.    For more information about COVID19 and options for caring for yourself at home, please visit the CDC website at https://www.cdc.gov/coronavirus/2019-ncov/about/steps-when-sick.html  For more options for care at [facility], please visit our website at [email or web address]    For more information, please use the [place] Department of Health COVID-19 Website: https://www.health.state.[place]/diseases/coronavirus/index.html  [place] Department of Health ([facility]) COVID-19 Hotlines  (Interpreters available):      Health questions: Phone Number: [phone] or [phone] and Hours: 7 a.m. to 7 p.m.    Schools and  questions: Phone Number: [phone] or [phone] and Hours 7 a.m. to 7 p.m.

## 2020-06-04 ENCOUNTER — VIRTUAL VISIT (OUTPATIENT)
Dept: URGENT CARE | Facility: CLINIC | Age: 60
End: 2020-06-04
Payer: COMMERCIAL

## 2020-06-04 DIAGNOSIS — Z20.822 SUSPECTED COVID-19 VIRUS INFECTION: Primary | ICD-10-CM

## 2020-06-04 PROCEDURE — 99213 OFFICE O/P EST LOW 20 MIN: CPT | Mod: TEL | Performed by: FAMILY MEDICINE

## 2020-06-04 ASSESSMENT — ENCOUNTER SYMPTOMS
VOMITING: 0
LIGHT-HEADEDNESS: 0
DIZZINESS: 0
COUGH: 1
FATIGUE: 1
SHORTNESS OF BREATH: 0
DIARRHEA: 0
ABDOMINAL PAIN: 0
SORE THROAT: 1
CHILLS: 0
NERVOUS/ANXIOUS: 1
FEVER: 0
CONFUSION: 0
NAUSEA: 0

## 2020-06-04 NOTE — PROGRESS NOTES
I was present during this telephone visit with the medical student who participated in the service and in the documentation of the note. I have verified the history and personally performed the physical exam and medical decision making. I agree with the assessment and plan of care as documented in the note with the following additions:   Strange time course for any illness, particular COVID-19. Agree with student that should Mika test negative, I would have a very low suspicion for COVID-19 and I don't think it would be a terrible idea for him to return to work afterwards, particularly given that he won't receive a negative result for at least 4 days from now (assuming tested within the next 24 hours, test resulting on average after 60 hours currently, and then [facility]). Recommended quarantining himself until test results back.     I personally spent a total of 9 minutes speaking with Saji Iqbal during today s visit.     Jordy Baldwin MD  10:27 AM, June 4, 2020

## 2020-06-04 NOTE — PATIENT INSTRUCTIONS
Possible COVID-19 Symptoms, Testing, Get Well Loop, and Information on Quarantining    Your symptoms show that you may have coronavirus (COVID-19). This illness can cause fever, cough and trouble breathing. Many people get a mild case and get better on their own. Some people can get very sick.     Not all patients are tested for COVID-19. If you need to be tested, your care team will let you know.    How can I protect others?    Without a test, we can t know for sure that you have COVID-19. For safety, it s very important to follow these rules.    Stay home and away from others (self-isolate) until:    You ve had no fever--and no medicine that reduces fever--for 3 full days (72 hours). And      Your other symptoms have resolved (gotten better). For example, your cough or breathing has improved. And     At least 10 days have passed since your symptoms started.    During this time:    Stay in your own room (and use your own bathroom), if you can.    Stay away from others in your home. No hugging, kissing or shaking hands.    No visitors.    Don t go to work, school or anywhere else.     Clean  high touch  surfaces often (doorknobs, counters, handles, etc.). Use a household cleaning spray or wipes.    Cover your mouth and nose with a mask, tissue or washcloth to avoid spreading germs.    Wash your hands and face often. Use soap and water.    For more tips, go to https://www.cdc.gov/coronavirus/2019-ncov/downloads/10Things.pdf.    How can I take care of myself?    1. Get lots of rest. Drink extra fluids (unless a doctor has told you not to).     2. Take Tylenol (acetaminophen) for fever or pain. If you have liver or kidney problems, ask your family doctor if it's okay to take Tylenol.     Adults can take either:     650 mg (two 325 mg pills) every 4 to 6 hours, or     1,000 mg (two 500 mg pills) every 8 hours as needed.     Note: Don't take more than 3,000 mg in one day.   Acetaminophen is found in many medicines (both  prescribed and over-the-counter medicines). Read all labels to be sure you don't take too much.   For children, check the Tylenol bottle for the right dose. The dose is based on  the child's age or weight.    3. If you have other health problems (like cancer, heart failure, an organ transplant or severe kidney disease): Call your specialty clinic if you don't feel better in the next 2 days.    4. Know when to call 911: If your breathing is so bad that it keeps you from doing normal activities, call 911 or go to the emergency room. Tell them that you've been staying home and may have COVID-19.    To schedule an appointment for curbside testing:    Provider/Staff to call and schedule the patient for the appointment per the System Ambulatory Workflow recommendations    Be sure to obtain details about the patients  care for the      Your symptoms show that you may have coronavirus (COVID-19). This illness can cause fever, cough and trouble breathing.     We would like to test you for this virus. This will be a curbside test--you will drive to the clinic, and we will test you in your car.    Please follow these steps:    1. We will call to schedule your test. Be ready to share details about the car you ll be in.   2. A member of our care team will ask you some questions. Then, they will use a swab to collect samples from your nose and throat.     We will test your samples for COVID-19, the flu and maybe other illnesses as well. We will call to share your test results.    How can I protect others?    Stay home and away from others (self-isolate) until:    You ve had no fever--and no medicine that reduces fever--for 3 full days (72 hours). And      Your other symptoms have resolved (gotten better). For example, your cough or breathing has improved. And     At least 10 days have passed since your symptoms started.    Stay at least 6 feet away from others. (If someone will drive you to your test, stay in the  backseat, as far away from the  as you can.)     Don t go to work, school or anywhere else. When it s time for your test, go straight to the testing site. Don t make any stops on the way there or back.     Wash your hands and face often. Use soap and water.     Cover your mouth and nose with a mask, tissue or washcloth.     Don t touch anyone. No hugging, kissing or handshakes.    How can I take care of myself?    5. Get lots of rest. Drink extra fluids (unless a doctor has told you not to).     6. Take Tylenol (acetaminophen) for fever or pain. If you have liver or kidney problems, ask your family doctor if it's okay to take Tylenol.     Adults can take either:     650 mg (two 325 mg pills) every 4 to 6 hours, or     1,000 mg (two 500 mg pills) every 8 hours as needed.     Note: Don't take more than 3,000 mg in one day.   Acetaminophen is found in many medicines (both prescribed and over-the-counter medicines). Read all labels to be sure you don't take too much.   For children, check the Tylenol bottle for the right dose. The dose is based on  the child's age or weight.    7. If you have other health problems (like cancer, heart failure, an organ transplant or severe kidney disease): Call your specialty clinic if you don't feel better in the next 2 days.    8. Know when to call 911: If your breathing is so bad that it keeps you from doing normal activities, call 911 or go to the emergency room. Tell them that you've been staying home and may have COVID-19.          Patient Education   Sign Up for GetWell Loop  COVID-19 Symptoms  We know it's scary to hear you might have COVID-19. We want to track your symptoms to make sure you're OK over the next 2 weeks.    Please look for an email from [employer]. This is a free, online program that we'll use to keep in touch. To sign up, click the link in the email you get.    If you don't get an email, please call your [facility] clinic. Ask them to sign you up  for Harjeet Siu.    You can learn more at [email or web address].  For informational purposes only. Not to replace the advice of your health care provider.   Copyright   2020 [facility]. Clinically reviewed by Sherri Méndez. All rights reserved. [employer] [id number] - 04/20.             How can I protect others?  If you have symptoms (fever, cough, body aches or trouble breathing): Stay home and away from others (self-isolate) until:    At least 10 days have passed since your symptoms started. And     You ve had no fever--and no medicine that reduces fever--for 3 full days (72 hours). And      Your other symptoms have resolved (gotten better).     If you don t have symptoms, but a test showed that you have COVID-19 (you tested positive):    Stay home and away from others (self-isolate) until at least 10 days have passed since the date of your first positive COVID-19 test.    During this time:    Stay in your own room, even for meals. Use your own bathroom if you can.     Stay away from others in your home. No hugging, kissing or shaking hands. No visitors.    Don t go to work, school or anywhere else.     Clean  high touch  surfaces often (doorknobs, counters, handles, etc.). Use a household cleaning spray or wipes. You ll find a full list on the EPA website:  www.epa.gov/pesticide-registration/list-n-disinfectants-use-against-sars-cov-2.    Cover your mouth and nose with a mask, tissue or washcloth to avoid spreading germs.    Wash your hands and face often. Use soap and water.    Caregivers in these groups are at risk for severe illness due to COVID-19:  o People 65 years and older  o People who live in a nursing home or long-term care facility  o People with chronic disease (lung, heart, cancer, diabetes, kidney, liver, immunologic)  o People who have a weakened immune system, including those who:  - Are in cancer treatment  - Take medicine that weakens the immune system, such as  corticosteroids  - Had a bone marrow or organ transplant  - Have an immune deficiency  - Have poorly controlled HIV or AIDS  - Are obese (body mass index of 40 or higher)  - Smoke regularly    Caregivers should wear gloves while washing dishes, handling laundry and cleaning bedrooms and bathrooms.    Use caution when washing and drying laundry: Don t shake dirty laundry, and use the warmest water setting that you can.    For more tips, go to www.cdc.gov/coronavirus/2019-ncov/downloads/10Things.pdf.    How can I take care of myself?  1. Get lots of rest. Drink extra fluids (unless a doctor has told you not to).     Take Tylenol (acetaminophen) for fever or pain. If you have liver or kidney problems, ask your family doctor if it s okay to take Tylenol.     Adults can take either:     650 mg (two 325 mg pills) every 4 to 6 hours, or     1,000 mg (two 500 mg pills) every 8 hours as needed.     Note: Don t take more than 3,000 mg in one day.   Acetaminophen is found in many medicines (both prescribed and over-the-counter medicines). Read all labels to be sure you don t take too much.     For children, check the Tylenol bottle for the right dose. The dose is based on the child s age or weight.    2. If you have other health problems (like cancer, heart failure, an organ transplant or severe kidney disease): Call your specialty clinic if you don t feel better in the next 2 days.    3. Know when to call 911: Emergency warning signs include:    Trouble breathing or shortness of breath    Pain or pressure in the chest that doesn t go away    Feeling confused like you haven t felt before, or not being able to wake up    Bluish-colored lips or face    What are the symptoms of COVID-19?     The most common symptoms are cough, fever and trouble breathing.     Less common symptoms include body aches, chills, diarrhea (loose, watery poops), fatigue (feeling very tired), headache, runny nose, sore throat and loss of  smell.    COVID-19 can cause severe coughing (bronchitis) and lung infection (pneumonia).    How does it spread?     The virus may spread when a person coughs or sneezes into the air. The virus can travel about 6 feet this way, and it can live on surfaces.      Common  (household disinfectants) will kill the virus.    Who is at risk?  Anyone can catch COVID-19 if they re around someone who has the virus.    How can others protect themselves?     Stay away from people who have COVID-19 (or symptoms of COVID-19).    Wash hands often with soap and water. Or, use hand  with at least 60% alcohol.    Avoid touching the eyes, nose or mouth.     Wear a face mask when you go out in public, when sick or when caring for a sick person.    Where can I get more information?     "[employer] [place]: About COVID-19: [email or web address]/[email or web address]/    CDC: What to Do If You re Sick: www.cdc.gov/coronavirus/2019-ncov/about/steps-when-sick.html    CDC: Ending Home Isolation: www.cdc.gov/coronavirus/2019-ncov/hcp/disposition-in-home-patients.html     CDC: Caring for Someone: www.cdc.gov/coronavirus/2019-ncov/if-you-are-sick/care-for-someone.html     [facility]: Interim Guidance for Hospital Discharge to Home: www.health.[facility].mn.us/diseases/coronavirus/hcp/hospdischarge.pdf    [facility] clinical trials (COVID-19 research studies): clinicalaffairs.[facility]-clinical-trials     Below are the COVID-19 hotlines at the [place] Department of Health ([facility]). Interpreters are available.   o For health questions: Call 367-736-7869 or 1-609.846.4589 (7 a.m. to 7 p.m.)  o For questions about schools and childcare: Call 926-193-9170 or 1-630.437.7390 (7 a.m. to 7 p.m.)        Thank you for limiting contact with others, wearing a simple mask to cover your cough, practice good hand hygiene habits and accessing our virtual services where possible to limit the spread of this virus.    For more information about COVID19 and  options for caring for yourself at home, please visit the CDC website at https://www.cdc.gov/coronavirus/2019-ncov/about/steps-when-sick.html  For more options for care at [facility], please visit our website at [email or web address]   As you recover from COVID-19    Patients who have symptoms (cough, fever, or shortness of breath), need to isolate for 10 days from when symptoms started AND 72 hours after fever resolves (without fever reducing medications) AND improvement of respiratory symptoms (whichever is longer).    During this time:    Isolate yourself at home (in own room/own bathroom if possible)    Do not allow any visitors    Do not go to work or school    Do not go to [employer],  centers, shopping, or other public places    Do not shake hands    Avoid close and intimate contact with others (hugging, kissing)    Follow CDC recommendations for household cleaning of frequently touched services    After the initial 10 days, continue to isolate yourself from household members as much as possible. To continue decrease the risk of community spread and exposure, you and any members of your household should limit activities in public for 14 days after starting home isolation.     You can reference the following CDC link for helpful home isolation/care tips:  https://www.cdc.gov/coronavirus/2019-ncov/downloads/10Things.pdf    Protect Others:    Cover your mouth and nose with a mask, disposable tissue or wash cloth to avoid spreading germs.    Wash your hands and face often. Use soap and water    Call Back If: Breathing difficulty develops or you become worse.      For more information about COVID19 and options for caring for yourself at home, please visit the CDC website at https://www.cdc.gov/coronavirus/2019-ncov/about/steps-when-sick.html  For more options for care at [facility], please visit our website at [email or web address]    For  information about [facility] COVID-19 Clinical Trials, please visit [email or web address]    For more information, please use the [place] Department of Health COVID-19 Website: https://www.health.[facility].mn.us/diseases/coronavirus/index.html  [place] Department [facility] ([facility]) COVID-19 Hotlines (Interpreters  available):      Health questions: Phone Number: 299.465.4941 or 1-646.631.9119 and Hours: 7 a.m. to 7 p.m.    Schools and  questions: Phone Number: [phone] or 1-344.172.2389 and Hours 7 a.m. to 7 p.m.

## 2020-06-04 NOTE — PROGRESS NOTES
"  The patient has been notified of following: Yes     \"This telephone visit will be conducted via a call between you and your physician/provider. We have found that certain health care needs can be provided without the need for a physical exam.  This service lets us provide the care you need with a short phone conversation.  If a prescription is necessary we can send it directly to your pharmacy.  If lab work is needed we can place an order for that and you can then stop by our lab to have the test done at a later time.    If during the course of the call the physician/provider feels a telephone visit is not appropriate, you will not be charged for this service.\"     Subjective     CC: Saji Iqbal  is a 60 year old male who presents to clinic today for the following health issues:   Chief Complaint   Patient presents with     Suspected Covid     Infection      History:  In the 14 days before your symptoms started, have you had close contact with a COVID-19 (Coronavirus) patient? Has had contact at work with 6 to 10 people with COVID who had been cleared to go back to work.      In the 14 days before your symptoms started, have you traveled internationally or to a state with high rates of COVID19? No    Do you have a fever? No. Does not have thermometer.     Are you having difficulty breathing? No    Do you have a cough? Yes, yesterday it came and went. No cough today.     Is your coughing worse when you are exposed to pollen, dust, or other things in the environment? No      Are you coughing up any mucus? No, it's a dry cough    Are you experiencing any of the following? None of these    What other symptoms have you experienced? Sore Throat, Headache and Body aches yesterday, have resolved entirely.     Denies  Unexpected weight loss, Sweating at night, Loss of appetite and Fatigue    Have you ever been diagnosed with asthma, bronchitis, or lung disease? No    Do you smoke? No     Have you ever smoked? I smoked in " the past, but quit 4 years ago.     Are there people you know with similar symptoms? No    Have you recently been hospitalized? No    Patient Active Problem List   Diagnosis     Tobacco abuse     Coronary artery disease involving native coronary artery without angina pectoris     Hyperlipidemia LDL goal <100     Benign essential hypertension     Obesity due to excess calories     Type 2 diabetes mellitus without complication (H)     Chronic gastroesophageal reflux disease     Controlled type 2 diabetes mellitus with hyperglycemia, with long-term current use of insulin (H)     NIKKI (obstructive sleep apnea)     Hypertriglyceridemia     Psoriasis     Past Surgical History:   Procedure Laterality Date     ARTHROSCOPY SHOULDER       STENT, CORONARY, ESAU  ,        Social History     Tobacco Use     Smoking status: Former Smoker     Packs/day: 0.33     Years: 20.00     Pack years: 6.60     Types: Cigarettes     Last attempt to quit: 2015     Years since quittin.8     Smokeless tobacco: Never Used   Substance Use Topics     Alcohol use: Yes     Alcohol/week: 1.0 standard drinks     Types: 1 Standard drinks or equivalent per week     Comment: 2-6 occasionally     Family History   Problem Relation Age of Onset     Coronary Artery Disease Mother      Coronary Artery Disease Father      Cerebrovascular Disease Brother      Anuerysm Sister          Current Outpatient Medications   Medication Sig Dispense Refill     aspirin 81 MG tablet Take by mouth daily       atorvastatin (LIPITOR) 20 MG tablet Take 1 tablet (20 mg) by mouth daily 90 tablet 3     augmented betamethasone dipropionate (DIPROLENE-AF) 0.05 % cream Apply sparingly to affected area  twice daily for 14 days.  Do not apply to face. 50 g 1     augmented betamethasone dipropionate (DIPROLENE-AF) 0.05 % external ointment Apply two times per day for 14 days to arms, legs or trunk. Then when needed 50 g 3     blood glucose (NO BRAND SPECIFIED) lancets  standard Use to test blood sugar 1 times daily or as directed. 1 Box 3     blood glucose monitoring (ACCU-CHEK NORI PLUS) meter device kit Use to test blood sugar 4 times daily or as directed. (pts machine is not working) 1 kit 0     blood glucose monitoring (ACCU-CHEK FASTCLIX) lancets Use to test blood sugar 4 times daily or as directed. 2 Box 3     blood glucose monitoring (NO BRAND SPECIFIED) test strip Use to test blood sugar 4 times daily or as directed. Please dispense Accu Chek Nori plus test strips or per patient preference if using a different brand 150 Box 3     buPROPion (WELLBUTRIN XL) 150 MG 24 hr tablet Take 1 tablet (150 mg) by mouth every morning 30 tablet 1     canagliflozin (INVOKANA) 300 MG tablet Take 1 tablet (300 mg) by mouth every morning (before breakfast) . 90 tablet 0     clobetasol (TEMOVATE) 0.05 % external cream Apply topically 2 times daily Apply to AA on arms, trunk, legs bid 120 g 3     clopidogrel (PLAVIX) 75 MG tablet Take 1 tablet (75 mg) by mouth daily 90 tablet 1     folic acid (FOLVITE) 1 MG tablet Take 1 tablet (1 mg) by mouth daily 100 tablet 3     gabapentin (NEURONTIN) 100 MG capsule TAKE ONE CAPSULE BY MOUTH THREE TIMES DAILY  90 capsule 0     hydrOXYzine (ATARAX) 25 MG tablet Take 1-2 tablets by mouth every 6 hours as needed for anxiety 60 tablet 0     LANTUS SOLOSTAR 100 UNIT/ML soln INJECT 21 UNITS SUBCUTANEOUSLY 2 TIMES DAILY   15 mL 0     lisinopril (ZESTRIL) 5 MG tablet Take 1 tablet (5 mg) by mouth daily 90 tablet 1     methotrexate sodium 2.5 MG TABS 6 tablets ( 15 mg ) once per week 24 tablet 0     metoprolol tartrate (LOPRESSOR) 25 MG tablet Take 1 tablet (25 mg) by mouth 2 times daily 180 tablet 1     omeprazole (PRILOSEC) 20 MG DR capsule TAKE 1 CAPSULE BY MOUTH ONE TIME DAILY  90 capsule 0     ULTICARE PEN NEEDLES 31G X 5 MM miscellaneous USE 2 DAILY OR AS DIRECTED 100 each 3     vitamin D2 (ERGOCALCIFEROL) 21895 units (1250 mcg) capsule Take 1 capsule  "(50,000 Units) by mouth once a week 50 capsule 0     Recent Labs   Lab Test 05/26/20  1526 05/11/20  1458 11/12/19  1439 05/13/19  1547 01/11/19  1526 12/14/18  0942 11/23/18  0923  03/27/18  1616  12/09/16  1015   A1C  --   --  8.3* 8.4* 9.4*  --  10.6*   < > 11.2*  --  >15.0  Results confirmed by repeat test  *   LDL  --   --   --  182*  --   --   --   --  121*  --  Cannot estimate LDL when triglyceride exceeds 400 mg/dL  134*   HDL  --   --   --  53  --   --   --   --  32*  --  30*   TRIG  --   --   --  322*  --   --   --   --  374*  --  632*   * 133*  --   --   --  74*  --   --   --    < > 61   CR 0.87 0.65*  --   --   --  1.00  --   --   --    < > 0.75   GFRESTIMATED >90 >90  --   --   --  77  --   --   --    < > >90  Non  GFR Calc     GFRESTBLACK >90 >90  --   --   --  >90  --   --   --    < > >90   GFR Calc     POTASSIUM 4.5 4.3  --   --   --  4.3  --   --   --    < > 4.6   TSH  --   --   --   --   --   --  1.69  --   --   --  0.89    < > = values in this interval not displayed.      Reviewed and updated as needed this visit by Provider       Review of Systems   Constitutional: Positive for fatigue. Negative for chills and fever.   HENT: Positive for sore throat. Negative for congestion.    Respiratory: Positive for cough. Negative for shortness of breath.    Cardiovascular: Negative for chest pain.   Gastrointestinal: Negative for abdominal pain, diarrhea, nausea and vomiting.   Neurological: Negative for dizziness and light-headedness.   Psychiatric/Behavioral: Negative for confusion. The patient is nervous/anxious.           Objective    Gen: Patient is alert, oriented  Resp: Speaking full sentence, no audible shortness of breath.  No cough of wheeze.  Psych: mentation appears normal and affect normal. Reports mood is \"a little anxious\" about symptoms.           Assessment/Plan:  Patient is a 60 year old man with a past medical history significant for CAD, type II DM " (most recent A1c of 8.3 in 11/2019) presenting to [facility] urgent care with abrupt onset of profound fatigue, sore throat, and cough yesterday that completely improved overnight. Symptom progression to complete resolution in short time frame atypical for COVID 19 and suspicion for infection is low, but have low threshold to test given patient's moderately high risk for complications of COVID 19 given age and comorbidities. Offered patient COVID 19 PCR testing and he is agreeable. Discussed 3-4 day turnaround for test result and importance of quarantining at home until resulted. Also discussed how to interpret potentially negative test; given symptom course and prompt resolution, have low pretest probability and negative test would be interpreted as true. Issues may arise with employer's policy on symptoms and returning to work. Patient agreeable to enrolling in [facility] for continued symptom monitoring and to address return to work issues as they arise.     1. Potential COVID 19 infection  - PCR testing ordered  - Get Well Loop referral    Phone call duration:  24 minutes    Teddy Andrews, MS4  [employer] [place] [employer]  6/4/2020 9:02 AM

## 2020-06-09 DIAGNOSIS — Z20.822 SUSPECTED COVID-19 VIRUS INFECTION: ICD-10-CM

## 2020-06-09 LAB
SARS-COV-2 RNA SPEC QL NAA+PROBE: NOT DETECTED
SPECIMEN SOURCE: NORMAL

## 2020-06-09 PROCEDURE — 87635 SARS-COV-2 COVID-19 AMP PRB: CPT | Mod: 90 | Performed by: FAMILY MEDICINE

## 2020-06-09 PROCEDURE — 99000 SPECIMEN HANDLING OFFICE-LAB: CPT | Performed by: FAMILY MEDICINE

## 2020-06-09 PROCEDURE — 99207 ZZC NO BILLABLE SERVICE THIS VISIT: CPT

## 2020-06-11 ENCOUNTER — TELEPHONE (OUTPATIENT)
Dept: FAMILY MEDICINE | Facility: CLINIC | Age: 60
End: 2020-06-11

## 2020-06-16 ENCOUNTER — OFFICE VISIT (OUTPATIENT)
Dept: FAMILY MEDICINE | Facility: CLINIC | Age: 60
End: 2020-06-16
Payer: COMMERCIAL

## 2020-06-16 ENCOUNTER — PATIENT OUTREACH (OUTPATIENT)
Dept: CARE COORDINATION | Facility: CLINIC | Age: 60
End: 2020-06-16

## 2020-06-16 VITALS
DIASTOLIC BLOOD PRESSURE: 86 MMHG | SYSTOLIC BLOOD PRESSURE: 130 MMHG | WEIGHT: 227 LBS | RESPIRATION RATE: 14 BRPM | OXYGEN SATURATION: 96 % | TEMPERATURE: 98.3 F | HEART RATE: 88 BPM | BODY MASS INDEX: 31 KG/M2

## 2020-06-16 DIAGNOSIS — F10.10 ETOH ABUSE: ICD-10-CM

## 2020-06-16 DIAGNOSIS — Z79.4 TYPE 2 DIABETES MELLITUS WITH HYPERGLYCEMIA, WITH LONG-TERM CURRENT USE OF INSULIN (H): ICD-10-CM

## 2020-06-16 DIAGNOSIS — M75.41 IMPINGEMENT SYNDROME, SHOULDER, RIGHT: Primary | ICD-10-CM

## 2020-06-16 DIAGNOSIS — Z12.11 SPECIAL SCREENING FOR MALIGNANT NEOPLASMS, COLON: ICD-10-CM

## 2020-06-16 DIAGNOSIS — E78.5 HYPERLIPIDEMIA LDL GOAL <100: ICD-10-CM

## 2020-06-16 DIAGNOSIS — E11.65 TYPE 2 DIABETES MELLITUS WITH HYPERGLYCEMIA, WITH LONG-TERM CURRENT USE OF INSULIN (H): ICD-10-CM

## 2020-06-16 LAB — HBA1C MFR BLD: 9.1 % (ref 0–5.6)

## 2020-06-16 PROCEDURE — 80061 LIPID PANEL: CPT | Performed by: FAMILY MEDICINE

## 2020-06-16 PROCEDURE — 36415 COLL VENOUS BLD VENIPUNCTURE: CPT | Performed by: FAMILY MEDICINE

## 2020-06-16 PROCEDURE — 83036 HEMOGLOBIN GLYCOSYLATED A1C: CPT | Performed by: FAMILY MEDICINE

## 2020-06-16 PROCEDURE — 99214 OFFICE O/P EST MOD 30 MIN: CPT | Performed by: FAMILY MEDICINE

## 2020-06-16 PROCEDURE — 80048 BASIC METABOLIC PNL TOTAL CA: CPT | Performed by: FAMILY MEDICINE

## 2020-06-16 ASSESSMENT — ANXIETY QUESTIONNAIRES
5. BEING SO RESTLESS THAT IT IS HARD TO SIT STILL: SEVERAL DAYS
1. FEELING NERVOUS, ANXIOUS, OR ON EDGE: NEARLY EVERY DAY
IF YOU CHECKED OFF ANY PROBLEMS ON THIS QUESTIONNAIRE, HOW DIFFICULT HAVE THESE PROBLEMS MADE IT FOR YOU TO DO YOUR WORK, TAKE CARE OF THINGS AT HOME, OR GET ALONG WITH OTHER PEOPLE: SOMEWHAT DIFFICULT
GAD7 TOTAL SCORE: 14
6. BECOMING EASILY ANNOYED OR IRRITABLE: NEARLY EVERY DAY
7. FEELING AFRAID AS IF SOMETHING AWFUL MIGHT HAPPEN: SEVERAL DAYS
3. WORRYING TOO MUCH ABOUT DIFFERENT THINGS: MORE THAN HALF THE DAYS
2. NOT BEING ABLE TO STOP OR CONTROL WORRYING: MORE THAN HALF THE DAYS

## 2020-06-16 ASSESSMENT — ENCOUNTER SYMPTOMS
HEADACHES: 0
NECK PAIN: 0
CONSTITUTIONAL NEGATIVE: 1
SHORTNESS OF BREATH: 0
ARTHRALGIAS: 1
PALPITATIONS: 0

## 2020-06-16 ASSESSMENT — PATIENT HEALTH QUESTIONNAIRE - PHQ9
SUM OF ALL RESPONSES TO PHQ QUESTIONS 1-9: 6
5. POOR APPETITE OR OVEREATING: MORE THAN HALF THE DAYS

## 2020-06-16 NOTE — PROGRESS NOTES
Clinic Care Coordination Contact  Community Health Worker Initial Outreach    Talked with patient    Chart Review: Referral from PCP for chemical health assessments and treatment options. Patient is not interested in inpatient but would be interested in other options.    CHW Initial Information Gathering:  Referral Source: PCP  Preferred Hospital: Other(N/A)  Preferred Urgent Care: Other(N/A)  Current living arrangement:: Not Assessed  Type of residence:: Other(N/A)  Community Resources: Other (see comment)(N/A)  Supplies used at home:: Other(N/A)  Equipment Currently Used at Home: other (see comments)(N/A)  Informal Support system:: Other(N/A)  No PCP office visit in Past Year: No  Transportation means:: Other(N/A)    CHW spoke with patient  Patient states that things in his life are getting out of control when they should be in control  Mika said that his brother is asking for money weekly and his daughter is getting  so he cannot give any of his money away.  Mika says he is mad about this and it is affecting his work performance.   Patient states that he is at work and would not like to talk right now.   Patient would like CHW to call him back at 3:00pm on Friday 6/19.    Patient accepts CC: Yes     SHAWNEE Nair   Clinic Care Coordination  [facility] Clinics:  [place], [place], [place], and [place]  Phone: [phone]
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
## 2020-06-17 LAB
ANION GAP SERPL CALCULATED.3IONS-SCNC: 7 MMOL/L (ref 3–14)
BUN SERPL-MCNC: 19 MG/DL (ref 7–30)
CALCIUM SERPL-MCNC: 9.5 MG/DL (ref 8.5–10.1)
CHLORIDE SERPL-SCNC: 103 MMOL/L (ref 94–109)
CHOLEST SERPL-MCNC: 265 MG/DL
CO2 SERPL-SCNC: 27 MMOL/L (ref 20–32)
CREAT SERPL-MCNC: 0.92 MG/DL (ref 0.66–1.25)
GFR SERPL CREATININE-BSD FRML MDRD: >90 ML/MIN/{1.73_M2}
GLUCOSE SERPL-MCNC: 212 MG/DL (ref 70–99)
HDLC SERPL-MCNC: 42 MG/DL
LDLC SERPL CALC-MCNC: 175 MG/DL
NONHDLC SERPL-MCNC: 223 MG/DL
POTASSIUM SERPL-SCNC: 3.9 MMOL/L (ref 3.4–5.3)
SODIUM SERPL-SCNC: 137 MMOL/L (ref 133–144)
TRIGL SERPL-MCNC: 241 MG/DL

## 2020-06-17 ASSESSMENT — ANXIETY QUESTIONNAIRES: GAD7 TOTAL SCORE: 14

## 2020-06-19 ENCOUNTER — PATIENT OUTREACH (OUTPATIENT)
Dept: CARE COORDINATION | Facility: CLINIC | Age: 60
End: 2020-06-19

## 2020-06-19 SDOH — HEALTH STABILITY: MENTAL HEALTH
STRESS IS WHEN SOMEONE FEELS TENSE, NERVOUS, ANXIOUS, OR CAN'T SLEEP AT NIGHT BECAUSE THEIR MIND IS TROUBLED. HOW STRESSED ARE YOU?: TO SOME EXTENT

## 2020-06-19 SDOH — ECONOMIC STABILITY: FOOD INSECURITY: WITHIN THE PAST 12 MONTHS, YOU WORRIED THAT YOUR FOOD WOULD RUN OUT BEFORE YOU GOT MONEY TO BUY MORE.: NEVER TRUE

## 2020-06-19 SDOH — ECONOMIC STABILITY: FOOD INSECURITY: WITHIN THE PAST 12 MONTHS, THE FOOD YOU BOUGHT JUST DIDN'T LAST AND YOU DIDN'T HAVE MONEY TO GET MORE.: NEVER TRUE

## 2020-06-19 SDOH — SOCIAL STABILITY: SOCIAL INSECURITY
WITHIN THE LAST YEAR, HAVE YOU BEEN KICKED, HIT, SLAPPED, OR OTHERWISE PHYSICALLY HURT BY YOUR PARTNER OR EX-PARTNER?: NO

## 2020-06-19 SDOH — ECONOMIC STABILITY: INCOME INSECURITY: HOW HARD IS IT FOR YOU TO PAY FOR THE VERY BASICS LIKE FOOD, HOUSING, MEDICAL CARE, AND HEATING?: NOT HARD AT ALL

## 2020-06-19 SDOH — ECONOMIC STABILITY: TRANSPORTATION INSECURITY
IN THE PAST 12 MONTHS, HAS LACK OF TRANSPORTATION KEPT YOU FROM MEETINGS, WORK, OR FROM GETTING THINGS NEEDED FOR DAILY LIVING?: NO

## 2020-06-19 SDOH — SOCIAL STABILITY: SOCIAL INSECURITY: WITHIN THE LAST YEAR, HAVE YOU BEEN HUMILIATED OR EMOTIONALLY ABUSED IN OTHER WAYS BY YOUR PARTNER OR EX-PARTNER?: NO

## 2020-06-19 SDOH — SOCIAL STABILITY: SOCIAL INSECURITY
WITHIN THE LAST YEAR, HAVE TO BEEN RAPED OR FORCED TO HAVE ANY KIND OF SEXUAL ACTIVITY BY YOUR PARTNER OR EX-PARTNER?: NO

## 2020-06-19 SDOH — SOCIAL STABILITY: SOCIAL INSECURITY: WITHIN THE LAST YEAR, HAVE YOU BEEN AFRAID OF YOUR PARTNER OR EX-PARTNER?: NO

## 2020-06-19 SDOH — ECONOMIC STABILITY: TRANSPORTATION INSECURITY
IN THE PAST 12 MONTHS, HAS THE LACK OF TRANSPORTATION KEPT YOU FROM MEDICAL APPOINTMENTS OR FROM GETTING MEDICATIONS?: NO

## 2020-06-19 SDOH — HEALTH STABILITY: PHYSICAL HEALTH: ON AVERAGE, HOW MANY DAYS PER WEEK DO YOU ENGAGE IN MODERATE TO STRENUOUS EXERCISE (LIKE A BRISK WALK)?: 5 DAYS

## 2020-06-19 ASSESSMENT — ACTIVITIES OF DAILY LIVING (ADL): DEPENDENT_IADLS:: INDEPENDENT

## 2020-06-19 NOTE — PROGRESS NOTES
Clinic Care Coordination Contact    Clinic Care Coordination Contact  OUTREACH    Referral Information:  Referral Source: PCP    Primary Diagnosis: CD    Chief Complaint   Patient presents with     Clinic Care Coordination - Initial     Chemical Health Support        Universal Utilization: 12% Admission or ED Risk  Clinic Utilization  Difficulty keeping appointments:: No  Compliance Concerns: No  No-Show Concerns: No  No PCP office visit in Past Year: No  Utilization    Last refreshed: 6/19/2020  2:54 PM:  Hospital Admissions 0           Last refreshed: 6/19/2020  2:54 PM:  ED Visits 0           Last refreshed: 6/19/2020  2:54 PM:  No Show Count (past year) 10              Current as of: 6/19/2020  2:54 PM              Clinical Concerns:  Current Medical Concerns:   Mika is concerned about his alcohol use. Feels that A1c is higher because he is drinking more in the last 3 months.  Knows that this is bad for his diabetes. Reports that his Psoriasis has flared up covering about 40% of his body   Notes that he hasn't been able to use methotrexate because his liver labs are high, which he attributes to EtOH use.       Current Behavioral Concerns: He is concerned about his alcohol use.    Education Provided to patient: Care coordination, CD rersources including outpatient programs offered at [facility] and [facility]. AA virtual meetings.  Pain  Pain (GOAL):: No  Health Maintenance Reviewed: Due/Overdue   PREVENTIVE CARE VISIT  ADVANCE CARE PLANNING  ZOSTER IMMUNIZATION (1 of 2)  EYE EXAM  DIABETIC FOOT EXAM    Clinical Pathway: Diabetes    Medication Management:  Manages independently     Functional Status:  Dependent ADLs:: Independent  Dependent IADLs:: Independent  Bed or wheelchair confined:: No  Mobility Status: Independent    Living Situation:  Current living arrangement:: I live alone  Type of residence:: Apartment(N/A)    Lifestyle & Psychosocial Needs:    Mika is originally from [place] and has lived in  [place] for the past six years.He feels like he has been more isolated since moving. He has been under much stress recently after being ill for one week and thinking that he had COCID 19.  He is concerned that his brother is always asking for money and we discussed strategies for how he can set boundaries with him.  He is not interested in mental health support at this time.  His main concern right now is  that his drinking is effecting his health and he is interested in going to outpatient treatment. He has not had a drink since 6/16/20. He would like to go to a program close to home and NEFTALI PERLA gave him information on [place] and [place].  Also provided him with virtual AA meetings which he was agreeable to trying.      Lifestyle     Physical activity     Days per week: 5 days     Minutes per session: Not on file     Stress: To some extent     Social Needs     Financial resource strain: Not hard at all     Food insecurity     Worry: Never true     Inability: Never true     Transportation needs     Medical: No     Non-medical: No     Inadequate nutrition (GOAL):: No  Tube Feeding: No  Inadequate activity/exercise (GOAL):: No  Significant changes in sleep pattern (GOAL): No  Transportation means:: Other, Accessible car(N/A)     Hindu or spiritual beliefs that impact treatment:: No  Mental health DX:: No  Mental health management concern (GOAL):: No  Informal Support system:: Other(N/A)   Socioeconomic History     Marital status: Single     Spouse name: Not on file     Number of children: Not on file     Years of education: Not on file     Highest education level: Not on file   Occupational History     Occupation: Manufactor    Relationships     Social connections     Talks on phone: Not on file     Gets together: Not on file     Attends Jain service: Not on file     Active member of club or organization: Not on file     Attends meetings of clubs or organizations: Not on file      Relationship status: Not on file     Intimate partner violence     Fear of current or ex partner: No     Emotionally abused: No     Physically abused: No     Forced sexual activity: No     Tobacco Use     Smoking status: Former Smoker     Packs/day: 0.33     Years: 20.00     Pack years: 6.60     Types: Cigarettes     Last attempt to quit: 2015     Years since quittin.9     Smokeless tobacco: Never Used   Substance and Sexual Activity     Alcohol use: Yes     Alcohol/week: 1.0 standard drinks     Types: 1 Standard drinks or equivalent per week     Comment: 2-6 occasionally     Drug use: No     Sexual activity: Yes        esources and Interventions:  Current Resources:      Community Resources: Other (see comment), None(N/A)  Supplies used at home:: Other(N/A)  Equipment Currently Used at Home: other (see comments)(N/A)         Referrals Placed: CD     Goals:   Goals        General    1.  Chemical Health Support (pt-stated)     Notes - Note edited  2020  3:02 PM by Lesley Flannery LSW    Goal Statement: In the next six months I will get support to stop drinking alcohol.  Date Goal set: 20  Barriers: Finding a program that works for me.  Strengths: I am motivated to stop drinking to improve my health.  Date to Achieve By: 20  Patient expressed understanding of goal: yes  Action steps to achieve this goal:  1. I will have a chemical health assessment completed and Care Coordinator provided numbers for [facility] ([phone]) or [facility] ( [phone]).   2. I will begin an outpatient program   3. I will participate in  AA meetings and can try virtual meetings by going to [email or web address] , Zoom AA meetings, and [employer]  4.  The Care Coordinator will assist me with finding additional resources as needed.             Patient/Caregiver understanding: Mika expressed understanding of what was discussed and AIDET communication was used during the encounter.    Outreach  Frequency: monthly  Future Appointments              In 3 months Naseem Esparza MD [facility]          Plan: Mika made a plan to contact [facility] today  to schedule a chemical health assessment and to look in to their outpatient treatment program. He is agreeable to trying virtual AA meetings and NEFTLAI LEIVA explained how to access those.  He will let NEFTALI LEIVA know if he runs in to any barriers finding a treatment program. NEFTALI Leiva will follow up in one week.    SHAWNA Bear   Care Coordination Team  [phone]

## 2020-06-19 NOTE — LETTER
[facility]  Complex Care Plan  About Me:    Patient Name:  Saji Iqbal    YOB: 1960  Age:         60 year old   [place] MRN:    [id number] Telephone Information:  Home Phone [phone]   Mobile [phone]       Address:  [address] [place] [address] Email address:  [email or web address]      Emergency Contact(s)    Name Relationship Lgl Grd Work Phone Home Phone Mobile Phone   1. KIKI IQBAL Daughter  None [phone]   2. DECLINED, PER * Declined               Primary language:  English     needed? No   [employer]:  [phone] op. 1  Other communication barriers: None  Preferred Method of Communication:  Mail  Current living arrangement: I live alone  Mobility Status/ Medical Equipment: Independent    Health Maintenance  Health Maintenance Reviewed: Due/Overdue   Care Gaps  PREVENTIVE CARE VISIT  ADVANCE CARE PLANNING  ZOSTER IMMUNIZATION (1 of 2)  EYE EXAM      My Access Plan  Medical Emergency 911   Primary Clinic Line [facility] - [phone]   24 Hour Appointment Line [phone] or  [phone]) (toll-free)   24 Hour Nurse Line [phone] (toll-free)   Preferred Urgent Care Other(N/A)   Preferred Hospital [facility]  [phone](N/A)   Preferred Pharmacy [facility] [place], [place] - [address] [facility]     Behavioral Health Crisis Line The National Suicide Prevention Lifeline at 1-289.124.1368 or 911             My Care Team Members  Patient Care Team       Relationship Specialty Notifications Start End    Naseem Esparza MD PCP - General Family Practice  3/27/18     Phone: [phone] Fax: [phone]         [id number] [facility] [address]    Naseem Esparza MD Assigned PCP   12/31/17     Phone: [phone] Fax: [phone]         [id number] [facility] [address]    Lesley Flannery, KATYAW Lead Care  Coordinator Primary Care - CC  6/19/20     Phone: [phone]                 My Care Plans  Self Management and Treatment Plan  Goals and (Comments)  Goals        General    1.  Chemical Health Support (pt-stated)     Notes - Note edited  6/19/2020  3:02 PM by Lesley Flannery LSW    Goal Statement: In the next six months I will get support to stop drinking alcohol.  Date Goal set: 6/19/20  Barriers: Finding a program that works for me.  Strengths: I am motivated to stop drinking to improve my health.  Date to Achieve By: 12/30/20  Patient expressed understanding of goal: yes  Action steps to achieve this goal:  1. I will have a chemical health assessment completed and Care Coordinator provided numbers for [facility] ([phone]) or [facility] ( [phone]).   2. I will begin an outpatient program   3. I will participate in  AA meetings and can try virtual meetings by going to [email or web address] , Zoom AA meetings, and [employer]  4.  The Care Coordinator will assist me with finding additional resources as needed.              Action Plans on File:                       Advance Care Plans/Directives Type:        My Medical and Care Information  Problem List   Patient Active Problem List   Diagnosis     Tobacco abuse     Coronary artery disease involving native coronary artery without angina pectoris     Hyperlipidemia LDL goal <100     Benign essential hypertension     Obesity due to excess calories     Chronic gastroesophageal reflux disease     Uncontrolled type 2 diabetes mellitus with hyperglycemia (H)     NIKKI (obstructive sleep apnea)     Hypertriglyceridemia     Psoriasis     ETOH abuse      Current Medications and Allergies:  See printed Medication Report.    Care Coordination Start Date: 6/19/2020   Frequency of Care Coordination: monthly   Form Last Updated: 06/19/2020

## 2020-06-19 NOTE — LETTER
[place] CARE COORDINATION  [facility]   June 19, 2020    Saji Josuear  [address] [address]      Dear Saji,    I am a clinic care coordinator who works with Naseem Esparza MD at [facility] . I wanted to thank you for spending the time to talk with me.  Below is a description of clinic care coordination and how I can further assist you.      The clinic care coordination team is made up of a registered nurse,  and community health worker who understand the health care system. The goal of clinic care coordination is to help you manage your health and improve access to the health care system in the most efficient manner. The team can assist you in meeting your health care goals by providing education, coordinating services, strengthening the communication among your providers and supporting you with any resource needs.    Please feel free to contact me at [phone] with any questions or concerns. We are focused on providing you with the highest-quality healthcare experience possible and that all starts with you.     Sincerely,     SHAWNA Bear   Care Coordination Team  [phone]      Enclosed: I have enclosed a copy of the Complex Care Plan. This has helpful information and goals that we have talked about. Please keep this in an easy to access place to use as needed.

## 2020-06-30 ENCOUNTER — PATIENT OUTREACH (OUTPATIENT)
Dept: CARE COORDINATION | Facility: CLINIC | Age: 60
End: 2020-06-30

## 2020-06-30 ASSESSMENT — ACTIVITIES OF DAILY LIVING (ADL): DEPENDENT_IADLS:: INDEPENDENT

## 2020-06-30 NOTE — PROGRESS NOTES
Clinic Care Coordination Contact  [facility]/Voicemail    Referral Source: PCP  Clinical Data: Care Coordinator Outreach  Outreach attempted x 1.  Left message on patient's voicemail with call back information and requested return call.  Plan: Care Coordinator will send unable to contact letter with care coordinator contact information via [employer]. Care Coordinator will try to reach patient again in 10 business days.    SHAWNA Bear   Care Coordination Team  [phone]

## 2020-07-21 ENCOUNTER — PATIENT OUTREACH (OUTPATIENT)
Dept: NURSING | Facility: CLINIC | Age: 60
End: 2020-07-21
Payer: COMMERCIAL

## 2020-07-21 ASSESSMENT — ACTIVITIES OF DAILY LIVING (ADL): DEPENDENT_IADLS:: INDEPENDENT

## 2020-07-21 NOTE — PROGRESS NOTES
Clinic Care Coordination Contact    Follow Up Progress Note      Assessment: Mika reports  that he has been drinking today. This past weekend  two of his friends ended their lives with one person hanging themself in his daughters ely. He states he is having a hard time dealing with this and when asked reports no thoughts of harming himself and he doesn't understand why people choose to do this.  He did not go to work yesterday or today. NEFTALI PERLA suggested connecting him to a counselor and said that would consider that. He states he is still open to getting support for his alcohol abuse and wonders if that contributes to his Psoriasis condition.    He hopes to begin seeing a new Dermatologist out of [facility] and is waiting for a call back to schedule.  He states that his psoriasis is a significant concern and is covering over 70% of his body which makes him very self conscious.  At this point he ended call abruptly . NEFTALI PERLA tried calling him back and there was no answer.          Goals addressed this encounter:   Goals Addressed                 This Visit's Progress       Patient Stated      1.  Chemical Health Support (pt-stated)   0%     Goal Statement: In the next six months I will get support to stop drinking alcohol.  Date Goal set: 6/19/20  Barriers: Finding a program that works for me.  Strengths: I am motivated to stop drinking to improve my health.  Date to Achieve By: 12/30/20  Patient expressed understanding of goal: yes  Action steps to achieve this goal:  1. I will have a chemical health assessment completed and Care Coordinator provided numbers for [facility] ([phone]) or [facility] ( [phone]).   2. I will begin an outpatient program   3. I will participate in  AA meetings and can try virtual meetings by going to [email or web address] , Zoom AA meetings, and [employer]  4.  The Care Coordinator will assist me with finding additional resources as needed.               Intervention/Education provided during outreach: NA     Outreach Frequency: monthly    Plan:   NEFTALI CC will follow up with him about his concerns related to grief and alcohol abuse as this call was ended abruptly by Mika.      Care Coordinator will follow up in one month    SHAWNA Bear Care Coordination Team  [phone]

## 2020-07-23 ENCOUNTER — PATIENT OUTREACH (OUTPATIENT)
Dept: FAMILY MEDICINE | Facility: CLINIC | Age: 60
End: 2020-07-23

## 2020-07-23 NOTE — TELEPHONE ENCOUNTER
Called and left message for patient to call RN PAL back to schedule f/u DM appointment.     Korey REYES RN

## 2020-08-18 ENCOUNTER — TELEPHONE (OUTPATIENT)
Dept: FAMILY MEDICINE | Facility: CLINIC | Age: 60
End: 2020-08-18

## 2020-08-18 NOTE — TELEPHONE ENCOUNTER
Summary:    Patient is due/failing the following:   Eye exam    Reviewed:  [x] CARE EVERYWHERE  [x] LAST OV NOTE INCLUDING ENDO  [x] FYI TAB  [x] MYCHART ACTIVE?  [x] LAST PANEL ENCOUNTER  [x] FUTURE APPTS  [x] IMMUNIZATIONS          Action needed:   Eye exam    Type of outreach:    contacted [facility] [phone] to have them fax over the last eye exam                                                                               Jacquelin Garay/ULISES  [place]

## 2020-08-20 DIAGNOSIS — L29.9 PRURITIC DISORDER: ICD-10-CM

## 2020-08-20 NOTE — TELEPHONE ENCOUNTER
In chart review- it looks like patient is seeing Dermatology through [facility] 8/4/20    Last Written Prescription Date:  06/1/20  Last Fill Quantity: 90,  # refills: 0   Last office visit: 10/10/2019 with prescribing provider:Virtual visit 4/28/20     Future Office Visit:   Next 5 appointments (look out 90 days)    Sep 17, 2020 11:10 AM CDT  (Arrive by 10:50 AM)  Office Visit with Naseem Esparza [facility]  [facility] [address] [place] [id number] [phone]               Requested Prescriptions   Pending Prescriptions Disp Refills     gabapentin (NEURONTIN) 100 MG capsule [Pharmacy Med Name: Gabapentin Oral Capsule 100 MG] 90 capsule 0     Sig: TAKE ONE CAPSULE BY MOUTH THREE TIMES DAILY       There is no refill protocol information for this order

## 2020-08-21 DIAGNOSIS — Z79.4 TYPE 2 DIABETES MELLITUS WITH HYPERGLYCEMIA, WITH LONG-TERM CURRENT USE OF INSULIN (H): ICD-10-CM

## 2020-08-21 DIAGNOSIS — E11.65 TYPE 2 DIABETES MELLITUS WITH HYPERGLYCEMIA, WITH LONG-TERM CURRENT USE OF INSULIN (H): ICD-10-CM

## 2020-08-24 RX ORDER — GABAPENTIN 100 MG/1
CAPSULE ORAL
Qty: 90 CAPSULE | Refills: 0 | Status: SHIPPED | OUTPATIENT
Start: 2020-08-24 | End: 2021-01-07

## 2020-08-24 RX ORDER — PEN NEEDLE, DIABETIC 29 G X1/2"
NEEDLE, DISPOSABLE MISCELLANEOUS
Qty: 100 EACH | Refills: 0 | Status: SHIPPED | OUTPATIENT
Start: 2020-08-24 | End: 2020-11-20

## 2020-08-25 ENCOUNTER — TELEPHONE (OUTPATIENT)
Dept: FAMILY MEDICINE | Facility: CLINIC | Age: 60
End: 2020-08-25

## 2020-08-25 NOTE — TELEPHONE ENCOUNTER
"    Called patient and he states he went to Dr. Monique at [facility] because he wanted a 2nd opinion and did not like how things were going with Dr. Lala.  Advised that patient that Dr. Lala wants a more complete lab work up tomorrow if possible regarding his elevated liver funtions-  Patient refused- states he is too busy and has too much going on in his life right now to get this done at any time    I asked patient if he was drinking alcohol again and he affirmed that he is.  I strongly advised against taking methotrexate - patient again stated he has never taken this medication. Per pharmacy last fill date was 05/5/20.    I advised patient that he cannot have two providers treating the same issue with different medications- it is dangerous to take methotrexate and otezla together along with his elevated LFT's.     I will route this note to patients PCP as that is who Dr. Monique was trying to reach - she wants to discuss etiology for LFT's    Huddled with Dr. Lala regarding Dr. Sutton phone call and concernsPer Dr. Lala- stop methotrexate and needs hepatic profile/ comp metabolic asap    Called and spoke with Dr. Monique. She thought we were his pcp- she is wondering why she is seeing patient as well as us-   Advised that we just refilled his methotrexate and she was very surprised and stated he should not be on that and Otezla- she wanted his liver issues worked up with PCP- I advised that this patient has a known alcohol problem and was counseled by Dr. Lala and myself regarding drinking and being    08/24/20    AST 73      Telephone  8/25/2020  [facility]  Location   Jump to Section ?  Document InformationEncounter DetailsMiscellaneous NotesPatient ContactsPatient DemographicsPlan of TreatmentReason for VisitSocial HistoryVisit Diagnoses  Saji Iqbal \"CHAD\" - 60 y.o. Male; born [birth year] Encounter Summary, generated on Aug. 25, 2020August 25, 2020   Reason for Visit    Reason Onset Date Comments   Results 08/25/2020     Encounter Details    Date Type Department Care Team Description   08/25/2020 Telephone [facility]    [address]    [place], [place] [id number] [phone]   Dyana Monique MD    [address]    [address]    [place], [place] [id number] [phone] (Fax)   Results   Social History  - documented as of this encounter  Tobacco Use Types Packs/Day Years Used Date   Former Smoker Cigarettes 0.25   Quit: 08/26/2008   Smokeless Tobacco: Never Used           Alcohol Use Drinks/Week oz/Week Comments   No           Sex Assigned at Birth Date Recorded   Not on file       COVID-19 Exposure Response Date Recorded   In the last month, have you been in contact with someone who was confirmed or suspected to have Coronavirus / COVID-19? No / Unsure 8/20/2020 10:09 AM CDT   Miscellaneous Notes  - documented in this encounter  Table of Contents for Miscellaneous Notes   Telephone Encounter - Анна Sexton - 08/25/2020 10:52 AM CDT   Telephone Encounter - Rhonda Greer - 08/25/2020 9:08 AM CDT      Telephone Encounter - Анна Sexton - 08/25/2020 10:52 AM CDT  Called patient to inform him that his labs that Dr. Monique had drawn at his last appointment were abnormally high. She would like him to follow up with his PCP on the abnormal liver results. When calling the patient to inform him of this he swore and me and then hung up the phone stating that he was F*ing miserable and could not get into see his PCP until mid September. I tried to reply but the patient hung up the phone.     Анна Sexton CMA 8/25/2020 11:04 AM         Electronically signed by Анна Sexton at 08/25/2020 11:06 AM CDT    Back to top of Miscellaneous Notes  Telephone Encounter - Rhonda Greer - 08/25/2020 9:08 AM CDT  ----- Message  from Dyana Monique MD sent at 8/25/2020 7:47 AM CDT -----  Please notify patient that liver tests were abnormal-- he needs to follow up with his PCP regarding this and make sure okay to take otzela.    Thanks!  Dyana Monique MD .................... 8/25/2020 7:46 AM      Electronically signed by Rhonda Greer at 08/25/2020 9:08 AM CDT

## 2020-08-25 NOTE — TELEPHONE ENCOUNTER
General Call:     Who is calling:  Анна from [facility] (Dr. Monique)    Reason for Call:  Анна is calling to speak to Dr. Lala's nurse regarding Mika. She would like a call back at [phone]. Анна will be there until 4:00 p.m. today. If it's after that time, please ask for the dermatology nurse.

## 2020-08-26 NOTE — TELEPHONE ENCOUNTER
TATYANA PA received call from Crista with Dr. Monique's office asking Dr. Esparza's opinion on restarting Otezla. RN huddled with Dr. Esparza and Dr. Esparza will defer prescribing specialty medications to the dermatologist specialist at this time.    Korey REYES RN

## 2020-09-03 NOTE — TELEPHONE ENCOUNTER
Pt had exam 2 years ago, will contact to check if had more recent exam    Jacquelin Garay/ULISES  [place]

## 2020-09-08 NOTE — PROGRESS NOTES
Pre-Visit Planning     Future Appointments   Date Time Provider Department Center   9/17/2020 11:10 AM Naseem Esparza MD [facility]     Arrival Time for this Appointment: 10:50 AM   Appointment Notes for this encounter:   diabetes    Questionnaires Reviewed/Assigned  No additional questionnaires are needed    Patient preferred phone number: [phone]    Visit Summaries:  06/16/20 OV w/ S.B. for R shoulder pain; patient declined steroid injection   Referred to CC to assist in reducing ETOH use  06/19/20 CC per note:  Mika is concerned about his alcohol use. Feels that A1c is higher because he is drinking more in the last 3 months.  Knows that this is bad for his diabetes. Reports that his Psoriasis has flared up covering about 40% of his body. Notes that he hasn't been able to use methotrexate because his liver labs are high, which he attributes to EtOH use.  07/21/20 CC per note: This past weekend  two of his friends ended their lives with one person hanging themself in his daughters garage. He states he is having a hard time dealing with this and when asked reports no thoughts of harming himself and he doesn't understand why people choose to do this.   08/04/20 OV w/ Dermatology for psoriasis: advised to start wet wraps, natural UVB, and otezla  08/25/20 Telephone encounter w/ dermatology - patient advised to hold off on otezla until liver enzymes in better range  Component      Latest Ref Rng & Units 5/26/2020   ALT      0 - 70 U/L 112 (H)   AST      0 - 45 U/L 81 (H)   Labs done w/ [facility]: 08/24/20    AST = 133 international unit(s)/L   ALT = 73 international unit(s)/L    Attempt #1, LVM. Attempt #2, LVM w/appointment reminder.  Korey REYES RN

## 2020-09-17 ENCOUNTER — OFFICE VISIT (OUTPATIENT)
Dept: FAMILY MEDICINE | Facility: CLINIC | Age: 60
End: 2020-09-17
Payer: COMMERCIAL

## 2020-09-17 VITALS
HEART RATE: 76 BPM | SYSTOLIC BLOOD PRESSURE: 150 MMHG | DIASTOLIC BLOOD PRESSURE: 93 MMHG | BODY MASS INDEX: 30.84 KG/M2 | TEMPERATURE: 98 F | WEIGHT: 225.8 LBS

## 2020-09-17 DIAGNOSIS — Z71.89 ADVANCED CARE PLANNING/COUNSELING DISCUSSION: ICD-10-CM

## 2020-09-17 DIAGNOSIS — E78.5 HYPERLIPIDEMIA LDL GOAL <100: ICD-10-CM

## 2020-09-17 DIAGNOSIS — E11.65 UNCONTROLLED TYPE 2 DIABETES MELLITUS WITH HYPERGLYCEMIA (H): Primary | ICD-10-CM

## 2020-09-17 DIAGNOSIS — R79.89 ELEVATED LFTS: ICD-10-CM

## 2020-09-17 DIAGNOSIS — Z23 NEED FOR VACCINATION: ICD-10-CM

## 2020-09-17 LAB — HBA1C MFR BLD: 7.1 % (ref 0–5.6)

## 2020-09-17 PROCEDURE — 83036 HEMOGLOBIN GLYCOSYLATED A1C: CPT | Performed by: FAMILY MEDICINE

## 2020-09-17 PROCEDURE — 82043 UR ALBUMIN QUANTITATIVE: CPT | Performed by: FAMILY MEDICINE

## 2020-09-17 PROCEDURE — 99214 OFFICE O/P EST MOD 30 MIN: CPT | Performed by: FAMILY MEDICINE

## 2020-09-17 PROCEDURE — 99207 C FOOT EXAM  NO CHARGE: CPT | Performed by: FAMILY MEDICINE

## 2020-09-17 PROCEDURE — 80076 HEPATIC FUNCTION PANEL: CPT | Performed by: FAMILY MEDICINE

## 2020-09-17 PROCEDURE — 36415 COLL VENOUS BLD VENIPUNCTURE: CPT | Performed by: FAMILY MEDICINE

## 2020-09-17 RX ORDER — ATORVASTATIN CALCIUM 40 MG/1
40 TABLET, FILM COATED ORAL DAILY
Qty: 90 TABLET | Refills: 1 | Status: SHIPPED | OUTPATIENT
Start: 2020-09-17 | End: 2020-10-22

## 2020-09-17 ASSESSMENT — ENCOUNTER SYMPTOMS
HEADACHES: 0
CONSTITUTIONAL NEGATIVE: 1
NERVOUS/ANXIOUS: 1
PALPITATIONS: 0
DYSPHORIC MOOD: 1
SHORTNESS OF BREATH: 0

## 2020-09-17 NOTE — PROGRESS NOTES
Subjective     Saji Iqbal is a 60 year old male who presents to clinic today for the following health issues:    History of Present Illness        Diabetes:   He presents for follow up of diabetes.  He is checking home blood glucose one time daily. He checks blood glucose before meals.  Blood glucose is sometimes over 200 and never under 70. He is aware of hypoglycemia symptoms including shakiness. He is concerned about other.  He is having numbness in feet. The patient has not had a diabetic eye exam in the last 12 months.         Vascular Disease:  He presents for follow up of vascular disease.  He never takes nitroglycerin. He takes daily aspirin.    He eats 0-1 servings of fruits and vegetables daily.He consumes 1 sweetened beverage(s) daily.He exercises with enough effort to increase his heart rate 9 or less minutes per day.  He exercises with enough effort to increase his heart rate 3 or less days per week. He is missing 1 dose(s) of medications per week.  He is not taking prescribed medications regularly due to remembering to take.     At last appointment commented that he was working to cut back on drinking, as he felt that it was worsening his diabetes.  Admits drinking is largely due to stress, and for something to do.  Admits that he is also depressed, not going ouch much.     Would be interested in meds, but mostly would like to do gene testing first.    Is feeling very self-conscious about psoriasis, last saw derm about 6 weeks ago, looking into a new derm provider.  Is not currently doing oral meds d/t high liver enzymes (which is from his drinking).    Review of Systems   Constitutional: Negative.    Eyes: Negative for visual disturbance.   Respiratory: Negative for shortness of breath.    Cardiovascular: Negative for chest pain, palpitations and peripheral edema.   Skin: Positive for rash.   Neurological: Negative for headaches.   Psychiatric/Behavioral: Positive for dysphoric mood. The patient is  nervous/anxious.         Objective    BP (!) 150/93 (BP Location: Right arm, Patient Position: Chair, Cuff Size: Adult Large)   Pulse 76   Temp 98  F (36.7  C) (Oral)   Wt 102.4 kg (225 lb 12.8 oz)   BMI 30.84 kg/m    Body mass index is 30.84 kg/m .  Physical Exam  Vitals signs reviewed.   Eyes:      Conjunctiva/sclera: Conjunctivae normal.   Cardiovascular:      Rate and Rhythm: Normal rate and regular rhythm.      Pulses:           Dorsalis pedis pulses are 2+ on the right side and 2+ on the left side.      Heart sounds: Normal heart sounds.   Pulmonary:      Effort: Pulmonary effort is normal.      Breath sounds: Normal breath sounds.   Musculoskeletal:      Comments: FABIO bunions   Skin:     General: Skin is warm and dry.      Comments: Skin on feet healthy.  No wounds, callous.  Psoriatic nail dystrophy.   Neurological:      Mental Status: He is alert and oriented to person, place, and time.      Gait: Gait normal.      Deep Tendon Reflexes:      Reflex Scores:       Patellar reflexes are 2+ on the right side and 2+ on the left side.       Achilles reflexes are 2+ on the right side and 2+ on the left side.     Comments: Nl filament and proprioception in feet FABIO        Assessment and Plan    (E11.65) Uncontrolled type 2 diabetes mellitus with hyperglycemia (H)  (primary encounter diagnosis)  Comment:   Plan: Albumin Random Urine Quantitative with Creat         Ratio, HEMOGLOBIN A1C, REVIEW OF HEALTH         MAINTENANCE PROTOCOL ORDERS, FOOT EXAM, JUST IN        CASE            (Z71.89) Advanced care planning/counseling discussion  Comment:   Plan:     (Z23) Need for vaccination  Comment: has had flu shot  Plan:     (E78.5) Hyperlipidemia LDL goal <100  Comment: increasing dose to 40mg  Plan: atorvastatin (LIPITOR) 40 MG tablet            (R79.89) Elevated LFTs  Comment: retesting  Plan: Hepatic panel              RTC in      Naseem Esparza MD

## 2020-09-18 LAB
ALBUMIN SERPL-MCNC: 4.1 G/DL (ref 3.4–5)
ALP SERPL-CCNC: 71 U/L (ref 40–150)
ALT SERPL W P-5'-P-CCNC: 158 U/L (ref 0–70)
AST SERPL W P-5'-P-CCNC: 139 U/L (ref 0–45)
BILIRUB DIRECT SERPL-MCNC: 0.2 MG/DL (ref 0–0.2)
BILIRUB SERPL-MCNC: 0.8 MG/DL (ref 0.2–1.3)
CREAT UR-MCNC: 169 MG/DL
MICROALBUMIN UR-MCNC: 19 MG/L
MICROALBUMIN/CREAT UR: 11.42 MG/G CR (ref 0–17)
PROT SERPL-MCNC: 7.3 G/DL (ref 6.8–8.8)

## 2020-09-24 ENCOUNTER — PATIENT OUTREACH (OUTPATIENT)
Dept: CARE COORDINATION | Facility: CLINIC | Age: 60
End: 2020-09-24

## 2020-09-24 ASSESSMENT — ACTIVITIES OF DAILY LIVING (ADL): DEPENDENT_IADLS:: INDEPENDENT

## 2020-09-24 NOTE — LETTER
[facility] CARE COORDINATION  [facility]   September 24, 2020    Saji Iqbal  [address]  [place] [address]      Dear Saji,    I have been unsuccessful in reaching you since our last contact. At this time the Care Coordination team will make no further attempts to reach you, however this does not change your ability to continue receiving care from your providers at your primary care clinic. If you need additional support from a care coordinator in the future please contact me at [phone].    All of us at [facility] are invested in your health and are here to assist you in meeting your goals.     Sincerely,    SHAWNA Bear   Care Coordination Team  [phone]

## 2020-09-24 NOTE — PROGRESS NOTES
Clinic Care Coordination Contact  [facility]/Voicemail    Referral Source: PCP  Clinical Data: Care Coordinator Outreach  Outreach attempted x 3.  Left message on patient's voicemail with call back information and requested return call.  Plan: Care Coordinator will send disenrollment letter with care coordinator contact information via [employer]. Care Coordinator will do no further outreaches at this time.    SHAWNA Bear   Care Coordination Team  [phone]

## 2020-10-04 ENCOUNTER — TRANSFERRED RECORDS (OUTPATIENT)
Dept: HEALTH INFORMATION MANAGEMENT | Facility: CLINIC | Age: 60
End: 2020-10-04

## 2020-10-05 ENCOUNTER — TELEPHONE (OUTPATIENT)
Dept: NURSING | Facility: CLINIC | Age: 60
End: 2020-10-05

## 2020-10-05 ENCOUNTER — HOSPITAL ENCOUNTER (EMERGENCY)
Facility: CLINIC | Age: 60
Discharge: HOME | End: 2020-10-05
Attending: EMERGENCY MEDICINE | Admitting: EMERGENCY MEDICINE
Payer: COMMERCIAL

## 2020-10-05 ENCOUNTER — NURSE TRIAGE (OUTPATIENT)
Dept: NURSING | Facility: CLINIC | Age: 60
End: 2020-10-05

## 2020-10-05 ENCOUNTER — TELEPHONE (OUTPATIENT)
Dept: FAMILY MEDICINE | Facility: CLINIC | Age: 60
End: 2020-10-05

## 2020-10-05 VITALS
DIASTOLIC BLOOD PRESSURE: 102 MMHG | HEART RATE: 91 BPM | TEMPERATURE: 98.2 F | RESPIRATION RATE: 18 BRPM | SYSTOLIC BLOOD PRESSURE: 168 MMHG | OXYGEN SATURATION: 96 %

## 2020-10-05 DIAGNOSIS — M79.674 GREAT TOE PAIN, RIGHT: ICD-10-CM

## 2020-10-05 PROCEDURE — 96372 THER/PROPH/DIAG INJ SC/IM: CPT | Performed by: EMERGENCY MEDICINE

## 2020-10-05 PROCEDURE — 99284 EMERGENCY DEPT VISIT MOD MDM: CPT

## 2020-10-05 PROCEDURE — 250N000011 HC RX IP 250 OP 636: Performed by: EMERGENCY MEDICINE

## 2020-10-05 RX ORDER — HYDROCODONE BITARTRATE AND ACETAMINOPHEN 5; 325 MG/1; MG/1
1 TABLET ORAL EVERY 6 HOURS PRN
Qty: 6 TABLET | Refills: 0 | Status: SHIPPED | OUTPATIENT
Start: 2020-10-05 | End: 2020-10-15

## 2020-10-05 RX ORDER — KETOROLAC TROMETHAMINE 30 MG/ML
30 INJECTION, SOLUTION INTRAMUSCULAR; INTRAVENOUS ONCE
Status: COMPLETED | OUTPATIENT
Start: 2020-10-05 | End: 2020-10-05

## 2020-10-05 RX ADMIN — KETOROLAC TROMETHAMINE 30 MG: 30 INJECTION, SOLUTION INTRAMUSCULAR at 14:37

## 2020-10-05 ASSESSMENT — ENCOUNTER SYMPTOMS
WOUND: 1
NUMBNESS: 1

## 2020-10-05 NOTE — TELEPHONE ENCOUNTER
Patient calling and stating that yesterday he dropped knife accidentally while cooking and it landed on his right foot above big toe, and wounded.  He states that yesterday he went to [facility] ER and they placed 3 sutures.   Patient is reporting that his wound is still bleeding, very painful and area around the wound is numb, and skin around wound is purple.    Patient advised to go to urgent care or ER for more assessment and evaluation. Patient agrees and he verbalized that is will go to urgent care.    Routed to Naseem Camacho, please review and advise    David Moulton RN

## 2020-10-05 NOTE — ED PROVIDER NOTES
"  History     Chief Complaint:  Toe Pain     HPI   Saji Iqbal is a 60 year old male with history of Coronary Artery Disease, type II diabetes, hypertension, hyperlipidemia, cluster headaches, ETOH abuse, anticoagulated on Plavix, who presents for evaluation of toe pain. The patient reports that yesterday he was cooking when he accidentally dropped a knife on his right foot landing onto his right greater toe and causing a laceration. He was seen at a facility last night where he had a laceration repair. Since then, he has continued to experience pain to the area and is reporting that he can not move the toe \"at all\" with numbness. The patient is concerned that the facility last night could have missed something during his evaluation. He notes that the knife did not shatter or break.    Allergies:  Metformin  Honey    Medications:   Aspirin  Lipitor   Diprolene   Wellbutrin XL   Canagliflozin   Temovate   Plavix   Folvite   Gabapentin   Hydroxyzine   Lantus Solostar   Lisinopril   Lopressor  Prilosec    Past Medical History:    Cluster headache   Coronary artery disease   Depression   Diabetes mellitus, type II   Hyperlipidemia   Hypertension   Rotator cuff arthropathy  Tobacco abuse  Obesity   GERD  NIKKI   Hypertriglyceridemia  Psoriasis  ETOH abuse     Past Surgical History:    Arthroscopy shoulder  Stent coronary      Family History:    Mother: Coronary Artery Disease  Father: Coronary Artery Disease  Brother: Cerebrovascular Disease  Sister: aneurysm     Social History:  The patient was unaccompanied to the ED.  Smoking Status: Never Smoker  Smokeless Tobacco: Never Used  Alcohol Use: Positive  Drug Use: Negative  PCP: Naseem Esparza     Review of Systems   Musculoskeletal:        Right foot pain - greater toe   Skin: Positive for wound.   Neurological: Positive for numbness (right great toe).   All other systems reviewed and are negative.    Physical Exam     Patient Vitals for the past 24 hrs:   BP Temp " Temp src Pulse Resp SpO2   10/05/20 1400 (!) 168/102 98.2  F (36.8  C) Oral 91 18 96 %       Physical Exam    Constitutional: Alert  HENT:    Nose: Nose normal.    Mouth/Throat: Oropharynx is clear, mucous membranes are moist  Eyes: EOM are normal. Pupils are equal, round, and reactive to light.   CV: Regular rate and rhythm.  MSK: Normal range of motion. No deformity. Right great toe has a laceration that is sutured. Sutures appear intact. There is no drainage or erythema around the laceration. He has good extension of the proximal phalanx of the great toe but no extension against resistance of his distal phalanx of his great toe.   Neurological:   A/Ox3  5/5 strength is symmetric to the upper and lower extremities;   Sensation intact to light touch throughout the upper and lower extremities;   Skin: Skin is warm and dry.    Emergency Department Course     Interventions:  Medications   ketorolac (TORADOL) injection 30 mg (has no administration in time range)      Emergency Department Course:    1410 Nursing notes and vitals reviewed. I performed an exam of the patient as documented above.     1429 I spoke with Dr. Diaz of the podiatry service regarding patient's presentation, findings, and plan of care.     1435 Prior to discharge, I personally reviewed the results with the patient and all related questions were answered. The patient verbalized understanding and is amenable to plan.     Impression & Plan      Medical Decision Making:  This is a 60-year-old male who comes in with right great toe pain.  He actually dropped a knife to his foot and cut his toe yesterday.  He had it sewn up and repaired at another location.  Exam as above today.  I do have a concern for potential tendon laceration versus traumatic hematoma or inflammation causing decreased range of motion.  I discussed the patient with Dr. Diaz who agreed to see the patient in clinic tomorrow morning.  I did make an appointment for the  patient at 815 tomorrow morning at the office and spoke with the office staff to confirm this.  The patient had left prior to this as I was on hold for a minute or 2 and he was discharged.  We did contact him by phone and made sure he was aware of the appointment time.  Patient expressed understanding and was in agreement with the plan of care.    Diagnosis:    ICD-10-CM    1. Great toe pain, right  M79.674      Disposition:   The patient is discharged to home.    Discharge Medications:  New Prescriptions    HYDROCODONE-ACETAMINOPHEN (NORCO) 5-325 MG TABLET    Take 1 tablet by mouth every 6 hours as needed for severe pain     Scribe Disclosure:  I, Orla Severson, am serving as a scribe at 2:07 PM on 10/5/2020 to document services personally performed by Saji Watters DO based on my observations and the provider's statements to me.     [facility] EMERGENCY DEPT         Saji Watters DO  10/05/20 1521

## 2020-10-05 NOTE — TELEPHONE ENCOUNTER
Mika calls in to report that post ED visit with stitches on his great toe he is having bleeding.  He did not do anything to cause the bleeding.  Read his discharge directions and told him to put pressure on the site. If it does not stop bleeding in 30 minutes he should return to ER. He agrees to this plan.   It seems by end of call as though it is stopping.      Reason for Disposition    Dressing soaked with blood or body fluid (e.g., drainage)     Got the  Bleeding to stop with discharge directions> Has follow up visit tomorrow.    Protocols used: POST-OP INCISION SYMPTOMS-A-AH

## 2020-10-05 NOTE — ED AVS SNAPSHOT
[facility]  [address]  [place] [address]  Phone: [phone]  Fax: [phone]                                    Saji Iqbal   MRN: [id number]    Department: [facility]   Date of Visit: 10/5/2020           After Visit Summary Signature Page    I have received my discharge instructions, and my questions have been answered. I have discussed any challenges I see with this plan with the nurse or doctor.    ..........................................................................................................................................  Patient/Patient Representative Signature      ..........................................................................................................................................  Patient Representative Print Name and Relationship to Patient    ..................................................               ................................................  Date                                   Time    ..........................................................................................................................................  Reviewed by Signature/Title    ...................................................              ..............................................  Date                                               Time          22EPIC Rev 08/18

## 2020-10-05 NOTE — TELEPHONE ENCOUNTER
"Patient calling, states he dropped a kitchen knife on toe yesterday and went to [facility] via ambulance. States he had 3 sutures places in the toe. Went to work today and now states the toe \"won't move\" and it is \"folded up\". Says he does not feel well because of this and thinks he needs to take an ambulance to another hospital because \"something is not right\".  Berna Joaquin RN  [facility]    "

## 2020-10-05 NOTE — ED TRIAGE NOTES
Pt with cut to right foot last night and went to [place] [facility] ER for stiches. States today toe numb and unable to extend greater toe. ABC's intact, alert and oriented X3.

## 2020-10-06 ENCOUNTER — OFFICE VISIT (OUTPATIENT)
Dept: PODIATRY | Facility: CLINIC | Age: 60
End: 2020-10-06
Payer: COMMERCIAL

## 2020-10-06 VITALS
SYSTOLIC BLOOD PRESSURE: 143 MMHG | WEIGHT: 225 LBS | HEIGHT: 72 IN | DIASTOLIC BLOOD PRESSURE: 87 MMHG | HEART RATE: 80 BPM | BODY MASS INDEX: 30.48 KG/M2

## 2020-10-06 DIAGNOSIS — S91.311A LACERATION OF RIGHT FOOT WITH TENDON INVOLVEMENT, INITIAL ENCOUNTER: Primary | ICD-10-CM

## 2020-10-06 DIAGNOSIS — S96.921A LACERATION OF RIGHT FOOT WITH TENDON INVOLVEMENT, INITIAL ENCOUNTER: Primary | ICD-10-CM

## 2020-10-06 PROCEDURE — 99204 OFFICE O/P NEW MOD 45 MIN: CPT | Performed by: PODIATRIST

## 2020-10-06 ASSESSMENT — MIFFLIN-ST. JEOR: SCORE: 1873.59

## 2020-10-06 ASSESSMENT — PAIN SCALES - GENERAL: PAINLEVEL: SEVERE PAIN (7)

## 2020-10-06 NOTE — PROGRESS NOTES
"Foot & Ankle Surgery  October 6, 2020    CC: \"cut on right foot\"    I was asked to see Saji Iqbal regarding the chief complaint by:  ER    HPI:  Pt is a 60 year old male who presents with above complaint.  Was making jalapeno poppers on Sunday, dropped a knife on his foot.  He was seen at [facility] facility, \"they didn't do anything for me\".  Put 3 stitches in the laceration, no repair of underlying damage, no imaging done.  He presented to a [facility] yesterday where there was concerns for tendon laceration.  Patient states his toe is drooping.  Going to [place] Thursday for his daughter's wedding.      ROS:   Pos for CC.  The patient denies current nausea, vomiting, chills, fevers, belly pain, calf pain, chest pain or SOB.  Complete remainder of ROS is otherwise neg.    VITALS:    Vitals:    10/06/20 0847   BP: (!) 143/87   Pulse: 80   Weight: 102.1 kg (225 lb)   Height: 1.829 m (6')       PMH:    Past Medical History:   Diagnosis Date     Cluster headache      Coronary artery disease      Depression      Diabetes mellitus, type II (H)      Hyperlipidemia      Hypertension      Rotator cuff arthropathy        SXHX:    Past Surgical History:   Procedure Laterality Date     ARTHROSCOPY SHOULDER       STENT, CORONARY, ESAU  2014, 2015        MEDS:    Current Outpatient Medications   Medication     gabapentin (NEURONTIN) 100 MG capsule     aspirin 81 MG tablet     atorvastatin (LIPITOR) 40 MG tablet     augmented betamethasone dipropionate (DIPROLENE-AF) 0.05 % cream     augmented betamethasone dipropionate (DIPROLENE-AF) 0.05 % external ointment     blood glucose (NO BRAND SPECIFIED) lancets standard     blood glucose monitoring (ACCU-CHEK NORI PLUS) meter device kit     blood glucose monitoring (ACCU-CHEK FASTCLIX) lancets     blood glucose monitoring (NO BRAND SPECIFIED) test strip     buPROPion (WELLBUTRIN XL) 150 MG 24 hr tablet     canagliflozin (INVOKANA) 300 MG tablet     clobetasol (TEMOVATE) " 0.05 % external cream     clopidogrel (PLAVIX) 75 MG tablet     folic acid (FOLVITE) 1 MG tablet     HYDROcodone-acetaminophen (NORCO) 5-325 MG tablet     hydrOXYzine (ATARAX) 25 MG tablet     insulin glargine (LANTUS SOLOSTAR) 100 UNIT/ML pen     insulin pen needle (ULTICARE PEN NEEDLES) 31G X 5 MM miscellaneous     lisinopril (ZESTRIL) 5 MG tablet     metoprolol tartrate (LOPRESSOR) 25 MG tablet     omeprazole (PRILOSEC) 20 MG DR capsule     vitamin D2 (ERGOCALCIFEROL) 77751 units (1250 mcg) capsule     No current facility-administered medications for this visit.        ALL:     Allergies   Allergen Reactions     Metformin Diarrhea     Honey Other (See Comments)     Throat irritation       FMH:    Family History   Problem Relation Age of Onset     Coronary Artery Disease Mother      Coronary Artery Disease Father      Cerebrovascular Disease Brother      Anuerysm Sister        SocHx:    Social History     Socioeconomic History     Marital status: Single     Spouse name: Not on file     Number of children: Not on file     Years of education: Not on file     Highest education level: Not on file   Occupational History     Occupation: [employer]    Social Needs     Financial resource strain: Not hard at all     Food insecurity     Worry: Never true     Inability: Never true     Transportation needs     Medical: No     Non-medical: No   Tobacco Use     Smoking status: Former Smoker     Packs/day: 0.33     Years: 20.00     Pack years: 6.60     Types: Cigarettes     Quit date: 2015     Years since quittin.2     Smokeless tobacco: Never Used   Substance and Sexual Activity     Alcohol use: Yes     Alcohol/week: 1.0 standard drinks     Types: 1 Standard drinks or equivalent per week     Comment: 2-6 occasionally     Drug use: No     Sexual activity: Yes   Lifestyle     Physical activity     Days per week: 5 days     Minutes per session: Not on file     Stress: To some extent   Relationships      Social connections     Talks on phone: Not on file     Gets together: Not on file     Attends Sabianism service: Not on file     Active member of club or organization: Not on file     Attends meetings of clubs or organizations: Not on file     Relationship status: Not on file     Intimate partner violence     Fear of current or ex partner: No     Emotionally abused: No     Physically abused: No     Forced sexual activity: No   Other Topics Concern      Service Not Asked     Blood Transfusions Not Asked     Caffeine Concern No     Comment: 0-1 coffee daily     Occupational Exposure Not Asked     Hobby Hazards Not Asked     Sleep Concern Not Asked     Stress Concern Not Asked     Weight Concern Not Asked     Special Diet Yes     Comment: low sodium, no red meat, no butter     Back Care Not Asked     Exercise Yes     Comment: walking 2-3 days per week     Bike Helmet Not Asked     Seat Belt Not Asked     Self-Exams Not Asked     Parent/sibling w/ CABG, MI or angioplasty before 65F 55M? Not Asked   Social History Narrative     Not on file           EXAMINATION:  Gen:   No apparent distress  Neuro:   A&Ox3, no deficits  Psych:    Answering questions appropriately for age and situation with normal affect  Head:    NCAT  Eye:    Visual scanning without deficit  Ear:    Response to auditory stimuli wnl  Lung:    Non-labored breathing on RA noted  Abd:    NTND per patient report  Lymph:    Neg for pitting/non-pitting edema BLE  Vasc:    Pulses palpable, CFT minimally delayed  Neuro:    Light touch sensation intact to all sensory nerve distributions without paresthesias  Derm:    Laceration dorsal R 1st ray, near MPJ, skin edges well approximated without bleeding noted today.  No SOI  MSK:    Right lower extremity - extension noted at MPJ, but extension at IPJ of the hallux limited.  Calf:    Neg for redness, swelling or tenderness    Assessment:  60 year old male with suspected laceration to the extensor tendon  complex right great toe      Plan:  Discussed etiologies, anatomy and options  1.  Suspected laceration to the extensor tendon complex right great toe  -bandage applied.  He has been doing dressing changes to the area  -walking boot dispensed.  Advised not to wear during his drive/flight to [place]  -MRI to assess laceration, will call  -surg piedad handout dispensed.  I suspect this will need repair.    -his tetanus status was previously updated  -no indication for PO abx course  -advised scheduling this for next week Wednesday    Follow up:  Based on imaging/plan or sooner with acute issues      Patient's medical history was reviewed today      Gerard Diaz DPM FACFAS FACFAOM  Podiatric Foot & Ankle Surgeon  [facility]  [phone]

## 2020-10-06 NOTE — PATIENT INSTRUCTIONS
Thank you for choosing [facility] Podiatry / Foot & Ankle Surgery!    DR. ONEAL'S CLINIC LOCATIONS:   MONDAY - [place] TUESDAY AM - [place]   [address] [facility]  [address]   [place] [address] [place] [address]   [phone]       THURSDAY AM - [place] THURSDAY PM - [place]   [address]   [place] [address] [place], [place] [address] [phone]       FRIDAY AM - [place] SET UP SURGERY: [phone] [address] APPOINTMENTS: [phone]   [place] [address] BILLING QUESTIONS: [phone] FAX NUMBER: [phone]     Follow up:     Next steps: get MRI done, call to schedule surgery.    Please read through the following handouts and if you have any questions, please feel free to call us or send a StatusPage message!              Mika to follow up with Primary Care provider regarding elevated blood pressure. (if equal or greater than 140/90)    Body Mass Index (BMI)  Many things can cause foot and ankle problems. Foot structure, activity level, foot mechanics and injuries are common causes of pain.    One very important issue that often goes unmentioned, is body weight.  Extra weight can cause increased stress on muscles, ligaments, bones and tendons. Sometimes just a few extra pounds is all it takes to put one over her/his threshold. Without reducing that stress, it can be difficult to alleviate pain.      Some people are uncomfortable addressing this issue, but we feel it is important for you to think about it. As Foot & Ankle specialists, our job is addressing the lower extremity problem and possible causes.     Regarding extra body weight, we encourage patients to discuss diet and weight management plans with their primary care doctors. It is this team approach that gives you the best opportunity for pain relief and getting you back on your feet.        Surgical planning.  If you have decided to  "have surgery, follow these few steps to get the procedure scheduled and to have the proper paperwork filled out.  If you are unsure about surgery, or would like to sit down and further discuss your issue and treatment options, please make a clinic appointment with Dr Diaz.    1.  Pick the date that you would like to have surgery.  Keep in mind that you will likely need at least 2 weeks off after the procedure for proper rest and healing.    2.  Call the surgery scheduling line at [phone] to get the procedure scheduled.  My , Tu, will assist you in getting surgery set up.      3.  Make an appointment to see Dr Diaz within 1-2 weeks of the date of surgery for your pre-operative consult.  When making the appointment, say \"I need to make a 30 minute surgical consult with Dr Diaz\".  All the paperwork will be ready for you during the visit.  It is recommended that you bring a spouse, family member or friend with you.  There will be lots of information presented.  It can be overwhelming, and it is better to have someone there to help sort out the details.    4.  Make an appointment to see your Primary Care Physician within 4 weeks of the date of surgery for your \"Pre-operative History and Physical\".  This is done to make sure you are medically healthy to undergo surgery.        If you have any post-operative questions regarding your procedure, call our triage nurses at [phone].        "

## 2020-10-06 NOTE — LETTER
"    10/6/2020         RE: Saji Iqbal  [address]  [place] [address]        Dear Colleague,    Thank you for referring your patient, Saji Iqbal, to the [facility] POD[facility]. Please see a copy of my visit note below.    Foot & Ankle Surgery  October 6, 2020    CC: \"cut on right foot\"    I was asked to see Saji Iqbal regarding the chief complaint by:  ER    HPI:  Pt is a 60 year old male who presents with above complaint.  Was making jalapeno poppers on Sunday, dropped a knife on his foot.  He was seen at [facility], \"they didn't do anything for me\".  Put 3 stitches in the laceration, no repair of underlying damage, no imaging done.  He presented to a [facility] yesterday where there was concerns for tendon laceration.  Patient states his toe is drooping.  Going to [place] Thursday for his daughter's wedding.      ROS:   Pos for CC.  The patient denies current nausea, vomiting, chills, fevers, belly pain, calf pain, chest pain or SOB.  Complete remainder of ROS is otherwise neg.    VITALS:    Vitals:    10/06/20 0847   BP: (!) 143/87   Pulse: 80   Weight: 102.1 kg (225 lb)   Height: 1.829 m (6')       PMH:    Past Medical History:   Diagnosis Date     Cluster headache      Coronary artery disease      Depression      Diabetes mellitus, type II (H)      Hyperlipidemia      Hypertension      Rotator cuff arthropathy        SXHX:    Past Surgical History:   Procedure Laterality Date     ARTHROSCOPY SHOULDER       STENT, CORONARY, ESAU  2014, 2015        MEDS:    Current Outpatient Medications   Medication     gabapentin (NEURONTIN) 100 MG capsule     aspirin 81 MG tablet     atorvastatin (LIPITOR) 40 MG tablet     augmented betamethasone dipropionate (DIPROLENE-AF) 0.05 % cream     augmented betamethasone dipropionate (DIPROLENE-AF) 0.05 % external ointment     blood glucose (NO BRAND SPECIFIED) lancets standard     blood glucose monitoring (ACCU-CHEK NORI PLUS) " meter device kit     blood glucose monitoring (ACCU-CHEK FASTCLIX) lancets     blood glucose monitoring (NO BRAND SPECIFIED) test strip     buPROPion (WELLBUTRIN XL) 150 MG 24 hr tablet     canagliflozin (INVOKANA) 300 MG tablet     clobetasol (TEMOVATE) 0.05 % external cream     clopidogrel (PLAVIX) 75 MG tablet     folic acid (FOLVITE) 1 MG tablet     HYDROcodone-acetaminophen (NORCO) 5-325 MG tablet     hydrOXYzine (ATARAX) 25 MG tablet     insulin glargine (LANTUS SOLOSTAR) 100 UNIT/ML pen     insulin pen needle (ULTICARE PEN NEEDLES) 31G X 5 MM miscellaneous     lisinopril (ZESTRIL) 5 MG tablet     metoprolol tartrate (LOPRESSOR) 25 MG tablet     omeprazole (PRILOSEC) 20 MG DR capsule     vitamin D2 (ERGOCALCIFEROL) 90675 units (1250 mcg) capsule     No current facility-administered medications for this visit.        ALL:     Allergies   Allergen Reactions     Metformin Diarrhea     Honey Other (See Comments)     Throat irritation       FMH:    Family History   Problem Relation Age of Onset     Coronary Artery Disease Mother      Coronary Artery Disease Father      Cerebrovascular Disease Brother      Anuerysm Sister        SocHx:    Social History     Socioeconomic History     Marital status: Single     Spouse name: Not on file     Number of children: Not on file     Years of education: Not on file     Highest education level: Not on file   Occupational History     Occupation: Manufactor    Social Needs     Financial resource strain: Not hard at all     Food insecurity     Worry: Never true     Inability: Never true     Transportation needs     Medical: No     Non-medical: No   Tobacco Use     Smoking status: Former Smoker     Packs/day: 0.33     Years: 20.00     Pack years: 6.60     Types: Cigarettes     Quit date: 2015     Years since quittin.2     Smokeless tobacco: Never Used   Substance and Sexual Activity     Alcohol use: Yes     Alcohol/week: 1.0 standard drinks     Types:  1 Standard drinks or equivalent per week     Comment: 2-6 occasionally     Drug use: No     Sexual activity: Yes   Lifestyle     Physical activity     Days per week: 5 days     Minutes per session: Not on file     Stress: To some extent   Relationships     Social connections     Talks on phone: Not on file     Gets together: Not on file     Attends Catholic service: Not on file     Active member of club or organization: Not on file     Attends meetings of clubs or organizations: Not on file     Relationship status: Not on file     Intimate partner violence     Fear of current or ex partner: No     Emotionally abused: No     Physically abused: No     Forced sexual activity: No   Other Topics Concern      Service Not Asked     Blood Transfusions Not Asked     Caffeine Concern No     Comment: 0-1 coffee daily     Occupational Exposure Not Asked     Hobby Hazards Not Asked     Sleep Concern Not Asked     Stress Concern Not Asked     Weight Concern Not Asked     Special Diet Yes     Comment: low sodium, no red meat, no butter     Back Care Not Asked     Exercise Yes     Comment: walking 2-3 days per week     Bike Helmet Not Asked     Seat Belt Not Asked     Self-Exams Not Asked     Parent/sibling w/ CABG, MI or angioplasty before 65F 55M? Not Asked   Social History Narrative     Not on file           EXAMINATION:  Gen:   No apparent distress  Neuro:   A&Ox3, no deficits  Psych:    Answering questions appropriately for age and situation with normal affect  Head:    NCAT  Eye:    Visual scanning without deficit  Ear:    Response to auditory stimuli wnl  Lung:    Non-labored breathing on RA noted  Abd:    NTND per patient report  Lymph:    Neg for pitting/non-pitting edema BLE  Vasc:    Pulses palpable, CFT minimally delayed  Neuro:    Light touch sensation intact to all sensory nerve distributions without paresthesias  Derm:    Laceration dorsal R 1st ray, near MPJ, skin edges well approximated without bleeding  noted today.  No SOI  MSK:    Right lower extremity - extension noted at MPJ, but extension at IPJ of the hallux limited.  Calf:    Neg for redness, swelling or tenderness    Assessment:  60 year old male with suspected laceration to the extensor tendon complex right great toe      Plan:  Discussed etiologies, anatomy and options  1.  Suspected laceration to the extensor tendon complex right great toe  -bandage applied.  He has been doing dressing changes to the area  -walking boot dispensed.  Advised not to wear during his drive/flight to [place]  -MRI to assess laceration, will call  -surg piedad handout dispensed.  I suspect this will need repair.    -his tetanus status was previously updated  -no indication for PO abx course  -advised scheduling this for next week Wednesday    Follow up:  Based on imaging/plan or sooner with acute issues      Patient's medical history was reviewed today      Gerard Diaz DPM FACFAS FACFAOM  Podiatric Foot & Ankle Surgeon  [facility]  [phone]          Again, thank you for allowing me to participate in the care of your patient.        Sincerely,        Gerard Diaz DPM, DPROSIO

## 2020-10-12 ENCOUNTER — NURSE TRIAGE (OUTPATIENT)
Dept: NURSING | Facility: CLINIC | Age: 60
End: 2020-10-12

## 2020-10-12 ENCOUNTER — HOSPITAL ENCOUNTER (OUTPATIENT)
Dept: MRI IMAGING | Facility: CLINIC | Age: 60
Discharge: HOME | End: 2020-10-12
Attending: PODIATRIST | Admitting: PODIATRIST
Payer: COMMERCIAL

## 2020-10-12 ENCOUNTER — TELEPHONE (OUTPATIENT)
Dept: PODIATRY | Facility: CLINIC | Age: 60
End: 2020-10-12

## 2020-10-12 ENCOUNTER — PREP FOR PROCEDURE (OUTPATIENT)
Dept: PODIATRY | Facility: CLINIC | Age: 60
End: 2020-10-12

## 2020-10-12 DIAGNOSIS — S96.921A LACERATION OF RIGHT FOOT WITH TENDON INVOLVEMENT, INITIAL ENCOUNTER: ICD-10-CM

## 2020-10-12 DIAGNOSIS — S91.311A LACERATION OF RIGHT FOOT WITH TENDON INVOLVEMENT, INITIAL ENCOUNTER: ICD-10-CM

## 2020-10-12 DIAGNOSIS — S96.821A LACERATION OF EXTENSOR TENDON OF RIGHT FOOT, INITIAL ENCOUNTER: Primary | ICD-10-CM

## 2020-10-12 PROCEDURE — 73718 MRI LOWER EXTREMITY W/O DYE: CPT | Mod: 26 | Performed by: RADIOLOGY

## 2020-10-12 PROCEDURE — 73718 MRI LOWER EXTREMITY W/O DYE: CPT | Mod: RT

## 2020-10-12 NOTE — TELEPHONE ENCOUNTER
Pt is calling.    Seen in the ED on 10/05/2020 for a laceration on his right toe on the first knuckle. Seen on 10/06/2020 by podiatrist.  It is swollen and red and he sees a red line going down his toe to the top of his foot all the way up to his ankle, per patient. Type 2 diabetic so at increase risk for infection. He was seen for this in the ED, and has stitches in it. Bleeding earlier today, and now the swelling has started after that. No other discharge seen from the wound.   I advised him that he needs to be seen in urgent care or the ED tonight.  He is unable to drive at all and has no one to drive him to the ED or urgent care. I advised him to take an Uber or other service if able. If not able, then my need to be taken by ambulance to the ED. He stated that he refuses to do that now.   I offered to contact the doctor on call. He stated that he cannot do anything for him.   Pharmacy-[facility] in [place] on . But he stated that he has no way to get to the pharmacy to  the prescription and they may be closed now as well.  He stated that he will go in and be seen tomorrow.  Care advice reviewed with him.  He verbalized understanding.    Additional Information    Negative: [1] Widespread rash AND [2] bright red, sunburn-like AND [3] too weak to stand    Negative: Sounds like a life-threatening emergency to the triager    Negative: Stitches (or staples) and  not  infected    Negative: Skin glue used to close wound and not infected    Negative:  surgical wound infection suspected    Negative: Surgical wound infection suspected (post-op)    Negative: Animal bite wound infection suspected    Negative: [1] Widespread rash AND [2] bright red, sunburn-like    Negative: Severe pain in the wound    Negative: Black (necrotic) or blisters develop in wound    Negative: Patient sounds very sick or weak to the triager    Negative: [1] Looks infected (spreading redness, red streak, pus) AND [2]  fever    [1] Red streak runs from the wound AND [2] longer than 1 inch (2.5 cm)    Protocols used: WOUND INFECTION-A-    Annika Valentine RN  [facility] Nurse Advisor  10/12/2020 at 6:32 PM

## 2020-10-12 NOTE — TELEPHONE ENCOUNTER
Foot & Ankle Surgery  October 12, 2020    I called and LVM for Mika regarding his MRI results:    Impression:     1. Full-thickness complete lacerations of the extensor pollicis longus  and extensor pollicis brevis tendons at the level of the first  metatarsal phalangeal joint.     2. Extensive edema of the intrinsic muscles of the feet and  predominantly dorsal soft tissues of the foot.    He has a trip for a wedding.  Recommend follow up as soon as possible afterwards for surgical repair.  Advised calling with any further questions.    Gerard Diaz DPM [facility]  Podiatric Foot & Ankle Surgeon  [facility]  [phone]

## 2020-10-12 NOTE — TELEPHONE ENCOUNTER
Patient also called the surgery scheduling line.  Patient sounded flustered stating he thinks he needs surgery, but wasn't sure.     If surgery is needed, please place surgery order.      Tu Mills, Surgery Scheduler

## 2020-10-12 NOTE — TELEPHONE ENCOUNTER
Message from Saji, regarding Right foot injury and MRI results from this morning.  Please call back.

## 2020-10-12 NOTE — PROGRESS NOTES
"Orders signed for \"extensor tendon repair right foot\"    MAC    Supine    No vendor    Gerard Diaz DPM FACFAS FACFAOM  Podiatric Foot & Ankle Surgeon  [facility]  [phone]      "

## 2020-10-13 ENCOUNTER — MYC MEDICAL ADVICE (OUTPATIENT)
Dept: ORTHOPEDICS | Facility: CLINIC | Age: 60
End: 2020-10-13

## 2020-10-13 DIAGNOSIS — Z11.59 ENCOUNTER FOR SCREENING FOR OTHER VIRAL DISEASES: Primary | ICD-10-CM

## 2020-10-13 PROBLEM — S96.821A: Status: ACTIVE | Noted: 2020-10-13

## 2020-10-13 NOTE — TELEPHONE ENCOUNTER
Scheduled surgery.     Type of surgery: right foot tendon repair  Location of surgery: [facility] OR  Date and time of surgery: 10/23/20 @ 1315  Surgeon: Joe  Pre-Op Appt Date: 10/20/20  Post-Op Appt Date: 11/6/20   Packet sent out: Yes  Pre-cert/Authorization completed:  No  Date: 10/13/20    Tu Mills, Surgery Scheduler

## 2020-10-14 ENCOUNTER — ALLIED HEALTH/NURSE VISIT (OUTPATIENT)
Dept: FAMILY MEDICINE | Facility: CLINIC | Age: 60
End: 2020-10-14
Payer: COMMERCIAL

## 2020-10-14 DIAGNOSIS — S96.821A: Primary | ICD-10-CM

## 2020-10-14 PROCEDURE — 99207 PR NO CHARGE NURSE ONLY: CPT

## 2020-10-14 NOTE — PROGRESS NOTES
"Chart review shows no orders for suture removal and notes indicating tendon and muscle tissue were involved. Advised patient he should reach out to podiatrist he saw regarding this injury, he refuses, stating \"there's no way I'm seeing the podiatrist any time soon.\" Patient was offered appointment with provider at this clinic to determine if safe to remove sutures. He reluctantly agrees, stating he is \"going to have to pay another copay for this,\" and \"you (RN) can tell just as easily as PA if stitches should be removed,\" advised patient it was out of scope of practice for RN to remove without provider's orders, that he will not be billed for this nurse only appointment today. Scheduled for appt with Shania Watters for tomorrow for her to assess. Patient agrees to plan of care and denies further questions or concerns.  Yuli Escobedo, BSN, RN, PHN    "

## 2020-10-15 ENCOUNTER — TELEPHONE (OUTPATIENT)
Dept: PODIATRY | Facility: CLINIC | Age: 60
End: 2020-10-15

## 2020-10-15 ENCOUNTER — OFFICE VISIT (OUTPATIENT)
Dept: FAMILY MEDICINE | Facility: CLINIC | Age: 60
End: 2020-10-15
Payer: COMMERCIAL

## 2020-10-15 ENCOUNTER — VIRTUAL VISIT (OUTPATIENT)
Dept: PODIATRY | Facility: CLINIC | Age: 60
End: 2020-10-15
Payer: COMMERCIAL

## 2020-10-15 VITALS
RESPIRATION RATE: 22 BRPM | HEART RATE: 90 BPM | HEIGHT: 71 IN | TEMPERATURE: 98.5 F | OXYGEN SATURATION: 95 % | DIASTOLIC BLOOD PRESSURE: 78 MMHG | BODY MASS INDEX: 31.44 KG/M2 | SYSTOLIC BLOOD PRESSURE: 128 MMHG | WEIGHT: 224.6 LBS

## 2020-10-15 DIAGNOSIS — Z48.02 ENCOUNTER FOR REMOVAL OF SUTURES: ICD-10-CM

## 2020-10-15 DIAGNOSIS — S91.311A LACERATION OF RIGHT FOOT WITH TENDON INVOLVEMENT, INITIAL ENCOUNTER: Primary | ICD-10-CM

## 2020-10-15 DIAGNOSIS — S96.921A LACERATION OF RIGHT FOOT WITH TENDON INVOLVEMENT, INITIAL ENCOUNTER: Primary | ICD-10-CM

## 2020-10-15 DIAGNOSIS — S96.821D LACERATION OF EXTENSOR TENDON OF RIGHT FOOT, SUBSEQUENT ENCOUNTER: Primary | ICD-10-CM

## 2020-10-15 PROCEDURE — 99207 PR NO CHARGE LOS: CPT | Performed by: PHYSICIAN ASSISTANT

## 2020-10-15 PROCEDURE — 99214 OFFICE O/P EST MOD 30 MIN: CPT | Mod: TEL | Performed by: PODIATRIST

## 2020-10-15 ASSESSMENT — MIFFLIN-ST. JEOR: SCORE: 1855.91

## 2020-10-15 NOTE — PROGRESS NOTES
"Subjective     Saji Iqbal is a 60 year old male who presents to clinic today for the following health issues:    HPI          Patient is a 60-year-old male with a history of coronary artery disease, type 2 diabetes, hypertension, hyperlipidemia anticoagulated on Plavix who presents today for follow-up from the ER and suture removal.  The patient was seen in the [facility] ER on 10/5/2020.  He indicates he dropped a knife on his right foot on 10/4/2020 and was seen in the [facility] ER.  He had sutures placed at that time however he felt he could not move the toe and was having numbness.  He was found to have full-thickness complete lacerations of 2 tendons in the foot.  He has seen podiatry and is set for surgery in approximately 1 week.  Patient is here today for suture removal.    Review of Systems   GENERAL:  No fevers  MUSCULOSKELETAL: As noted in HPI          Objective    /78 (BP Location: Right arm, Patient Position: Chair, Cuff Size: Adult Large)   Pulse 90   Temp 98.5  F (36.9  C) (Oral)   Resp 22   Ht 1.803 m (5' 11\")   Wt 101.9 kg (224 lb 9.6 oz)   SpO2 95%   BMI 31.33 kg/m    Body mass index is 31.33 kg/m .  Physical Exam   GENERAL: No acute distress  HEENT: Normocephalic  SKIN: Healing laceration over the dorsal aspect of the 1st MTP joint, base of the 1st toe. 3 sutures in place.  NEURO: Alert and non-focal        Assessment & Plan     Laceration of extensor tendon of right foot, subsequent encounter  Sutures removed, healing well. Patient has follow up with podiatry to move forward with surgery to repair the tendons.           Return if symptoms worsen or fail to improve.    Shania Watters PA-C  [facility]    "

## 2020-10-15 NOTE — PROGRESS NOTES
"Saji Iqbal is a 60 year old male who is being evaluated via a billable telephone visit.      The patient has been notified of following:     \"This telephone visit will be conducted via a call between you and your physician/provider. We have found that certain health care needs can be provided without the need for a physical exam.  This service lets us provide the care you need with a short phone conversation.  If a prescription is necessary we can send it directly to your pharmacy.  If lab work is needed we can place an order for that and you can then stop by our lab to have the test done at a later time.     Telephone visits are billed at different rates depending on your insurance coverage. During this emergency period, for some insurers they may be billed the same as an in-person visit.  Please reach out to your insurance provider with any questions.    If during the course of the call the physician/provider feels a telephone visit is not appropriate, you will not be charged for this service.\"    Patient has given verbal consent for Telephone visit?  Yes    What phone number would you like to be contacted at? [phone]    How would you like to obtain your AVS? Mail a copy    Phone call duration: 22 minutes    Telephone surgical consult for extensor tendon laceration right foot.  Discussed procedure, generic post-op course and risks    Plan:  The surgical procedure(s) was discussed in detail today.  Risks that were discussed include, but are not limited to, infection, wound healing complications, temporary or permanent nerve damage/numbness, painful scarring, possible recurrence of/new deformity, over-correction, under-correction, possible abnormal bone healing, fixation/hardware failure, continued or new pain, the need to revise the procedure, as well as blood clots, limb loss, pain syndrome and death.  After a discussion of the procedure, risks, and post-operative care and course, the patient has elected to " forego further non-operative management in favor of surgical intervention.  No guarantees were given.  The patient was informed that swelling and generalized discomfort can persist for months, and full recovery can often take up to a year.  I anticipate discontinuation of prescription pain medication at/shortly after suture removal.    Diagnosis:  above  Procedure:  Repair of extensor tendon to hallux  Activity Level:  NWB 2-6 weeks  Pain Management:  Norco, atarax, ibuprofen  DVT prophylaxis:  .  Patient instructed on ankle ROM/calf massage exercises; patient advised on early frequent ambulation  Allergies:   [unfilled]  Dispo:  Same day  WB assistive device:  PFS splint; will order boot/crutches  Smoking:  neg  Vit D status:  n/a  Clot/bleeding disorder history:   neg  Job duties/anticipated time off:  2 weeks min    Billing today is based on a time of >25 minutes, more than 50% of which was spent on counseling and coordinating care.      Gerard Diaz DPM FACFAS FACFAOM  Podiatric Foot & Ankle Surgeon  [facility]  [phone]      Juliette MUÑOZ CMA

## 2020-10-15 NOTE — TELEPHONE ENCOUNTER
Per chart review, patient called [facility] on 10/12/20 with concerns for infection of his right great toe incision. He declined urgent care due to no transportation. Patient then walked into [facility] yesterday wanting sutures removed. He was then scheduled with a provider today and seen at 1:10 and had the sutures removed. He states there was no concern for infection and sutures were removed.   Informed he has a telephone appointment with Dr. Diaz today at 4:30. He states that we scheduled that for him and not sure what it is for. Informed he should keep that appointment to go over surgery and to inform Dr. Diaz of what was done today. He was in agreement.   He states he will be at work but will take the call and try to get to a private area. He can be reached at: [phone]  Ok to leave message : yes.     Routing to provider as MARIELY White RN

## 2020-10-15 NOTE — PROGRESS NOTES
Pre-Visit Planning :     Future Appointments   Date Time Provider Department Center   10/15/2020  4:30 PM Gerard Diaz DPM UPPOD UP   10/20/2020  1:30 PM Nathan Ernst PA-C [facility] CR   10/20/2020  2:30 PM CR COVID LAB [facility] CR   10/30/2020 10:30 AM Gerard Diaz DPM [facility]   11/6/2020 10:00 AM Gerard Diaz DPM [facility]   12/17/2020  7:10 AM Naseem Esparza MD [facility] CR      Arrival Time for this Appointment:  1:10 PM      Appointment Notes for this encounter:    Pre op surgery DOS: 10/23  AN[facility]BAL Logan;Surgeon;Gerard Diaz DPM Procedure ;  Extensor tendon repair, right foot  DX: Laceration of extensor tendon of right foot,     Questionnaires Reviewed/Assigned:   YES-PHQ-9, RADHA-7    Spoke to patient via phone. Are there any additional questions or concerns you'd like to review with your provider during your visit?       Last OV with provider:  9/17/2020; Uncontrolled type 2 diabetes mellitus with hyperglycemia (H), Advanced care planning/counseling discussion, Need for vaccination, Hyperlipidemia LDL goal <100, Elevated LFTs          Hospital ER Visits:  10/5/2020; [facility] Logan; Dr. Saji Watters; Great toe pain, right    Is the visit preventive? No-PRE-OP               Specialty Visits:  10/12/2020; [facility], Dr.Jeremy Diaz; Laceration of extensor tendon of right foot, initial encounter                  Imaging and Lab Review:10/12/2020; MR right foot without  contrast ; History: laceration dorsal R 1st MPJ, weakness in extension at hallux IPJ; Laceration of right foot with tendon involvement, initial encounter; Laceration of right foot with                         tendon involvement, initial encounter; Impression: 1. Full-thickness complete lacerations of the extensor pollicis longus and extensor pollicis brevis tendons at the level of the first metatarsal phalangeal joint. 2. Extensive edema of the intrinsic                 muscles of the  "feet and predominantly dorsal soft tissues of the foot.     Recent Procedures:  N/A    MEDS ;    Current Outpatient Medications   Medication     aspirin 81 MG tablet     atorvastatin (LIPITOR) 40 MG tablet     augmented betamethasone dipropionate (DIPROLENE-AF) 0.05 % cream     augmented betamethasone dipropionate (DIPROLENE-AF) 0.05 % external ointment     blood glucose (NO BRAND SPECIFIED) lancets standard     blood glucose monitoring (ACCU-CHEK NORI PLUS) meter device kit     blood glucose monitoring (ACCU-CHEK FASTCLIX) lancets     blood glucose monitoring (NO BRAND SPECIFIED) test strip     canagliflozin (INVOKANA) 300 MG tablet     clobetasol (TEMOVATE) 0.05 % external cream     clopidogrel (PLAVIX) 75 MG tablet     gabapentin (NEURONTIN) 100 MG capsule     HYDROcodone-acetaminophen (NORCO) 5-325 MG tablet     hydrOXYzine (ATARAX) 25 MG tablet     insulin glargine (LANTUS SOLOSTAR) 100 UNIT/ML pen     insulin pen needle (ULTICARE PEN NEEDLES) 31G X 5 MM miscellaneous     lisinopril (ZESTRIL) 5 MG tablet     metoprolol tartrate (LOPRESSOR) 25 MG tablet     omeprazole (PRILOSEC) 20 MG DR capsule     vitamin D2 (ERGOCALCIFEROL) 38726 units (1250 mcg) capsule     No current facility-administered medications for this visit.       Is there anything on your medication list that needs to be updated?  Ask pt @ check-in     Health Maintenance Due   Topic Date Due     PREVENTIVE CARE VISIT  1960     COLORECTAL CANCER SCREENING  04/07/1971     HEPATITIS B IMMUNIZATION (1 of 3 - Risk 3-dose series) 04/07/1980     ZOSTER IMMUNIZATION (1 of 2) 04/07/2011     EYE EXAM  07/11/2019     Preferred pharmacy: [id number] - [facility] [address]    MyChart: YES    Questionnaire Review :  {\"Lastly, it appears there are some questions your care team has for you.    If MyChart ACTIVE \"If you get a chance to do your questions on [employer], your appointment will be much faster and smoother so feel free to sign " "in and go through those, otherwise please plan on arriving early so that you have time to complete them.\"        Patient preferred phone number: [phone]    Dasia Torres, Registered Nurse, [facility]   [phone]    "

## 2020-10-15 NOTE — LETTER
"    10/15/2020         RE: Saji Iqbal  [address] [address]        Dear Colleague,    Thank you for referring your patient, Saji Iqbal, to the [facility]. Please see a copy of my visit note below.    Saji Iqbal is a 60 year old male who is being evaluated via a billable telephone visit.      The patient has been notified of following:     \"This telephone visit will be conducted via a call between you and your physician/provider. We have found that certain health care needs can be provided without the need for a physical exam.  This service lets us provide the care you need with a short phone conversation.  If a prescription is necessary we can send it directly to your pharmacy.  If lab work is needed we can place an order for that and you can then stop by our lab to have the test done at a later time.     Telephone visits are billed at different rates depending on your insurance coverage. During this emergency period, for some insurers they may be billed the same as an in-person visit.  Please reach out to your insurance provider with any questions.    If during the course of the call the physician/provider feels a telephone visit is not appropriate, you will not be charged for this service.\"    Patient has given verbal consent for Telephone visit?  Yes    What phone number would you like to be contacted at? [phone]    How would you like to obtain your AVS? Mail a copy    Phone call duration: 22 minutes    Telephone surgical consult for extensor tendon laceration right foot.  Discussed procedure, generic post-op course and risks    Plan:  The surgical procedure(s) was discussed in detail today.  Risks that were discussed include, but are not limited to, infection, wound healing complications, temporary or permanent nerve damage/numbness, painful scarring, possible recurrence of/new deformity, over-correction, under-correction, possible abnormal bone healing, " fixation/hardware failure, continued or new pain, the need to revise the procedure, as well as blood clots, limb loss, pain syndrome and death.  After a discussion of the procedure, risks, and post-operative care and course, the patient has elected to forego further non-operative management in favor of surgical intervention.  No guarantees were given.  The patient was informed that swelling and generalized discomfort can persist for months, and full recovery can often take up to a year.  I anticipate discontinuation of prescription pain medication at/shortly after suture removal.    Diagnosis:  above  Procedure:  Repair of extensor tendon to hallux  Activity Level:  NWB 2-6 weeks  Pain Management:  Norco, atarax, ibuprofen  DVT prophylaxis:  .  Patient instructed on ankle ROM/calf massage exercises; patient advised on early frequent ambulation  Allergies:   [unfilled]  Dispo:  Same day  WB assistive device:  PFS splint; will order boot/crutches  Smoking:  neg  Vit D status:  n/a  Clot/bleeding disorder history:   neg  Job duties/anticipated time off:  2 weeks min    Billing today is based on a time of >25 minutes, more than 50% of which was spent on counseling and coordinating care.      Gerard Diaz DPM FACFAS FACFAOM  Podiatric Foot & Ankle Surgeon  [facility]  [phone]      Juliette MUÑOZ CMA            Again, thank you for allowing me to participate in the care of your patient.        Sincerely,        Gerard Diaz DPM, SARAH

## 2020-10-15 NOTE — PATIENT INSTRUCTIONS
Thank you for choosing [facility] / Foot & Ankle Surgery!    DR. ONEAL'S CLINIC LOCATIONS:   [id number] [facility] [address]   [place] [address]                        [place] [address]   [phone]      SET UP SURGERY: [phone]   APPOINTMENTS: [phone]   BILLING QUESTIONS: [phone]   FAX NUMBER: [phone]     FOOT & ANKLE SURGICAL RISKS  Undergoing surgical procedure involves a certain amount of risks. Risks of complications vary, depending on the complexity of the surgery and how you take care of the surgical area during the healing process. Complications can range from minor infection to death. Some complications are temporary while others will be permanent. Your surgeon weighs the risk of complications versus the potential benefit of undergoing the procedure. You need to consider your tolerance for unexpected problems as you decide whether to undergo surgery.    Foot and ankle surgery involves cutting skin, bone, ligaments, blood vessels, and joints. These structures heal well but not without consequence. Any break in the skin can lead to infection. A deep infection can involve bone or joints, which can be devastating. Deep infection can lead to further surgeries such as amputation or could spread to other parts of the body. Most infections are minor and easily treated with oral antibiotics. Infections are often times from bacteria already present on your skin. Proper care of the surgical site is an essential component of avoiding infection. Do not get the bandage wet and take proper care of external pins to avoid these complications.    Joint stiffness is inherent to any foot or ankle surgery. Joint surgery is a major component of reconstructive foot and ankle procedures. The ligaments and tissue around the joint are released for  exposure and/or correction of alignment. Scar tissue limits joint mobility. This can lead to hypersensitivity to touch, pain, problems wearing shoes, and need for revision surgery.    Bones do not always heal after surgery. Poor healing after a bone cut of joint fusion can lead to an extended period of casting, bone stimulators, or repeat surgery. A surgical nonunion is when bones do not heal properly. Smoking will almost always increase your risks of having postoperative complications. So if you smoke, quit now.    Bone grafting is sometimes necessary during the original or repeat surgery. Bone is sometimes taken from other parts of the body, freeze-dried cadaveric bone, or synthetic bone grafts may be used as needed.    BLOOD CLOT PREVENTION & EDUCATION  Foot and ankle surgery can lead to blood clots in the large veins of your legs. This is called a deep venous thrombosis or DVT. A DVT can be life threatening if a portion of the clot breaks away and travels to the lungs. A clot in the lungs is called a pulmonary embolism or PE.    Your risk of developing a DVT is dependent on many factors. Risk factors associated with surgery include the type of surgery you are having (foot and ankle surgery is considered a much lower risk that knee, hip, or abdominal surgery), how long you are in a cast/boot, restricted ambulation, inability to move the ankle, and if you are hospitalized after surgery.    A number of medical conditions also increase your risk of DVT, including diabetes, use of estrogen medications such as birth control pills or postmenopausal medications, obesity, pregnancy, heart failure, cancer, etc.    Other risk factors include heavy smoking, advanced age (over 60 years old, but even those over 40 have increased risk), family of personal history of blood clots or clotting disorders. Symptoms of a DVT in the leg may include swelling, tenderness, a warm feeling or redness. A DVT can occur in both legs even if  only one side is being treated. If you have these symptoms, call your doctor immediately.    Symptoms of a PE include chest pain, shortness of breath or the need to breath rapidly that is not associated with exercise, difficulty breathing, rapid heart rate, a feeling of passing out, and coughing or coughing up blood. A PE is an emergency so you need to be evaluated in the ER immediately if any of these symptoms occur. You could die from a PE. Call 911 right away. PE and DVT can occur without any symptoms in the leg and chest.    Prevention of DVT and PE is important. Your doctor may apply various types of compression to your legs before and after surgery. In addition, high risk patients may be place on a short course of blood thinning medication after surgery.    You can reduce your risk for DVT after surgery by getting up and moving/crawling/crutching around the house once or twice each hour while awake in the first few weeks. Seated range of motion exercises of your ankle and leg may also help. Moving your legs keeps blood flowing through your leg veins and reduced any pooling of blood that may clot. Be sure to follow your doctor's instructions on elevation and weight bearing restrictions.      SURGICAL DRESSING  Your surgical dressing is a sterile dressing and should be left in place until removed by your doctor or their assistant at your first postop visit in clinic. Keep the dressing dry by covering it with a plastic bag during showers, taking baths with your surgical foot hanging outside of the tub, or by sponge bathing. If the dressing does become wet or dirty, call the clinic as it most likely needs to be changed. Do not change it yourself unless told otherwise by your surgeon. The safest plan is to wait to shower for three days after surgery. So take a long shower the morning of surgery.    Do not wear regular shoes with a surgical bandage and/or external pins in your foot. Wear loose fitting clothing that  will easily slide over the dressing. Do not cover your surgical foot with blankets as it can contaminate the dressing. Also, remember to keep it away from your pets as they may try to chew on it.    If your surgeon places external pins in your foot, you must keep your foot dry until the pins are removed at six to eight weeks after surgery. Pins should be covered for protection but still examine the pin sites for loosening, movement, infection, or drainage whenever possible. Do not apply ointment on the pin sites and never push a loose pin back into place.    PRESURGICAL MEDICATION RECOMMENDATIONS  Certain prescription, over-the-counter and herbal medications interfere with healing after an operation. The main concern related to medications that increase bleeding at the surgical site. Excess blood under the incision results in poor wound healing, excess pain, increased scarring, and a higher risk of infection.    Some medications slow the healing process of bone. Medications can also interfere with anesthesia drugs that keep you asleep during the operation. It is important to ensure that these medications are out of your system prior to the operation. The list below details a number of medications that are of concern. Pay special attention to how long you should avoid these medications prior to surgery. Please note that this list is not complete. You should ask your surgeon, pharmacist, or primary care physician if you are uncertain about other medications. Any herbal supplement not listed should be discontinued at least one week prior to surgery.    Aspirin: stop one week prior and may restart the day after surgery. This medication promotes bleeding.  Motrin/Ibuprofen/Aleve/Advil/NSAIDS: stop one week prior to surgery. These medications promote bleeding and may delay bone healing. Avoid these medications at least six weeks postop.  Coumadin: your primary care doctor will manage your coumadin in relation to  surgery.  Enbrel: stop two weeks prior and may restart two weeks after. It may affect soft tissue healing and increase infection risk.  Remicade: stop eight to twelve weeks prior and may restart two weeks after. It can affect soft tissue healing and increase infection risk.  Humera: stop four weeks prior and may restart two weeks after. It can affect soft tissue healing and increase infection risk.  Methotrexate: stop taking on dose prior to surgery. It may be restarted when the wound is healing well.  Kava: stop at least one day prior and may restart one day after. It can cause increased sedative effects of anesthetics.  Ephedra (ma martinez): stop at least one day prior and may restart one day after. It can increase risk of heart attack or stroke and bleeding at the surgical site.  Munira's Wort: stop at least five days prior and may restart one day after. It can diminish the effects of medications given during surgery.  Ginseng: stop at least one week prior and may restart one day after. It lowers blood sugar and can increase bleeding at the surgical site.  Ginkgo: stop 36 hours prior and may restart one day after. It can increase bleeding at the surgical site.  Garlic: stop at least one week prior and may restart one day after.  Valerian: slowly decrease the dose over a few weeks prior to avoid withdrawal symptoms. It can increase sedative effects of anesthetics.  Echinacea: no stop/start date. It can be associated with allergic reactions and suppression of your immune system.  Vitamin E/Omgea-3/Fish Oil/Flax/Glucosamine/Chondroitin: stop two weeks prior and may restart one day after. They can increase bleeding at the surgical site.      TIPS FOR SUCCESSFUL POST-OP HEALING  How you care for your surgical site is critically important to achieve successful results. Avoidance of injury, infection, excess swelling, scar tissue, and stiffness are completely dependent on the care you provide over the next six weeks  after surgery.    Your foot requires significant rest and elevation. Sitting for long hours with your foot elevated, however, will create its own problems. Expect muscle aches, back pain, cramps etc. Optimal posture, lumbar support, back exercises, ice, and heat may help with your new aches and pains. DO not apply a heating pad to your foot or leg, as this can worsen pain or swelling. Instead apply ice packs behind the involved knee. Do not apply it directly to the skin.    Narcotic pain medications and inactivity may also cause constipation. Limiting the use of these narcotics will help minimize this problem. The pain medication will not completely alleviate your pain. The purpose of pain pills is to take the edge off and help you get through the first few days. You can substitute extra strength Tylenol if pain is milder. Do not take Tylenol and prescription pain pills at the same time. Do not take more than 4,000mg of Tylenol in 24 hours. You can also minimize constipation by drinking plenty of water, eating lots of fruits and veggies, and taking the appropriate amount of Metamucil or other fiber supplements. These measures should be continued while on narcotic pain medications and if you remain inactive.    Showering can be a major challenge after surgery. Your incision requires about three days to become sealed from water. Your bandage should not get wet or removed. Attempt to avoid showing for three days after surgery and try a sponge bath instead. It can be dangerous showering while standing on only one foot so be careful. Consider borrowing a shower chair. If the bandage does get wet, call us immediately for a dressing change as this can slow the healing process or cause infection.    External pins need to be kept dry without ointment or moisture. Keep them covered at all times. Protect them from clothing, blankets, and pets.  Any change or movement of the pins deserves a call to clinic. A loose pin will need  to be removed. Never push them back into your foot.    Stiffness will develop after any operation due to scarring. The scar tissue begins to form immediately after the surgery. Inactivity can cause excess stiffness and may lead to blood clots, scar tissue, and adhesions.        SCAR CARE  Scarring is an unfortunate but unavoidable part of surgery. Every person scars differently and there is no way to predict how an individual's scar will look. Once the sutures are removed, there are a few things you can do to help minimize the scar tissue formation.    As soon as the skin is incised during surgery, the body is taking steps to prepare for healing. After about three days, the body has sent cells to the incision to begin the healing process. These cells, called fibroblasts, make collagen, a protein in the skin that helps provide strength. Once the skin has been sufficiently strengthened, the sutures are removed. Over the next year, the body synthesizes new collagen and breaks down old collagen to help achieve a strong scar that allows the foot and ankle to function appropriately. This is where patients can help the appearance of the scare, as it will change over the next year.    1. Do not expose the scar to the sun for one year.  2. Wear shoes/socks or cover your scar with zinc oxide  3. Any sun exposure can permanently darken the scar  4. Massage the scar 2-3 times a day to break down the collagen  5. Apply lotion/Vitamin E on the scar using some pressure  6. Try over the counter products like Mederma/Scar Zone    SHOWERING BEFORE SURGERY  You must wash with the soap twice before coming to the hospital for your surgery. This will decrease bacteria (germs) on your skin. It will also help reduce your chance of infection (illness caused by germs) after surgery.  Read the directions and safety tips on the bottle of soap. Wash once the evening before surgery and once the morning of surgery. Use 4 ounces of soap each time.  When showering, it is best to use 2 fresh washcloths and a fresh towel.   Items you will need for showerin newly washed washcloths    2 newly washed towels    8 ounces of soap  FOLLOW THESE INSTRUCTIONS:  The evening before surgery   1. Shower or bathe as you normally would, and use your regular soap and a clean washcloth. Give special attention to places where your incision (surgical cut) or catheters will be. This includes your groin area. Rinse well. You may wash your hair with your regular shampoo.  2. Next, wash your entire body from your chin down with the antiseptic soap. See the next page for directions about how to do this.  3. Rinse well and dry off using a newly washed towel.  The morning of surgery  Repeat steps 1, 2 and 3.   Other suggestions:    Wear freshly washed pajamas or clothing after your evening shower.    Wear freshly washed clothes the day of surgery.    Wash and change your bed sheets the day before surgery to have clean bed sheets after you shower and when you get home from surgery.    If you have trouble washing all areas, make sure someone helps you.    Don t use any deodorant, lotion or powder after your shower.    Women who are menstruating should wear a fresh sanitary pad to the hospital.    Hibiclens - 4% CHG  1. Put about 1 ounce of soap onto a clean, damp washcloth. Wash your skin from your chin down to your toes. Pay special attention to your incision areas.  2. Gently rub your incision area and surrounding areas.  3. Repeat steps 1 and 2 until you have used 4 ounces. Make sure you gently rub your incision area and surrounding areas for a full 5 minutes.   4. Rinse with water and towel dry with a clean towel.       * If you have any post-operative questions regarding your procedure, call our triage team at the [facility] at [phone] (option 2 > option 3).

## 2020-10-16 ASSESSMENT — MIFFLIN-ST. JEOR: SCORE: 1848.65

## 2020-10-20 ENCOUNTER — TELEPHONE (OUTPATIENT)
Dept: FAMILY MEDICINE | Facility: CLINIC | Age: 60
End: 2020-10-20

## 2020-10-20 ENCOUNTER — OFFICE VISIT (OUTPATIENT)
Dept: FAMILY MEDICINE | Facility: CLINIC | Age: 60
End: 2020-10-20
Payer: COMMERCIAL

## 2020-10-20 VITALS
HEART RATE: 84 BPM | RESPIRATION RATE: 20 BRPM | DIASTOLIC BLOOD PRESSURE: 92 MMHG | HEIGHT: 71 IN | WEIGHT: 226 LBS | SYSTOLIC BLOOD PRESSURE: 150 MMHG | BODY MASS INDEX: 31.64 KG/M2 | TEMPERATURE: 99.1 F

## 2020-10-20 DIAGNOSIS — Z01.818 PREOP GENERAL PHYSICAL EXAM: Primary | ICD-10-CM

## 2020-10-20 DIAGNOSIS — Z11.59 ENCOUNTER FOR SCREENING FOR OTHER VIRAL DISEASES: ICD-10-CM

## 2020-10-20 DIAGNOSIS — I44.7 LEFT BUNDLE BRANCH BLOCK (LBBB) DETERMINED BY ELECTROCARDIOGRAPHY: ICD-10-CM

## 2020-10-20 LAB
BASOPHILS # BLD AUTO: 0.1 10E9/L (ref 0–0.2)
BASOPHILS NFR BLD AUTO: 1 %
DIFFERENTIAL METHOD BLD: ABNORMAL
EOSINOPHIL # BLD AUTO: 0.2 10E9/L (ref 0–0.7)
EOSINOPHIL NFR BLD AUTO: 2 %
ERYTHROCYTE [DISTWIDTH] IN BLOOD BY AUTOMATED COUNT: 12 % (ref 10–15)
HBA1C MFR BLD: 6.9 % (ref 0–5.6)
HCT VFR BLD AUTO: 45 % (ref 40–53)
HGB BLD-MCNC: 15.4 G/DL (ref 13.3–17.7)
LYMPHOCYTES # BLD AUTO: 1.4 10E9/L (ref 0.8–5.3)
LYMPHOCYTES NFR BLD AUTO: 15.3 %
MCH RBC QN AUTO: 33.6 PG (ref 26.5–33)
MCHC RBC AUTO-ENTMCNC: 34.2 G/DL (ref 31.5–36.5)
MCV RBC AUTO: 98 FL (ref 78–100)
MONOCYTES # BLD AUTO: 1 10E9/L (ref 0–1.3)
MONOCYTES NFR BLD AUTO: 10.9 %
NEUTROPHILS # BLD AUTO: 6.3 10E9/L (ref 1.6–8.3)
NEUTROPHILS NFR BLD AUTO: 70.8 %
PLATELET # BLD AUTO: 169 10E9/L (ref 150–450)
RBC # BLD AUTO: 4.58 10E12/L (ref 4.4–5.9)
WBC # BLD AUTO: 9 10E9/L (ref 4–11)

## 2020-10-20 PROCEDURE — U0003 INFECTIOUS AGENT DETECTION BY NUCLEIC ACID (DNA OR RNA); SEVERE ACUTE RESPIRATORY SYNDROME CORONAVIRUS 2 (SARS-COV-2) (CORONAVIRUS DISEASE [COVID-19]), AMPLIFIED PROBE TECHNIQUE, MAKING USE OF HIGH THROUGHPUT TECHNOLOGIES AS DESCRIBED BY CMS-2020-01-R: HCPCS | Performed by: PODIATRIST

## 2020-10-20 PROCEDURE — 99214 OFFICE O/P EST MOD 30 MIN: CPT | Performed by: PHYSICIAN ASSISTANT

## 2020-10-20 PROCEDURE — 36415 COLL VENOUS BLD VENIPUNCTURE: CPT | Performed by: PHYSICIAN ASSISTANT

## 2020-10-20 PROCEDURE — 93000 ELECTROCARDIOGRAM COMPLETE: CPT | Performed by: PHYSICIAN ASSISTANT

## 2020-10-20 PROCEDURE — 83036 HEMOGLOBIN GLYCOSYLATED A1C: CPT | Performed by: PHYSICIAN ASSISTANT

## 2020-10-20 PROCEDURE — 85025 COMPLETE CBC W/AUTO DIFF WBC: CPT | Performed by: PHYSICIAN ASSISTANT

## 2020-10-20 ASSESSMENT — ANXIETY QUESTIONNAIRES
GAD7 TOTAL SCORE: 6
GAD7 TOTAL SCORE: 6
5. BEING SO RESTLESS THAT IT IS HARD TO SIT STILL: NOT AT ALL
4. TROUBLE RELAXING: SEVERAL DAYS
6. BECOMING EASILY ANNOYED OR IRRITABLE: SEVERAL DAYS
2. NOT BEING ABLE TO STOP OR CONTROL WORRYING: SEVERAL DAYS
1. FEELING NERVOUS, ANXIOUS, OR ON EDGE: SEVERAL DAYS
7. FEELING AFRAID AS IF SOMETHING AWFUL MIGHT HAPPEN: SEVERAL DAYS
3. WORRYING TOO MUCH ABOUT DIFFERENT THINGS: SEVERAL DAYS
7. FEELING AFRAID AS IF SOMETHING AWFUL MIGHT HAPPEN: SEVERAL DAYS
GAD7 TOTAL SCORE: 6

## 2020-10-20 ASSESSMENT — MIFFLIN-ST. JEOR: SCORE: 1862.26

## 2020-10-20 NOTE — TELEPHONE ENCOUNTER
Please call patient. I spoke with cardiology and they will not clear him for surgery without a consult first. I placed a cardiology referral. Please give him cardiology number: [phone]. He will likely need to cancel surgery as this is unlikely to happen in the next couple days.     Nathan Ernst PA-C on 10/20/2020 at 3:19 PM

## 2020-10-20 NOTE — TELEPHONE ENCOUNTER
And the stress echo should be cancelled. Cardiology will decide what additional testing is needed.    Nathan Ernst PA-C on 10/20/2020 at 3:25 PM

## 2020-10-20 NOTE — TELEPHONE ENCOUNTER
Patient informed. Mika Melchor is scheduled with a cardiologist 10/22/20.   Alexsander Llanes RN

## 2020-10-20 NOTE — PATIENT INSTRUCTIONS

## 2020-10-21 LAB
SARS-COV-2 RNA SPEC QL NAA+PROBE: NOT DETECTED
SPECIMEN SOURCE: NORMAL

## 2020-10-21 ASSESSMENT — ANXIETY QUESTIONNAIRES: GAD7 TOTAL SCORE: 6

## 2020-10-22 ENCOUNTER — OFFICE VISIT (OUTPATIENT)
Dept: CARDIOLOGY | Facility: CLINIC | Age: 60
End: 2020-10-22
Attending: PHYSICIAN ASSISTANT
Payer: COMMERCIAL

## 2020-10-22 ENCOUNTER — TELEPHONE (OUTPATIENT)
Dept: FAMILY MEDICINE | Facility: CLINIC | Age: 60
End: 2020-10-22

## 2020-10-22 VITALS
HEART RATE: 76 BPM | BODY MASS INDEX: 31.11 KG/M2 | DIASTOLIC BLOOD PRESSURE: 82 MMHG | SYSTOLIC BLOOD PRESSURE: 142 MMHG | HEIGHT: 71 IN | WEIGHT: 222.2 LBS

## 2020-10-22 DIAGNOSIS — F41.1 GENERALIZED ANXIETY DISORDER: Primary | ICD-10-CM

## 2020-10-22 DIAGNOSIS — I10 BENIGN ESSENTIAL HYPERTENSION: ICD-10-CM

## 2020-10-22 DIAGNOSIS — Z01.818 PREOP GENERAL PHYSICAL EXAM: ICD-10-CM

## 2020-10-22 DIAGNOSIS — I44.7 LEFT BUNDLE BRANCH BLOCK (LBBB) DETERMINED BY ELECTROCARDIOGRAPHY: ICD-10-CM

## 2020-10-22 DIAGNOSIS — I25.10 CORONARY ARTERY DISEASE INVOLVING NATIVE CORONARY ARTERY OF NATIVE HEART WITHOUT ANGINA PECTORIS: ICD-10-CM

## 2020-10-22 PROCEDURE — 99204 OFFICE O/P NEW MOD 45 MIN: CPT | Performed by: INTERNAL MEDICINE

## 2020-10-22 RX ORDER — CLOPIDOGREL BISULFATE 75 MG/1
75 TABLET ORAL DAILY
Status: ON HOLD | COMMUNITY
End: 2020-10-23

## 2020-10-22 RX ORDER — ROSUVASTATIN CALCIUM 40 MG/1
40 TABLET, COATED ORAL AT BEDTIME
Qty: 90 TABLET | Refills: 11 | Status: SHIPPED | OUTPATIENT
Start: 2020-10-22 | End: 2022-07-07 | Stop reason: SINTOL

## 2020-10-22 RX ORDER — LISINOPRIL 10 MG/1
10 TABLET ORAL DAILY
Qty: 90 TABLET | Refills: 11 | Status: SHIPPED | OUTPATIENT
Start: 2020-10-22 | End: 2021-07-08

## 2020-10-22 RX ORDER — CLOBETASOL PROPIONATE 0.5 MG/G
OINTMENT TOPICAL 2 TIMES DAILY
Status: ON HOLD | COMMUNITY
End: 2020-10-23

## 2020-10-22 ASSESSMENT — MIFFLIN-ST. JEOR: SCORE: 1845.02

## 2020-10-22 NOTE — TELEPHONE ENCOUNTER
We talked with [facility] admitting nurse [phone]. Informed cardiology visit 1:15 today will determine if surgery is a go tomorrow.   She is unable to see Nathan's note (font color is light blue). Nathan corrected font color in visit. Left message for nurse to call back if questions.   Message handled by EMBER RN with huddle - provider name: ANICETO Deshpande RN (direct phone [phone])

## 2020-10-22 NOTE — TELEPHONE ENCOUNTER
Nurse from [facility] is requesting the physical notes to be completed for his surgery tomorrow.

## 2020-10-22 NOTE — PROGRESS NOTES
Cardiology Clinic Consultation:    October 22, 2020   Patient Name: Saji Iqbal  Patient MRN: [id number]    Consult reason: pre-op cardiac assessment     HPI and Assessment and Plan/Recommendations:    Please see dictation. #[id number]    Thank you for allowing our team to participate in the care of Saji Iqbal.  Please do not hesitate to call or page me with any questions or concerns.    Sincerely,     Edgar Grande MD, [facility]  Cardiology  Text Page   October 22, 2020    cc  Nathan Ernst PA-C  [address] [place] [address]    Past Medical History:     Past Medical History:   Diagnosis Date     Cluster headache      Coronary artery disease      Depression      Diabetes mellitus, type II (H)      Gastroesophageal reflux disease      Heart attack (H)      Hyperlipidemia      Hypertension      Renal disease      hx kidney stones     Rotator cuff arthropathy      Sleep apnea     doesn't use cpap-is broken     Stented coronary artery         Past Surgical History:   Past Surgical History:   Procedure Laterality Date     ARTHROSCOPY SHOULDER       STENT, CORONARY, ESAU  2014, 2015       Medications (outpatient):  Current Outpatient Medications   Medication Sig Dispense Refill     aspirin 81 MG tablet Take by mouth daily       atorvastatin (LIPITOR) 40 MG tablet Take 1 tablet (40 mg) by mouth daily 90 tablet 1     augmented betamethasone dipropionate (DIPROLENE-AF) 0.05 % external ointment Apply two times per day for 14 days to arms, legs or trunk. Then when needed 50 g 3     blood glucose (NO BRAND SPECIFIED) lancets standard Use to test blood sugar 1 times daily or as directed. 1 Box 3     blood glucose monitoring (ACCU-CHEK NORI PLUS) meter device kit Use to test blood sugar 4 times daily or as directed. (pts machine is not working) 1 kit 0     blood glucose monitoring (ACCU-CHEK FASTCLIX) lancets Use to test blood sugar 4 times daily or as directed. 2 Box 3     blood glucose monitoring (NO  BRAND SPECIFIED) test strip Use to test blood sugar 4 times daily or as directed. Please dispense Accu Chek Ana plus test strips or per patient preference if using a different brand 150 Box 3     canagliflozin (INVOKANA) 300 MG tablet Take 1 tablet (300 mg) by mouth every morning (before breakfast) . 90 tablet 1     gabapentin (NEURONTIN) 100 MG capsule TAKE ONE CAPSULE BY MOUTH THREE TIMES DAILY  90 capsule 0     insulin glargine (LANTUS SOLOSTAR) 100 UNIT/ML pen Inject 24 Units Subcutaneous 2 times daily 45 mL 1     insulin pen needle (ULTICARE PEN NEEDLES) 31G X 5 MM miscellaneous USE 2 DAILY OR AS DIRECTED 100 each 0     lisinopril (ZESTRIL) 5 MG tablet TAKE 1 TABLET BY MOUTH ONE TIME DAILY  90 tablet 0     metoprolol tartrate (LOPRESSOR) 25 MG tablet Take 1 tablet (25 mg) by mouth 2 times daily 180 tablet 1     omeprazole (PRILOSEC) 20 MG DR capsule TAKE 1 CAPSULE BY MOUTH ONE TIME DAILY  90 capsule 0     vitamin D2 (ERGOCALCIFEROL) 75072 units (1250 mcg) capsule Take 1 capsule (50,000 Units) by mouth once a week (Patient not taking: Reported on 10/22/2020) 50 capsule 0       Allergies:  Allergies   Allergen Reactions     Metformin Diarrhea     Honey Other (See Comments)     Throat irritation       Social History:   History   Drug Use No      History   Smoking Status     Former Smoker     Packs/day: 0.33     Years: 20.00     Types: Cigarettes     Quit date: 7/17/2015   Smokeless Tobacco     Never Used     Social History    Substance and Sexual Activity      Alcohol use: Yes        Alcohol/week: 1.0 standard drinks        Types: 1 Standard drinks or equivalent per week        Comment: 4-5 drinks nightly on weekend       Family History:  Family History   Problem Relation Age of Onset     Coronary Artery Disease Mother      Coronary Artery Disease Father      Cerebrovascular Disease Brother      Anuerysm Sister        Review of Systems:   A complete review of systems was negative except as mentioned in the  "History of Present Illness.     Objective & Physical Exam:  BP (!) 142/82 (BP Location: Right arm, Patient Position: Sitting, Cuff Size: Adult Large)   Pulse 76   Ht 1.803 m (5' 11\")   Wt 100.8 kg (222 lb 3.2 oz)   BMI 30.99 kg/m    Wt Readings from Last 2 Encounters:   10/22/20 100.8 kg (222 lb 3.2 oz)   10/20/20 102.5 kg (226 lb)     Body mass index is 30.99 kg/m .   Body surface area is 2.25 meters squared.     Constitutional: appears stated age, appears to be well nourished, anxious  Eyes: sclera anicteric, conjunctiva normal  ENT: normocephalic, without obvious abnormality, atraumatic  Pulmonary: clear to auscultation bilaterally, no wheezes, no rales, no increased work of breathing  Cardiovascular: JVP normal, regular rate, regular rhythm, 2/6 PAUL at the RUSB, no lower extremity edema  Gastrointestinal: abdominal exam benign  Neurologic: awake, alert, face symmetrical, moves all extremities  Skin: no jaundice  Psychiatric: affect is normal, answers questions appropriately, oriented to self and place    Data reviewed:  Lab Results   Component Value Date    WBC 9.0 10/20/2020    RBC 4.58 10/20/2020    HGB 15.4 10/20/2020    HCT 45.0 10/20/2020    MCV 98 10/20/2020    MCH 33.6 (H) 10/20/2020    MCHC 34.2 10/20/2020    RDW 12.0 10/20/2020     10/20/2020     Sodium   Date Value Ref Range Status   06/16/2020 137 133 - 144 mmol/L Final     Potassium   Date Value Ref Range Status   06/16/2020 3.9 3.4 - 5.3 mmol/L Final     Chloride   Date Value Ref Range Status   06/16/2020 103 94 - 109 mmol/L Final     Carbon Dioxide   Date Value Ref Range Status   06/16/2020 27 20 - 32 mmol/L Final     Anion Gap   Date Value Ref Range Status   06/16/2020 7 3 - 14 mmol/L Final     Glucose   Date Value Ref Range Status   06/16/2020 212 (H) 70 - 99 mg/dL Final     Urea Nitrogen   Date Value Ref Range Status   06/16/2020 19 7 - 30 mg/dL Final     Creatinine   Date Value Ref Range Status   06/16/2020 0.92 0.66 - 1.25 mg/dL " Final     GFR Estimate   Date Value Ref Range Status   06/16/2020 >90 >60 mL/min/[1.73_m2] Final     Comment:     Non  GFR Calc  Starting 12/18/2018, serum creatinine based estimated GFR (eGFR) will be   calculated using the Chronic Kidney Disease Epidemiology Collaboration   (CKD-EPI) equation.       Calcium   Date Value Ref Range Status   06/16/2020 9.5 8.5 - 10.1 mg/dL Final     Bilirubin Total   Date Value Ref Range Status   09/17/2020 0.8 0.2 - 1.3 mg/dL Final     Alkaline Phosphatase   Date Value Ref Range Status   09/17/2020 71 40 - 150 U/L Final     ALT   Date Value Ref Range Status   09/17/2020 158 (H) 0 - 70 U/L Final     AST   Date Value Ref Range Status   09/17/2020 139 (H) 0 - 45 U/L Final     Recent Labs   Lab Test 06/16/20  1048 05/13/19  1547   CHOL 265* 299*   HDL 42 53   * 182*   TRIG 241* 322*      Lab Results   Component Value Date    A1C 6.9 10/20/2020    A1C 7.1 09/17/2020    A1C 9.1 06/16/2020    A1C 8.3 11/12/2019    A1C 8.4 05/13/2019

## 2020-10-22 NOTE — LETTER
10/22/2020      Naseem Esparza MD  [address]  [facility] [address]      RE: Saji Iqbal       Dear Colleague,    I had the pleasure of seeing Saji Salasar in the [facility].    Service Date: 10/22/2020      CARDIOLOGY CLINIC CONSULT NOTE      CONSULT INDICATION:  Preoperative cardiac risk assessment, new left bundle branch block on ECG.      HISTORY OF PRESENT ILLNESS:      I had the opportunity to see patient Mika Iqbal today in Cardiology Clinic for consultation.  As you know, he is a 60-year-old male with a past medical history significant for coronary artery disease with NSTEMI (status post PCI with BMS to LAD 01/2014, PCI with BMS to LAD 07/2015), prior smoking, current alcohol dependence, sleep apnea not on CPAP therapy, abdominal aortic aneurysm, hypertension, diabetes, hyperlipidemia, anxiety, who presents for preoperative cardiac risk assessment prior to right foot surgery.      The patient previously lived in [place] and received much of his cardiac care through the [facility].  Per chart review, the patient presented in [place] with left shoulder pain 01/2014 and ultimately underwent cardiac catheterization with PCI, BMS to LAD.  Subsequently, he developed similar left shoulder discomfort 07/2015, and repeat coronary angiography showed a 90% LAD stenosis proximal to the initial stent, which was stented again with a bare-metal stent.  The reasoning for bare-metal stents was concern for medication compliance.      The patient reports that at baseline he is extremely physically active, works a physically demanding job, walks approximately 6-8 miles daily.  He denies any chest pain, chest pressure or abnormal shortness of breath.  He denies any recurrence of his anginal symptoms.  He denies any palpitations, dizziness or lightheadedness.  He denies any abnormal weight gain, lower extremity swelling.      Unfortunately, approximately 1 month ago while  cutting vegetables, his knife dropped, and he suffered a laceration to his right foot.  He is planned for surgery with Dr. Diaz.      He was seen for preoperative cardiac risk assessment by SARAH Deshpande, on 10/20/2020.  An ECG was done at that time, which showed a normal sinus rhythm with a left bundle branch block.  The left bundle branch block was new from an ECG 12/26/2017.      Last TTE through [facility] 01/30/2014 showed normal biventricular function.  There was noted to be hypokinesis of the interseptum.  He was noted to have a mildly elevated estimated RVSP of 40 mmHg plus mean right atrial pressure.  He was also noted to have left ventricular hypertrophy.      ASSESSMENT AND PLAN/RECOMMENDATIONS:      1.  Preoperative cardiac risk assessment.  The patient reports that prior to sustaining this right foot injury approximately 1 month ago, he was able to walk for 6-8 miles daily without abnormal symptoms, able to scale 2-3 flights of stairs without abnormal shortness of breath or chest discomfort.  Per guidelines, no further cardiac diagnostic testing is necessary prior to proceeding.  However, with his significant history of coronary artery disease with multiple stents to his LAD as well as this new left bundle branch block on ECG, we will proceed with a repeat TTE.  He denies any symptoms concerning for angina.   2.  Coronary artery disease with NSTEMI, status post BMS to LAD in 2014, BMS to LAD in 2015.  Treated by the Cardiology group at [facility] in [place].  The reason for bare-metal stent versus drug-eluting stent was concern for medication adherence.   3.  Hypertension.   4.  Hyperlipidemia.   5.  Diabetes.   6.  Alcohol dependence, drinks approximately 4-5 beverages daily on the weekends.   7.  Sleep apnea, not on CPAP therapy.   8.  Anxiety.      - Patient was anxious during our encounter today.  He denied any suicidal ideation or homicidal ideation.  I offered to have the patient  be seen in the emergency department versus monitoring here.  He decided to wait here, and so we had the patient rest on the examination bed with the lights off, and on reassessment, the patient reported that he felt significantly improved.   - Referral to Mental Health.   - Increase lisinopril 5 mg daily to 10 mg daily, goal blood pressure less than 130/80 mmHg.   - Switch atorvastatin 40 mg daily to rosuvastatin 40 mg at bedtime, goal LDL less than 70.   - Continue current cardiac regimen of aspirin 81 mg daily, metoprolol tartrate 25 mg b.i.d.   - TTE.   - Follow up in 1 year with fasting lipids or sooner as needed.   - Patient advised to seek medical care if he develops any significant anxiety, chest pain, chest pressure, abnormal shortness of breath or any other symptoms that are concerning for him.      Thank you for allowing our team to participate in the care of patient Mika Iqbal.  Please do not hesitate to page or call with any questions or concerns.     Edgar Grande MD, [facility]  Cardiology  Text Page   2020           D: 10/22/2020   T: 10/22/2020   MT: freedom      Name:     TESHA IQBAL   MRN:      [id number]        Account:      [id number]   :      1960           Service Date: 10/22/2020      Document: [id number]        Outpatient Encounter Medications as of 10/22/2020   Medication Sig Dispense Refill     aspirin 81 MG tablet Take by mouth daily       augmented betamethasone dipropionate (DIPROLENE-AF) 0.05 % external ointment Apply two times per day for 14 days to arms, legs or trunk. Then when needed 50 g 3     blood glucose (NO BRAND SPECIFIED) lancets standard Use to test blood sugar 1 times daily or as directed. 1 Box 3     blood glucose monitoring (ACCU-CHEK NORI PLUS) meter device kit Use to test blood sugar 4 times daily or as directed. (pts machine is not working) 1 kit 0     blood glucose monitoring (ACCU-CHEK FASTCLIX) lancets Use to test blood sugar 4 times  daily or as directed. 2 Box 3     blood glucose monitoring (NO BRAND SPECIFIED) test strip Use to test blood sugar 4 times daily or as directed. Please dispense Accu Chek Ana plus test strips or per patient preference if using a different brand 150 Box 3     canagliflozin (INVOKANA) 300 MG tablet Take 1 tablet (300 mg) by mouth every morning (before breakfast) . 90 tablet 1     gabapentin (NEURONTIN) 100 MG capsule TAKE ONE CAPSULE BY MOUTH THREE TIMES DAILY  90 capsule 0     insulin glargine (LANTUS SOLOSTAR) 100 UNIT/ML pen Inject 24 Units Subcutaneous 2 times daily 45 mL 1     insulin pen needle (ULTICARE PEN NEEDLES) 31G X 5 MM miscellaneous USE 2 DAILY OR AS DIRECTED 100 each 0     lisinopril (ZESTRIL) 10 MG tablet Take 1 tablet (10 mg) by mouth daily 90 tablet 11     metoprolol tartrate (LOPRESSOR) 25 MG tablet Take 1 tablet (25 mg) by mouth 2 times daily 180 tablet 1     omeprazole (PRILOSEC) 20 MG DR capsule TAKE 1 CAPSULE BY MOUTH ONE TIME DAILY  90 capsule 0     rosuvastatin (CRESTOR) 40 MG tablet Take 1 tablet (40 mg) by mouth At Bedtime 90 tablet 11     [DISCONTINUED] atorvastatin (LIPITOR) 40 MG tablet Take 1 tablet (40 mg) by mouth daily 90 tablet 1     [DISCONTINUED] lisinopril (ZESTRIL) 5 MG tablet TAKE 1 TABLET BY MOUTH ONE TIME DAILY  90 tablet 0     [DISCONTINUED] vitamin D2 (ERGOCALCIFEROL) 29643 units (1250 mcg) capsule Take 1 capsule (50,000 Units) by mouth once a week (Patient not taking: Reported on 10/22/2020) 50 capsule 0     No facility-administered encounter medications on file as of 10/22/2020.        Again, thank you for allowing me to participate in the care of your patient.      Sincerely,    Edgar Grande MD     [facility]    cc:   Nathan Ernst PA-C  [address] [place] [address]

## 2020-10-22 NOTE — PATIENT INSTRUCTIONS
October 22, 2020    Thank you for allowing our Cardiology team to participate in your care.     Please note the following changes to your heart treatment plan:     Medication changes:   - stop atorvastatin 40mg daily  - start rosuvastatin 40mg every evening  - increase lisinopril to 10mg daily     Tests to be done:  - TTE (heart ultrasound)  - FASTING lipids in 1 year    Follow up:  - Follow up in 1 year, or sooner as needed.      Please contact our team at [phone] for any questions or concerns.   If you are having a medical emergency, please call 911.       Sincerely,    Edgar Grande MD, [facility]  Cardiology    [facility] - [facility] and [facility] - [facility] - [place]

## 2020-10-22 NOTE — LETTER
10/22/2020    Naseem Esparza MD  [address]  [facility] [address]    RE: Saji Iqbal       Dear Colleague,    I had the pleasure of seeing Saji Iqbal in the [facility].        Cardiology Clinic Consultation:    October 22, 2020   Patient Name: Saji Iqbal  Patient MRN: [id number]    Consult reason: pre-op cardiac assessment     HPI and Assessment and Plan/Recommendations:    Please see dictation. #[id number]    Thank you for allowing our team to participate in the care of Saji Iqbal.  Please do not hesitate to call or page me with any questions or concerns.    Sincerely,     Edgar Grande MD, [facility]  Cardiology  Text Page   October 22, 2020    cc  LARON CarrollC  [address] [place] [address]    Past Medical History:     Past Medical History:   Diagnosis Date     Cluster headache      Coronary artery disease      Depression      Diabetes mellitus, type II (H)      Gastroesophageal reflux disease      Heart attack (H)      Hyperlipidemia      Hypertension      Renal disease      hx kidney stones     Rotator cuff arthropathy      Sleep apnea     doesn't use cpap-is broken     Stented coronary artery         Past Surgical History:   Past Surgical History:   Procedure Laterality Date     ARTHROSCOPY SHOULDER       STENT, CORONARY, ESAU  2014, 2015       Medications (outpatient):  Current Outpatient Medications   Medication Sig Dispense Refill     aspirin 81 MG tablet Take by mouth daily       atorvastatin (LIPITOR) 40 MG tablet Take 1 tablet (40 mg) by mouth daily 90 tablet 1     augmented betamethasone dipropionate (DIPROLENE-AF) 0.05 % external ointment Apply two times per day for 14 days to arms, legs or trunk. Then when needed 50 g 3     blood glucose (NO BRAND SPECIFIED) lancets standard Use to test blood sugar 1 times daily or as directed. 1 Box 3     blood glucose monitoring (ACCU-CHEK NORI PLUS) meter device kit Use to test blood sugar 4  times daily or as directed. (pts machine is not working) 1 kit 0     blood glucose monitoring (ACCU-CHEK FASTCLIX) lancets Use to test blood sugar 4 times daily or as directed. 2 Box 3     blood glucose monitoring (NO BRAND SPECIFIED) test strip Use to test blood sugar 4 times daily or as directed. Please dispense Accu Chek Ana plus test strips or per patient preference if using a different brand 150 Box 3     canagliflozin (INVOKANA) 300 MG tablet Take 1 tablet (300 mg) by mouth every morning (before breakfast) . 90 tablet 1     gabapentin (NEURONTIN) 100 MG capsule TAKE ONE CAPSULE BY MOUTH THREE TIMES DAILY  90 capsule 0     insulin glargine (LANTUS SOLOSTAR) 100 UNIT/ML pen Inject 24 Units Subcutaneous 2 times daily 45 mL 1     insulin pen needle (ULTICARE PEN NEEDLES) 31G X 5 MM miscellaneous USE 2 DAILY OR AS DIRECTED 100 each 0     lisinopril (ZESTRIL) 5 MG tablet TAKE 1 TABLET BY MOUTH ONE TIME DAILY  90 tablet 0     metoprolol tartrate (LOPRESSOR) 25 MG tablet Take 1 tablet (25 mg) by mouth 2 times daily 180 tablet 1     omeprazole (PRILOSEC) 20 MG DR capsule TAKE 1 CAPSULE BY MOUTH ONE TIME DAILY  90 capsule 0     vitamin D2 (ERGOCALCIFEROL) 22784 units (1250 mcg) capsule Take 1 capsule (50,000 Units) by mouth once a week (Patient not taking: Reported on 10/22/2020) 50 capsule 0       Allergies:  Allergies   Allergen Reactions     Metformin Diarrhea     Honey Other (See Comments)     Throat irritation       Social History:   History   Drug Use No      History   Smoking Status     Former Smoker     Packs/day: 0.33     Years: 20.00     Types: Cigarettes     Quit date: 7/17/2015   Smokeless Tobacco     Never Used     Social History    Substance and Sexual Activity      Alcohol use: Yes        Alcohol/week: 1.0 standard drinks        Types: 1 Standard drinks or equivalent per week        Comment: 4-5 drinks nightly on weekend       Family History:  Family History   Problem Relation Age of Onset     Coronary  "Artery Disease Mother      Coronary Artery Disease Father      Cerebrovascular Disease Brother      Anuerysm Sister        Review of Systems:   A complete review of systems was negative except as mentioned in the History of Present Illness.     Objective & Physical Exam:  BP (!) 142/82 (BP Location: Right arm, Patient Position: Sitting, Cuff Size: Adult Large)   Pulse 76   Ht 1.803 m (5' 11\")   Wt 100.8 kg (222 lb 3.2 oz)   BMI 30.99 kg/m    Wt Readings from Last 2 Encounters:   10/22/20 100.8 kg (222 lb 3.2 oz)   10/20/20 102.5 kg (226 lb)     Body mass index is 30.99 kg/m .   Body surface area is 2.25 meters squared.     Constitutional: appears stated age, appears to be well nourished, anxious  Eyes: sclera anicteric, conjunctiva normal  ENT: normocephalic, without obvious abnormality, atraumatic  Pulmonary: clear to auscultation bilaterally, no wheezes, no rales, no increased work of breathing  Cardiovascular: JVP normal, regular rate, regular rhythm, 2/6 PAUL at the RUSB, no lower extremity edema  Gastrointestinal: abdominal exam benign  Neurologic: awake, alert, face symmetrical, moves all extremities  Skin: no jaundice  Psychiatric: affect is normal, answers questions appropriately, oriented to self and place    Data reviewed:  Lab Results   Component Value Date    WBC 9.0 10/20/2020    RBC 4.58 10/20/2020    HGB 15.4 10/20/2020    HCT 45.0 10/20/2020    MCV 98 10/20/2020    MCH 33.6 (H) 10/20/2020    MCHC 34.2 10/20/2020    RDW 12.0 10/20/2020     10/20/2020     Sodium   Date Value Ref Range Status   06/16/2020 137 133 - 144 mmol/L Final     Potassium   Date Value Ref Range Status   06/16/2020 3.9 3.4 - 5.3 mmol/L Final     Chloride   Date Value Ref Range Status   06/16/2020 103 94 - 109 mmol/L Final     Carbon Dioxide   Date Value Ref Range Status   06/16/2020 27 20 - 32 mmol/L Final     Anion Gap   Date Value Ref Range Status   06/16/2020 7 3 - 14 mmol/L Final     Glucose   Date Value Ref Range " Status   06/16/2020 212 (H) 70 - 99 mg/dL Final     Urea Nitrogen   Date Value Ref Range Status   06/16/2020 19 7 - 30 mg/dL Final     Creatinine   Date Value Ref Range Status   06/16/2020 0.92 0.66 - 1.25 mg/dL Final     GFR Estimate   Date Value Ref Range Status   06/16/2020 >90 >60 mL/min/[1.73_m2] Final     Comment:     Non  GFR Calc  Starting 12/18/2018, serum creatinine based estimated GFR (eGFR) will be   calculated using the Chronic Kidney Disease Epidemiology Collaboration   (CKD-EPI) equation.       Calcium   Date Value Ref Range Status   06/16/2020 9.5 8.5 - 10.1 mg/dL Final     Bilirubin Total   Date Value Ref Range Status   09/17/2020 0.8 0.2 - 1.3 mg/dL Final     Alkaline Phosphatase   Date Value Ref Range Status   09/17/2020 71 40 - 150 U/L Final     ALT   Date Value Ref Range Status   09/17/2020 158 (H) 0 - 70 U/L Final     AST   Date Value Ref Range Status   09/17/2020 139 (H) 0 - 45 U/L Final     Recent Labs   Lab Test 06/16/20  1048 05/13/19  1547   CHOL 265* 299*   HDL 42 53   * 182*   TRIG 241* 322*      Lab Results   Component Value Date    A1C 6.9 10/20/2020    A1C 7.1 09/17/2020    A1C 9.1 06/16/2020    A1C 8.3 11/12/2019    A1C 8.4 05/13/2019            Thank you for allowing me to participate in the care of your patient.      Sincerely,     Edgar Grande MD     [facility]    cc:   Nathan Ernst PA-C  [address] [place] [address]

## 2020-10-22 NOTE — PROGRESS NOTES
Service Date: 10/22/2020      CARDIOLOGY CLINIC CONSULT NOTE      CONSULT INDICATION:  Preoperative cardiac risk assessment, new left bundle branch block on ECG.      HISTORY OF PRESENT ILLNESS:      I had the opportunity to see patient Mika Iqbal today in Cardiology Clinic for consultation.  As you know, he is a 60-year-old male with a past medical history significant for coronary artery disease with NSTEMI (status post PCI with BMS to LAD 01/2014, PCI with BMS to LAD 07/2015), prior smoking, current alcohol dependence, sleep apnea not on CPAP therapy, abdominal aortic aneurysm, hypertension, diabetes, hyperlipidemia, anxiety, who presents for preoperative cardiac risk assessment prior to right foot surgery.      The patient previously lived in [place] and received much of his cardiac care through the [facility].  Per chart review, the patient presented in [place] with left shoulder pain 01/2014 and ultimately underwent cardiac catheterization with PCI, BMS to LAD.  Subsequently, he developed similar left shoulder discomfort 07/2015, and repeat coronary angiography showed a 90% LAD stenosis proximal to the initial stent, which was stented again with a bare-metal stent.  The reasoning for bare-metal stents was concern for medication compliance.      The patient reports that at baseline he is extremely physically active, works a physically demanding job, walks approximately 6-8 miles daily.  He denies any chest pain, chest pressure or abnormal shortness of breath.  He denies any recurrence of his anginal symptoms.  He denies any palpitations, dizziness or lightheadedness.  He denies any abnormal weight gain, lower extremity swelling.      Unfortunately, approximately 1 month ago while cutting vegetables, his knife dropped, and he suffered a laceration to his right foot.  He is planned for surgery with Dr. Diaz.      He was seen for preoperative cardiac risk assessment by SARAH Deshpande, on  10/20/2020.  An ECG was done at that time, which showed a normal sinus rhythm with a left bundle branch block.  The left bundle branch block was new from an ECG 12/26/2017.      Last TTE through [facility] 01/30/2014 showed normal biventricular function.  There was noted to be hypokinesis of the interseptum.  He was noted to have a mildly elevated estimated RVSP of 40 mmHg plus mean right atrial pressure.  He was also noted to have left ventricular hypertrophy.      ASSESSMENT AND PLAN/RECOMMENDATIONS:      1.  Preoperative cardiac risk assessment.  The patient reports that prior to sustaining this right foot injury approximately 1 month ago, he was able to walk for 6-8 miles daily without abnormal symptoms, able to scale 2-3 flights of stairs without abnormal shortness of breath or chest discomfort.  Per guidelines, no further cardiac diagnostic testing is necessary prior to proceeding.  However, with his significant history of coronary artery disease with multiple stents to his LAD as well as this new left bundle branch block on ECG, we will proceed with a repeat TTE.  He denies any symptoms concerning for angina.   2.  Coronary artery disease with NSTEMI, status post BMS to LAD in 2014, BMS to LAD in 2015.  Treated by the Cardiology group at [facility] in [place].  The reason for bare-metal stent versus drug-eluting stent was concern for medication adherence.   3.  Hypertension.   4.  Hyperlipidemia.   5.  Diabetes.   6.  Alcohol dependence, drinks approximately 4-5 beverages daily on the weekends.   7.  Sleep apnea, not on CPAP therapy.   8.  Anxiety.      - Patient was anxious during our encounter today.  He denied any suicidal ideation or homicidal ideation.  I offered to have the patient be seen in the emergency department versus monitoring here.  He decided to wait here, and so we had the patient rest on the examination bed with the lights off, and on reassessment, the patient reported that he felt  significantly improved.   - Referral to Mental Health.   - Increase lisinopril 5 mg daily to 10 mg daily, goal blood pressure less than 130/80 mmHg.   - Switch atorvastatin 40 mg daily to rosuvastatin 40 mg at bedtime, goal LDL less than 70.   - Continue current cardiac regimen of aspirin 81 mg daily, metoprolol tartrate 25 mg b.i.d.   - TTE.   - Follow up in 1 year with fasting lipids or sooner as needed.   - Patient advised to seek medical care if he develops any significant anxiety, chest pain, chest pressure, abnormal shortness of breath or any other symptoms that are concerning for him.      Thank you for allowing our team to participate in the care of patient Mika Iqbal.  Please do not hesitate to page or call with any questions or concerns.     Edgar Grande MD, [facility]  Cardiology  Text Page   2020           D: 10/22/2020   T: 10/22/2020   MT: freedom      Name:     TESHA IQBAL   MRN:      [id number]        Account:      [id number]   :      1960           Service Date: 10/22/2020      Document: [id number]

## 2020-10-22 NOTE — PROGRESS NOTES
Cardiology Clinic Consultation:    October 22, 2020   Patient Name: Saji Iqbal  Patient MRN: [id number]    Consult reason: ***    HPI and Assessment and Plan/Recommendations:    Please see dictation. #***    Thank you for allowing our team to participate in the care of Saji Iqbal.  Please do not hesitate to call or page me with any questions or concerns.    Sincerely,     Edgar Grande MD, [facility]  Cardiology  Text Page   October 22, 2020    cc  LARON CarrollC  [address] [place] [address]    Past Medical History:     The 10-year ASCVD risk score (Jose Alberto RAM Jr., et al., 2013) is: 28.9%    Values used to calculate the score:      Age: 60 years      Sex: Male      Is Non- : No      Diabetic: Yes      Tobacco smoker: No      Systolic Blood Pressure: 142 mmHg      Is BP treated: Yes      HDL Cholesterol: 42 mg/dL      Total Cholesterol: 265 mg/dL  Past Medical History:   Diagnosis Date     Cluster headache      Coronary artery disease      Depression      Diabetes mellitus, type II (H)      Gastroesophageal reflux disease      Heart attack (H)      Hyperlipidemia      Hypertension      Renal disease      hx kidney stones     Rotator cuff arthropathy      Sleep apnea     doesn't use cpap-is broken     Stented coronary artery         Past Surgical History:   Past Surgical History:   Procedure Laterality Date     ARTHROSCOPY SHOULDER       STENT, CORONARY, ESAU  2014, 2015       Medications (outpatient):  Current Outpatient Medications   Medication Sig Dispense Refill     aspirin 81 MG tablet Take by mouth daily       atorvastatin (LIPITOR) 40 MG tablet Take 1 tablet (40 mg) by mouth daily 90 tablet 1     augmented betamethasone dipropionate (DIPROLENE-AF) 0.05 % external ointment Apply two times per day for 14 days to arms, legs or trunk. Then when needed 50 g 3     blood glucose (NO BRAND SPECIFIED) lancets standard Use to test blood sugar 1 times daily or as  directed. 1 Box 3     blood glucose monitoring (ACCU-CHEK NORI PLUS) meter device kit Use to test blood sugar 4 times daily or as directed. (pts machine is not working) 1 kit 0     blood glucose monitoring (ACCU-CHEK FASTCLIX) lancets Use to test blood sugar 4 times daily or as directed. 2 Box 3     blood glucose monitoring (NO BRAND SPECIFIED) test strip Use to test blood sugar 4 times daily or as directed. Please dispense Accu Chek Nori plus test strips or per patient preference if using a different brand 150 Box 3     canagliflozin (INVOKANA) 300 MG tablet Take 1 tablet (300 mg) by mouth every morning (before breakfast) . 90 tablet 1     gabapentin (NEURONTIN) 100 MG capsule TAKE ONE CAPSULE BY MOUTH THREE TIMES DAILY  90 capsule 0     insulin glargine (LANTUS SOLOSTAR) 100 UNIT/ML pen Inject 24 Units Subcutaneous 2 times daily 45 mL 1     insulin pen needle (ULTICARE PEN NEEDLES) 31G X 5 MM miscellaneous USE 2 DAILY OR AS DIRECTED 100 each 0     lisinopril (ZESTRIL) 5 MG tablet TAKE 1 TABLET BY MOUTH ONE TIME DAILY  90 tablet 0     metoprolol tartrate (LOPRESSOR) 25 MG tablet Take 1 tablet (25 mg) by mouth 2 times daily 180 tablet 1     omeprazole (PRILOSEC) 20 MG DR capsule TAKE 1 CAPSULE BY MOUTH ONE TIME DAILY  90 capsule 0     vitamin D2 (ERGOCALCIFEROL) 43295 units (1250 mcg) capsule Take 1 capsule (50,000 Units) by mouth once a week (Patient not taking: Reported on 10/22/2020) 50 capsule 0       Allergies:  Allergies   Allergen Reactions     Metformin Diarrhea     Honey Other (See Comments)     Throat irritation       Social History:   History   Drug Use No      History   Smoking Status     Former Smoker     Packs/day: 0.33     Years: 20.00     Types: Cigarettes     Quit date: 7/17/2015   Smokeless Tobacco     Never Used     Social History    Substance and Sexual Activity      Alcohol use: Yes        Alcohol/week: 1.0 standard drinks        Types: 1 Standard drinks or equivalent per week        Comment:  "4-5 drinks nightly on weekend       Family History:  Family History   Problem Relation Age of Onset     Coronary Artery Disease Mother      Coronary Artery Disease Father      Cerebrovascular Disease Brother      Anuerysm Sister        Review of Systems:   A complete review of systems was negative except as mentioned in the History of Present Illness.     Objective & Physical Exam:  BP (!) 142/82 (BP Location: Right arm, Patient Position: Sitting, Cuff Size: Adult Large)   Pulse 76   Ht 1.803 m (5' 11\")   Wt 100.8 kg (222 lb 3.2 oz)   BMI 30.99 kg/m    Wt Readings from Last 2 Encounters:   10/22/20 100.8 kg (222 lb 3.2 oz)   10/20/20 102.5 kg (226 lb)     Body mass index is 30.99 kg/m .   Body surface area is 2.25 meters squared.  ***  Constitutional: appears stated age, in no apparent distress, appears to be well nourished  Eyes: sclera anicteric, conjunctiva normal  ENT: normocephalic, without obvious abnormality, atraumatic  Pulmonary: clear to auscultation bilaterally, no wheezes, no rales, no increased work of breathing  Cardiovascular: JVP normal, regular rate, regular rhythm, normal S1 and S2, no S3, S4, no murmur appreciated, no lower extremity edema***  Gastrointestinal: abdominal exam benign  Neurologic: awake, alert, face symmetrical, moves all extremities  Skin: no jaundice  Psychiatric: affect is normal, answers questions appropriately, oriented to self and place    Data reviewed:  Lab Results   Component Value Date    WBC 9.0 10/20/2020    RBC 4.58 10/20/2020    HGB 15.4 10/20/2020    HCT 45.0 10/20/2020    MCV 98 10/20/2020    MCH 33.6 (H) 10/20/2020    MCHC 34.2 10/20/2020    RDW 12.0 10/20/2020     10/20/2020     Sodium   Date Value Ref Range Status   06/16/2020 137 133 - 144 mmol/L Final     Potassium   Date Value Ref Range Status   06/16/2020 3.9 3.4 - 5.3 mmol/L Final     Chloride   Date Value Ref Range Status   06/16/2020 103 94 - 109 mmol/L Final     Carbon Dioxide   Date Value Ref " Range Status   06/16/2020 27 20 - 32 mmol/L Final     Anion Gap   Date Value Ref Range Status   06/16/2020 7 3 - 14 mmol/L Final     Glucose   Date Value Ref Range Status   06/16/2020 212 (H) 70 - 99 mg/dL Final     Urea Nitrogen   Date Value Ref Range Status   06/16/2020 19 7 - 30 mg/dL Final     Creatinine   Date Value Ref Range Status   06/16/2020 0.92 0.66 - 1.25 mg/dL Final     GFR Estimate   Date Value Ref Range Status   06/16/2020 >90 >60 mL/min/[1.73_m2] Final     Comment:     Non  GFR Calc  Starting 12/18/2018, serum creatinine based estimated GFR (eGFR) will be   calculated using the Chronic Kidney Disease Epidemiology Collaboration   (CKD-EPI) equation.       Calcium   Date Value Ref Range Status   06/16/2020 9.5 8.5 - 10.1 mg/dL Final     Bilirubin Total   Date Value Ref Range Status   09/17/2020 0.8 0.2 - 1.3 mg/dL Final     Alkaline Phosphatase   Date Value Ref Range Status   09/17/2020 71 40 - 150 U/L Final     ALT   Date Value Ref Range Status   09/17/2020 158 (H) 0 - 70 U/L Final     AST   Date Value Ref Range Status   09/17/2020 139 (H) 0 - 45 U/L Final     Recent Labs   Lab Test 06/16/20  1048 05/13/19  1547   CHOL 265* 299*   HDL 42 53   * 182*   TRIG 241* 322*      Lab Results   Component Value Date    A1C 6.9 10/20/2020    A1C 7.1 09/17/2020    A1C 9.1 06/16/2020    A1C 8.3 11/12/2019    A1C 8.4 05/13/2019

## 2020-10-23 ENCOUNTER — ANESTHESIA EVENT (OUTPATIENT)
Dept: SURGERY | Facility: CLINIC | Age: 60
End: 2020-10-23
Payer: COMMERCIAL

## 2020-10-23 ENCOUNTER — ANESTHESIA (OUTPATIENT)
Dept: SURGERY | Facility: CLINIC | Age: 60
End: 2020-10-23
Payer: COMMERCIAL

## 2020-10-23 ENCOUNTER — HOSPITAL ENCOUNTER (OUTPATIENT)
Facility: CLINIC | Age: 60
Discharge: HOME | End: 2020-10-23
Attending: PODIATRIST | Admitting: PODIATRIST
Payer: COMMERCIAL

## 2020-10-23 VITALS
OXYGEN SATURATION: 97 % | BODY MASS INDEX: 30.8 KG/M2 | WEIGHT: 220 LBS | DIASTOLIC BLOOD PRESSURE: 89 MMHG | RESPIRATION RATE: 16 BRPM | TEMPERATURE: 98.1 F | HEART RATE: 76 BPM | HEIGHT: 71 IN | SYSTOLIC BLOOD PRESSURE: 144 MMHG

## 2020-10-23 DIAGNOSIS — Z98.890 S/P FOOT SURGERY, RIGHT: Primary | ICD-10-CM

## 2020-10-23 DIAGNOSIS — S96.821A LACERATION OF EXTENSOR TENDON OF RIGHT FOOT, INITIAL ENCOUNTER: ICD-10-CM

## 2020-10-23 LAB
CREAT SERPL-MCNC: 0.96 MG/DL (ref 0.66–1.25)
GFR SERPL CREATININE-BSD FRML MDRD: 85 ML/MIN/{1.73_M2}
GLUCOSE BLDC GLUCOMTR-MCNC: 164 MG/DL (ref 70–99)
GLUCOSE BLDC GLUCOMTR-MCNC: 169 MG/DL (ref 70–99)
POTASSIUM SERPL-SCNC: 3.8 MMOL/L (ref 3.4–5.3)

## 2020-10-23 PROCEDURE — 360N000021 HC SURGERY LEVEL 3 EA 15 ADDTL MIN: Performed by: PODIATRIST

## 2020-10-23 PROCEDURE — 36415 COLL VENOUS BLD VENIPUNCTURE: CPT | Performed by: ANESTHESIOLOGY

## 2020-10-23 PROCEDURE — 250N000011 HC RX IP 250 OP 636: Performed by: PODIATRIST

## 2020-10-23 PROCEDURE — 999N000136 HC STATISTIC PRE PROC ASSESS II: Performed by: PODIATRIST

## 2020-10-23 PROCEDURE — 761N000002 HC RECOVERY PHASE 1 LEVEL 1 EA ADDTL HR: Performed by: PODIATRIST

## 2020-10-23 PROCEDURE — 258N000003 HC RX IP 258 OP 636: Performed by: ANESTHESIOLOGY

## 2020-10-23 PROCEDURE — 250N000011 HC RX IP 250 OP 636

## 2020-10-23 PROCEDURE — 761N000001 HC RECOVERY PHASE 1 LEVEL 1 FIRST HR: Performed by: PODIATRIST

## 2020-10-23 PROCEDURE — 84132 ASSAY OF SERUM POTASSIUM: CPT | Performed by: ANESTHESIOLOGY

## 2020-10-23 PROCEDURE — 28208 REPAIR OF FOOT TENDON: CPT | Mod: RT | Performed by: PODIATRIST

## 2020-10-23 PROCEDURE — 370N000002 HC ANESTHESIA TECHNICAL FEE, EACH ADDTL 15 MIN: Performed by: PODIATRIST

## 2020-10-23 PROCEDURE — 272N000001 HC OR GENERAL SUPPLY STERILE: Performed by: PODIATRIST

## 2020-10-23 PROCEDURE — 250N000013 HC RX MED GY IP 250 OP 250 PS 637: Performed by: PODIATRIST

## 2020-10-23 PROCEDURE — 370N000001 HC ANESTHESIA TECHNICAL FEE, 1ST 30 MIN: Performed by: PODIATRIST

## 2020-10-23 PROCEDURE — 761N000007 HC RECOVERY PHASE 2 EACH 15 MINS: Performed by: PODIATRIST

## 2020-10-23 PROCEDURE — 250N000011 HC RX IP 250 OP 636: Performed by: ANESTHESIOLOGY

## 2020-10-23 PROCEDURE — 999N001017 HC STATISTIC GLUCOSE BY METER IP

## 2020-10-23 PROCEDURE — 250N000009 HC RX 250

## 2020-10-23 PROCEDURE — 82565 ASSAY OF CREATININE: CPT | Performed by: ANESTHESIOLOGY

## 2020-10-23 PROCEDURE — 360N000020 HC SURGERY LEVEL 3 1ST 30 MIN: Performed by: PODIATRIST

## 2020-10-23 RX ORDER — LIDOCAINE HYDROCHLORIDE 10 MG/ML
INJECTION, SOLUTION INFILTRATION; PERINEURAL PRN
Status: DISCONTINUED | OUTPATIENT
Start: 2020-10-23 | End: 2020-10-23

## 2020-10-23 RX ORDER — BUPIVACAINE HYDROCHLORIDE 2.5 MG/ML
INJECTION, SOLUTION EPIDURAL; INFILTRATION; INTRACAUDAL PRN
Status: DISCONTINUED | OUTPATIENT
Start: 2020-10-23 | End: 2020-10-23 | Stop reason: HOSPADM

## 2020-10-23 RX ORDER — HYDROMORPHONE HYDROCHLORIDE 1 MG/ML
.3-.5 INJECTION, SOLUTION INTRAMUSCULAR; INTRAVENOUS; SUBCUTANEOUS EVERY 10 MIN PRN
Status: DISCONTINUED | OUTPATIENT
Start: 2020-10-23 | End: 2020-10-23 | Stop reason: HOSPADM

## 2020-10-23 RX ORDER — PROPOFOL 10 MG/ML
INJECTION, EMULSION INTRAVENOUS CONTINUOUS PRN
Status: DISCONTINUED | OUTPATIENT
Start: 2020-10-23 | End: 2020-10-23

## 2020-10-23 RX ORDER — CEFAZOLIN SODIUM 2 G/100ML
2 INJECTION, SOLUTION INTRAVENOUS
Status: COMPLETED | OUTPATIENT
Start: 2020-10-23 | End: 2020-10-23

## 2020-10-23 RX ORDER — LIDOCAINE 40 MG/G
CREAM TOPICAL
Status: DISCONTINUED | OUTPATIENT
Start: 2020-10-23 | End: 2020-10-23 | Stop reason: HOSPADM

## 2020-10-23 RX ORDER — HYDROXYZINE HYDROCHLORIDE 25 MG/1
TABLET, FILM COATED ORAL
Qty: 20 TABLET | Refills: 0 | Status: SHIPPED | OUTPATIENT
Start: 2020-10-23 | End: 2021-07-08

## 2020-10-23 RX ORDER — SODIUM CHLORIDE, SODIUM LACTATE, POTASSIUM CHLORIDE, CALCIUM CHLORIDE 600; 310; 30; 20 MG/100ML; MG/100ML; MG/100ML; MG/100ML
INJECTION, SOLUTION INTRAVENOUS CONTINUOUS
Status: DISCONTINUED | OUTPATIENT
Start: 2020-10-23 | End: 2020-10-23 | Stop reason: HOSPADM

## 2020-10-23 RX ORDER — LABETALOL 20 MG/4 ML (5 MG/ML) INTRAVENOUS SYRINGE
10
Status: DISCONTINUED | OUTPATIENT
Start: 2020-10-23 | End: 2020-10-23 | Stop reason: HOSPADM

## 2020-10-23 RX ORDER — GLYCOPYRROLATE 0.2 MG/ML
INJECTION, SOLUTION INTRAMUSCULAR; INTRAVENOUS PRN
Status: DISCONTINUED | OUTPATIENT
Start: 2020-10-23 | End: 2020-10-23

## 2020-10-23 RX ORDER — MEPERIDINE HYDROCHLORIDE 25 MG/ML
12.5 INJECTION INTRAMUSCULAR; INTRAVENOUS; SUBCUTANEOUS
Status: DISCONTINUED | OUTPATIENT
Start: 2020-10-23 | End: 2020-10-23 | Stop reason: HOSPADM

## 2020-10-23 RX ORDER — ONDANSETRON 4 MG/1
4 TABLET, ORALLY DISINTEGRATING ORAL EVERY 30 MIN PRN
Status: DISCONTINUED | OUTPATIENT
Start: 2020-10-23 | End: 2020-10-23 | Stop reason: HOSPADM

## 2020-10-23 RX ORDER — DEXMEDETOMIDINE HYDROCHLORIDE 4 UG/ML
INJECTION, SOLUTION INTRAVENOUS PRN
Status: DISCONTINUED | OUTPATIENT
Start: 2020-10-23 | End: 2020-10-23

## 2020-10-23 RX ORDER — EPHEDRINE SULFATE 50 MG/ML
INJECTION, SOLUTION INTRAVENOUS PRN
Status: DISCONTINUED | OUTPATIENT
Start: 2020-10-23 | End: 2020-10-23

## 2020-10-23 RX ORDER — NALOXONE HYDROCHLORIDE 0.4 MG/ML
.1-.4 INJECTION, SOLUTION INTRAMUSCULAR; INTRAVENOUS; SUBCUTANEOUS
Status: DISCONTINUED | OUTPATIENT
Start: 2020-10-23 | End: 2020-10-23 | Stop reason: HOSPADM

## 2020-10-23 RX ORDER — OXYCODONE HYDROCHLORIDE 5 MG/1
5 TABLET ORAL
Status: COMPLETED | OUTPATIENT
Start: 2020-10-23 | End: 2020-10-23

## 2020-10-23 RX ORDER — FENTANYL CITRATE 50 UG/ML
25-50 INJECTION, SOLUTION INTRAMUSCULAR; INTRAVENOUS
Status: DISCONTINUED | OUTPATIENT
Start: 2020-10-23 | End: 2020-10-23 | Stop reason: HOSPADM

## 2020-10-23 RX ORDER — ONDANSETRON 2 MG/ML
4 INJECTION INTRAMUSCULAR; INTRAVENOUS EVERY 30 MIN PRN
Status: DISCONTINUED | OUTPATIENT
Start: 2020-10-23 | End: 2020-10-23 | Stop reason: HOSPADM

## 2020-10-23 RX ORDER — PROPOFOL 10 MG/ML
INJECTION, EMULSION INTRAVENOUS PRN
Status: DISCONTINUED | OUTPATIENT
Start: 2020-10-23 | End: 2020-10-23

## 2020-10-23 RX ORDER — ACETAMINOPHEN 325 MG/1
975 TABLET ORAL ONCE
Status: DISCONTINUED | OUTPATIENT
Start: 2020-10-23 | End: 2020-10-23 | Stop reason: HOSPADM

## 2020-10-23 RX ORDER — ONDANSETRON 2 MG/ML
INJECTION INTRAMUSCULAR; INTRAVENOUS PRN
Status: DISCONTINUED | OUTPATIENT
Start: 2020-10-23 | End: 2020-10-23

## 2020-10-23 RX ORDER — IBUPROFEN 600 MG/1
TABLET, FILM COATED ORAL
Qty: 30 TABLET | Refills: 0 | Status: SHIPPED | OUTPATIENT
Start: 2020-10-23 | End: 2023-02-02

## 2020-10-23 RX ORDER — PHENYLEPHRINE HYDROCHLORIDE 10 MG/ML
INJECTION INTRAVENOUS PRN
Status: DISCONTINUED | OUTPATIENT
Start: 2020-10-23 | End: 2020-10-23

## 2020-10-23 RX ORDER — KETAMINE HYDROCHLORIDE 10 MG/ML
INJECTION INTRAMUSCULAR; INTRAVENOUS PRN
Status: DISCONTINUED | OUTPATIENT
Start: 2020-10-23 | End: 2020-10-23

## 2020-10-23 RX ORDER — CEFAZOLIN SODIUM 1 G/3ML
1 INJECTION, POWDER, FOR SOLUTION INTRAMUSCULAR; INTRAVENOUS SEE ADMIN INSTRUCTIONS
Status: DISCONTINUED | OUTPATIENT
Start: 2020-10-23 | End: 2020-10-23 | Stop reason: HOSPADM

## 2020-10-23 RX ORDER — HYDROCODONE BITARTRATE AND ACETAMINOPHEN 5; 325 MG/1; MG/1
TABLET ORAL
Qty: 20 TABLET | Refills: 0 | Status: SHIPPED | OUTPATIENT
Start: 2020-10-23 | End: 2021-01-07

## 2020-10-23 RX ORDER — FENTANYL CITRATE 50 UG/ML
INJECTION, SOLUTION INTRAMUSCULAR; INTRAVENOUS PRN
Status: DISCONTINUED | OUTPATIENT
Start: 2020-10-23 | End: 2020-10-23

## 2020-10-23 RX ADMIN — SODIUM CHLORIDE, POTASSIUM CHLORIDE, SODIUM LACTATE AND CALCIUM CHLORIDE: 600; 310; 30; 20 INJECTION, SOLUTION INTRAVENOUS at 13:39

## 2020-10-23 RX ADMIN — SODIUM CHLORIDE, POTASSIUM CHLORIDE, SODIUM LACTATE AND CALCIUM CHLORIDE: 600; 310; 30; 20 INJECTION, SOLUTION INTRAVENOUS at 12:09

## 2020-10-23 RX ADMIN — PHENYLEPHRINE HYDROCHLORIDE 100 MCG: 10 INJECTION INTRAVENOUS at 13:13

## 2020-10-23 RX ADMIN — Medication 20 MG: at 13:00

## 2020-10-23 RX ADMIN — PHENYLEPHRINE HYDROCHLORIDE 100 MCG: 10 INJECTION INTRAVENOUS at 13:05

## 2020-10-23 RX ADMIN — CEFAZOLIN SODIUM 2 G: 2 INJECTION, SOLUTION INTRAVENOUS at 12:43

## 2020-10-23 RX ADMIN — GLYCOPYRROLATE 0.2 MG: 0.2 INJECTION, SOLUTION INTRAMUSCULAR; INTRAVENOUS at 12:53

## 2020-10-23 RX ADMIN — EPHEDRINE SULFATE 10 MG: 50 INJECTION, SOLUTION INTRAVENOUS at 13:21

## 2020-10-23 RX ADMIN — FENTANYL CITRATE 50 MCG: 50 INJECTION, SOLUTION INTRAMUSCULAR; INTRAVENOUS at 13:23

## 2020-10-23 RX ADMIN — DEXMEDETOMIDINE HYDROCHLORIDE 8 MCG: 4 INJECTION, SOLUTION INTRAVENOUS at 14:16

## 2020-10-23 RX ADMIN — ONDANSETRON HYDROCHLORIDE 4 MG: 2 INJECTION, SOLUTION INTRAVENOUS at 12:53

## 2020-10-23 RX ADMIN — LIDOCAINE HYDROCHLORIDE 50 MG: 10 INJECTION, SOLUTION INFILTRATION; PERINEURAL at 12:53

## 2020-10-23 RX ADMIN — OXYCODONE HYDROCHLORIDE 5 MG: 5 TABLET ORAL at 15:22

## 2020-10-23 RX ADMIN — PROPOFOL 200 MG: 10 INJECTION, EMULSION INTRAVENOUS at 13:11

## 2020-10-23 RX ADMIN — FENTANYL CITRATE 50 MCG: 50 INJECTION, SOLUTION INTRAMUSCULAR; INTRAVENOUS at 15:05

## 2020-10-23 RX ADMIN — FENTANYL CITRATE 50 MCG: 50 INJECTION, SOLUTION INTRAMUSCULAR; INTRAVENOUS at 12:55

## 2020-10-23 RX ADMIN — PHENYLEPHRINE HYDROCHLORIDE 50 MCG: 10 INJECTION INTRAVENOUS at 14:04

## 2020-10-23 RX ADMIN — PROPOFOL 120 MCG/KG/MIN: 10 INJECTION, EMULSION INTRAVENOUS at 12:53

## 2020-10-23 RX ADMIN — MIDAZOLAM 2 MG: 1 INJECTION INTRAMUSCULAR; INTRAVENOUS at 12:37

## 2020-10-23 RX ADMIN — EPHEDRINE SULFATE 10 MG: 50 INJECTION, SOLUTION INTRAVENOUS at 13:58

## 2020-10-23 RX ADMIN — PHENYLEPHRINE HYDROCHLORIDE 100 MCG: 10 INJECTION INTRAVENOUS at 13:53

## 2020-10-23 RX ADMIN — FENTANYL CITRATE 50 MCG: 50 INJECTION, SOLUTION INTRAMUSCULAR; INTRAVENOUS at 14:49

## 2020-10-23 RX ADMIN — DEXMEDETOMIDINE HYDROCHLORIDE 12 MCG: 4 INJECTION, SOLUTION INTRAVENOUS at 14:12

## 2020-10-23 RX ADMIN — SODIUM CHLORIDE, POTASSIUM CHLORIDE, SODIUM LACTATE AND CALCIUM CHLORIDE: 600; 310; 30; 20 INJECTION, SOLUTION INTRAVENOUS at 14:46

## 2020-10-23 RX ADMIN — PHENYLEPHRINE HYDROCHLORIDE 200 MCG: 10 INJECTION INTRAVENOUS at 13:21

## 2020-10-23 RX ADMIN — Medication 20 MG: at 12:53

## 2020-10-23 RX ADMIN — GLYCOPYRROLATE 0.2 MG: 0.2 INJECTION, SOLUTION INTRAMUSCULAR; INTRAVENOUS at 13:20

## 2020-10-23 SDOH — HEALTH STABILITY: MENTAL HEALTH: CURRENT SMOKER: 0

## 2020-10-23 ASSESSMENT — MIFFLIN-ST. JEOR: SCORE: 1835.04

## 2020-10-23 NOTE — ANESTHESIA CARE TRANSFER NOTE
Patient: Saji Iqbal    Procedure(s):  Extensor tendon repair, right foot    Diagnosis: Laceration of extensor tendon of right foot, initial encounter [S96.821A]  Diagnosis Additional Information: No value filed.    Anesthesia Type:   MAC     Note:  Airway :Face Mask  Patient transferred to:PACU  Comments: Pt to PACU, VSS, ,reporpt to RNHandoff Report: Identifed the Patient, Identified the Reponsible Provider, Reviewed the pertinent medical history, Discussed the surgical course, Reviewed Intra-OP anesthesia mangement and issues during anesthesia, Set expectations for post-procedure period and Allowed opportunity for questions and acknowledgement of understanding      Vitals: (Last set prior to Anesthesia Care Transfer)    CRNA VITALS  10/23/2020 1356 - 10/23/2020 1439      10/23/2020             Resp Rate (observed):  12                Electronically Signed By: JACKIE Jose CRNA  October 23, 2020  2:39 PM

## 2020-10-23 NOTE — DISCHARGE INSTRUCTIONS
HOME CARE FOLLOWING MINOR SURGERY        DRESSING  Keep dressing dry and in place until your doctor instructs you to remove the dressing.    DRAINAGE  There should be minimal drainage. If bleeding should occur and soaks through the dressing apply a sterile, dry dressing over it and tape in place. If bleeding persists, apply gentle, steady pressure with your hand over the dressing for 5 minutes. If the bleeding does not stop, call your doctor.    SKIN CLOSURE  You may have stitches or special skin closures. You will be given an appointment for the removal of any external stitches. You may have stitches under the skin which will absorb. You may have steri-strips over the incision. These look like thin tapes and will peel off in 5-7 days. If they don t after 7 days, you may carefully remove them. You may shower with the steri-strips but do not soak them, as with swimming or taking a bath. A protective covering of plastic may be placed over external stitches for showering.    NOTIFY YOUR PHYSICIAN IF YOU HAVE ANY OF THE FOLLOWING SYMPTOMS:    1. Fever greater than 101 degrees  2. Excessive bleeding or drainage  3. Disruption of the skin closure  4. Swelling, redness or excessive tenderness at the site  5. Severe pain  6. Drainage that is green, yellow, thick white or has a bad odor            GENERAL ANESTHESIA OR SEDATION ADULT DISCHARGE INSTRUCTIONS   SPECIAL PRECAUTIONS FOR 24 HOURS AFTER SURGERY    IT IS NOT UNUSUAL TO FEEL LIGHT-HEADED OR FAINT, UP TO 24 HOURS AFTER SURGERY OR WHILE TAKING PAIN MEDICATION.  IF YOU HAVE THESE SYMPTOMS; SIT FOR A FEW MINUTES BEFORE STANDING AND HAVE SOMEONE ASSIST YOU WHEN YOU GET UP TO WALK OR USE THE BATHROOM.    YOU SHOULD REST AND RELAX FOR THE NEXT 24 HOURS AND YOU MUST MAKE ARRANGEMENTS TO HAVE SOMEONE STAY WITH YOU FOR AT LEAST 24 HOURS AFTER YOUR DISCHARGE.  AVOID HAZARDOUS AND STRENUOUS ACTIVITIES.  DO NOT MAKE IMPORTANT DECISIONS FOR 24 HOURS.    DO NOT DRIVE ANY VEHICLE OR  OPERATE MECHANICAL EQUIPMENT FOR 24 HOURS FOLLOWING THE END OF YOUR SURGERY.  EVEN THOUGH YOU MAY FEEL NORMAL, YOUR REACTIONS MAY BE AFFECTED BY THE MEDICATION YOU HAVE RECEIVED.    DO NOT DRINK ALCOHOLIC BEVERAGES FOR 24 HOURS FOLLOWING YOUR SURGERY.    DRINK CLEAR LIQUIDS (APPLE JUICE, GINGER ALE, 7-UP, BROTH, ETC.).  PROGRESS TO YOUR REGULAR DIET AS YOU FEEL ABLE.    YOU MAY HAVE A DRY MOUTH, A SORE THROAT, MUSCLES ACHES OR TROUBLE SLEEPING.  THESE SHOULD GO AWAY AFTER 24 HOURS.    CALL YOUR DOCTOR FOR ANY OF THE FOLLOWING:  SIGNS OF INFECTION (FEVER, GROWING TENDERNESS AT THE SURGERY SITE, A LARGE AMOUNT OF DRAINAGE OR BLEEDING, SEVERE PAIN, FOUL-SMELLING DRAINAGE, REDNESS OR SWELLING.    IT HAS BEEN OVER 8 TO 10 HOURS SINCE SURGERY AND YOU ARE STILL NOT ABLE TO URINATE (PASS WATER).

## 2020-10-23 NOTE — ANESTHESIA POSTPROCEDURE EVALUATION
Patient: Saji Iqbal    Procedure(s):  Extensor tendon repair, right foot    Diagnosis:Laceration of extensor tendon of right foot, initial encounter [S96.821A]  Diagnosis Additional Information: No value filed.    Anesthesia Type:  MAC    Note:  Anesthesia Post Evaluation    Patient location during evaluation: PACU  Patient participation: Able to fully participate in evaluation  Level of consciousness: awake and alert  Pain management: adequate  multimodal analgesia used between 6 hours prior to anesthesia start to PACU dischargeAirway patency: patent  Cardiovascular status: acceptable  Respiratory status: acceptable  two or more mitigation strategies used for obstructive sleep apneaHydration status: acceptable  PONV: none     Anesthetic complications: None          Last vitals:  Vitals:    10/23/20 1435 10/23/20 1440 10/23/20 1445   BP: 98/74 118/88 (!) 129/91   Pulse: 87 87 89   Resp: 13 12 17   Temp: 97.1  F (36.2  C)     SpO2: 97% 97% 97%         Electronically Signed By: Teddy Gilbert MD  October 23, 2020  2:51 PM

## 2020-10-23 NOTE — BRIEF OP NOTE
[facility]    Brief Operative Note    Pre-operative diagnosis: Laceration of extensor tendon of right foot, initial encounter [D61.747R]  Post-operative diagnosis sp extensor tendon repair right foot    Procedure: Procedure(s):  Extensor tendon repair, right foot  Surgeon: Surgeon(s) and Role:     * Gerard Diaz DPM - Primary  Anesthesia: Monitor Anesthesia Care   Estimated blood loss: Less than 10 ml  Drains: None  Specimens: * No specimens in log *  Findings:   full laceration of extensor tendon without violation of MPJ joint.  Complications: None.  Implants: * No implants in log *
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
## 2020-10-23 NOTE — ANESTHESIA PREPROCEDURE EVALUATION
Anesthesia Pre-Procedure Evaluation    Patient: Saji Iqbal   MRN: [id number] : 1960          Preoperative Diagnosis: Laceration of extensor tendon of right foot, initial encounter [D58.709R]    Procedure(s):  Extensor tendon repair, right foot    Past Medical History:   Diagnosis Date     Cluster headache      Coronary artery disease      Depression      Diabetes mellitus, type II (H)      Gastroesophageal reflux disease      Heart attack (H)      Hyperlipidemia      Hypertension      Renal disease      hx kidney stones     Rotator cuff arthropathy      Sleep apnea     doesn't use cpap-is broken     Stented coronary artery      Past Surgical History:   Procedure Laterality Date     ARTHROSCOPY SHOULDER       STENT, CORONARY, ESAU  ,      Anesthesia Evaluation     . Pt has had prior anesthetic. Type: General and Regional    No history of anesthetic complications          ROS/MED HX    ENT/Pulmonary:     (+)sleep apnea, uses CPAP , . .    Neurologic:  - neg neurologic ROS     Cardiovascular:     (+) hypertension--CAD, --. : . . . :. .       METS/Exercise Tolerance:     Hematologic:  - neg hematologic  ROS       Musculoskeletal:   (+) arthritis,  -       GI/Hepatic:     (+) GERD Asymptomatic on medication,       Renal/Genitourinary:     (+) chronic renal disease, type: CRI,       Endo:     (+) type II DM Obesity, .      Psychiatric:  - neg psychiatric ROS       Infectious Disease:  - neg infectious disease ROS       Malignancy:         Other:    - neg other ROS                      Physical Exam  Normal systems: cardiovascular and pulmonary    Airway   Mallampati: II  TM distance: >3 FB  Neck ROM: full    Dental   (+) missing    Cardiovascular       Pulmonary             Lab Results   Component Value Date    WBC 9.0 10/20/2020    HGB 15.4 10/20/2020    HCT 45.0 10/20/2020     10/20/2020     2020    POTASSIUM 3.9 2020    CHLORIDE 103 2020    CO2 27 2020    BUN 19  "06/16/2020    CR 0.92 06/16/2020     (H) 06/16/2020    BENY 9.5 06/16/2020    MAG 2.0 12/26/2017    ALBUMIN 4.1 09/17/2020    PROTTOTAL 7.3 09/17/2020     (H) 09/17/2020     (H) 09/17/2020    ALKPHOS 71 09/17/2020    BILITOTAL 0.8 09/17/2020    TSH 1.69 11/23/2018       Preop Vitals  BP Readings from Last 3 Encounters:   10/22/20 (!) 142/82   10/20/20 (!) 150/92   10/15/20 128/78    Pulse Readings from Last 3 Encounters:   10/22/20 76   10/20/20 84   10/15/20 90      Resp Readings from Last 3 Encounters:   10/20/20 20   10/15/20 22   10/05/20 18    SpO2 Readings from Last 3 Encounters:   10/15/20 95%   10/05/20 96%   06/16/20 96%      Temp Readings from Last 1 Encounters:   10/20/20 99.1  F (37.3  C) (Oral)    Ht Readings from Last 1 Encounters:   10/16/20 1.803 m (5' 11\")      Wt Readings from Last 1 Encounters:   10/16/20 101.2 kg (223 lb)    Estimated body mass index is 31.1 kg/m  as calculated from the following:    Height as of this encounter: 1.803 m (5' 11\").    Weight as of this encounter: 101.2 kg (223 lb).       Anesthesia Plan      History & Physical Review      ASA Status:  3 .    NPO Status:  > 8 hours    Plan for MAC with Intravenous induction. Maintenance will be Balanced.    PONV prophylaxis:  Ondansetron (or other 5HT-3) and Dexamethasone or Solumedrol    The patient is not a current smoker      Postoperative Care  Postoperative pain management:  IV analgesics, Oral pain medications and Multi-modal analgesia.      Consents  Anesthetic plan, risks, benefits and alternatives discussed with:  Patient..                 Teddy Gilbert MD                    .  "

## 2020-10-27 ENCOUNTER — TELEPHONE (OUTPATIENT)
Dept: PODIATRY | Facility: CLINIC | Age: 60
End: 2020-10-27

## 2020-10-27 NOTE — TELEPHONE ENCOUNTER
1:10pm on 10/27/2020.  Pt. Had surgery with Dr. Diaz on 10/23/2020 and states that his HR person at work has called him to say that they have not received paperwork or payment for his short-term disability and FMLA.  Please call pt. At [phone] regarding this information.

## 2020-10-29 ENCOUNTER — TELEPHONE (OUTPATIENT)
Dept: PODIATRY | Facility: CLINIC | Age: 60
End: 2020-10-29

## 2020-10-29 NOTE — TELEPHONE ENCOUNTER
Received FMLA and short-term disability forms and placed in provider's basket at [facility].    Juliette MUÑOZ CMA

## 2020-10-29 NOTE — TELEPHONE ENCOUNTER
I called and spoke with patient regarding [facility] paperwork. Patient states that he is unable to drive or walk so he is not able to pick it up. Patient will have employer resend paperwork. Patient was given [facility] fax number to be sure it is sent to the correct location ([phone]).    Juliette MUÑOZ CMA

## 2020-10-29 NOTE — TELEPHONE ENCOUNTER
Please let him know that I have filled out all paperwork I have received, and if his employer has not received it, either I did not get any from him or it was misplaced by his employer.  In either case, have him send another copy to us, or he can simply bring it to his appointment on Friday.     Thanks     Gerard

## 2020-10-30 ENCOUNTER — MYC MEDICAL ADVICE (OUTPATIENT)
Dept: PODIATRY | Facility: CLINIC | Age: 60
End: 2020-10-30

## 2020-10-30 ENCOUNTER — OFFICE VISIT (OUTPATIENT)
Dept: PODIATRY | Facility: CLINIC | Age: 60
End: 2020-10-30
Payer: COMMERCIAL

## 2020-10-30 VITALS — DIASTOLIC BLOOD PRESSURE: 80 MMHG | SYSTOLIC BLOOD PRESSURE: 122 MMHG

## 2020-10-30 DIAGNOSIS — Z98.890 S/P FOOT SURGERY, RIGHT: ICD-10-CM

## 2020-10-30 DIAGNOSIS — S96.921A LACERATION OF RIGHT FOOT WITH TENDON INVOLVEMENT, INITIAL ENCOUNTER: Primary | ICD-10-CM

## 2020-10-30 DIAGNOSIS — S91.311A LACERATION OF RIGHT FOOT WITH TENDON INVOLVEMENT, INITIAL ENCOUNTER: Primary | ICD-10-CM

## 2020-10-30 PROCEDURE — 99024 POSTOP FOLLOW-UP VISIT: CPT | Performed by: PODIATRIST

## 2020-10-30 NOTE — PATIENT INSTRUCTIONS
Thank you for choosing [facility] / Foot & Ankle Surgery!    DR. ONEAL'S CLINIC LOCATIONS:   [address] [facility] [address]   [place] [address]                        [place] [address]   [phone]      SET UP SURGERY: [phone]   APPOINTMENTS: [phone]   BILLING QUESTIONS: [phone]   FAX NUMBER: [phone]       Follow up: as scheduled on 11/6/20

## 2020-10-30 NOTE — LETTER
"    10/30/2020         RE: Saji Iqbal  [address]  [place] [address]        Dear Colleague,    Thank you for referring your patient, Saji Iqbal, to the [facility] PODIATRY. Please see a copy of my visit note below.    Foot & Ankle Surgery  October 30, 2020    S:  Patient in today sp repair of right EHL/EHB laceration on 10/23/20.  Pain levels tolerable.  Following post-op instructions    /80       ROS - positive for CC.  Patient denies current nausea, vomiting, chills, fevers, belly pain, calf pain, chest pain or SOB.  Complete remainder of ROS is otherwise neg.    PE - bruising along incision across dorsal R 1st MPJ without drainage or SOI.  Hallux in line with lesser toes in sagittal plane with active extension at the MPJ.  Skin shows no trophic, color or temperature changes otherwise.  No calf redness, swelling or pain noted otherwise.    A/P - 60 year old yo patient approx 1 week sp above procedure  -reviewed procedure, including loss of correction when patient violently moved when coming out of anesthesia, as well as subsequent repair.  No evidence we have lost correction.  He has concerns about how this will affect the cost of the procedure.  I mentioned that while he will not be billed twice for \"double repair\" from my end, he may incur increased cost 2/2 to increased time in the OR  -rebandaged foot, with coban splint around the toe to minimize plantarflexion/dorsiflexion  -continue all post-op instructions without change; reviewed  -has appt next Friday for follow up, anticipate suture removal    Follow up  -  1 week or sooner with acute issues    Plan for [id number] - once sutures are removed, if seated/NWB duties are available.  Otherwise, may be 3-5 mo before healed sufficiently to resume duties       Gerard Diaz DPM FACFAS FACFAOM  Podiatric Foot & Ankle Surgeon  [facility]  [phone]          Again, thank you for allowing me " to participate in the care of your patient.        Sincerely,        Gerard Diaz DPM, SARAH

## 2020-10-30 NOTE — TELEPHONE ENCOUNTER
Completed FMLA and short-term disability forms faxed to [employer] admin Emilia Araujo at [phone].    Separately, faxed short-term disability to [employer] at [phone].    Originals sent to abstracting.    Patient informed via [employer].    Juliette MUÑOZ CMA

## 2020-10-30 NOTE — PROGRESS NOTES
"Foot & Ankle Surgery  October 30, 2020    S:  Patient in today sp repair of right EHL/EHB laceration on 10/23/20.  Pain levels tolerable.  Following post-op instructions    /80       ROS - positive for CC.  Patient denies current nausea, vomiting, chills, fevers, belly pain, calf pain, chest pain or SOB.  Complete remainder of ROS is otherwise neg.    PE - bruising along incision across dorsal R 1st MPJ without drainage or SOI.  Hallux in line with lesser toes in sagittal plane with active extension at the MPJ.  Skin shows no trophic, color or temperature changes otherwise.  No calf redness, swelling or pain noted otherwise.    A/P - 60 year old yo patient approx 1 week sp above procedure  -reviewed procedure, including loss of correction when patient violently moved when coming out of anesthesia, as well as subsequent repair.  No evidence we have lost correction.  He has concerns about how this will affect the cost of the procedure.  I mentioned that while he will not be billed twice for \"double repair\" from my end, he may incur increased cost 2/2 to increased time in the OR  -rebandaged foot, with coban splint around the toe to minimize plantarflexion/dorsiflexion  -continue all post-op instructions without change; reviewed  -has appt next Friday for follow up, anticipate suture removal    Follow up  -  1 week or sooner with acute issues    Plan for [id number] - once sutures are removed, if seated/NWB duties are available.  Otherwise, may be 3-5 mo before healed sufficiently to resume duties       Gerard Diaz DPM FACFAS FACFAOM  Podiatric Foot & Ankle Surgeon  [facility]  [phone]      "

## 2020-11-05 ENCOUNTER — TELEPHONE (OUTPATIENT)
Dept: PODIATRY | Facility: CLINIC | Age: 60
End: 2020-11-05

## 2020-11-05 NOTE — TELEPHONE ENCOUNTER
Message from Mika, states he has an appointment tomorrow to have stiches removed but this foot is all of a sudden having a lot of pain. Says he is concerned there may be infection and unsure if it can wait until tomorrow. Please call back ASAP.

## 2020-11-05 NOTE — TELEPHONE ENCOUNTER
Phone call to patient. He denies new injury that he is aware of. Woke up today having increased pain at incision site. Rates as +7/10. Yesterday pain was +2/10.   Denies drainage on dressing or increased swelling that he is able to tell. Has remained NWB with crutches.   He states he has a condition where he rocks and moves his feet to get to sleep. He also has restless leg syndrome. He thinks he may have done something to his incision in his sleep. He fears he has pulled a suture loose of the tendon and may have infection. Denies fever.   Stopped narcotics because they kept him awake for 24 hrs, even if when reduces to 1/2 tablet of Norco. He has taken only 4 ibuprofen since surgery.   He took 600mg ibuprofen around 8:30 am and it has not helped yet.   Recommended he take the ibuprofen every 6 hrs with food to help with increased pain. Also suggested he ice his foot or behind his knee and keep foot elevated as much as possible to help manage pain.   Offered appointment at 11:00 today at [facility]. Patient does not have a ride and declines. He feels he will be ok to wait until his appointment tomorrow. He would like Dr. Diaz to be informed of his call in the meantime.   Recommended if he developed fever or unable to manage the pain that he go to the ED. He verbalized understanding.     Routing to provider as MARIELY White RN

## 2020-11-06 ENCOUNTER — OFFICE VISIT (OUTPATIENT)
Dept: PODIATRY | Facility: CLINIC | Age: 60
End: 2020-11-06
Payer: COMMERCIAL

## 2020-11-06 VITALS
DIASTOLIC BLOOD PRESSURE: 72 MMHG | SYSTOLIC BLOOD PRESSURE: 116 MMHG | WEIGHT: 220 LBS | BODY MASS INDEX: 30.8 KG/M2 | HEIGHT: 71 IN

## 2020-11-06 DIAGNOSIS — Z98.890 S/P FOOT SURGERY, RIGHT: ICD-10-CM

## 2020-11-06 DIAGNOSIS — S96.921A LACERATION OF RIGHT FOOT WITH TENDON INVOLVEMENT, INITIAL ENCOUNTER: Primary | ICD-10-CM

## 2020-11-06 DIAGNOSIS — S91.311A LACERATION OF RIGHT FOOT WITH TENDON INVOLVEMENT, INITIAL ENCOUNTER: Primary | ICD-10-CM

## 2020-11-06 PROCEDURE — 99024 POSTOP FOLLOW-UP VISIT: CPT | Performed by: PODIATRIST

## 2020-11-06 ASSESSMENT — MIFFLIN-ST. JEOR: SCORE: 1835.04

## 2020-11-06 NOTE — PATIENT INSTRUCTIONS
Thank you for choosing [facility] Podiatry / Foot & Ankle Surgery!    DR. ONEAL'S CLINIC LOCATIONS:   [place] [id number]   [place] [address]                        [place], [place] [id number] [phone]      SET UP SURGERY: [phone]   APPOINTMENTS: [phone]   BILLING QUESTIONS: [phone]   FAX NUMBER: [phone]     Follow up:     Next steps:     Please read through the following handouts and if you have any questions, please feel free to call us or send a [employer] message!            (BMI) Body Mass Index  Many things can cause foot and ankle problems. Foot structure, activity level, foot mechanics and injuries are common causes of pain.One very important issue that often goes unmentioned, is body weight.  Extra weight can cause increased stress on muscles, ligaments, bones and tendons. Sometimes just a few extra pounds is all it takes to put one over her/his threshold. Without reducing that stress, it can be difficult to alleviate pain. Some people are uncomfortable addressing this issue, but we feel it is important for you to think about it. As Foot & Ankle specialists, our job is addressing the lower extremity problem and possible causes. Regarding extra body weight, we encourage patients to discuss diet and weight management plans with their primary care doctors. It is this team approach that gives you the best opportunity for pain relief and getting you back on your feet.

## 2020-11-06 NOTE — PROGRESS NOTES
"Foot & Ankle Surgery  November 6, 2020    S:  Patient in today sp extensor tendon(s) repair on 10/23/20 for laceration.  Pain levels low.  NWB, following post-op instructoins.  Very anxious today about possible suture removal    /72   Ht 1.803 m (5' 11\")   Wt 99.8 kg (220 lb)   BMI 30.68 kg/m        ROS - positive for CC.  Patient denies current nausea, vomiting, chills, fevers, belly pain, calf pain, chest pain or SOB.  Complete remainder of ROS is otherwise neg.    PE - sutures removed, slight scabbing along incision, but no dehiscence, drainage or SOI.  Hallux with slight dorsiflexion, similar to lesser toes, with active dorsiflexion noted at MPJ.  Skin shows no trophic, color or temperature changes otherwise.  No calf redness, swelling or pain noted otherwise.    A/P - 60 year old yo patient approx 2 weeks sp above procedure  -sutures removed, s-s applied.  Remove in 1 week if still present  -continue - DVT exercises; compression with tensogrip  -change - ok to start heel WB in boot with crutches; ice/elevate/rest prn based on pain; ok to wash foot but no soaking/submerging.    -note to RTW 11/9/20 for seated/NWB job duties.  States his HR told him he could not get a ride to work, as it would be a HIPPA violation, as his friends would \"ask questions\" about what he had done.  I told him this is nonsense, he can get a ride to work without it being a HIPPA violatoin  -4 week follow up, likely PT referral    Follow up  -  4 weeks or sooner with acute issues    Plan for [id number] - 11/9/20 from my standpoint for seated/NWB duties       Gerard Diaz, SARAH FACFAS FACFAOM  Podiatric Foot & Ankle Surgeon  [facility]  [phone]      "

## 2020-11-06 NOTE — LETTER
[facility] PODIATRY  [address]  [address]  [place] [address]  [phone]          November 6, 2020    RE:  Saji Iqbal                                                                                                                                                       [address] [address]            To whom it may concern:    Saji Iqbal is under my professional care after undergoing right foot surgery.  He is cleared to return to work 11/9/20 for seated/non weight bearing job duties.  We will see him back in 4 weeks and update his status at that time.    Sincerely,        Gerard Diaz, SARAH FACFAS FACFAOM  Podiatric Foot & Ankle Surgeon  [facility]  [phone]

## 2020-11-06 NOTE — LETTER
"    11/6/2020         RE: Saji Iqbal  [address]  [place] [address]        Dear Colleague,    Thank you for referring your patient, Saji Iqbal, to the [facility] PODIATRY. Please see a copy of my visit note below.    Foot & Ankle Surgery  November 6, 2020    S:  Patient in today sp extensor tendon(s) repair on 10/23/20 for laceration.  Pain levels low.  NWB, following post-op instructoins.  Very anxious today about possible suture removal    /72   Ht 1.803 m (5' 11\")   Wt 99.8 kg (220 lb)   BMI 30.68 kg/m        ROS - positive for CC.  Patient denies current nausea, vomiting, chills, fevers, belly pain, calf pain, chest pain or SOB.  Complete remainder of ROS is otherwise neg.    PE - sutures removed, slight scabbing along incision, but no dehiscence, drainage or SOI.  Hallux with slight dorsiflexion, similar to lesser toes, with active dorsiflexion noted at MPJ.  Skin shows no trophic, color or temperature changes otherwise.  No calf redness, swelling or pain noted otherwise.    A/P - 60 year old yo patient approx 2 weeks sp above procedure  -sutures removed, s-s applied.  Remove in 1 week if still present  -continue - DVT exercises; compression with tensogrip  -change - ok to start heel WB in boot with crutches; ice/elevate/rest prn based on pain; ok to wash foot but no soaking/submerging.    -note to RTW 11/9/20 for seated/NWB job duties.  States his HR told him he could not get a ride to work, as it would be a HIPPA violation, as his friends would \"ask questions\" about what he had done.  I told him this is nonsense, he can get a ride to work without it being a HIPPA violatoin  -4 week follow up, likely PT referral    Follow up  -  4 weeks or sooner with acute issues    Plan for [id number] - 11/9/20 from my standpoint for seated/NWB duties       Gerard Diaz, SARAH FACFAS FACFAOM  Podiatric Foot & Ankle Surgeon  [facility]  [phone]          Again, thank you for allowing me to participate in the care of your patient.        Sincerely,        Gerard Diaz DPM, SARAH

## 2020-11-11 ENCOUNTER — TELEPHONE (OUTPATIENT)
Dept: PSYCHIATRY | Facility: CLINIC | Age: 60
End: 2020-11-11

## 2020-11-11 NOTE — TELEPHONE ENCOUNTER
"PSYCHIATRY CLINIC PHONE INTAKE     SERVICES REQUESTED / INTERESTED IN          Med Management    Presenting Problem and Brief History                              What would you like to be seen for? (brief description):  Patient was referred for psychiatry/an evaluation because of a panic attack during a cardiology appointment. He reports this was likely due to an upcoming surgery, which was completed at the end of October. He does not experience many symptoms of depression anymore, mainly the anxiety. Patient noted his mother had a history of depression and anxiety, and likely postpartum depression. He is the youngest of 4 children and feels he and his mother did not really connect.   Have you received a mental health diagnosis? Yes   Which one (s): Anxiety  Is there any history of developmental delay?  No   Are you currently seeing a mental health provider?  No            Who / month last seen:    Do you have mental health records elsewhere?  Yes - [facility], and \"somewhere in [place], [place]\"  Will you sign a release so we can obtain them?  Yes    Have you ever been hospitalized for psychiatric reasons?  Yes  Describe:  2014 - [facility] for 3 weeks for  and then an adult treatment facility in [place] for 3 weeks.    Do you have current thoughts of self-harm?  No    Do you currently have thoughts of harming others?  No       Substance Use History     Do you have any history of alcohol / illicit drug use?  Yes  Describe:  Alcohol. Has not used since about 10/20/20.  Have you ever received treatment for this?  No    Describe:       Social History     Who is the patient's a guardian?  No    Name / number:   Have you had an ACT team in last 12 months?  No  Describe:    Do you have any current or past legal issues?  No  Describe:    OK to leave a detailed voicemail?  Yes    Medical/ Surgical History                                   Patient Active Problem List   Diagnosis     Tobacco abuse     Coronary artery disease " involving native coronary artery without angina pectoris     Hyperlipidemia LDL goal <100     Benign essential hypertension     Obesity due to excess calories     Chronic gastroesophageal reflux disease     Uncontrolled type 2 diabetes mellitus with hyperglycemia (H)     NIKKI (obstructive sleep apnea)     Hypertriglyceridemia     Psoriasis     ETOH abuse     Laceration of extensor tendon of right foot, initial encounter          Medications             Current Outpatient Medications   Medication Sig Dispense Refill     aspirin 81 MG tablet Take by mouth daily       augmented betamethasone dipropionate (DIPROLENE-AF) 0.05 % external ointment Apply two times per day for 14 days to arms, legs or trunk. Then when needed 50 g 3     blood glucose (NO BRAND SPECIFIED) lancets standard Use to test blood sugar 1 times daily or as directed. 1 Box 3     blood glucose monitoring (ACCU-CHEK NORI PLUS) meter device kit Use to test blood sugar 4 times daily or as directed. (pts machine is not working) 1 kit 0     blood glucose monitoring (ACCU-CHEK FASTCLIX) lancets Use to test blood sugar 4 times daily or as directed. 2 Box 3     blood glucose monitoring (NO BRAND SPECIFIED) test strip Use to test blood sugar 4 times daily or as directed. Please dispense Accu Chek Nori plus test strips or per patient preference if using a different brand 150 Box 3     canagliflozin (INVOKANA) 300 MG tablet Take 1 tablet (300 mg) by mouth every morning (before breakfast) . 90 tablet 1     gabapentin (NEURONTIN) 100 MG capsule TAKE ONE CAPSULE BY MOUTH THREE TIMES DAILY  90 capsule 0     HYDROcodone-acetaminophen (NORCO) 5-325 MG tablet Take 1-2 tablets every 4-6 hours as needed for pain.  Do not take other tylenol products with this medication, as too much tylenol can be damaging to the liver. 20 tablet 0     hydrOXYzine (ATARAX) 25 MG tablet Take 1-2 tablets every 4-6 hours as needed for pain control. 20 tablet 0     ibuprofen (ADVIL/MOTRIN) 600  MG tablet Take 600mg of ibuprofen every 6 hours x 7 days to assist in pain control.  If you are not tolerating the medication, you are ok to stop. 30 tablet 0     insulin glargine (LANTUS SOLOSTAR) 100 UNIT/ML pen Inject 24 Units Subcutaneous 2 times daily 45 mL 1     insulin pen needle (ULTICARE PEN NEEDLES) 31G X 5 MM miscellaneous USE 2 DAILY OR AS DIRECTED 100 each 0     lisinopril (ZESTRIL) 10 MG tablet Take 1 tablet (10 mg) by mouth daily 90 tablet 11     metoprolol tartrate (LOPRESSOR) 25 MG tablet Take 1 tablet (25 mg) by mouth 2 times daily 180 tablet 1     omeprazole (PRILOSEC) 20 MG DR capsule TAKE 1 CAPSULE BY MOUTH ONE TIME DAILY  90 capsule 0     rosuvastatin (CRESTOR) 40 MG tablet Take 1 tablet (40 mg) by mouth At Bedtime 90 tablet 11         DISPOSITION      Completed phone screen with patient. Added to AGE wait list. Patient declined scheduling CDE because he has someone else he can call.    Marcella Woody,

## 2020-11-20 ENCOUNTER — MYC MEDICAL ADVICE (OUTPATIENT)
Dept: PODIATRY | Facility: CLINIC | Age: 60
End: 2020-11-20

## 2020-11-20 DIAGNOSIS — E11.65 TYPE 2 DIABETES MELLITUS WITH HYPERGLYCEMIA, WITH LONG-TERM CURRENT USE OF INSULIN (H): ICD-10-CM

## 2020-11-20 DIAGNOSIS — Z79.4 TYPE 2 DIABETES MELLITUS WITH HYPERGLYCEMIA, WITH LONG-TERM CURRENT USE OF INSULIN (H): ICD-10-CM

## 2020-11-20 RX ORDER — PEN NEEDLE, DIABETIC 29 G X1/2"
NEEDLE, DISPOSABLE MISCELLANEOUS
Qty: 100 EACH | Refills: 0 | Status: SHIPPED | OUTPATIENT
Start: 2020-11-20 | End: 2021-02-08

## 2020-11-20 NOTE — TELEPHONE ENCOUNTER
Prescription approved per [facility] Refill Protocol.  Ben Benítez RN, BSN

## 2020-11-20 NOTE — TELEPHONE ENCOUNTER
Please see [employer] message and photo.   Left voicemail asking for a return call to discuss the great toe stiffness. Informed he may take the boot off when sleeping and at rest, but any time he is up moving, he should wear the boot.     RENETTA White RN

## 2020-11-20 NOTE — TELEPHONE ENCOUNTER
Discussed with Dr. Daiz.   Ok for patient to wear boot when sleeping for protection.   Sandal not advised.   Phone call to patient and informed of the above.   Asked that he make sure to take the boot off several times during the day when at rest for ROM. He had only been taking it off in the evening and will do so.   Will follow up at appointment next week.     RENETTA White RN

## 2020-11-20 NOTE — TELEPHONE ENCOUNTER
"Patient returns call about [employer] message. He asks if he should be seen sooner than 11/30/20 with Dr. Diaz.   Reviewed photo patient sent. Asked if the redness and discoloration in the picture is about the same as it has been since surgery. He states it is but less purple than it was initially. Area is warm and toe appears larger than his left great toe. Informed that is not uncommon to have some swelling. He denies pain or fever.   He asks about wearing the boot at night because in the morning after having the boot off, his right great toe \"feels like it is dead\". He states he has very little sensation first thing in the morning and feels it is related to it being cold.   After wearing the boot for awhile he is able to have more normal sensation again.     His other concern is that in the am his right great toe appears to curl downwards at what he describes as the first MTP joint. After wearing the boot it becomes more straight. He is very concerned about this.   He is afraid he is going to re-injure his foot in his sleep.   He feels that wearing the boot at night would help or asks if he could wear a sandal with a strap to protect it when sleeping.   Video appointment scheduled for 11/24/20 to discuss further.   Will also discuss with provider to see if there are further recommendations before appointment and get back with him.     Please advise if it is ok for patient to wear the boot at bedtime or if he may wear a sandal instead to protect the toe if he thrashes around at night.      RENETTA White RN    "

## 2020-11-25 NOTE — TELEPHONE ENCOUNTER
----- Message from Jordyn Garcia sent at 11/24/2020  4:30 PM CST -----  Patient is concerned with how the surgery site look like where  did surgery on. He would like to speak with someone about it as soon as possible as stated he is very concerned about it. If you could give him a call back.     Thank you!    Sincerely,    Jordyn Garcia

## 2020-11-25 NOTE — TELEPHONE ENCOUNTER
Talked to patient and patient stated that he will keep his appointment on Monday November 30 and go to the Hospital if he feels he needs to.    SHIREEN Tejada.

## 2020-11-30 ENCOUNTER — OFFICE VISIT (OUTPATIENT)
Dept: PODIATRY | Facility: CLINIC | Age: 60
End: 2020-11-30
Payer: COMMERCIAL

## 2020-11-30 VITALS
HEIGHT: 71 IN | DIASTOLIC BLOOD PRESSURE: 80 MMHG | BODY MASS INDEX: 30.8 KG/M2 | WEIGHT: 220 LBS | SYSTOLIC BLOOD PRESSURE: 132 MMHG

## 2020-11-30 DIAGNOSIS — S96.921A LACERATION OF RIGHT FOOT WITH TENDON INVOLVEMENT, INITIAL ENCOUNTER: Primary | ICD-10-CM

## 2020-11-30 DIAGNOSIS — Z98.890 S/P FOOT SURGERY, RIGHT: ICD-10-CM

## 2020-11-30 DIAGNOSIS — S91.311A LACERATION OF RIGHT FOOT WITH TENDON INVOLVEMENT, INITIAL ENCOUNTER: Primary | ICD-10-CM

## 2020-11-30 PROCEDURE — 99024 POSTOP FOLLOW-UP VISIT: CPT | Performed by: PODIATRIST

## 2020-11-30 ASSESSMENT — MIFFLIN-ST. JEOR: SCORE: 1835.04

## 2020-11-30 NOTE — PROGRESS NOTES
"Foot & Ankle Surgery  November 30, 2020    S:  Patient in today sp EHL/EHB right lower extremity repair for knife laceration on 10/23/20.  Pain levels improving.  States it can ache and wake him up.  He was supposed to have a video visit last week but we weren't able to make it work.  Putting some weight on the foot in the boot with crutches, but not much    /80   Ht 1.803 m (5' 11\")   Wt 99.8 kg (220 lb)   BMI 30.68 kg/m        ROS - positive for CC.  Patient denies current nausea, vomiting, chills, fevers, belly pain, calf pain, chest pain or SOB.  Complete remainder of ROS is otherwise neg.    PE - small superficial scab dorsal R 1st MPJ, but no drainage or SOI.  Active extnsion at MPJ and hallux IPJ.  Skin shows no trophic, color or temperature changes otherwise.  No calf redness, swelling or pain noted otherwise.    A/P - 60 year old yo patient approx 5+ weeks sp above procedure  -WBAT in boot with crutches; increase weight to tolerance  -RICE/tylenol prn   -[facility] PT referral for functional rehab  -ok to drive; not to wear boot, and practice in empty parking lot prior to going on the road    Follow up  -  4 weeks or sooner with acute issues    Plan for [id number] - when healed sufficiently to return to job duties       Gerard Diaz, EUNM FACFAS FACFAOM  Podiatric Foot & Ankle Surgeon  [facility]  [phone]    "

## 2020-12-03 ENCOUNTER — THERAPY VISIT (OUTPATIENT)
Dept: PHYSICAL THERAPY | Facility: CLINIC | Age: 60
End: 2020-12-03
Attending: PODIATRIST
Payer: COMMERCIAL

## 2020-12-03 DIAGNOSIS — Z98.890 S/P FOOT SURGERY, RIGHT: ICD-10-CM

## 2020-12-03 DIAGNOSIS — S96.921A LACERATION OF RIGHT FOOT WITH TENDON INVOLVEMENT, INITIAL ENCOUNTER: ICD-10-CM

## 2020-12-03 DIAGNOSIS — Z47.89 AFTERCARE FOLLOWING SURGERY OF THE MUSCULOSKELETAL SYSTEM: ICD-10-CM

## 2020-12-03 DIAGNOSIS — S91.311A LACERATION OF RIGHT FOOT WITH TENDON INVOLVEMENT, INITIAL ENCOUNTER: ICD-10-CM

## 2020-12-03 DIAGNOSIS — M25.571 ACUTE RIGHT ANKLE PAIN: ICD-10-CM

## 2020-12-03 PROCEDURE — 97110 THERAPEUTIC EXERCISES: CPT | Mod: GP | Performed by: PHYSICAL THERAPIST

## 2020-12-03 PROCEDURE — 97161 PT EVAL LOW COMPLEX 20 MIN: CPT | Mod: GP | Performed by: PHYSICAL THERAPIST

## 2020-12-03 NOTE — PROGRESS NOTES
[facility] Initial Evaluation  Subjective:  The history is provided by the patient. No  was used.   Patient Health History  Saji Iqbal being seen for Right foot surgery to repair great toe extensor on 10/4/2020.     Date of Onset: 10/4/2020.   Problem occurred: dropped a knife on foot    Pain is reported as 1/10 on pain scale.  General health as reported by patient is good.  Pertinent medical history includes: depression, diabetes, heart problems, high blood pressure, migraines/headaches, numbness/tingling and overweight.   Red flags:  None as reported by patient.   Other medical allergies details: See EPIC.   Surgeries include:  Heart surgery and orthopedic surgery. Other surgery history details: 2 Stents in heart.    Current medications:  Cardiac medication and high blood pressure medication.    Current occupation is .   Primary job tasks include:  Computer work.                  Therapist Generated HPI Evaluation  Problem details: Pt. complains of right great toe pain after having surgery to repair severed great toe extensor tendon on 10/23/2020.  Injury occurred on on 10/4/1010.  PT order dated 11/30/2020.  Currently using 2 crutches and CAM boot.  He is cleared to progress away from boot and into shoe as tolerated.      .         Type of problem:  Right foot.    This is a new condition.  Occurance: accident at home.  Where condition occurred: at home.  Site of Pain: great toe.  Pain is described as aching and is intermittent.  Pain radiates to:  No radiation. Pain is worse during the day.  Since onset symptoms are gradually improving.  Associated symptoms:  Loss of motion/stiffness, loss of strength and numbness. Symptoms are exacerbated by activity, ascending stairs, bending/squatting, kneeling, descending stairs, walking, weight bearing and standing  and relieved by rest.      Restrictions due to condition include:  Working in normal job without  restrictions.  Barriers include:  None as reported by patient.                        Objective:    Gait:    Gait Type:  Antalgic   Weight Bearing Status:  WBAT   Assistive Devices:  Crutches and CAM            Ankle/Foot Evaluation  ROM:  AROM is normal.PROM is normal.    PROM:            Great Toe Flexion:  Left:  35     Right:  15    Great Toe Extension:  Left:    62     Right: 30            PALPATION: normal    EDEMA: normal                                                              General     ROS    Assessment/Plan:    Patient is a 60 year old male with right side ankle complaints.    Patient has the following significant findings with corresponding treatment plan.                Diagnosis 1:  S/p R toe surgery  Pain -  self management, education, directional preference exercise and home program  Decreased ROM/flexibility - manual therapy and therapeutic exercise  Decreased strength - therapeutic exercise and therapeutic activities  Decreased proprioception - neuro re-education and therapeutic activities  Impaired gait - gait training  Impaired muscle performance - neuro re-education  Decreased function - therapeutic activities    Therapy Evaluation Codes:   1) Clinical presentation characteristics are:   Stable/Uncomplicated.  2) Decision-Making    Low complexity using standardized patient assessment instrument and/or measureable assessment of functional outcome.  Cumulative Therapy Evaluation is: Low complexity.    Previous and current functional limitations:  (See Goal Flow Sheet for this information)    Short term and Long term goals: (See Goal Flow Sheet for this information)     Communication ability:  Patient appears to be able to clearly communicate and understand verbal and written communication and follow directions correctly.  Treatment Explanation - The following has been discussed with the patient:   RX ordered/plan of care  Anticipated outcomes  Possible risks and side effects  This patient  would benefit from PT intervention to resume normal activities.   Rehab potential is good.    Frequency:  2 X week, once daily  Duration:  for 6 weeks  Discharge Plan:  Achieve all LTG.  Independent in home treatment program.  Reach maximal therapeutic benefit.    Please refer to the daily flowsheet for treatment today, total treatment time and time spent performing 1:1 timed codes.

## 2020-12-16 ENCOUNTER — HOSPITAL ENCOUNTER (EMERGENCY)
Facility: CLINIC | Age: 60
Discharge: HOME | End: 2020-12-16
Attending: EMERGENCY MEDICINE | Admitting: EMERGENCY MEDICINE
Payer: COMMERCIAL

## 2020-12-16 ENCOUNTER — APPOINTMENT (OUTPATIENT)
Dept: GENERAL RADIOLOGY | Facility: CLINIC | Age: 60
End: 2020-12-16
Attending: EMERGENCY MEDICINE
Payer: COMMERCIAL

## 2020-12-16 VITALS
OXYGEN SATURATION: 95 % | TEMPERATURE: 98.1 F | SYSTOLIC BLOOD PRESSURE: 126 MMHG | DIASTOLIC BLOOD PRESSURE: 53 MMHG | RESPIRATION RATE: 28 BRPM | HEART RATE: 93 BPM

## 2020-12-16 DIAGNOSIS — R06.00 DYSPNEA, UNSPECIFIED TYPE: ICD-10-CM

## 2020-12-16 DIAGNOSIS — Z20.822 EXPOSURE TO COVID-19 VIRUS: ICD-10-CM

## 2020-12-16 DIAGNOSIS — R25.1 SHAKING: ICD-10-CM

## 2020-12-16 LAB
ANION GAP SERPL CALCULATED.3IONS-SCNC: 9 MMOL/L (ref 3–14)
BASOPHILS # BLD AUTO: 0.1 10E9/L (ref 0–0.2)
BASOPHILS NFR BLD AUTO: 1.5 %
BUN SERPL-MCNC: 19 MG/DL (ref 7–30)
CALCIUM SERPL-MCNC: 9.8 MG/DL (ref 8.5–10.1)
CHLORIDE SERPL-SCNC: 104 MMOL/L (ref 94–109)
CO2 SERPL-SCNC: 23 MMOL/L (ref 20–32)
CREAT SERPL-MCNC: 1.01 MG/DL (ref 0.66–1.25)
D DIMER PPP FEU-MCNC: 0.4 UG/ML FEU (ref 0–0.5)
DIFFERENTIAL METHOD BLD: ABNORMAL
EOSINOPHIL # BLD AUTO: 0.1 10E9/L (ref 0–0.7)
EOSINOPHIL NFR BLD AUTO: 1 %
ERYTHROCYTE [DISTWIDTH] IN BLOOD BY AUTOMATED COUNT: 13.6 % (ref 10–15)
GFR SERPL CREATININE-BSD FRML MDRD: 81 ML/MIN/{1.73_M2}
GLUCOSE SERPL-MCNC: 166 MG/DL (ref 70–99)
HCT VFR BLD AUTO: 44 % (ref 40–53)
HGB BLD-MCNC: 15.4 G/DL (ref 13.3–17.7)
IMM GRANULOCYTES # BLD: 0 10E9/L (ref 0–0.4)
IMM GRANULOCYTES NFR BLD: 0.4 %
INTERPRETATION ECG - MUSE: NORMAL
LYMPHOCYTES # BLD AUTO: 0.8 10E9/L (ref 0.8–5.3)
LYMPHOCYTES NFR BLD AUTO: 14.8 %
MCH RBC QN AUTO: 34.1 PG (ref 26.5–33)
MCHC RBC AUTO-ENTMCNC: 35 G/DL (ref 31.5–36.5)
MCV RBC AUTO: 98 FL (ref 78–100)
MONOCYTES # BLD AUTO: 0.7 10E9/L (ref 0–1.3)
MONOCYTES NFR BLD AUTO: 13.4 %
NEUTROPHILS # BLD AUTO: 3.6 10E9/L (ref 1.6–8.3)
NEUTROPHILS NFR BLD AUTO: 68.9 %
NRBC # BLD AUTO: 0 10*3/UL
NRBC BLD AUTO-RTO: 0 /100
PLATELET # BLD AUTO: 154 10E9/L (ref 150–450)
POTASSIUM SERPL-SCNC: 4.4 MMOL/L (ref 3.4–5.3)
RBC # BLD AUTO: 4.51 10E12/L (ref 4.4–5.9)
SODIUM SERPL-SCNC: 136 MMOL/L (ref 133–144)
TROPONIN I SERPL-MCNC: <0.015 UG/L (ref 0–0.04)
WBC # BLD AUTO: 5.2 10E9/L (ref 4–11)

## 2020-12-16 PROCEDURE — 85025 COMPLETE CBC W/AUTO DIFF WBC: CPT | Performed by: EMERGENCY MEDICINE

## 2020-12-16 PROCEDURE — 96374 THER/PROPH/DIAG INJ IV PUSH: CPT

## 2020-12-16 PROCEDURE — 93005 ELECTROCARDIOGRAM TRACING: CPT

## 2020-12-16 PROCEDURE — 250N000011 HC RX IP 250 OP 636: Performed by: EMERGENCY MEDICINE

## 2020-12-16 PROCEDURE — 71045 X-RAY EXAM CHEST 1 VIEW: CPT

## 2020-12-16 PROCEDURE — 96375 TX/PRO/DX INJ NEW DRUG ADDON: CPT

## 2020-12-16 PROCEDURE — 85379 FIBRIN DEGRADATION QUANT: CPT | Performed by: EMERGENCY MEDICINE

## 2020-12-16 PROCEDURE — 99285 EMERGENCY DEPT VISIT HI MDM: CPT | Mod: 25

## 2020-12-16 PROCEDURE — 80048 BASIC METABOLIC PNL TOTAL CA: CPT | Performed by: EMERGENCY MEDICINE

## 2020-12-16 PROCEDURE — 84484 ASSAY OF TROPONIN QUANT: CPT | Performed by: EMERGENCY MEDICINE

## 2020-12-16 RX ORDER — LORAZEPAM 2 MG/ML
1 INJECTION INTRAMUSCULAR ONCE
Status: COMPLETED | OUTPATIENT
Start: 2020-12-16 | End: 2020-12-16

## 2020-12-16 RX ORDER — ONDANSETRON 2 MG/ML
4 INJECTION INTRAMUSCULAR; INTRAVENOUS ONCE
Status: COMPLETED | OUTPATIENT
Start: 2020-12-16 | End: 2020-12-16

## 2020-12-16 RX ADMIN — ONDANSETRON 4 MG: 2 INJECTION INTRAMUSCULAR; INTRAVENOUS at 11:44

## 2020-12-16 RX ADMIN — LORAZEPAM 1 MG: 2 INJECTION INTRAMUSCULAR; INTRAVENOUS at 11:42

## 2020-12-16 ASSESSMENT — ENCOUNTER SYMPTOMS
NAUSEA: 1
NERVOUS/ANXIOUS: 1
VOMITING: 1
FEVER: 1
CHILLS: 1
DIAPHORESIS: 1
ABDOMINAL PAIN: 0
SHORTNESS OF BREATH: 1

## 2020-12-16 NOTE — ED PROVIDER NOTES
History   Chief Complaint:  COVID Exposure and Nausea & Vomiting       HPI   Saji Iqbal is a 60 year old male with history of diabetes, GERD, hypertension, CAD, and myocardial infarction with coronary stents placed who presents with nausea and vomiting as well as a COVID exposure. The patient says that on 12/14 he had some heartburn and vomiting. He got some Omeprazole over the counter that gave some relief. The patient says that today he has been dry heaving. He says that he has been unable to eat anything solid since yesterday. He notes that he was recently in contact with a co-worker that tested positive for COVID.  He went to urgent care today due to chills last night but denies fever.  He denied a cough, sore throat, or shortness of breath to them.  He told her he felt shaky and thought his blood sugar might be low though it was normal in urgent care.  The patient says that he had a COVID test performed at clinic today, but has not received results yet.  Because of how the patient felt, the urgent care was concerned and wanted the patient to be seen in the emergency department.    The patient endorses increased anxiety, chills, subjective fevers, minor shortness of breath, and diaphoresis. EMS notes that the patient's current anxiety is in part due to being seen in clinic earlier (he notes that he has some residual anxiety due to waking up during surgery of his right ankle) and needing multiple attempts to place an IV. He denies any chest pain or abdominal pain.       Review of Systems   Constitutional: Positive for chills, diaphoresis and fever (subjective).   Respiratory: Positive for shortness of breath.    Cardiovascular: Negative for chest pain.   Gastrointestinal: Positive for nausea and vomiting. Negative for abdominal pain.   Psychiatric/Behavioral: The patient is nervous/anxious.    All other systems reviewed and are  negative.    Allergies:  Metformin  Honey    Medications:  Crestor  Prilosec  Lopressor  Lisinopril  Lantus  Ibuprofen  Atarax  Holiday  Gabapentin   Invokana  Diprolene   Aspirin     Past Medical History:     Cluster headache  CAD  Depression  Diabetes  GERD  Heart attack   Hyperlipidemia   Hypertension   Renal disease  Rotator cuff arthropathy  Sleep apnea   Stented coronary artery   Tobacco abuse  Obesity  Hypertriglyceridemia   Psoriasis   ETOH abuse      Past Surgical History:    Arthroscopy shoulder  Repair tendon foot  Coronary stent      Family History:    CAD  Aneurysm   Cerebrovascular disease    Social History:  Patient presents to the ED via EMS.    Physical Exam     Patient Vitals for the past 24 hrs:   BP Temp Temp src Pulse Resp SpO2   12/16/20 1327 126/53 -- -- -- -- --   12/16/20 1215 (!) 160/101 -- -- 80 -- 95 %   12/16/20 1139 -- -- -- -- 28 --   12/16/20 1101 (!) 187/100 98.1  F (36.7  C) Oral 84 -- 96 %       Physical Exam  Constitutional: Vital signs reviewed as above.   Head: No external signs of trauma noted.  Eyes: Pupils are equal, round, and reactive to light.   Neck: No JVD noted  Cardiovascular: Normal rate, regular rhythm and normal heart sounds.  No murmur heard. Equal B/L peripheral pulses.  Pulmonary/Chest: Effort normal and breath sounds normal. No respiratory distress. Patient has no wheezes. Patient has no rales.   Gastrointestinal: Soft. There is no tenderness.   Musculoskeletal/Extremities: No edema noted. Normal tone.  Neurological: Patient is alert and oriented to person, place, and time.   Skin: Skin is warm and dry. There is no diaphoresis noted.   Psychiatric: The patient appears calm.      Emergency Department Course     ECG:  ECG taken at 1144, ECG read at 1151  Normal sinus rhythm  left bundle branch block   Abnormal ECG  No significant change compared to EKG dated 10/20/2020  Rate 75 bpm. WV interval 172 ms. QRS duration 152 ms. QT/QTc 426/475 ms. P-R-T axes -1 -6 106.      Imaging:  XR Chest Port 1 View   Preliminary Result   IMPRESSION: Heart size is normal. Tortuous aorta. Pulmonary   vasculature normal. Lungs are clear. No pleural fluid.          Laboratory:    CBC: WBC 5.2, HGB 15.4,   BMP:  (H) o/w WNL (Creatinine 1.01)  D Dimer: 0.4  Troponin (Collected 1145): <0.015       Emergency Department Course:    ED Course as of Dec 16 1328   Wed Dec 16, 2020   1055 Patient notes and history reviewed. Patient exam performed.      1145 IV was inserted and blood was drawn for laboratory testing, results above.       1224 The patient was sent for a XR while in the emergency department, results above.        1319 Patient rechecked and updated via iPad.  Discussed plan of care.  Patient is comfortable with discharge.           Interventions:    1142 Ativan 1 mg IV  1144 Zofran 4 mg IV    Disposition:  The patient was discharged to home.       Impression & Plan       Medical Decision Making:  This 60-year-old male patient was sent to the ED due to concerns for shaking.  Please see the HPI and exam for specifics.  The patient has improved in the ED.  His blood pressure, of note, has markedly improved as well.  The patient feels less anxious.  A broad differential was considered though I have a low suspicion for ACS and the patient's D-dimer was negative making it unlikely that he has a PE.  The patient himself stated that he felt more short of breath and anxious because of prior healthcare experiences as well as from 3 failed attempts for an IV by EMS.  At this time, the patient is comfortable going home.  He notes that he did have a primary care appointment scheduled tomorrow though he states that because of his pending Covid test he will be unable to go there.  I have encouraged him to still follow with them even if it is a virtual visit.  The patient should return with any new or worsening symptoms.  Anticipatory guidance given prior to discharge.    Diagnosis:    ICD-10-CM     1. Dyspnea, unspecified type  R06.00    2. Shaking  R25.1    3. Exposure to COVID-19 virus  Z20.828        Discharge Medications:  New Prescriptions    No medications on file       Scribe Disclosure:  I, Jeff Everett, am serving as a scribe at 10:54 AM on 12/16/2020 to document services personally performed by Russell Harrison DO based on my observations and the provider's statements to me.            Russell Harrison DO  12/16/20 [id number]

## 2020-12-16 NOTE — ED AVS SNAPSHOT
[facility] Emergency Dept  [address]  [facility] [address]  Phone: [phone]  Fax: [phone]                                    Saji Iqbal   MRN: [id number]    Department: [facility] Emergency Dept   Date of Visit: 12/16/2020           After Visit Summary Signature Page    I have received my discharge instructions, and my questions have been answered. I have discussed any challenges I see with this plan with the nurse or doctor.    ..........................................................................................................................................  Patient/Patient Representative Signature      ..........................................................................................................................................  Patient Representative Print Name and Relationship to Patient    ..................................................               ................................................  Date                                   Time    ..........................................................................................................................................  Reviewed by Signature/Title    ...................................................              ..............................................  Date                                               Time          22EPIC Rev 08/18

## 2020-12-16 NOTE — ED NOTES
Bed: ED10  Expected date: 12/16/20  Expected time: 10:44 AM  Means of arrival: Ambulance  Comments:  [id number]  59yo PUI

## 2020-12-16 NOTE — ED TRIAGE NOTES
"Pt arrives with nausea and vomiting the past few days and reports he was exposed to someone who tested COVID positive. Pt went to urgent care today to get a test and was sent here because pt was \"shaking\" and a sepsis eval. Pt denies recent fevers, cough, and endorses mild SOB from anxiety. Pt is very anxious upon arrival to ED.  "

## 2020-12-16 NOTE — DISCHARGE INSTRUCTIONS
What do you do next:   Continue your home medications unless we have specifically changed them  Follow up as indicated below    When do you return: If you have severe chest pain, severe shortness of breath, lightheadedness or fainting, or any other symptoms that concern you, please return to the ED for reevaluation.    Thank you for allowing us to care for you today.

## 2020-12-17 ENCOUNTER — TELEPHONE (OUTPATIENT)
Dept: FAMILY MEDICINE | Facility: CLINIC | Age: 60
End: 2020-12-17

## 2020-12-17 ENCOUNTER — PATIENT OUTREACH (OUTPATIENT)
Dept: NURSING | Facility: CLINIC | Age: 60
End: 2020-12-17
Payer: COMMERCIAL

## 2020-12-17 ENCOUNTER — TELEPHONE (OUTPATIENT)
Dept: CARE COORDINATION | Facility: CLINIC | Age: 60
End: 2020-12-17

## 2020-12-17 DIAGNOSIS — E11.65 TYPE 2 DIABETES MELLITUS WITH HYPERGLYCEMIA, WITH LONG-TERM CURRENT USE OF INSULIN (H): ICD-10-CM

## 2020-12-17 DIAGNOSIS — Z79.4 TYPE 2 DIABETES MELLITUS WITH HYPERGLYCEMIA, WITH LONG-TERM CURRENT USE OF INSULIN (H): ICD-10-CM

## 2020-12-17 DIAGNOSIS — Z71.89 OTHER SPECIFIED COUNSELING: ICD-10-CM

## 2020-12-17 NOTE — PROGRESS NOTES
Clinic Care Coordination Contact  Community Health Worker Initial Outreach  Spoke with patient     Chart Review: Referral from discharge report. In ED for COVID exposure, nausea and vomiting. COVID test pending. Discharged to home. Follow up with PCP as needed.     CHW introduced self and role with CC  Patient states he is feeling better this morning, able to eat food. Getting food via [employer].   Missed PCP appointment this morning at 7am due to not feeling well or knowing results of COVID test. Patient needs to cancel PT appointment tomorrow, requests CHW to do this.   Patient reports having plenty of resources to be able to self-isolate, talked to daughter yesterday who is 24 and doesn't think COVID is real which upset him.  Patient states that [facility] in [place] did the test. He is concerned about the gag reflux, may need to see ENT however it's completely better today. He states that he needs a refill of Invokana prescription along with a battery for his glucose meter.   Shops at [facility], CHW encouraged patient to call pharmacy and request new battery which can be picked up through drive thru with medications.   Patient states that he feels what he had yesterday is a stomach bug, he is checking his MyChart frequently for an update on his COVID test.   CHW inquired if patient is in need of any additional support or resources, patient declines.   CHW will send message to care team to assist with prescription refill and to reschedule PCP visit.   CHW called and cancelled patients PT appointment for tomorrow.    Patient accepts CC: No, patient declines CC follow up for connection to resources. Patient will be sent Care Coordination introduction letter for future reference.     SHAWNEE Nair, B.S. [facility] Care Coordination  [facility] Clinics:  [place], [place], [place], [place], and [place]  Phone: [phone]

## 2020-12-17 NOTE — TELEPHONE ENCOUNTER
Patient left voicemail for CHW requesting to speak to a nurse today regarding his ED visit yesterday.     CHW spoke to patient this morning and answered medication questions, unsure what else needs to discussed. Can a nurse please contact patient? Thank you!     PAN Cano CHW

## 2020-12-17 NOTE — LETTER
[place] CARE COORDINATION  [facility]  December 17, 2020    Saji Iqbal  [address]  [place] [address]      Dear Saji,    I am a clinic community health worker who works with Naseem Esparza MD at the [facility]. I wanted to thank you for spending the time to talk with me.  Below is a description of clinic care coordination and how I can further assist you.      The clinic care coordination team is made up of a registered nurse,  and community health worker who understand the health care system. The goal of clinic care coordination is to help you manage your health and improve access to the health care system in the most efficient manner. The team can assist you in meeting your health care goals by providing education, coordinating services, strengthening the communication among your providers and supporting you with any resource needs.    Please feel free to contact me at [phone] with any questions or concerns. We are focused on providing you with the highest-quality healthcare experience possible and that all starts with you.     Sincerely,     SHAWNEE Nair, B.S. Public Health  Clinic Care Coordination  [facility]:  [facility], [place], [place], and [place]  Phone: [phone]

## 2020-12-17 NOTE — TELEPHONE ENCOUNTER
Reason for call:  Medication   If this is a refill request, has the caller requested the refill from the pharmacy already? N/A  Will the patient be using a [facility]? No  Name of the pharmacy and phone number for the current request: [facility] pharmacy    Name of the medication requested: Invokana, was given to him 06/2020    Other request: none    Phone number to reach patient:  Home number on file [phone] (home)    Best Time:  any    Can we leave a detailed message on this number?  YES    Travel screening: Not Applicable

## 2020-12-17 NOTE — TELEPHONE ENCOUNTER
Sent RX request to PCP since it is no longer in med list.    Will need to wait for results to come back to r/s patient.

## 2020-12-17 NOTE — TELEPHONE ENCOUNTER
Patient is awaiting COVID test results obtained on 12/16. He is requesting a refill of Invokana, last ordered on 6/16/20.     Patient also needs to reschedule PCP appointment, missed this morning at 7:00am. Please assist patient.     Thank you  PAN Cano

## 2020-12-19 ENCOUNTER — NURSE TRIAGE (OUTPATIENT)
Dept: NURSING | Facility: CLINIC | Age: 60
End: 2020-12-19

## 2020-12-19 NOTE — TELEPHONE ENCOUNTER
Alc[facility]  [facility] pharmacy [facility] called, patient is looking for a script for Invokana. Pharmacy unclear if patient received elsewhere as should have been out in September and is just asking now. If is to be on, needs script sent.     Will forward message to Dr. Esparza. Pharmacy is letting patient know and he will check next week.

## 2020-12-21 DIAGNOSIS — Z79.4 TYPE 2 DIABETES MELLITUS WITH HYPERGLYCEMIA, WITH LONG-TERM CURRENT USE OF INSULIN (H): ICD-10-CM

## 2020-12-21 DIAGNOSIS — E11.65 TYPE 2 DIABETES MELLITUS WITH HYPERGLYCEMIA, WITH LONG-TERM CURRENT USE OF INSULIN (H): ICD-10-CM

## 2020-12-21 NOTE — TELEPHONE ENCOUNTER
Prescription approved per [facility] protocol.    Called patient and scheduled a diabetic f/u.     Lenka Jacobson RN on 12/21/2020 at 9:09 AM

## 2020-12-21 NOTE — TELEPHONE ENCOUNTER
Reason for call:  Medication   If this is a refill request, has the caller requested the refill from the pharmacy already? N/A  Will the patient be using a [facility]? No  Name of the pharmacy and phone number for the current request: [facility] pharmacy    Name of the medication requested: Invokana, was given to him 06/2020    Other request: none    Phone number to reach patient:  Home number on file [phone] (home)    Best Time:  any    Can we leave a detailed message on this number?  YES    Travel screening: Not Applicable

## 2020-12-27 ENCOUNTER — HEALTH MAINTENANCE LETTER (OUTPATIENT)
Age: 60
End: 2020-12-27

## 2020-12-28 ENCOUNTER — OFFICE VISIT (OUTPATIENT)
Dept: PODIATRY | Facility: CLINIC | Age: 60
End: 2020-12-28
Payer: COMMERCIAL

## 2020-12-28 VITALS
BODY MASS INDEX: 30.8 KG/M2 | HEIGHT: 71 IN | WEIGHT: 220 LBS | SYSTOLIC BLOOD PRESSURE: 136 MMHG | DIASTOLIC BLOOD PRESSURE: 80 MMHG

## 2020-12-28 DIAGNOSIS — S91.311A LACERATION OF RIGHT FOOT WITH TENDON INVOLVEMENT, INITIAL ENCOUNTER: Primary | ICD-10-CM

## 2020-12-28 DIAGNOSIS — Z98.890 S/P FOOT SURGERY, RIGHT: ICD-10-CM

## 2020-12-28 DIAGNOSIS — S96.921A LACERATION OF RIGHT FOOT WITH TENDON INVOLVEMENT, INITIAL ENCOUNTER: Primary | ICD-10-CM

## 2020-12-28 PROCEDURE — 99024 POSTOP FOLLOW-UP VISIT: CPT | Performed by: PODIATRIST

## 2020-12-28 RX ORDER — ONDANSETRON 4 MG/1
4 TABLET, ORALLY DISINTEGRATING ORAL
COMMUNITY
Start: 2020-12-15 | End: 2023-02-02

## 2020-12-28 ASSESSMENT — MIFFLIN-ST. JEOR: SCORE: 1835.04

## 2020-12-28 NOTE — LETTER
[facility]  [address]  [address]  [place] [address]  [phone]          December 28, 2020    RE:  Saji Iqbal                                                                                                                                                       [address] [address]            To whom it may concern:    Saji Iqbal is under my professional care for after undergoing right foot surgery.  He is cleared to return to work, weight bearing as tolerated in his walking boot or comfortable shoe, starting 12/28/20.  These restrictions will lapse in 4 weeks unless otherwise noted.    Sincerely,        Gerard Diaz DPM FACFAS FACFAOM  Podiatric Foot & Ankle Surgeon  [facility]

## 2020-12-28 NOTE — PROGRESS NOTES
"Foot & Ankle Surgery  December 28, 2020    S:  Patient in today sp extensor tendon laceration repair right great toe on 10/23/20.  Pain levels low.  He's concerned that the incision hasn't healed.  He mentioned \"bleeding again\", but when asked if it's been draining/bleeding, he states it has not been.  Has done 1 PT session since he was seen last.  Full WB in boot with minimal pain    /80   Ht 1.803 m (5' 11\")   Wt 99.8 kg (220 lb)   BMI 30.68 kg/m        ROS - positive for CC.  Patient denies current nausea, vomiting, chills, fevers, belly pain, calf pain, chest pain or SOB.  Complete remainder of ROS is otherwise neg.    PE - incision healed.  There is a scab, and underneath there is a punctate but deep invagination.  This appears to be nearly fully epithelialized.  There certainly is not drainage or SOI, and there is no exposed fat or deep fascia.  R great toe extension noted without pain.  Skin shows no trophic, color or temperature changes otherwise.  No calf redness, swelling or pain noted otherwise.    A/P - 60 year old yo patient approx 9+ weeks sp above procedure  -while there is no visible open wound, but rather a deep invagination that appears nearly fully epithelialized, I advised wash/dry, abx ointment/bandaid daily x 2 weeks just to cover our bases.  No indication for PO abx  -continue WBAT in boot; once fully WB without pain(currently is), ok to transition to comfortable shoe gear  -RICE/NSAID vs tylenol prn based on pain  -letter to RTW with boot/comfortable shoe gear starting 12/28/20 x 4 weeks    Follow up  -  2 weeks for incision check or sooner with acute issues    Plan for [id number] - 12/28/20       Gerard Diaz DPM FACFAS FACFAOM  Podiatric Foot & Ankle Surgeon  [facility]  [phone]      "

## 2021-01-07 ENCOUNTER — OFFICE VISIT (OUTPATIENT)
Dept: FAMILY MEDICINE | Facility: CLINIC | Age: 61
End: 2021-01-07
Payer: COMMERCIAL

## 2021-01-07 VITALS
TEMPERATURE: 98.1 F | DIASTOLIC BLOOD PRESSURE: 70 MMHG | RESPIRATION RATE: 20 BRPM | OXYGEN SATURATION: 96 % | HEART RATE: 95 BPM | SYSTOLIC BLOOD PRESSURE: 110 MMHG | BODY MASS INDEX: 31.38 KG/M2 | WEIGHT: 225 LBS

## 2021-01-07 DIAGNOSIS — Z79.4 TYPE 2 DIABETES MELLITUS WITH HYPERGLYCEMIA, WITH LONG-TERM CURRENT USE OF INSULIN (H): ICD-10-CM

## 2021-01-07 DIAGNOSIS — E11.65 TYPE 2 DIABETES MELLITUS WITH HYPERGLYCEMIA, WITH LONG-TERM CURRENT USE OF INSULIN (H): ICD-10-CM

## 2021-01-07 DIAGNOSIS — I10 BENIGN ESSENTIAL HYPERTENSION: ICD-10-CM

## 2021-01-07 DIAGNOSIS — F33.1 MODERATE EPISODE OF RECURRENT MAJOR DEPRESSIVE DISORDER (H): ICD-10-CM

## 2021-01-07 DIAGNOSIS — I25.10 CORONARY ARTERY DISEASE INVOLVING NATIVE CORONARY ARTERY OF NATIVE HEART WITHOUT ANGINA PECTORIS: ICD-10-CM

## 2021-01-07 DIAGNOSIS — L29.9 PRURITIC DISORDER: ICD-10-CM

## 2021-01-07 DIAGNOSIS — E11.65 UNCONTROLLED TYPE 2 DIABETES MELLITUS WITH HYPERGLYCEMIA (H): Primary | ICD-10-CM

## 2021-01-07 LAB — HBA1C MFR BLD: 7.8 % (ref 0–5.6)

## 2021-01-07 PROCEDURE — 83036 HEMOGLOBIN GLYCOSYLATED A1C: CPT | Performed by: FAMILY MEDICINE

## 2021-01-07 PROCEDURE — 36415 COLL VENOUS BLD VENIPUNCTURE: CPT | Performed by: FAMILY MEDICINE

## 2021-01-07 PROCEDURE — 99214 OFFICE O/P EST MOD 30 MIN: CPT | Performed by: FAMILY MEDICINE

## 2021-01-07 RX ORDER — METOPROLOL TARTRATE 25 MG/1
25 TABLET, FILM COATED ORAL 2 TIMES DAILY
Qty: 180 TABLET | Refills: 1 | Status: SHIPPED | OUTPATIENT
Start: 2021-01-07 | End: 2021-07-02

## 2021-01-07 RX ORDER — HYDROXYZINE PAMOATE 50 MG/1
50 CAPSULE ORAL 3 TIMES DAILY PRN
Qty: 30 CAPSULE | Refills: 1 | Status: SHIPPED | OUTPATIENT
Start: 2021-01-07 | End: 2022-07-07

## 2021-01-07 RX ORDER — GABAPENTIN 100 MG/1
200 CAPSULE ORAL DAILY
Qty: 180 CAPSULE | Refills: 1 | Status: SHIPPED | OUTPATIENT
Start: 2021-01-07 | End: 2021-07-08

## 2021-01-07 ASSESSMENT — ENCOUNTER SYMPTOMS
DECREASED CONCENTRATION: 1
PALPITATIONS: 0
DYSPHORIC MOOD: 1
NERVOUS/ANXIOUS: 1
HEADACHES: 0
SHORTNESS OF BREATH: 0
CONSTITUTIONAL NEGATIVE: 1

## 2021-01-07 ASSESSMENT — ANXIETY QUESTIONNAIRES
1. FEELING NERVOUS, ANXIOUS, OR ON EDGE: MORE THAN HALF THE DAYS
GAD7 TOTAL SCORE: 12
3. WORRYING TOO MUCH ABOUT DIFFERENT THINGS: SEVERAL DAYS
2. NOT BEING ABLE TO STOP OR CONTROL WORRYING: SEVERAL DAYS
6. BECOMING EASILY ANNOYED OR IRRITABLE: NEARLY EVERY DAY
IF YOU CHECKED OFF ANY PROBLEMS ON THIS QUESTIONNAIRE, HOW DIFFICULT HAVE THESE PROBLEMS MADE IT FOR YOU TO DO YOUR WORK, TAKE CARE OF THINGS AT HOME, OR GET ALONG WITH OTHER PEOPLE: SOMEWHAT DIFFICULT
7. FEELING AFRAID AS IF SOMETHING AWFUL MIGHT HAPPEN: MORE THAN HALF THE DAYS
5. BEING SO RESTLESS THAT IT IS HARD TO SIT STILL: SEVERAL DAYS

## 2021-01-07 ASSESSMENT — PATIENT HEALTH QUESTIONNAIRE - PHQ9
5. POOR APPETITE OR OVEREATING: MORE THAN HALF THE DAYS
SUM OF ALL RESPONSES TO PHQ QUESTIONS 1-9: 9

## 2021-01-07 NOTE — PROGRESS NOTES
Assessment and Plan    (E11.65) Uncontrolled type 2 diabetes mellitus with hyperglycemia (H)  (primary encounter diagnosis)  Comment: has been under better controll recently  Plan: HEMOGLOBIN A1C            (L29.9) Pruritic disorder  Comment: refilling, initially prescribed by derm  Plan: gabapentin (NEURONTIN) 100 MG capsule            (E11.65,  Z79.4) Type 2 diabetes mellitus with hyperglycemia, with long-term current use of insulin (H)  Comment: refilling  Plan: canagliflozin (INVOKANA) 300 MG tablet, insulin        glargine (LANTUS SOLOSTAR) 100 UNIT/ML pen            (I25.10) Coronary artery disease involving native coronary artery of native heart without angina pectoris  Comment: refilling  Plan: metoprolol tartrate (LOPRESSOR) 25 MG tablet            (I10) Benign essential hypertension  Comment: BP well controlled  Plan: metoprolol tartrate (LOPRESSOR) 25 MG tablet            (F33.1) Moderate episode of recurrent major depressive disorder (H)  Comment: starting med, has been on in distant past, PRN provided  Plan: FLUoxetine (PROZAC) 20 MG capsule, hydrOXYzine         (VISTARIL) 50 MG capsule              RTC in 1m for mood f/u    Naseem Esparza MD    Lisa Brennan is a 60 year old who presents to clinic today for the following health issues     HPI       Diabetes Follow-up      How often are you checking your blood sugar? Not at all    What concerns do you have today about your diabetes? None     Do you have any of these symptoms? (Select all that apply)  No numbness or tingling in feet.  No redness, sores or blisters on feet.  No complaints of excessive thirst.  No reports of blurry vision.  No significant changes to weight.    Have you had a diabetic eye exam in the last 12 months? No        BP Readings from Last 2 Encounters:   01/07/21 110/70   12/28/20 136/80     Hemoglobin A1C (%)   Date Value   10/20/2020 6.9 (H)   09/17/2020 7.1 (H)     LDL Cholesterol Calculated (mg/dL)   Date Value  "  06/16/2020 175 (H)   05/13/2019 182 (H)           ED/UC Followup:    Facility:  [facility]  Date of visit: 12/16/20  Reason for visit: shaking, nausea, dyspnea  Current Status: not doing well, not feeling good about being isolated,\" not one to handle or be equipped to deal with this stuff\"       Following up from recent panic attack.  Struggling with isolation and social distancing.  Notes he does have a history of depression, had tried several med and never really found them helpful.  Although he admits that he didn't usually give them very long to be helpful.  Had side effects with sertraline.  Had been on citalopram but not helpful.  Buspirone - doesn't recall.    Review of Systems   Constitutional: Negative.    Eyes: Negative for visual disturbance.   Respiratory: Negative for shortness of breath.    Cardiovascular: Negative for chest pain, palpitations and peripheral edema.   Neurological: Negative for headaches.   Psychiatric/Behavioral: Positive for decreased concentration and dysphoric mood. The patient is nervous/anxious.           Objective    /70 (BP Location: Right arm, Patient Position: Sitting, Cuff Size: Adult Regular)   Pulse 95   Temp 98.1  F (36.7  C) (Oral)   Resp 20   Wt 102.1 kg (225 lb)   SpO2 96%   BMI 31.38 kg/m    Body mass index is 31.38 kg/m .  Physical Exam  Vitals signs reviewed.   Eyes:      Conjunctiva/sclera: Conjunctivae normal.   Cardiovascular:      Rate and Rhythm: Normal rate and regular rhythm.      Heart sounds: Normal heart sounds.   Pulmonary:      Effort: Pulmonary effort is normal.      Breath sounds: Normal breath sounds.   Skin:     General: Skin is warm and dry.   Neurological:      Mental Status: He is alert and oriented to person, place, and time.   Psychiatric:         Attention and Perception: Attention normal.         Mood and Affect: Mood is depressed.         Behavior: Behavior normal.                  "

## 2021-01-08 ASSESSMENT — ANXIETY QUESTIONNAIRES: GAD7 TOTAL SCORE: 12

## 2021-01-11 ENCOUNTER — OFFICE VISIT (OUTPATIENT)
Dept: PODIATRY | Facility: CLINIC | Age: 61
End: 2021-01-11
Payer: COMMERCIAL

## 2021-01-11 VITALS
HEIGHT: 71 IN | SYSTOLIC BLOOD PRESSURE: 140 MMHG | DIASTOLIC BLOOD PRESSURE: 90 MMHG | WEIGHT: 227 LBS | BODY MASS INDEX: 31.78 KG/M2

## 2021-01-11 DIAGNOSIS — S96.921A LACERATION OF RIGHT FOOT WITH TENDON INVOLVEMENT, INITIAL ENCOUNTER: Primary | ICD-10-CM

## 2021-01-11 DIAGNOSIS — Z98.890 S/P FOOT SURGERY, RIGHT: ICD-10-CM

## 2021-01-11 DIAGNOSIS — S91.311A LACERATION OF RIGHT FOOT WITH TENDON INVOLVEMENT, INITIAL ENCOUNTER: Primary | ICD-10-CM

## 2021-01-11 PROCEDURE — 99024 POSTOP FOLLOW-UP VISIT: CPT | Performed by: PODIATRIST

## 2021-01-11 ASSESSMENT — MIFFLIN-ST. JEOR: SCORE: 1866.8

## 2021-01-11 NOTE — PROGRESS NOTES
"Foot & Ankle Surgery  January 11, 2021    S:  Patient in today sp extensor tendon laceration repair right great toe on 10/23/20.  Pain levels low.  He had an invagination along the incision that appeared epithelialized, but I advised a 2 week follow up from his 12/28/20 appt for a recheck.  Full WB in boot without pain, although he noticed some discomfort in shoe gear with dorsiflexion of the toe.  Can have some paresthesias along the incision.  Does not have any more scheduled PT appts.  Notices atrophy of right calf muscle, wants to know what he can do for this    BP (!) 140/90   Ht 1.803 m (5' 11\")   Wt 103 kg (227 lb)   BMI 31.66 kg/m        ROS - positive for CC.  Patient denies current nausea, vomiting, chills, fevers, belly pain, calf pain, chest pain or SOB.  Complete remainder of ROS is otherwise neg.    PE - very small dry scab over previously-invaginated area, but otherwise this appears healed without SOI.  PROM without pain, active dorsiflexion of the toe noted.  Skin shows no trophic, color or temperature changes otherwise.  No calf redness, swelling or pain noted otherwise.    A/P - 60 year old yo patient approx 11+ weeks sp above procedure  -transition to comfortable shoe gear to tolerance with care to avoid exceeding pain threshold at surgical site with amt of weight/time on foot, as this can have a neg impact on further healing.  Stiff-soled shoe likely best  -RICE/NSAID vs tylenol prn  -finish PT; continue HEP   -regarding calf, also discussed further PT.  We also discussed home/gym exercises that can get calf back up to speed    Follow up  -  6 months post-op or sooner with acute issues    Plan for [id number] - already working       Gerard Diaz DPM FACFAS FACFAOM  Podiatric Foot & Ankle Surgeon  [facility]  [phone]      "

## 2021-01-11 NOTE — PATIENT INSTRUCTIONS
Thank you for choosing [facility] / Foot & Ankle Surgery!    DR. ONEAL'S CLINIC LOCATIONS:   [id number] [facility] [facility] [address]   [place] [address]                        [place], [place] [id number] [phone]      SET UP SURGERY: [phone]   APPOINTMENTS: [phone]   BILLING QUESTIONS: [phone]   FAX NUMBER: [phone]       Follow up: 3 months    Mika to follow up with Primary Care provider regarding elevated blood pressure. (if equal or above 140/90)

## 2021-02-02 PROBLEM — Z47.89 AFTERCARE FOLLOWING SURGERY OF THE MUSCULOSKELETAL SYSTEM: Status: RESOLVED | Noted: 2020-12-03 | Resolved: 2021-02-02

## 2021-02-02 PROBLEM — M25.571 ACUTE RIGHT ANKLE PAIN: Status: RESOLVED | Noted: 2020-12-03 | Resolved: 2021-02-02
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
## 2021-02-08 ENCOUNTER — VIRTUAL VISIT (OUTPATIENT)
Dept: FAMILY MEDICINE | Facility: CLINIC | Age: 61
End: 2021-02-08
Payer: COMMERCIAL

## 2021-02-08 DIAGNOSIS — E11.65 TYPE 2 DIABETES MELLITUS WITH HYPERGLYCEMIA, WITH LONG-TERM CURRENT USE OF INSULIN (H): ICD-10-CM

## 2021-02-08 DIAGNOSIS — F33.1 MODERATE EPISODE OF RECURRENT MAJOR DEPRESSIVE DISORDER (H): ICD-10-CM

## 2021-02-08 DIAGNOSIS — Z79.4 TYPE 2 DIABETES MELLITUS WITH HYPERGLYCEMIA, WITH LONG-TERM CURRENT USE OF INSULIN (H): ICD-10-CM

## 2021-02-08 PROCEDURE — 99214 OFFICE O/P EST MOD 30 MIN: CPT | Mod: TEL | Performed by: FAMILY MEDICINE

## 2021-02-08 PROCEDURE — 96127 BRIEF EMOTIONAL/BEHAV ASSMT: CPT | Performed by: FAMILY MEDICINE

## 2021-02-08 RX ORDER — PEN NEEDLE, DIABETIC 29 G X1/2"
NEEDLE, DISPOSABLE MISCELLANEOUS
Qty: 90 EACH | Refills: 1 | Status: SHIPPED | OUTPATIENT
Start: 2021-02-08 | End: 2021-07-02

## 2021-02-08 RX ORDER — HYDROXYZINE HYDROCHLORIDE 10 MG/1
10 TABLET, FILM COATED ORAL 3 TIMES DAILY PRN
Qty: 60 TABLET | Refills: 1 | Status: SHIPPED | OUTPATIENT
Start: 2021-02-08 | End: 2022-07-07

## 2021-02-08 ASSESSMENT — PATIENT HEALTH QUESTIONNAIRE - PHQ9
SUM OF ALL RESPONSES TO PHQ QUESTIONS 1-9: 12
5. POOR APPETITE OR OVEREATING: NOT AT ALL

## 2021-02-08 ASSESSMENT — ANXIETY QUESTIONNAIRES
IF YOU CHECKED OFF ANY PROBLEMS ON THIS QUESTIONNAIRE, HOW DIFFICULT HAVE THESE PROBLEMS MADE IT FOR YOU TO DO YOUR WORK, TAKE CARE OF THINGS AT HOME, OR GET ALONG WITH OTHER PEOPLE: NOT DIFFICULT AT ALL
1. FEELING NERVOUS, ANXIOUS, OR ON EDGE: MORE THAN HALF THE DAYS
6. BECOMING EASILY ANNOYED OR IRRITABLE: MORE THAN HALF THE DAYS
2. NOT BEING ABLE TO STOP OR CONTROL WORRYING: NEARLY EVERY DAY
7. FEELING AFRAID AS IF SOMETHING AWFUL MIGHT HAPPEN: NEARLY EVERY DAY
3. WORRYING TOO MUCH ABOUT DIFFERENT THINGS: NEARLY EVERY DAY
5. BEING SO RESTLESS THAT IT IS HARD TO SIT STILL: NOT AT ALL
GAD7 TOTAL SCORE: 13

## 2021-02-08 ASSESSMENT — ENCOUNTER SYMPTOMS
HEADACHES: 0
SLEEP DISTURBANCE: 0
DYSPHORIC MOOD: 1
NERVOUS/ANXIOUS: 0
CONSTITUTIONAL NEGATIVE: 1
ABDOMINAL PAIN: 0
PALPITATIONS: 0

## 2021-02-08 NOTE — PROGRESS NOTES
Mika is a 60 year old who is being evaluated via a billable telephone visit.      What phone number would you like to be contacted at? [phone]  How would you like to obtain your [facility]? [facility]  Assessment and Plan    (F33.1) Moderate episode of recurrent major depressive disorder (H)  Comment: increasing dose of fluoxetine, lower dose of PRN  Plan: FLUoxetine (PROZAC) 20 MG capsule, hydrOXYzine         (ATARAX) 10 MG tablet            (E11.65,  Z79.4) Type 2 diabetes mellitus with hyperglycemia, with long-term current use of insulin (H)  Comment: reordering  Plan: insulin pen needle ([employer]ICARE PEN NEEDLES) 31G X        5 MM miscellaneous, insulin glargine (LANTUS         SOLOSTAR) 100 UNIT/ML pen              RTC in 1m    Naseem Esparza MD      Subjective     Mika is a 60 year old who presents to clinic today for the following health issues     HPI       Depression and Anxiety Follow-Up    How are you doing with your depression since your last visit? No change    How are you doing with your anxiety since your last visit?  No change    Are you having other symptoms that might be associated with depression or anxiety? No    Have you had a significant life event? Financial Concerns     Do you have any concerns with your use of alcohol or other drugs? No    Social History     Tobacco Use     Smoking status: Former Smoker     Packs/day: 0.33     Years: 20.00     Pack years: 6.60     Types: Cigarettes     Quit date: 2015     Years since quittin.5     Smokeless tobacco: Never Used   Substance Use Topics     Alcohol use: Yes     Alcohol/week: 1.0 standard drinks     Types: 1 Standard drinks or equivalent per week     Comment: 4-5 drinks nightly on weekend     Drug use: No     PHQ 2019   PHQ-9 Total Score - 6 9   Q9: Thoughts of better off dead/self-harm past 2 weeks Not at all Not at all Not at all     RADHA-7 SCORE 2020 10/20/2020 2021   Total Score - 6 (mild anxiety) -   Total Score  14 6 12     Last PHQ-9 2/8/2021   1.  Little interest or pleasure in doing things 0   2.  Feeling down, depressed, or hopeless 0   3.  Trouble falling or staying asleep, or sleeping too much 3   4.  Feeling tired or having little energy 3   5.  Poor appetite or overeating 3   6.  Feeling bad about yourself 0   7.  Trouble concentrating 0   8.  Moving slowly or restless 3   Q9: Thoughts of better off dead/self-harm past 2 weeks 0   PHQ-9 Total Score 12   Difficulty at work, home, or with people Somewhat difficult     RADHA-7  2/8/2021   1. Feeling nervous, anxious, or on edge 2   2. Not being able to stop or control worrying 3   3. Worrying too much about different things 3   4. Trouble relaxing 0   5. Being so restless that it is hard to sit still 0   6. Becoming easily annoyed or irritable 2   7. Feeling afraid, as if something awful might happen 3   RADHA-7 Total Score 13   If you checked any problems, how difficult have they made it for you to do your work, take care of things at home, or get along with other people? Not difficult at all       Suicide Assessment Five-step Evaluation and Treatment (SAFE-T)       How many servings of fruits and vegetables do you eat daily?  0-1    On average, how many sweetened beverages do you drink each day (Examples: soda, juice, sweet tea, etc.  Do NOT count diet or artificially sweetened beverages)?   0    How many days per week do you exercise enough to make your heart beat faster? 3 or less    How many minutes a day do you exercise enough to make your heart beat faster? 9 or less    How many days per week do you miss taking your medication? 0    Not seeing any improvement in overall mood, but is not having any concerns for side effects.    Notes that he previously used 10mg hydroxyzine and found it really knocked him out.  Would like to get a lower dose.    Notes that in 2014 he was hospitalized for depression and suicidal thoughts.  He is quite emphatic that he does not feel  "that way currently.    Notes that he has several \"good things\" coming up, including daughter's wedding, and he is really look forward to this.        Review of Systems   Constitutional: Negative.    Cardiovascular: Negative for palpitations.   Gastrointestinal: Negative for abdominal pain.   Neurological: Negative for headaches.   Psychiatric/Behavioral: Positive for dysphoric mood. Negative for self-injury, sleep disturbance and suicidal ideas. The patient is not nervous/anxious.             Objective           Vitals:  No vitals were obtained today due to virtual visit.    Physical Exam   healthy, alert and no distress  PSYCH: Alert and oriented times 3; coherent speech, normal   rate and volume, able to articulate logical thoughts, able   to abstract reason, no tangential thoughts, no hallucinations   or delusions  His affect is normal  RESP: No cough, no audible wheezing, able to talk in full sentences  Remainder of exam unable to be completed due to telephone visits                Phone call duration: 10 minutes  "

## 2021-02-09 ASSESSMENT — ANXIETY QUESTIONNAIRES: GAD7 TOTAL SCORE: 13

## 2021-04-06 DIAGNOSIS — F33.1 MODERATE EPISODE OF RECURRENT MAJOR DEPRESSIVE DISORDER (H): ICD-10-CM

## 2021-04-08 NOTE — TELEPHONE ENCOUNTER
Routing refill request to provider for review/approval because:  No PHQ on record    Briana Ibrahim RN

## 2021-04-14 ENCOUNTER — VIRTUAL VISIT (OUTPATIENT)
Dept: FAMILY MEDICINE | Facility: CLINIC | Age: 61
End: 2021-04-14
Payer: COMMERCIAL

## 2021-04-14 ENCOUNTER — NURSE TRIAGE (OUTPATIENT)
Dept: NURSING | Facility: CLINIC | Age: 61
End: 2021-04-14

## 2021-04-14 ENCOUNTER — ALLIED HEALTH/NURSE VISIT (OUTPATIENT)
Dept: NURSING | Facility: CLINIC | Age: 61
End: 2021-04-14
Payer: COMMERCIAL

## 2021-04-14 DIAGNOSIS — Z20.822 SHORTNESS OF BREATH WITH EXPOSURE TO COVID-19 VIRUS: ICD-10-CM

## 2021-04-14 DIAGNOSIS — R06.02 SHORTNESS OF BREATH WITH EXPOSURE TO COVID-19 VIRUS: ICD-10-CM

## 2021-04-14 DIAGNOSIS — Z20.822 SHORTNESS OF BREATH WITH EXPOSURE TO COVID-19 VIRUS: Primary | ICD-10-CM

## 2021-04-14 DIAGNOSIS — R06.02 SHORTNESS OF BREATH WITH EXPOSURE TO COVID-19 VIRUS: Primary | ICD-10-CM

## 2021-04-14 LAB
SARS-COV-2 RNA RESP QL NAA+PROBE: NORMAL
SPECIMEN SOURCE: NORMAL

## 2021-04-14 PROCEDURE — U0003 INFECTIOUS AGENT DETECTION BY NUCLEIC ACID (DNA OR RNA); SEVERE ACUTE RESPIRATORY SYNDROME CORONAVIRUS 2 (SARS-COV-2) (CORONAVIRUS DISEASE [COVID-19]), AMPLIFIED PROBE TECHNIQUE, MAKING USE OF HIGH THROUGHPUT TECHNOLOGIES AS DESCRIBED BY CMS-2020-01-R: HCPCS | Performed by: PHYSICIAN ASSISTANT

## 2021-04-14 PROCEDURE — 99207 PR NO CHARGE NURSE ONLY: CPT

## 2021-04-14 PROCEDURE — 99213 OFFICE O/P EST LOW 20 MIN: CPT | Mod: TEL | Performed by: PHYSICIAN ASSISTANT

## 2021-04-14 PROCEDURE — U0005 INFEC AGEN DETEC AMPLI PROBE: HCPCS | Performed by: PHYSICIAN ASSISTANT

## 2021-04-14 NOTE — TELEPHONE ENCOUNTER
Calling to get covid test.  Sob recently when wearing mask.  Not sure about getting vaccine.  Advised him he needs phone visit with provider to get order for covid test.  Transferred to scheduling    Yue Harding RN  [facility] Nurse Advisor

## 2021-04-14 NOTE — PROGRESS NOTES
"Mika is a 61 year old who is being evaluated via a billable telephone visit.      What phone number would you like to be contacted at? [phone]  How would you like to obtain your AVS? Mail a copy    Assessment & Plan     Shortness of breath with exposure to COVID-19 virus  Exposure would have been secondary but Mika is concerned.  He does have several risk factors for complications should he become ill with covid.  Test ordered today to be done in clinic.  Follow up pending results.  - Symptomatic COVID-19 Virus (Coronavirus) by PCR         BMI:   Estimated body mass index is 31.66 kg/m  as calculated from the following:    Height as of 1/11/21: 1.803 m (5' 11\").    Weight as of 1/11/21: 103 kg (227 lb).   Weight management plan: Discussed healthy diet and exercise guidelines        Return today (on 4/14/2021) for nurse appt today.    Alcides Toledo PA-C  [facility] [place]    Subjective    Mika is a 61 year old who presents for the following health issues:    HPI       Concern for COVID-19  About how many days ago did these symptoms start? SOB   Is this your first visit for this illness? Yes  In the 14 days before your symptoms started, have you had close contact with someone with COVID-19 (Coronavirus)? Yes, I have been in contact with someone who has symptoms of COVID-19/Coronavirus (no lab test done or waiting on results).  Do you have a fever or chills? No  Are you having new or worsening difficulty breathing? Yes   Please describe what kind of difficulty you are having breathing:Moderate dyspnea (short of breath with ADLs, shortness of breath that limits the ability to walk up one flight of stairs without needing to rest)  Do you have new or worsening cough? No  Have you had any new or unexplained body aches? YES  possibly  Have you experienced any of the following NEW symptoms?    Headache: YES    Sore throat: No    Loss of taste or smell: No    Chest pain: No    Diarrhea: " No    Rash: No  What treatments have you tried? NONE  Who do you live with? NONE  Are you, or a household member, a healthcare worker or a ? No  Do you live in a nursing home, group home, or shelter? No  Do you have a way to get food/medications if quarantined? Yes, I have a friend or family member who can help me.    Last week Mika was around someone whose son tested positive.  Work will not tell him if that person tested positive or not.    Yesterday Mika started feeling SOB, mostly just with wearing his facemask.  Has tried several different kinds and they all tend to make him feel SOB.  Only other symptoms is some back pain if he bends forward to pick something up.  Worries that he may not be able to smell as well today.  Has not eaten yet today.      Review of Systems   Constitutional, HEENT, cardiovascular, pulmonary, gi and gu systems are negative, except as otherwise noted.      Objective           Vitals:  No vitals were obtained today due to virtual visit.    Physical Exam   healthy, alert and no distress  PSYCH: Alert and oriented times 3; coherent speech, normal   rate and volume, able to articulate logical thoughts, able   to abstract reason, no tangential thoughts, no hallucinations   or delusions  His affect is normal and pleasant  RESP: No cough, no audible wheezing, able to talk in full sentences  Remainder of exam unable to be completed due to telephone visits          Phone call duration: 10 minutes

## 2021-04-15 LAB
LABORATORY COMMENT REPORT: NORMAL
SARS-COV-2 RNA RESP QL NAA+PROBE: NEGATIVE
SPECIMEN SOURCE: NORMAL

## 2021-04-24 ENCOUNTER — HEALTH MAINTENANCE LETTER (OUTPATIENT)
Age: 61
End: 2021-04-24

## 2021-05-18 ENCOUNTER — ALLIED HEALTH/NURSE VISIT (OUTPATIENT)
Dept: NURSING | Facility: CLINIC | Age: 61
End: 2021-05-18
Payer: COMMERCIAL

## 2021-05-18 ENCOUNTER — NURSE TRIAGE (OUTPATIENT)
Dept: NURSING | Facility: CLINIC | Age: 61
End: 2021-05-18

## 2021-05-18 ENCOUNTER — VIRTUAL VISIT (OUTPATIENT)
Dept: FAMILY MEDICINE | Facility: CLINIC | Age: 61
End: 2021-05-18
Payer: COMMERCIAL

## 2021-05-18 DIAGNOSIS — R05.9 COUGH: ICD-10-CM

## 2021-05-18 DIAGNOSIS — J02.0 STREP THROAT: Primary | ICD-10-CM

## 2021-05-18 DIAGNOSIS — J02.9 SORE THROAT: Primary | ICD-10-CM

## 2021-05-18 DIAGNOSIS — J02.9 SORE THROAT: ICD-10-CM

## 2021-05-18 LAB
DEPRECATED S PYO AG THROAT QL EIA: POSITIVE
LABORATORY COMMENT REPORT: NORMAL
SARS-COV-2 RNA RESP QL NAA+PROBE: NEGATIVE
SARS-COV-2 RNA RESP QL NAA+PROBE: NORMAL
SPECIMEN SOURCE: ABNORMAL
SPECIMEN SOURCE: NORMAL
SPECIMEN SOURCE: NORMAL

## 2021-05-18 PROCEDURE — 87880 STREP A ASSAY W/OPTIC: CPT | Performed by: NURSE PRACTITIONER

## 2021-05-18 PROCEDURE — U0005 INFEC AGEN DETEC AMPLI PROBE: HCPCS | Performed by: NURSE PRACTITIONER

## 2021-05-18 PROCEDURE — 99213 OFFICE O/P EST LOW 20 MIN: CPT | Mod: TEL | Performed by: NURSE PRACTITIONER

## 2021-05-18 PROCEDURE — U0003 INFECTIOUS AGENT DETECTION BY NUCLEIC ACID (DNA OR RNA); SEVERE ACUTE RESPIRATORY SYNDROME CORONAVIRUS 2 (SARS-COV-2) (CORONAVIRUS DISEASE [COVID-19]), AMPLIFIED PROBE TECHNIQUE, MAKING USE OF HIGH THROUGHPUT TECHNOLOGIES AS DESCRIBED BY CMS-2020-01-R: HCPCS | Performed by: NURSE PRACTITIONER

## 2021-05-18 RX ORDER — AMOXICILLIN 500 MG/1
500 CAPSULE ORAL 2 TIMES DAILY
Qty: 20 CAPSULE | Refills: 0 | Status: SHIPPED | OUTPATIENT
Start: 2021-05-18 | End: 2021-07-08

## 2021-05-18 NOTE — TELEPHONE ENCOUNTER
Yesterday back pain and ST. Cough.  No vaccine.  Warm transfer to scheduling.    COVID 19 Nurse Triage Plan/Patient Instructions    Please be aware that novel coronavirus (COVID-19) may be circulating in the community. If you develop symptoms such as fever, cough, or SOB or if you have concerns about the presence of another infection including coronavirus (COVID-19), please contact your health care provider or visit [email or web address].     Disposition/Instructions    Virtual Visit with provider recommended. Reference Visit Selection Guide.    Thank you for taking steps to prevent the spread of this virus.  o Limit your contact with others.  o Wear a simple mask to cover your cough.  o Wash your hands well and often.    Resources    [facility]: About COVID-19: [email or web address]    CDC: What to Do If You're Sick: www.cdc.gov/coronavirus/2019-ncov/about/steps-when-sick.html    CDC: Ending Home Isolation: www.cdc.gov/coronavirus/2019-ncov/hcp/disposition-in-home-patients.html     CDC: Caring for Someone: www.cdc.gov/coronavirus/2019-ncov/if-you-are-sick/care-for-someone.html     [facility]: Interim Guidance for Hospital Discharge to Home: www.health.[facility].[place]/diseases/coronavirus/hcp/hospdischarge.pdf    [facility] clinical trials (COVID-19 research studies): [email or web address]-clinical-trials     Below are the COVID-19 hotlines at the [facility] ([facility]). Interpreters are available.   o For health questions: Call 833-469-3752 or 1-531.904.7447 (7 a.m. to 7 p.m.)  o For questions about schools and childcare: Call 773-017-6267 or 1-290.188.5323 (7 a.m. to 7 p.m.)                 Yue Harding RN  [facility] Nurse Advisor    Reason for Disposition    [1] Continuous (nonstop) coughing interferes with work or school AND [2] no improvement using cough treatment per protocol    Additional Information    Negative: SEVERE difficulty breathing (e.g.,  struggling for each breath, speaks in single words)    Negative: Difficult to awaken or acting confused (e.g., disoriented, slurred speech)    Negative: Bluish (or gray) lips or face now    Negative: Shock suspected (e.g., cold/pale/clammy skin, too weak to stand, low BP, rapid pulse)    Negative: Sounds like a life-threatening emergency to the triager    Negative: [1] COVID-19 exposure AND [2] has not completed COVID-19 vaccine series AND [3] no symptoms    Negative: [1] COVID-19 exposure AND [2] completed COVID-19 vaccine series (fully vaccinated) AND [3] no symptoms    Negative: COVID-19 vaccine reaction suspected (e.g., fever, headache, muscle aches) occurring during days 1-3 after getting vaccine    Negative: COVID-19 vaccine, questions about    Negative: [1] COVID-19 vaccine series completed (fully vaccinated) in past 3 months AND [2] new-onset of COVID-19 symptoms BUT [3] no known exposure    Negative: [1] Had lab test confirmed COVID-19 infection within last 3 monthsAND [2] new-onset of COVID-19 symptoms BUT [3] no known exposure    Negative: [1] Lives with someone known to have influenza (flu test positive) AND [2] flu-like symptoms (e.g., cough, runny nose, sore throat, SOB; with or without fever)    Negative: [1] Adult with possible COVID-19 symptoms AND [2] triager concerned about severity of symptoms or other causes    Negative: COVID-19 and breastfeeding, questions about    Negative: SEVERE or constant chest pain or pressure (Exception: mild central chest pain, present only when coughing)    Negative: MODERATE difficulty breathing (e.g., speaks in phrases, SOB even at rest, pulse 100-120)    Negative: [1] Headache AND [2] stiff neck (can't touch chin to chest)    Negative: MILD difficulty breathing (e.g., minimal/no SOB at rest, SOB with walking, pulse <100)    Negative: Chest pain or pressure    Negative: Patient sounds very sick or weak to the triager    Negative: Fever > 103 F (39.4 C)    Negative:  [1] Fever > 101 F (38.3 C) AND [2] age > 60 years    Negative: [1] Fever > 100.0 F (37.8 C) AND [2] bedridden (e.g., nursing home patient, CVA, chronic illness, recovering from surgery)    Negative: [1] HIGH RISK patient (e.g., age > 64 years, diabetes, heart or lung disease, weak immune system) AND [2] new or worsening symptoms    Negative: [1] HIGH RISK patient AND [2] influenza is widespread in the community AND [3] ONE OR MORE respiratory symptoms: cough, sore throat, runny or stuffy nose    Negative: [1] HIGH RISK patient AND [2] influenza exposure within the last 7 days AND [3] ONE OR MORE respiratory symptoms: cough, sore throat, runny or stuffy nose    Protocols used: CORONAVIRUS (COVID-19) DIAGNOSED OR [id number]-Q-NC 3.25

## 2021-05-18 NOTE — PROGRESS NOTES
Mika is a 61 year old who is being evaluated via a billable telephone visit.      What phone number would you like to be contacted at?   How would you like to obtain your AVS? My chart    Assessment & Plan     Sore throat  Tolerating symptoms well.  Will test for strep.  - Streptococcus A Rapid Scr w Reflx to PCR; Future    Cough  Tolerating symptoms well, will test for covid.  - Symptomatic COVID-19 Virus (Coronavirus) by PCR; Future        Return in about 1 day (around 5/19/2021) for Lab Work.    Marcela Jacobs, JACKIE CNP  [facility]   Mika is a 61 year old who presents for the following health issues     HPI     Acute Illness  Acute illness concerns:   Onset/Duration:1  Symptoms:  Fever: Does not know  Chills/Sweats: YES  Headache (location?): YES, slight  Sinus Pressure: YES  Conjunctivitis:  no  Ear Pain: no  Rhinorrhea: YES  Congestion: YES  Sore Throat:yes  Cough: YES-non-productive  Wheeze: YES  Decreased Appetite: no  Nausea: no  Vomiting: no  Diarrhea: no  Dysuria/Freq.: no  Dysuria or Hematuria: no  Fatigue/Achiness: YES  Sick/Strep Exposure: YES, coworker with sinus inf  Therapies tried and outcome: None    Symptoms started yesterday.  Body aches.  + sore throat.  + cough, seems to be better now that he is up and around.  Breathing okay.  + chills and sweats.  No thermometer.  No vomiting.  He is drinking fluids.  Decreased appetite.  He does have an ill coworker, unsure about dx.  Pt is not vaccinated.      Review of Systems   Constitutional, HEENT, cardiovascular, pulmonary, gi and gu systems are negative, except as otherwise noted.      Objective           Vitals:  No vitals were obtained today due to virtual visit.    Physical Exam   healthy, alert and no distress  PSYCH: Alert and oriented times 3; coherent speech, normal   rate and volume, able to articulate logical thoughts, able   to abstract reason, no tangential thoughts, no hallucinations   or delusions   His affect is normal  RESP: No cough, no audible wheezing, able to talk in full sentences  Remainder of exam unable to be completed due to telephone visits                Phone call duration: 5 minutes

## 2021-07-02 DIAGNOSIS — E11.65 UNCONTROLLED TYPE 2 DIABETES MELLITUS WITH HYPERGLYCEMIA (H): Primary | ICD-10-CM

## 2021-07-02 DIAGNOSIS — E11.65 TYPE 2 DIABETES MELLITUS WITH HYPERGLYCEMIA, WITH LONG-TERM CURRENT USE OF INSULIN (H): ICD-10-CM

## 2021-07-02 DIAGNOSIS — I25.10 CORONARY ARTERY DISEASE INVOLVING NATIVE CORONARY ARTERY OF NATIVE HEART WITHOUT ANGINA PECTORIS: ICD-10-CM

## 2021-07-02 DIAGNOSIS — I10 BENIGN ESSENTIAL HYPERTENSION: ICD-10-CM

## 2021-07-02 DIAGNOSIS — Z79.4 TYPE 2 DIABETES MELLITUS WITH HYPERGLYCEMIA, WITH LONG-TERM CURRENT USE OF INSULIN (H): ICD-10-CM

## 2021-07-02 LAB — HBA1C MFR BLD: 8.5 % (ref 0–5.6)

## 2021-07-02 PROCEDURE — 36415 COLL VENOUS BLD VENIPUNCTURE: CPT | Performed by: FAMILY MEDICINE

## 2021-07-02 PROCEDURE — 83036 HEMOGLOBIN GLYCOSYLATED A1C: CPT | Performed by: FAMILY MEDICINE

## 2021-07-02 RX ORDER — PEN NEEDLE, DIABETIC 29 G X1/2"
NEEDLE, DISPOSABLE MISCELLANEOUS
Qty: 90 EACH | Refills: 0 | Status: SHIPPED | OUTPATIENT
Start: 2021-07-02 | End: 2021-10-21

## 2021-07-02 RX ORDER — METOPROLOL TARTRATE 25 MG/1
25 TABLET, FILM COATED ORAL 2 TIMES DAILY
Qty: 180 TABLET | Refills: 0 | Status: SHIPPED | OUTPATIENT
Start: 2021-07-02 | End: 2022-01-19

## 2021-07-02 NOTE — TELEPHONE ENCOUNTER
Prescription approved per [facility] protocol.    Lenka Jacobson RN on 7/2/2021 at 4:59 PM

## 2021-07-08 ENCOUNTER — OFFICE VISIT (OUTPATIENT)
Dept: FAMILY MEDICINE | Facility: CLINIC | Age: 61
End: 2021-07-08
Payer: COMMERCIAL

## 2021-07-08 VITALS
HEIGHT: 71 IN | WEIGHT: 223 LBS | BODY MASS INDEX: 31.22 KG/M2 | HEART RATE: 95 BPM | SYSTOLIC BLOOD PRESSURE: 144 MMHG | DIASTOLIC BLOOD PRESSURE: 96 MMHG | OXYGEN SATURATION: 96 % | TEMPERATURE: 98.4 F | RESPIRATION RATE: 18 BRPM

## 2021-07-08 DIAGNOSIS — I10 BENIGN ESSENTIAL HYPERTENSION: ICD-10-CM

## 2021-07-08 DIAGNOSIS — Z79.4 TYPE 2 DIABETES MELLITUS WITH HYPERGLYCEMIA, WITH LONG-TERM CURRENT USE OF INSULIN (H): Primary | ICD-10-CM

## 2021-07-08 DIAGNOSIS — F33.1 MODERATE EPISODE OF RECURRENT MAJOR DEPRESSIVE DISORDER (H): ICD-10-CM

## 2021-07-08 DIAGNOSIS — L29.9 PRURITIC DISORDER: ICD-10-CM

## 2021-07-08 DIAGNOSIS — E11.65 TYPE 2 DIABETES MELLITUS WITH HYPERGLYCEMIA, WITH LONG-TERM CURRENT USE OF INSULIN (H): Primary | ICD-10-CM

## 2021-07-08 PROCEDURE — 99214 OFFICE O/P EST MOD 30 MIN: CPT | Performed by: FAMILY MEDICINE

## 2021-07-08 RX ORDER — GABAPENTIN 100 MG/1
200 CAPSULE ORAL DAILY
Qty: 180 CAPSULE | Refills: 1 | Status: SHIPPED | OUTPATIENT
Start: 2021-07-08 | End: 2022-01-18

## 2021-07-08 RX ORDER — FLUOXETINE 40 MG/1
40 CAPSULE ORAL DAILY
Qty: 90 CAPSULE | Refills: 1 | Status: SHIPPED | OUTPATIENT
Start: 2021-07-08 | End: 2022-07-07

## 2021-07-08 RX ORDER — LISINOPRIL 10 MG/1
10 TABLET ORAL DAILY
Qty: 90 TABLET | Refills: 1 | Status: SHIPPED | OUTPATIENT
Start: 2021-07-08 | End: 2022-01-18

## 2021-07-08 ASSESSMENT — PAIN SCALES - GENERAL: PAINLEVEL: NO PAIN (0)

## 2021-07-08 ASSESSMENT — ANXIETY QUESTIONNAIRES
7. FEELING AFRAID AS IF SOMETHING AWFUL MIGHT HAPPEN: SEVERAL DAYS
2. NOT BEING ABLE TO STOP OR CONTROL WORRYING: SEVERAL DAYS
3. WORRYING TOO MUCH ABOUT DIFFERENT THINGS: SEVERAL DAYS
6. BECOMING EASILY ANNOYED OR IRRITABLE: SEVERAL DAYS
5. BEING SO RESTLESS THAT IT IS HARD TO SIT STILL: SEVERAL DAYS
GAD7 TOTAL SCORE: 6
1. FEELING NERVOUS, ANXIOUS, OR ON EDGE: SEVERAL DAYS
IF YOU CHECKED OFF ANY PROBLEMS ON THIS QUESTIONNAIRE, HOW DIFFICULT HAVE THESE PROBLEMS MADE IT FOR YOU TO DO YOUR WORK, TAKE CARE OF THINGS AT HOME, OR GET ALONG WITH OTHER PEOPLE: SOMEWHAT DIFFICULT

## 2021-07-08 ASSESSMENT — MIFFLIN-ST. JEOR: SCORE: 1843.65

## 2021-07-08 ASSESSMENT — ENCOUNTER SYMPTOMS
SHORTNESS OF BREATH: 0
PALPITATIONS: 0
HEADACHES: 0
CONSTITUTIONAL NEGATIVE: 1

## 2021-07-08 ASSESSMENT — PATIENT HEALTH QUESTIONNAIRE - PHQ9
5. POOR APPETITE OR OVEREATING: NOT AT ALL
SUM OF ALL RESPONSES TO PHQ QUESTIONS 1-9: 2

## 2021-07-08 NOTE — PROGRESS NOTES
[facility] pharmacy calling to inquire what brand of test strips are needed, due to pt has not filled diabetic supplies through them before. Writer advised Accu check NORI was last meter given in 11/2018. Pharmacist requested new meter and lancet orders sent incase meter is not being used or covered by insurance now. Writer sent in new orders.    Jordy LONG RN   [facility]

## 2021-07-08 NOTE — PROGRESS NOTES
Assessment and Plan        (E11.65,  Z79.4) Type 2 diabetes mellitus with hyperglycemia, with long-term current use of insulin (H)  Comment: will work on being more consistent with medication use, will still probably need another medication, will follow closely.  Plan: canagliflozin (INVOKANA) 300 MG tablet, insulin        glargine (LANTUS SOLOSTAR) 100 UNIT/ML pen,         blood glucose (NO BRAND SPECIFIED) test strip            (I10) Benign essential hypertension  Comment: has not been using for at least several months, refilling  Plan: lisinopril (ZESTRIL) 10 MG tablet            (L29.9) Pruritic disorder  Comment: for itching from psoriasis  Plan: gabapentin (NEURONTIN) 100 MG capsule            (F33.1) Moderate episode of recurrent major depressive disorder (H)  Comment: mood stable, refilling  Plan: FLUoxetine (PROZAC) 40 MG capsule              RTC in 2w for BP recheck, 3m DM    Naseem Esparza MD      Lisa Brennan is a 61 year old who presents for the following health issues     HPI     Diabetes Follow-up      How often are you checking your blood sugar? Not at all    What concerns do you have today about your diabetes?Concerns about affording meds     Do you have any of these symptoms? (Select all that apply)  Numbness in feet    Have you had a diabetic eye exam in the last 12 months? No    Notes that he forgets his meds a couple of days a week.  Knows he needs to be better about this.  Also admits that his diet is pretty poor, although has been working on loosing weight recently.  Denies CP, palpitations, edema, dyspnea, HA, vision changes.  Has not been getting lisinopril.    Is having a flare of psoriasis, notes that he has a lot of existing medical expenses and hasn't been able to go to the dermatologist.            Hyperlipidemia Follow-Up      Are you regularly taking any medication or supplement to lower your cholesterol?   yes    Are you having muscle aches or other side effects that you  "think could be caused by your cholesterol lowering medication?  Possibly, not sure.    Hypertension Follow-up      Do you check your blood pressure regularly outside of the clinic? No     Are you following a low salt diet? yes    Are your blood pressures ever more than 140 on the top number (systolic) OR more   than 90 on the bottom number (diastolic), for example 140/90? N/A    BP Readings from Last 2 Encounters:   07/08/21 (!) 144/96   01/11/21 (!) 140/90     Hemoglobin A1C (%)   Date Value   07/02/2021 8.5 (H)   01/07/2021 7.8 (H)     LDL Cholesterol Calculated (mg/dL)   Date Value   06/16/2020 175 (H)   05/13/2019 182 (H)         How many servings of fruits and vegetables do you eat daily?  0-1    On average, how many sweetened beverages do you drink each day (Examples: soda, juice, sweet tea, etc.  Do NOT count diet or artificially sweetened beverages)?   1 weekly    How many days per week do you exercise enough to make your heart beat faster? 3 or less    How many minutes a day do you exercise enough to make your heart beat faster? 9 or less    How many days per week do you miss taking your medication? 0        Review of Systems   Constitutional: Negative.    Eyes: Negative for visual disturbance.   Respiratory: Negative for shortness of breath.    Cardiovascular: Negative for chest pain, palpitations and peripheral edema.   Neurological: Negative for headaches.            Objective    BP (!) 144/96   Pulse 95   Temp 98.4  F (36.9  C) (Oral)   Resp 18   Ht 1.803 m (5' 11\")   Wt 101.2 kg (223 lb)   SpO2 96%   BMI 31.10 kg/m    Body mass index is 31.1 kg/m .  Physical Exam  Vitals signs reviewed.   Eyes:      Conjunctiva/sclera: Conjunctivae normal.   Cardiovascular:      Rate and Rhythm: Normal rate and regular rhythm.      Heart sounds: Normal heart sounds.   Pulmonary:      Effort: Pulmonary effort is normal.      Breath sounds: Normal breath sounds.   Skin:     General: Skin is warm and dry. "   Neurological:      Mental Status: He is alert and oriented to person, place, and time.

## 2021-07-09 ASSESSMENT — ANXIETY QUESTIONNAIRES: GAD7 TOTAL SCORE: 6

## 2021-08-05 ENCOUNTER — TELEPHONE (OUTPATIENT)
Dept: FAMILY MEDICINE | Facility: CLINIC | Age: 61
End: 2021-08-05

## 2021-08-05 ENCOUNTER — ALLIED HEALTH/NURSE VISIT (OUTPATIENT)
Dept: NURSING | Facility: CLINIC | Age: 61
End: 2021-08-05
Payer: COMMERCIAL

## 2021-08-05 VITALS — DIASTOLIC BLOOD PRESSURE: 70 MMHG | SYSTOLIC BLOOD PRESSURE: 132 MMHG

## 2021-08-05 DIAGNOSIS — I10 BENIGN ESSENTIAL HYPERTENSION: Primary | ICD-10-CM

## 2021-08-05 PROCEDURE — 99207 PR NO CHARGE NURSE ONLY: CPT

## 2021-08-05 NOTE — PROGRESS NOTES
Saji Iqbal is a 61 year old patient who comes in today for a Blood Pressure check.  Initial BP:  /70 (BP Location: Right arm, Patient Position: Sitting, Cuff Size: Adult Large)      Data Unavailable  Disposition: results routed to provider    Shruti Rdz MA

## 2021-08-05 NOTE — TELEPHONE ENCOUNTER
Saji Iqbal is a 61 year old patient who comes in today for a Blood Pressure check.  Initial BP:  /70 (BP Location: Right arm, Patient Position: Sitting, Cuff Size: Adult Large)      Data Unavailable  Disposition: results routed to provider    Shruti Rdz MA      BP Readings from Last 6 Encounters:   08/05/21 132/70   07/08/21 (!) 144/96   01/11/21 (!) 140/90   01/07/21 110/70   12/28/20 136/80   12/16/20 126/53

## 2021-08-10 DIAGNOSIS — E11.65 TYPE 2 DIABETES MELLITUS WITH HYPERGLYCEMIA, WITH LONG-TERM CURRENT USE OF INSULIN (H): ICD-10-CM

## 2021-08-10 DIAGNOSIS — Z79.4 TYPE 2 DIABETES MELLITUS WITH HYPERGLYCEMIA, WITH LONG-TERM CURRENT USE OF INSULIN (H): ICD-10-CM

## 2021-08-11 RX ORDER — BLOOD-GLUCOSE METER
EACH MISCELLANEOUS
Qty: 1 KIT | Refills: 0 | Status: SHIPPED | OUTPATIENT
Start: 2021-08-11 | End: 2023-10-24

## 2021-08-11 NOTE — TELEPHONE ENCOUNTER
Prescription approved per [facility] Refill Protocol.  Kyleigh Gallego RN

## 2021-10-04 ENCOUNTER — HEALTH MAINTENANCE LETTER (OUTPATIENT)
Age: 61
End: 2021-10-04

## 2021-10-12 DIAGNOSIS — E11.65 TYPE 2 DIABETES MELLITUS WITH HYPERGLYCEMIA, WITH LONG-TERM CURRENT USE OF INSULIN (H): ICD-10-CM

## 2021-10-12 DIAGNOSIS — Z79.4 TYPE 2 DIABETES MELLITUS WITH HYPERGLYCEMIA, WITH LONG-TERM CURRENT USE OF INSULIN (H): ICD-10-CM

## 2021-10-14 RX ORDER — BLOOD SUGAR DIAGNOSTIC
STRIP MISCELLANEOUS
Qty: 100 STRIP | Refills: 1 | Status: SHIPPED | OUTPATIENT
Start: 2021-10-14 | End: 2022-04-20

## 2021-10-14 RX ORDER — LANCETS
EACH MISCELLANEOUS
Qty: 100 EACH | Refills: 1 | Status: SHIPPED | OUTPATIENT
Start: 2021-10-14 | End: 2022-01-19

## 2021-10-19 DIAGNOSIS — Z79.4 TYPE 2 DIABETES MELLITUS WITH HYPERGLYCEMIA, WITH LONG-TERM CURRENT USE OF INSULIN (H): ICD-10-CM

## 2021-10-19 DIAGNOSIS — E11.65 TYPE 2 DIABETES MELLITUS WITH HYPERGLYCEMIA, WITH LONG-TERM CURRENT USE OF INSULIN (H): ICD-10-CM

## 2021-10-20 ENCOUNTER — VIRTUAL VISIT (OUTPATIENT)
Dept: FAMILY MEDICINE | Facility: CLINIC | Age: 61
End: 2021-10-20
Payer: COMMERCIAL

## 2021-10-20 DIAGNOSIS — G44.209 TENSION HEADACHE: Primary | ICD-10-CM

## 2021-10-20 DIAGNOSIS — R05.9 COUGH: ICD-10-CM

## 2021-10-20 DIAGNOSIS — J34.89 NASAL DRAINAGE: ICD-10-CM

## 2021-10-20 PROCEDURE — 99213 OFFICE O/P EST LOW 20 MIN: CPT | Mod: TEL | Performed by: NURSE PRACTITIONER

## 2021-10-20 NOTE — PATIENT INSTRUCTIONS
"Check blood sugars to ensure not getting too high/low while sick    Once COVID-19 and flu swabs are back we will touch base with how you are feeling and determine treatment if needed. In the meantime continue symptomatic treatment, robitussin okay for cough, nothing with DM in it, warm fluids for the cough and staying hydrated overall for the headache. Tylenol is okay also. 1000 mg three times a day as needed is the max amount.    Continue to stay home until results are back    Discharge Instructions for COVID-19 Patients  You have--or may have--COVID-19. Please follow the instructions listed below.   If you have a weakened immune system, discuss with your doctor any other actions you need to take.  How can I protect others?  If you have symptoms (fever, cough, body aches or trouble breathing):    Stay home and away from others (self-isolate) until:  ? Your other symptoms have resolved (gotten better). And   ? You've had no fever--and no medicine that reduces fever--for 1 full day (24 hours). And   ? At least 10 days have passed since your symptoms started. (You may need to wait 20 days. Follow the advice of your care team.)  If you don't show symptoms, but testing showed that you have COVID-19:    Stay home and away from others (self-isolate) until at least 10 days have passed since the date of your first positive COVID-19 test.  During this time    Stay in your own room, even for meals. Use your own bathroom if you can.    Stay away from others in your home. No hugging, kissing or shaking hands. No visitors.    Don't go to work, school or anywhere else.    Clean \"high touch\" surfaces often (doorknobs, counters, handles). Use household cleaning spray or wipes.    You'll find a full list of  on the EPA website: www.epa.gov/pesticide-registration/list-n-disinfectants-use-against-sars-cov-2.    Cover your mouth and nose with a mask or other face covering to avoid spreading germs.    Wash your hands and face " often. Use soap and water.    Caregivers in these groups are at risk for severe illness due to COVID-19:  ? People 65 years and older  ? People who live in a nursing home or long-term care facility  ? People with chronic disease (lung, heart, cancer, diabetes, kidney, liver, immunologic)  ? People who have a weakened immune system, including those who:    Are in cancer treatment    Take medicine that weakens the immune system, such as corticosteroids    Had a bone marrow or organ transplant    Have an immune deficiency    Have poorly controlled HIV or AIDS    Are obese (body mass index of 40 or higher)    Smoke regularly    Caregivers should wear gloves while washing dishes, handling laundry and cleaning bedrooms and bathrooms.    Use caution when washing and drying laundry: Don't shake dirty laundry and use the warmest water setting that you can.    For more tips on managing your health at home, go to www.cdc.gov/coronavirus/2019-ncov/downloads/10Things.pdf.  How can I take care of myself at home?  1. Get lots of rest. Drink extra fluids (unless a doctor has told you not to).  2. Take Tylenol (acetaminophen) for fever or pain. If you have liver or kidney problems, ask your family doctor if it's okay to take Tylenol.   Adults can take either:   ? 650 mg (two 325 mg pills) every 4 to 6 hours, or   ? 1,000 mg (two 500 mg pills) every 8 hours as needed.  ? Note: Don't take more than 3,000 mg in one day. Acetaminophen is found in many medicines (both prescribed and over-the-counter medicines). Read all labels to be sure you don't take too much.   For children, check the Tylenol bottle for the right dose. The dose is based on the child's age or weight.  3. If you have other health problems (like cancer, heart failure, an organ transplant or severe kidney disease): Call your specialty clinic if you don't feel better in the next 2 days.  4. Know when to call 911. Emergency warning signs include:  ? Trouble breathing or  shortness of breath  ? Pain or pressure in the chest that doesn't go away  ? Feeling confused like you haven't felt before, or not being able to wake up  ? Bluish-colored lips or face  5. Your doctor may have prescribed a blood thinner medicine. Follow their instructions.  Where can I get more information?    [facility] - About COVID-19:   [email or web address]    CDC - What to Do If You're Sick: www.cdc.gov/coronavirus/2019-ncov/about/steps-when-sick.html    CDC - Ending Home Isolation: www.cdc.gov/coronavirus/2019-ncov/hcp/disposition-in-home-patients.html    CDC - Caring for Someone: www.cdc.gov/coronavirus/2019-ncov/if-you-are-sick/care-for-someone.html    [facility] - Interim Guidance for Hospital Discharge to Home: www.health.[facility].mn.us/diseases/coronavirus/hcp/hospdischarge.pdf    Below are the COVID-19 hotlines at the [facility] of Health ([facility]). Interpreters are available.  ? For health questions: Call 367-989-7815 or 1-374.503.3801 (7 a.m. to 7 p.m.)  ? For questions about schools and childcare: Call 059-390-0316 or 1-653.283.4664 (7 a.m. to 7 p.m.)    For informational purposes only. Not to replace the advice of your health care provider. Clinically reviewed by Dr. Jorge Watkins.   Copyright   2020 [facility]. All rights reserved. [employer] [id number] - REV 01/05/21.

## 2021-10-20 NOTE — PROGRESS NOTES
"Mika is a 61 year old who is being evaluated via a billable telephone visit.      What phone number would you like to be contacted at? cell  How would you like to obtain your AVS? MyChart    Assessment & Plan     Tension headache  Symptoms started with nasal drainage Monday. Get COVID and flu tested. If negative and symptoms improving okay to return to work.   - Symptomatic COVID-19 Virus (Coronavirus) by PCR Nasopharyngeal; Future    Cough  As above  - Symptomatic COVID-19 Virus (Coronavirus) by PCR Nasopharyngeal; Future  - Influenza A/B antigen    Nasal drainage  As above  - Symptomatic COVID-19 Virus (Coronavirus) by PCR Nasopharyngeal; Future  - Influenza A/B antigen    BMI:   Estimated body mass index is 31.1 kg/m  as calculated from the following:    Height as of 7/8/21: 1.803 m (5' 11\").    Weight as of 7/8/21: 101.2 kg (223 lb).     Return in about 1 week (around 10/27/2021), or if symptoms worsen or fail to improve.    JACKIE Burk, NP-C  [facility]   Mika is a 61 year old who presents for the following health issues  accompanied by his self.    HPI     Monday night started getting nasal discharge, was sniffling a lot, blowing his nose. Doesn't bother him too much when laying down but then he has since developed a cough. No environmental allergies. Has honey allergy which he avoids. Eyes are more dry, feels like it getting re-routed to his nose.  No fever/chills. Has slight headache. Has been coughing hard this morning. No illness contacts he is aware of in the past 2 weeks. No dizziness. No SOB, no chest pain. Some sinus pressure in frontal but that is where is headache is. No change in vision.     He doesn't check BP at home. HR is around 90.     Has been self quarantining since yesterday. Doesn't need letter for work.     He hasn't gotten the COVID-19 vaccine yet but states he is now willing to do so.       Review of Systems   Constitutional, HEENT-as above, " cardiovascular, pulmonary-as above, systems are negative, except as otherwise noted.      Objective           Vitals:  No vitals were obtained today due to virtual visit.    Physical Exam   healthy, alert and no distress  PSYCH: Alert and oriented times 3; coherent speech, normal   rate and volume, able to articulate logical thoughts, able   to abstract reason, no tangential thoughts, no hallucinations   or delusions  His affect is normal  RESP: No cough, no audible wheezing, able to talk in full sentences  Remainder of exam unable to be completed due to telephone visits      No results found for this visit on 10/20/21.              Phone call duration: 8 minutes

## 2021-10-21 ENCOUNTER — LAB (OUTPATIENT)
Dept: URGENT CARE | Facility: URGENT CARE | Age: 61
End: 2021-10-21
Attending: NURSE PRACTITIONER
Payer: COMMERCIAL

## 2021-10-21 DIAGNOSIS — R05.9 COUGH: ICD-10-CM

## 2021-10-21 DIAGNOSIS — J34.89 NASAL DRAINAGE: ICD-10-CM

## 2021-10-21 DIAGNOSIS — G44.209 TENSION HEADACHE: ICD-10-CM

## 2021-10-21 LAB
FLUAV AG SPEC QL IA: NEGATIVE
FLUBV AG SPEC QL IA: NEGATIVE

## 2021-10-21 PROCEDURE — 87804 INFLUENZA ASSAY W/OPTIC: CPT | Performed by: NURSE PRACTITIONER

## 2021-10-21 PROCEDURE — U0003 INFECTIOUS AGENT DETECTION BY NUCLEIC ACID (DNA OR RNA); SEVERE ACUTE RESPIRATORY SYNDROME CORONAVIRUS 2 (SARS-COV-2) (CORONAVIRUS DISEASE [COVID-19]), AMPLIFIED PROBE TECHNIQUE, MAKING USE OF HIGH THROUGHPUT TECHNOLOGIES AS DESCRIBED BY CMS-2020-01-R: HCPCS

## 2021-10-21 PROCEDURE — U0005 INFEC AGEN DETEC AMPLI PROBE: HCPCS

## 2021-10-21 RX ORDER — PEN NEEDLE, DIABETIC 29 G X1/2"
NEEDLE, DISPOSABLE MISCELLANEOUS
Qty: 100 EACH | Refills: 3 | Status: SHIPPED | OUTPATIENT
Start: 2021-10-21 | End: 2023-02-27

## 2021-10-21 NOTE — RESULT ENCOUNTER NOTE
Steve Brennan,   You do not have the flu. The COVID-19 swab is still pending. Hope your symptoms are starting to improve some.  Thank you,  JACKIE Burk, NP-C  [facility]

## 2021-10-22 LAB — SARS-COV-2 RNA RESP QL NAA+PROBE: NEGATIVE

## 2021-10-22 NOTE — RESULT ENCOUNTER NOTE
Steve Brennan,   Your COVID-19 swab was negative. Please let me know if your symptoms are worsening or not improving.  Thank you,  JACKIE Burk, NP-C  [facility]

## 2021-11-26 ENCOUNTER — TELEPHONE (OUTPATIENT)
Dept: FAMILY MEDICINE | Facility: CLINIC | Age: 61
End: 2021-11-26
Payer: COMMERCIAL

## 2021-11-26 NOTE — LETTER
[facility]  [address] [id number] [phone]  December 13, 2021    Saji Iqbal  [address] [place] [id number]    Dear Saji,    I care about your health and have reviewed your health plan. I have reviewed your medical conditions, medication list, and lab results and am making recommendations based on this review, to better manage your health.    You are in particular need of attention regarding:  -Depression  -Diabetes  -Colon Cancer Screening  -Wellness (Physical) Visit     I am recommending that you:  {recommendations:-schedule a FOLLOWUP OFFICE APPOINTMENT with me.  I will recheck your: A1c, Lipids (fasting cholesterol - nothing to eat except water and/or meds for 8+ hours), BMP (basic metabolic panel), CMP(complete metabolic panel) and Microablumin tests.  -schedule a WELLNESS (Physical) APPOINTMENT with me.   I will check fasting labs the same day - nothing to eat except water and meds for 8-10 hours prior.  -schedule a COLONOSCOPY to look for colon cancer (due every 10 years or 5 years in higher risk situations.)   Colon cancer is now the second leading cause of death in the [place] for both men and women and there are over 130,000 new cases and 50,000 deaths per year from colon cancer.  Colonoscopies can prevent 90-95% of these deaths.  Problem lesions can be removed before they ever become cancer.  This test is not only looking for cancer, but also getting rid of precancerious lesions.  If you do not wish to do a colonoscopy or cannot afford to do one, at this time, there is another option. It is called a FIT test or Fecal Immunochemical Occult Blood Test (take home stool sample kit).  It does not replace the colonoscopy for colorectal cancer screening, but it can detect hidden bleeding in the lower colon.  It does need to be repeated every year and if a positive result is obtained, you would be referred for a  colonoscopy.  If you have completed either one of these tests at another facility, please have the records sent to our clinic so that we can best coordinate your care.    Here is a list of Health Maintenance topics that are due now or due soon:  Health Maintenance Due   Topic Date Due     PREVENTIVE CARE VISIT  Never done     DEPRESSION ACTION PLAN  Never done     COLORECTAL CANCER SCREENING  Never done     COVID-19 Vaccine (1) Never done     LUNG CANCER SCREENING  Never done     EYE EXAM  07/11/2019     LIPID  06/16/2021     INFLUENZA VACCINE (1) 09/01/2021     MICROALBUMIN  09/17/2021     DIABETIC FOOT EXAM  09/17/2021     ANNUAL REVIEW OF HM ORDERS  09/17/2021     A1C  10/02/2021     ZOSTER IMMUNIZATION (2 of 2) 09/02/2021     BMP  12/16/2021     PHQ-9  01/08/2022       Please call us at [phone] (or use [employer]) to address the above recommendations.     Thank you for trusting [facility] and we appreciate the opportunity to serve you.  We look forward to supporting your healthcare needs in the future.    Healthy Regards,    Your [facility] Care Team

## 2021-11-26 NOTE — TELEPHONE ENCOUNTER
Patient Quality Outreach    Patient is due for the following:   Diabetes -  A1C, LDL (Fasting), Eye Exam, Microalbumin and Diabetic Follow-Up Visit  Colon Cancer Screening -  Colonoscopy  Depression  -  DAP  Physical  - As soon as possible  Immunizations  -  Covid, Influenza and Zoster    NEXT STEPS:   Schedule a office visit for Diabetes yearly physical    Type of outreach:    Sent [email or web address] message.      Questions for provider review:    None     Nilam Santa, CMA

## 2021-12-13 NOTE — TELEPHONE ENCOUNTER
Patient Quality Outreach    Patient is due for the following:   Diabetes -  A1C, LDL (Fasting), Microalbumin and Diabetic Follow-Up Visit  Colon Cancer Screening -  Colonoscopy  Depression  -  PHQ-9 Needed and DAP  Physical  - As soon as possible  Immunizations  -  Influenza and Zoster    NEXT STEPS:   Schedule a office visit for Physical and Diabetes    Type of outreach:    Sent letter.      Questions for provider review:    None     Nilam Santa, CMA

## 2021-12-22 ENCOUNTER — NURSE TRIAGE (OUTPATIENT)
Dept: NURSING | Facility: CLINIC | Age: 61
End: 2021-12-22
Payer: COMMERCIAL

## 2021-12-22 NOTE — TELEPHONE ENCOUNTER
"Pt reports last night, approx 1030pm, urine stream was very \"disrupted\".   Symptoms have continued this morning and pt is experiencing pain in end of penis of 4-5/10 pain scale. Blood appeared after stream, \"couple drops\" at end of penis. Pt states there was no blood mixed with urine.    Pt states it reminds him of a past kidney stone when he was approx 27 yrs old.    Pt reports he had back pain x2mos ago lasting 2 days and resolved. Pt was not seen for this.    Pt denies back or flank pain.  No fever or chills.  Pt reports he slept fine last night, but woke with same symptoms this morning.     Urgency \"sensation\" is present. Denies burning with urination.    Pt wants to be seen today.     Per RN Triage protocol, pt transferred to scheduling for appt today, if no appts pt will go to [facility] today. Caller given care advice per RN triage protocol. Caller advised pt to call back if symptoms worsened or new symptoms of concern appear. Caller verbalizes understanding and agreement of plan.    Ayana Ruelas RN   12/22/21 8:30 AM  [facility] Nurse Advisor    Reason for Disposition    Patient wants to be seen    Additional Information    Negative: Shock suspected (e.g., cold/pale/clammy skin, too weak to stand, low BP, rapid pulse)    Negative: Sounds like a life-threatening emergency to the triager    Negative: Followed a genital area injury    Negative: Followed a genital area injury (penis, scrotum)    Negative: Vaginal discharge    Negative: Pus (white, yellow) or bloody discharge from end of penis    Negative: Discomfort (pain, burning or stinging) when passing urine and pregnant    Negative: Discomfort (pain, burning or stinging) when passing urine and female    Negative: Discomfort (pain, burning or stinging) when passing urine and male    Negative: Pain or itching in the vulvar area    Negative: Pain in scrotum is main symptom    Negative: Blood in the urine is main symptom    Negative: Symptoms arising from " use of a urinary catheter ([place]in or Coude)    Negative: Unable to urinate (or only a few drops) > 4 hours and bladder feels very full (e.g., palpable bladder or strong urge to urinate)    Negative: Decreased urination and drinking very little and dehydration suspected (e.g., dark urine, no urine > 12 hours, very dry mouth, very lightheaded)    Negative: Patient sounds very sick or weak to the triager    Negative: Fever > 100.4 F (38.0 C)    Negative: Side (flank) or lower back pain present    Negative: Can't control passage of urine (i.e., urinary incontinence) and new onset (< 2 weeks) or worsening    Negative: Urinating more frequently than usual (i.e., frequency)    Negative: Bad or foul-smelling urine    Protocols used: URINARY SYMPTOMS-A-OH    COVID 19 Nurse Triage Plan/Patient Instructions    Please be aware that novel coronavirus (COVID-19) may be circulating in the community. If you develop symptoms such as fever, cough, or SOB or if you have concerns about the presence of another infection including coronavirus (COVID-19), please contact your health care provider or visit [email or web address].     Disposition/Instructions    In-Person Visit with provider recommended. Reference Visit Selection Guide.    Thank you for taking steps to prevent the spread of this virus.  o Limit your contact with others.  o Wear a simple mask to cover your cough.  o Wash your hands well and often.    Resources    [facility]: About COVID-19: [email or web address]    CDC: What to Do If You're Sick: www.cdc.gov/coronavirus/2019-ncov/about/steps-when-sick.html    CDC: Ending Home Isolation: www.cdc.gov/coronavirus/2019-ncov/hcp/disposition-in-home-patients.html     CDC: Caring for Someone: www.cdc.gov/coronavirus/2019-ncov/if-you-are-sick/care-for-someone.html     [facility]: Interim Guidance for Hospital Discharge to Home: www.health.[facility].mn.us/diseases/coronavirus/hcp/hospdischarge.pdf    [facility]  [place] clinical trials (COVID-19 research studies): clinicalaffairs.[facility]-clinical-trials     Below are the COVID-19 hotlines at the [facility] of Health ([facility]). Interpreters are available.   o For health questions: Call 025-108-1394 or 1-325.814.9821 (7 a.m. to 7 p.m.)  o For questions about schools and childcare: Call 616-144-5890 or 1-958.864.9795 (7 a.m. to 7 p.m.)

## 2021-12-26 ENCOUNTER — NURSE TRIAGE (OUTPATIENT)
Dept: NURSING | Facility: CLINIC | Age: 61
End: 2021-12-26
Payer: COMMERCIAL

## 2021-12-26 NOTE — TELEPHONE ENCOUNTER
Breathing was shallow. Hiccuping so much. Got them last night around 9 p.m. had them most of the night. Woke and got them right back again. Went to convenience store and got charged water and that helped. Ate again and got them back again. This time used brii adalbertoer and they went away again. Doesn't have the hiccups now. No abdominal pain. Has neck stiffness and chills. Wonders if he has a touch of the flu. He's allergic to honey and the ham may have had honey in it. Body aches at 2 out of ten, around shoulders and neck. I connected him with scheduling for a virtual visit today.   Soheila Nowak RN  [facility]      Reason for Disposition    Patient is HIGH RISK (e.g., age > 64 years, pregnant, HIV+, or chronic medical condition)     He's drinking a lot of water because he had a kidney stone but his urine is dark. Thought it would be lighter.    Additional Information    Negative: Severe difficulty breathing (e.g., struggling for each breath, speaks in single words)    Negative: Difficult to awaken or acting confused (e.g., disoriented, slurred speech)    Negative: Bluish (or gray) lips or face now    Negative: Shock suspected (e.g., cold/pale/clammy skin, too weak to stand, low BP, rapid pulse)    Negative: Sounds like a life-threatening emergency to the triager    Negative: [1] Sounds like a cold AND [2] no fever    Negative: [1] Cough with sputum AND [2] no fever    Negative: [1] Dry cough (no sputum) sputum AND [2] no fever    Negative: [1] Throat pain AND [2] no fever    Negative: Influenza vaccine reaction is suspected    Negative: Chest pain  (Exception: MILD central chest pain, present only when coughing)    Negative: [1] Headache AND [2] stiff neck (can't touch chin to chest)     Neck pain, can put chin down to chest.    Negative: Fever > 104 F (40 C)    Negative: [1] Difficulty breathing AND [2] not severe AND [3] not from stuffy nose (e.g., not relieved by cleaning out the nose)     Negative: Patient sounds very sick or weak to the triager    Negative: [1] Fever > 101 F (38.3 C) AND [2] age > 60    Negative: [1] Fever > 100.0 F (37.8 C) AND [2] bedridden (e.g., nursing home patient, CVA, chronic illness, recovering from surgery)    Negative: [1] Fever > 100.0 F (37.8 C) AND [2] diabetes mellitus or weak immune system (e.g., HIV positive, cancer chemo, splenectomy, organ transplant, chronic steroids)    Protocols used: INFLUENZA - SEASONAL-A-AH

## 2021-12-27 ENCOUNTER — VIRTUAL VISIT (OUTPATIENT)
Dept: FAMILY MEDICINE | Facility: CLINIC | Age: 61
End: 2021-12-27
Payer: COMMERCIAL

## 2021-12-27 ENCOUNTER — LAB (OUTPATIENT)
Dept: LAB | Facility: CLINIC | Age: 61
End: 2021-12-27
Payer: COMMERCIAL

## 2021-12-27 DIAGNOSIS — N30.01 ACUTE CYSTITIS WITH HEMATURIA: ICD-10-CM

## 2021-12-27 DIAGNOSIS — Z79.4 TYPE 2 DIABETES MELLITUS WITH HYPERGLYCEMIA, WITH LONG-TERM CURRENT USE OF INSULIN (H): ICD-10-CM

## 2021-12-27 DIAGNOSIS — R30.0 DYSURIA: Primary | ICD-10-CM

## 2021-12-27 DIAGNOSIS — R30.0 DYSURIA: ICD-10-CM

## 2021-12-27 DIAGNOSIS — R06.6 HICCUPS: ICD-10-CM

## 2021-12-27 DIAGNOSIS — E11.65 TYPE 2 DIABETES MELLITUS WITH HYPERGLYCEMIA, WITH LONG-TERM CURRENT USE OF INSULIN (H): ICD-10-CM

## 2021-12-27 LAB
ALBUMIN UR-MCNC: NEGATIVE MG/DL
APPEARANCE UR: CLEAR
BILIRUB UR QL STRIP: NEGATIVE
COLOR UR AUTO: YELLOW
GLUCOSE UR STRIP-MCNC: >=1000 MG/DL
HGB UR QL STRIP: ABNORMAL
KETONES UR STRIP-MCNC: NEGATIVE MG/DL
LEUKOCYTE ESTERASE UR QL STRIP: NEGATIVE
NITRATE UR QL: NEGATIVE
PH UR STRIP: 8.5 [PH] (ref 5–7)
RBC #/AREA URNS AUTO: ABNORMAL /HPF
SP GR UR STRIP: 1.02 (ref 1–1.03)
UROBILINOGEN UR STRIP-ACNC: 1 E.U./DL
WBC #/AREA URNS AUTO: ABNORMAL /HPF

## 2021-12-27 PROCEDURE — 81001 URINALYSIS AUTO W/SCOPE: CPT

## 2021-12-27 PROCEDURE — 99213 OFFICE O/P EST LOW 20 MIN: CPT | Mod: TEL | Performed by: FAMILY MEDICINE

## 2021-12-27 PROCEDURE — 87086 URINE CULTURE/COLONY COUNT: CPT

## 2021-12-27 RX ORDER — CIPROFLOXACIN 500 MG/1
500 TABLET, FILM COATED ORAL 2 TIMES DAILY
Qty: 10 TABLET | Refills: 0 | Status: SHIPPED | OUTPATIENT
Start: 2021-12-27 | End: 2022-01-01

## 2021-12-27 NOTE — PATIENT INSTRUCTIONS
Ciprofloxacin twice a day for 5 days  Make apt with lab prior to starting antibiotics and leave a urine    Make apt if not better or worsen

## 2021-12-27 NOTE — PROGRESS NOTES
"Mika is a 61 year old who is being evaluated via a billable telephone visit.      What phone number would you like to be contacted at? [phone]  How would you like to obtain your AVS? MyChart    Assessment & Plan     Dysuria  Likely UTI  - ciprofloxacin (CIPRO) 500 MG tablet; Take 1 tablet (500 mg) by mouth 2 times daily for 5 days  Consider stopping Farxiga  Consider urology consult    Type 2 diabetes mellitus with hyperglycemia, with long-term current use of insulin (H)  Will treat more aggressively due to diabetes   - ciprofloxacin (CIPRO) 500 MG tablet; Take 1 tablet (500 mg) by mouth 2 times daily for 5 days    Acute cystitis with hematuria  Treat  Consider urology consult   - UA Macro with Reflex to Micro and Culture - lab collect; Future  - ciprofloxacin (CIPRO) 500 MG tablet; Take 1 tablet (500 mg) by mouth 2 times daily for 5 days    Hiccups  resolved        22 minutes spent on the date of the encounter doing chart review, history and exam, documentation and further activities per the note       BMI:   Estimated body mass index is 31.1 kg/m  as calculated from the following:    Height as of 7/8/21: 1.803 m (5' 11\").    Weight as of 7/8/21: 101.2 kg (223 lb).           No follow-ups on file.    Annika Espino MD  [facility]    Subjective   Mika is a 61 year old who presents for the following health issues     HPI       Hiccups for 20 hours  Drinking a lot of water but still has bright yellow urine and a decrease output    Patient with diabetes, hypertension, hyperlipidemia   Started hiccups night before last. Had them most of that night, went and got water with electrolytes and helped, cme tack with eating. Went away with brii amador.   hiccops are gone since yesterday at 5 pm.     Urine was yellow yesterday, and today almost brown. No frequency or urgency.   Has dysuria, slight pain in the suprapubic area.   Has a stiff back, no fever.   Had a bladder infection 3-4 years " ago, feels like that but not as bad.   On Invokana for diabetes.     diabetes   On canagliflozin, lantus 24 units   Lab Results   Component Value Date    A1C 8.5 07/02/2021    A1C 7.8 01/07/2021    A1C 6.9 10/20/2020    A1C 7.1 09/17/2020    A1C 9.1 06/16/2020        Review of Systems   As above  No fever, new back pain      Objective           Vitals:  No vitals were obtained today due to virtual visit.    Physical Exam   healthy, alert and no distress  PSYCH: Alert and oriented times 3; coherent speech, normal   rate and volume, able to articulate logical thoughts, able   to abstract reason, no tangential thoughts, no hallucinations   or delusions  His affect is normal  RESP: No cough, no audible wheezing, able to talk in full sentences  Remainder of exam unable to be completed due to telephone visits                Phone call duration: 12 minutes

## 2021-12-28 LAB — BACTERIA UR CULT: NO GROWTH

## 2022-01-13 DIAGNOSIS — E11.65 TYPE 2 DIABETES MELLITUS WITH HYPERGLYCEMIA, WITH LONG-TERM CURRENT USE OF INSULIN (H): ICD-10-CM

## 2022-01-13 DIAGNOSIS — L29.9 PRURITIC DISORDER: ICD-10-CM

## 2022-01-13 DIAGNOSIS — I10 BENIGN ESSENTIAL HYPERTENSION: ICD-10-CM

## 2022-01-13 DIAGNOSIS — Z79.4 TYPE 2 DIABETES MELLITUS WITH HYPERGLYCEMIA, WITH LONG-TERM CURRENT USE OF INSULIN (H): ICD-10-CM

## 2022-01-14 NOTE — TELEPHONE ENCOUNTER
gabapentin (NEURONTIN) 100 MG capsule        Future Office visit:    Next 5 appointments (look out 90 days)      Jan 19, 2022  7:30 AM  (Arrive by 7:10 AM)  Provider Visit with JACKIE Monaco CNP  [facility] ([facility] ) [address] [facility] [address]  [phone]         Routing refill request to provider for review/approval because:  Drug not on the [facility], [facility] or [facility] refill protocol or controlled substance    Kyleigh Gallego RN

## 2022-01-18 RX ORDER — CANAGLIFLOZIN 300 MG/1
TABLET, FILM COATED ORAL
Qty: 90 TABLET | Refills: 0 | Status: SHIPPED | OUTPATIENT
Start: 2022-01-18 | End: 2022-07-07

## 2022-01-18 RX ORDER — GABAPENTIN 100 MG/1
CAPSULE ORAL
Qty: 180 CAPSULE | Refills: 0 | Status: SHIPPED | OUTPATIENT
Start: 2022-01-18 | End: 2022-04-14

## 2022-01-18 RX ORDER — LISINOPRIL 10 MG/1
TABLET ORAL
Qty: 90 TABLET | Refills: 0 | Status: SHIPPED | OUTPATIENT
Start: 2022-01-18 | End: 2022-04-14

## 2022-01-19 ENCOUNTER — OFFICE VISIT (OUTPATIENT)
Dept: FAMILY MEDICINE | Facility: CLINIC | Age: 62
End: 2022-01-19
Payer: COMMERCIAL

## 2022-01-19 VITALS
WEIGHT: 218 LBS | RESPIRATION RATE: 20 BRPM | OXYGEN SATURATION: 97 % | TEMPERATURE: 98.5 F | SYSTOLIC BLOOD PRESSURE: 128 MMHG | DIASTOLIC BLOOD PRESSURE: 76 MMHG | HEART RATE: 76 BPM | BODY MASS INDEX: 30.52 KG/M2 | HEIGHT: 71 IN

## 2022-01-19 DIAGNOSIS — Z79.4 TYPE 2 DIABETES MELLITUS WITH HYPERGLYCEMIA, WITH LONG-TERM CURRENT USE OF INSULIN (H): ICD-10-CM

## 2022-01-19 DIAGNOSIS — R31.9 HEMATURIA, UNSPECIFIED TYPE: Primary | ICD-10-CM

## 2022-01-19 DIAGNOSIS — E11.65 TYPE 2 DIABETES MELLITUS WITH HYPERGLYCEMIA, WITH LONG-TERM CURRENT USE OF INSULIN (H): ICD-10-CM

## 2022-01-19 DIAGNOSIS — Z12.11 SCREEN FOR COLON CANCER: ICD-10-CM

## 2022-01-19 DIAGNOSIS — I10 BENIGN ESSENTIAL HYPERTENSION: ICD-10-CM

## 2022-01-19 DIAGNOSIS — F33.1 MODERATE EPISODE OF RECURRENT MAJOR DEPRESSIVE DISORDER (H): ICD-10-CM

## 2022-01-19 DIAGNOSIS — Z23 HIGH PRIORITY FOR 2019-NCOV VACCINE: ICD-10-CM

## 2022-01-19 DIAGNOSIS — I25.10 CORONARY ARTERY DISEASE INVOLVING NATIVE CORONARY ARTERY OF NATIVE HEART WITHOUT ANGINA PECTORIS: ICD-10-CM

## 2022-01-19 LAB
ALBUMIN UR-MCNC: NEGATIVE MG/DL
ANION GAP SERPL CALCULATED.3IONS-SCNC: 8 MMOL/L (ref 3–14)
APPEARANCE UR: CLEAR
BILIRUB UR QL STRIP: NEGATIVE
BUN SERPL-MCNC: 12 MG/DL (ref 7–30)
CALCIUM SERPL-MCNC: 9.2 MG/DL (ref 8.5–10.1)
CHLORIDE BLD-SCNC: 104 MMOL/L (ref 94–109)
CHOLEST SERPL-MCNC: 250 MG/DL
CO2 SERPL-SCNC: 24 MMOL/L (ref 20–32)
COLOR UR AUTO: YELLOW
CREAT SERPL-MCNC: 0.8 MG/DL (ref 0.66–1.25)
FASTING STATUS PATIENT QL REPORTED: YES
GFR SERPL CREATININE-BSD FRML MDRD: >90 ML/MIN/1.73M2
GLUCOSE BLD-MCNC: 166 MG/DL (ref 70–99)
GLUCOSE UR STRIP-MCNC: >=1000 MG/DL
HBA1C MFR BLD: 7.4 % (ref 0–5.6)
HDLC SERPL-MCNC: 43 MG/DL
HGB UR QL STRIP: NEGATIVE
KETONES UR STRIP-MCNC: NEGATIVE MG/DL
LDLC SERPL CALC-MCNC: 171 MG/DL
LEUKOCYTE ESTERASE UR QL STRIP: NEGATIVE
NITRATE UR QL: NEGATIVE
NONHDLC SERPL-MCNC: 207 MG/DL
PH UR STRIP: 6.5 [PH] (ref 5–7)
POTASSIUM BLD-SCNC: 4.2 MMOL/L (ref 3.4–5.3)
SODIUM SERPL-SCNC: 136 MMOL/L (ref 133–144)
SP GR UR STRIP: 1.02 (ref 1–1.03)
TRIGL SERPL-MCNC: 182 MG/DL
UROBILINOGEN UR STRIP-ACNC: 1 E.U./DL

## 2022-01-19 PROCEDURE — 80048 BASIC METABOLIC PNL TOTAL CA: CPT | Performed by: NURSE PRACTITIONER

## 2022-01-19 PROCEDURE — 83036 HEMOGLOBIN GLYCOSYLATED A1C: CPT | Performed by: NURSE PRACTITIONER

## 2022-01-19 PROCEDURE — 99214 OFFICE O/P EST MOD 30 MIN: CPT | Performed by: NURSE PRACTITIONER

## 2022-01-19 PROCEDURE — 0051A COVID-19,PF,PFIZER (12+ YRS): CPT | Performed by: NURSE PRACTITIONER

## 2022-01-19 PROCEDURE — 80061 LIPID PANEL: CPT | Performed by: NURSE PRACTITIONER

## 2022-01-19 PROCEDURE — 81003 URINALYSIS AUTO W/O SCOPE: CPT | Performed by: NURSE PRACTITIONER

## 2022-01-19 PROCEDURE — 91305 COVID-19,PF,PFIZER (12+ YRS): CPT | Performed by: NURSE PRACTITIONER

## 2022-01-19 PROCEDURE — 36415 COLL VENOUS BLD VENIPUNCTURE: CPT | Performed by: NURSE PRACTITIONER

## 2022-01-19 RX ORDER — LANCETS
EACH MISCELLANEOUS
Qty: 100 EACH | Refills: 1 | Status: SHIPPED | OUTPATIENT
Start: 2022-01-19 | End: 2022-04-20

## 2022-01-19 RX ORDER — METOPROLOL TARTRATE 25 MG/1
25 TABLET, FILM COATED ORAL 2 TIMES DAILY
Qty: 180 TABLET | Refills: 1 | Status: SHIPPED | OUTPATIENT
Start: 2022-01-19 | End: 2022-07-11

## 2022-01-19 RX ORDER — INSULIN GLARGINE 100 [IU]/ML
24 INJECTION, SOLUTION SUBCUTANEOUS 2 TIMES DAILY
Qty: 45 ML | Refills: 1 | Status: SHIPPED | OUTPATIENT
Start: 2022-01-19 | End: 2022-07-07

## 2022-01-19 ASSESSMENT — PATIENT HEALTH QUESTIONNAIRE - PHQ9
SUM OF ALL RESPONSES TO PHQ QUESTIONS 1-9: 2
5. POOR APPETITE OR OVEREATING: NOT AT ALL

## 2022-01-19 ASSESSMENT — ANXIETY QUESTIONNAIRES
6. BECOMING EASILY ANNOYED OR IRRITABLE: SEVERAL DAYS
5. BEING SO RESTLESS THAT IT IS HARD TO SIT STILL: NOT AT ALL
2. NOT BEING ABLE TO STOP OR CONTROL WORRYING: NOT AT ALL
7. FEELING AFRAID AS IF SOMETHING AWFUL MIGHT HAPPEN: NOT AT ALL
IF YOU CHECKED OFF ANY PROBLEMS ON THIS QUESTIONNAIRE, HOW DIFFICULT HAVE THESE PROBLEMS MADE IT FOR YOU TO DO YOUR WORK, TAKE CARE OF THINGS AT HOME, OR GET ALONG WITH OTHER PEOPLE: SOMEWHAT DIFFICULT
GAD7 TOTAL SCORE: 1
3. WORRYING TOO MUCH ABOUT DIFFERENT THINGS: NOT AT ALL
1. FEELING NERVOUS, ANXIOUS, OR ON EDGE: NOT AT ALL

## 2022-01-19 ASSESSMENT — MIFFLIN-ST. JEOR: SCORE: 1820.97

## 2022-01-19 ASSESSMENT — PAIN SCALES - GENERAL: PAINLEVEL: NO PAIN (0)

## 2022-01-19 NOTE — PROGRESS NOTES
"  Assessment & Plan     Screen for colon cancer  Considering colonoscopy     Benign essential hypertension  Controlled.  Continue current medications, lab surveillance today.    - BASIC METABOLIC PANEL; Future  - BASIC METABOLIC PANEL  - metoprolol tartrate (LOPRESSOR) 25 MG tablet; Take 1 tablet (25 mg) by mouth 2 times daily    Type 2 diabetes mellitus with hyperglycemia, with long-term current use of insulin (H)  Improving and close to goal.  Work on diet changes.  Continue current medications. Will schedule his eye exam.   - BASIC METABOLIC PANEL; Future  - blood glucose monitoring (SOFTCLIX) lancets; Use to test blood sugar 1 times daily or as directed.  - BASIC METABOLIC PANEL  - insulin glargine (LANTUS SOLOSTAR) 100 UNIT/ML pen; Inject 24 Units Subcutaneous 2 times daily  - Hemoglobin A1c    Hematuria, unspecified type  Resolved.  Monitor.   - UA Macro with Reflex to Micro and Culture - lab collect; Future  - UA Macro with Reflex to Micro and Culture - lab collect    Coronary artery disease involving native coronary artery of native heart without angina pectoris  Denies CP, SOB.    - metoprolol tartrate (LOPRESSOR) 25 MG tablet; Take 1 tablet (25 mg) by mouth 2 times daily    High priority for 2019-nCoV vaccine  Administered today   - COVID-19,PF,PFIZER (12+ Yrs GRAY LABEL)    Moderate episode of recurrent major depressive disorder (H)  No longer taking ssri.  Feels mood is controlled.  Has new friend group.             BMI:   Estimated body mass index is 30.4 kg/m  as calculated from the following:    Height as of this encounter: 1.803 m (5' 11\").    Weight as of this encounter: 98.9 kg (218 lb).   Weight management plan: Discussed healthy diet and exercise guidelines      Return in about 6 months (around 7/19/2022) for Preventive Visit, diabetes , In-Clinic Visit with Dr. Esparza .    Deann Campos, JACKIE CNP  [facility] [place] is a 61 year old who presents for the " following health issues     History of Present Illness       Diabetes:   He presents for follow up of diabetes.  He is checking home blood glucose a few times a week. He checks blood glucose before meals.  Blood glucose is never over 200 and never under 70. He is aware of hypoglycemia symptoms including dizziness and weakness. He is concerned about other.  He is having numbness in feet and burning in feet. The patient has not had a diabetic eye exam in the last 12 months.          Morning blood sugars between 125-148.  Has been consistent with medications.  Trying to watch diet.  Planning to start a new diet that will eliminate sugars and other components to help with psoriasis.    Lab Results   Component Value Date    A1C 7.4 01/19/2022    A1C 8.5 07/02/2021    A1C 7.8 01/07/2021    A1C 6.9 10/20/2020    A1C 7.1 09/17/2020    A1C 9.1 06/16/2020       ED/UC Followup:    Facility:  [facility]   Date of visit: 12/27/21  Reason for visit: Dysuria, Diabetes, Acute Cystitis with hematuria  Current Status: is fine, patient states that he thinks he may have passed a kidney stone when this happened. He states that he had a divided stream at the time, and a lot of blood in urine. It went away after a couple of days.    No longer having dysuria and hematuria. Stream has returned to normal/baseline.     Stopped prozac about a year ago; he doesn't feel he needs this anymore.   RADHA-7 SCORE 2/8/2021 7/8/2021 1/19/2022   Total Score - - -   Total Score 13 6 1     PHQ 2/8/2021 7/8/2021 1/19/2022   PHQ-9 Total Score 12 2 2   Q9: Thoughts of better off dead/self-harm past 2 weeks Not at all Not at all Not at all           Review of Systems   Constitutional, HEENT, cardiovascular, pulmonary, gi and gu systems are negative, except as otherwise noted.      Objective    /76 (BP Location: Right arm, Patient Position: Sitting, Cuff Size: Adult Large)   Pulse 76   Temp 98.5  F (36.9  C) (Oral)   Resp 20   Ht 1.803 m  "(5' 11\")   Wt 98.9 kg (218 lb)   SpO2 97%   BMI 30.40 kg/m    Body mass index is 30.4 kg/m .  Physical Exam   GENERAL: healthy, alert and no distress  NECK: no adenopathy, no asymmetry, masses, or scars and thyroid normal to palpation  RESP: lungs clear to auscultation - no rales, rhonchi or wheezes  CV: regular rate and rhythm, normal S1 S2, no S3 or S4, no murmur, click or rub, no peripheral edema and peripheral pulses strong  ABDOMEN: soft, nontender, no hepatosplenomegaly, no masses and bowel sounds normal  MS: no gross musculoskeletal defects noted, no edema  SKIN: psoriasis on extremities, scalp  PSYCH: mentation appears normal, affect normal/bright  Diabetic foot exam: normal DP and PT pulses, normal sensory exam, normal monofilament exam and onychomycosis    Results for orders placed or performed in visit on 01/19/22 (from the past 24 hour(s))   Hemoglobin A1c   Result Value Ref Range    Hemoglobin A1C 7.4 (H) 0.0 - 5.6 %   UA Macro with Reflex to Micro and Culture - lab collect    Specimen: Urine, Midstream   Result Value Ref Range    Color Urine Yellow Colorless, Straw, Light Yellow, Yellow    Appearance Urine Clear Clear    Glucose Urine >=1000 (A) Negative mg/dL    Bilirubin Urine Negative Negative    Ketones Urine Negative Negative mg/dL    Specific Gravity Urine 1.020 1.003 - 1.035    Blood Urine Negative Negative    pH Urine 6.5 5.0 - 7.0    Protein Albumin Urine Negative Negative mg/dL    Urobilinogen Urine 1.0 0.2, 1.0 E.U./dL    Nitrite Urine Negative Negative    Leukocyte Esterase Urine Negative Negative    Narrative    Microscopic not indicated             "

## 2022-01-20 ENCOUNTER — TELEPHONE (OUTPATIENT)
Dept: FAMILY MEDICINE | Facility: CLINIC | Age: 62
End: 2022-01-20
Payer: COMMERCIAL

## 2022-01-20 DIAGNOSIS — Z79.4 TYPE 2 DIABETES MELLITUS WITH HYPERGLYCEMIA, WITH LONG-TERM CURRENT USE OF INSULIN (H): Primary | ICD-10-CM

## 2022-01-20 DIAGNOSIS — E11.65 TYPE 2 DIABETES MELLITUS WITH HYPERGLYCEMIA, WITH LONG-TERM CURRENT USE OF INSULIN (H): Primary | ICD-10-CM

## 2022-01-20 ASSESSMENT — ANXIETY QUESTIONNAIRES: GAD7 TOTAL SCORE: 1

## 2022-01-20 NOTE — TELEPHONE ENCOUNTER
Prior Authorization Retail Medication Request    Medication/Dose: INVOKANA 300 MG tablet  ICD code (if different than what is on RX):    Previously Tried and Failed:    Rationale:      Insurance Name:  [employer]  Insurance ID:  [id number]      Pharmacy Information (if different than what is on RX)  Name:     [id number] - [place], [place] - [address]  Phone:  [phone]

## 2022-01-23 ENCOUNTER — HEALTH MAINTENANCE LETTER (OUTPATIENT)
Age: 62
End: 2022-01-23

## 2022-01-24 NOTE — TELEPHONE ENCOUNTER
Central Prior Authorization Team   Phone: [phone]    PA Initiation    Medication: INVOKANA 300 MG tablet  Insurance Company: [employer] - Phone [phone] Fax [phone]  Pharmacy Filling the Rx: [id number] - [place], [place] - [address]  Filling Pharmacy Phone: [phone]  Filling Pharmacy Fax:    Start Date: 1/24/2022

## 2022-01-25 NOTE — TELEPHONE ENCOUNTER
PRIOR AUTHORIZATION DENIED    Medication: INVOKANA 300 MG tablet    Denial Date: 1/24/2022    Denial Rational:  Patient must have a history of trial & failure to the formulary alternative(s) or have a contraindication or intolerance to the formulary alternatives: Jardiance.                   Appeal Information:    If you would like to appeal, please supply P/A team with a letter of medical necessity with clinical reason.

## 2022-01-25 NOTE — TELEPHONE ENCOUNTER
Please discuss with patient switch from Invokana to Jardiance (formulary switch) - same class and mechanism so really shouldn't cause any issues.  RN OK to approve once pharmacy confirmed.    Naseem Esparza MD

## 2022-01-28 ENCOUNTER — TELEPHONE (OUTPATIENT)
Dept: NURSING | Facility: CLINIC | Age: 62
End: 2022-01-28
Payer: COMMERCIAL

## 2022-01-29 NOTE — TELEPHONE ENCOUNTER
COVID-19 testing at [facility] is by appointment only. You'll need to schedule a time to get tested. If you have symptoms (signs) of COVID, please log in to [employer] [facility] to complete the symptom .     If you don't have COVID symptoms and want to get tested, you should also log in to [employer] to complete the symptom .   This includes people who:    have had close contact with a COVID-positive person    want to be tested before or after travel    have taken part in high-risk activities    have a school testing mandate, or     were told to get tested by their care team or the health department.    After the symptom  has been completed, you will be able to schedule your COVID test on [employer] [facility]. To learn more about our testing locations or for other details, please visit our COVID-19 Resource Hub.    [employer] is also the fastest way to get your test results. You'll get your results in [employer] [facility] within 3 days. If you don't use [employer], you'll get your results in the mail in 7 to 10 days. If your test is positive and you don't view your result in [employer] within 1 business day, you'll get a phone call with your result. A positive result means that you have COVID-19.    If you have an upcoming procedure at [facility], you'll need to be tested for COVID. The test needs to happen 2 to 4 days before your procedure. If you have an upcoming procedure, we will contact you to schedule a COVID test.    If you don't have a [employer] account, please call [phone] to complete the symptom  over the phone.     You can also find community testing sites in [place] at [place]/get-tested/testing-locations. If you live in [place], please visit www.dhs.[place].gov/covid-19/community-testing.htm.

## 2022-02-09 ENCOUNTER — IMMUNIZATION (OUTPATIENT)
Dept: NURSING | Facility: CLINIC | Age: 62
End: 2022-02-09
Attending: NURSE PRACTITIONER
Payer: COMMERCIAL

## 2022-02-09 DIAGNOSIS — Z23 HIGH PRIORITY FOR 2019-NCOV VACCINE: Primary | ICD-10-CM

## 2022-02-09 PROCEDURE — 0052A COVID-19,PF,PFIZER (12+ YRS): CPT

## 2022-02-09 PROCEDURE — 99207 PR NO CHARGE LOS: CPT

## 2022-02-09 PROCEDURE — 91305 COVID-19,PF,PFIZER (12+ YRS): CPT

## 2022-02-12 ENCOUNTER — TELEPHONE (OUTPATIENT)
Dept: NURSING | Facility: CLINIC | Age: 62
End: 2022-02-12
Payer: COMMERCIAL

## 2022-02-12 NOTE — TELEPHONE ENCOUNTER
Telephone call    Patient called because since he got the 2nd covid vaccine and since then his Plaque Psoriasis is taking over his back.  He was wondering if that is a side effect of the vaccine.  He states that he can not reach his back to put anything on it  But itt is out of control.  He does not feel able to  Go to urgent care so wanted a telephone visit with Dr Esparza. Transferred to scheduling.    Linh Aldana RN   [facility]  4:45 PM 2/12/2022    COVID 19 Nurse Triage Plan/Patient Instructions    Please be aware that novel coronavirus (COVID-19) may be circulating in the community. If you develop symptoms such as fever, cough, or SOB or if you have concerns about the presence of another infection including coronavirus (COVID-19), please contact your health care provider or visit [email or web address].     Disposition/Instructions    In-Person Visit with provider recommended. Reference Visit Selection Guide.    Thank you for taking steps to prevent the spread of this virus.  o Limit your contact with others.  o Wear a simple mask to cover your cough.  o Wash your hands well and often.    Resources    [facility]: About COVID-19: [email or web address]    CDC: What to Do If You're Sick: www.cdc.gov/coronavirus/2019-ncov/about/steps-when-sick.html    CDC: Ending Home Isolation: www.cdc.gov/coronavirus/2019-ncov/hcp/disposition-in-home-patients.html     CDC: Caring for Someone: www.cdc.gov/coronavirus/2019-ncov/if-you-are-sick/care-for-someone.html     [facility]: Interim Guidance for Hospital Discharge to Home: www.health.[facility].[place]/diseases/coronavirus/hcp/hospdischarge.pdf    [facility] clinical trials (COVID-19 research studies): clinicalaffairs.[facility]/umn-clinical-trials     Below are the COVID-19 hotlines at the [facility] ([facility]). Interpreters are available.   o For health questions: Call 808-303-6054 or 1-942.699.1219 (7 a.m. to 7  p.m.)  o For questions about schools and childcare: Call 771-241-7494 or 1-410.814.6380 (7 a.m. to 7 p.m.)

## 2022-04-13 DIAGNOSIS — L29.9 PRURITIC DISORDER: ICD-10-CM

## 2022-04-13 DIAGNOSIS — I10 BENIGN ESSENTIAL HYPERTENSION: ICD-10-CM

## 2022-04-14 RX ORDER — LISINOPRIL 10 MG/1
TABLET ORAL
Qty: 90 TABLET | Refills: 0 | Status: SHIPPED | OUTPATIENT
Start: 2022-04-14 | End: 2022-07-07

## 2022-04-14 RX ORDER — GABAPENTIN 100 MG/1
CAPSULE ORAL
Qty: 180 CAPSULE | Refills: 0 | Status: SHIPPED | OUTPATIENT
Start: 2022-04-14 | End: 2022-07-07

## 2022-04-14 NOTE — TELEPHONE ENCOUNTER
Routing refill request to provider for review/approval because:  Drug not on the [facility] refill protocol     Korey REYES RN

## 2022-04-20 DIAGNOSIS — E11.65 TYPE 2 DIABETES MELLITUS WITH HYPERGLYCEMIA, WITH LONG-TERM CURRENT USE OF INSULIN (H): ICD-10-CM

## 2022-04-20 DIAGNOSIS — Z79.4 TYPE 2 DIABETES MELLITUS WITH HYPERGLYCEMIA, WITH LONG-TERM CURRENT USE OF INSULIN (H): ICD-10-CM

## 2022-04-20 RX ORDER — LANCETS
EACH MISCELLANEOUS
Qty: 100 EACH | Refills: 0 | Status: SHIPPED | OUTPATIENT
Start: 2022-04-20 | End: 2023-10-24

## 2022-04-20 RX ORDER — BLOOD SUGAR DIAGNOSTIC
STRIP MISCELLANEOUS
Qty: 100 STRIP | Refills: 0 | Status: SHIPPED | OUTPATIENT
Start: 2022-04-20 | End: 2023-10-24

## 2022-05-15 ENCOUNTER — HEALTH MAINTENANCE LETTER (OUTPATIENT)
Age: 62
End: 2022-05-15

## 2022-07-05 ENCOUNTER — TELEPHONE (OUTPATIENT)
Dept: FAMILY MEDICINE | Facility: CLINIC | Age: 62
End: 2022-07-05

## 2022-07-05 NOTE — TELEPHONE ENCOUNTER
S-(situation): plaque psoriasis on bilateral soles of feet and palms of hands    B-(background): hx of psoriasis.  Was walking around over the weekend and noticed he was having blood coming from feet. Psoriasis on feet with cracks making it painful to walk.  Has been soaking feet, applying triamcinolone cream, aquaphor ointment and applying clean socks.  He is worried about infection and he is diabetic    A-(assessment): denied any drainage with an odor, leg swelling, increased warmth, fever, chills.  Has been keeping areas clean, but would like an antibiotic to make sure he doesn't get an infection.      R-(recommendations): assisted in scheduling an appointment with his primary care provider.  Advised to call back with any worsening of symptoms.  Patient verbalized understanding.    Next 5 appointments (look out 90 days)    Jul 07, 2022 11:30 AM  (Arrive by 11:10 AM)  Provider Visit with Naseem Esparza MD  [facility] ([facility] ) [address] [facility] [address] [phone]   Jul 19, 2022  9:00 AM  (Arrive by 8:40 AM)  Adult Preventative Visit with Naseem Esparza MD  [facility] ([facility] ) [address] [facility] [id number] [phone]

## 2022-07-07 ENCOUNTER — OFFICE VISIT (OUTPATIENT)
Dept: FAMILY MEDICINE | Facility: CLINIC | Age: 62
End: 2022-07-07
Payer: COMMERCIAL

## 2022-07-07 ENCOUNTER — TELEPHONE (OUTPATIENT)
Dept: DERMATOLOGY | Facility: CLINIC | Age: 62
End: 2022-07-07

## 2022-07-07 VITALS
WEIGHT: 220 LBS | TEMPERATURE: 98.5 F | DIASTOLIC BLOOD PRESSURE: 86 MMHG | SYSTOLIC BLOOD PRESSURE: 126 MMHG | BODY MASS INDEX: 30.68 KG/M2 | RESPIRATION RATE: 16 BRPM | HEART RATE: 80 BPM | OXYGEN SATURATION: 98 %

## 2022-07-07 DIAGNOSIS — E78.5 HYPERLIPIDEMIA LDL GOAL <100: ICD-10-CM

## 2022-07-07 DIAGNOSIS — Z12.11 SCREEN FOR COLON CANCER: ICD-10-CM

## 2022-07-07 DIAGNOSIS — Z79.4 TYPE 2 DIABETES MELLITUS WITH HYPERGLYCEMIA, WITH LONG-TERM CURRENT USE OF INSULIN (H): ICD-10-CM

## 2022-07-07 DIAGNOSIS — L29.9 PRURITIC DISORDER: ICD-10-CM

## 2022-07-07 DIAGNOSIS — E11.65 UNCONTROLLED TYPE 2 DIABETES MELLITUS WITH HYPERGLYCEMIA (H): Primary | ICD-10-CM

## 2022-07-07 DIAGNOSIS — E11.65 TYPE 2 DIABETES MELLITUS WITH HYPERGLYCEMIA, WITH LONG-TERM CURRENT USE OF INSULIN (H): ICD-10-CM

## 2022-07-07 DIAGNOSIS — I25.10 CORONARY ARTERY DISEASE INVOLVING NATIVE CORONARY ARTERY OF NATIVE HEART WITHOUT ANGINA PECTORIS: ICD-10-CM

## 2022-07-07 DIAGNOSIS — I10 BENIGN ESSENTIAL HYPERTENSION: ICD-10-CM

## 2022-07-07 DIAGNOSIS — L40.9 PSORIASIS: ICD-10-CM

## 2022-07-07 LAB
HBA1C MFR BLD: 6.3 % (ref 0–5.6)
HOLD SPECIMEN: NORMAL

## 2022-07-07 PROCEDURE — 99214 OFFICE O/P EST MOD 30 MIN: CPT | Performed by: FAMILY MEDICINE

## 2022-07-07 PROCEDURE — 83036 HEMOGLOBIN GLYCOSYLATED A1C: CPT | Performed by: FAMILY MEDICINE

## 2022-07-07 PROCEDURE — 36415 COLL VENOUS BLD VENIPUNCTURE: CPT | Performed by: FAMILY MEDICINE

## 2022-07-07 PROCEDURE — 82043 UR ALBUMIN QUANTITATIVE: CPT | Performed by: FAMILY MEDICINE

## 2022-07-07 RX ORDER — CHOLECALCIFEROL (VITAMIN D3) 25 MCG
25 CAPSULE ORAL DAILY
Qty: 90 CAPSULE | Refills: 3 | Status: SHIPPED | OUTPATIENT
Start: 2022-07-07 | End: 2023-02-02

## 2022-07-07 RX ORDER — GABAPENTIN 100 MG/1
200 CAPSULE ORAL DAILY
Qty: 180 CAPSULE | Refills: 1 | Status: SHIPPED | OUTPATIENT
Start: 2022-07-07 | End: 2023-02-02

## 2022-07-07 RX ORDER — PREDNISONE 10 MG/1
TABLET ORAL
Qty: 21 TABLET | Refills: 0 | Status: SHIPPED | OUTPATIENT
Start: 2022-07-07 | End: 2022-07-16

## 2022-07-07 RX ORDER — LISINOPRIL 10 MG/1
10 TABLET ORAL DAILY
Qty: 90 TABLET | Refills: 1 | Status: SHIPPED | OUTPATIENT
Start: 2022-07-07 | End: 2022-08-17

## 2022-07-07 RX ORDER — INSULIN GLARGINE 100 [IU]/ML
24 INJECTION, SOLUTION SUBCUTANEOUS 2 TIMES DAILY
Qty: 45 ML | Refills: 1 | Status: SHIPPED | OUTPATIENT
Start: 2022-07-07 | End: 2023-02-17

## 2022-07-07 ASSESSMENT — ENCOUNTER SYMPTOMS
CONSTITUTIONAL NEGATIVE: 1
HEADACHES: 0
PALPITATIONS: 0
SHORTNESS OF BREATH: 0

## 2022-07-07 ASSESSMENT — PAIN SCALES - GENERAL: PAINLEVEL: MODERATE PAIN (4)

## 2022-07-07 NOTE — PROGRESS NOTES
Assessment and Plan    (E11.65) Uncontrolled type 2 diabetes mellitus with hyperglycemia (H)  (primary encounter diagnosis)  Comment: A1c looks better  Plan: HEMOGLOBIN A1C            (Z12.11) Screen for colon cancer  Comment:   Plan:     (E11.65,  Z79.4) Type 2 diabetes mellitus with hyperglycemia, with long-term current use of insulin (H)  Comment:   Refills  Plan: empagliflozin (JARDIANCE) 25 MG TABS tablet,         insulin glargine (LANTUS SOLOSTAR) 100 UNIT/ML         pen            (I10) Benign essential hypertension  Comment: BP good, refilling  Plan: lisinopril (ZESTRIL) 10 MG tablet            (L40.9) Psoriasis  Comment: urgent derm referral, steroid burst/taper  Plan: Adult Dermatology Referral, predniSONE         (DELTASONE) 10 MG tablet, Cholecalciferol         (VITAMIN D HIGH POTENCY) 25 MCG (1000 UT) CAPS            (L29.9) Pruritic disorder  Comment:   Plan: gabapentin (NEURONTIN) 100 MG capsule            (E78.5) Hyperlipidemia LDL goal <100  Comment:   Plan: STATIN NOT PRESCRIBED (INTENTIONAL)              RTC in  for DM    Naseem Esparza MD      Lisa Brennan is a 62 year old, presenting for the following health issues:  Psoriasis      HPI     Rash  Onset/Duration: 4 days   Description  Location: Hands and arms, feet   Character: blotchy, flakey, painful, burning  Itching: no  Intensity:  severe  Progression of Symptoms:  worsening  Accompanying signs and symptoms:   Fever: No  Body aches or joint pain: No  Sore throat symptoms: No  Recent cold symptoms: No  History:           Previous episodes of similar rash: None  New exposures:  None  Recent travel: No  Exposure to similar rash: No  Precipitating or alleviating factors:   Therapies tried and outcome: none    Marked psoriasis flare about 10 days ago.  Hands and soles cracked and occasional bleeding, peeling/flaking skin.  NO fever, purulent discharge.    Is not currently using fluoxetine.    Denies CP, palpitations, edema, dyspnea, HA,  vision changes.        Review of Systems   Constitutional: Negative.    Eyes: Negative for visual disturbance.   Respiratory: Negative for shortness of breath.    Cardiovascular: Negative for chest pain, palpitations and peripheral edema.   Skin: Positive for rash.   Neurological: Negative for headaches.            Objective    /86 (BP Location: Right arm, Patient Position: Sitting, Cuff Size: Adult Large)   Pulse 80   Temp 98.5  F (36.9  C) (Oral)   Resp 16   Wt 99.8 kg (220 lb)   SpO2 98%   BMI 30.68 kg/m    Body mass index is 30.68 kg/m .  Physical Exam  Vitals and nursing note reviewed.   Constitutional:       Appearance: Normal appearance.   Skin:     General: Skin is warm and dry.      Comments: Exposed skin on arms and legs noted to be at least 75% psoriatic plaques.  Face spared.  Palms and soles desquamating, with occasional full-thickness skin cracks in joint creases.   Neurological:      General: No focal deficit present.      Mental Status: He is alert and oriented to person, place, and time.   Psychiatric:         Mood and Affect: Mood normal.         Behavior: Behavior normal.                            .  ..

## 2022-07-07 NOTE — TELEPHONE ENCOUNTER
[facility]    Phone Message    May a detailed message be left on voicemail: yes     Reason for Call: Appointment Intake    Referring Provider Name: Naseem Esparza MD  Diagnosis and/or Symptoms: Possible Psoriasis flare 80% covered all over  Urgent: 3-5 Days  Pt would like message on MyChart to discuss scheduling ASAP, rather than on the phone.   Pt is dealing with a lot of pain and is struggling with walking and using his hands due to his condition. Please message Pt ASAP  Thanks     Action Taken: Message routed to:  [facility] Derm    Travel Screening: Not Applicable

## 2022-07-08 ENCOUNTER — MYC MEDICAL ADVICE (OUTPATIENT)
Dept: DERMATOLOGY | Facility: CLINIC | Age: 62
End: 2022-07-08

## 2022-07-08 LAB
CREAT UR-MCNC: 195 MG/DL
MICROALBUMIN UR-MCNC: 55 MG/L
MICROALBUMIN/CREAT UR: 28.21 MG/G CR (ref 0–17)

## 2022-07-08 NOTE — TELEPHONE ENCOUNTER
[facility]    Phone Message    May a detailed message be left on voicemail: yes     Reason for Call: Pt is calling back about his bad condition. Pt is unable to sent pics, his hands are so bad. It is hard for the pt to hold on to the phone. Please review and call back   Thanks     Action Taken: Message routed to:  [facility] Derm    Travel Screening: Not Applicable

## 2022-07-08 NOTE — TELEPHONE ENCOUNTER
Called pt and got him booked today with [facility] at 4:15.    Michelle BENTLEY RN  Dermatology   [phone]

## 2022-07-11 ENCOUNTER — TELEPHONE (OUTPATIENT)
Dept: FAMILY MEDICINE | Facility: CLINIC | Age: 62
End: 2022-07-11

## 2022-07-11 RX ORDER — METOPROLOL TARTRATE 25 MG/1
TABLET, FILM COATED ORAL
Qty: 180 TABLET | Refills: 0 | Status: SHIPPED | OUTPATIENT
Start: 2022-07-11 | End: 2023-02-17

## 2022-07-11 RX ORDER — INSULIN GLARGINE 100 [IU]/ML
24 INJECTION, SOLUTION SUBCUTANEOUS 2 TIMES DAILY
Qty: 45 ML | Refills: 0 | OUTPATIENT
Start: 2022-07-11

## 2022-07-11 NOTE — TELEPHONE ENCOUNTER
S/w pt and scheduled with Dr. Vasques on Thursday 7/14 at 7:30 am for rash on hands and legs.    Advised pt to arrive at clinic at 7:15 am for check in.    Pt states understanding.    Sarah VICKERS RN  [facility]  [phone]

## 2022-07-11 NOTE — LETTER
July 11, 2022      Saji Salasar  [address] [address]        To Whom It May Concern:    Saji GODWIN Rasheed  was seen on 7/7/22.  Please excuse him  until 7/18/22 due to illness.        Sincerely,        Naseem Esparza MD

## 2022-07-11 NOTE — TELEPHONE ENCOUNTER
Letter completed - covered through 7/18/22.  If he neds longer he should reach out.    Naseem Esparza MD

## 2022-07-11 NOTE — TELEPHONE ENCOUNTER
Mika is calling stating that he would like a letter for work, excusing him due to the psoriasis flare up on his feet. States that he is having a hard time walking even around his apartment. And does not feel that he should be driving due to the fact that if he were to have to hard brake he wouldn't be able to do so.     Mika would like a letter written and uploaded to [employer].     Mika- [phone], ok to leave detailed message.     Ary Akers

## 2022-07-12 ENCOUNTER — TELEPHONE (OUTPATIENT)
Dept: DERMATOLOGY | Facility: CLINIC | Age: 62
End: 2022-07-12

## 2022-07-12 NOTE — TELEPHONE ENCOUNTER
Reason for Call:  Other call back    Detailed comments: Mika Josuear called to clarify if it is okay using crutches from previous injury or watt is the new recommendation ,would like to return to work and want to know should do what to do. Request a call back    Phone Number Patient can be reached at: Cell number on file:    Telephone Information:   Mobile [phone]       Best Time: anytime    Can we leave a detailed message on this number? YES    Call taken on 7/12/2022 at 12:17 PM by Lennie West MA

## 2022-07-12 NOTE — TELEPHONE ENCOUNTER
Called pt back, pt asking if he should use crutches to return to work. Advised that since he has not been seen in derm yet we are unable to give any recommendations like that. Advised to ask his PCP who he saw last week for this issue. Pt voiced understanding.    Michelle BENTLEY RN  Dermatology   [phone]

## 2022-07-14 ENCOUNTER — TELEPHONE (OUTPATIENT)
Dept: DERMATOLOGY | Facility: CLINIC | Age: 62
End: 2022-07-14

## 2022-07-14 ENCOUNTER — OFFICE VISIT (OUTPATIENT)
Dept: DERMATOLOGY | Facility: CLINIC | Age: 62
End: 2022-07-14
Payer: COMMERCIAL

## 2022-07-14 DIAGNOSIS — L40.9 PSORIASIS: Primary | ICD-10-CM

## 2022-07-14 DIAGNOSIS — Z79.899 ENCOUNTER FOR LONG-TERM (CURRENT) USE OF HIGH-RISK MEDICATION: ICD-10-CM

## 2022-07-14 LAB
ALBUMIN SERPL-MCNC: 3.9 G/DL (ref 3.4–5)
ALP SERPL-CCNC: 90 U/L (ref 40–150)
ALT SERPL W P-5'-P-CCNC: 95 U/L (ref 0–70)
ANION GAP SERPL CALCULATED.3IONS-SCNC: 6 MMOL/L (ref 3–14)
AST SERPL W P-5'-P-CCNC: 82 U/L (ref 0–45)
BILIRUB SERPL-MCNC: 1.1 MG/DL (ref 0.2–1.3)
BUN SERPL-MCNC: 16 MG/DL (ref 7–30)
CALCIUM SERPL-MCNC: 9.5 MG/DL (ref 8.5–10.1)
CHLORIDE BLD-SCNC: 104 MMOL/L (ref 94–109)
CO2 SERPL-SCNC: 29 MMOL/L (ref 20–32)
CREAT SERPL-MCNC: 0.92 MG/DL (ref 0.66–1.25)
ERYTHROCYTE [DISTWIDTH] IN BLOOD BY AUTOMATED COUNT: 13 % (ref 10–15)
GFR SERPL CREATININE-BSD FRML MDRD: >90 ML/MIN/1.73M2
GLUCOSE BLD-MCNC: 152 MG/DL (ref 70–99)
HBV CORE AB SERPL QL IA: NONREACTIVE
HCT VFR BLD AUTO: 43.6 % (ref 40–53)
HCV AB SERPL QL IA: NONREACTIVE
HGB BLD-MCNC: 14.3 G/DL (ref 13.3–17.7)
MCH RBC QN AUTO: 34.5 PG (ref 26.5–33)
MCHC RBC AUTO-ENTMCNC: 32.8 G/DL (ref 31.5–36.5)
MCV RBC AUTO: 105 FL (ref 78–100)
PLATELET # BLD AUTO: 111 10E3/UL (ref 150–450)
POTASSIUM BLD-SCNC: 4.1 MMOL/L (ref 3.4–5.3)
PROT SERPL-MCNC: 7.1 G/DL (ref 6.8–8.8)
RBC # BLD AUTO: 4.15 10E6/UL (ref 4.4–5.9)
SODIUM SERPL-SCNC: 139 MMOL/L (ref 133–144)
WBC # BLD AUTO: 8.9 10E3/UL (ref 4–11)

## 2022-07-14 PROCEDURE — 86803 HEPATITIS C AB TEST: CPT | Performed by: DERMATOLOGY

## 2022-07-14 PROCEDURE — 80053 COMPREHEN METABOLIC PANEL: CPT | Performed by: DERMATOLOGY

## 2022-07-14 PROCEDURE — 85027 COMPLETE CBC AUTOMATED: CPT | Performed by: DERMATOLOGY

## 2022-07-14 PROCEDURE — 86481 TB AG RESPONSE T-CELL SUSP: CPT | Performed by: DERMATOLOGY

## 2022-07-14 PROCEDURE — 99204 OFFICE O/P NEW MOD 45 MIN: CPT | Performed by: DERMATOLOGY

## 2022-07-14 PROCEDURE — 36415 COLL VENOUS BLD VENIPUNCTURE: CPT | Performed by: DERMATOLOGY

## 2022-07-14 PROCEDURE — 86704 HEP B CORE ANTIBODY TOTAL: CPT | Performed by: DERMATOLOGY

## 2022-07-14 RX ORDER — CLOBETASOL PROPIONATE 0.5 MG/G
CREAM TOPICAL 2 TIMES DAILY
Qty: 240 G | Refills: 6 | Status: SHIPPED | OUTPATIENT
Start: 2022-07-14 | End: 2023-10-24

## 2022-07-14 ASSESSMENT — PAIN SCALES - GENERAL: PAINLEVEL: MODERATE PAIN (5)

## 2022-07-14 NOTE — LETTER
7/14/2022         RE: Saji Iqbal  [address]  [place] [address]        Dear Colleague,    Thank you for referring your patient, Saji Iqbal, to the [facility]. Please see a copy of my visit note below.    Saji Iqbal , a 62 year old year old male patient, I was asked to see by Dr. Esparza for psoriasis.  Patient states this has been present for years.  Patient reports the following symptoms:  Rash on trunk and ext and hands and feet.   .  Patient reports the following previous treatments topicals.  .  Patient reports the following modifying factors none.  Associated symptoms: he does not some finger swelling.  .  Patient has no other skin complaints today.  Remainder of the HPI, Meds, PMH, Allergies, FH, and SH was reviewed in chart.      Past Medical History:   Diagnosis Date     Cluster headache      Coronary artery disease      Depression      Diabetes mellitus, type II (H)      Gastroesophageal reflux disease      Heart attack (H)      Hyperlipidemia      Hypertension      Renal disease      hx kidney stones     Rotator cuff arthropathy      Sleep apnea     doesn't use cpap-is broken     Stented coronary artery        Past Surgical History:   Procedure Laterality Date     ARTHROSCOPY SHOULDER       REPAIR TENDON FOOT Right 10/23/2020    Procedure: Repair of extensor tendon at the level of the metatarsophalangeal joint, right foot;  Surgeon: Gerard Diaz DPM;  Location: [facility] OR     STENT, CORONARY, ESAU  2014, 2015        Family History   Problem Relation Age of Onset     Coronary Artery Disease Mother      Coronary Artery Disease Father      Cerebrovascular Disease Brother      Anuerysm Sister        Social History     Socioeconomic History     Marital status: Single     Spouse name: Not on file     Number of children: Not on file     Years of education: Not on file     Highest education level: Not on file   Occupational History     Occupation:  Manufactor    Tobacco Use     Smoking status: Former Smoker     Packs/day: 0.33     Years: 20.00     Pack years: 6.60     Types: Cigarettes     Quit date: 2015     Years since quittin.9     Smokeless tobacco: Never Used   Vaping Use     Vaping Use: Never used   Substance and Sexual Activity     Alcohol use: Yes     Alcohol/week: 1.0 standard drink     Types: 1 Standard drinks or equivalent per week     Comment: 4-5 drinks nightly on weekend     Drug use: No     Sexual activity: Yes   Other Topics Concern      Service Not Asked     Blood Transfusions Not Asked     Caffeine Concern No     Comment: 0-1 coffee daily     Occupational Exposure Not Asked     Hobby Hazards Not Asked     Sleep Concern Not Asked     Stress Concern Not Asked     Weight Concern Not Asked     Special Diet Yes     Comment: low sodium, no red meat, no butter     Back Care Not Asked     Exercise Yes     Comment: walking 2-3 days per week     Bike Helmet Not Asked     Seat Belt Not Asked     Self-Exams Not Asked     Parent/sibling w/ CABG, MI or angioplasty before 65F 55M? Not Asked   Social History Narrative     Not on file     Social Determinants of Health     Financial Resource Strain: Not on file   Food Insecurity: Not on file   Transportation Needs: Not on file   Physical Activity: Not on file   Stress: Not on file   Social Connections: Not on file   Intimate Partner Violence: Not on file   Housing Stability: Not on file       Outpatient Encounter Medications as of 2022   Medication Sig Dispense Refill     ACCU-CHEK GUIDE test strip Use to test blood sugar 1 times daily or as directed. 100 strip 0     aspirin 81 MG tablet Take by mouth daily       blood glucose (NO BRAND SPECIFIED) lancets standard Use to test blood sugar 1 times daily or as directed. 100 lancet 0     blood glucose (NO BRAND SPECIFIED) lancets standard Use to test blood sugar 1 times daily or as directed. 1 Box 3     blood glucose  monitoring (ACCU-CHEK NORI PLUS) meter device kit Use to test blood sugar 4 times daily or as directed. (pts machine is not working) 1 kit 0     blood glucose monitoring (NO BRAND SPECIFIED) test strip Use to test blood sugar 4 times daily or as directed. Please dispense Accu Chek Nori plus test strips or per patient preference if using a different brand 150 Box 3     blood glucose monitoring (SOFTCLIX) lancets USE TO TEST BLOOD SUGAR ONCE DAILY OR AS DIRECTED 100 each 0     Blood Glucose Monitoring Suppl (ACCU-CHEK GUIDE) w/Device KIT USE TO TEST BLOOD SUGAR 1 TIME DAILY OR AS DIRECTED 1 kit 0     Cholecalciferol (VITAMIN D HIGH POTENCY) 25 MCG (1000 UT) CAPS Take 25 mcg by mouth daily 90 capsule 3     empagliflozin (JARDIANCE) 25 MG TABS tablet Take 1 tablet (25 mg) by mouth daily 90 tablet 1     gabapentin (NEURONTIN) 100 MG capsule Take 2 capsules (200 mg) by mouth daily 180 capsule 1     ibuprofen (ADVIL/MOTRIN) 600 MG tablet Take 600mg of ibuprofen every 6 hours x 7 days to assist in pain control.  If you are not tolerating the medication, you are ok to stop. 30 tablet 0     insulin glargine (LANTUS SOLOSTAR) 100 UNIT/ML pen Inject 24 Units Subcutaneous 2 times daily 45 mL 1     lisinopril (ZESTRIL) 10 MG tablet Take 1 tablet (10 mg) by mouth daily 90 tablet 1     metoprolol tartrate (LOPRESSOR) 25 MG tablet TAKE 1 TABLET BY MOUTH TWICE DAILY 180 tablet 0     omeprazole (PRILOSEC) 20 MG DR capsule TAKE 1 CAPSULE BY MOUTH ONE TIME DAILY 90 capsule 0     ondansetron (ZOFRAN-ODT) 4 MG ODT tab Take 4 mg by mouth       ULTICARE PEN NEEDLES 31G X 5 MM miscellaneous USE 2 DAILY OR AS DIRECTED  100 each 3     predniSONE (DELTASONE) 10 MG tablet Take 4 tablets (40 mg) by mouth daily for 3 days, THEN 2 tablets (20 mg) daily for 3 days, THEN 1 tablet (10 mg) daily for 3 days. (Patient not taking: Reported on 7/14/2022) 21 tablet 0     STATIN NOT PRESCRIBED (INTENTIONAL) Please choose reason not prescribed from choices  below. (Patient not taking: Reported on 7/14/2022)       No facility-administered encounter medications on file as of 7/14/2022.             Review Of Systems  Skin: As above  Eyes: negative  Ears/Nose/Throat: negative  Respiratory: No shortness of breath, dyspnea on exertion, cough, or hemoptysis  Cardiovascular: negative  Gastrointestinal: negative  Genitourinary: negative  Musculoskeletal: negative  Neurologic: negative  Psychiatric: negative  Hematologic/Lymphatic/Immunologic: negative  Endocrine: negative      O:   NAD, WDWN, Alert & Oriented, Mood & Affect wnl, Vitals stable   Here today alone     General appearance richard ii   Vitals stable   Alert, oriented and in no acute distress   Red scaly plaques on trunk and ext and hands and feet with fingernail changes      Eyes: Conjunctivae/lids:Normal     ENT: Lips, buccal mucosa, tongue: normal    MSK:Normal    Cardiovascular: peripheral edema slight    Pulm: Breathing Normal    Neuro/Psych: Orientation:Normal; Mood/Affect:Normal      A/P:  1. Psoriasis  Nbuvb, topicals, methotrextae, soriatane, biolgoics discussed with patient   The most common side effects associated with biologic therapy include upper respiratory infections and flu-like symptoms. In the majority of instances, these side effects are mild; therefore, discontinuation of biologic therapy is usually not necessary. Injection site reactions are also a common side effect of biologics, as these medications have to be injected subcutaneously or administered via infusion.    In addition to these common side effects, patients should be educated about other, potentially serious, complications of biologic therapy. Areas of concern include: malignancy/lymphoma, congestive heart failure and demyelinating disease. Psoriasis patients have a double to triple relative risk of developing lymphoma compared to non-psoriasis patients.     In a recent study, the risk of serious adverse events associated with TNF?  inhibitors -- including tuberculosis, lymphoma and demyelinating disease -- was compared to the risks patients take on a regular basis, such as being in a car accident. The risks of serious adverse events from the medication were comparable to the risks patients take on a regular basis. In a separate study, the risk of these potentially serious adverse events from biologic therapy was compared to the lifetime risk of more common ailments, such as heart disease, stroke and cancer.  Patients on biologic therapy had a much greater risk of acquiring a common ailment compared to sustaining a potentially serious adverse event from biologic therapy.   Check cbc, cmp, hep b and c and tb today       It was a pleasure speaking to Saji Iqbal today.  Previous clinic  notes and pertinent laboratory tests were reviewed prior to Saji Iqbal's visit.  Return to clinic 2 months      Again, thank you for allowing me to participate in the care of your patient.        Sincerely,        Abhay Vasques MD

## 2022-07-14 NOTE — PROGRESS NOTES
Saji CITLALI Rasheed , a 62 year old year old male patient, I was asked to see by Dr. Esparza for psoriasis.  Patient states this has been present for years.  Patient reports the following symptoms:  Rash on trunk and ext and hands and feet.   .  Patient reports the following previous treatments topicals.  .  Patient reports the following modifying factors none.  Associated symptoms: he does not some finger swelling.  .  Patient has no other skin complaints today.  Remainder of the HPI, Meds, PMH, Allergies, FH, and SH was reviewed in chart.      Past Medical History:   Diagnosis Date     Cluster headache      Coronary artery disease      Depression      Diabetes mellitus, type II (H)      Gastroesophageal reflux disease      Heart attack (H)      Hyperlipidemia      Hypertension      Renal disease      hx kidney stones     Rotator cuff arthropathy      Sleep apnea     doesn't use cpap-is broken     Stented coronary artery        Past Surgical History:   Procedure Laterality Date     ARTHROSCOPY SHOULDER       REPAIR TENDON FOOT Right 10/23/2020    Procedure: Repair of extensor tendon at the level of the metatarsophalangeal joint, right foot;  Surgeon: Gerard Diaz DPM;  Location: [facility] OR     STENT, CORONARY, ESAU  ,         Family History   Problem Relation Age of Onset     Coronary Artery Disease Mother      Coronary Artery Disease Father      Cerebrovascular Disease Brother      Anuerysm Sister        Social History     Socioeconomic History     Marital status: Single     Spouse name: Not on file     Number of children: Not on file     Years of education: Not on file     Highest education level: Not on file   Occupational History     Occupation: Manufactor    Tobacco Use     Smoking status: Former Smoker     Packs/day: 0.33     Years: 20.00     Pack years: 6.60     Types: Cigarettes     Quit date: 2015     Years since quittin.9     Smokeless tobacco: Never Used   Vaping Use      Vaping Use: Never used   Substance and Sexual Activity     Alcohol use: Yes     Alcohol/week: 1.0 standard drink     Types: 1 Standard drinks or equivalent per week     Comment: 4-5 drinks nightly on weekend     Drug use: No     Sexual activity: Yes   Other Topics Concern      Service Not Asked     Blood Transfusions Not Asked     Caffeine Concern No     Comment: 0-1 coffee daily     Occupational Exposure Not Asked     Hobby Hazards Not Asked     Sleep Concern Not Asked     Stress Concern Not Asked     Weight Concern Not Asked     Special Diet Yes     Comment: low sodium, no red meat, no butter     Back Care Not Asked     Exercise Yes     Comment: walking 2-3 days per week     Bike Helmet Not Asked     Seat Belt Not Asked     Self-Exams Not Asked     Parent/sibling w/ CABG, MI or angioplasty before 65F 55M? Not Asked   Social History Narrative     Not on file     Social Determinants of Health     Financial Resource Strain: Not on file   Food Insecurity: Not on file   Transportation Needs: Not on file   Physical Activity: Not on file   Stress: Not on file   Social Connections: Not on file   Intimate Partner Violence: Not on file   Housing Stability: Not on file       Outpatient Encounter Medications as of 7/14/2022   Medication Sig Dispense Refill     ACCU-CHEK GUIDE test strip Use to test blood sugar 1 times daily or as directed. 100 strip 0     aspirin 81 MG tablet Take by mouth daily       blood glucose (NO BRAND SPECIFIED) lancets standard Use to test blood sugar 1 times daily or as directed. 100 lancet 0     blood glucose (NO BRAND SPECIFIED) lancets standard Use to test blood sugar 1 times daily or as directed. 1 Box 3     blood glucose monitoring (ACCU-CHEK NORI PLUS) meter device kit Use to test blood sugar 4 times daily or as directed. (pts machine is not working) 1 kit 0     blood glucose monitoring (NO BRAND SPECIFIED) test strip Use to test blood sugar 4 times daily or as directed. Please  dispense Accu Chek Ana plus test strips or per patient preference if using a different brand 150 Box 3     blood glucose monitoring (SOFTCLIX) lancets USE TO TEST BLOOD SUGAR ONCE DAILY OR AS DIRECTED 100 each 0     Blood Glucose Monitoring Suppl (ACCU-CHEK GUIDE) w/Device KIT USE TO TEST BLOOD SUGAR 1 TIME DAILY OR AS DIRECTED 1 kit 0     Cholecalciferol (VITAMIN D HIGH POTENCY) 25 MCG (1000 UT) CAPS Take 25 mcg by mouth daily 90 capsule 3     empagliflozin (JARDIANCE) 25 MG TABS tablet Take 1 tablet (25 mg) by mouth daily 90 tablet 1     gabapentin (NEURONTIN) 100 MG capsule Take 2 capsules (200 mg) by mouth daily 180 capsule 1     ibuprofen (ADVIL/MOTRIN) 600 MG tablet Take 600mg of ibuprofen every 6 hours x 7 days to assist in pain control.  If you are not tolerating the medication, you are ok to stop. 30 tablet 0     insulin glargine (LANTUS SOLOSTAR) 100 UNIT/ML pen Inject 24 Units Subcutaneous 2 times daily 45 mL 1     lisinopril (ZESTRIL) 10 MG tablet Take 1 tablet (10 mg) by mouth daily 90 tablet 1     metoprolol tartrate (LOPRESSOR) 25 MG tablet TAKE 1 TABLET BY MOUTH TWICE DAILY 180 tablet 0     omeprazole (PRILOSEC) 20 MG DR capsule TAKE 1 CAPSULE BY MOUTH ONE TIME DAILY 90 capsule 0     ondansetron (ZOFRAN-ODT) 4 MG ODT tab Take 4 mg by mouth       ULTICARE PEN NEEDLES 31G X 5 MM miscellaneous USE 2 DAILY OR AS DIRECTED  100 each 3     predniSONE (DELTASONE) 10 MG tablet Take 4 tablets (40 mg) by mouth daily for 3 days, THEN 2 tablets (20 mg) daily for 3 days, THEN 1 tablet (10 mg) daily for 3 days. (Patient not taking: Reported on 7/14/2022) 21 tablet 0     STATIN NOT PRESCRIBED (INTENTIONAL) Please choose reason not prescribed from choices below. (Patient not taking: Reported on 7/14/2022)       No facility-administered encounter medications on file as of 7/14/2022.             Review Of Systems  Skin: As above  Eyes: negative  Ears/Nose/Throat: negative  Respiratory: No shortness of breath, dyspnea  on exertion, cough, or hemoptysis  Cardiovascular: negative  Gastrointestinal: negative  Genitourinary: negative  Musculoskeletal: negative  Neurologic: negative  Psychiatric: negative  Hematologic/Lymphatic/Immunologic: negative  Endocrine: negative      O:   NAD, WDWN, Alert & Oriented, Mood & Affect wnl, Vitals stable   Here today alone     General appearance richard ii   Vitals stable   Alert, oriented and in no acute distress   Red scaly plaques on trunk and ext and hands and feet with fingernail changes      Eyes: Conjunctivae/lids:Normal     ENT: Lips, buccal mucosa, tongue: normal    MSK:Normal    Cardiovascular: peripheral edema slight    Pulm: Breathing Normal    Neuro/Psych: Orientation:Normal; Mood/Affect:Normal      A/P:  1. Psoriasis  Nbuvb, topicals, methotrextae, soriatane, biolgoics discussed with patient   The most common side effects associated with biologic therapy include upper respiratory infections and flu-like symptoms. In the majority of instances, these side effects are mild; therefore, discontinuation of biologic therapy is usually not necessary. Injection site reactions are also a common side effect of biologics, as these medications have to be injected subcutaneously or administered via infusion.    In addition to these common side effects, patients should be educated about other, potentially serious, complications of biologic therapy. Areas of concern include: malignancy/lymphoma, congestive heart failure and demyelinating disease. Psoriasis patients have a double to triple relative risk of developing lymphoma compared to non-psoriasis patients.     In a recent study, the risk of serious adverse events associated with TNF? inhibitors -- including tuberculosis, lymphoma and demyelinating disease -- was compared to the risks patients take on a regular basis, such as being in a car accident. The risks of serious adverse events from the medication were comparable to the risks patients take on  a regular basis. In a separate study, the risk of these potentially serious adverse events from biologic therapy was compared to the lifetime risk of more common ailments, such as heart disease, stroke and cancer.  Patients on biologic therapy had a much greater risk of acquiring a common ailment compared to sustaining a potentially serious adverse event from biologic therapy.   Check cbc, cmp, hep b and c and tb today       It was a pleasure speaking to Saji Iqbal today.  Previous clinic  notes and pertinent laboratory tests were reviewed prior to Saji Iqbal's visit.  Return to clinic 2 months

## 2022-07-15 NOTE — TELEPHONE ENCOUNTER
PA Initiation    Medication: Taltz- PA Pending  Insurance Company: [employer] - Phone [phone] Fax [phone]  Pharmacy Filling the Rx: [facility] - [place], [place] - [address] [facility] [address]  Filling Pharmacy Phone:    Filling Pharmacy Fax:    Start Date: 7/14/2022

## 2022-07-16 LAB
GAMMA INTERFERON BACKGROUND BLD IA-ACNC: 0 IU/ML
M TB IFN-G BLD-IMP: NEGATIVE
M TB IFN-G CD4+ BCKGRND COR BLD-ACNC: 10 IU/ML
MITOGEN IGNF BCKGRD COR BLD-ACNC: 0 IU/ML
MITOGEN IGNF BCKGRD COR BLD-ACNC: 0 IU/ML
QUANTIFERON MITOGEN: 10 IU/ML
QUANTIFERON NIL TUBE: 0 IU/ML
QUANTIFERON TB1 TUBE: 0 IU/ML
QUANTIFERON TB2 TUBE: 0

## 2022-07-19 ENCOUNTER — TELEPHONE (OUTPATIENT)
Dept: DERMATOLOGY | Facility: CLINIC | Age: 62
End: 2022-07-19

## 2022-07-19 ENCOUNTER — OFFICE VISIT (OUTPATIENT)
Dept: FAMILY MEDICINE | Facility: CLINIC | Age: 62
End: 2022-07-19
Payer: COMMERCIAL

## 2022-07-19 VITALS
DIASTOLIC BLOOD PRESSURE: 80 MMHG | OXYGEN SATURATION: 95 % | HEART RATE: 72 BPM | BODY MASS INDEX: 30.28 KG/M2 | WEIGHT: 217.1 LBS | TEMPERATURE: 97.9 F | SYSTOLIC BLOOD PRESSURE: 144 MMHG

## 2022-07-19 DIAGNOSIS — E11.65 UNCONTROLLED TYPE 2 DIABETES MELLITUS WITH HYPERGLYCEMIA (H): Primary | ICD-10-CM

## 2022-07-19 DIAGNOSIS — Z12.11 SCREEN FOR COLON CANCER: ICD-10-CM

## 2022-07-19 DIAGNOSIS — E78.5 HYPERLIPIDEMIA LDL GOAL <100: ICD-10-CM

## 2022-07-19 DIAGNOSIS — Z00.00 ROUTINE GENERAL MEDICAL EXAMINATION AT A HEALTH CARE FACILITY: ICD-10-CM

## 2022-07-19 LAB
ANION GAP SERPL CALCULATED.3IONS-SCNC: 7 MMOL/L (ref 3–14)
BUN SERPL-MCNC: 17 MG/DL (ref 7–30)
CALCIUM SERPL-MCNC: 9.2 MG/DL (ref 8.5–10.1)
CHLORIDE BLD-SCNC: 107 MMOL/L (ref 94–109)
CHOLEST SERPL-MCNC: 280 MG/DL
CO2 SERPL-SCNC: 27 MMOL/L (ref 20–32)
CREAT SERPL-MCNC: 0.74 MG/DL (ref 0.66–1.25)
FASTING STATUS PATIENT QL REPORTED: YES
GFR SERPL CREATININE-BSD FRML MDRD: >90 ML/MIN/1.73M2
GLUCOSE BLD-MCNC: 103 MG/DL (ref 70–99)
HDLC SERPL-MCNC: 62 MG/DL
LDLC SERPL CALC-MCNC: 167 MG/DL
NONHDLC SERPL-MCNC: 218 MG/DL
POTASSIUM BLD-SCNC: 3.9 MMOL/L (ref 3.4–5.3)
SODIUM SERPL-SCNC: 141 MMOL/L (ref 133–144)
TRIGL SERPL-MCNC: 255 MG/DL

## 2022-07-19 PROCEDURE — 36415 COLL VENOUS BLD VENIPUNCTURE: CPT | Performed by: FAMILY MEDICINE

## 2022-07-19 PROCEDURE — 80061 LIPID PANEL: CPT | Performed by: FAMILY MEDICINE

## 2022-07-19 PROCEDURE — 99396 PREV VISIT EST AGE 40-64: CPT | Performed by: FAMILY MEDICINE

## 2022-07-19 PROCEDURE — 80048 BASIC METABOLIC PNL TOTAL CA: CPT | Performed by: FAMILY MEDICINE

## 2022-07-19 ASSESSMENT — ENCOUNTER SYMPTOMS
SORE THROAT: 0
HEMATURIA: 0
SHORTNESS OF BREATH: 0
CONSTIPATION: 0
DYSURIA: 0
FREQUENCY: 0
HEARTBURN: 0
WEAKNESS: 0
JOINT SWELLING: 0
HEADACHES: 0
HEMATOCHEZIA: 0
MYALGIAS: 0
ARTHRALGIAS: 0
NAUSEA: 0
DIZZINESS: 0
CHILLS: 1
NERVOUS/ANXIOUS: 0
DIARRHEA: 0
COUGH: 0
EYE PAIN: 0
PARESTHESIAS: 1
ABDOMINAL PAIN: 0
PALPITATIONS: 0
FEVER: 0

## 2022-07-19 ASSESSMENT — PAIN SCALES - GENERAL: PAINLEVEL: MODERATE PAIN (5)

## 2022-07-19 ASSESSMENT — PATIENT HEALTH QUESTIONNAIRE - PHQ9: SUM OF ALL RESPONSES TO PHQ QUESTIONS 1-9: 3

## 2022-07-19 NOTE — TELEPHONE ENCOUNTER
Left message for pt that there are lab results available via my chart or can call the clinic at [phone] to speak with nurse.    Sarah VICKERS RN  [facility]  [phone]

## 2022-07-19 NOTE — PROGRESS NOTES
SUBJECTIVE:   CC: Saji Iqbal is an 62 year old male who presents for preventative health visit.       Patient has been advised of split billing requirements and indicates understanding: Yes     Healthy Habits:     Getting at least 3 servings of Calcium per day:  NO    Bi-annual eye exam:  NO    Dental care twice a year:  NO    Sleep apnea or symptoms of sleep apnea:  Sleep apnea    Diet:  Diabetic    Frequency of exercise:  None    Taking medications regularly:  Yes    Medication side effects:  None    PHQ-2 Total Score: 1    Additional concerns today:  No          Diabetes Follow-up    How often are you checking your blood sugar? One time daily  What time of day are you checking your blood sugars (select all that apply)?  right away in morning  Have you had any blood sugars above 200?  No  Have you had any blood sugars below 70?  No    What symptoms do you notice when your blood sugar is low?  Lethargy    What concerns do you have today about your diabetes? None     Do you have any of these symptoms? (Select all that apply)  Numbness in feet, Redness, sores, or blisters on feet and Blurry vision    Have you had a diabetic eye exam in the last 12 months? No        BP Readings from Last 2 Encounters:   07/19/22 (!) 144/80   07/07/22 126/86     Hemoglobin A1C POCT (%)   Date Value   07/02/2021 8.5 (H)   01/07/2021 7.8 (H)     Hemoglobin A1C (%)   Date Value   07/07/2022 6.3 (H)   01/19/2022 7.4 (H)     LDL Cholesterol Calculated (mg/dL)   Date Value   01/19/2022 171 (H)   06/16/2020 175 (H)   05/13/2019 182 (H)           Today's PHQ-2 Score:   PHQ-2 ( 1999 Pfizer) 7/19/2022   Q1: Little interest or pleasure in doing things 1   Q2: Feeling down, depressed or hopeless 0   PHQ-2 Score 1   PHQ-2 Total Score (12-17 Years)- Positive if 3 or more points; Administer PHQ-A if positive -   Q1: Little interest or pleasure in doing things Several days   Q2: Feeling down, depressed or hopeless Not at all   PHQ-2 Score 1        Abuse: Current or Past(Physical, Sexual or Emotional)- No  Do you feel safe in your environment? Yes        Social History     Tobacco Use     Smoking status: Former Smoker     Packs/day: 0.33     Years: 20.00     Pack years: 6.60     Types: Cigarettes     Quit date: 2015     Years since quittin.0     Smokeless tobacco: Never Used   Substance Use Topics     Alcohol use: Yes     Alcohol/week: 1.0 standard drink     Types: 1 Standard drinks or equivalent per week     Comment: 4-5 drinks nightly on weekend     Will be starting injections for psoriais soon.  Prednisone is helping.  No recent complete eye exam.    Alcohol Use 2022   Prescreen: >3 drinks/day or >7 drinks/week? Yes   AUDIT SCORE  12       Last PSA: No results found for: PSA    Reviewed orders with patient. Reviewed health maintenance and updated orders accordingly - Yes  Lab work is in process  Labs reviewed in EPIC    Reviewed and updated as needed this visit by clinical staff   Tobacco  Allergies  Meds   Med Hx  Surg Hx  Fam Hx  Soc Hx          Reviewed and updated as needed this visit by Provider                       Review of Systems   Constitutional: Positive for chills. Negative for fever.   HENT: Positive for hearing loss. Negative for congestion, ear pain and sore throat.    Eyes: Positive for visual disturbance. Negative for pain.   Respiratory: Negative for cough and shortness of breath.    Cardiovascular: Negative for chest pain, palpitations and peripheral edema.   Gastrointestinal: Negative for abdominal pain, constipation, diarrhea, heartburn, hematochezia and nausea.   Genitourinary: Negative for dysuria, frequency, genital sores, hematuria, impotence and urgency.   Musculoskeletal: Negative for arthralgias, joint swelling and myalgias.   Skin: Negative for rash.   Neurological: Positive for paresthesias. Negative for dizziness, weakness and headaches.   Psychiatric/Behavioral: Negative for mood changes. The  patient is not nervous/anxious.          OBJECTIVE:   BP (!) 144/80 (BP Location: Right arm, Patient Position: Sitting, Cuff Size: Adult Large)   Pulse 72   Temp 97.9  F (36.6  C) (Oral)   Wt 98.5 kg (217 lb 1.6 oz)   SpO2 95%   BMI 30.28 kg/m      Physical Exam  Constitutional:       General: He is not in acute distress.     Appearance: He is well-developed.   HENT:      Right Ear: Tympanic membrane and external ear normal.      Left Ear: Tympanic membrane and external ear normal.      Nose: Nose normal.      Mouth/Throat:      Mouth: No oral lesions.      Pharynx: No oropharyngeal exudate.   Eyes:      General:         Right eye: No discharge.         Left eye: No discharge.      Conjunctiva/sclera: Conjunctivae normal.      Pupils: Pupils are equal, round, and reactive to light.   Neck:      Thyroid: No thyromegaly.      Trachea: No tracheal deviation.   Cardiovascular:      Rate and Rhythm: Normal rate and regular rhythm.      Pulses: Normal pulses.      Heart sounds: Normal heart sounds, S1 normal and S2 normal. No murmur heard.    No S3 or S4 sounds.   Pulmonary:      Effort: Pulmonary effort is normal. No respiratory distress.      Breath sounds: Normal breath sounds. No wheezing or rales.   Abdominal:      General: Bowel sounds are normal.      Palpations: Abdomen is soft. There is no mass.      Tenderness: There is no abdominal tenderness.   Musculoskeletal:         General: No deformity. Normal range of motion.      Cervical back: Neck supple.   Lymphadenopathy:      Cervical: No cervical adenopathy.   Skin:     General: Skin is warm and dry.      Findings: No lesion or rash.   Neurological:      Mental Status: He is alert and oriented to person, place, and time.      Motor: No abnormal muscle tone.      Deep Tendon Reflexes: Reflexes are normal and symmetric.   Psychiatric:         Speech: Speech normal.         Thought Content: Thought content normal.         Judgment: Judgment normal.  "          Diagnostic Test Results:  Labs reviewed in Epic    ASSESSMENT/PLAN:       ICD-10-CM    1. Uncontrolled type 2 diabetes mellitus with hyperglycemia (H)  E11.65 OPTOMETRY REFERRAL   2. Screen for colon cancer  Z12.11 Fecal colorectal cancer screen (FIT)   3. Routine general medical examination at a health care facility  Z00.00    4. Hyperlipidemia LDL goal <100  E78.5 Lipid panel reflex to direct LDL Fasting           COUNSELING:   Reviewed preventive health counseling, as reflected in patient instructions    Estimated body mass index is 30.28 kg/m  as calculated from the following:    Height as of 1/19/22: 1.803 m (5' 11\").    Weight as of this encounter: 98.5 kg (217 lb 1.6 oz).     Weight management plan: Discussed healthy diet and exercise guidelines    He reports that he quit smoking about 7 years ago. His smoking use included cigarettes. He has a 6.60 pack-year smoking history. He has never used smokeless tobacco.      Counseling Resources:  ATP IV Guidelines  Pooled Cohorts Equation Calculator  FRAX Risk Assessment  ICSI Preventive Guidelines  Dietary Guidelines for Americans, 2010  USDA's MyPlate  ASA Prophylaxis  Lung CA Screening    Naseem Esparza MD  [facility]  "

## 2022-07-19 NOTE — TELEPHONE ENCOUNTER
Tb test negative per Dr. Vasques.  See results note.    Sarah VICKERS RN  [facility] [place]  [phone]

## 2022-07-19 NOTE — TELEPHONE ENCOUNTER
PRIOR AUTHORIZATION DENIED    Medication: Taltz- PA Denied    Denial Date: 7/19/2022    Denial Rational: Must try and fail ALL Step 1 medications.     Step 1 medications require prior authorization for coverage and include Cosentyx, Enbrel, Humira, Skyrizi, and Stelara.    (Prior to Taltz approval, also must fail Step 2 medication, Otezla.)         Appeal Information:         Eliz Brown [facility] [email or web address]  [email or web address]  Phone: [phone]  Fax: [phone]

## 2022-07-21 ENCOUNTER — TELEPHONE (OUTPATIENT)
Dept: FAMILY MEDICINE | Facility: CLINIC | Age: 62
End: 2022-07-21

## 2022-07-21 ENCOUNTER — TELEPHONE (OUTPATIENT)
Dept: DERMATOLOGY | Facility: CLINIC | Age: 62
End: 2022-07-21

## 2022-07-21 NOTE — TELEPHONE ENCOUNTER
PA Initiation    Medication: Azeb--PA Initiated  Insurance Company: [employer] - Phone [phone] Fax [phone]  Pharmacy Filling the Rx:    Filling Pharmacy Phone:    Filling Pharmacy Fax:    Start Date: 7/21/2022    KEY:[id number]

## 2022-07-26 NOTE — TELEPHONE ENCOUNTER
Prior Authorization Approval    Authorization Effective Date: 6/25/2022  Authorization Expiration Date: 7/25/2024  Medication: Skyrizi--PA Approved  Approved Dose/Quantity:     Reference #: [id number]   Insurance Company: [employer] - Phone [phone] Fax [phone]  Expected CoPay:       CoPay Card Available:      Foundation Assistance Needed:    Which Pharmacy is filling the prescription (Not needed for infusion/clinic administered): [facility] - [place] [address]   Pharmacy Notified:    Patient Notified: Yes        Eliz Brown [facility] [email or web address]  Phone: [phone]  Fax: [phone]

## 2022-08-01 ENCOUNTER — ALLIED HEALTH/NURSE VISIT (OUTPATIENT)
Dept: FAMILY MEDICINE | Facility: CLINIC | Age: 62
End: 2022-08-01

## 2022-08-01 ENCOUNTER — TELEPHONE (OUTPATIENT)
Dept: DERMATOLOGY | Facility: CLINIC | Age: 62
End: 2022-08-01

## 2022-08-01 VITALS — SYSTOLIC BLOOD PRESSURE: 138 MMHG | DIASTOLIC BLOOD PRESSURE: 74 MMHG

## 2022-08-01 DIAGNOSIS — Z01.30 BP CHECK: Primary | ICD-10-CM

## 2022-08-01 DIAGNOSIS — L40.9 PSORIASIS: Primary | ICD-10-CM

## 2022-08-01 PROCEDURE — 99207 PR NO CHARGE NURSE ONLY: CPT | Performed by: FAMILY MEDICINE

## 2022-08-01 NOTE — TELEPHONE ENCOUNTER
[facility] Call Center    Phone Message    May a detailed message be left on voicemail: yes     Reason for Call: Medication Question or concern regarding medication   Prescription Clarification  Name of Medication: Risankizumab-rzaa (SKYRIZI) 150 MG/ML subcutaneous  Prescribing Provider: Dr. Abhay Vasques   Pharmacy: N/A   What on the order needs clarification? Pt Mika experiencing pain in hands and flare-up of psoriasis after having the Risankizumab-rzaa (SKYRIZI) 150 MG/ML subcutaneous injection. Please call to discuss side effects and advise on alleviating pain [phone]          Action Taken: Message routed to:  Other: [facility]    Travel Screening: Not Applicable

## 2022-08-01 NOTE — TELEPHONE ENCOUNTER
S/w pt and gave Dr. Vasques's message below.  Pt states he completed a regime of prednisone on 7/16/22 and wondering if the prednisone exacerbated the skyrizi?  States the prednisone helped sxs from the waist and above.  States his fingertips are so sore he is not able to type for work.    Sarah VICKERS RN  [facility] [place]  [phone]

## 2022-08-01 NOTE — TELEPHONE ENCOUNTER
Worsening of psoriasis is a rare side effect I would stop the skyrizi and switch to something his insurance will cover, is that ok with you?

## 2022-08-01 NOTE — PROGRESS NOTES
Saji Iqbal was evaluated at [facility] on August 1, 2022 at which time his blood pressure was:    BP Readings from Last 3 Encounters:   08/01/22 138/74   07/19/22 (!) 144/80   07/07/22 126/86     Pulse Readings from Last 3 Encounters:   07/19/22 72   07/07/22 80   01/19/22 76       Reviewed lifestyle modifications for blood pressure control and reduction: including making healthy food choices, managing weight, getting regular exercise, smoking cessation, reducing alcohol consumption, monitoring blood pressure regularly.     Symptoms: None    BP Goal:< 140/90 mmHg    BP Assessment:  BP at goal    Potential Reasons for BP too high: NA - Not applicable    BP Follow-Up Plan: Recheck BP in 6 months at pharmacy    Recommendation to Provider: none    Note completed by: Brandon Wadsworth    Thank You   Trisha Sparrow [facility]

## 2022-08-02 RX ORDER — BETAMETHASONE DIPROPIONATE 0.5 MG/G
OINTMENT, AUGMENTED TOPICAL 2 TIMES DAILY
Qty: 100 G | Refills: 6 | Status: SHIPPED | OUTPATIENT
Start: 2022-08-02 | End: 2023-06-05

## 2022-08-02 NOTE — TELEPHONE ENCOUNTER
Often times, prednisone will clear psoriasis and then it comes back once you are off the prednisoje.    Do you want to continue skyrizi or switch to something else

## 2022-08-02 NOTE — TELEPHONE ENCOUNTER
Left message on answering machine for patient to call back.    Michelle BENTLEY RN  Dermatology   [phone]

## 2022-08-02 NOTE — TELEPHONE ENCOUNTER
Called and spoke with pt and gave him Dr. Vasques's message.     Pt said the cream and Skyrizi seem to be helping all areas of his body, except his hands and feet, they are very painful and it is difficult to work. Wondering if there is another cream to help with the pain on hands and feet until Skyrizi can help clear things up.    Michelle BENTLEY RN  Dermatology   [phone]

## 2022-08-11 DIAGNOSIS — I10 BENIGN ESSENTIAL HYPERTENSION: ICD-10-CM

## 2022-08-16 RX ORDER — LISINOPRIL 10 MG/1
10 TABLET ORAL DAILY
Qty: 90 TABLET | Refills: 1 | OUTPATIENT
Start: 2022-08-16

## 2022-08-16 NOTE — TELEPHONE ENCOUNTER
Should still have meds. Pt was given 6 months of meds.   90 tablet 1 7/7/2022     Eliz Bee, RN, BSN, PHN  [facility]

## 2022-08-17 ENCOUNTER — TELEPHONE (OUTPATIENT)
Dept: NURSING | Facility: CLINIC | Age: 62
End: 2022-08-17

## 2022-08-17 NOTE — TELEPHONE ENCOUNTER
Telephone call  Patient calling because he is out of his lisinopril he was started in July on 20 mg of lisinopril and now he is out for 3 days.  He needs this refill as soon as possible . It appears to be pending but it is for 10 mg not 20 mg.  It appears it was being changed on the 7/7/2022 office visit.  He is having a lot of pain in his feet and his hands with the plaque psoriasis.  He is contacting his dermatologist to see if he can get  Some prednisone while  The skyrizi takes effect which takes 4 months.  His feet and hands are very painful.  Routing to provider    Linh Aldana RN   [facility] Nurse Advisor  1:40 PM 8/17/2022    COVID 19 Nurse Triage Plan/Patient Instructions    Please be aware that novel coronavirus (COVID-19) may be circulating in the community. If you develop symptoms such as fever, cough, or SOB or if you have concerns about the presence of another infection including coronavirus (COVID-19), please contact your health care provider or visit [email or web address].     Disposition/Instructions    Home care recommended. Follow home care protocol based instructions.    Thank you for taking steps to prevent the spread of this virus.  o Limit your contact with others.  o Wear a simple mask to cover your cough.  o Wash your hands well and often.    Resources    [facility]: About COVID-19: [email or web address]    CDC: What to Do If You're Sick: www.cdc.gov/coronavirus/2019-ncov/about/steps-when-sick.html    CDC: Ending Home Isolation: www.cdc.gov/coronavirus/2019-ncov/hcp/disposition-in-home-patients.html     CDC: Caring for Someone: www.cdc.gov/coronavirus/2019-ncov/if-you-are-sick/care-for-someone.html     [facility]: Interim Guidance for Hospital Discharge to Home: www.health.[facility].mn.us/diseases/coronavirus/hcp/hospdischarge.pdf    [facility] clinical trials (COVID-19 research studies): clinicalaffairs.[facility]/n-clinical-trials     Below are the  COVID-19 hotlines at the [place] Department of Health ([facility]). Interpreters are available.   o For health questions: Call 437-271-4485 or 1-199.778.8183 (7 a.m. to 7 p.m.)  o For questions about schools and childcare: Call 723-197-6806 or 1-251.468.6674 (7 a.m. to 7 p.m.)

## 2022-08-25 ENCOUNTER — TELEPHONE (OUTPATIENT)
Dept: DERMATOLOGY | Facility: CLINIC | Age: 62
End: 2022-08-25

## 2022-08-25 ENCOUNTER — OFFICE VISIT (OUTPATIENT)
Dept: DERMATOLOGY | Facility: CLINIC | Age: 62
End: 2022-08-25
Payer: COMMERCIAL

## 2022-08-25 ENCOUNTER — MYC MEDICAL ADVICE (OUTPATIENT)
Dept: UROLOGY | Facility: CLINIC | Age: 62
End: 2022-08-25

## 2022-08-25 DIAGNOSIS — L30.9 DERMATITIS: Primary | ICD-10-CM

## 2022-08-25 PROCEDURE — 99212 OFFICE O/P EST SF 10 MIN: CPT | Mod: 25 | Performed by: DERMATOLOGY

## 2022-08-25 PROCEDURE — 11102 TANGNTL BX SKIN SINGLE LES: CPT | Performed by: DERMATOLOGY

## 2022-08-25 PROCEDURE — 88312 SPECIAL STAINS GROUP 1: CPT | Performed by: PATHOLOGY

## 2022-08-25 PROCEDURE — 88305 TISSUE EXAM BY PATHOLOGIST: CPT | Performed by: PATHOLOGY

## 2022-08-25 ASSESSMENT — PAIN SCALES - GENERAL: PAINLEVEL: WORST PAIN (10)

## 2022-08-25 NOTE — TELEPHONE ENCOUNTER
"[facility] pt- Call center end of day transferred to [place] office in error.  Surgery Clinic RN took call rather than attempt another transfer to [facility].  Pt did not receive his AVS thru MyChart as his messages indicated Service disruption.  RN resent MyChart to pt and awaited confirmation he received.    Pt drove self home and stated by the time he stepped out of his vehicle at home, his shoe was squishy and full of bloody bandages when removed his shoe.  Pt wrapped a plastic bag around and called Clinic awaiting extended hold times and transfer.  He did state that by the time he reached Myself; he thinks it has stopped.  Advised to \"add additional gauze without removal of other bandages and avoid peeking to check\"  Then apply pressure at wound site for 20-30 minutes and elevate above level of his heart to ensure it has stopped. Advised to contnue to elevate as much as possible and then proceed with the post 24 hour directions.  Advised if patient continues to soak thru bandages despite pressure and elevation then should call for after hours triage and perhaps a page to on call doctor.    Advised FYI would be sent to Dr Vasques.    Cee VÁZQUEZ   Specialty Clinic RN  "

## 2022-08-25 NOTE — PROGRESS NOTES
Saji Iqbal is an extremely pleasant 62 year old year old male patient here today for painful rash on feet and arms. Started biolgoic last visit but feet have gotten very painful.  Skin and hands are ok.  Patient has no other skin complaints today.  Remainder of the HPI, Meds, PMH, Allergies, FH, and SH was reviewed in chart.      Past Medical History:   Diagnosis Date     Cluster headache      Coronary artery disease      Depression      Diabetes mellitus, type II (H)      Gastroesophageal reflux disease      Heart attack (H)      Hyperlipidemia      Hypertension      Renal disease      hx kidney stones     Rotator cuff arthropathy      Sleep apnea     doesn't use cpap-is broken     Stented coronary artery        Past Surgical History:   Procedure Laterality Date     ARTHROSCOPY SHOULDER       REPAIR TENDON FOOT Right 10/23/2020    Procedure: Repair of extensor tendon at the level of the metatarsophalangeal joint, right foot;  Surgeon: Gerard Diaz DPM;  Location: [facility] OR     STENT, CORONARY, ESAU  ,         Family History   Problem Relation Age of Onset     Coronary Artery Disease Mother      Coronary Artery Disease Father      Cerebrovascular Disease Brother      Anuerysm Sister        Social History     Socioeconomic History     Marital status: Single     Spouse name: Not on file     Number of children: Not on file     Years of education: Not on file     Highest education level: Not on file   Occupational History     Occupation: Manufactor    Tobacco Use     Smoking status: Former Smoker     Packs/day: 0.33     Years: 20.00     Pack years: 6.60     Types: Cigarettes     Quit date: 2015     Years since quittin.1     Smokeless tobacco: Never Used   Vaping Use     Vaping Use: Never used   Substance and Sexual Activity     Alcohol use: Yes     Alcohol/week: 1.0 standard drink     Types: 1 Standard drinks or equivalent per week     Comment: 4-5 drinks nightly on weekend      Drug use: No     Sexual activity: Yes   Other Topics Concern      Service Not Asked     Blood Transfusions Not Asked     Caffeine Concern No     Comment: 0-1 coffee daily     Occupational Exposure Not Asked     Hobby Hazards Not Asked     Sleep Concern Not Asked     Stress Concern Not Asked     Weight Concern Not Asked     Special Diet Yes     Comment: low sodium, no red meat, no butter     Back Care Not Asked     Exercise Yes     Comment: walking 2-3 days per week     Bike Helmet Not Asked     Seat Belt Not Asked     Self-Exams Not Asked     Parent/sibling w/ CABG, MI or angioplasty before 65F 55M? Not Asked   Social History Narrative     Not on file     Social Determinants of Health     Financial Resource Strain: Not on file   Food Insecurity: Not on file   Transportation Needs: Not on file   Physical Activity: Not on file   Stress: Not on file   Social Connections: Not on file   Intimate Partner Violence: Not on file   Housing Stability: Not on file       Outpatient Encounter Medications as of 8/25/2022   Medication Sig Dispense Refill     ACCU-CHEK GUIDE test strip Use to test blood sugar 1 times daily or as directed. 100 strip 0     aspirin 81 MG tablet Take by mouth daily       augmented betamethasone dipropionate (DIPROLENE-AF) 0.05 % external ointment Apply topically 2 times daily To hands 100 g 6     blood glucose (NO BRAND SPECIFIED) lancets standard Use to test blood sugar 1 times daily or as directed. 100 lancet 0     blood glucose (NO BRAND SPECIFIED) lancets standard Use to test blood sugar 1 times daily or as directed. 1 Box 3     blood glucose monitoring (ACCU-CHEK NORI PLUS) meter device kit Use to test blood sugar 4 times daily or as directed. (pts machine is not working) 1 kit 0     blood glucose monitoring (NO BRAND SPECIFIED) test strip Use to test blood sugar 4 times daily or as directed. Please dispense Accu Chek Nori plus test strips or per patient preference if using a  different brand 150 Box 3     blood glucose monitoring (SOFTCLIX) lancets USE TO TEST BLOOD SUGAR ONCE DAILY OR AS DIRECTED 100 each 0     Blood Glucose Monitoring Suppl (ACCU-CHEK GUIDE) w/Device KIT USE TO TEST BLOOD SUGAR 1 TIME DAILY OR AS DIRECTED 1 kit 0     Cholecalciferol (VITAMIN D HIGH POTENCY) 25 MCG (1000 UT) CAPS Take 25 mcg by mouth daily 90 capsule 3     clobetasol (TEMOVATE) 0.05 % external cream Apply topically 2 times daily Trunk, arms, legs and hands and feet 240 g 6     empagliflozin (JARDIANCE) 25 MG TABS tablet Take 1 tablet (25 mg) by mouth daily 90 tablet 1     gabapentin (NEURONTIN) 100 MG capsule Take 2 capsules (200 mg) by mouth daily 180 capsule 1     ibuprofen (ADVIL/MOTRIN) 600 MG tablet Take 600mg of ibuprofen every 6 hours x 7 days to assist in pain control.  If you are not tolerating the medication, you are ok to stop. 30 tablet 0     insulin glargine (LANTUS SOLOSTAR) 100 UNIT/ML pen Inject 24 Units Subcutaneous 2 times daily 45 mL 1     ixekizumab (TALTZ) 80 MG/ML SOAJ auto-injector Inject 160 mg (2 mL) once, then start 80 mg (1 mL) at weeks 2, 4, 6, 8, 10, and 12 2 mL 3     ixekizumab (TALTZ) 80 MG/ML SOAJ auto-injector Inject 1 mL (80 mg) Subcutaneous every 28 days 2 mL 3     lisinopril (ZESTRIL) 20 MG tablet Take 1 tablet (20 mg) by mouth daily 30 tablet 0     metoprolol tartrate (LOPRESSOR) 25 MG tablet TAKE 1 TABLET BY MOUTH TWICE DAILY 180 tablet 0     omeprazole (PRILOSEC) 20 MG DR capsule TAKE 1 CAPSULE BY MOUTH ONE TIME DAILY 90 capsule 0     ondansetron (ZOFRAN-ODT) 4 MG ODT tab Take 4 mg by mouth       predniSONE (DELTASONE) 10 MG tablet 5 tablets by m outh for 3 days, decrease by 1/2 tablet every 3 days 80 tablet 0     Risankizumab-rzaa (SKYRIZI) 150 MG/ML subcutaneous Inject 1 mL (150 mg) Subcutaneous See Admin Instructions - 1 injection on day 0, 1 injection on day 28, then 1 injection every 12 weeks 1 mL 1     Risankizumab-rzaa (SKYRIZI) 150 MG/ML subcutaneous Inject  1 mL (150 mg) Subcutaneous every 3 months 1 mL 2     STATIN NOT PRESCRIBED (INTENTIONAL) Please choose reason not prescribed from choices below.       ULTICARE PEN NEEDLES 31G X 5 MM miscellaneous USE 2 DAILY OR AS DIRECTED  100 each 3     No facility-administered encounter medications on file as of 8/25/2022.             O:   NAD, WDWN, Alert & Oriented, Mood & Affect wnl, Vitals stable   Here today alone    General appearance normal   Vitals stable   Alert, oriented and in no acute distress     Hyperkeratotic fissured plaques BL feet      Eyes: Conjunctivae/lids:Normal     ENT: Lips, buccal mucosa, tongue: normal    MSK:Normal    Cardiovascular: peripheral edema none    Pulm: Breathing Normal    Neuro/Psych: Orientation:Alert and Orientedx3 ; Mood/Affect:normal       A/P:  1. R foot r/o psoriasis v prp   TANGENTIAL BIOPSY SENT OUT:  After consent, anesthesia with LEC and prep, tangential excision performed and specimen sent out for permanent section histology.  No complications and routine wound care. Patient told to call our office in 1-2 weeks for result.       Cont topiclas for now  Previous clinic notes and pertinent laboratory tests were reviewed prior to Saji Iqbal's visit.

## 2022-08-25 NOTE — LETTER
August 25, 2022      Saji Iqbal  [address] [address]        To Whom It May Concern,      Saji Iqbal should not work until the results of a test we did come back.           Sincerely,        Abhay Vasques MD

## 2022-08-25 NOTE — LETTER
2022         RE: Saji Iqbal  [address]  [place] [address]        Dear Colleague,    Thank you for referring your patient, Saji Iqbal, to the [facility]. Please see a copy of my visit note below.    Saji Iqbal is an extremely pleasant 62 year old year old male patient here today for painful rash on feet and arms. Started biolgoic last visit but feet have gotten very painful.  Skin and hands are ok.  Patient has no other skin complaints today.  Remainder of the HPI, Meds, PMH, Allergies, FH, and SH was reviewed in chart.      Past Medical History:   Diagnosis Date     Cluster headache      Coronary artery disease      Depression      Diabetes mellitus, type II (H)      Gastroesophageal reflux disease      Heart attack (H)      Hyperlipidemia      Hypertension      Renal disease      hx kidney stones     Rotator cuff arthropathy      Sleep apnea     doesn't use cpap-is broken     Stented coronary artery        Past Surgical History:   Procedure Laterality Date     ARTHROSCOPY SHOULDER       REPAIR TENDON FOOT Right 10/23/2020    Procedure: Repair of extensor tendon at the level of the metatarsophalangeal joint, right foot;  Surgeon: Gerard Diaz DPM;  Location: [facility] OR     STENT, CORONARY, ESAU  ,         Family History   Problem Relation Age of Onset     Coronary Artery Disease Mother      Coronary Artery Disease Father      Cerebrovascular Disease Brother      Anuerysm Sister        Social History     Socioeconomic History     Marital status: Single     Spouse name: Not on file     Number of children: Not on file     Years of education: Not on file     Highest education level: Not on file   Occupational History     Occupation: Manufactor    Tobacco Use     Smoking status: Former Smoker     Packs/day: 0.33     Years: 20.00     Pack years: 6.60     Types: Cigarettes     Quit date: 2015     Years since quittin.1      Smokeless tobacco: Never Used   Vaping Use     Vaping Use: Never used   Substance and Sexual Activity     Alcohol use: Yes     Alcohol/week: 1.0 standard drink     Types: 1 Standard drinks or equivalent per week     Comment: 4-5 drinks nightly on weekend     Drug use: No     Sexual activity: Yes   Other Topics Concern      Service Not Asked     Blood Transfusions Not Asked     Caffeine Concern No     Comment: 0-1 coffee daily     Occupational Exposure Not Asked     Hobby Hazards Not Asked     Sleep Concern Not Asked     Stress Concern Not Asked     Weight Concern Not Asked     Special Diet Yes     Comment: low sodium, no red meat, no butter     Back Care Not Asked     Exercise Yes     Comment: walking 2-3 days per week     Bike Helmet Not Asked     Seat Belt Not Asked     Self-Exams Not Asked     Parent/sibling w/ CABG, MI or angioplasty before 65F 55M? Not Asked   Social History Narrative     Not on file     Social Determinants of Health     Financial Resource Strain: Not on file   Food Insecurity: Not on file   Transportation Needs: Not on file   Physical Activity: Not on file   Stress: Not on file   Social Connections: Not on file   Intimate Partner Violence: Not on file   Housing Stability: Not on file       Outpatient Encounter Medications as of 8/25/2022   Medication Sig Dispense Refill     ACCU-CHEK GUIDE test strip Use to test blood sugar 1 times daily or as directed. 100 strip 0     aspirin 81 MG tablet Take by mouth daily       augmented betamethasone dipropionate (DIPROLENE-AF) 0.05 % external ointment Apply topically 2 times daily To hands 100 g 6     blood glucose (NO BRAND SPECIFIED) lancets standard Use to test blood sugar 1 times daily or as directed. 100 lancet 0     blood glucose (NO BRAND SPECIFIED) lancets standard Use to test blood sugar 1 times daily or as directed. 1 Box 3     blood glucose monitoring (ACCU-CHEK NORI PLUS) meter device kit Use to test blood sugar 4 times daily or  as directed. (pts machine is not working) 1 kit 0     blood glucose monitoring (NO BRAND SPECIFIED) test strip Use to test blood sugar 4 times daily or as directed. Please dispense Accu Chek Ana plus test strips or per patient preference if using a different brand 150 Box 3     blood glucose monitoring (SOFTCLIX) lancets USE TO TEST BLOOD SUGAR ONCE DAILY OR AS DIRECTED 100 each 0     Blood Glucose Monitoring Suppl (ACCU-CHEK GUIDE) w/Device KIT USE TO TEST BLOOD SUGAR 1 TIME DAILY OR AS DIRECTED 1 kit 0     Cholecalciferol (VITAMIN D HIGH POTENCY) 25 MCG (1000 UT) CAPS Take 25 mcg by mouth daily 90 capsule 3     clobetasol (TEMOVATE) 0.05 % external cream Apply topically 2 times daily Trunk, arms, legs and hands and feet 240 g 6     empagliflozin (JARDIANCE) 25 MG TABS tablet Take 1 tablet (25 mg) by mouth daily 90 tablet 1     gabapentin (NEURONTIN) 100 MG capsule Take 2 capsules (200 mg) by mouth daily 180 capsule 1     ibuprofen (ADVIL/MOTRIN) 600 MG tablet Take 600mg of ibuprofen every 6 hours x 7 days to assist in pain control.  If you are not tolerating the medication, you are ok to stop. 30 tablet 0     insulin glargine (LANTUS SOLOSTAR) 100 UNIT/ML pen Inject 24 Units Subcutaneous 2 times daily 45 mL 1     ixekizumab (TALTZ) 80 MG/ML SOAJ auto-injector Inject 160 mg (2 mL) once, then start 80 mg (1 mL) at weeks 2, 4, 6, 8, 10, and 12 2 mL 3     ixekizumab (TALTZ) 80 MG/ML SOAJ auto-injector Inject 1 mL (80 mg) Subcutaneous every 28 days 2 mL 3     lisinopril (ZESTRIL) 20 MG tablet Take 1 tablet (20 mg) by mouth daily 30 tablet 0     metoprolol tartrate (LOPRESSOR) 25 MG tablet TAKE 1 TABLET BY MOUTH TWICE DAILY 180 tablet 0     omeprazole (PRILOSEC) 20 MG DR capsule TAKE 1 CAPSULE BY MOUTH ONE TIME DAILY 90 capsule 0     ondansetron (ZOFRAN-ODT) 4 MG ODT tab Take 4 mg by mouth       predniSONE (DELTASONE) 10 MG tablet 5 tablets by m outh for 3 days, decrease by 1/2 tablet every 3 days 80 tablet 0      Risankizumab-rzaa (SKYRIZI) 150 MG/ML subcutaneous Inject 1 mL (150 mg) Subcutaneous See Admin Instructions - 1 injection on day 0, 1 injection on day 28, then 1 injection every 12 weeks 1 mL 1     Risankizumab-rzaa (SKYRIZI) 150 MG/ML subcutaneous Inject 1 mL (150 mg) Subcutaneous every 3 months 1 mL 2     STATIN NOT PRESCRIBED (INTENTIONAL) Please choose reason not prescribed from choices below.       ULTICARE PEN NEEDLES 31G X 5 MM miscellaneous USE 2 DAILY OR AS DIRECTED  100 each 3     No facility-administered encounter medications on file as of 8/25/2022.             O:   NAD, WDWN, Alert & Oriented, Mood & Affect wnl, Vitals stable   Here today alone    General appearance normal   Vitals stable   Alert, oriented and in no acute distress     Hyperkeratotic fissured plaques BL feet      Eyes: Conjunctivae/lids:Normal     ENT: Lips, buccal mucosa, tongue: normal    MSK:Normal    Cardiovascular: peripheral edema none    Pulm: Breathing Normal    Neuro/Psych: Orientation:Alert and Orientedx3 ; Mood/Affect:normal       A/P:  1. R foot r/o psoriasis v prp   TANGENTIAL BIOPSY SENT OUT:  After consent, anesthesia with LEC and prep, tangential excision performed and specimen sent out for permanent section histology.  No complications and routine wound care. Patient told to call our office in 1-2 weeks for result.       Cont topiclas for now  Previous clinic notes and pertinent laboratory tests were reviewed prior to Saji Iqbal's visit.        Again, thank you for allowing me to participate in the care of your patient.        Sincerely,        Abhay Vasques MD

## 2022-08-25 NOTE — PATIENT INSTRUCTIONS
Wound Care Instructions     FOR SUPERFICIAL WOUNDS     [facility] [phone]                 AFTER 24 HOURS YOU SHOULD REMOVE THE BANDAGE AND BEGIN DAILY DRESSING CHANGES AS FOLLOWS:     1) Remove Dressing.     2) Clean and dry the area with tap water using a Q-tip or sterile gauze pad.     3) Apply Vaseline, Aquaphor, Polysporin ointment or Bacitracin ointment over entire wound.  Do NOT use Neosporin ointment.     4) Cover the wound with a band-aid, or a sterile non-stick gauze pad and micropore paper tape    REPEAT THESE INSTRUCTIONS AT LEAST ONCE A DAY UNTIL THE WOUND HAS COMPLETELY HEALED.    It is an old wives tale that a wound heals better when it is exposed to air and allowed to dry out. The wound will heal faster with a better cosmetic result if it is kept moist with ointment and covered with a bandage.    **Do not let the wound dry out.**    Supplies Needed:      *Cotton tipped applicators (Q-tips)    *Vaseline, Aquaphor, Polysporin or Bacitracin Ointment (NOT NEOSPORIN)    *Band-aids or non-stick gauze pads and micropore paper tape.      PATIENT INFORMATION:    During the healing process you will notice a number of changes. All wounds develop a small halo of redness surrounding the wound.  This means healing is occurring. Severe itching with extensive redness usually indicates sensitivity to the ointment or bandage tape used to dress the wound.  You should call our office if this develops.      Swelling  and/or discoloration around your surgical site is common, particularly when performed around the eye.    All wounds normally drain.  The larger the wound the more drainage there will be.  After 7-10 days, you will notice the wound beginning to shrink and new skin will begin to grow.  The wound is healed when you can see skin has formed over the entire area.  A healed wound has a healthy, shiny look to the surface and is red to dark pink in color to normalize.  Wounds may take approximately  4-6 weeks to heal.  Larger wounds may take 6-8 weeks.  After the wound is healed you may discontinue dressing changes.    You may experience a sensation of tightness as your wound heals. This is normal and will gradually subside.    Your healed wound may be sensitive to temperature changes. This sensitivity improves with time, but if you re having a lot of discomfort, try to avoid temperature extremes.    Patients frequently experience itching after their wound appears to have healed because of the continue healing under the skin.  Plain Vaseline will help relieve the itching.      POSSIBLE COMPLICATIONS    BLEEDING:    Leave the bandage in place.  Use tightly rolled up gauze or a cloth to apply direct pressure over the bandage for 30  minutes.  Reapply pressure for an additional 30 minutes if necessary  Use additional gauze and tape to maintain pressure once the bleeding has stopped.

## 2022-08-29 ENCOUNTER — NURSE TRIAGE (OUTPATIENT)
Dept: NURSING | Facility: CLINIC | Age: 62
End: 2022-08-29

## 2022-08-29 NOTE — TELEPHONE ENCOUNTER
See mychart message- wound has stopped bleeding    Thank you,    Mitzi HUGHESRN BSN  [facility] Dermatology- [phone]

## 2022-08-29 NOTE — TELEPHONE ENCOUNTER
Attempted to call pt and it sounds like someone picks up the phone and then hangs up.    Sarah VICKERS RN  [facility] [place]  [phone]

## 2022-08-29 NOTE — TELEPHONE ENCOUNTER
Nurse Triage SBAR    Is this a 2nd Level Triage?     Yes    Situation    Assessment/Background:   Bleeding from the biopsy on right foot.   The biopsy is right behind the little toe on the right foot.    The opening keeps draining and bleeding.   No fever, hot sweats, cold sweats or the chills noted.     No odor noted.   But it will not stop draining or bleeding.     Pt is in a lot of pain in the right foot.    Pt is very worried about infection.  He is diabetic and worried about healing.      Pt would like a call back from the clinic.    Please call the Pt back @ [phone] for further assistance.    Briana Braun RN  Central Triage Red Flags/Med Refills      Protocol Recommended Disposition:   See in Office Within 3 Days    Reason for Disposition    Patient wants to be seen    Additional Information    Negative: Followed an ankle or foot injury    Negative: Toe pain is main symptom    Negative: Ankle pain is main symptom    Negative: Entire foot is cool or blue in comparison to other foot    Negative: Purple or black skin on foot or toe    Negative: Red area or streak and fever    Negative: Swollen foot and fever    Negative: Patient sounds very sick or weak to the triager    Negative: SEVERE pain (e.g., excruciating, unable to do any normal activities)    Negative: Looks like a boil, infected sore, deep ulcer, or other infected rash (spreading redness, pus)    Negative: Swollen foot  (Exceptions: Localized bump from bunions, calluses, insect bite, sting.)    Negative: Weakness (i.e., loss of strength) of new-onset in foot or toes (Exceptions: Not truly weak, foot feels weak because of pain; weakness present > 2 weeks.)    Negative: Numbness (i.e., loss of sensation) in foot or toes (Exception: Just tingling; numbness present > 2 weeks.)    Negative: MODERATE pain (e.g., interferes with normal activities, limping) and present > 3 days    Negative: Weakness or numbness in foot or toes and present > 2 weeks     Negative: Tingling in feet and new or increased    Negative: Pain in the big toe joint    Protocols used: FOOT PAIN-A-[place]

## 2022-08-29 NOTE — TELEPHONE ENCOUNTER
"Called patient- discussed wound care- how to use a pressure dressing.  Patient states he is worried about infections because he is a DM  Advised that we are not going to worry about infection, He just needs to take care of the wound as discussed.  If he has increasing redness/heat/pain/swelling- then we would worry about infection. As it is it sounds like he keeps walking on the wound and causing bleeding  Patient states he has to \"shit and piss\" and cant just stop walking or living.  Advised patient he can use crutches, and walk on his heel- in stead of the whole foot.  Patient wants to know when the FMLA is gong to be completed advised that we are waiting for the biopsy - if that dx supports the FMLA    Thank you,    Mitzi HUGHESRN BSN  [facility] Dermatology- [phone]    "

## 2022-08-30 ENCOUNTER — TELEPHONE (OUTPATIENT)
Dept: FAMILY MEDICINE | Facility: CLINIC | Age: 62
End: 2022-08-30

## 2022-08-30 NOTE — TELEPHONE ENCOUNTER
Patient calls with questions about infection and what he should be watching for. He recently had biopsy done on his right foot by Dermatology. He states that site appear to be healing well, but he is concerned about cracks that have been developing in the dry skin patches and has one that is about 1 inch away from the biopsy site on his heel. He was putting Aquafor to this, but states he has not been very diligent about this before today. He is covering the dry skin with Aquafor and then applying a dressing to help keep moisture in. Reviewed symptoms of infection - increased pain, redness, swelling, green/yellow drainage and fever and that he should call to speak to a nurse if these develop. Informed patient he can call Dr Vasques or PCP clinic to speak to a nurse, there are nurses available 24 hours a day. Also recommended marking any redness that does develop with a marker to track if it is spreading. Patient reports he is using the camera on his phone to take photos of his foot to track any changes, encouraged patient to continue to do this.      He is also having new pain above his right knee today with some cramping. He states that he has been laying in bed more since biopsy since it's painful to walk and his knee feels better since he sat back up. Informed patient that since this is on the same leg that the biopsy was done on, the pain may be triggered by him walking differently after the biopsy. Recommended warm packs, stretching and staying hydrated to help with cramping pain.    Yulisa Matamoros RN  [facility]

## 2022-08-31 ENCOUNTER — TELEPHONE (OUTPATIENT)
Dept: DERMATOLOGY | Facility: CLINIC | Age: 62
End: 2022-08-31

## 2022-08-31 LAB
PATH REPORT.COMMENTS IMP SPEC: NORMAL
PATH REPORT.COMMENTS IMP SPEC: NORMAL
PATH REPORT.FINAL DX SPEC: NORMAL
PATH REPORT.GROSS SPEC: NORMAL
PATH REPORT.MICROSCOPIC SPEC OTHER STN: NORMAL
PATH REPORT.RELEVANT HX SPEC: NORMAL

## 2022-08-31 NOTE — TELEPHONE ENCOUNTER
[facility] Call Center    Phone Message    May a detailed message be left on voicemail: yes     Reason for Call: Other: Pt states he is in a lot of pain and walking is almost impossible. Pt states he would like a call back to discuss why he wasn't given crutches when he asked for them and to address his pain.     Please call Pt back as soon as possible.     Thank you.     Action Taken: Other: [facility]X Derm    Travel Screening: Not Applicable

## 2022-09-01 ENCOUNTER — MYC MEDICAL ADVICE (OUTPATIENT)
Dept: DERMATOLOGY | Facility: CLINIC | Age: 62
End: 2022-09-01

## 2022-09-01 NOTE — TELEPHONE ENCOUNTER
For his feet I would use aquaphor and topical betamethasone.    I would wait until 3 injections of skyrizi then to see if we should give up.     Yes there is a risk of prednisone making psoriasis come back once you are off of it, but this is the only acute medication we have to try and control your pain right now.    We will write a note for crutches

## 2022-09-01 NOTE — TELEPHONE ENCOUNTER
Left message on answering machine for patient to call back.    Michelle BENTLEY RN  Dermatology   [phone]

## 2022-09-01 NOTE — TELEPHONE ENCOUNTER
[facility] Call Center    Phone Message    May a detailed message be left on voicemail: yes     Reason for Call: Pt is very confused with the call he received from labs or whoever call pt.   Pt was given a Dx for his foot. Pt would like someone to explain the Dx and what kind of treatment plan. Pt is unable to walk or work, pt is at a sit still.   I called clinic and spoke with Sarah JOE she let me know we are waiting on Dr. Vasques to reply.   I assured pt we received his concern and messages. Dr. Vasques will go over everything and someone will call pt back as soon as we are able after Dr. Vasques goes over the notes.   Pt also wanted me to relay the message that his plaque psoriasis has moved in his hands and feet. Pt also has   growing pockets of fluid on right hand (seems to be growing by the hour)   Pt's hands are extremely dry.   pt was stung by wasp yesterday - not sure it is related.   pt is concern - pt's employment is looking into [place]. Pt is unable to work. Pt is unable to move or walk.   Thanks     Action Taken: Message routed to:  [facility] Derm    Travel Screening: Not Applicable

## 2022-09-01 NOTE — TELEPHONE ENCOUNTER
Abhay Vasques MD   9/1/2022  7:36 AM CDT Back to Top        Steve Brennan     Your biopsy did come back as psoriasis.       How many injections of Skyrizi have you had?  We could give this longer to work and add a high dose of prednisone to see if this helps.  Or we could switch biologic agents and add prednisone as well.       We don't have crutches in clinic to give out.  IF you would like crutches, I could send a Rx for you to get at a medical supply store.  Please let me know     S/w pt and gave Dr. Vasques's message above.  Pt states did the initial skyrizi injection on July 30th and the last one on August 27th.  States the first one made him super tired but no headaches.  Has been on 2 courses of steroids and is on the end of the 2nd course now.  Dedicated nurse told pt that the steroid could exacerbate the plagues of the psoriasis.  Has a couple of spots above the ankle.  States the feet are cracking and bleeding.  Has been using aquaphor with socks.  Wondering what to do to treat his feet?    Pt would like to give the skyrizi a chance and wondering when he will notice a difference.  Pt states he has crutches but is not able to use them at work without doctor's permission.  Pt needs doctor's note to use crutches at work.  Pt works as an  on the floor.  Fax number is on the [facility] paperwork.  Crutches are in his car but does not use them in the apartment.      Has developed 3 pockets of fluid on his hands.  Stung by a wasp on middle finger on right hand yesterday.  Pt is going to reach out to PCP about this.     Sarah VICEKRS RN  [facility] [place]  [phone]

## 2022-09-01 NOTE — TELEPHONE ENCOUNTER
Please review my chart message and advise.    Sarah VICKERS RN  [facility] [place]  [phone]

## 2022-09-01 NOTE — TELEPHONE ENCOUNTER
Routing to Dr. Vasques to advise.  See message below.  Pathology report in chart.    Sarah VICKERS RN  [facility]  [phone]

## 2022-09-02 NOTE — TELEPHONE ENCOUNTER
Spoke with patient and gave Dr. Vasques's note below.     He states he is in pain. He is wondering if he should go to Urgent Care, he has been feverish since 5 am, has not taken temperature, or anything for pain or fever.   He is also nauseous, and states he lost 11 pounds since he has been laying in bed and cannot cook for himself. Pt states he is not happy with level of care.    I told patient to go to the Emergency Room. He does not want to drive and declined ambulance. Patient states he will take Uber.     He wants to know about his work restrictions- states employer doesn't have decent handicap access. What can he do & not do. How long can he stand for or walk, etc. He is also inquiring about the crutches. Please advise.    Thank you,  Zulma GRAYSON RN  [facility] Dermatology  [phone]

## 2022-09-02 NOTE — TELEPHONE ENCOUNTER
See telephone message- patient was advised to go to ER by derm nurse.    Thank you,    Mitzi HUGHESRN BSN  [facility] Dermatology- [phone]

## 2022-09-02 NOTE — TELEPHONE ENCOUNTER
Patient called requesting refill on trazodone. Ok to refill this medication as patient had a follow up with Cody on 8/26/2021. Script sent to pharmacy.    Please see patient message and advise.     Zulma GRAYSON RN  [facility] Dermat[facility]  [phone]

## 2022-09-06 ENCOUNTER — MYC MEDICAL ADVICE (OUTPATIENT)
Dept: DERMATOLOGY | Facility: CLINIC | Age: 62
End: 2022-09-06

## 2022-09-06 DIAGNOSIS — L40.9 PSORIASIS: Primary | ICD-10-CM

## 2022-09-06 NOTE — TELEPHONE ENCOUNTER
Responded to pt's mychart message with Dr. Vasques's message below.    Michelle BENTLEY RN  Dermatology   [phone]

## 2022-09-06 NOTE — TELEPHONE ENCOUNTER
Pt responded to [facility] message and wants to try prednisone, routed to Dr. Vasques to fill. Will close this encounter.    Michelle BENTLEY RN  Dermatology   [phone]

## 2022-09-06 NOTE — TELEPHONE ENCOUNTER
The patient last stated he wanted to cont skyrizi.      Does he still want to do this?  If he does I would opt for a short course of prednisone.  Does he want to do this?

## 2022-09-07 ENCOUNTER — TELEPHONE (OUTPATIENT)
Dept: DERMATOLOGY | Facility: CLINIC | Age: 62
End: 2022-09-07

## 2022-09-07 RX ORDER — PREDNISONE 20 MG/1
TABLET ORAL
Qty: 100 TABLET | Refills: 0 | Status: SHIPPED | OUTPATIENT
Start: 2022-09-07 | End: 2023-02-02

## 2022-09-07 NOTE — TELEPHONE ENCOUNTER
Pharmacy notified of providers message    Thank you,    Mitzi HUGHESRN BSN  [facility] Dermatology- [phone]

## 2022-09-07 NOTE — TELEPHONE ENCOUNTER
Pharmacy questioning if this is a taper or just stop after 24 days?  Qty was for 100 so they need to know if this is a continuation? Need to know if there is a end date    Thank you,    Mitzi HUGHESRN BSN  [facility]- [phone]

## 2022-09-11 ENCOUNTER — HEALTH MAINTENANCE LETTER (OUTPATIENT)
Age: 62
End: 2022-09-11

## 2022-09-20 ENCOUNTER — MYC MEDICAL ADVICE (OUTPATIENT)
Dept: FAMILY MEDICINE | Facility: CLINIC | Age: 62
End: 2022-09-20

## 2022-09-20 DIAGNOSIS — I10 BENIGN ESSENTIAL HYPERTENSION: ICD-10-CM

## 2022-09-21 RX ORDER — LISINOPRIL 20 MG/1
20 TABLET ORAL DAILY
Qty: 30 TABLET | Refills: 0 | Status: SHIPPED | OUTPATIENT
Start: 2022-09-21 | End: 2023-01-04

## 2022-09-21 NOTE — TELEPHONE ENCOUNTER
Medication is being filled for 1 time refill only due to:  to get to scheduled appt   Next 5 appointments (look out 90 days)    Sep 22, 2022 11:30 AM  (Arrive by 11:15 AM)  Return Visit with Abhay Vasques MD  [facility] ([facility] ) [address] [address]  [phone]   Oct 11, 2022  2:30 PM  (Arrive by 2:10 PM)  Provider Visit with Naseem Esparza MD  [facility] ([facility] ) [id number] [place] [id number] [phone]        Dyana SALGUERO RN

## 2022-09-22 ENCOUNTER — OFFICE VISIT (OUTPATIENT)
Dept: DERMATOLOGY | Facility: CLINIC | Age: 62
End: 2022-09-22
Payer: COMMERCIAL

## 2022-09-22 DIAGNOSIS — Z79.899 ENCOUNTER FOR LONG-TERM (CURRENT) USE OF HIGH-RISK MEDICATION: ICD-10-CM

## 2022-09-22 DIAGNOSIS — L40.9 PSORIASIS: Primary | ICD-10-CM

## 2022-09-22 PROCEDURE — 99213 OFFICE O/P EST LOW 20 MIN: CPT | Performed by: DERMATOLOGY

## 2022-09-22 NOTE — PROGRESS NOTES
Saji Iqbal is an extremely pleasant 62 year old year old male patient here today for f/u psoriasis on skyrizie.  Next injection in Nov.  Things have gotten with pred taper.  His legs and feet are much better.  Using topiclas PRN.  Patient has no other skin complaints today.  Remainder of the HPI, Meds, PMH, Allergies, FH, and SH was reviewed in chart.      Past Medical History:   Diagnosis Date     Cluster headache      Coronary artery disease      Depression      Diabetes mellitus, type II (H)      Gastroesophageal reflux disease      Heart attack (H)      Hyperlipidemia      Hypertension      Renal disease      hx kidney stones     Rotator cuff arthropathy      Sleep apnea     doesn't use cpap-is broken     Stented coronary artery        Past Surgical History:   Procedure Laterality Date     ARTHROSCOPY SHOULDER       REPAIR TENDON FOOT Right 10/23/2020    Procedure: Repair of extensor tendon at the level of the metatarsophalangeal joint, right foot;  Surgeon: Gerard Diaz DPM;  Location: [facility]     STENT, CORONARY, ESAU  ,         Family History   Problem Relation Age of Onset     Coronary Artery Disease Mother      Coronary Artery Disease Father      Cerebrovascular Disease Brother      Anuerysm Sister        Social History     Socioeconomic History     Marital status: Single     Spouse name: Not on file     Number of children: Not on file     Years of education: Not on file     Highest education level: Not on file   Occupational History     Occupation: Manufactor    Tobacco Use     Smoking status: Former Smoker     Packs/day: 0.33     Years: 20.00     Pack years: 6.60     Types: Cigarettes     Quit date: 2015     Years since quittin.1     Smokeless tobacco: Never Used   Vaping Use     Vaping Use: Never used   Substance and Sexual Activity     Alcohol use: Yes     Alcohol/week: 1.0 standard drink     Types: 1 Standard drinks or equivalent per week     Comment: 4-5  drinks nightly on weekend     Drug use: No     Sexual activity: Yes   Other Topics Concern      Service Not Asked     Blood Transfusions Not Asked     Caffeine Concern No     Comment: 0-1 coffee daily     Occupational Exposure Not Asked     Hobby Hazards Not Asked     Sleep Concern Not Asked     Stress Concern Not Asked     Weight Concern Not Asked     Special Diet Yes     Comment: low sodium, no red meat, no butter     Back Care Not Asked     Exercise Yes     Comment: walking 2-3 days per week     Bike Helmet Not Asked     Seat Belt Not Asked     Self-Exams Not Asked     Parent/sibling w/ CABG, MI or angioplasty before 65F 55M? Not Asked   Social History Narrative     Not on file     Social Determinants of Health     Financial Resource Strain: Not on file   Food Insecurity: Not on file   Transportation Needs: Not on file   Physical Activity: Not on file   Stress: Not on file   Social Connections: Not on file   Intimate Partner Violence: Not on file   Housing Stability: Not on file       Outpatient Encounter Medications as of 9/22/2022   Medication Sig Dispense Refill     ACCU-CHEK GUIDE test strip Use to test blood sugar 1 times daily or as directed. 100 strip 0     aspirin 81 MG tablet Take by mouth daily       augmented betamethasone dipropionate (DIPROLENE-AF) 0.05 % external ointment Apply topically 2 times daily To hands 100 g 6     blood glucose (NO BRAND SPECIFIED) lancets standard Use to test blood sugar 1 times daily or as directed. 100 lancet 0     blood glucose (NO BRAND SPECIFIED) lancets standard Use to test blood sugar 1 times daily or as directed. 1 Box 3     blood glucose monitoring (ACCU-CHEK NORI PLUS) meter device kit Use to test blood sugar 4 times daily or as directed. (pts machine is not working) 1 kit 0     blood glucose monitoring (NO BRAND SPECIFIED) test strip Use to test blood sugar 4 times daily or as directed. Please dispense Accu Chek Nori plus test strips or per patient  preference if using a different brand 150 Box 3     blood glucose monitoring (SOFTCLIX) lancets USE TO TEST BLOOD SUGAR ONCE DAILY OR AS DIRECTED 100 each 0     Blood Glucose Monitoring Suppl (ACCU-CHEK GUIDE) w/Device KIT USE TO TEST BLOOD SUGAR 1 TIME DAILY OR AS DIRECTED 1 kit 0     Cholecalciferol (VITAMIN D HIGH POTENCY) 25 MCG (1000 UT) CAPS Take 25 mcg by mouth daily 90 capsule 3     clobetasol (TEMOVATE) 0.05 % external cream Apply topically 2 times daily Trunk, arms, legs and hands and feet 240 g 6     empagliflozin (JARDIANCE) 25 MG TABS tablet Take 1 tablet (25 mg) by mouth daily 90 tablet 1     gabapentin (NEURONTIN) 100 MG capsule Take 2 capsules (200 mg) by mouth daily 180 capsule 1     ibuprofen (ADVIL/MOTRIN) 600 MG tablet Take 600mg of ibuprofen every 6 hours x 7 days to assist in pain control.  If you are not tolerating the medication, you are ok to stop. 30 tablet 0     insulin glargine (LANTUS SOLOSTAR) 100 UNIT/ML pen Inject 24 Units Subcutaneous 2 times daily 45 mL 1     ixekizumab (TALTZ) 80 MG/ML SOAJ auto-injector Inject 160 mg (2 mL) once, then start 80 mg (1 mL) at weeks 2, 4, 6, 8, 10, and 12 2 mL 3     ixekizumab (TALTZ) 80 MG/ML SOAJ auto-injector Inject 1 mL (80 mg) Subcutaneous every 28 days 2 mL 3     lisinopril (ZESTRIL) 20 MG tablet Take 1 tablet (20 mg) by mouth daily 30 tablet 0     metoprolol tartrate (LOPRESSOR) 25 MG tablet TAKE 1 TABLET BY MOUTH TWICE DAILY 180 tablet 0     omeprazole (PRILOSEC) 20 MG DR capsule TAKE 1 CAPSULE BY MOUTH ONE TIME DAILY 90 capsule 0     ondansetron (ZOFRAN-ODT) 4 MG ODT tab Take 4 mg by mouth       predniSONE (DELTASONE) 10 MG tablet 5 tablets by m outh for 3 days, decrease by 1/2 tablet every 3 days 80 tablet 0     predniSONE (DELTASONE) 20 MG tablet 3 tablets by mouth for 4 days, decrease by 1/2 tablet every 4 days 100 tablet 0     Risankizumab-rzaa (SKYRIZI) 150 MG/ML subcutaneous Inject 1 mL (150 mg) Subcutaneous See Admin Instructions - 1  injection on day 0, 1 injection on day 28, then 1 injection every 12 weeks 1 mL 1     Risankizumab-rzaa (SKYRIZI) 150 MG/ML subcutaneous Inject 1 mL (150 mg) Subcutaneous every 3 months 1 mL 2     STATIN NOT PRESCRIBED (INTENTIONAL) Please choose reason not prescribed from choices below.       ULTICARE PEN NEEDLES 31G X 5 MM miscellaneous USE 2 DAILY OR AS DIRECTED  100 each 3     No facility-administered encounter medications on file as of 9/22/2022.             O:   NAD, WDWN, Alert & Oriented, Mood & Affect wnl, Vitals stable   Here today alone    General appearance normal   Vitals stable   Alert, oriented and in no acute distress     R foot bx site healed  Feet mostly clear  Legs clear      Eyes: Conjunctivae/lids:Normal     ENT: Lips, buccal mucosa, tongue: normal    MSK:Normal    Cardiovascular: peripheral edema none    Pulm: Breathing Normal    Neuro/Psych: Orientation:Alert and Orientedx3 ; Mood/Affect:normal       A/P:  1. Psoriasis  May take 4 injection of skyrizie to see complete results  Cont biologic for now  Cont topicals  Cont calcium and vit d  Switching biolgoics discussed with patient   Cont pred taper  mychart us in one week as he continues taper  It was a pleasure speaking to Saji Iqbal today.

## 2022-09-22 NOTE — LETTER
9/22/2022         RE: Saji Iqbal  [address]  [place] [facility] [address]        Dear Colleague,    Thank you for referring your patient, Saji Iqbal, to the [facility]. Please see a copy of my visit note below.    Saji Iqbal is an extremely pleasant 62 year old year old male patient here today for f/u psoriasis on skyrizie.  Next injection in Nov.  Things have gotten with pred taper.  His legs and feet are much better.  Using topiclas PRN.  Patient has no other skin complaints today.  Remainder of the HPI, Meds, PMH, Allergies, FH, and SH was reviewed in chart.      Past Medical History:   Diagnosis Date     Cluster headache      Coronary artery disease      Depression      Diabetes mellitus, type II (H)      Gastroesophageal reflux disease      Heart attack (H)      Hyperlipidemia      Hypertension      Renal disease      hx kidney stones     Rotator cuff arthropathy      Sleep apnea     doesn't use cpap-is broken     Stented coronary artery        Past Surgical History:   Procedure Laterality Date     ARTHROSCOPY SHOULDER       REPAIR TENDON FOOT Right 10/23/2020    Procedure: Repair of extensor tendon at the level of the metatarsophalangeal joint, right foot;  Surgeon: Gerard Diaz DPM;  Location: [facility] OR     STENT, CORONARY, ESAU  2014, 2015        Family History   Problem Relation Age of Onset     Coronary Artery Disease Mother      Coronary Artery Disease Father      Cerebrovascular Disease Brother      Anuerysm Sister        Social History     Socioeconomic History     Marital status: Single     Spouse name: Not on file     Number of children: Not on file     Years of education: Not on file     Highest education level: Not on file   Occupational History     Occupation: Manufactor    Tobacco Use     Smoking status: Former Smoker     Packs/day: 0.33     Years: 20.00     Pack years: 6.60     Types: Cigarettes     Quit date: 7/17/2015      Years since quittin.1     Smokeless tobacco: Never Used   Vaping Use     Vaping Use: Never used   Substance and Sexual Activity     Alcohol use: Yes     Alcohol/week: 1.0 standard drink     Types: 1 Standard drinks or equivalent per week     Comment: 4-5 drinks nightly on weekend     Drug use: No     Sexual activity: Yes   Other Topics Concern      Service Not Asked     Blood Transfusions Not Asked     Caffeine Concern No     Comment: 0-1 coffee daily     Occupational Exposure Not Asked     Hobby Hazards Not Asked     Sleep Concern Not Asked     Stress Concern Not Asked     Weight Concern Not Asked     Special Diet Yes     Comment: low sodium, no red meat, no butter     Back Care Not Asked     Exercise Yes     Comment: walking 2-3 days per week     Bike Helmet Not Asked     Seat Belt Not Asked     Self-Exams Not Asked     Parent/sibling w/ CABG, MI or angioplasty before 65F 55M? Not Asked   Social History Narrative     Not on file     Social Determinants of Health     Financial Resource Strain: Not on file   Food Insecurity: Not on file   Transportation Needs: Not on file   Physical Activity: Not on file   Stress: Not on file   Social Connections: Not on file   Intimate Partner Violence: Not on file   Housing Stability: Not on file       Outpatient Encounter Medications as of 2022   Medication Sig Dispense Refill     ACCU-CHEK GUIDE test strip Use to test blood sugar 1 times daily or as directed. 100 strip 0     aspirin 81 MG tablet Take by mouth daily       augmented betamethasone dipropionate (DIPROLENE-AF) 0.05 % external ointment Apply topically 2 times daily To hands 100 g 6     blood glucose (NO BRAND SPECIFIED) lancets standard Use to test blood sugar 1 times daily or as directed. 100 lancet 0     blood glucose (NO BRAND SPECIFIED) lancets standard Use to test blood sugar 1 times daily or as directed. 1 Box 3     blood glucose monitoring (ACCU-CHEK NORI PLUS) meter device kit Use to test  blood sugar 4 times daily or as directed. (pts machine is not working) 1 kit 0     blood glucose monitoring (NO BRAND SPECIFIED) test strip Use to test blood sugar 4 times daily or as directed. Please dispense Accu Chek Ana plus test strips or per patient preference if using a different brand 150 Box 3     blood glucose monitoring (SOFTCLIX) lancets USE TO TEST BLOOD SUGAR ONCE DAILY OR AS DIRECTED 100 each 0     Blood Glucose Monitoring Suppl (ACCU-CHEK GUIDE) w/Device KIT USE TO TEST BLOOD SUGAR 1 TIME DAILY OR AS DIRECTED 1 kit 0     Cholecalciferol (VITAMIN D HIGH POTENCY) 25 MCG (1000 UT) CAPS Take 25 mcg by mouth daily 90 capsule 3     clobetasol (TEMOVATE) 0.05 % external cream Apply topically 2 times daily Trunk, arms, legs and hands and feet 240 g 6     empagliflozin (JARDIANCE) 25 MG TABS tablet Take 1 tablet (25 mg) by mouth daily 90 tablet 1     gabapentin (NEURONTIN) 100 MG capsule Take 2 capsules (200 mg) by mouth daily 180 capsule 1     ibuprofen (ADVIL/MOTRIN) 600 MG tablet Take 600mg of ibuprofen every 6 hours x 7 days to assist in pain control.  If you are not tolerating the medication, you are ok to stop. 30 tablet 0     insulin glargine (LANTUS SOLOSTAR) 100 UNIT/ML pen Inject 24 Units Subcutaneous 2 times daily 45 mL 1     ixekizumab (TALTZ) 80 MG/ML SOAJ auto-injector Inject 160 mg (2 mL) once, then start 80 mg (1 mL) at weeks 2, 4, 6, 8, 10, and 12 2 mL 3     ixekizumab (TALTZ) 80 MG/ML SOAJ auto-injector Inject 1 mL (80 mg) Subcutaneous every 28 days 2 mL 3     lisinopril (ZESTRIL) 20 MG tablet Take 1 tablet (20 mg) by mouth daily 30 tablet 0     metoprolol tartrate (LOPRESSOR) 25 MG tablet TAKE 1 TABLET BY MOUTH TWICE DAILY 180 tablet 0     omeprazole (PRILOSEC) 20 MG DR capsule TAKE 1 CAPSULE BY MOUTH ONE TIME DAILY 90 capsule 0     ondansetron (ZOFRAN-ODT) 4 MG ODT tab Take 4 mg by mouth       predniSONE (DELTASONE) 10 MG tablet 5 tablets by m outh for 3 days, decrease by 1/2 tablet  every 3 days 80 tablet 0     predniSONE (DELTASONE) 20 MG tablet 3 tablets by mouth for 4 days, decrease by 1/2 tablet every 4 days 100 tablet 0     Risankizumab-rzaa (SKYRIZI) 150 MG/ML subcutaneous Inject 1 mL (150 mg) Subcutaneous See Admin Instructions - 1 injection on day 0, 1 injection on day 28, then 1 injection every 12 weeks 1 mL 1     Risankizumab-rzaa (SKYRIZI) 150 MG/ML subcutaneous Inject 1 mL (150 mg) Subcutaneous every 3 months 1 mL 2     STATIN NOT PRESCRIBED (INTENTIONAL) Please choose reason not prescribed from choices below.       ULTICARE PEN NEEDLES 31G X 5 MM miscellaneous USE 2 DAILY OR AS DIRECTED  100 each 3     No facility-administered encounter medications on file as of 9/22/2022.             O:   NAD, WDWN, Alert & Oriented, Mood & Affect wnl, Vitals stable   Here today alone    General appearance normal   Vitals stable   Alert, oriented and in no acute distress     R foot bx site healed  Feet mostly clear  Legs clear      Eyes: Conjunctivae/lids:Normal     ENT: Lips, buccal mucosa, tongue: normal    MSK:Normal    Cardiovascular: peripheral edema none    Pulm: Breathing Normal    Neuro/Psych: Orientation:Alert and Orientedx3 ; Mood/Affect:normal       A/P:  1. Psoriasis  May take 4 injection of skyrizie to see complete results  Cont biologic for now  Cont topicals  Cont calcium and vit d  Switching biolgoics discussed with patient   Cont pred taper  mychart us in one week as he continues taper  It was a pleasure speaking to Saji Iqbal today.        Again, thank you for allowing me to participate in the care of your patient.        Sincerely,        Abhay Vasques MD

## 2022-11-08 ENCOUNTER — TELEPHONE (OUTPATIENT)
Dept: DERMATOLOGY | Facility: CLINIC | Age: 62
End: 2022-11-08

## 2022-11-08 DIAGNOSIS — L40.9 PSORIASIS: Primary | ICD-10-CM

## 2022-11-08 NOTE — TELEPHONE ENCOUNTER
S/w pt and advised needs to use moisturizer on hands and feet to help with the dryness and cracks.  Pt states when he applies the aquaphor it exacerbates the pain in his hands and feet.  Nurse advised can try using vaseline instead of aquaphor.  Pt states he will continue with the aquaphor but wondering if can get an rx.    Pharmacy pended.    Sarah VICKERS RN  [facility] [place]  [phone]

## 2022-11-08 NOTE — TELEPHONE ENCOUNTER
Patient called the internal medicine nurse stating he finished methotrexate prescribed on 10/24/22 and is still taking folic acid. His body has cleared up to 85%. His hands are now very dry with lots of cracks and he is dropping things. His feet have many sores and bleeding cracks which make it impossible to walk. He has been in bed since Sunday afternoon. What should he do next? Also, requesting an Rx for Aquaphor ointment because it is very expensive OTC.

## 2022-11-09 ENCOUNTER — MYC MEDICAL ADVICE (OUTPATIENT)
Dept: FAMILY MEDICINE | Facility: CLINIC | Age: 62
End: 2022-11-09

## 2022-11-09 RX ORDER — FLUOCINONIDE 0.5 MG/G
CREAM TOPICAL 2 TIMES DAILY
Qty: 120 G | Refills: 6 | Status: SHIPPED | OUTPATIENT
Start: 2022-11-09 | End: 2023-02-02

## 2022-11-09 NOTE — TELEPHONE ENCOUNTER
Called the [facility], patient is ok to call and schedule, no further questions about tests are needed from their end.    Teddy Finney RN on 11/9/2022 at 11:07 AM

## 2022-11-18 NOTE — PROGRESS NOTES
NB-UVB treatment for psoriasis  Treatment #2  No issues   photoproection on eyes, lips, nipples  118.0 mj 0min 13 seconds today  Mineral oil applied  Goal three times weekly    No complications    
stated

## 2023-01-04 DIAGNOSIS — I10 BENIGN ESSENTIAL HYPERTENSION: ICD-10-CM

## 2023-01-05 RX ORDER — LISINOPRIL 20 MG/1
TABLET ORAL
Qty: 30 TABLET | Refills: 0 | OUTPATIENT
Start: 2023-01-05

## 2023-01-22 ENCOUNTER — HEALTH MAINTENANCE LETTER (OUTPATIENT)
Age: 63
End: 2023-01-22

## 2023-02-01 ENCOUNTER — NURSE TRIAGE (OUTPATIENT)
Dept: FAMILY MEDICINE | Facility: CLINIC | Age: 63
End: 2023-02-01
Payer: COMMERCIAL

## 2023-02-01 NOTE — TELEPHONE ENCOUNTER
"Pt transferred to triage     5 days ago approx walking down hallway in apt jammed toe into item in hallway.   Second toe on left foot- can move it  Other toes are purple/red  Swelling/fluid build up in toes   Pt is diabetic. Pt reports toes are hot to touch. Offered appt today, pt declined. Scheduled tomorrow. Adv of worsening symptoms should be seen. Scheduled pt in appt tomorrow   Next 5 appointments (look out 90 days)    Feb 02, 2023 10:00 AM  (Arrive by 9:40 AM)  Provider Visit with JACKIE Hussein CNP  [facility] ([facility] ) [address] [facility] [address] [phone]   Feb 17, 2023  9:00 AM  (Arrive by 8:40 AM)  Provider Visit with Naseem Esparza MD  [facility] ([facility] ) [address] [facility] [address] [phone]        Home cares adv- elevate, ice, monitor, try to not put weight on toes, etc.     Dyana SALGUERO, RN     Answer Assessment - Initial Assessment Questions  1. MECHANISM: \"How did the injury happen?\"       Jammed it on something in hallway   2. ONSET: \"When did the injury happen?\" (Minutes or hours ago)       Approx 5 days ago   3. LOCATION: \"What part of the toe is injured?\" \"Is the nail damaged?\"       Second left toe- but 4 toes look bad   4. APPEARANCE of TOE INJURY: \"What does the injury look like?\"       Purple red, swelling  5. SEVERITY: \"Can you use the foot normally?\" \"Can you walk?\"         6. SIZE: For cuts, bruises, or swelling, ask: \"How large is it?\" (e.g., inches or centimeters;  entire toe)       No openings   7. PAIN: \"Is there pain?\" If Yes, ask: \"How bad is the pain?\"   (e.g., Scale 1-10; or mild, moderate, severe)        8. TETANUS: For any breaks in the skin, ask: \"When was the last tetanus booster?\"      Na   9. DIABETES: \"Do you have a history of diabetes or poor circulation in the feet?\"      yes  10. OTHER SYMPTOMS: \"Do you " "have any other symptoms?\"         No   11. PREGNANCY: \"Is there any chance you are pregnant?\" \"When was your last menstrual period?\"        Na    Protocols used: TOE INJURY-A-OH      "

## 2023-02-02 ENCOUNTER — ANCILLARY PROCEDURE (OUTPATIENT)
Dept: GENERAL RADIOLOGY | Facility: CLINIC | Age: 63
End: 2023-02-02
Attending: NURSE PRACTITIONER
Payer: COMMERCIAL

## 2023-02-02 ENCOUNTER — OFFICE VISIT (OUTPATIENT)
Dept: FAMILY MEDICINE | Facility: CLINIC | Age: 63
End: 2023-02-02
Payer: COMMERCIAL

## 2023-02-02 VITALS
BODY MASS INDEX: 31.56 KG/M2 | SYSTOLIC BLOOD PRESSURE: 130 MMHG | HEART RATE: 83 BPM | RESPIRATION RATE: 18 BRPM | TEMPERATURE: 98.1 F | DIASTOLIC BLOOD PRESSURE: 82 MMHG | OXYGEN SATURATION: 95 % | WEIGHT: 225.4 LBS | HEIGHT: 71 IN

## 2023-02-02 DIAGNOSIS — S92.412A CLOSED DISPLACED FRACTURE OF PROXIMAL PHALANX OF LEFT GREAT TOE, INITIAL ENCOUNTER: ICD-10-CM

## 2023-02-02 DIAGNOSIS — S99.922A FOOT INJURY, LEFT, INITIAL ENCOUNTER: ICD-10-CM

## 2023-02-02 DIAGNOSIS — S99.922A FOOT INJURY, LEFT, INITIAL ENCOUNTER: Primary | ICD-10-CM

## 2023-02-02 PROCEDURE — 99213 OFFICE O/P EST LOW 20 MIN: CPT | Performed by: NURSE PRACTITIONER

## 2023-02-02 PROCEDURE — 73630 X-RAY EXAM OF FOOT: CPT | Mod: TC | Performed by: RADIOLOGY

## 2023-02-02 ASSESSMENT — PAIN SCALES - GENERAL: PAINLEVEL: MILD PAIN (2)

## 2023-02-02 ASSESSMENT — PATIENT HEALTH QUESTIONNAIRE - PHQ9
SUM OF ALL RESPONSES TO PHQ QUESTIONS 1-9: 1
10. IF YOU CHECKED OFF ANY PROBLEMS, HOW DIFFICULT HAVE THESE PROBLEMS MADE IT FOR YOU TO DO YOUR WORK, TAKE CARE OF THINGS AT HOME, OR GET ALONG WITH OTHER PEOPLE: NOT DIFFICULT AT ALL
SUM OF ALL RESPONSES TO PHQ QUESTIONS 1-9: 1

## 2023-02-02 NOTE — PROGRESS NOTES
"  Assessment & Plan     Foot injury, left, initial encounter  Acute; encouraged RICE, ibuprofen twice daily to help with swelling. Will pend xray results. Patient has surgical boot at home advised to wear if needed to help with immobilization but to not rely on boot. Follow up if symptoms do not improve/worsen.     - XR Foot Left G/E 3 Views; Future       BMI:   Estimated body mass index is 31.44 kg/m  as calculated from the following:    Height as of this encounter: 1.803 m (5' 11\").    Weight as of this encounter: 102.2 kg (225 lb 6.4 oz).   Weight management plan: Discussed healthy diet and exercise guidelines      Return in about 1 week (around 2/9/2023) for if symptoms do not improve and/or worsen.    JACKIE Hussein CNP [facility] ALIE Brennan is a 62 year old, presenting for the following health issues:  Trauma      Trauma    History of Present Illness       Reason for visit:  Foot injury  Symptom onset:  3-7 days ago  Symptoms include:  Pain swelling  Symptom intensity:  Moderate  Symptom progression:  Improving  Had these symptoms before:  No  What makes it worse:  Putting on shoes  What makes it better:  Resting    He eats 0-1 servings of fruits and vegetables daily.He consumes 1 sweetened beverage(s) daily.He exercises with enough effort to increase his heart rate 10 to 19 minutes per day.  He exercises with enough effort to increase his heart rate 3 or less days per week. He is missing 1 dose(s) of medications per week.  He is not taking prescribed medications regularly due to remembering to take.    Today's PHQ-9         PHQ-9 Total Score: 1    PHQ-9 Q9 Thoughts of better off dead/self-harm past 2 weeks :   Not at all    How difficult have these problems made it for you to do your work, take care of things at home, or get along with other people: Not difficult at all     Patient presents to clinic with left foot injury. States he was going to his door answering " "machine in apartment when he slammed it against the wall. States his second and third toes were the ones initially injured but has noticed his great toe swelling with redness and concerns of infection. Denies any break in the skin. States the coloring overall has improved over the last 5 days as well as the pain. Sunday walking was unbearable but today has much improved.     Has not done anything for symptoms.     Review of Systems   Constitutional, HEENT, cardiovascular, pulmonary, gi and gu systems are negative, except as otherwise noted.      Objective    /82 (BP Location: Right arm, Patient Position: Sitting, Cuff Size: Adult Large)   Pulse 83   Temp 98.1  F (36.7  C) (Oral)   Resp 18   Ht 1.803 m (5' 11\")   Wt 102.2 kg (225 lb 6.4 oz)   SpO2 95%   BMI 31.44 kg/m    Body mass index is 31.44 kg/m .  Physical Exam   GENERAL: healthy, alert and no distress  RESP: lungs clear to auscultation - no rales, rhonchi or wheezes  CV: regular rate and rhythm, normal S1 S2, no S3 or S4, no murmur, click or rub, no peripheral edema and peripheral pulses strong  MS: normal muscle tone, decreased range of motion of left foot/toes, +2 edema to left great toe and erythema, second and third toes noted to be ecchymotic no swelling or erythema noted, peripheral pulses normal and tenderness to palpation of left great toe.                 "

## 2023-02-02 NOTE — RESULT ENCOUNTER NOTE
Called pt and advised of below per Aura Amador.  Pt requested referral scheduling  phone # be sent via [facility] to pt.   sent w/ #.    Keisha Howard RN, BSN  [facility]

## 2023-02-14 ENCOUNTER — OFFICE VISIT (OUTPATIENT)
Dept: OPTOMETRY | Facility: CLINIC | Age: 63
End: 2023-02-14
Attending: FAMILY MEDICINE
Payer: COMMERCIAL

## 2023-02-14 DIAGNOSIS — H52.4 PRESBYOPIA: ICD-10-CM

## 2023-02-14 DIAGNOSIS — H31.021: Primary | ICD-10-CM

## 2023-02-14 DIAGNOSIS — E11.3293 MILD NONPROLIFERATIVE DIABETIC RETINOPATHY OF BOTH EYES WITHOUT MACULAR EDEMA ASSOCIATED WITH TYPE 2 DIABETES MELLITUS (H): ICD-10-CM

## 2023-02-14 DIAGNOSIS — H52.203 ASTIGMATISM OF BOTH EYES, UNSPECIFIED TYPE: ICD-10-CM

## 2023-02-14 DIAGNOSIS — E11.65 UNCONTROLLED TYPE 2 DIABETES MELLITUS WITH HYPERGLYCEMIA (H): ICD-10-CM

## 2023-02-14 PROCEDURE — 92015 DETERMINE REFRACTIVE STATE: CPT | Performed by: OPTOMETRIST

## 2023-02-14 PROCEDURE — 92004 COMPRE OPH EXAM NEW PT 1/>: CPT | Performed by: OPTOMETRIST

## 2023-02-14 ASSESSMENT — REFRACTION_MANIFEST
OD_SPHERE: -1.25
OS_SPHERE: -0.50
METHOD_AUTOREFRACTION: 1
OD_AXIS: 004
OD_SPHERE: -1.00
OD_CYLINDER: +0.50
OS_AXIS: 180
OD_CYLINDER: +0.50
OD_AXIS: 005
OS_AXIS: 012
OS_CYLINDER: +1.00
OS_CYLINDER: +1.25
OS_SPHERE: -0.75
OS_ADD: +2.25
OD_ADD: +2.25

## 2023-02-14 ASSESSMENT — KERATOMETRY
OD_K2POWER_DIOPTERS: 45
OS_AXISANGLE2_DEGREES: 127
OS_K2POWER_DIOPTERS: 45.12
OD_AXISANGLE2_DEGREES: 122
OD_K1POWER_DIOPTERS: 44.37
OS_AXISANGLE_DEGREES: 37
OS_K1POWER_DIOPTERS: 44.75
OD_AXISANGLE_DEGREES: 32

## 2023-02-14 ASSESSMENT — VISUAL ACUITY
METHOD: SNELLEN - LINEAR
OS_SC: 20/60
OS_SC: 20/30
OS_SC+: -1
OD_SC: 20/200
OD_SC: 20/400
OD_PH_SC: 20/200

## 2023-02-14 ASSESSMENT — CUP TO DISC RATIO
OD_RATIO: 0.1
OS_RATIO: 0.1

## 2023-02-14 ASSESSMENT — CONF VISUAL FIELD
METHOD: COUNTING FINGERS
OD_SUPERIOR_TEMPORAL_RESTRICTION: 0
OS_INFERIOR_TEMPORAL_RESTRICTION: 0
OD_NORMAL: 1
OS_NORMAL: 1
OD_INFERIOR_NASAL_RESTRICTION: 0
OS_SUPERIOR_TEMPORAL_RESTRICTION: 0
OD_SUPERIOR_NASAL_RESTRICTION: 0
OS_INFERIOR_NASAL_RESTRICTION: 0
OD_INFERIOR_TEMPORAL_RESTRICTION: 0
OS_SUPERIOR_NASAL_RESTRICTION: 0

## 2023-02-14 ASSESSMENT — TONOMETRY
OD_IOP_MMHG: 15
OS_IOP_MMHG: 15
IOP_METHOD: APPLANATION

## 2023-02-14 ASSESSMENT — EXTERNAL EXAM - RIGHT EYE: OD_EXAM: NORMAL

## 2023-02-14 ASSESSMENT — SLIT LAMP EXAM - LIDS
COMMENTS: MEIBOMIAN GLAND DYSFUNCTION
COMMENTS: MEIBOMIAN GLAND DYSFUNCTION

## 2023-02-14 ASSESSMENT — EXTERNAL EXAM - LEFT EYE: OS_EXAM: NORMAL

## 2023-02-14 NOTE — PROGRESS NOTES
Chief Complaint   Patient presents with     Diabetic Eye Exam       Lab Results   Component Value Date    A1C 6.3 07/07/2022    A1C 7.4 01/19/2022    A1C 8.5 07/02/2021    A1C 7.8 01/07/2021    A1C 6.9 10/20/2020    A1C 7.1 09/17/2020    A1C 9.1 06/16/2020            Last Eye Exam: Over 20 years ago  Dilated Previously: Yes, side effects of dilation explained today    What are you currently using to see?  readers    Distance Vision Acuity: Satisfied with vision    Near Vision Acuity: Not satisfied with readers     Eye Comfort: itchy in the morning   Do you use eye drops? : Yes: Saline when needed       Lorna Day - Optometric Assistant      Medical, surgical and family histories reviewed and updated 2/14/2023.     Patient is unsure how long his R eye has been blurry  Worked with lasers in the past   Vision was 20/150 at last exam right eye several years ago  OBJECTIVE: See Ophthalmology exam    ASSESSMENT:    ICD-10-CM    1. Solar retinopathy of right eye  H31.021       2. Uncontrolled type 2 diabetes mellitus with hyperglycemia (H)  E11.65 OPTOMETRY REFERRAL      3. Mild nonproliferative diabetic retinopathy of both eyes without macular edema associated with type 2 diabetes mellitus (H)  E11.3293         Longstanding vision loss R eye  PLAN:  Refer for [facility] in [place]  He will be getting Cobra and will wait to see Retina patient will call for appointment  Saji Iqbal aware  eye exam results will be sent to Naseem Esparza.    La Thomas OD

## 2023-02-14 NOTE — LETTER
2/14/2023         RE: Saji Iqbal  [address]  [place] [address]        Dear Colleague,    Thank you for referring your patient, Saji Iqbal, to the [facility]. Please see a copy of my visit note below.    Chief Complaint   Patient presents with     Diabetic Eye Exam       Lab Results   Component Value Date    A1C 6.3 07/07/2022    A1C 7.4 01/19/2022    A1C 8.5 07/02/2021    A1C 7.8 01/07/2021    A1C 6.9 10/20/2020    A1C 7.1 09/17/2020    A1C 9.1 06/16/2020            Last Eye Exam: Over 20 years ago  Dilated Previously: Yes, side effects of dilation explained today    What are you currently using to see?  readers    Distance Vision Acuity: Satisfied with vision    Near Vision Acuity: Not satisfied with readers     Eye Comfort: itchy in the morning   Do you use eye drops? : Yes: Saline when needed       Lorna Day - Optometric Assistant      Medical, surgical and family histories reviewed and updated 2/14/2023.     Patient is unsure how long his R eye has been blurry  Worked with lasers in the past   Vision was 20/150 at last exam right eye several years ago  OBJECTIVE: See Ophthalmology exam    ASSESSMENT:    ICD-10-CM    1. Solar retinopathy of right eye  H31.021       2. Uncontrolled type 2 diabetes mellitus with hyperglycemia (H)  E11.65 OPTOMETRY REFERRAL      3. Mild nonproliferative diabetic retinopathy of both eyes without macular edema associated with type 2 diabetes mellitus (H)  E11.3293         Longstanding vision loss R eye  PLAN:  Refer for dot heme in [place]  He will be getting Cobra and will wait to see Retina patient will call for appointment  Saji CITLALI Rasheed aware  eye exam results will be sent to Naseem Esparza.    La Thomas OD           Again, thank you for allowing me to participate in the care of your patient.        Sincerely,        La Thomas, OD

## 2023-02-15 ENCOUNTER — TELEPHONE (OUTPATIENT)
Dept: DERMATOLOGY | Facility: CLINIC | Age: 63
End: 2023-02-15
Payer: COMMERCIAL

## 2023-02-15 NOTE — TELEPHONE ENCOUNTER
Closing encounter as pt reached out via mychart and answered questions.    Thank you,  Michelle BENTLEY RN  Dermatology   [phone]

## 2023-02-15 NOTE — TELEPHONE ENCOUNTER
Left message for pt to call [phone] and ask to speak with derm nurse.    Sarah VICKERS RN  [facility] [place]  [phone]

## 2023-02-15 NOTE — TELEPHONE ENCOUNTER
[facility] Call Center    Phone Message    May a detailed message be left on voicemail: yes     Reason for Call: Other: Pt states he would like a call back from a RN to discuss his Risankizumab-rzaa (SKYRIZI) 150 MG/ML subcutaneous. Please call Pt back, thanks!      Action Taken: Message routed to:  Other: OX derm     Travel Screening: Not Applicable

## 2023-02-17 ENCOUNTER — OFFICE VISIT (OUTPATIENT)
Dept: FAMILY MEDICINE | Facility: CLINIC | Age: 63
End: 2023-02-17
Payer: COMMERCIAL

## 2023-02-17 VITALS
BODY MASS INDEX: 30.94 KG/M2 | HEART RATE: 77 BPM | OXYGEN SATURATION: 98 % | DIASTOLIC BLOOD PRESSURE: 82 MMHG | TEMPERATURE: 97.7 F | HEIGHT: 71 IN | SYSTOLIC BLOOD PRESSURE: 134 MMHG | WEIGHT: 221 LBS | RESPIRATION RATE: 19 BRPM

## 2023-02-17 DIAGNOSIS — I10 BENIGN ESSENTIAL HYPERTENSION: ICD-10-CM

## 2023-02-17 DIAGNOSIS — I25.10 CORONARY ARTERY DISEASE INVOLVING NATIVE CORONARY ARTERY OF NATIVE HEART WITHOUT ANGINA PECTORIS: ICD-10-CM

## 2023-02-17 DIAGNOSIS — E11.65 TYPE 2 DIABETES MELLITUS WITH HYPERGLYCEMIA, WITH LONG-TERM CURRENT USE OF INSULIN (H): ICD-10-CM

## 2023-02-17 DIAGNOSIS — Z79.4 TYPE 2 DIABETES MELLITUS WITH HYPERGLYCEMIA, WITH LONG-TERM CURRENT USE OF INSULIN (H): ICD-10-CM

## 2023-02-17 DIAGNOSIS — F33.1 MODERATE EPISODE OF RECURRENT MAJOR DEPRESSIVE DISORDER (H): ICD-10-CM

## 2023-02-17 DIAGNOSIS — Z12.11 SCREEN FOR COLON CANCER: ICD-10-CM

## 2023-02-17 DIAGNOSIS — E11.65 UNCONTROLLED TYPE 2 DIABETES MELLITUS WITH HYPERGLYCEMIA (H): Primary | ICD-10-CM

## 2023-02-17 LAB — HBA1C MFR BLD: 7.5 % (ref 0–5.6)

## 2023-02-17 PROCEDURE — 83036 HEMOGLOBIN GLYCOSYLATED A1C: CPT | Performed by: FAMILY MEDICINE

## 2023-02-17 PROCEDURE — 36415 COLL VENOUS BLD VENIPUNCTURE: CPT | Performed by: FAMILY MEDICINE

## 2023-02-17 PROCEDURE — 99214 OFFICE O/P EST MOD 30 MIN: CPT | Performed by: FAMILY MEDICINE

## 2023-02-17 RX ORDER — INSULIN GLARGINE 100 [IU]/ML
24 INJECTION, SOLUTION SUBCUTANEOUS 2 TIMES DAILY
Qty: 45 ML | Refills: 1 | Status: ON HOLD | OUTPATIENT
Start: 2023-02-17 | End: 2023-09-11

## 2023-02-17 RX ORDER — LISINOPRIL 20 MG/1
20 TABLET ORAL DAILY
Qty: 90 TABLET | Refills: 1 | Status: SHIPPED | OUTPATIENT
Start: 2023-02-17 | End: 2023-04-20

## 2023-02-17 RX ORDER — METOPROLOL TARTRATE 25 MG/1
25 TABLET, FILM COATED ORAL 2 TIMES DAILY
Qty: 180 TABLET | Refills: 1 | Status: ON HOLD | OUTPATIENT
Start: 2023-02-17 | End: 2023-08-30

## 2023-02-17 ASSESSMENT — PATIENT HEALTH QUESTIONNAIRE - PHQ9
10. IF YOU CHECKED OFF ANY PROBLEMS, HOW DIFFICULT HAVE THESE PROBLEMS MADE IT FOR YOU TO DO YOUR WORK, TAKE CARE OF THINGS AT HOME, OR GET ALONG WITH OTHER PEOPLE: SOMEWHAT DIFFICULT
SUM OF ALL RESPONSES TO PHQ QUESTIONS 1-9: 4
SUM OF ALL RESPONSES TO PHQ QUESTIONS 1-9: 4

## 2023-02-17 NOTE — PROGRESS NOTES
Assessment and Plan    (E11.65) Uncontrolled type 2 diabetes mellitus with hyperglycemia (H)  (primary encounter diagnosis)  Comment: A1c remains high, will start on semaglutide, pt should reach out for alternatives if too $  Plan: HEMOGLOBIN A1C, semaglutide (OZEMPIC) 2         MG/1.5ML SOPN pen, Semaglutide, 1 MG/DOSE,         (OZEMPIC) 4 MG/3ML pen            (Z12.11) Screen for colon cancer  Comment:   Plan:     (I10) Benign essential hypertension  Comment: refilling  Plan: lisinopril (ZESTRIL) 20 MG tablet, metoprolol         tartrate (LOPRESSOR) 25 MG tablet            (I25.10) Coronary artery disease involving native coronary artery of native heart without angina pectoris  Comment:   Plan: metoprolol tartrate (LOPRESSOR) 25 MG tablet            (E11.65,  Z79.4) Type 2 diabetes mellitus with hyperglycemia, with long-term current use of insulin (H)  Comment:   Plan: empagliflozin (JARDIANCE) 25 MG TABS tablet,         insulin glargine (LANTUS SOLOSTAR) 100 UNIT/ML         pen            (F33.1) Moderate episode of recurrent major depressive disorder (H)  Comment: no mood complaints today  Plan:       RTC in     Naseem Esparza MD      Lisa Brennan is a 62 year old, presenting for the following health issues:  Diabetes (Discuss psoriasis on feet - makes neuropathy worse)      History of Present Illness       Reason for visit:  Diabetic screening.    He eats 0-1 servings of fruits and vegetables daily.He consumes 2 sweetened beverage(s) daily.He exercises with enough effort to increase his heart rate 9 or less minutes per day.  He exercises with enough effort to increase his heart rate 3 or less days per week.   He is taking medications regularly.    Today's PHQ-9         PHQ-9 Total Score: 4    PHQ-9 Q9 Thoughts of better off dead/self-harm past 2 weeks :   Not at all    How difficult have these problems made it for you to do your work, take care of things at home, or get along with other people: Somewhat  "difficult       Diabetes Follow-up    How often are you checking your blood sugar? A few times a month  What time of day are you checking your blood sugars (select all that apply)?  Before meals  Have you had any blood sugars above 200?  Yes   Have you had any blood sugars below 70?  No    What symptoms do you notice when your blood sugar is low?  Shaky, Dizzy, Weak and Lethargy    What concerns do you have today about your diabetes? Numbness, pain in feet     Do you have any of these symptoms? (Select all that apply)  Numbness in feet, Burning in feet and Redness, sores, or blisters on feet    Mental health reflects recently getting laid off.    Notes that he has no break in meds.    Hasn't been great about checking blood sugars.    BP Readings from Last 2 Encounters:   02/17/23 (!) 152/92   02/02/23 130/82     Hemoglobin A1C (%)   Date Value   02/17/2023 7.5 (H)   07/07/2022 6.3 (H)   07/02/2021 8.5 (H)   01/07/2021 7.8 (H)     LDL Cholesterol Calculated (mg/dL)   Date Value   07/19/2022 167 (H)   01/19/2022 171 (H)   06/16/2020 175 (H)   05/13/2019 182 (H)             Review of Systems   Constitutional: Negative.    Eyes: Negative for visual disturbance.   Respiratory: Negative for shortness of breath.    Cardiovascular: Negative for chest pain, palpitations and peripheral edema.   Neurological: Negative for headaches.            Objective    BP (!) 152/92 (BP Location: Right arm, Patient Position: Sitting, Cuff Size: Adult Large)   Pulse 77   Temp 97.7  F (36.5  C) (Tympanic)   Resp 19   Ht 1.803 m (5' 11\")   Wt 100.2 kg (221 lb)   SpO2 98%   BMI 30.82 kg/m    Body mass index is 30.82 kg/m .  Physical Exam  Vitals and nursing note reviewed.   HENT:      Head: Normocephalic.   Eyes:      Conjunctiva/sclera: Conjunctivae normal.   Cardiovascular:      Rate and Rhythm: Normal rate and regular rhythm.      Heart sounds: Normal heart sounds.   Pulmonary:      Effort: Pulmonary effort is normal.      Breath " sounds: Normal breath sounds.   Skin:     General: Skin is warm and dry.   Neurological:      General: No focal deficit present.      Mental Status: He is alert and oriented to person, place, and time.   Psychiatric:         Mood and Affect: Mood normal.         Behavior: Behavior normal.

## 2023-02-17 NOTE — PATIENT INSTRUCTIONS
Reduce daily Lantus bu 2u for low blood sugar event or FBG <120.  Do not make a change more often than every third day.

## 2023-02-21 ASSESSMENT — ENCOUNTER SYMPTOMS
PALPITATIONS: 0
CONSTITUTIONAL NEGATIVE: 1
SHORTNESS OF BREATH: 0
HEADACHES: 0

## 2023-02-27 ENCOUNTER — MYC MEDICAL ADVICE (OUTPATIENT)
Dept: FAMILY MEDICINE | Facility: CLINIC | Age: 63
End: 2023-02-27
Payer: COMMERCIAL

## 2023-02-27 DIAGNOSIS — Z79.4 TYPE 2 DIABETES MELLITUS WITH HYPERGLYCEMIA, WITH LONG-TERM CURRENT USE OF INSULIN (H): ICD-10-CM

## 2023-02-27 DIAGNOSIS — E11.65 TYPE 2 DIABETES MELLITUS WITH HYPERGLYCEMIA, WITH LONG-TERM CURRENT USE OF INSULIN (H): ICD-10-CM

## 2023-02-27 RX ORDER — PEN NEEDLE, DIABETIC 29 G X1/2"
NEEDLE, DISPOSABLE MISCELLANEOUS
Qty: 100 EACH | Refills: 11 | Status: SHIPPED | OUTPATIENT
Start: 2023-02-27 | End: 2023-10-24

## 2023-03-30 ENCOUNTER — OFFICE VISIT (OUTPATIENT)
Dept: PODIATRY | Facility: CLINIC | Age: 63
End: 2023-03-30
Attending: NURSE PRACTITIONER
Payer: COMMERCIAL

## 2023-03-30 ENCOUNTER — ANCILLARY PROCEDURE (OUTPATIENT)
Dept: GENERAL RADIOLOGY | Facility: CLINIC | Age: 63
End: 2023-03-30
Attending: PODIATRIST
Payer: COMMERCIAL

## 2023-03-30 VITALS
DIASTOLIC BLOOD PRESSURE: 81 MMHG | BODY MASS INDEX: 30.94 KG/M2 | WEIGHT: 221 LBS | HEIGHT: 71 IN | SYSTOLIC BLOOD PRESSURE: 120 MMHG

## 2023-03-30 DIAGNOSIS — S92.412A CLOSED DISPLACED FRACTURE OF PROXIMAL PHALANX OF LEFT GREAT TOE, INITIAL ENCOUNTER: ICD-10-CM

## 2023-03-30 PROCEDURE — 73660 X-RAY EXAM OF TOE(S): CPT | Mod: TC | Performed by: RADIOLOGY

## 2023-03-30 PROCEDURE — 99213 OFFICE O/P EST LOW 20 MIN: CPT | Mod: 57 | Performed by: PODIATRIST

## 2023-03-30 PROCEDURE — 28490 TREAT BIG TOE FRACTURE: CPT | Mod: TA | Performed by: PODIATRIST

## 2023-03-30 NOTE — LETTER
3/30/2023         RE: Saji Iqbal  [address]  [place] [address]        Dear Colleague,    Thank you for referring your patient, Saji Iqbal, to the [facility]. Please see a copy of my visit note below.    ASSESSMENT:  Encounter Diagnosis   Name Primary?     Closed displaced fracture of proximal phalanx of left great toe, initial encounter      MEDICAL DECISION MAKING:  I personally reviewed the previous x-ray images, as well as today's at x-ray images.  Initially sustained a fairly substantial comminuted fracture with intra-articular involvement, a step-off in the joint, and displacement.  There is some noted interval healing yet fracture lines are still seen.  The joint is irregular.    He has treated this conservatively.  At this point, no indication for open reduction with internal fixation.  He is certainly at risk for nonunion of some fragments, as well as posttraumatic arthritis    I discussed the risk of posttraumatic arthritis.  This typically is treated initially with supportive, more rigid soled shoes.  Currently he is having very little pain.  If increased pain in the future, I explained that the interphalangeal joint can be fused (surgery).    The above was all discussed in detail with Saji.  At this point I encouraged him to wean out of the surgical shoe and into some rigid soled supportive shoes.  He says he has some at home.    He also reported feeling dizzy during the x-ray.  X-rays were taken in a seated position.  He was escorted back to his room and provided some juice.  Blood pressure was rechecked and normal, 118/62.  He reported resolution of dizziness/lightheadedness. He associated this to not eating breakfast and to wearing a mask.    Follow-up in 1 month.      Disclaimer: This note consists of symbols derived from keyboarding, dictation and/or voice recognition software. As a result, there may be errors in the script that have gone  undetected. Please consider this when interpreting information found in this chart.    Abhay Butts DPM, FACFAS, MS    [facility] Department of Podiatry/Foot & Ankle Surgery      ____________________________________________________________________    HPI:       I was asked by Aura Amador APRN CNP to evaluate Saji Iqbal in consultation for a fracture of his left great toe.     Injury occurred about 2 months ago.  A monitor fell on his foot.  He was evaluated in primary care on 2/2/2023.  X-rays showed a displaced, intra-articular fracture of the proximal phalanx.    Type 2 diabetes  Last hemoglobin A1c 7.5%    Past Medical History:   Diagnosis Date     Cluster headache      Coronary artery disease      Depression      Diabetes mellitus, type II (H)      Gastroesophageal reflux disease      Heart attack (H)      Hyperlipidemia      Hypertension      Renal disease      hx kidney stones     Rotator cuff arthropathy      Sleep apnea     doesn't use cpap-is broken     Stented coronary artery    *  *  Past Surgical History:   Procedure Laterality Date     ARTHROSCOPY SHOULDER       REPAIR TENDON FOOT Right 10/23/2020    Procedure: Repair of extensor tendon at the level of the metatarsophalangeal joint, right foot;  Surgeon: Gerard Diaz DPM;  Location: [facility] OR     STENT, CORONARY, ESAU  2014, 2015   *  *  Current Outpatient Medications   Medication Sig Dispense Refill     ACCU-CHEK GUIDE test strip Use to test blood sugar 1 times daily or as directed. 100 strip 0     aspirin 81 MG tablet Take by mouth daily       augmented betamethasone dipropionate (DIPROLENE-AF) 0.05 % external ointment Apply topically 2 times daily To hands 100 g 6     blood glucose (NO BRAND SPECIFIED) lancets standard Use to test blood sugar 1 times daily or as directed. 100 lancet 0     blood glucose monitoring (NO BRAND SPECIFIED) test strip Use to test blood sugar 4 times daily or as directed. Please dispense Accu Chek Ana plus test  "strips or per patient preference if using a different brand 150 Box 3     blood glucose monitoring (SOFTCLIX) lancets USE TO TEST BLOOD SUGAR ONCE DAILY OR AS DIRECTED 100 each 0     Blood Glucose Monitoring Suppl (ACCU-CHEK GUIDE) w/Device KIT USE TO TEST BLOOD SUGAR 1 TIME DAILY OR AS DIRECTED 1 kit 0     clobetasol (TEMOVATE) 0.05 % external cream Apply topically 2 times daily Trunk, arms, legs and hands and feet 240 g 6     empagliflozin (JARDIANCE) 25 MG TABS tablet Take 1 tablet (25 mg) by mouth daily 90 tablet 1     folic acid (FOLVITE) 1 MG tablet One tablet daily except the day you take methotrexate 60 tablet 3     insulin glargine (LANTUS SOLOSTAR) 100 UNIT/ML pen Inject 24 Units Subcutaneous 2 times daily 45 mL 1     lisinopril (ZESTRIL) 20 MG tablet Take 1 tablet (20 mg) by mouth daily 90 tablet 1     metoprolol tartrate (LOPRESSOR) 25 MG tablet Take 1 tablet (25 mg) by mouth 2 times daily 180 tablet 1     Risankizumab-rzaa (SKYRIZI) 150 MG/ML subcutaneous Inject 1 mL (150 mg) Subcutaneous See Admin Instructions - 1 injection on day 0, 1 injection on day 28, then 1 injection every 12 weeks 1 mL 1     semaglutide (OZEMPIC) 2 MG/1.5ML SOPN pen Inject 0.25 mg Subcutaneous every 7 days for 28 days, THEN 0.5 mg every 7 days for 28 days. 2 mL 0     Semaglutide, 1 MG/DOSE, (OZEMPIC) 4 MG/3ML pen Inject 1 mg Subcutaneous every 7 days 9 mL 1     STATIN NOT PRESCRIBED (INTENTIONAL) Please choose reason not prescribed from choices below. (Patient not taking: Reported on 2/17/2023)       ULTICARE PEN NEEDLES 31G X 5 MM miscellaneous USE 2 DAILY OR AS DIRECTED 100 each 11         EXAM:    Vitals: /81   Ht 1.803 m (5' 11\")   Wt 100.2 kg (221 lb)   BMI 30.82 kg/m    BMI: Body mass index is 30.82 kg/m .    Constitutional:  Saji Iqbal is in no apparent distress, appears well-nourished.  Cooperative with history and physical exam.    Diabetic Foot Exam (details below):  normal DP and PT pulses, no trophic " changes or ulcerative lesions and normal sensory exam    Vascular:  Pedal pulses are palpable for both the DP and PT arteries.  CFT < 3 sec.    Isolated edema of the left hallux centralized around the interphalangeal joint.    Neuro: Light touch sensation is intact to the L4, L5, S1 distributions  No evidence of weakness, spasticity, or contracture in the lower extremities.     Derm: Generalized erythema with edema, left hallux.  No wounds.    Musculoskeletal:    Lower extremity muscle strength is normal. No gross deformities.  The left hallux interphalangeal joint is stiff.    X-Ray Findings:  I personally reviewed the left hallux images.  See comments above.            Again, thank you for allowing me to participate in the care of your patient.        Sincerely,        Abhay Butts DPM

## 2023-03-30 NOTE — PROGRESS NOTES
ASSESSMENT:  Encounter Diagnosis   Name Primary?     Closed displaced fracture of proximal phalanx of left great toe, initial encounter      MEDICAL DECISION MAKING:  I personally reviewed the previous x-ray images, as well as today's at x-ray images.  Initially sustained a fairly substantial comminuted fracture with intra-articular involvement, a step-off in the joint, and displacement.  There is some noted interval healing yet fracture lines are still seen.  The joint is irregular.    He has treated this conservatively.  At this point, no indication for open reduction with internal fixation.  He is certainly at risk for nonunion of some fragments, as well as posttraumatic arthritis    I discussed the risk of posttraumatic arthritis.  This typically is treated initially with supportive, more rigid soled shoes.  Currently he is having very little pain.  If increased pain in the future, I explained that the interphalangeal joint can be fused (surgery).    The above was all discussed in detail with Saji.  At this point I encouraged him to wean out of the surgical shoe and into some rigid soled supportive shoes.  He says he has some at home.    He also reported feeling dizzy during the x-ray.  X-rays were taken in a seated position.  He was escorted back to his room and provided some juice.  Blood pressure was rechecked and normal, 118/62.  He reported resolution of dizziness/lightheadedness. He associated this to not eating breakfast and to wearing a mask.    Follow-up in 1 month.      Disclaimer: This note consists of symbols derived from keyboarding, dictation and/or voice recognition software. As a result, there may be errors in the script that have gone undetected. Please consider this when interpreting information found in this chart.    Abhay Butts DPM, FACFAS, MS    [place] Department of Podiatry/Foot & Ankle Surgery      ____________________________________________________________________    HPI:       I  was asked by Aura Amador APRN CNP to evaluate Saji Iqbal in consultation for a fracture of his left great toe.     Injury occurred about 2 months ago.  A monitor fell on his foot.  He was evaluated in primary care on 2/2/2023.  X-rays showed a displaced, intra-articular fracture of the proximal phalanx.    Type 2 diabetes  Last hemoglobin A1c 7.5%    Past Medical History:   Diagnosis Date     Cluster headache      Coronary artery disease      Depression      Diabetes mellitus, type II (H)      Gastroesophageal reflux disease      Heart attack (H)      Hyperlipidemia      Hypertension      Renal disease      hx kidney stones     Rotator cuff arthropathy      Sleep apnea     doesn't use cpap-is broken     Stented coronary artery    *  *  Past Surgical History:   Procedure Laterality Date     ARTHROSCOPY SHOULDER       REPAIR TENDON FOOT Right 10/23/2020    Procedure: Repair of extensor tendon at the level of the metatarsophalangeal joint, right foot;  Surgeon: Gerard Diaz DPM;  Location: [facility] OR     STENT, CORONARY, ESAU  2014, 2015   *  *  Current Outpatient Medications   Medication Sig Dispense Refill     ACCU-CHEK GUIDE test strip Use to test blood sugar 1 times daily or as directed. 100 strip 0     aspirin 81 MG tablet Take by mouth daily       augmented betamethasone dipropionate (DIPROLENE-AF) 0.05 % external ointment Apply topically 2 times daily To hands 100 g 6     blood glucose (NO BRAND SPECIFIED) lancets standard Use to test blood sugar 1 times daily or as directed. 100 lancet 0     blood glucose monitoring (NO BRAND SPECIFIED) test strip Use to test blood sugar 4 times daily or as directed. Please dispense Accu Chek Ana plus test strips or per patient preference if using a different brand 150 Box 3     blood glucose monitoring (SOFTCLIX) lancets USE TO TEST BLOOD SUGAR ONCE DAILY OR AS DIRECTED 100 each 0     Blood Glucose Monitoring Suppl (ACCU-CHEK GUIDE) w/Device KIT USE TO TEST BLOOD  "SUGAR 1 TIME DAILY OR AS DIRECTED 1 kit 0     clobetasol (TEMOVATE) 0.05 % external cream Apply topically 2 times daily Trunk, arms, legs and hands and feet 240 g 6     empagliflozin (JARDIANCE) 25 MG TABS tablet Take 1 tablet (25 mg) by mouth daily 90 tablet 1     folic acid (FOLVITE) 1 MG tablet One tablet daily except the day you take methotrexate 60 tablet 3     insulin glargine (LANTUS SOLOSTAR) 100 UNIT/ML pen Inject 24 Units Subcutaneous 2 times daily 45 mL 1     lisinopril (ZESTRIL) 20 MG tablet Take 1 tablet (20 mg) by mouth daily 90 tablet 1     metoprolol tartrate (LOPRESSOR) 25 MG tablet Take 1 tablet (25 mg) by mouth 2 times daily 180 tablet 1     Risankizumab-rzaa (SKYRIZI) 150 MG/ML subcutaneous Inject 1 mL (150 mg) Subcutaneous See Admin Instructions - 1 injection on day 0, 1 injection on day 28, then 1 injection every 12 weeks 1 mL 1     semaglutide (OZEMPIC) 2 MG/1.5ML SOPN pen Inject 0.25 mg Subcutaneous every 7 days for 28 days, THEN 0.5 mg every 7 days for 28 days. 2 mL 0     Semaglutide, 1 MG/DOSE, (OZEMPIC) 4 MG/3ML pen Inject 1 mg Subcutaneous every 7 days 9 mL 1     STATIN NOT PRESCRIBED (INTENTIONAL) Please choose reason not prescribed from choices below. (Patient not taking: Reported on 2/17/2023)       ULTICARE PEN NEEDLES 31G X 5 MM miscellaneous USE 2 DAILY OR AS DIRECTED 100 each 11         EXAM:    Vitals: /81   Ht 1.803 m (5' 11\")   Wt 100.2 kg (221 lb)   BMI 30.82 kg/m    BMI: Body mass index is 30.82 kg/m .    Constitutional:  Saji Iqbal is in no apparent distress, appears well-nourished.  Cooperative with history and physical exam.    Diabetic Foot Exam (details below):  normal DP and PT pulses, no trophic changes or ulcerative lesions and normal sensory exam    Vascular:  Pedal pulses are palpable for both the DP and PT arteries.  CFT < 3 sec.    Isolated edema of the left hallux centralized around the interphalangeal joint.    Neuro: Light touch sensation is intact " to the L4, L5, S1 distributions  No evidence of weakness, spasticity, or contracture in the lower extremities.     Derm: Generalized erythema with edema, left hallux.  No wounds.    Musculoskeletal:    Lower extremity muscle strength is normal. No gross deformities.  The left hallux interphalangeal joint is stiff.    X-Ray Findings:  I personally reviewed the left hallux images.  See comments above.

## 2023-04-07 ENCOUNTER — NURSE TRIAGE (OUTPATIENT)
Dept: NURSING | Facility: CLINIC | Age: 63
End: 2023-04-07

## 2023-04-07 ENCOUNTER — OFFICE VISIT (OUTPATIENT)
Dept: FAMILY MEDICINE | Facility: CLINIC | Age: 63
End: 2023-04-07
Payer: COMMERCIAL

## 2023-04-07 VITALS
TEMPERATURE: 97.1 F | OXYGEN SATURATION: 96 % | DIASTOLIC BLOOD PRESSURE: 74 MMHG | HEART RATE: 75 BPM | SYSTOLIC BLOOD PRESSURE: 105 MMHG | WEIGHT: 221.8 LBS | BODY MASS INDEX: 31.05 KG/M2 | RESPIRATION RATE: 16 BRPM | HEIGHT: 71 IN

## 2023-04-07 DIAGNOSIS — K12.0 APHTHOUS ULCER: ICD-10-CM

## 2023-04-07 DIAGNOSIS — S16.1XXA ACUTE STRAIN OF NECK MUSCLE, INITIAL ENCOUNTER: ICD-10-CM

## 2023-04-07 DIAGNOSIS — T14.8XXA HEMATOMA OF SKIN: Primary | ICD-10-CM

## 2023-04-07 PROCEDURE — 99213 OFFICE O/P EST LOW 20 MIN: CPT | Performed by: FAMILY MEDICINE

## 2023-04-07 ASSESSMENT — PAIN SCALES - GENERAL: PAINLEVEL: NO PAIN (0)

## 2023-04-07 ASSESSMENT — ENCOUNTER SYMPTOMS: NECK PAIN: 1

## 2023-04-07 NOTE — PROGRESS NOTES
"  Assessment and Plan    (T14.8XXA) Hematoma of skin  (primary encounter diagnosis)  Comment: I think he just had an unlucky stick with autoinjector and hit a small artery  Plan: observe, reassured    (K12.0) Aphthous ulcer  Comment: healing  Plan: observe    (S16.1XXA) Acute strain of neck muscle, initial encounter  Comment: improving  Plan: heat, ice, OTC pain meds      RTC in 6w    Naseem Esparza MD      Lisa Brennan is a 63 year old, presenting for the following health issues:  injection reaction        4/7/2023    11:11 AM   Additional Questions   Roomed by MR   Accompanied by NA         4/7/2023    11:11 AM   Patient Reported Additional Medications   Patient reports taking the following new medications NA     History of Present Illness       Reason for visit:  Injection site inflamation  Symptom onset:  1-3 days ago    He eats 0-1 servings of fruits and vegetables daily.He consumes 1 sweetened beverage(s) daily.He exercises with enough effort to increase his heart rate 9 or less minutes per day.  He exercises with enough effort to increase his heart rate 3 or less days per week. He is missing 7 dose(s) of medications per week.       Concern - Inj site reaction    Description: He had an injection of insulin either Sunday or Monday     Symptoms started Tues/Wed. There is bruising on his abdomen with a lump under the skin he states is like an \"M&M\". Also has lesions in his mouth, body aches, and neck pain at one point.  Injection for Lantus.  Wonders if he swabbed the wrong patch of skin.    He is worried that he had a seizure because the neck pain came out of no where and was very sharp. Woke with this pain about 5d ago, really bothered him for a day, is moslty resolved now.    Some sores in his mouth.  Spot on his inner lower lip.  Seems to be healing.  Not sure where this came from.   One on his lip formed a blister, although not it looks white.    Is a little concerned that all of these things seemed to " "happen at the same time.    Neck is improving.    Psoriasis currently in remission, not currently on medication.    Review of Systems   HENT: Positive for mouth sores.    Musculoskeletal: Positive for neck pain.   Hematological:        Bruising            Objective    /74 (BP Location: Right arm, Patient Position: Sitting, Cuff Size: Adult Large)   Pulse 75   Temp 97.1  F (36.2  C) (Oral)   Resp 16   Ht 1.803 m (5' 11\")   Wt 100.6 kg (221 lb 12.8 oz)   SpO2 96%   BMI 30.93 kg/m    Body mass index is 30.93 kg/m .  Physical Exam  Vitals and nursing note reviewed.   Constitutional:       Appearance: Normal appearance.   Skin:     General: Skin is warm and dry.   Neurological:      General: No focal deficit present.      Mental Status: He is alert and oriented to person, place, and time.   Psychiatric:         Mood and Affect: Mood normal.         Behavior: Behavior normal.                            " (3) walks occasionally

## 2023-04-07 NOTE — TELEPHONE ENCOUNTER
Patient is calling to report a lesion in his mouth and lump on injection site.    He takes lantus and ozempic.  When he injects lantus on his right side of the abd, he notices that there is a lump after the injection.  It eventually turns purple (like a bruise) and right now, it is about a size of an M&M.  It is not painful or reddened per patient.    Other concern is a lesion in the inside of his lower lip.  It's about 1/8 of an inch in size and is white in color.  It's not draining or bleeding.  He had one on the inside of his cheek before this that went away.    Disposition:  See PCP within 24 hours.  Care advice given. Pt verbalized understanding.    Mayra Tate, RN, BSN Nurse Triage Advisor 4/7/2023 3:56 AM       Reason for Disposition    Painless ulcer or sore    Looks like a boil, infected sore, deep ulcer or other infected rash    Additional Information    Negative: Chemical in the mouth suspected cause of ulcers    Negative: [1] Drinking very little AND [2] dehydration suspected (e.g., no urine > 12 hours, very dry mouth, very lightheaded)    Negative: Generalized rash on body    Negative: Patient sounds very sick or weak to the triager    Negative: Large blisters in mouth (i.e., fluid filled bubbles or sacs)    Negative: Gums are red, painful and have many ulcers    Negative: Fever    Negative: [1] One pimple or ulcer on the gum AND [2] near a toothache    Negative: Large lymph node (> 1 inch or 2.5 cm) under the jaw    Negative: Facial swelling    Negative: Weak immune system (e.g., HIV positive, cancer chemo, splenectomy, organ transplant, chronic steroids)    Negative: 4 or more ulcers    Negative: Mouth ulcer lasts > 2 weeks    Negative: Sounds like a life-threatening emergency to the triager    Negative: Patient sounds very sick or weak to the triager    Negative: SEVERE pain (e.g., excruciating)    Negative: [1] Swelling is painful to touch AND [2] fever    Negative: [1] Swelling is red AND [2]  fever    Negative: [1] Swelling is red AND [2] size > 2 inches (5.0 cm) (Exception: itchy area of skin)    Negative: [1] Swelling of groin (inguinal area) AND [2] painful    Negative: [1] Swelling is painful to touch AND [2] no fever    Protocols used: MOUTH ULCERS-A-AH, SKIN LUMP OR LOCALIZED SWELLING-A-AH

## 2023-04-20 ENCOUNTER — TELEPHONE (OUTPATIENT)
Dept: FAMILY MEDICINE | Facility: CLINIC | Age: 63
End: 2023-04-20
Payer: COMMERCIAL

## 2023-04-20 DIAGNOSIS — I10 BENIGN ESSENTIAL HYPERTENSION: ICD-10-CM

## 2023-04-20 RX ORDER — LISINOPRIL 20 MG/1
20 TABLET ORAL DAILY
Qty: 90 TABLET | Refills: 1 | Status: SHIPPED | OUTPATIENT
Start: 2023-04-20 | End: 2023-10-24

## 2023-04-20 NOTE — TELEPHONE ENCOUNTER
Received call from pt   Due to insurance changes he needs his lisinopril sent to the [facility] pharmacy in [place]  Writer sent the script to pt's preferred pharmacy    Andrey Shen RN on 4/20/2023 at 1:23 PM

## 2023-05-17 NOTE — PATIENT INSTRUCTIONS
Methotrexate 12.5 mg once per week   Folic acid 1 gm daily except the day you take the methotrexate  LAB DRAW in one week from the time you start the methotrexate  Schedule virtual visit in two weeks   Pt has shoulder immobilizer in place R arm and sling in place L arm        Laney Siu RN  05/16/23 [id number]

## 2023-05-24 ENCOUNTER — TELEPHONE (OUTPATIENT)
Dept: FAMILY MEDICINE | Facility: CLINIC | Age: 63
End: 2023-05-24
Payer: COMMERCIAL

## 2023-05-24 DIAGNOSIS — E11.65 UNCONTROLLED TYPE 2 DIABETES MELLITUS WITH HYPERGLYCEMIA (H): Primary | ICD-10-CM

## 2023-05-24 NOTE — TELEPHONE ENCOUNTER
Routing to PCP to advise of medication change. Please refer below for pt request.    Mick Hollins RN on 5/24/2023 at 4:50 PM

## 2023-05-24 NOTE — TELEPHONE ENCOUNTER
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 Pt is requesting an alternative and has been on metformin before and is able to see that a 30 day supply of metformin is $4 which is way more affordable.     Pt has been out of the jardiance for 2 days at this point.     Pharmacy: Linked in [facility]    Pt call back: [phone] Detailed Vm OK    Juliette Akers

## 2023-05-25 NOTE — TELEPHONE ENCOUNTER
Our records show that he was on metformin in the past and had issues of diarrhea with it.  Does he recall how bad this was and would he still like to try it again giventhat?    Naseem Esparza MD

## 2023-05-25 NOTE — TELEPHONE ENCOUNTER
Pt doesn't want the side effects from metformin but also doesn't want the consequences of not taking anything. Pt says he is on day 3 without Jardiance.      Ozempic is now $1000/month.  Has 2 doses left after 5/24/23 dose.      Concerned about how is going to afford his meds going forward.  Pt approved Care coordination referral, referral sent.    Keisha Howard RN, BSN  [facility]

## 2023-05-25 NOTE — TELEPHONE ENCOUNTER
Let's start by tying a different medication in the same family as the Jardiance.  If it's also too expensive have him check back.    Has patient moved, or did someone key in the pharmacy wrong?  Selected pharmacy is [facility]  RN may sign once pharmacy is confirmed.    Naseem Esparza MD

## 2023-05-26 ENCOUNTER — PATIENT OUTREACH (OUTPATIENT)
Dept: CARE COORDINATION | Facility: CLINIC | Age: 63
End: 2023-05-26
Payer: COMMERCIAL

## 2023-05-26 NOTE — PROGRESS NOTES
Clinic Care Coordination Contact  [facility]/Voicemail       Clinical Data: Care Coordinator Outreach  Outreach attempted x 1.  Left message on patient's voicemail with call back information and requested return call.  Plan:  Care Coordinator will try to reach patient again in 1-2 business days.      Addie Mackey, covering for Rachael Beltran  Community Health Worker  [facility]  [phone]

## 2023-05-26 NOTE — PROGRESS NOTES
Clinic Care Coordination Contact  Care Team Conversations    Pt left VM at 3:09 pm, requesting a return phone call regarding medication affordability, specifically for Type 2 diabetes.    CHW Plan: CHW will call pt following Memorial Day Weekend on Tuesday, 5/30/23.    Addie Mackey  Community Health Worker  [facility]  [phone]

## 2023-05-30 NOTE — TELEPHONE ENCOUNTER
Called pt, advised of below.  Pt says Invokana is $590 for 30 days, too expensive.    Invokana and Jardiance were they same daniels.    Keisha Howard RN, BSN  [facility]

## 2023-05-30 NOTE — TELEPHONE ENCOUNTER
Routed to Dr Esparza, please review below and advise, any other ideas/alternatives.    Pt called back.  Advised of below per Dr Esparza.      Invokana is $600 on his insurance.    Metformin is $4 for 30 pills but doesn't go well for pt, doesn't like the side effects but he doesn't know what else to do.  He has been off jardiance now for 7 days.  Blood sugars have been 200 plus, that is with taking ozempic and lantus.  Hasn't check it yet today.  5/29/23 was 220.  Been feeling super tired.    Is Prior Auth an option?    Keisha Howard RN, BSN  [facility]

## 2023-05-30 NOTE — PROGRESS NOTES
Clinic Care Coordination Contact  Community Health Worker Initial Outreach    CHW Initial Information Gathering:  Referral Source: PCP  Current living arrangement:: I live alone  Type of residence:: Apartment  Community Resources: None  Supplies Currently Used at Home: Diabetic Supplies  Equipment Currently Used at Home: none  Informal Support system:: Family (Daughter and several neighbors)  No PCP office visit in Past Year: No  Transportation means:: Regular car  CHW Additional Questions  If ED/Hospital discharge, follow-up appointment scheduled as recommended?: N/A  Medication changes made following ED/Hospital discharge?: N/A  MyChart active?: Yes  Patient sent Social Determinants of Health questionnaire?: Yes    Patient accepts CC: Yes. Patient scheduled for assessment with PAN Chambers RN, on 5/31/23 at 11:00 am. Patient noted desire to discuss medication affordability, severance plan is completed - pt does not want to apply for unemplyment, question about apps to help find affordable medications.     Spoke with pt this morning:    Pt reports he has been out of Jardiance for one week. He continues to take Lantus and Ozempic. Pt had been getting a 90-day supply for Jardiance for $33/mo using his old [employer] insurance. Pt is currently on a health savings-type insurance plan.     Pt laid off from [employer] on 3/1/23 after working for them for 8 years. Pt is actively seeking employment. Pt's computer is not very powerful for looking for work. Pt was working with an  through [employer] in [place], however, is being assigned a new  currently. Pt's severance package is completed; prefers not to apply for unemployment insurance.     Pt spoke recently to PCP about getting back on Metformin because it is cheaper even though it does cause stomach upset for pt ($4/mo at [facility]). However, when Dr. Esparza sent prescription over to [facility], he sent the Jardiance prescription not  Metformin.    Pt has a Bridge insurance policy - uses an ranulfo helps you find the best deals. Pt is unsure of how to change pharmacy from [facility] to [facility] ([facility] - pharmaceutical saving ranulfo).      Addie Mackey  Community Health Worker  [facility]  [phone]

## 2023-05-31 ENCOUNTER — PATIENT OUTREACH (OUTPATIENT)
Dept: NURSING | Facility: CLINIC | Age: 63
End: 2023-05-31
Payer: COMMERCIAL

## 2023-05-31 NOTE — LETTER
[facility]  Patient Centered Plan of Care  About Me:      Patient Name:  Saji Iqbal    YOB: 1960  Age:         63 year old   Selin MRN:    [id number] Telephone Information:  Home Phone [phone]   Mobile [phone]       Address:  [address] [place], [place] [address] Email address:  [email or web address]      Emergency Contact(s)    Name Relationship Lgl Grd Work Phone Home Phone Mobile Phone   1KIKI ESTES Daughter  None [phone]           Primary language:  English     needed? No   [employer]:  [phone] op. 1  Other communication barriers:None    Preferred Method of Communication:  Mail  Current living arrangement: I live alone    Mobility Status/ Medical Equipment: Independent    Health Maintenance  Health Maintenance Reviewed: Due/Overdue   Health Maintenance Due   Topic Date Due     DEPRESSION ACTION PLAN  Never done     COLORECTAL CANCER SCREENING  Never done     LUNG CANCER SCREENING  Never done     ZOSTER IMMUNIZATION (2 of 2) 09/02/2021     COVID-19 Vaccine (3 - Pfizer series) 04/06/2022     A1C  05/17/2023      My Access Plan  Medical Emergency 911   Primary Clinic Line [facility] [phone]   24 Hour Appointment Line [phone] or  [phone]) (toll-free)   24 Hour Nurse Line [phone] (toll-free)   Preferred Urgent Care [facility] [phone]       [facility]  [phone]       Preferred Pharmacy [facility] - [place], [place] - [address] [place] [address]     Behavioral Health Crisis Line The National Suicide Prevention Lifeline at 1-268.269.1262 or Text/Call 208     My Care Team Members  Patient Care Team         Relationship Specialty Notifications Start End    Naseem Esparza MD PCP - General Family Practice  3/27/18     Phone: [phone] Fax: [phone]  [address] [facility] [address]    Naseem Esparza MD Assigned PCP   2/14/21     Phone: [phone] Fax: [phone]         [address] [place] [address]    La Thomas OD MD Ophthalmology  11/9/22     Phone: [phone] Fax: [phone] [address] [place] [address]    Abhay Vasques MD MD Dermatology  2/15/23     Phone: [phone] Pager: [phone] Fax: [phone]        [address] [facility] [address]    Abhay Vasques MD Assigned Surgical Provider   5/6/23     Phone: [phone] Pager: [phone] Fax: [phone]        [address] [facility] [address]    Dayan Hill RN Lead Care Coordinator  Admissions 5/30/23     Phone: [phone]         Rachael Beltran MA Community Health Worker Primary Care - CC Admissions 6/1/23     Phone: [phone]                   My Care Plans  Self Management and Treatment Plan  Care Plan  Care Plan: Diabetes (Primary Care)       Problem: Diabetes Mangement Needs Improvement       Long-Range Goal: Demonstrate improved diabetes management       Start Date: 6/1/2023 Expected End Date: 12/29/2023    Note:     Barriers: limited income, unemployed, diagnosis of multiple, chronic, complex medical conditions, provider availability - wait time to complete appointments, etc.   Strengths: motivated, engaged in care coordination  Patient expressed understanding of goal: Yes  Action steps to achieve this goal:  1. I will follow up with my providers as scheduled/recommended.   2. I will discuss, review, schedule and complete recommended overdue health maintenance with my Primary Care Provider.   3. I will review the following resources to assist with medication affordability:      [place] Prescription Assistance Program: #[phone]     GoodRX: https://www.Car Guy NationrCFEngine.com/     https://www.FlowMetric.org/prescription-assistance/rx-outreach.html     https://Comprehensive Care.com/     My Good Days:  #0-980-298-0531 https://www.mygooddays.org/patients/diseases-covered     Medicare Assistance Programs: https://www.medicare.gov/pharmaceutical-assistance-program/Index.aspx     [employer]ire: #7-222-663-4100 https://www.Vormetric/eligibility/     Lisa [employer] Foundation: #8-385-385-6151 https://www.GeoIQ/     [place] Insulin Safety Net     https://www.needymeds.org/  4. I will take my medications as prescribed.   5. I will review additional resources for employment if/as needed. ([employer] [address], https://www.[employer]/, accessible Internet at local library, etc.)  6. I will contact my care team with questions, concerns, support needs. I will use the clinic as a resource and I understand I can contact my clinic with 24/7 after hours services available. Care Coordinator will remain available as needed.                                   Action Plans on File:                       Advance Care Plans/Directives Type:   No data recorded    My Medical and Care Information  Problem List   Patient Active Problem List   Diagnosis     Tobacco abuse     Coronary artery disease involving native coronary artery without angina pectoris     Hyperlipidemia LDL goal <100     Benign essential hypertension     Obesity due to excess calories     Chronic gastroesophageal reflux disease     Uncontrolled type 2 diabetes mellitus with hyperglycemia (H)     NIKKI (obstructive sleep apnea)     Hypertriglyceridemia     Psoriasis     ETOH abuse     Laceration of extensor tendon of right foot, initial encounter     Moderate episode of recurrent major depressive disorder (H)      Current Medications and Allergies:    Allergies   Allergen Reactions     Metformin Diarrhea     Honey Other (See Comments)     Throat irritation     Statins       Current Outpatient Medications:      ACCU-CHEK GUIDE test strip, Use to test blood sugar 1 times daily or as directed., Disp: 100 strip, Rfl: 0     aspirin 81 MG tablet, Take by mouth daily, Disp: , Rfl:       augmented betamethasone dipropionate (DIPROLENE-AF) 0.05 % external ointment, Apply topically 2 times daily To hands, Disp: 100 g, Rfl: 6     blood glucose (NO BRAND SPECIFIED) lancets standard, Use to test blood sugar 1 times daily or as directed., Disp: 100 lancet, Rfl: 0     blood glucose monitoring (NO BRAND SPECIFIED) test strip, Use to test blood sugar 4 times daily or as directed. Please dispense Accu Chek Ana plus test strips or per patient preference if using a different brand, Disp: 150 Box, Rfl: 3     blood glucose monitoring (SOFTCLIX) lancets, USE TO TEST BLOOD SUGAR ONCE DAILY OR AS DIRECTED, Disp: 100 each, Rfl: 0     Blood Glucose Monitoring Suppl (ACCU-CHEK GUIDE) w/Device KIT, USE TO TEST BLOOD SUGAR 1 TIME DAILY OR AS DIRECTED, Disp: 1 kit, Rfl: 0     canagliflozin (INVOKANA) 300 MG tablet, Take 1 tablet (300 mg) by mouth every morning (before breakfast), Disp: 30 tablet, Rfl: 0     clobetasol (TEMOVATE) 0.05 % external cream, Apply topically 2 times daily Trunk, arms, legs and hands and feet, Disp: 240 g, Rfl: 6     Ertugliflozin L-PyroglutamicAc 15 MG TABS, Take 15 mg by mouth daily, Disp: 30 tablet, Rfl: 0     folic acid (FOLVITE) 1 MG tablet, One tablet daily except the day you take methotrexate, Disp: 60 tablet, Rfl: 3     insulin glargine (LANTUS SOLOSTAR) 100 UNIT/ML pen, Inject 24 Units Subcutaneous 2 times daily, Disp: 45 mL, Rfl: 1     lisinopril (ZESTRIL) 20 MG tablet, Take 1 tablet (20 mg) by mouth daily, Disp: 90 tablet, Rfl: 1     metoprolol tartrate (LOPRESSOR) 25 MG tablet, Take 1 tablet (25 mg) by mouth 2 times daily, Disp: 180 tablet, Rfl: 1     Semaglutide, 1 MG/DOSE, (OZEMPIC) 4 MG/3ML pen, Inject 1 mg Subcutaneous every 7 days, Disp: 9 mL, Rfl: 1     STATIN NOT PRESCRIBED (INTENTIONAL), Please choose reason not prescribed from choices below., Disp: , Rfl:      ULTICARE PEN NEEDLES 31G X 5 MM miscellaneous, USE 2 DAILY OR AS DIRECTED, Disp: 100 each, Rfl: 11     Care  Coordination Start Date: 5/25/2023   Frequency of Care Coordination: 2 weeks       Form Last Updated: 06/01/2023

## 2023-05-31 NOTE — LETTER
[facility] CARE COORDINATION  [address] [place] [address]     June 1, 2023    Saji GODWIN Rasheed  [address]  [place] [address]      Dear Saji,    I am a  clinic care coordinator who works with Naseem Esparza MD with the [facility]. I wanted to thank you for spending the time to talk with me.  Below is a description of clinic care coordination and how I can further assist you.       The clinic care coordination team is made up of a registered nurse, , financial resource worker and community health worker who understand the health care system. The goal of clinic care coordination is to help you manage your health and improve access to the health care system. Our team works alongside your provider to assist you in determining your health and social needs. We can help you obtain health care and community resources, providing you with necessary information and education. We can work with you through any barriers and develop a care plan that helps coordinate and strengthen the communication between you and your care team.  Our services are voluntary and are offered without charge to you personally.    Please feel free to contact me with any questions or concerns regarding care coordination and what we can offer.      We are focused on providing you with the highest-quality healthcare experience possible.    Sincerely,     Dayan Hill RN Care Coordinator  [facility] - [place], [place], [place]  Email: [email or web address]  Phone: [phone]     Enclosed: I have enclosed a copy of the Patient Centered Plan of Care. This has helpful information and goals that we have talked about. Please keep this in an easy to access place to use as needed.

## 2023-05-31 NOTE — PROGRESS NOTES
Clinic Care Coordination Contact  [facility]/Voicemail    Clinical Data: Care Coordinator Outreach  Outreach attempted x 1.  Left message on patient's voicemail with call back information and requested return call.  Plan: Care Coordinator will try to reach patient again in 3-5 business days.    Dayan Hill, RN Care Coordinator  [facility] [place]  Email: [email or web address]  Phone: [phone]

## 2023-05-31 NOTE — Clinical Note
Please see medication section. Appointment scheduled 06/05/23. Patient requesting RX for Metformin. Jardiance and Invokana not affordable. Ozempic $125 with coupon. Also reports taking Atorvastatin - not on medication list and noted on Allergy list. Medication affordability resources provided and Care Coordination will follow up with patient. Thanks, Dayan Hill, RNCC.

## 2023-06-01 ENCOUNTER — TELEPHONE (OUTPATIENT)
Dept: FAMILY MEDICINE | Facility: CLINIC | Age: 63
End: 2023-06-01
Payer: COMMERCIAL

## 2023-06-01 DIAGNOSIS — E11.65 UNCONTROLLED TYPE 2 DIABETES MELLITUS WITH HYPERGLYCEMIA (H): Primary | ICD-10-CM

## 2023-06-01 RX ORDER — GLIPIZIDE 10 MG/1
10 TABLET, FILM COATED, EXTENDED RELEASE ORAL DAILY
Qty: 90 TABLET | Refills: 0 | Status: SHIPPED | OUTPATIENT
Start: 2023-06-01 | End: 2023-06-05

## 2023-06-01 ASSESSMENT — ACTIVITIES OF DAILY LIVING (ADL): DEPENDENT_IADLS:: INDEPENDENT

## 2023-06-01 NOTE — TELEPHONE ENCOUNTER
Advised of below per Dr Esparza.    Pt looked up glipizide cost through his insurance and says that is more affordable for now until he can discuss with pharmacy team.  Would only like to take for one month if possible.     Gave pt [facility] MTM scheduling phone number from referral.    Keisha Howard RN, BSN  [facility]

## 2023-06-01 NOTE — PROGRESS NOTES
Clinic Care Coordination Contact  OUTREACH    Referral Information:  Referral Source: PCP  Primary Diagnosis: Diabetes    Chief Complaint   Patient presents with     Clinic Care Coordination - Initial     Scheduled RNCC Assessment       Universal Utilization: 19% Risk of Admission or ED Visit   Clinic Utilization  Difficulty keeping appointments:: No  Compliance Concerns: Yes  No-Show Concerns: No  No PCP office visit in Past Year: No  Utilization    Hospital Admissions  0             ED Visits  0             No Show Count (past year)  1                Current as of: 6/1/2023  2:17 AM            Clinical Concerns:  Current Medical Concerns:   Patient Active Problem List   Diagnosis     Tobacco abuse     Coronary artery disease involving native coronary artery without angina pectoris     Hyperlipidemia LDL goal <100     Benign essential hypertension     Obesity due to excess calories     Chronic gastroesophageal reflux disease     Uncontrolled type 2 diabetes mellitus with hyperglycemia (H)     NIKKI (obstructive sleep apnea)     Hypertriglyceridemia     Psoriasis     ETOH abuse     Laceration of extensor tendon of right foot, initial encounter     Moderate episode of recurrent major depressive disorder (H)      Checks his blood sugars daily in the morning.  Reports readings averaging over 200  States that he is trying to better control his diet  Verbalizes experiencing increased thirst with higher blood sugar readings.      Current Behavioral Concerns: none noted.     Education Provided to patient: Care Coordination role, clinic after hours, medications, appointments discussed/reviewed. Support provided.    Pain  Pain (GOAL):: No  Health Maintenance Reviewed: Due/Overdue   Health Maintenance Due   Topic Date Due     DEPRESSION ACTION PLAN  Never done     COLORECTAL CANCER SCREENING  Never done     LUNG CANCER SCREENING  Never done     ZOSTER IMMUNIZATION (2 of 2) 09/02/2021     COVID-19 Vaccine (3 - Pfizer series)  04/06/2022     A1C  05/17/2023      Clinical Pathway: Clinic Care Coordination Diabetes Assessment    Medication Management:  Medication review status: Medications reviewed.  Changes noted per patient report.  Patient states:   He is not taking Invokana due to medication being too expensive   Not taking Ertugliflozin L-PyroglutamicAc 15 mg  Has been out of Jardiance for 10 days. Unable to refill due to cost.   States that he increased his insulin from 24 units to 26 units on Monday.    Has pill bottles for Gabapentin - previously prescribed for itching which his skin has cleared up so he is no longer taking this.   Also has Methotrexate & Folic Acid but he is not taking this.     Reports he is taking   20 mg of atorvastatin before bed.   Centrum Silver for Men over 50.   Not on medication list     Has one dose of Ozempic remaining that he takes on Mondays. He is not sure if this is affordable since he has different insurance since the last time this was refilled.   Reports cost is $125 with patient assistance program at [facility] which is still pretty expensive.     MTM is not covered under health insurance plan.      RNCC assisted patient to schedule Primary Care Provider appointment 06/05/23 at 1:40 pm to discuss and review medications further.   Patient is hopeful to resume Metformin as this is more affordable.     Functional Status:  Dependent ADLs:: Independent  Dependent IADLs:: Independent  Bed or wheelchair confined:: No  Mobility Status: Independent  Fallen 2 or more times in the past year?: No  Any fall with injury in the past year?: No    Living Situation:  Current living arrangement:: I live alone  Type of residence:: Apartment    Lifestyle & Psychosocial Needs:    Social Determinants of Health     Tobacco Use: Medium Risk (4/7/2023)    Patient History      Smoking Tobacco Use: Former      Smokeless Tobacco Use: Never      Passive Exposure: Never   Alcohol Use: Not on file   Financial Resource Strain: Low  Risk  (6/19/2020)    Overall Financial Resource Strain (CARDIA)      Difficulty of Paying Living Expenses: Not hard at all   Food Insecurity: No Food Insecurity (6/19/2020)    Hunger Vital Sign      Worried About Running Out of Food in the Last Year: Never true      Ran Out of Food in the Last Year: Never true   Transportation Needs: No Transportation Needs (6/19/2020)    PRAPARE - Transportation      Lack of Transportation (Medical): No      Lack of Transportation (Non-Medical): No   Physical Activity: Unknown (6/19/2020)    Exercise Vital Sign      Days of Exercise per Week: 5 days      Minutes of Exercise per Session: Not on file   Stress: Stress Concern Present (6/19/2020)    [employer] - Occupational Stress Questionnaire      Feeling of Stress : To some extent   Social Connections: Not on file   Intimate Partner Violence: Not At Risk (6/19/2020)    Humiliation, Afraid, Rape, and Kick questionnaire      Fear of Current or Ex-Partner: No      Emotionally Abused: No      Physically Abused: No      Sexually Abused: No   Depression: Not at risk (2/17/2023)    PHQ-2      PHQ-2 Score: 2   Housing Stability: Not on file     Diet:: Diabetic diet  Inadequate nutrition (GOAL):: No  Tube Feeding: No  Inadequate activity/exercise (GOAL):: No  Significant changes in sleep pattern (GOAL): No  Transportation means:: Regular car  Shinto or spiritual beliefs that impact treatment:: No  Mental health DX:: Yes  Mental health DX how managed:: None  Mental health management concern (GOAL):: No  Chemical Dependency Status: Past Concern (ETOH)  Chemical Dependency Management: Sponsor  Informal Support system:: Family, Children, Neighbors (Daughter and several neighbors)      Resources and Interventions:  Current Resources:   Community Resources: DME  Supplies Currently Used at Home: Diabetic Supplies, Compression Stockings  Equipment Currently Used at Home: glucometer, grab bar, tub/shower, shower  chair  Employment Status: unemployed  Advance Care Plan/Directive  Advanced Care Plans/Directives on file:: No  Advanced Care Plan/Directive Status: Declined Further Information    Referrals Placed: Community Resources     Care Plan:  Care Plan: Diabetes (Primary Care)     Problem: Diabetes Mangement Needs Improvement     Long-Range Goal: Demonstrate improved diabetes management     Start Date: 6/1/2023 Expected End Date: 12/29/2023    Note:     Barriers: limited income, unemployed, diagnosis of multiple, chronic, complex medical conditions, provider availability - wait time to complete appointments, etc.   Strengths: motivated, engaged in care coordination  Patient expressed understanding of goal: Yes  Action steps to achieve this goal:  1. I will follow up with my providers as scheduled/recommended.   2. I will discuss, review, schedule and complete recommended overdue health maintenance with my Primary Care Provider.   3. I will review the following resources to assist with medication affordability:      [employer] Prescription Assistance Program: #812-123-4517     [facility]: https://www.Exabre/     https://www.Guang Lian Shi Dai.GameSkinny/prescription-assistance/rx-outreach.html     https://Mettl/     [employer] Good Days: #7-842-312-1615 https://www.FX Bridgegooddays.org/patients/diseases-covered     Medicare Assistance Programs: https://www.medicare.gov/pharmaceutical-assistance-program/Index.aspx     T3 Search: #3-349-348-2483 https://www.Ahandyhand.Protagonist Therapeutics/eligibility/     [facility] [facility]: #5-800-600-5859 https://www.StyleZen.com/     [place] Insulin Safety Net     https://www.needymeds.org/  4. I will take my medications as prescribed.   5. I will review additional resources for employment if/as needed. ([facility] [address] [place], https://www.POINT [employer]ical.[employer]ist [employer]rapeutics/, accessible Internet at local library, etc.)  6. I will contact my care team with questions, concerns, support needs. I will use the clinic as a resource and I understand I can  contact my clinic with 24/7 after hours services available. Care Coordinator will remain available as needed.                          Patient/Caregiver understanding: Patient/caregiver verbalized understanding and denies any additional questions or concerns at this time. RNCC engaged in AIDET communications during encounter.      Outreach Frequency: 2 weeks  Future Appointments              In 4 days Naseem Esparza MD [facility]    In 1 month Naseem Esparza MD [facility]        Plan: RNCC will send clinic care coordination introduction letter, patient centered plan of care, and medication list to patient via myZamana. Patient/caregiver was provided with writers contact information and encouraged to call with questions, concerns, support needs. RNCC will remain available as needed. CHWCC will follow up with patient/caregiver again in 2 weeks. RNCC will review chart in 6 weeks.      Dayan Hill RN Care Coordinator  [facility] [place]  Email: [email or web address]  Phone: [phone]

## 2023-06-01 NOTE — TELEPHONE ENCOUNTER
I will refer him to our pharmacy team and they can work with him to see in there are any assistance programs to help with cost.  Rx for glipizide sent - Mika can use that for now while waiting to figgure things out with pharmacy.    Naseem Esparza MD

## 2023-06-01 NOTE — TELEPHONE ENCOUNTER
Rec'd message from Dayan Hill to schedule first available appointment with PCP, as the patient needs a different medication other than JARDIANCE due to the cost. Scheduled patient for a telephone visit on Monday, June 5th. Huddled with Dr. Esparza and he said that the appointment was not needed, and that the patient should call their pharmacist to see if there are other medications in the same class that would be less expensive, and then the patient would let us know which ones and the provider could prescribe them. Called patient to let him know, and the patient said that he already asked the pharmacist and there weren't any medications that were affordable. Patient would like to keep his video visit, but would prefer an earlier appointment. Routing to PCP as FYI and to see if it is appropriate to offer the patient another appointment.     Tiffany Pruitt

## 2023-06-03 ENCOUNTER — HEALTH MAINTENANCE LETTER (OUTPATIENT)
Age: 63
End: 2023-06-03

## 2023-06-05 ENCOUNTER — VIRTUAL VISIT (OUTPATIENT)
Dept: FAMILY MEDICINE | Facility: CLINIC | Age: 63
End: 2023-06-05
Payer: COMMERCIAL

## 2023-06-05 DIAGNOSIS — E11.65 TYPE 2 DIABETES MELLITUS WITH HYPERGLYCEMIA, WITH LONG-TERM CURRENT USE OF INSULIN (H): ICD-10-CM

## 2023-06-05 DIAGNOSIS — E11.65 UNCONTROLLED TYPE 2 DIABETES MELLITUS WITH HYPERGLYCEMIA (H): ICD-10-CM

## 2023-06-05 DIAGNOSIS — Z12.11 SCREEN FOR COLON CANCER: Primary | ICD-10-CM

## 2023-06-05 DIAGNOSIS — Z79.4 TYPE 2 DIABETES MELLITUS WITH HYPERGLYCEMIA, WITH LONG-TERM CURRENT USE OF INSULIN (H): ICD-10-CM

## 2023-06-05 PROCEDURE — 99214 OFFICE O/P EST MOD 30 MIN: CPT | Mod: 95 | Performed by: FAMILY MEDICINE

## 2023-06-05 RX ORDER — GLIPIZIDE 10 MG/1
10 TABLET, FILM COATED, EXTENDED RELEASE ORAL DAILY
Qty: 90 TABLET | Refills: 0 | Status: ON HOLD | OUTPATIENT
Start: 2023-06-05 | End: 2023-09-08

## 2023-06-05 ASSESSMENT — ENCOUNTER SYMPTOMS
CONSTITUTIONAL NEGATIVE: 1
PALPITATIONS: 0
HEADACHES: 0
SHORTNESS OF BREATH: 0

## 2023-06-05 NOTE — PROGRESS NOTES
Mika is a 63 year old who is being evaluated via a billable telephone visit.      What phone number would you like to be contacted at? [phone]  How would you like to obtain your AVS? [place]  Distant Location (provider location):  Off-site    Assessment and Plan      (E11.65) Uncontrolled type 2 diabetes mellitus with hyperglycemia (H)  Comment: due to insurance will have to switch to less desirable meds, hopefully will find new job and can resume previous plan  Plan: glipiZIDE (GLUCOTROL XL) 10 MG 24 hr tablet            (E11.65,  Z79.4) Type 2 diabetes mellitus with hyperglycemia, with long-term current use of insulin (H)  Comment:   Plan: blood glucose (NO BRAND SPECIFIED) test strip              RTC in  for DM/CPE    Naseem Esparza MD      Subjective   Mika is a 63 year old, presenting for the following health issues:  No chief complaint on file.        6/5/2023     1:25 PM   Additional Questions   Roomed by CHRISTINA Mendoza     History of Present Illness       Reason for visit:  New insurance doesn t cover giardiance.    He eats 0-1 servings of fruits and vegetables daily.He consumes 1 sweetened beverage(s) daily.He exercises with enough effort to increase his heart rate 9 or less minutes per day.  He exercises with enough effort to increase his heart rate 3 or less days per week. He is missing 7 dose(s) of medications per week.  He is not taking prescribed medications regularly due to other.       Diabetes Follow-up    How often are you checking your blood sugar? One time daily  What time of day are you checking your blood sugars (select all that apply)?  once in the morning   Have you had any blood sugars above 200?  Yes   Have you had any blood sugars below 70?  No    What symptoms do you notice when your blood sugar is low?  Lethargy    What concerns do you have today about your diabetes? Other: medication concerns - due to insurance     Do you have any of these symptoms? (Select all that apply)   Numbness in feet      BP Readings from Last 2 Encounters:   04/07/23 105/74   03/30/23 120/81     Hemoglobin A1C (%)   Date Value   02/17/2023 7.5 (H)   07/07/2022 6.3 (H)   07/02/2021 8.5 (H)   01/07/2021 7.8 (H)     LDL Cholesterol Calculated (mg/dL)   Date Value   07/19/2022 167 (H)   01/19/2022 171 (H)   06/16/2020 175 (H)   05/13/2019 182 (H)           Recently laid off, some medication not well covered and too expensive.  Wondering about glipizide and it's effectiveness.  Now using 26u of Lantus.    Hopes to get new job and insurance situation should improve.    Review of Systems   Constitutional: Negative.    Eyes: Negative for visual disturbance.   Respiratory: Negative for shortness of breath.    Cardiovascular: Negative for chest pain, palpitations and peripheral edema.   Neurological: Negative for headaches.            Objective           Vitals:  No vitals were obtained today due to virtual visit.    Physical Exam   healthy, alert and no distress  PSYCH: Alert and oriented times 3; coherent speech, normal   rate and volume, able to articulate logical thoughts, able   to abstract reason, no tangential thoughts, no hallucinations   or delusions  His affect is normal  RESP: No cough, no audible wheezing, able to talk in full sentences  Remainder of exam unable to be completed due to telephone visits          Phone call duration: 18 minutes

## 2023-06-08 ENCOUNTER — TELEPHONE (OUTPATIENT)
Dept: FAMILY MEDICINE | Facility: CLINIC | Age: 63
End: 2023-06-08
Payer: COMMERCIAL

## 2023-06-08 NOTE — TELEPHONE ENCOUNTER
MTM referral from: [facility] visit (referral by provider)    MT referral outreach attempt #2 on 2023 at 3:29 PM      Outcome: Patient is not interested at this time because he's discussed with provider and starting another medication. Patient states his meter strips are  and he will compare rates for cheapest with [facility], will route to [facility] Pharmacist/Provider as an FYI. Thank you for the referral.     Use Private Pay for the carrier/Plan on the flowsheet    Mihaela Fu [facility]

## 2023-06-20 ENCOUNTER — PATIENT OUTREACH (OUTPATIENT)
Dept: CARE COORDINATION | Facility: CLINIC | Age: 63
End: 2023-06-20
Payer: COMMERCIAL

## 2023-06-20 NOTE — PROGRESS NOTES
Clinic Care Coordination Contact  [facility]/Voicemail       Clinical Data: Care Coordinator Outreach  Outreach attempted x 1.  Left message on patient's voicemail with call back information and requested return call.    Plan: Care Coordinator will try to reach patient again in 10 business days.    SHAWNEE Nair, B.S. [facility] Care Coordination  [facility]:  [facility] [place]  [phone]  [email or web address]

## 2023-07-24 ENCOUNTER — PATIENT OUTREACH (OUTPATIENT)
Dept: CARE COORDINATION | Facility: CLINIC | Age: 63
End: 2023-07-24
Payer: COMMERCIAL

## 2023-07-24 NOTE — PROGRESS NOTES
Clinic Care Coordination Contact  Community Health Worker Follow Up    Care Gaps: Did not discuss today    Health Maintenance Due   Topic Date Due    DEPRESSION ACTION PLAN  Never done    COLORECTAL CANCER SCREENING  Never done    LUNG CANCER SCREENING  Never done    ZOSTER IMMUNIZATION (2 of 2) 09/02/2021    COVID-19 Vaccine (3 - Pfizer series) 04/06/2022    A1C  05/17/2023    MICROALBUMIN  07/07/2023    BMP  07/19/2023    LIPID  07/19/2023    YEARLY PREVENTIVE VISIT  07/19/2023    PHQ-9  08/17/2023       Care Plan:   Care Plan: Diabetes (Primary Care)       Problem: Diabetes Mangement Needs Improvement       Long-Range Goal: Demonstrate improved diabetes management       Start Date: 6/1/2023 Expected End Date: 12/29/2023    This Visit's Progress: 10%    Note:     Barriers: limited income, unemployed, diagnosis of multiple, chronic, complex medical conditions, provider availability - wait time to complete appointments, etc.   Strengths: motivated, engaged in care coordination  Patient expressed understanding of goal: Yes  Action steps to achieve this goal:  1. I will follow up with my providers as scheduled/recommended.   2. I will discuss, review, schedule and complete recommended overdue health maintenance with my Primary Care Provider.   3. I will review the following resources to assist with medication affordability:    [employer] Prescription Assistance Program: #164-772-3943   [facility]: https://www.CMGE.Pepperweed Consulting/   https://www.Research & Innovation.org/prescription-assistance/rx-outreach.html   https://Mobitto.Pepperweed Consulting/   My Good Days: #5-627-249-1097 https://www.mygooddays.org/patients/diseases-covered   Medicare Assistance Programs: https://www.medicare.gov/pharmaceutical-assistance-program/Index.aspx   [employer]: #3-467-177-1671 https://www.e-Zassi.Pepperweed Consulting/eligibility/   [facility] [place]: #7-083-049-3009 https://www.Echobit.com/   [place] Insulin Safety Net   https://www.needymeds.org/  4. I will take my medications as  "prescribed.   5. I will review additional resources for employment if/as needed. ([facility] [address] [place], https://www.Group Commerceforcemn.com/, accessible Internet at local library, etc.)  6. I will contact my care team with questions, concerns, support needs. I will use the clinic as a resource and I understand I can contact my clinic with 24/7 after hours services available. Care Coordinator will remain available as needed.                                  Intervention and Education during outreach:   Pt states he is taking all his medications except metoprolol which he has been out of for 2 weeks. He has checked [facility] and it will cost $28. Pt reports having a 12 days supply of Lantus injections remaining.  Pt reports having a 2 weeks supply of lisinopril as well. CHW recommends pt review the medication affordability resources sent to him on 6/1/23, including the resources for [place] Prescription Assistance Program: #818-897-3360 who might be able to assist him during this time between unemployment and employment. Pt verbalizes understanding.  \"I'm running pretty low on funds.\" Pt states he is able to afford his rent for August 2023 and his car insurance has been paid.   Pt reports needing to sign up for unemployment. He need to provide the last 18 months of work history from his former employer. He is having difficulty opening the ranulfo to his former employer which will allow him access to this information. Was given a password that doesn't work to the ranulfo, even though it was reset by someone in HR. CHW encourages pt to reach out to HR at his former employer again for assistance in being able to access his former work history and pay stubs.  Pt continues to work with an  at [employer]. Just last week, this  encouraged him to apply for [facility]. Pt's goal is to fill out the online form at [facility] today or tomorrow.   Pt reports he will be meeting with his  at [employer] on 7/27/23 to complete Linked " In online resume.     CHW asks if pt has any further concerns or need for resources as this time. Pt states he does not. CHW made sure pt has CHW contact information. Pt will contact CHW with questions or updates before next outreach.     CHW Plan: CHW will follow up with pt in one month.    Addie Mackey  Community Health Worker  [facility]  [phone]

## 2023-07-25 ENCOUNTER — PATIENT OUTREACH (OUTPATIENT)
Dept: CARE COORDINATION | Facility: CLINIC | Age: 63
End: 2023-07-25
Payer: COMMERCIAL

## 2023-07-25 NOTE — PROGRESS NOTES
"Clinic Care Coordination Contact  [facility]/Voicemail       RNCC received voicemail message from patient stating he was trying to apply for [employer], however was experiencing difficulties as it \"wouldn't go through\". States in voicemail he waited on long hold to try and call them without luck.     RNCC also received message from WCC that patient has been out of Metoprolol medication for the past 2 weeks.   Good RX at [facility] cost $4-$11.   Patient reported only having 2 week supply remaining of  Lantus & Lisinopril medication.   GoodRX cost for 1 month of Lisinopril: $4 at [facility].   Insulin could be obtained via resources provided within goal. ([place] Prescription Assistance Program, [place], [place] Insulin Safety Net, etc).     Clinical Data: Care Coordinator Outreach  Outreach attempted x 1.  Left message on patient's voicemail with call back information and requested return call.  Plan: Care Coordinator will try to reach patient again in 3-5 business days.    Dayan Hill RN Care Coordinator  [facility] [place]  Email: [email or web address]  Phone: [phone]   "

## 2023-07-26 ENCOUNTER — PATIENT OUTREACH (OUTPATIENT)
Dept: CARE COORDINATION | Facility: CLINIC | Age: 63
End: 2023-07-26
Payer: COMMERCIAL

## 2023-07-26 NOTE — PROGRESS NOTES
Clinic Care Coordination Contact  [facility]/Voicemail    Received IB message from PAN Hanley RN, to reach out to pt today to discuss FRW referral and options to refill prescriptions. See her 7/25/23 visit note.    Clinical Data: Care Coordinator Outreach  Outreach attempted x 1.  Left message on patient's voicemail with call back information and requested return call.  Plan: Care Coordinator will try to reach patient again in 1-2 business days.    Addie Mackey  Community Health Worker  [facility]  [phone]

## 2023-07-28 ENCOUNTER — PATIENT OUTREACH (OUTPATIENT)
Dept: CARE COORDINATION | Facility: CLINIC | Age: 63
End: 2023-07-28
Payer: COMMERCIAL

## 2023-07-28 NOTE — PROGRESS NOTES
Clinic Care Coordination Contact  Community Health Worker Follow Up    Care Gaps: Did not discuss.     Health Maintenance Due   Topic Date Due    DEPRESSION ACTION PLAN  Never done    COLORECTAL CANCER SCREENING  Never done    LUNG CANCER SCREENING  Never done    ZOSTER IMMUNIZATION (2 of 2) 09/02/2021    COVID-19 Vaccine (3 - Pfizer series) 04/06/2022    A1C  05/17/2023    MICROALBUMIN  07/07/2023    BMP  07/19/2023    LIPID  07/19/2023    YEARLY PREVENTIVE VISIT  07/19/2023    PHQ-9  08/17/2023       Care Plan:   Care Plan: Diabetes (Primary Care)       Problem: Diabetes Mangement Needs Improvement       Long-Range Goal: Demonstrate improved diabetes management       Start Date: 6/1/2023 Expected End Date: 12/29/2023    This Visit's Progress: 10%    Note:     Barriers: limited income, unemployed, diagnosis of multiple, chronic, complex medical conditions, provider availability - wait time to complete appointments, etc.   Strengths: motivated, engaged in care coordination  Patient expressed understanding of goal: Yes  Action steps to achieve this goal:  1. I will follow up with my providers as scheduled/recommended.   2. I will discuss, review, schedule and complete recommended overdue health maintenance with my Primary Care Provider.   3. I will review the following resources to assist with medication affordability:    Syndevrx Prescription Assistance Program: #[phone]   IZEARX: https://www.Advanced Field Solutions.Graduateland/   https://www.60mo.org/prescription-assistance/rx-outreach.html   https://Juxinli.Graduateland/   My Good Days: #7-516-991-3838 https://www.mygooddays.org/patients/diseases-covered   Medicare Assistance Programs: https://www.medicare.gov/pharmaceutical-assistance-program/Index.aspx   Mobclix: #0-223-321-0006 https://www.Wilson Therapeutics.Graduateland/eligibility/   Lisa HallChristianaCare: #6-241-479-1145 https://www.mBlox.com/   [place] Insulin Safety [place]   https://www.needymeds.org/  4. I will take my medications as prescribed.  "  5. I will review additional resources for employment if/as needed. ([facility] [address] [place], https://www.careerforcemn.com/, accessible Internet at local library, etc.)  6. I will contact my care team with questions, concerns, support needs. I will use the clinic as a resource and I understand I can contact my clinic with 24/7 after hours services available. Care Coordinator will remain available as needed.                                  Intervention and Education during outreach:   Pt is waiting for an UBER to take him to Urgent Care at the time of my call this morning. Pt states his right ear is plugged up, to the point he doesn't feel safe driving. Pt was with a friend 2 weeks ago who had an ear infection.   Pt reports he has been able to access his 2023 earnings through his former employer, [employer]. Now he will be able to apply for unemployment, which he plans to do later today.  Pt states he met with his  yesterday, who has \"infinite wisdom about these programs [[employer] and unemployment].\" He was told by his  that \"the moment I get my first paycheck or unemployment check, it would disqualify myself from [employer].\" CHW states she does not know these specifics and asks if pt would like to talk with our Financial Resource Worker (FRW) team. Pt declines, stating he feels he has a good handle on this situation now.   CHW and pt discussed talking on Monday, 7/31/23, at 9:00 am about prescription affordability programs ([place] Prescription Assistance Program, Lisa Cares, [place] Insulin Safety Net, etc) so he can procure metoprolol, lantus, and lisinopril.     CHW Plan: CHW will call pt at 9:00 am on 7/31/23 to further discuss procuring the medications he is out of/needing within the next 1-2 weeks.    Addie Mackey  Community Health Worker  [facility]  [phone]        "

## 2023-07-31 ENCOUNTER — PATIENT OUTREACH (OUTPATIENT)
Dept: CARE COORDINATION | Facility: CLINIC | Age: 63
End: 2023-07-31
Payer: COMMERCIAL

## 2023-07-31 NOTE — PROGRESS NOTES
Clinic Care Coordination Contact  Community Health Worker Follow Up    Care Gaps: Did not discuss today    Health Maintenance Due   Topic Date Due    DEPRESSION ACTION PLAN  Never done    COLORECTAL CANCER SCREENING  Never done    LUNG CANCER SCREENING  Never done    ZOSTER IMMUNIZATION (2 of 2) 09/02/2021    COVID-19 Vaccine (3 - Pfizer series) 04/06/2022    A1C  05/17/2023    MICROALBUMIN  07/07/2023    BMP  07/19/2023    LIPID  07/19/2023    YEARLY PREVENTIVE VISIT  07/19/2023    PHQ-9  08/17/2023       Care Gap Goal set: Yes    Care Plan:   Care Plan: Diabetes (Primary Care)       Problem: Diabetes Mangement Needs Improvement       Long-Range Goal: Demonstrate improved diabetes management       Start Date: 6/1/2023 Expected End Date: 12/29/2023    This Visit's Progress: 10% Recent Progress: 10%    Note:     Barriers: limited income, unemployed, diagnosis of multiple, chronic, complex medical conditions, provider availability - wait time to complete appointments, etc.   Strengths: motivated, engaged in care coordination  Patient expressed understanding of goal: Yes  Action steps to achieve this goal:  1. I will follow up with my providers as scheduled/recommended.   2. I will discuss, review, schedule and complete recommended overdue health maintenance with my Primary Care Provider.   3. I will review the following resources to assist with medication affordability:    [place] Prescription Assistance Program: #[phone]   dcBLOX Inc.RX: https://www.Frank & Oak.GT Channel/  https://www.Useful at Night.org/prescription-assistance/rx-outreach.html   https://Suo Yi.GT Channel/   My Good Days: #8-325-844-3452 https://www.mygooddays.org/patients/diseases-covered   Medicare Assistance Programs: https://www.medicare.gov/pharmaceutical-assistance-program/Index.aspx   Bioniq Health: #6-041-151-1927 https://www.i-nexus.GT Channel/eligibility/   Lisa Halls [facility]: #6-414-396-5975 https://www.Iluminage Beauty.GT Channel/   [place] Insulin Safety [place]    https://www.needymeds.org/  4. I will take my medications as prescribed.   5. I will review additional resources for employment if/as needed. ([address], https://www.Kinsights/, accessible Internet at local [employer] management, etc.)  6. I will contact my care team with questions, concerns, support needs. I will use the clinic as a resource and I understand I can contact my clinic with 24/7 after hours services available. Care Coordinator will remain available as needed.                                  Intervention and Education during outreach:   Pt purchased a new computer because his old computer was just that, old. He was having trouble accessing [employer] and other sites that would help him in searching for a job. At 11:00 today, he plans to go to [employer] for assistance in setting up his new computer.   Discussed medication affordability with pt. Discussed Good RX, [place] Prescription Assistance Plan, and the [place] Insulin Safety Net to pt. Pt states through his COBRA-type insurance program, they have recommended use of CallAround Rx ranulfo. W states use whichever program works best for him, by providing coupons for cost-saving. CHW was able to tell pt a 60 day supply of Metoprolol 25 mg pills costs $4.15 - $11.65 through [employer]. Pt thanks [facility] for these resources. Pt requests CHW send these resources to his B2B-Center account.    PHARMACY ASSISTANCE RESOURCES:  [place] Prescription Assistance Program: #705-675-9089   FilmySphere Entertainment Pvt LtdRX: https://www.Food.ee.Grain Management/   https://www.Health2Sync.org/prescription-assistance/rx-outreach.html   https://documistic.com/   My Good Days: #8-123-670-5452 https://www.mygooddays.org/patients/diseases-covered   Medicare Assistance Programs: https://www.medicare.gov/pharmaceutical-assistance-program/Index.aspx   LyfeSystems: #8-987-287-8937 https://www.Trustlook.Grain Management/eligibility/   Lisagay HallChristianaCare: #4-156-620-3496 https://www.Deal Co-op.Grain Management/   [place] Insulin Safety [place]    https://www.needymeds.org/        CHW also recommends pt talk with ELAYNE Chadwick at the [facility]. Pt is agreeable to this. CHW placed a conference call to the [facility] scheduling line ([phone]) to schedule with ELAYNE Chadwick, this Wednesday, 8/2/23, if possible. Left VM at [facility] scheduling line; request they call Mika back directly.   Recommended pt call MT (Medication Therapy Management) scheduling department tomorrow at [phone], if he has not heard back from them today. Pt is aware he should request meeting with ELAYNE Chadwick, at the [facility]. She is at the clinic on Wednesdays.     CHW Plan: CHW will route to PAN Hanley RN, for her knowledge and further recommendations.  CHW will route Pharmacy Affordability Resources to pt's Next One's On Me (NOOM)hart account. CHW will perform next outreach in one month, unless advised otherwise by PAN Hanley RN.    Addie Mackey  Community Health Worker  [facility]  [phone]

## 2023-07-31 NOTE — LETTER
[facility] COORD[facility]  [id number]  [place] [address]    July 31, 2023    Saji Salasar  [address]  [place] [address]      Rell Brennan,    Thank you for taking the time to speak with me today. Below I have included the pharmacy resources which we discussed today.     [place] Prescription Assistance Program: #[phone]   [place] Insulin Safety Net   GoodRX: https://www.Blue Lane Technologies.Micropelt/   https://www.Axial.org/prescription-assistance/rx-outreach.html   https://Confluence Life Sciences.Micropelt/   My Good Days: #2-876-464-6401 https://www.mygooddays.org/patients/diseases-covered   Medicare Assistance Programs: https://www.medicare.gov/pharmaceutical-assistance-program/Index.aspx   The smART Peace Prize: #7-257-673-0935 https://www.Tribunat.Micropelt/eligibility/   [facility]: #2-461-853-6372 https://www.Powin Energy Corporation.Micropelt/   [place] Insulin Safety Net   https://www.needymeds.org/    Also, if you do not hear back from the MTM (Medication Therapy Management) scheduling department, please give them a call tomorrow at [phone]. Ask them to schedule an appointment with ELAYNE Chadwick, at the [facility]. She is at the clinic on Wednesdays.     If you have any further questions, do not hesitate to contact me.    Addie Mackey  Community Health Worker  [facility]  [phone]

## 2023-08-02 ENCOUNTER — TELEPHONE (OUTPATIENT)
Dept: FAMILY MEDICINE | Facility: CLINIC | Age: 63
End: 2023-08-02
Payer: COMMERCIAL

## 2023-08-02 NOTE — TELEPHONE ENCOUNTER
MTM referral from: [facility] visit (referral by provider)    MTM referral outreach attempt #3 on August 2, 2023 at 2:20 PM      Outcome:Spoke with patient back on 07/31. His current insurance does not cover MTM services but he thought he was getting a new insurance plan on 08/01. Tried to call him today for update. Left voicemail message.     Use Private Pay for the carrier/Plan on the flowsheet    Alma Recinos CPhT  MTM

## 2023-08-05 ENCOUNTER — NURSE TRIAGE (OUTPATIENT)
Dept: NURSING | Facility: CLINIC | Age: 63
End: 2023-08-05
Payer: COMMERCIAL

## 2023-08-06 NOTE — TELEPHONE ENCOUNTER
Pt states that he was eating a subway sandwich the other day and he started to hiccup. He states he has been almost continuously hiccupping for 3 days. He states he may have fallen asleep out of pure exhaustion as he is not really able to sleep. Recommended tricks from the care advice and to see someone in the next 24 hours if they do not stop. He states he will definitely get seen in the AM if they are not gone by morning.   Reason for Disposition   [1] Hiccups present > 24 hours AND [2] nearly continuous    Additional Information   Negative: Patient sounds very sick or weak to the triager   Negative: [1] Hiccups present > 3 hours AND [2] severe AND [3] no improvement using hiccup treatment per protocol    Protocols used: Cjenfdo-X-OQPRESTON Woody RN on 8/5/2023 at 11:58 PM

## 2023-08-06 NOTE — TELEPHONE ENCOUNTER
Patient calling back reporting his hiccups are continuing. Reviewed home care remedies with patient reporting he has tried all of them. Denies any new symptoms. Advised to see a provider within 24 hours with patient to go to the ER for further evaluation.     Dayan Gasca RN 08/06/23 2:19 AM   [facility] Triage Nurse Advisor

## 2023-08-16 ENCOUNTER — TELEPHONE (OUTPATIENT)
Dept: FAMILY MEDICINE | Facility: CLINIC | Age: 63
End: 2023-08-16
Payer: COMMERCIAL

## 2023-08-16 NOTE — TELEPHONE ENCOUNTER
Received call from pt regarding his medications  He has absolutely no money to pay for his medications     Pt is out of his Lisinopril and metoprolol and almost out his Lantus    Addie are you able to help pt with this?    Thank you  Andrey Shen RN on 8/16/2023 at 10:56 AM

## 2023-08-18 ENCOUNTER — PATIENT OUTREACH (OUTPATIENT)
Dept: CARE COORDINATION | Facility: CLINIC | Age: 63
End: 2023-08-18
Payer: COMMERCIAL

## 2023-08-18 NOTE — PROGRESS NOTES
Clinic Care Coordination Contact  Community Health Worker Follow Up    Care Gaps: Did not discuss    Health Maintenance Due   Topic Date Due    DEPRESSION ACTION PLAN  Never done    COLORECTAL CANCER SCREENING  Never done    LUNG CANCER SCREENING  Never done    ZOSTER IMMUNIZATION (2 of 2) 09/02/2021    COVID-19 Vaccine (3 - Pfizer series) 04/06/2022    A1C  05/17/2023    MICROALBUMIN  07/07/2023    BMP  07/19/2023    LIPID  07/19/2023    YEARLY PREVENTIVE VISIT  07/19/2023    PHQ-9  08/17/2023       Care Gap Goal set: Yes    Care Plan:   Care Plan: Diabetes (Primary Care)       Problem: Diabetes Mangement Needs Improvement       Long-Range Goal: Demonstrate improved diabetes management       Start Date: 6/1/2023 Expected End Date: 12/29/2023    This Visit's Progress: 20% Recent Progress: 10%    Note:     Barriers: limited income, unemployed, diagnosis of multiple, chronic, complex medical conditions, provider availability - wait time to complete appointments, etc.   Strengths: motivated, engaged in care coordination  Patient expressed understanding of goal: Yes  Action steps to achieve this goal:  1. I will follow up with my providers as scheduled/recommended.   2. I will discuss, review, schedule and complete recommended overdue health maintenance with my Primary Care Provider.   3. I will review the following resources to assist with medication affordability:    [employer] Prescription Assistance Program: #011-901-7255   BTCJamRX: https://www.VideoflowrSureline Systems.ReadyDock/  https://www.LiveNinja.org/prescription-assistance/rx-outreach.html   https://EvoTronix.com/   My Good Days: #2-696-505-4077 https://www.mygooddays.org/patients/diseases-covered   Medicare Assistance Programs: https://www.medicare.gov/pharmaceutical-assistance-program/Index.aspx   RoyaltyShare: #4-047-036-8955 https://www.ByAllAccounts.ReadyDock/eligibility/   Lisa Halls [facility]: #9-648-403-9942 https://www.Boston Technologies.ReadyDock/   [place] Insulin Safety Net    "https://www.needymeds.org/  4. I will take my medications as prescribed.   5. I will review additional resources for employment if/as needed. ([facility] [address] [place], https://www.Grupo IMOforcemn.com/, accessible Internet at local library, etc.)  6. I will contact my care team with questions, concerns, support needs. I will use the clinic as a resource and I understand I can contact my clinic with 24/7 after hours services available. Care Coordinator will remain available as needed.                                  Intervention and Education during outreach:   Received IB message from Andrey Shen RN, at the [facility] on 8/16/23 stating pt is out of medications and has no money to pay for his prescriptions. (See 8/16 telephone message).   Educated pt on the importance of consistently taking medications as directed. Discussed procuring medications a week before they run out so as not to miss any doses.   Pt states he has 4 more doses of his Lantus and is completely out of Metoprolol and Lisinopril.    Pt states he has been accessing food shelfs in he area to conserve his money.  While talking with CHW, pt was able to bring up [facility] ranulfo to look at the prices of his BP medications which run anywhere from $8.00 - $12.00 for a 30 day - 90 day supply. Pt states, \"I don't want to be driving all over to multiple pharmacies.\" CHW states, in light of his emergent situation to purchase 3 medications, that pt should try to find the lowest prices at the same pharmacy to simply his driving.   Pt states he has found out he can get his Lantus at the [facility] for $40 using [facility]. Pt wonders if they are open this weekend. CHW checked to find out the [facility] pharmacy is open M-F, 8:00 am - 5:00 pm. Pt will not be able to get there today, thus plans to go on Monday morning at open to purchase his Lantus.  Discussed the [place] Insulin Safety [place] program and the urgent need program: One part of the program allows " eligible individuals who are in urgent need of insulin to get a one-time, 30-day supply of insulin from their pharmacy, for a $35 co-pay.Pt is thankful for this information, but will use [facility] to procure his next month's supply.  Pt was not able to find the Sik[employer]ky Aircraftt message CHW sent 7/31/23 with medication affordability resources. Pt requests CHW text him the info; CHW requests emailing pt this resources. Pt agreeable to this. Emailed medication affordability resources to: [email or web address]. Writer communicated the risks of unencrypted electronic communication and the patient and/or patient representative has agreed to accept the risks and receive unencrypted communication related to the information or resources we have discussed. We reviewed that no PHI will be included.  Email address verified with the patient.  Will include electronic communication form for patient/caregiver to complete.  Pt states his sister is going to be sending money to assist in paying for his credit card minimum balance and for medications.  Discussed contacting the [place] Prescription Assistance Program at 756-803-4130 to see if you are eligible. Pt verbalizes understanding.    CHW asks if pt has any further concerns or need for resources as this time. Pt states he does not. CHW made sure pt has CHW contact information. Pt will contact CHW with questions or updates before next outreach.     CHW Plan: CHW will follow up with pt in two weeks.    Addie Mackey  Community Health Worker  [facility]  [phone]

## 2023-08-30 ENCOUNTER — APPOINTMENT (OUTPATIENT)
Dept: CARDIOLOGY | Facility: CLINIC | Age: 63
End: 2023-08-30
Attending: INTERNAL MEDICINE
Payer: MEDICAID

## 2023-08-30 ENCOUNTER — MEDICAL CORRESPONDENCE (OUTPATIENT)
Dept: HEALTH INFORMATION MANAGEMENT | Facility: CLINIC | Age: 63
End: 2023-08-30

## 2023-08-30 ENCOUNTER — HOSPITAL ENCOUNTER (INPATIENT)
Facility: CLINIC | Age: 63
LOS: 12 days | Discharge: HOME | End: 2023-09-11
Attending: STUDENT IN AN ORGANIZED HEALTH CARE EDUCATION/TRAINING PROGRAM | Admitting: INTERNAL MEDICINE
Payer: MEDICAID

## 2023-08-30 ENCOUNTER — APPOINTMENT (OUTPATIENT)
Dept: GENERAL RADIOLOGY | Facility: CLINIC | Age: 63
End: 2023-08-30
Attending: STUDENT IN AN ORGANIZED HEALTH CARE EDUCATION/TRAINING PROGRAM
Payer: MEDICAID

## 2023-08-30 ENCOUNTER — APPOINTMENT (OUTPATIENT)
Dept: ULTRASOUND IMAGING | Facility: CLINIC | Age: 63
End: 2023-08-30
Attending: INTERNAL MEDICINE
Payer: MEDICAID

## 2023-08-30 DIAGNOSIS — I25.5 ISCHEMIC CARDIOMYOPATHY: Primary | ICD-10-CM

## 2023-08-30 DIAGNOSIS — I21.4 NSTEMI (NON-ST ELEVATED MYOCARDIAL INFARCTION) (H): ICD-10-CM

## 2023-08-30 DIAGNOSIS — M75.02 ADHESIVE CAPSULITIS OF LEFT SHOULDER: ICD-10-CM

## 2023-08-30 DIAGNOSIS — I10 BENIGN ESSENTIAL HYPERTENSION: ICD-10-CM

## 2023-08-30 DIAGNOSIS — D64.9 ANEMIA, UNSPECIFIED TYPE: ICD-10-CM

## 2023-08-30 DIAGNOSIS — F10.930 ALCOHOL WITHDRAWAL, UNCOMPLICATED (H): ICD-10-CM

## 2023-08-30 DIAGNOSIS — E87.29 ALCOHOLIC KETOACIDOSIS: ICD-10-CM

## 2023-08-30 DIAGNOSIS — M25.512 ACUTE PAIN OF LEFT SHOULDER: ICD-10-CM

## 2023-08-30 DIAGNOSIS — R79.89 ELEVATED TROPONIN: ICD-10-CM

## 2023-08-30 LAB
ALBUMIN SERPL BCG-MCNC: 3.4 G/DL (ref 3.5–5.2)
ALP SERPL-CCNC: 178 U/L (ref 40–129)
ALT SERPL W P-5'-P-CCNC: 61 U/L (ref 0–70)
ANION GAP SERPL CALCULATED.3IONS-SCNC: 21 MMOL/L (ref 7–15)
AST SERPL W P-5'-P-CCNC: 235 U/L (ref 0–45)
ATRIAL RATE - MUSE: 85 BPM
ATRIAL RATE - MUSE: 94 BPM
B-OH-BUTYR SERPL-SCNC: 0.8 MMOL/L
BASOPHILS # BLD AUTO: 0.1 10E3/UL (ref 0–0.2)
BASOPHILS NFR BLD AUTO: 1 %
BILIRUB DIRECT SERPL-MCNC: 3.04 MG/DL (ref 0–0.3)
BILIRUB SERPL-MCNC: 4.3 MG/DL
BUN SERPL-MCNC: 6.9 MG/DL (ref 8–23)
CALCIUM SERPL-MCNC: 8.2 MG/DL (ref 8.8–10.2)
CHLORIDE SERPL-SCNC: 98 MMOL/L (ref 98–107)
CREAT SERPL-MCNC: 0.79 MG/DL (ref 0.67–1.17)
DEPRECATED HCO3 PLAS-SCNC: 18 MMOL/L (ref 22–29)
DIASTOLIC BLOOD PRESSURE - MUSE: NORMAL MMHG
DIASTOLIC BLOOD PRESSURE - MUSE: NORMAL MMHG
EOSINOPHIL # BLD AUTO: 0 10E3/UL (ref 0–0.7)
EOSINOPHIL NFR BLD AUTO: 0 %
ERYTHROCYTE [DISTWIDTH] IN BLOOD BY AUTOMATED COUNT: 12.4 % (ref 10–15)
ETHANOL SERPL-MCNC: 0.17 G/DL
GFR SERPL CREATININE-BSD FRML MDRD: >90 ML/MIN/1.73M2
GLUCOSE BLDC GLUCOMTR-MCNC: 111 MG/DL (ref 70–99)
GLUCOSE BLDC GLUCOMTR-MCNC: 135 MG/DL (ref 70–99)
GLUCOSE BLDC GLUCOMTR-MCNC: 162 MG/DL (ref 70–99)
GLUCOSE BLDC GLUCOMTR-MCNC: 195 MG/DL (ref 70–99)
GLUCOSE BLDC GLUCOMTR-MCNC: 82 MG/DL (ref 70–99)
GLUCOSE SERPL-MCNC: 154 MG/DL (ref 70–99)
HCT VFR BLD AUTO: 34.5 % (ref 40–53)
HGB BLD-MCNC: 12.3 G/DL (ref 13.3–17.7)
IMM GRANULOCYTES # BLD: 0 10E3/UL
IMM GRANULOCYTES NFR BLD: 1 %
INTERPRETATION ECG - MUSE: NORMAL
INTERPRETATION ECG - MUSE: NORMAL
LVEF ECHO: NORMAL
LYMPHOCYTES # BLD AUTO: 0.7 10E3/UL (ref 0.8–5.3)
LYMPHOCYTES NFR BLD AUTO: 9 %
MAGNESIUM SERPL-MCNC: 1.3 MG/DL (ref 1.7–2.3)
MAGNESIUM SERPL-MCNC: 1.4 MG/DL (ref 1.7–2.3)
MCH RBC QN AUTO: 37.6 PG (ref 26.5–33)
MCHC RBC AUTO-ENTMCNC: 35.7 G/DL (ref 31.5–36.5)
MCV RBC AUTO: 106 FL (ref 78–100)
MONOCYTES # BLD AUTO: 0.6 10E3/UL (ref 0–1.3)
MONOCYTES NFR BLD AUTO: 8 %
NEUTROPHILS # BLD AUTO: 6.2 10E3/UL (ref 1.6–8.3)
NEUTROPHILS NFR BLD AUTO: 81 %
NRBC # BLD AUTO: 0 10E3/UL
NRBC BLD AUTO-RTO: 0 /100
P AXIS - MUSE: -12 DEGREES
P AXIS - MUSE: 4 DEGREES
PHOSPHATE SERPL-MCNC: 2.3 MG/DL (ref 2.5–4.5)
PLATELET # BLD AUTO: 79 10E3/UL (ref 150–450)
POTASSIUM SERPL-SCNC: 3.6 MMOL/L (ref 3.4–5.3)
POTASSIUM SERPL-SCNC: 4.2 MMOL/L (ref 3.4–5.3)
PR INTERVAL - MUSE: 174 MS
PR INTERVAL - MUSE: 192 MS
PROT SERPL-MCNC: 6.2 G/DL (ref 6.4–8.3)
QRS DURATION - MUSE: 146 MS
QRS DURATION - MUSE: 150 MS
QT - MUSE: 418 MS
QT - MUSE: 430 MS
QTC - MUSE: 511 MS
QTC - MUSE: 522 MS
R AXIS - MUSE: -23 DEGREES
R AXIS - MUSE: -27 DEGREES
RBC # BLD AUTO: 3.27 10E6/UL (ref 4.4–5.9)
SODIUM SERPL-SCNC: 137 MMOL/L (ref 136–145)
SYSTOLIC BLOOD PRESSURE - MUSE: NORMAL MMHG
SYSTOLIC BLOOD PRESSURE - MUSE: NORMAL MMHG
T AXIS - MUSE: 118 DEGREES
T AXIS - MUSE: 125 DEGREES
TROPONIN T SERPL HS-MCNC: 32 NG/L
TROPONIN T SERPL HS-MCNC: 39 NG/L
TROPONIN T SERPL HS-MCNC: 39 NG/L
VENTRICULAR RATE- MUSE: 85 BPM
VENTRICULAR RATE- MUSE: 94 BPM
WBC # BLD AUTO: 7.7 10E3/UL (ref 4–11)

## 2023-08-30 PROCEDURE — 96361 HYDRATE IV INFUSION ADD-ON: CPT

## 2023-08-30 PROCEDURE — 250N000012 HC RX MED GY IP 250 OP 636 PS 637: Performed by: INTERNAL MEDICINE

## 2023-08-30 PROCEDURE — 36415 COLL VENOUS BLD VENIPUNCTURE: CPT | Performed by: STUDENT IN AN ORGANIZED HEALTH CARE EDUCATION/TRAINING PROGRAM

## 2023-08-30 PROCEDURE — 82077 ASSAY SPEC XCP UR&BREATH IA: CPT | Performed by: STUDENT IN AN ORGANIZED HEALTH CARE EDUCATION/TRAINING PROGRAM

## 2023-08-30 PROCEDURE — 82962 GLUCOSE BLOOD TEST: CPT

## 2023-08-30 PROCEDURE — 258N000003 HC RX IP 258 OP 636: Performed by: STUDENT IN AN ORGANIZED HEALTH CARE EDUCATION/TRAINING PROGRAM

## 2023-08-30 PROCEDURE — 99223 1ST HOSP IP/OBS HIGH 75: CPT | Mod: 25 | Performed by: INTERNAL MEDICINE

## 2023-08-30 PROCEDURE — 99223 1ST HOSP IP/OBS HIGH 75: CPT | Performed by: INTERNAL MEDICINE

## 2023-08-30 PROCEDURE — 250N000013 HC RX MED GY IP 250 OP 250 PS 637: Performed by: INTERNAL MEDICINE

## 2023-08-30 PROCEDURE — 83735 ASSAY OF MAGNESIUM: CPT | Performed by: STUDENT IN AN ORGANIZED HEALTH CARE EDUCATION/TRAINING PROGRAM

## 2023-08-30 PROCEDURE — 93005 ELECTROCARDIOGRAM TRACING: CPT

## 2023-08-30 PROCEDURE — 84484 ASSAY OF TROPONIN QUANT: CPT | Performed by: STUDENT IN AN ORGANIZED HEALTH CARE EDUCATION/TRAINING PROGRAM

## 2023-08-30 PROCEDURE — 83735 ASSAY OF MAGNESIUM: CPT | Performed by: INTERNAL MEDICINE

## 2023-08-30 PROCEDURE — 93306 TTE W/DOPPLER COMPLETE: CPT | Mod: 26 | Performed by: INTERNAL MEDICINE

## 2023-08-30 PROCEDURE — 73030 X-RAY EXAM OF SHOULDER: CPT | Mod: LT

## 2023-08-30 PROCEDURE — 258N000003 HC RX IP 258 OP 636: Performed by: INTERNAL MEDICINE

## 2023-08-30 PROCEDURE — 80053 COMPREHEN METABOLIC PANEL: CPT | Performed by: STUDENT IN AN ORGANIZED HEALTH CARE EDUCATION/TRAINING PROGRAM

## 2023-08-30 PROCEDURE — 76705 ECHO EXAM OF ABDOMEN: CPT

## 2023-08-30 PROCEDURE — 96375 TX/PRO/DX INJ NEW DRUG ADDON: CPT

## 2023-08-30 PROCEDURE — 71046 X-RAY EXAM CHEST 2 VIEWS: CPT

## 2023-08-30 PROCEDURE — 82010 KETONE BODYS QUAN: CPT | Performed by: INTERNAL MEDICINE

## 2023-08-30 PROCEDURE — 999N000208 ECHOCARDIOGRAM COMPLETE

## 2023-08-30 PROCEDURE — 96374 THER/PROPH/DIAG INJ IV PUSH: CPT

## 2023-08-30 PROCEDURE — HZ2ZZZZ DETOXIFICATION SERVICES FOR SUBSTANCE ABUSE TREATMENT: ICD-10-PCS | Performed by: INTERNAL MEDICINE

## 2023-08-30 PROCEDURE — 84100 ASSAY OF PHOSPHORUS: CPT | Performed by: INTERNAL MEDICINE

## 2023-08-30 PROCEDURE — 85025 COMPLETE CBC W/AUTO DIFF WBC: CPT | Performed by: STUDENT IN AN ORGANIZED HEALTH CARE EDUCATION/TRAINING PROGRAM

## 2023-08-30 PROCEDURE — 250N000011 HC RX IP 250 OP 636: Mod: JZ | Performed by: STUDENT IN AN ORGANIZED HEALTH CARE EDUCATION/TRAINING PROGRAM

## 2023-08-30 PROCEDURE — 99285 EMERGENCY DEPT VISIT HI MDM: CPT | Mod: 25

## 2023-08-30 PROCEDURE — 120N000001 HC R&B MED SURG/OB

## 2023-08-30 PROCEDURE — 84484 ASSAY OF TROPONIN QUANT: CPT | Performed by: INTERNAL MEDICINE

## 2023-08-30 PROCEDURE — 255N000002 HC RX 255 OP 636: Performed by: EMERGENCY MEDICINE

## 2023-08-30 PROCEDURE — 250N000013 HC RX MED GY IP 250 OP 250 PS 637

## 2023-08-30 PROCEDURE — 36415 COLL VENOUS BLD VENIPUNCTURE: CPT | Performed by: INTERNAL MEDICINE

## 2023-08-30 PROCEDURE — 99207 PR APP CREDIT; MD BILLING SHARED VISIT: CPT | Performed by: INTERNAL MEDICINE

## 2023-08-30 PROCEDURE — 84132 ASSAY OF SERUM POTASSIUM: CPT | Performed by: STUDENT IN AN ORGANIZED HEALTH CARE EDUCATION/TRAINING PROGRAM

## 2023-08-30 PROCEDURE — 250N000011 HC RX IP 250 OP 636: Performed by: STUDENT IN AN ORGANIZED HEALTH CARE EDUCATION/TRAINING PROGRAM

## 2023-08-30 PROCEDURE — 82248 BILIRUBIN DIRECT: CPT | Performed by: INTERNAL MEDICINE

## 2023-08-30 RX ORDER — GABAPENTIN 300 MG/1
300 CAPSULE ORAL EVERY 8 HOURS
Status: DISCONTINUED | OUTPATIENT
Start: 2023-09-04 | End: 2023-09-04

## 2023-08-30 RX ORDER — GABAPENTIN 300 MG/1
600 CAPSULE ORAL EVERY 8 HOURS
Status: DISCONTINUED | OUTPATIENT
Start: 2023-09-02 | End: 2023-09-04

## 2023-08-30 RX ORDER — LIDOCAINE 40 MG/G
CREAM TOPICAL
Status: DISCONTINUED | OUTPATIENT
Start: 2023-08-30 | End: 2023-09-11 | Stop reason: HOSPADM

## 2023-08-30 RX ORDER — GABAPENTIN 600 MG/1
1200 TABLET ORAL ONCE
Status: COMPLETED | OUTPATIENT
Start: 2023-08-30 | End: 2023-08-30

## 2023-08-30 RX ORDER — HYDROXYZINE HYDROCHLORIDE 25 MG/1
25 TABLET, FILM COATED ORAL EVERY 6 HOURS PRN
Status: DISCONTINUED | OUTPATIENT
Start: 2023-08-30 | End: 2023-09-02

## 2023-08-30 RX ORDER — FLUMAZENIL 0.1 MG/ML
0.2 INJECTION, SOLUTION INTRAVENOUS
Status: DISCONTINUED | OUTPATIENT
Start: 2023-08-30 | End: 2023-09-06

## 2023-08-30 RX ORDER — ASPIRIN 81 MG/1
81 TABLET ORAL DAILY
Status: DISCONTINUED | OUTPATIENT
Start: 2023-08-30 | End: 2023-09-11 | Stop reason: HOSPADM

## 2023-08-30 RX ORDER — ONDANSETRON 4 MG/1
4 TABLET, ORALLY DISINTEGRATING ORAL EVERY 6 HOURS PRN
Status: DISCONTINUED | OUTPATIENT
Start: 2023-08-30 | End: 2023-08-30

## 2023-08-30 RX ORDER — THIAMINE HYDROCHLORIDE 100 MG/ML
100 INJECTION, SOLUTION INTRAMUSCULAR; INTRAVENOUS DAILY
Status: DISCONTINUED | OUTPATIENT
Start: 2023-08-30 | End: 2023-08-30

## 2023-08-30 RX ORDER — PAROXETINE 20 MG/1
20 TABLET, FILM COATED ORAL DAILY
COMMUNITY
End: 2023-10-24

## 2023-08-30 RX ORDER — LISINOPRIL 5 MG/1
5 TABLET ORAL DAILY
Status: DISCONTINUED | OUTPATIENT
Start: 2023-08-30 | End: 2023-09-06

## 2023-08-30 RX ORDER — KETOROLAC TROMETHAMINE 15 MG/ML
15 INJECTION, SOLUTION INTRAMUSCULAR; INTRAVENOUS ONCE
Status: COMPLETED | OUTPATIENT
Start: 2023-08-30 | End: 2023-08-30

## 2023-08-30 RX ORDER — ASPIRIN 81 MG/1
TABLET, CHEWABLE ORAL
Status: COMPLETED
Start: 2023-08-30 | End: 2023-08-30

## 2023-08-30 RX ORDER — GABAPENTIN 100 MG/1
100 CAPSULE ORAL EVERY 8 HOURS
Status: DISCONTINUED | OUTPATIENT
Start: 2023-09-06 | End: 2023-09-04

## 2023-08-30 RX ORDER — DEXTROSE MONOHYDRATE 25 G/50ML
25-50 INJECTION, SOLUTION INTRAVENOUS
Status: DISCONTINUED | OUTPATIENT
Start: 2023-08-30 | End: 2023-09-01

## 2023-08-30 RX ORDER — SODIUM CHLORIDE 9 MG/ML
INJECTION, SOLUTION INTRAVENOUS CONTINUOUS
Status: DISCONTINUED | OUTPATIENT
Start: 2023-08-30 | End: 2023-09-02

## 2023-08-30 RX ORDER — LORAZEPAM 1 MG/1
1-2 TABLET ORAL EVERY 30 MIN PRN
Status: DISCONTINUED | OUTPATIENT
Start: 2023-08-30 | End: 2023-09-06

## 2023-08-30 RX ORDER — NITROGLYCERIN 0.4 MG/1
TABLET SUBLINGUAL
Status: COMPLETED
Start: 2023-08-30 | End: 2023-08-30

## 2023-08-30 RX ORDER — ONDANSETRON 2 MG/ML
4 INJECTION INTRAMUSCULAR; INTRAVENOUS EVERY 6 HOURS PRN
Status: DISCONTINUED | OUTPATIENT
Start: 2023-08-30 | End: 2023-08-30

## 2023-08-30 RX ORDER — DIAZEPAM 10 MG/2ML
10 INJECTION, SOLUTION INTRAMUSCULAR; INTRAVENOUS ONCE
Status: COMPLETED | OUTPATIENT
Start: 2023-08-30 | End: 2023-08-30

## 2023-08-30 RX ORDER — MULTIPLE VITAMINS W/ MINERALS TAB 9MG-400MCG
1 TAB ORAL DAILY
Status: DISCONTINUED | OUTPATIENT
Start: 2023-08-30 | End: 2023-09-11 | Stop reason: HOSPADM

## 2023-08-30 RX ORDER — NICOTINE POLACRILEX 4 MG
15-30 LOZENGE BUCCAL
Status: DISCONTINUED | OUTPATIENT
Start: 2023-08-30 | End: 2023-09-01

## 2023-08-30 RX ORDER — ACETAMINOPHEN 325 MG/1
650 TABLET ORAL EVERY 4 HOURS PRN
Status: DISCONTINUED | OUTPATIENT
Start: 2023-08-30 | End: 2023-09-11 | Stop reason: HOSPADM

## 2023-08-30 RX ORDER — ACETAMINOPHEN 650 MG/1
650 SUPPOSITORY RECTAL EVERY 4 HOURS PRN
Status: DISCONTINUED | OUTPATIENT
Start: 2023-08-30 | End: 2023-09-11 | Stop reason: HOSPADM

## 2023-08-30 RX ORDER — LORAZEPAM 2 MG/ML
1-2 INJECTION INTRAMUSCULAR EVERY 30 MIN PRN
Status: DISCONTINUED | OUTPATIENT
Start: 2023-08-30 | End: 2023-09-06

## 2023-08-30 RX ORDER — GABAPENTIN 300 MG/1
900 CAPSULE ORAL EVERY 8 HOURS
Status: COMPLETED | OUTPATIENT
Start: 2023-08-30 | End: 2023-09-02

## 2023-08-30 RX ORDER — FOLIC ACID 1 MG/1
1 TABLET ORAL DAILY
Status: DISCONTINUED | OUTPATIENT
Start: 2023-08-30 | End: 2023-09-11 | Stop reason: HOSPADM

## 2023-08-30 RX ORDER — CLONIDINE HYDROCHLORIDE 0.1 MG/1
0.1 TABLET ORAL EVERY 8 HOURS
Status: DISCONTINUED | OUTPATIENT
Start: 2023-08-30 | End: 2023-08-31

## 2023-08-30 RX ORDER — MAGNESIUM SULFATE HEPTAHYDRATE 40 MG/ML
4 INJECTION, SOLUTION INTRAVENOUS ONCE
Status: COMPLETED | OUTPATIENT
Start: 2023-08-30 | End: 2023-08-31

## 2023-08-30 RX ADMIN — KETOROLAC TROMETHAMINE 15 MG: 15 INJECTION, SOLUTION INTRAMUSCULAR; INTRAVENOUS at 01:30

## 2023-08-30 RX ADMIN — CLONIDINE HYDROCHLORIDE 0.1 MG: 0.1 TABLET ORAL at 14:29

## 2023-08-30 RX ADMIN — LORAZEPAM 1 MG: 1 TABLET ORAL at 08:53

## 2023-08-30 RX ADMIN — NITROGLYCERIN: 0.4 TABLET SUBLINGUAL at 04:10

## 2023-08-30 RX ADMIN — CLONIDINE HYDROCHLORIDE 0.1 MG: 0.1 TABLET ORAL at 08:45

## 2023-08-30 RX ADMIN — NITROGLYCERIN: 0.4 TABLET SUBLINGUAL at 03:30

## 2023-08-30 RX ADMIN — ACETAMINOPHEN 650 MG: 325 TABLET, FILM COATED ORAL at 17:14

## 2023-08-30 RX ADMIN — ASPIRIN 81 MG CHEWABLE TABLET: 81 TABLET CHEWABLE at 03:30

## 2023-08-30 RX ADMIN — FOLIC ACID 1 MG: 1 TABLET ORAL at 08:45

## 2023-08-30 RX ADMIN — SODIUM CHLORIDE: 9 INJECTION, SOLUTION INTRAVENOUS at 17:06

## 2023-08-30 RX ADMIN — MAGNESIUM SULFATE HEPTAHYDRATE 4 G: 4 INJECTION, SOLUTION INTRAVENOUS at 22:24

## 2023-08-30 RX ADMIN — THIAMINE HYDROCHLORIDE 100 MG: 100 INJECTION, SOLUTION INTRAMUSCULAR; INTRAVENOUS at 06:20

## 2023-08-30 RX ADMIN — HUMAN ALBUMIN MICROSPHERES AND PERFLUTREN 3 ML: 10; .22 INJECTION, SOLUTION INTRAVENOUS at 08:18

## 2023-08-30 RX ADMIN — ASPIRIN 81 MG CHEWABLE TABLET 324 MG: 81 TABLET CHEWABLE at 04:10

## 2023-08-30 RX ADMIN — METOPROLOL TARTRATE 12.5 MG: 25 TABLET, FILM COATED ORAL at 20:28

## 2023-08-30 RX ADMIN — ASPIRIN 81 MG: 81 TABLET, COATED ORAL at 14:47

## 2023-08-30 RX ADMIN — GABAPENTIN 1200 MG: 600 TABLET, FILM COATED ORAL at 08:45

## 2023-08-30 RX ADMIN — THIAMINE HCL TAB 100 MG 100 MG: 100 TAB at 08:45

## 2023-08-30 RX ADMIN — GABAPENTIN 900 MG: 300 CAPSULE ORAL at 22:24

## 2023-08-30 RX ADMIN — DIAZEPAM 10 MG: 5 INJECTION INTRAMUSCULAR; INTRAVENOUS at 05:27

## 2023-08-30 RX ADMIN — LISINOPRIL 5 MG: 5 TABLET ORAL at 14:28

## 2023-08-30 RX ADMIN — SODIUM CHLORIDE: 9 INJECTION, SOLUTION INTRAVENOUS at 08:46

## 2023-08-30 RX ADMIN — INSULIN ASPART 1 UNITS: 100 INJECTION, SOLUTION INTRAVENOUS; SUBCUTANEOUS at 15:11

## 2023-08-30 RX ADMIN — DEXTROSE AND SODIUM CHLORIDE: 5; 900 INJECTION, SOLUTION INTRAVENOUS at 05:28

## 2023-08-30 RX ADMIN — CLONIDINE HYDROCHLORIDE 0.1 MG: 0.1 TABLET ORAL at 22:24

## 2023-08-30 RX ADMIN — GABAPENTIN 900 MG: 300 CAPSULE ORAL at 14:29

## 2023-08-30 RX ADMIN — MULTIPLE VITAMINS W/ MINERALS TAB 1 TABLET: TAB at 08:45

## 2023-08-30 RX ADMIN — HYDROXYZINE HYDROCHLORIDE 25 MG: 25 TABLET, FILM COATED ORAL at 20:28

## 2023-08-30 ASSESSMENT — ACTIVITIES OF DAILY LIVING (ADL)
ADLS_ACUITY_SCORE: 44
ADLS_ACUITY_SCORE: 35
ADLS_ACUITY_SCORE: 43
ADLS_ACUITY_SCORE: 43
ADLS_ACUITY_SCORE: 35
ADLS_ACUITY_SCORE: 35
ADLS_ACUITY_SCORE: 43
ADLS_ACUITY_SCORE: 43
ADLS_ACUITY_SCORE: 44
ADLS_ACUITY_SCORE: 35
ADLS_ACUITY_SCORE: 44

## 2023-08-30 NOTE — H&P
[facility]    History and Physical - Hospitalist Service       Date of Admission:  8/30/2023    Assessment & Plan      Saji Iqbal is a 63 year old male admitted on 8/30/2023. He has a hx of CAD s/p PCI LAD, type 2 DM, HTN, HLP, alcohol abuse who presents with L shoulder pain.  He was sleeping on his left shoulder and when he woke up he had pain in the left shoulder.  The pain is localized to the left shoulder and does not radiate.  It is made worse with certain movements.  He had a little bit of numbness on his left hand but that has improved.  He denies any chest pain or shortness of breath.  His last alcohol drink was yesterday.  In the ER is seen by Dr. Call.  I discussed case with him and I am asked to admit the patient.    L shoulder Pain.  -Seems musculoskeletal in nature.  Earlier it was so painful he could not move his arm.  However he has had pain medications and now has no pain.  -With his previous myocardial infarction he did not have any chest pain but he had pain in his left shoulder blade.  -We will admit him to telemetry and carry out serial troponins as well as an echocardiogram.  -If his troponin trends up.  It is worthwhile consulting cardiology.    2.  Non-ST elevation MI versus type II myocardial infarction.  -I favor the later given the musculoskeletal pain of the left shoulder.  -Will trend troponin and check an echocardiogram.    3.  History of alcohol abuse.  -He is not forthcoming as to what he drinks however he states that he drinks any kind of liquor and drinks and more than every other day.  -His tremors and likely a component of alcoholic ketoacidosis.  -We will place him on CIWA protocol and thiamine.    4.  Elevated LFTs and bilirubin.  -Likely as a result of cholestasis.  -We will get an ultrasound of the right upper quadrant.    5.  Diabetes type 2.  -Sliding scale insulin.    Of note the patient has stopped taking his usual medications due to cost  issues.       Diet:  NPO.  DVT Prophylaxis: Pneumatic Compression Devices  Durbin Catheter: Not present  Lines: None     Cardiac Monitoring: None  Code Status:  Full Code.    Clinically Significant Risk Factors Present on Admission          # Hypocalcemia: Lowest Ca = 8.2 mg/dL in last 2 days, will monitor and replace as appropriate    # Anion Gap Metabolic Acidosis: Highest Anion Gap = 21 mmol/L in last 2 days, will monitor and treat as appropriate  # Hypoalbuminemia: Lowest albumin = 3.4 g/dL at 8/30/2023  3:51 AM, will monitor as appropriate   # Drug Induced Platelet Defect: home medication list includes an antiplatelet medication   # Hypertension: Noted on problem list                 Disposition Plan      Expected Discharge Date: 08/31/2023                  Andreas Moon MD  Hospitalist Service  [facility]  Securely message with [employer] (more info)  Text page via [employer] Paging/Directory     ______________________________________________________________________    Chief Complaint     L shoulder Pain.    History is obtained from the patient    History of Present Illness   Saji Iqbal is a 63 year old male who has a hx of CAD s/p PCI LAD, type 2 DM, HTN, HLP, alcohol abuse who presents with L shoulder pain.  He was sleeping on his left shoulder and when he woke up he had pain in the left shoulder.  The pain is localized to the left shoulder and does not radiate.  It is made worse with certain movements.  He had a little bit of numbness on his left hand but that has improved.  He denies any chest pain or shortness of breath.  His last alcohol drink was yesterday.  In the ER is seen by Dr. Call.  I discussed case with him and I am asked to admit the patient.      Past Medical History    Past Medical History:   Diagnosis Date    Cluster headache     Coronary artery disease     Depression     Diabetes mellitus, type II (H)     Gastroesophageal reflux disease     Heart attack (H)      Hyperlipidemia     Hypertension     Renal disease      hx kidney stones    Rotator cuff arthropathy     Sleep apnea     doesn't use cpap-is broken    Stented coronary artery        Past Surgical History   Past Surgical History:   Procedure Laterality Date    ARTHROSCOPY SHOULDER      REPAIR TENDON FOOT Right 10/23/2020    Procedure: Repair of extensor tendon at the level of the metatarsophalangeal joint, right foot;  Surgeon: Gerard Diaz DPM;  Location: [facility] OR    STENT, CORONARY, ESAU  2014, 2015       Prior to Admission Medications   Prior to Admission Medications   Prescriptions Last Dose Informant Patient Reported? Taking?   ACCU-CHEK GUIDE test strip   No No   Sig: Use to test blood sugar 1 times daily or as directed.   Blood Glucose Monitoring Suppl (ACCU-CHEK GUIDE) w/Device KIT   No No   Sig: USE TO TEST BLOOD SUGAR 1 TIME DAILY OR AS DIRECTED   STATIN NOT PRESCRIBED (INTENTIONAL)   No No   Sig: Please choose reason not prescribed from choices below.   ULTICARE PEN NEEDLES 31G X 5 MM miscellaneous   No No   Sig: USE 2 DAILY OR AS DIRECTED   aspirin 81 MG tablet   Yes No   Sig: Take by mouth daily   blood glucose (NO BRAND SPECIFIED) lancets standard   No No   Sig: Use to test blood sugar 1 times daily or as directed.   blood glucose (NO BRAND SPECIFIED) test strip   No No   Sig: Use to test blood sugar 4 times daily or as directed. Please dispense Accu Chek Ana plus test strips or per patient preference if using a different brand   blood glucose monitoring (SOFTCLIX) lancets   No No   Sig: USE TO TEST BLOOD SUGAR ONCE DAILY OR AS DIRECTED   clobetasol (TEMOVATE) 0.05 % external cream   No No   Sig: Apply topically 2 times daily Trunk, arms, legs and hands and feet   folic acid (FOLVITE) 1 MG tablet   No No   Sig: One tablet daily except the day you take methotrexate   glipiZIDE (GLUCOTROL XL) 10 MG 24 hr tablet   No No   Sig: Take 1 tablet (10 mg) by mouth daily   insulin glargine (LANTUS SOLOSTAR)  100 UNIT/ML pen   No No   Sig: Inject 24 Units Subcutaneous 2 times daily   lisinopril (ZESTRIL) 20 MG tablet   No No   Sig: Take 1 tablet (20 mg) by mouth daily   metoprolol tartrate (LOPRESSOR) 25 MG tablet   No No   Sig: Take 1 tablet (25 mg) by mouth 2 times daily      Facility-Administered Medications: None        Review of Systems    The 10 point Review of Systems is negative other than noted in the HPI or here.     Social History   I have reviewed this patient's social history and updated it with pertinent information if needed.  Social History     Tobacco Use    Smoking status: Former     Packs/day: 0.33     Years: 20.00     Pack years: 6.60     Types: Cigarettes     Quit date: 2015     Years since quittin.1     Passive exposure: Never    Smokeless tobacco: Never   Vaping Use    Vaping Use: Never used   Substance Use Topics    Alcohol use: Yes     Alcohol/week: 1.0 standard drink of alcohol     Types: 1 Standard drinks or equivalent per week     Comment: 4-5 drinks nightly on weekend    Drug use: No         Family History   I have reviewed this patient's family history and updated it with pertinent information if needed.  Family History   Problem Relation Age of Onset    Coronary Artery Disease Mother     Coronary Artery Disease Father     Cerebrovascular Disease Brother     Anuerysm Sister     Glaucoma No family hx of     Macular Degeneration No family hx of          Allergies   Allergies   Allergen Reactions    Metformin Diarrhea    Honey Other (See Comments)     Throat irritation    Statins         Physical Exam   Vital Signs: Temp: 98.1  F (36.7  C) Temp src: Oral BP: 139/86 Pulse: 80   Resp: (!) 33 SpO2: 98 % O2 Device: Nasal cannula Oxygen Delivery: 3 LPM  Weight: 0 lbs 0 oz    Gen - Alert, awake, oriented x 3 in NAD.  Lungs - CTA B.  HEart - RR,S1+S2 nml, no m/g/r.  Abd - soft, NT, ND, + BS.  Ext - no edema.  Neuro - + tremors, CN II-XII wnl, ITZEL's.    Medical Decision Making       75 MINUTES  SPENT BY ME on the date of service doing chart review, history, exam, documentation & further activities per the note.      Data     I have personally reviewed the following data over the past 24 hrs:    7.7  \   12.3 (L)   / 79 (L)     137 98 6.9 (L) /  154 (H)   3.6 18 (L) 0.79 \     ALT: 61 AST: 235 (H) AP: 178 (H) TBILI: 4.3 (H)   ALB: 3.4 (L) TOT PROTEIN: 6.2 (L) LIPASE: N/A     Trop: 39 (H) BNP: N/A       Imaging results reviewed over the past 24 hrs:   Recent Results (from the past 24 hour(s))   XR Shoulder Left G/E 3 Views    Narrative    EXAM: XR SHOULDER LEFT G/E 3 VIEWS  LOCATION: [facility]  DATE: 8/30/2023    INDICATION: left shoulder pain  COMPARISON: None.      Impression    IMPRESSION: Mild osteoarthritic change at the acromioclavicular joint. No displaced fracture or dislocation.   Chest XR,  PA & LAT    Narrative    EXAM: XR CHEST 2 VIEWS  LOCATION: [facility]  DATE: 8/30/2023    INDICATION: left shoulder pain, cardiac workup  COMPARISON: 12/22/2014      Impression    IMPRESSION: Borderline enlarged cardiac silhouette. Lungs are clear. No pneumothorax or pleural effusion.

## 2023-08-30 NOTE — PROGRESS NOTES
Patient Arrived to room 623 from ER at ( ) via cart, alert and oriented x 4, oriented to room and call system, IV patent and infusing, rates pain 2/10, reviewed welcome folder and pain/medication information packet with patient.  Pt is on oxygen 1L. Patient is NPO. On Tele. Admission profile started.    Pt Tolerated regular diet. No chest pain or SOB, no N/V. On RA. BG checks done. Patient is unable to void. Bladder scan 310. Provider paged- stated keep monitoring and cath if BS >500.  Will continue monitoring.

## 2023-08-30 NOTE — PROGRESS NOTES
Patient seen and examined.  Chart reviewed.  Please see admission history and physical by Andreas Moon MD from earlier today for details.    Echocardiogram shows new cardiomyopathy with wall motion abnormalities.    Start metoprolol 12.5 mg twice a day.  Start aspirin 81 mg a day.  Start lisinopril 5 mg a day.  Check lipid panel.  Cardiology consult.    Continue to treat alcohol withdrawals.

## 2023-08-30 NOTE — PLAN OF CARE
"Provided cares for pt from [id number]. Pt a/o x4. C/ L shoulder pain, PRN tylenol given and ice applied. Pt not OOB. Voided, urine jelly/tea colored. . Tele SR/ST w/ BBB. Plan for NPO at midnight for angiogram tomorrow.     /86 (BP Location: Right arm)   Pulse 104   Temp 98.3  F (36.8  C) (Tympanic)   Resp 16   Ht 1.803 m (5' 11\")   Wt 99.8 kg (220 lb)   SpO2 97%   BMI 30.68 kg/m                             "

## 2023-08-30 NOTE — ED TRIAGE NOTES
Pt presented by EMS with of left shoulder pain. Per report, pt walked down to the lobby, sitting with PD, stated sudden onset of left shoulder pain, no recent fall or trauma, hx of daily ETOH consumption, last drink was noon.  at the scene, and was given 650mg of tylenol liquid. Pt arrived with increased pain, described to be spastic and with certain movement. A&Ox3, hx of 2 MI and 2 stents, pt is concerned about MI again; stated he has been off BP med for over a month because he is not able to afford them. Denied CP, ABC intact      Triage Assessment       Row Name 08/30/23 0550       Triage Assessment (Adult)    Airway WDL WDL       Respiratory WDL    Respiratory WDL WDL       Skin Circulation/Temperature WDL    Skin Circulation/Temperature WDL WDL       Cardiac WDL    Cardiac WDL WDL       Peripheral/Neurovascular WDL    Peripheral Neurovascular WDL WDL       [place] Coma Scale    Best Eye Response 4-->(E4) spontaneous    Best Motor Response 6-->(M6) obeys commands    Best Verbal Response 5-->(V5) oriented    [place] Coma Scale Score 15      Row Name 08/30/23 0129       Triage Assessment (Adult)    Airway WDL WDL       Respiratory WDL    Respiratory WDL WDL       Skin Circulation/Temperature WDL    Skin Circulation/Temperature WDL WDL       Peripheral/Neurovascular WDL    Peripheral Neurovascular WDL WDL       Cognitive/Neuro/Behavioral WDL    Cognitive/Neuro/Behavioral WDL WDL

## 2023-08-30 NOTE — ED NOTES
[facility]  ED Nurse Handoff Report    ED Chief complaint: Shoulder Pain  . ED Diagnosis:   Final diagnoses:   Acute pain of left shoulder   Anemia, unspecified type   Elevated troponin       Allergies:   Allergies   Allergen Reactions    Metformin Diarrhea    Honey Other (See Comments)     Throat irritation    Statins        Code Status: Full Code    Activity level - Baseline/Home:  independent.  Activity Level - Current:   independent.   Lift room needed: No.   Bariatric: No   Needed: No   Isolation: No.   Infection: Not Applicable.     Respiratory status: Nasal cannula - pt is RA with sat at 96% since arrival, desat to 88% when given valium, pt is on 2L O2    Vital Signs (within 30 minutes):   Vitals:    08/30/23 0330 08/30/23 0530 08/30/23 0535 08/30/23 0545   BP: (!) 167/103 (!) 146/93 (!) 162/88 139/86   Pulse: 84 84 101 80   Resp: 20 15 13 (!) 33   Temp:       TempSrc:       SpO2: 96% 95% 95% 98%       Cardiac Rhythm:  ,      Pain level:    Patient confused: No.   Patient Falls Risk: activity supervised and toileting schedule implemented.   Elimination Status: Has voided     Patient Report - Initial Complaint: Left shoulder pain .   Focused Assessment: Saji Iqbal is a 63 year old male admitted on 8/30/2023. He has a hx of CAD s/p PCI LAD, type 2 DM, HTN, HLP, alcohol abuse who presents with L shoulder pain.  He was sleeping on his left shoulder and when he woke up he had pain in the left shoulder.  The pain is localized to the left shoulder and does not radiate.  It is made worse with certain movements.  He had a little bit of numbness on his left hand but that has improved.  He denies any chest pain or shortness of breath.  His last alcohol drink was yesterday.  In the ER is seen by Dr. Call.  I discussed case with him and I am asked to admit the patient.      Abnormal Results:   Labs Ordered and Resulted from Time of ED Arrival to Time of ED Departure   BASIC METABOLIC  PANEL - Abnormal       Result Value    Sodium 137      Potassium 3.6      Chloride 98      Carbon Dioxide (CO2) 18 (*)     Anion Gap 21 (*)     Urea Nitrogen 6.9 (*)     Creatinine 0.79      Calcium 8.2 (*)     Glucose 154 (*)     GFR Estimate >90     TROPONIN T, HIGH SENSITIVITY - Abnormal    Troponin T, High Sensitivity 39 (*)    ETHYL ALCOHOL LEVEL - Abnormal    Alcohol ethyl 0.17 (*)    CBC WITH PLATELETS AND DIFFERENTIAL - Abnormal    WBC Count 7.7      RBC Count 3.27 (*)     Hemoglobin 12.3 (*)     Hematocrit 34.5 (*)      (*)     MCH 37.6 (*)     MCHC 35.7      RDW 12.4      Platelet Count 79 (*)     % Neutrophils 81      % Lymphocytes 9      % Monocytes 8      % Eosinophils 0      % Basophils 1      % Immature Granulocytes 1      NRBCs per 100 WBC 0      Absolute Neutrophils 6.2      Absolute Lymphocytes 0.7 (*)     Absolute Monocytes 0.6      Absolute Eosinophils 0.0      Absolute Basophils 0.1      Absolute Immature Granulocytes 0.0      Absolute NRBCs 0.0     TROPONIN T, HIGH SENSITIVITY - Abnormal    Troponin T, High Sensitivity 39 (*)    KETONE BETA-HYDROXYBUTYRATE QUANTITATIVE, RAPID - Abnormal    Ketone (Beta-Hydroxybutyrate) Quantitative 0.80 (*)    HEPATIC FUNCTION PANEL - Abnormal    Protein Total 6.2 (*)     Albumin 3.4 (*)     Bilirubin Total 4.3 (*)     Alkaline Phosphatase 178 (*)      (*)     ALT 61      Bilirubin Direct 3.04 (*)    LACTIC ACID WHOLE BLOOD        Chest XR,  PA & LAT   Final Result   IMPRESSION: Borderline enlarged cardiac silhouette. Lungs are clear. No pneumothorax or pleural effusion.      XR Shoulder Left G/E 3 Views   Final Result   IMPRESSION: Mild osteoarthritic change at the acromioclavicular joint. No displaced fracture or dislocation.          Treatments provided: see MAR  Family Comments:  Pt called daughter   OBS brochure/video discussed/provided to patient:  N/A  ED Medications:   Medications   dextrose 5% and 0.9% NaCl infusion ( Intravenous $New Bag  8/30/23 2573)   thiamine (B-1) injection 100 mg (has no administration in time range)   ketorolac (TORADOL) injection 15 mg (15 mg Intravenous $Given 8/30/23 0130)   nitroGLYcerin (NITROSTAT) 0.4 MG sublingual tablet (  $Given 8/30/23 1210)   aspirin (ASA) 81 MG chewable tablet (  Not Given 8/30/23 0419)   diazepam (VALIUM) injection 10 mg (10 mg Intravenous $Given 8/30/23 4827)       Drips infusing:  No  For the majority of the shift this patient was Green.   Interventions performed were pain management..    Sepsis treatment initiated: No    Cares/treatment/interventions/medications to be completed following ED care: pt drinks a pine a day and every day, denied hx of alcohol withdraw, unsure what alcohol withdraw feels like. CIWA -5. Given valium and thiamine    ED Nurse Name: Pedro Nathan RN  5:59 AM RECEIVING UNIT ED HANDOFF REVIEW    Above ED Nurse Handoff Report was reviewed: Yes  Reviewed by: Leela Smith RN on August 30, 2023 at 8:43 AM

## 2023-08-30 NOTE — CONSULTS
[facility]    Cardiology Consultation     Date of Admission:  8/30/2023    Assessment & Plan   Saji Iqbal is a 63 year old male who was admitted on 8/30/2023.    Impression:  1.  Significant reduction left ventricular systolic function with regional wall motion abnormalities.  This is most consistent with an ischemic cardiomyopathy.  Patient has known coronary artery disease with 2 prior stents placed in the proximal left anterior descending artery 1 in 2014 and the other in 2015.  Patient is asymptomatic with no symptoms of angina pectoris and also no symptoms of congestive heart failure.  2.  Alcoholism.  This could be contributing to the reduction left ventricular systolic function also.  3.  Medication noncompliance.  The patient cannot afford his medications as he lost his job and is on [employer] which does not cover his meds and they are too expensive for him.  4.  Small troponin rise.  Could be consistent with a non-ST elevation myocardial infarct though the patient has no symptoms.  5.  Chronic left bundle branch block present since 2020.  6.  Type 2 diabetes mellitus.    Plan:  1.  Coronary angiography is indicated in this patient who has moderate reduction left ventricular systolic function and regional wall motion abnormalities and who is a prior history of coronary artery disease.  I explained the risks and benefits of the procedure to the patient including the risk of stroke, heart attack, death, bleeding, bruising, hematoma formation, vascular damage, dye allergy, dye nephropathy, the risk associated with conscious sedation including aspiration and intubation, and the risk associated with blood transfusions.  I also explained the special risk associated with coronary interventions including the increased risk of death, myocardial infarction and emergency bypass surgery.  The patient will be understood why redoing the procedure.  He told me understood how the procedure was  performed.  He told me he understood the risks associate with this procedure and wishes to proceed.  2.  I fully agree with the reinstitution of his medical therapy.  The patient has significantly abnormal liver function test probably related to alcoholism and I would hold off on using statins given the markedly raised AST of 235.      Tomas Jimenez MD [facility]    Primary Care Physician   Naseem Esparza    Reason for Consult   Reason for consult: I was asked by hospitalist service to evaluate this patient for reduced left ventricular systolic function..    History of Present Illness   Saji Iqbal is a 63 year old male who presents with left shoulder discomfort which sounds distinctly musculoskeletal.  This is a patient with a past history of coronary artery disease and who has type 2 diabetes mellitus..  In 2014 he had a bare-metal stent placed in the proximal left anterior descending artery in [facility].  He had a another stent placed just proximal to that stent in 2015 also at [facility] and this also was a bare-metal stent.  They used bare-metal stents because they felt that the patient would not continue to use dual antiplatelet therapy to reduce the risk of stent thrombosis.  The patient has not been complaining of any chest pains or chest pressure or unusual shortness of breath.  He is not complaining of ankle edema orthopnea nor PND.  He has a left bundle branch block on his EKG.  This was present in 2020 but not in 2017.  He had an echocardiogram performed here today and this showed that he has an ejection fraction of 35 to 40% with severe inferior and septal hypokinesis.  Echocardiogram in 2014 showed normal left ventricular size and systolic function.  The patient has a significant problem with alcoholism.  The patient has not been taking his medications for a while because he lost his job and [employer] insurance does not cover his medications well and he cannot afford  them.    Past Medical History   Past Medical History:   Diagnosis Date    Cluster headache     Coronary artery disease     Depression     Diabetes mellitus, type II (H)     Gastroesophageal reflux disease     Heart attack (H)     Hyperlipidemia     Hypertension     Renal disease      hx kidney stones    Rotator cuff arthropathy     Sleep apnea     doesn't use cpap-is broken    Stented coronary artery          Past Surgical History   Past Surgical History:   Procedure Laterality Date    ARTHROSCOPY SHOULDER      REPAIR TENDON FOOT Right 10/23/2020    Procedure: Repair of extensor tendon at the level of the metatarsophalangeal joint, right foot;  Surgeon: Gerard Diaz DPM;  Location: [facility] OR    STENT, CORONARY, ESAU  2014, 2015         Prior to Admission Medications   Prior to Admission Medications   Prescriptions Last Dose Informant Patient Reported? Taking?   ACCU-CHEK GUIDE test strip   No No   Sig: Use to test blood sugar 1 times daily or as directed.   Blood Glucose Monitoring Suppl (ACCU-CHEK GUIDE) w/Device KIT   No No   Sig: USE TO TEST BLOOD SUGAR 1 TIME DAILY OR AS DIRECTED   PARoxetine (PAXIL) 20 MG tablet 8/29/2023 at AM  Yes Yes   Sig: Take 20 mg by mouth daily   STATIN NOT PRESCRIBED (INTENTIONAL)   No No   Sig: Please choose reason not prescribed from choices below.   ULTICARE PEN NEEDLES 31G X 5 MM miscellaneous   No No   Sig: USE 2 DAILY OR AS DIRECTED   aspirin 81 MG tablet 8/29/2023 at AM  Yes Yes   Sig: Take by mouth daily   blood glucose (NO BRAND SPECIFIED) lancets standard   No No   Sig: Use to test blood sugar 1 times daily or as directed.   blood glucose (NO BRAND SPECIFIED) test strip   No No   Sig: Use to test blood sugar 4 times daily or as directed. Please dispense Accu Chek Ana plus test strips or per patient preference if using a different brand   blood glucose monitoring (SOFTCLIX) lancets   No No   Sig: USE TO TEST BLOOD SUGAR ONCE DAILY OR AS DIRECTED   clobetasol (TEMOVATE)  0.05 % external cream More than a month at PRN  No Yes   Sig: Apply topically 2 times daily Trunk, arms, legs and hands and feet   glipiZIDE (GLUCOTROL XL) 10 MG 24 hr tablet 8/29/2023 at AM  No Yes   Sig: Take 1 tablet (10 mg) by mouth daily   insulin glargine (LANTUS SOLOSTAR) 100 UNIT/ML pen 8/29/2023 at PM  No Yes   Sig: Inject 24 Units Subcutaneous 2 times daily   lisinopril (ZESTRIL) 20 MG tablet Past Week  No Yes   Sig: Take 1 tablet (20 mg) by mouth daily      Facility-Administered Medications: None     Current Facility-Administered Medications   Medication Dose Route Frequency    aspirin  81 mg Oral Daily    cloNIDine  0.1 mg Oral Q8H    folic acid  1 mg Oral Daily    [START ON 9/6/2023] gabapentin  100 mg Oral Q8H    [START ON 9/4/2023] gabapentin  300 mg Oral Q8H    [START ON 9/2/2023] gabapentin  600 mg Oral Q8H    gabapentin  900 mg Oral Q8H    insulin aspart  1-6 Units Subcutaneous Q4H    lisinopril  5 mg Oral Daily    metoprolol tartrate  12.5 mg Oral BID    multivitamin w/minerals  1 tablet Oral Daily    sodium chloride (PF)  3 mL Intracatheter Q8H    thiamine  100 mg Oral Daily     Current Facility-Administered Medications   Medication Last Rate    sodium chloride 100 mL/hr at 08/30/23 0846     Allergies   Allergies   Allergen Reactions    Metformin Diarrhea    Honey Other (See Comments)     Throat irritation    Statins        Social History    reports that he quit smoking about 8 years ago. His smoking use included cigarettes. He has a 6.60 pack-year smoking history. He has never been exposed to tobacco smoke. He has never used smokeless tobacco. He reports current alcohol use of about 1.0 standard drink of alcohol per week. He reports that he does not use drugs.      Family History   I have reviewed this patient's family history and updated it with pertinent information if needed.  Family History   Problem Relation Age of Onset    Coronary Artery Disease Mother     Coronary Artery Disease Father   "   Cerebrovascular Disease Brother     Anuerysm Sister     Glaucoma No family hx of     Macular Degeneration No family hx of           Review of Systems   A comprehensive review of system was performed and is negative other than that noted in the HPI or here.     Physical Exam   Vital Signs with Ranges  Temp:  [97.1  F (36.2  C)-98.1  F (36.7  C)] 97.8  F (36.6  C)  Pulse:  [] 98  Resp:  [13-33] 18  BP: (138-167)/() 144/92  SpO2:  [95 %-98 %] 96 %  Wt Readings from Last 4 Encounters:   08/30/23 99.8 kg (220 lb)   04/07/23 100.6 kg (221 lb 12.8 oz)   03/30/23 100.2 kg (221 lb)   02/17/23 100.2 kg (221 lb)     No intake/output data recorded.      Vitals: BP (!) 144/92 (BP Location: Right arm, Patient Position: Semi-Fuchs's, Cuff Size: Adult Regular)   Pulse 98   Temp 97.8  F (36.6  C) (Temporal)   Resp 18   Ht 1.803 m (5' 11\")   Wt 99.8 kg (220 lb)   SpO2 96%   BMI 30.68 kg/m      Physical Exam:   General - Alert and oriented to time place and person in no acute distress  Eyes - No scleral icterus  HEENT - Neck supple, moist mucous membranes  Cardiovascular -heart sounds 1 and 2 are normal with no murmurs.  S4 present.  Jugular venous pulse is not raised and carotids are normal with no bruits.  Extremities - There is no peripheral edema  Respiratory -chest is clear to percussion auscultation with no added sounds.  Skin - No pallor or cyanosis  Gastrointestinal - Non tender and non distended without rebound or guarding  Psych - Appropriate affect   Neurological - Tremor.  No gross motor neurological focal deficits.     No lab results found in last 7 days.    Invalid input(s): TROPONINIES    Recent Labs   Lab 08/30/23  1205 08/30/23  0834 08/30/23  0351 08/30/23  0343   WBC  --   --   --  7.7   HGB  --   --   --  12.3*   MCV  --   --   --  106*   PLT  --   --   --  79*   NA  --   --   --  137   POTASSIUM  --   --   --  3.6   CHLORIDE  --   --   --  98   CO2  --   --   --  18*   BUN  --   --   --  " 6.9*   CR  --   --   --  0.79   GFRESTIMATED  --   --   --  >90   ANIONGAP  --   --   --  21*   BENY  --   --   --  8.2*   * 195*  --  154*   ALBUMIN  --   --  3.4*  --    PROTTOTAL  --   --  6.2*  --    BILITOTAL  --   --  4.3*  --    ALKPHOS  --   --  178*  --    ALT  --   --  61  --    AST  --   --  235*  --      Recent Labs   Lab Test 07/19/22  0935 01/19/22  0734   CHOL 280* 250*   HDL 62 43   * 171*   TRIG 255* 182*     Recent Labs   Lab 08/30/23  0343   WBC 7.7   HGB 12.3*   HCT 34.5*   *   PLT 79*     No results for input(s): PH, PHV, PO2, PO2V, SAT, PCO2, PCO2V, HCO3, HCO3V in the last 168 hours.  No results for input(s): NTBNPI, NTBNP in the last 168 hours.  No results for input(s): DD in the last 168 hours.  No results for input(s): SED, CRP in the last 168 hours.  Recent Labs   Lab 08/30/23  0343   PLT 79*     No results for input(s): TSH in the last 168 hours.  No results for input(s): COLOR, APPEARANCE, URINEGLC, URINEBILI, URINEKETONE, SG, UBLD, URINEPH, PROTEIN, UROBILINOGEN, NITRITE, LEUKEST, RBCU, WBCU in the last 168 hours.    Imaging:  Recent Results (from the past 48 hour(s))   XR Shoulder Left G/E 3 Views    Narrative    EXAM: XR SHOULDER LEFT G/E 3 VIEWS  LOCATION: [facility]  DATE: 8/30/2023    INDICATION: left shoulder pain  COMPARISON: None.      Impression    IMPRESSION: Mild osteoarthritic change at the acromioclavicular joint. No displaced fracture or dislocation.   Chest XR,  PA & LAT    Narrative    EXAM: XR CHEST 2 VIEWS  LOCATION: [facility]  DATE: 8/30/2023    INDICATION: left shoulder pain, cardiac workup  COMPARISON: 12/22/2014      Impression    IMPRESSION: Borderline enlarged cardiac silhouette. Lungs are clear. No pneumothorax or pleural effusion.   Echocardiogram Complete   Result Value    LVEF  35-40%    [facility]    [id number]  [id number]  [id number]  [id number]^JORGE^K     [facility]  [facility]  Echocardiography Laboratory  [address] [place] [address]     Name: TESHA GUTIÉRREZ  MRN: [id number]  : 1960  Study Date: 2023 07:58 AM  Age: 63 yrs  Gender: Male  Patient Location: [facility]  Reason For Study: Chest Pain  Ordering Physician: JORGE NELSON  Performed By: Diya Busch     BSA: 2.2 m2  Height: 71 in  Weight: 221 lb  HR: 96  BP: 129/86 mmHg  ______________________________________________________________________________  Procedure  Complete Portable Echo Adult. Optison (NDC #3688-1978) given intravenously.  ______________________________________________________________________________  Interpretation Summary     The visual ejection fraction is 35-40%.  The ascending aorta is Mildly dilated.  There is severe inferior wall hypokinesis.  There is severe septal hypokinesis.  There is no comparison study available. Contrast was used without apparent  complications.  ______________________________________________________________________________  Left Ventricle  The left ventricle is normal in size. There is normal left ventricular wall  thickness. A sigmoid septum is present. The visual ejection fraction is 35-  40%. Left ventricular diastolic function is abnormal. There is severe inferior  wall hypokinesis. There is severe septal hypokinesis.     Right Ventricle  The right ventricle is normal in structure, function and size.     Atria  The left atrium is mildly dilated. Right atrial size is normal.     Aortic Valve  There is mild trileaflet aortic sclerosis.     Pulmonic Valve  The pulmonic valve is not well visualized.     Vessels  Borderline aortic root dilatation. The ascending aorta is Mildly dilated.     Pericardium  There is no pericardial effusion.     Rhythm  Sinus rhythm was noted.     ______________________________________________________________________________  MMode/2D Measurements & Calculations  IVSd: 1.5 cm  LVIDd: 5.2 cm  LVIDs: 4.4 cm  LVPWd: 1.2  cm  FS: 15.6 %  LV mass(C)d: 283.4 grams  LV mass(C)dI: 128.8 grams/m2  Ao root diam: 3.9 cm  asc Aorta Diam: 4.0 cm     LVOT diam: 2.0 cm  LVOT area: 3.1 cm2  Ao root diam Index (cm/m2): 1.8  asc Aorta Diam Index (cm/m2): 1.8  RWT: 0.45  TAPSE: 1.9 cm     ______________________________________________________________________________  Report approved by: Kaden Avilez 08/30/2023 10:32 AM         US Abdomen Limited    Narrative    US ABDOMEN LIMITED 8/30/2023 9:26 AM    CLINICAL HISTORY: elevated LFT's., elevated LFT's.  TECHNIQUE: Limited abdominal ultrasound.    COMPARISON: None.    FINDINGS:    GALLBLADDER: The gallbladder is normal. No gallstones, wall  thickening, or pericholecystic fluid. Negative sonographic Fish's  sign.    BILE DUCTS: The common bile duct was not visualized, likely secondary  to poor acoustic penetration of the liver.    LIVER: Increased echogenicity of the hepatic parenchyma with markedly  decreased acoustic penetrance. The entire liver was incompletely  visualized due to abnormal echogenicity and poor acoustic penetration.  The main portal vein was not visualized, likely secondary to poor  acoustic penetration.    RIGHT KIDNEY: No hydronephrosis.    PANCREAS: The pancreas is largely obscured by overlying gas.    Trace ascites.      Impression    IMPRESSION:  1.  Increased echogenicity of hepatic parenchyma with markedly  decreased acoustic penetrance. Findings could be seen with hepatic  steatosis and fibrosis.  2.  The CBD, main portal vein, and pancreas were not visualized on  this exam due to abnormal liver with decreased acoustic penetration  and bowel gas.  3.  Trace upper abdominal ascites.    NITESH OWEN MD         SYSTEM ID:  [id number]       Echo:  No results found for this or any previous visit (from the past 4320 hour(s)).    Clinically Significant Risk Factors Present on Admission          # Hypocalcemia: Lowest Ca = 8.2 mg/dL in last 2 days, will monitor and replace as  "appropriate   # Hypomagnesemia: Lowest Mg = 1.4 mg/dL in last 2 days, will replace as needed  # Anion Gap Metabolic Acidosis: Highest Anion Gap = 21 mmol/L in last 2 days, will monitor and treat as appropriate  # Hypoalbuminemia: Lowest albumin = 3.4 g/dL at 8/30/2023  3:51 AM, will monitor as appropriate   # Drug Induced Platelet Defect: home medication list includes an antiplatelet medication   # Hypertension: Noted on problem list  # Chronic heart failure with reduced ejection fraction: last echo with EF <40%     # Obesity: Estimated body mass index is 30.68 kg/m  as calculated from the following:    Height as of this encounter: 1.803 m (5' 11\").    Weight as of this encounter: 99.8 kg (220 lb).             Cardiomyopathy                                                    "

## 2023-08-30 NOTE — PHARMACY-ADMISSION MEDICATION HISTORY
Pharmacist Admission Medication History    Admission medication history is complete. The information provided in this note is only as accurate as the sources available at the time of the update.    Medication reconciliation/reorder completed by provider prior to medication history? Yes    Information Source(s): Patient, Clinic records, and [facility] via in-person    Pertinent Information:     Patient states he recently started taking paroxetine again. This was discontinued on 2017 and he hasn't filled a prescription for it in [email or web address].   Adherence is an issue and cost seems to be a primary barrier with his [employer] insurance. He reported that he stopped taking metoprolol months ago because he couldn't find it cheap enough. Metoprolol was last filled in January 2023. Many medications have inconsistent fill history. Patient uses whatever pharmacy is cheapest for each individual medication.   For patients on insulin therapy:   I have concerns about insulin adherence but the patient states he uses it twice daily every day when questioned further. Last fill was December per [facility], but he says he gets 90 days worth for $18 somewhere.   Do you use sliding scale insulin based on blood sugars? No  Do you typically eat three meals a day? Yes  How many times do you check your blood glucose per day? 1-2 times daily  How many episodes of hypoglycemia do you typically have per month? None      Changes made to PTA medication list:  Added: Paroxetine (possibly using old supply)  Deleted: Metoprolol (adherence issue), folic acid  Changed: None    Medication Affordability:  Not including over the counter (OTC) medications, was there a time in the past 3 months when you did not take your medications as prescribed because of cost?: Yes (Patient stopped taking metoprolol because cost was a barrier)    Allergies reviewed with patient and updates made in EHR: no    Medication History Completed By: Abhay Yuan  [facility] 8/30/2023 10:28 AM    Prior to Admission medications    Medication Sig Last Dose Taking? Auth Provider Long Term End Date   aspirin 81 MG tablet Take by mouth daily 8/29/2023 at AM Yes Reported, Patient     clobetasol (TEMOVATE) 0.05 % external cream Apply topically 2 times daily Trunk, arms, legs and hands and feet More than a month at PRN Yes Abhay Vasques MD     glipiZIDE (GLUCOTROL XL) 10 MG 24 hr tablet Take 1 tablet (10 mg) by mouth daily 8/29/2023 at AM Yes Naseem Esparza MD Yes    insulin glargine (LANTUS SOLOSTAR) 100 UNIT/ML pen Inject 24 Units Subcutaneous 2 times daily 8/29/2023 at PM Yes Naseem Esparza MD Yes    lisinopril (ZESTRIL) 20 MG tablet Take 1 tablet (20 mg) by mouth daily Past Week Yes Naseem Esparza MD Yes    PARoxetine (PAXIL) 20 MG tablet Take 20 mg by mouth daily 8/29/2023 at AM Yes Unknown, Entered By History No    ACCU-CHEK GUIDE test strip Use to test blood sugar 1 times daily or as directed.   Naseem Esparza MD     blood glucose (NO BRAND SPECIFIED) lancets standard Use to test blood sugar 1 times daily or as directed.   Naseem Esparza MD     blood glucose (NO BRAND SPECIFIED) test strip Use to test blood sugar 4 times daily or as directed. Please dispense Accu Chek Ana plus test strips or per patient preference if using a different brand   Naseem Esparza MD     blood glucose monitoring (SOFTCLIX) lancets USE TO TEST BLOOD SUGAR ONCE DAILY OR AS DIRECTED   Naseem Esparza MD     Blood Glucose Monitoring Suppl (ACCU-CHEK GUIDE) w/Device KIT USE TO TEST BLOOD SUGAR 1 TIME DAILY OR AS DIRECTED   Naseem Esparza MD     STATIN NOT PRESCRIBED (INTENTIONAL) Please choose reason not prescribed from choices below.   Naseem Esparza MD Yes    ULTICARE PEN NEEDLES 31G X 5 MM miscellaneous USE 2 DAILY OR AS DIRECTED   Naseem Esparza MD

## 2023-08-30 NOTE — ED PROVIDER NOTES
History     Chief Complaint:  Shoulder Pain       HPI   Saji Iqbal is a 63 year old male history of type 2 diabetes, hypertension, hyperlipidemia, tobacco use, alcohol abuse, prior cardiac stents x2.  Who awoke with left shoulder pain.  Patient was sleeping on his left shoulder and when he woke up with the pain.  Pain is localized and does not radiate.  It is exacerbated by certain movements of his left shoulder and when he stands up.  Denies trauma.  No fever.  No left shoulder surgeries.  He had a little bit of numbness in his left hand but that started after he was hyperventilating and he thinks secondary to positioning of his arm is now improved.  No chest pain or shortness of breath.  No vomiting or diaphoresis.  Patient wants to ensure this is not his heart disease has 2 stents before.  Patient drank alcohol yesterday but he states he stopped drinking at noon.      Independent Historian:   EMS assist with history.  They provided patient with Tylenol.    Review of External Notes:   Telemedicine note June 5, 2023.      Medications:    Aspirin 81 mg   Glipizide   Lantus solostar  Lisinopril   Lopressor  Crestor  Plavix    Past Medical History:    Cluster headache  Coronary artery disease   Depression   Type 2 diabetes mellitus   GERD  Heart attack  Hyperlipidemia    Hypertension   Renal disease  Sleep apnea  Coronary artery disease   Depression   Anxiety     Past Surgical History:    Coronary stent  Arthroscopy shoulder  Repair tendon foot     Physical Exam   Patient Vitals for the past 24 hrs:   BP Temp Temp src Pulse Resp SpO2   08/30/23 0545 139/86 -- -- 80 (!) 33 98 %   08/30/23 0535 (!) 162/88 -- -- 101 13 95 %   08/30/23 0530 (!) 146/93 -- -- 84 15 95 %   08/30/23 0330 (!) 167/103 -- -- 84 20 96 %   08/30/23 0126 138/81 98.1  F (36.7  C) Oral 78 16 96 %        Physical Exam  GENERAL: Patient well nourished, but in moderate distress, occasionally yelling out in pain when he moves his left shoulder in  the wrong way.  HEAD: Atraumatic.  NECK: No rigidity  CV: RRR, no murmurs rubs or gallops  PULM: CTAB with good aeration; no retractions, rales, rhonchi, or wheezing  ABD: Soft, nontender, nondistended, no guarding  DERM: Multiple old appearing wounds on his extremities.  Skin warm and dry  EXTREMITY: Can fully range the left shoulder.  Some positions do appear to exacerbate his pain.  No pain with micro agitation of the left shoulder and no pain with loading the left shoulder joint.  The left shoulder is not red, hot, or swollen.  There are no skin changes on the left shoulder.  Shoulder compartments soft and nontender.  No calf tenderness or peripheral edema.   VASCULAR: Symmetric pulses bilaterally      Emergency Department Course   ECG  ECG interpreted by me.  Time 0127  NSR at 94 with left bundle branch block.  Negative Sgarbossa's.  .  .  QTc 522.  In comparison to prior ECG, no change.    Masood ECG interpreted by me 0406.  Normal sinus rhythm rate 85.  Left bundle branch block.  Negative Sgarbossa's.  QTc 511.    Imaging:  Chest XR,  PA & LAT   Final Result   IMPRESSION: Borderline enlarged cardiac silhouette. Lungs are clear. No pneumothorax or pleural effusion.      XR Shoulder Left G/E 3 Views   Final Result   IMPRESSION: Mild osteoarthritic change at the acromioclavicular joint. No displaced fracture or dislocation.         Report per radiology    Laboratory:  Labs Ordered and Resulted from Time of ED Arrival to Time of ED Departure   BASIC METABOLIC PANEL - Abnormal       Result Value    Sodium 137      Potassium 3.6      Chloride 98      Carbon Dioxide (CO2) 18 (*)     Anion Gap 21 (*)     Urea Nitrogen 6.9 (*)     Creatinine 0.79      Calcium 8.2 (*)     Glucose 154 (*)     GFR Estimate >90     TROPONIN T, HIGH SENSITIVITY - Abnormal    Troponin T, High Sensitivity 39 (*)    ETHYL ALCOHOL LEVEL - Abnormal    Alcohol ethyl 0.17 (*)    CBC WITH PLATELETS AND DIFFERENTIAL - Abnormal    WBC  Count 7.7      RBC Count 3.27 (*)     Hemoglobin 12.3 (*)     Hematocrit 34.5 (*)      (*)     MCH 37.6 (*)     MCHC 35.7      RDW 12.4      Platelet Count 79 (*)     % Neutrophils 81      % Lymphocytes 9      % Monocytes 8      % Eosinophils 0      % Basophils 1      % Immature Granulocytes 1      NRBCs per 100 WBC 0      Absolute Neutrophils 6.2      Absolute Lymphocytes 0.7 (*)     Absolute Monocytes 0.6      Absolute Eosinophils 0.0      Absolute Basophils 0.1      Absolute Immature Granulocytes 0.0      Absolute NRBCs 0.0     TROPONIN T, HIGH SENSITIVITY - Abnormal    Troponin T, High Sensitivity 39 (*)    KETONE BETA-HYDROXYBUTYRATE QUANTITATIVE, RAPID - Abnormal    Ketone (Beta-Hydroxybutyrate) Quantitative 0.80 (*)    HEPATIC FUNCTION PANEL - Abnormal    Protein Total 6.2 (*)     Albumin 3.4 (*)     Bilirubin Total 4.3 (*)     Alkaline Phosphatase 178 (*)      (*)     ALT 61      Bilirubin Direct 3.04 (*)    LACTIC ACID WHOLE BLOOD             Emergency Department Course & Assessments:             Interventions:  Medications   dextrose 5% and 0.9% NaCl infusion ( Intravenous $New Bag 8/30/23 0528)   thiamine (B-1) injection 100 mg (100 mg Intravenous $Given 8/30/23 0620)   ketorolac (TORADOL) injection 15 mg (15 mg Intravenous $Given 8/30/23 0130)   nitroGLYcerin (NITROSTAT) 0.4 MG sublingual tablet (  $Given 8/30/23 0410)   aspirin (ASA) 81 MG chewable tablet (  Not Given 8/30/23 6509)   diazepam (VALIUM) injection 10 mg (10 mg Intravenous $Given 8/30/23 0564)           Independent Interpretation (X-rays, CTs, rhythm strip):  Chest x-ray-no pneumothorax and no pneumonia.    Consultations/Discussion of Management or Tests:  Hospitalist Dr. Moon       Social Determinants of Health affecting care:   Alcohol abuse    Disposition:  The patient was admitted to the hospital under the care of Dr. Moon.     Impression & Plan         Medical Decision Making:  Patient presenting with left shoulder  pain.  Chronic conditions complicating-coronary artery disease.  Differential diagnosis-considered septic arthritis, compartment syndrome, fracture, ACS, among others.  Initial exam seemed most consistent with potential musculoskeletal pain however I cannot reproduce the pain to palpation anywhere.  However he does have significant pain with certain maneuvers it appears.  Patient occasionally yelling out in severe pain.  I gave patient Toradol which did help his symptoms.  ECG showing left bundle branch block with negative Sgarbossa's.  Repeat ECG and unchanged.  Chest x-ray and shoulder x-ray unremarkable.  On exam I do not see signs of compartment syndrome.  I see no evidence of septic arthritis nor necrotizing skin infection.  Initial troponin elevated 39.  Most recent was years prior and was normal.  Therefore empirically given aspirin and nitroglycerin.  Nitroglycerin made patient feel nauseous.  I repeated the troponin and it is unchanged at 39.  I discussed with patient that this could be musculoskeletal but it could also be cardiac.  Favoring musculoskeletal.  However now the left shoulder pain is completely resolved and I cannot do anything to reproduce pain.  Therefore, consulted hospitalist for admission for troponin trending and ACS observation.  Patient then started to get withdrawal symptoms.  His ketone levels are slightly elevated and anion gap is slightly up suggestive of alcohol ketoacidosis.  Treated withdrawal with IV diazepam.  Given thiamine and D5 NS.  Patient to be admitted.    Critical Care time:  was 0 minutes for this patient excluding procedures.    Diagnosis:    ICD-10-CM    1. Acute pain of left shoulder  M25.512       2. Anemia, unspecified type  D64.9       3. Elevated troponin  R77.8       4. Alcoholic ketoacidosis  E87.29       5. Alcohol withdrawal, uncomplicated (H)  F10.930            Discharge Medications:  New Prescriptions    No medications on file          Gerry Call  MD  8/30/2023   No att. providers found        Gerry Call MD  08/30/23 0611

## 2023-08-31 LAB
ANION GAP SERPL CALCULATED.3IONS-SCNC: 7 MMOL/L (ref 7–15)
APTT PPP: 30 SECONDS (ref 22–38)
BASOPHILS # BLD AUTO: 0.1 10E3/UL (ref 0–0.2)
BASOPHILS NFR BLD AUTO: 1 %
BUN SERPL-MCNC: 7.5 MG/DL (ref 8–23)
CALCIUM SERPL-MCNC: 8.1 MG/DL (ref 8.8–10.2)
CHLORIDE SERPL-SCNC: 102 MMOL/L (ref 98–107)
CHOLEST SERPL-MCNC: 245 MG/DL
CREAT SERPL-MCNC: 0.72 MG/DL (ref 0.67–1.17)
DEPRECATED HCO3 PLAS-SCNC: 23 MMOL/L (ref 22–29)
EOSINOPHIL # BLD AUTO: 0.2 10E3/UL (ref 0–0.7)
EOSINOPHIL NFR BLD AUTO: 3 %
ERYTHROCYTE [DISTWIDTH] IN BLOOD BY AUTOMATED COUNT: 13.2 % (ref 10–15)
GFR SERPL CREATININE-BSD FRML MDRD: >90 ML/MIN/1.73M2
GLUCOSE BLDC GLUCOMTR-MCNC: 128 MG/DL (ref 70–99)
GLUCOSE BLDC GLUCOMTR-MCNC: 78 MG/DL (ref 70–99)
GLUCOSE BLDC GLUCOMTR-MCNC: 83 MG/DL (ref 70–99)
GLUCOSE BLDC GLUCOMTR-MCNC: 95 MG/DL (ref 70–99)
GLUCOSE BLDC GLUCOMTR-MCNC: 96 MG/DL (ref 70–99)
GLUCOSE BLDC GLUCOMTR-MCNC: 96 MG/DL (ref 70–99)
GLUCOSE SERPL-MCNC: 77 MG/DL (ref 70–99)
HCT VFR BLD AUTO: 32.8 % (ref 40–53)
HDLC SERPL-MCNC: 15 MG/DL
HGB BLD-MCNC: 11.1 G/DL (ref 13.3–17.7)
IMM GRANULOCYTES # BLD: 0 10E3/UL
IMM GRANULOCYTES NFR BLD: 0 %
INR PPP: 1.14 (ref 0.85–1.15)
LDLC SERPL CALC-MCNC: 163 MG/DL
LYMPHOCYTES # BLD AUTO: 0.8 10E3/UL (ref 0.8–5.3)
LYMPHOCYTES NFR BLD AUTO: 12 %
MAGNESIUM SERPL-MCNC: 2.3 MG/DL (ref 1.7–2.3)
MCH RBC QN AUTO: 37.5 PG (ref 26.5–33)
MCHC RBC AUTO-ENTMCNC: 33.8 G/DL (ref 31.5–36.5)
MCV RBC AUTO: 111 FL (ref 78–100)
MONOCYTES # BLD AUTO: 0.6 10E3/UL (ref 0–1.3)
MONOCYTES NFR BLD AUTO: 9 %
NEUTROPHILS # BLD AUTO: 4.9 10E3/UL (ref 1.6–8.3)
NEUTROPHILS NFR BLD AUTO: 75 %
NONHDLC SERPL-MCNC: 230 MG/DL
NRBC # BLD AUTO: 0 10E3/UL
NRBC BLD AUTO-RTO: 0 /100
PLATELET # BLD AUTO: 51 10E3/UL (ref 150–450)
POTASSIUM SERPL-SCNC: 4 MMOL/L (ref 3.4–5.3)
RBC # BLD AUTO: 2.96 10E6/UL (ref 4.4–5.9)
SODIUM SERPL-SCNC: 132 MMOL/L (ref 136–145)
TRIGL SERPL-MCNC: 337 MG/DL
WBC # BLD AUTO: 6.5 10E3/UL (ref 4–11)

## 2023-08-31 PROCEDURE — 250N000013 HC RX MED GY IP 250 OP 250 PS 637: Performed by: INTERNAL MEDICINE

## 2023-08-31 PROCEDURE — 80048 BASIC METABOLIC PNL TOTAL CA: CPT | Performed by: INTERNAL MEDICINE

## 2023-08-31 PROCEDURE — 258N000003 HC RX IP 258 OP 636: Performed by: INTERNAL MEDICINE

## 2023-08-31 PROCEDURE — 85730 THROMBOPLASTIN TIME PARTIAL: CPT | Performed by: INTERNAL MEDICINE

## 2023-08-31 PROCEDURE — 83735 ASSAY OF MAGNESIUM: CPT | Performed by: STUDENT IN AN ORGANIZED HEALTH CARE EDUCATION/TRAINING PROGRAM

## 2023-08-31 PROCEDURE — 85025 COMPLETE CBC W/AUTO DIFF WBC: CPT | Performed by: INTERNAL MEDICINE

## 2023-08-31 PROCEDURE — 36415 COLL VENOUS BLD VENIPUNCTURE: CPT | Performed by: INTERNAL MEDICINE

## 2023-08-31 PROCEDURE — 85610 PROTHROMBIN TIME: CPT | Performed by: INTERNAL MEDICINE

## 2023-08-31 PROCEDURE — 99232 SBSQ HOSP IP/OBS MODERATE 35: CPT | Performed by: INTERNAL MEDICINE

## 2023-08-31 PROCEDURE — 80061 LIPID PANEL: CPT | Performed by: INTERNAL MEDICINE

## 2023-08-31 PROCEDURE — 120N000001 HC R&B MED SURG/OB

## 2023-08-31 PROCEDURE — 36415 COLL VENOUS BLD VENIPUNCTURE: CPT | Performed by: STUDENT IN AN ORGANIZED HEALTH CARE EDUCATION/TRAINING PROGRAM

## 2023-08-31 RX ORDER — LORAZEPAM 2 MG/ML
0.5 INJECTION INTRAMUSCULAR
Status: COMPLETED | OUTPATIENT
Start: 2023-08-31 | End: 2023-09-03

## 2023-08-31 RX ORDER — CLONIDINE HYDROCHLORIDE 0.1 MG/1
0.05 TABLET ORAL EVERY 8 HOURS
Status: DISCONTINUED | OUTPATIENT
Start: 2023-08-31 | End: 2023-08-31

## 2023-08-31 RX ORDER — HEPARIN SODIUM 1000 [USP'U]/ML
INJECTION, SOLUTION INTRAVENOUS; SUBCUTANEOUS
Status: DISCONTINUED
Start: 2023-08-31 | End: 2023-09-11 | Stop reason: HOSPADM

## 2023-08-31 RX ORDER — LIDOCAINE HYDROCHLORIDE 10 MG/ML
INJECTION, SOLUTION EPIDURAL; INFILTRATION; INTRACAUDAL; PERINEURAL
Status: COMPLETED
Start: 2023-08-31 | End: 2023-09-11

## 2023-08-31 RX ORDER — NITROGLYCERIN 5 MG/ML
VIAL (ML) INTRAVENOUS
Status: DISCONTINUED
Start: 2023-08-31 | End: 2023-09-11 | Stop reason: HOSPADM

## 2023-08-31 RX ORDER — CARVEDILOL 3.12 MG/1
1.56 TABLET, FILM COATED ORAL 2 TIMES DAILY WITH MEALS
Status: DISCONTINUED | OUTPATIENT
Start: 2023-08-31 | End: 2023-09-01

## 2023-08-31 RX ORDER — LORAZEPAM 0.5 MG/1
0.5 TABLET ORAL
Status: COMPLETED | OUTPATIENT
Start: 2023-08-31 | End: 2023-09-03

## 2023-08-31 RX ORDER — FENTANYL CITRATE 50 UG/ML
INJECTION, SOLUTION INTRAMUSCULAR; INTRAVENOUS
Status: DISCONTINUED
Start: 2023-08-31 | End: 2023-08-31 | Stop reason: WASHOUT

## 2023-08-31 RX ORDER — LIDOCAINE 40 MG/G
CREAM TOPICAL
Status: DISCONTINUED | OUTPATIENT
Start: 2023-08-31 | End: 2023-08-31

## 2023-08-31 RX ORDER — VERAPAMIL HYDROCHLORIDE 2.5 MG/ML
INJECTION, SOLUTION INTRAVENOUS
Status: DISCONTINUED
Start: 2023-08-31 | End: 2023-09-11 | Stop reason: HOSPADM

## 2023-08-31 RX ORDER — POTASSIUM CHLORIDE 1500 MG/1
20 TABLET, EXTENDED RELEASE ORAL
Status: DISCONTINUED | OUTPATIENT
Start: 2023-08-31 | End: 2023-09-11 | Stop reason: HOSPADM

## 2023-08-31 RX ORDER — ASPIRIN 81 MG/1
243 TABLET, CHEWABLE ORAL ONCE
Status: COMPLETED | OUTPATIENT
Start: 2023-08-31 | End: 2023-08-31

## 2023-08-31 RX ORDER — ASPIRIN 325 MG
325 TABLET ORAL ONCE
Status: COMPLETED | OUTPATIENT
Start: 2023-08-31 | End: 2023-08-31

## 2023-08-31 RX ORDER — SODIUM CHLORIDE 9 MG/ML
INJECTION, SOLUTION INTRAVENOUS CONTINUOUS
Status: DISCONTINUED | OUTPATIENT
Start: 2023-08-31 | End: 2023-09-02

## 2023-08-31 RX ADMIN — GABAPENTIN 900 MG: 300 CAPSULE ORAL at 17:25

## 2023-08-31 RX ADMIN — LORAZEPAM 1 MG: 1 TABLET ORAL at 17:25

## 2023-08-31 RX ADMIN — GABAPENTIN 900 MG: 300 CAPSULE ORAL at 22:45

## 2023-08-31 RX ADMIN — CARVEDILOL 1.56 MG: 3.12 TABLET, FILM COATED ORAL at 17:27

## 2023-08-31 RX ADMIN — FOLIC ACID 1 MG: 1 TABLET ORAL at 09:40

## 2023-08-31 RX ADMIN — CARVEDILOL 1.56 MG: 3.12 TABLET, FILM COATED ORAL at 11:37

## 2023-08-31 RX ADMIN — ASPIRIN 325 MG: 325 TABLET ORAL at 09:40

## 2023-08-31 RX ADMIN — LISINOPRIL 5 MG: 5 TABLET ORAL at 09:41

## 2023-08-31 RX ADMIN — THIAMINE HCL TAB 100 MG 100 MG: 100 TAB at 09:40

## 2023-08-31 RX ADMIN — GABAPENTIN 900 MG: 300 CAPSULE ORAL at 06:11

## 2023-08-31 RX ADMIN — ACETAMINOPHEN 650 MG: 325 TABLET, FILM COATED ORAL at 18:37

## 2023-08-31 RX ADMIN — HYDROXYZINE HYDROCHLORIDE 25 MG: 25 TABLET, FILM COATED ORAL at 18:37

## 2023-08-31 RX ADMIN — CLONIDINE HYDROCHLORIDE 0.1 MG: 0.1 TABLET ORAL at 06:11

## 2023-08-31 RX ADMIN — ACETAMINOPHEN 650 MG: 325 TABLET, FILM COATED ORAL at 06:11

## 2023-08-31 RX ADMIN — SODIUM CHLORIDE: 9 INJECTION, SOLUTION INTRAVENOUS at 13:08

## 2023-08-31 RX ADMIN — DICLOFENAC SODIUM 2 G: 10 GEL TOPICAL at 20:58

## 2023-08-31 RX ADMIN — Medication 1 MG: at 22:45

## 2023-08-31 RX ADMIN — MULTIPLE VITAMINS W/ MINERALS TAB 1 TABLET: TAB at 09:40

## 2023-08-31 RX ADMIN — SODIUM CHLORIDE: 9 INJECTION, SOLUTION INTRAVENOUS at 05:20

## 2023-08-31 ASSESSMENT — ACTIVITIES OF DAILY LIVING (ADL)
ADLS_ACUITY_SCORE: 44
ADLS_ACUITY_SCORE: 41
ADLS_ACUITY_SCORE: 40
ADLS_ACUITY_SCORE: 41
ADLS_ACUITY_SCORE: 40
ADLS_ACUITY_SCORE: 41
ADLS_ACUITY_SCORE: 44
ADLS_ACUITY_SCORE: 41
ADLS_ACUITY_SCORE: 43
DEPENDENT_IADLS:: INDEPENDENT
ADLS_ACUITY_SCORE: 44

## 2023-08-31 NOTE — PROGRESS NOTES
[facility]    Medicine Progress Note - Hospitalist Service    Date of Admission:  8/30/2023    Assessment & Plan   Saji Iqbal is a 63 year old male admitted on 8/30/2023. He has a hx of CAD s/p PCI LAD, type 2 DM, HTN, HLP, alcohol abuse who presents with L shoulder pain.  He was sleeping on his left shoulder and when he woke up he had pain in the left shoulder.  The pain is localized to the left shoulder and does not radiate.  It is made worse with certain movements.  He had a little bit of numbness on his left hand but that has improved.  He denies any chest pain or shortness of breath.  Was noted to have a mildly elevated troponin level and to be intoxicated with alcohol.  He was placed in the hospital for further evaluation and treatment.    Alcohol use disorder.  Acute alcohol intoxication.  Alcohol withdrawals.  -Withdrawals quite pronounced yesterday.  -Did require benzodiazepines yesterday.  Has not required benzos today.  -Feeling better.  -Stop clonidine.  -Continue gabapentin taper.  -Continue IV fluids.  -Continue folic acid, thiamine, multivitamin.  -CIWA protocol.  -Benzodiazepines if needed.    New cardiomyopathy.  Suspect ischemic.  Non-ST elevation myocardial infarction.  Hypertension.  Hyperlipidemia.  Coronary artery disease.  -Appreciate cardiology input.  -Planning for coronary angiogram today, 8/31.  -Has been started on metoprolol yesterday.  -Metoprolol had to be stopped overnight due to sinus pause.  -Now stop clonidine as above.  -Starting carvedilol 1.5625 mg twice a day.  -Continue aspirin 81 mg a day.  -Continue lisinopril 5 mg a day.  -No statin at this time due to liver disease.    Thrombocytopenia.  Anemia.  -No obvious hemorrhage.  -Suspect marrow suppression due to ongoing alcohol abuse.  -Needs long-term alcohol cessation.  -Check INR and PTT for planned angiogram.  -Recheck CBC tomorrow.    Hyponatremia.  -Mild.  Sodium 132.  -Continue IV  "fluids.    Hypomagnesemia.  -Magnesium replacement protocol.    Hypocalcemia.  -Check ionized calcium tomorrow.    Transaminitis.  Hyperbilirubinemia.  -Due to alcohol abuse.  -Hepatic steatosis and fibrosis noted on ultrasound of abdomen.  -Needs long-term alcohol cessation.  -Recheck LFTs tomorrow.             Diet: NPO for Medical/Clinical Reasons Except for: Meds    DVT Prophylaxis: Pneumatic Compression Devices  Durbin Catheter: Not present  Lines: None     Cardiac Monitoring: ACTIVE order. Indication: AMI (NSTEMI/ STEMI) (48 hours)  Code Status: Full Code      Clinically Significant Risk Factors          # Hypocalcemia: Lowest Ca = 8.1 mg/dL in last 2 days, will monitor and replace as appropriate   # Hypomagnesemia: Lowest Mg = 1.3 mg/dL in last 2 days, will replace as needed  # Anion Gap Metabolic Acidosis: Highest Anion Gap = 21 mmol/L in last 2 days, will monitor and treat as appropriate  # Hypoalbuminemia: Lowest albumin = 3.4 g/dL at 8/30/2023  3:51 AM, will monitor as appropriate   # Thrombocytopenia: Lowest platelets = 51 in last 2 days, will monitor for bleeding   # Hypertension: Noted on problem list  # Chronic heart failure with reduced ejection fraction: last echo with EF <40%       # Obesity: Estimated body mass index is 30.68 kg/m  as calculated from the following:    Height as of this encounter: 1.803 m (5' 11\").    Weight as of this encounter: 99.8 kg (220 lb)., PRESENT ON ADMISSION     # Financial/Environmental Concerns: unable to afford medication(s);unable to afford rent/mortgage;unemployed;insurance inadequate         Disposition Plan      Expected Discharge Date: 09/01/2023      Destination: home            Kareem Mays DO  Hospitalist Service  [facility]  Securely message with [employer] (more info)  Text page via [facility] Paging/Directory   ______________________________________________________________________    Interval History   Denies chest pain, shortness of " breath, fevers, chills, nausea, or vomiting.    Physical Exam   Vital Signs: Temp: 97.7  F (36.5  C) Temp src: Temporal BP: 99/66 Pulse: 75   Resp: 16 SpO2: 97 % O2 Device: None (Room air)    Weight: 220 lbs 0 oz    Gen:  NAD, A&Ox3.  Eyes:  PERRL, sclera anicteric.  OP:  MMM, no lesions.  Neck:  Supple.  CV:  Regular, no murmurs.  Lung:  CTA b/l, normal effort.  Ab:  +BS, soft.  Skin:  Warm, dry to touch.  No rash.  Ext:  No pitting edema LE b/l.      Medical Decision Making       40 MINUTES SPENT BY ME on the date of service doing chart review, history, exam, documentation & further activities per the note.      Data     I have personally reviewed the following data over the past 24 hrs:    6.5  \   11.1 (L)   / 51 (L)     132 (L) 102 7.5 (L) /  95   4.0 23 0.72 \       Imaging results reviewed over the past 24 hrs:   No results found for this or any previous visit (from the past 24 hour(s)).

## 2023-08-31 NOTE — PLAN OF CARE
~[id number]    VSS on RA. A/O x3, d/o to time. CIWAs 6, 10 -- PRN ativan per MAR. C/o L shoulder pain, PRN tylenol & atarax given. No angio today r/t platelets, see cards note. LS clear. Tele SR, BBB, ST depression, prolonged QT. BG 83. Cardiac diet. Up A2 GB W pivot.

## 2023-08-31 NOTE — PROGRESS NOTES
Notified that patient had 2.4 second pause on telemetry. Unfortunately, had already received his nightly metoprolol dosing. Electrolytes checked showing low magnesium of 1.3. Potassium normal at 4.2. Ordered high magnesium replacement protocol. Continue to monitor patient on telemetry. Will hold morning dose of metoprolol pending primary team decision whether to continue this medication.      Alin Murillo MD  Hospitalist

## 2023-08-31 NOTE — PLAN OF CARE
Goal Outcome Evaluation:      Plan of Care Reviewed With: patient    Overall Patient Progress: improvingOverall Patient Progress: improving    A/Ox4, VSS on RA. On tele- 2.4 sec pause overnight, MD notified, Mg protocol started. Mg 1.3, replaced, back at 2.3. Tele SR 1st degree AVB/BBB. CIWAS 7,5, and 5. BG 82, 128, and 78. NPO started at 0400 for angiogram at 4 pm. Voiding adequately per urinal. Up with A2 w/Gb to BR, had BM.

## 2023-08-31 NOTE — CONSULTS
Care Management Initial Consult    General Information  Assessment completed with: Patient, VM-chart review,    Type of CM/SW Visit: Initial Assessment    Primary Care Provider verified and updated as needed: Yes   Readmission within the last 30 days: no previous admission in last 30 days      Reason for Consult: financial concerns, insurance concerns  Advance Care Planning:            Communication Assessment  Patient's communication style: spoken language (English or Bilingual)        Cognitive  Cognitive/Neuro/Behavioral: WDL                      Living Environment:   People in home: alone     Current living Arrangements: apartment      Able to return to prior arrangements: yes       Family/Social Support:  Care provided by: self  Provides care for: no one    Current Resources:   Patient receiving home care services: No     Community Resources:  (Community Health Worker-[facility])  Equipment currently used at home:    Supplies currently used at home: Diabetic Supplies, Compression Stockings    Employment/Financial:  Employment Status:          Financial Concerns: unable to afford medication(s), unable to afford rent/mortgage, unemployed, insurance inadequate   Referral to Financial Worker: Yes    Lifestyle & Psychosocial Needs:  Social Determinants of Health     Tobacco Use: Medium Risk (6/5/2023)    Patient History     Smoking Tobacco Use: Former     Smokeless Tobacco Use: Never     Passive Exposure: Never   Alcohol Use: Not on file   Financial Resource Strain: Low Risk  (6/19/2020)    Overall Financial Resource Strain (CARDIA)     Difficulty of Paying Living Expenses: Not hard at all   Food Insecurity: No Food Insecurity (6/19/2020)    Hunger Vital Sign     Worried About Running Out of Food in the Last Year: Never true     Ran Out of Food in the Last Year: Never true   Transportation Needs: No Transportation Needs (6/19/2020)    PRAPARE - Transportation     Lack of Transportation (Medical): No     Lack of  Transportation (Non-Medical): No   Physical Activity: Unknown (6/19/2020)    Exercise Vital Sign     Days of Exercise per Week: 5 days     Minutes of Exercise per Session: Not on file   Stress: Stress Concern Present (6/19/2020)    [employer] - Occupational Stress Questionnaire     Feeling of Stress : To some extent   Social Connections: Not on file   Intimate Partner Violence: Not At Risk (6/19/2020)    Humiliation, Afraid, Rape, and Kick questionnaire     Fear of Current or Ex-Partner: No     Emotionally Abused: No     Physically Abused: No     Sexually Abused: No   Depression: Not at risk (2/17/2023)    PHQ-2     PHQ-2 Score: 2   Housing Stability: Not on file       Functional Status:  Prior to admission patient needed assistance:   Dependent ADLs:: Independent  Dependent IADLs:: Independent       Mental Health Status:  Mental Health Status: No Current Concerns         Additional Information:  NEFTALI consulted for insurance/financial issues, pt hasn't been able to afford medications due to insurance not covering them.  SW referred pt to financial counselor.     SW met with pt to gather additional information and discuss referral to financial counselor. Pt stated they recently called and they will be meeting with him in person.  Pt is 63 years old and is not retired, unemployed. Pt reports no income.  Pt shares he's working with an , but doesn't have much hopes of finding employment due to ongoing medical/physical issues. Pt states he's not qualified as disabled and hasn't pursued it since it takes years to do. Pt states he's not retired, and is wanting to hold off a couple years so he can get full FDC benefits.      Pt reports working with a community health worker (CHW) in [place] through [facility].  SW reviewed this chart after meeting.  CHW has been working with pt on obtaining Rx through various resources.  In recent notes, NEFTALI does not see that they have referred or  assisted with MA/SNAP/GA assistance.  Pt utilizing local [employer] to preserve money for Rx, and sister helping with Rx.    Pt reports living at his apartment for seven years and has never missed rent; however, he's not sure how he's going to pay Septembers rent.  He's shared he's going to ask landlord if he can be late.  SW asked if pt has ever used emergency assistance through the [facility], he stated he hasn't.  Provided pt with [facility] Community Resource guide and coordinated entry system due to risk of homelessness.     Provided pt with combined application form for SNAP/GA and [facility] contact number.  SW asked pt if SW can contact daughter to discuss this form so they can help pt with this, pt agreed.  SW left VM for daughter.    Update: SW spoke to daughter and discussed the application and resources given.  Daughter shared pt was supposed to apply for unemployment 6-months ago and has tells him consistently to apply, even up to a couple days ago. Daughter believes the reason why he hasn't done it is in relation to his alcohol abuse.  Daughter shared she can maybe help once with rent but that's it, again discussed resources given. Daughter appreciative of the call since she's sure pt would forget about what SW informed him with about these applications/resources.  SW discussed helping pt via over the phone and completing an online application as well.     Sw informed daughter of [employer], a free service for assistance with applications.      PADILLA Bhatti, [facility]  Inpatient Care Coordination  [facility]  [phone]

## 2023-08-31 NOTE — PROGRESS NOTES
Coronary angiogram could not be performed today because the platelet count is 51,000.  The risk of bleeding would be prohibitive.  Cannot have a CT angiogram either because he has 2 stents in his left anterior descending artery.  We will have to wait until his platelet count comes up before the coronary angiogram can be performed.  Conservative therapy in the meantime.

## 2023-08-31 NOTE — PROGRESS NOTES
"[facility] Cardiology Progress Note            Interval History:   Cardiomyopathy.  Regional wall motion abnormalities present which is more suggestive of underlying coronary artery disease.  Past history of stenting of the left anterior descending artery.  No symptoms of congestive heart failure.  Patient did have 2 pauses approximately 2.4 seconds in length.  Patient is taking clonidine which has AV myla blocking properties.  So the effects may be additive between the clonidine and the metoprolol.  History of alcoholism.  Has liver dysfunction precluding the use of statins at this time.  Does not feel short of breath.              Medications:      aspirin  81 mg Oral Daily    carvedilol  1.5625 mg Oral BID w/meals    cloNIDine  0.1 mg Oral Q8H    folic acid  1 mg Oral Daily    [START ON 9/6/2023] gabapentin  100 mg Oral Q8H    [START ON 9/4/2023] gabapentin  300 mg Oral Q8H    [START ON 9/2/2023] gabapentin  600 mg Oral Q8H    gabapentin  900 mg Oral Q8H    insulin aspart  1-6 Units Subcutaneous Q4H    lisinopril  5 mg Oral Daily    multivitamin w/minerals  1 tablet Oral Daily    sodium chloride (PF)  3 mL Intracatheter Q8H    sodium chloride (PF)  3 mL Intracatheter Q8H    thiamine  100 mg Oral Daily               Physical Exam:   Blood pressure 120/78, pulse 87, temperature 98.1  F (36.7  C), temperature source Temporal, resp. rate 12, height 1.803 m (5' 11\"), weight 99.8 kg (220 lb), SpO2 95 %.  Rhythm: Sinus  Constitutional: Patient appears somewhat distracted.  In no distress.  Mildly overweight.    Neck: Jugular venous pulse is not raised and carotids are normal with no bruits.    Respiratory: Symmetrical expansion of the chest.  Chest is clear to percussion auscultation with no added sounds.    Cardiovascular: Heart sounds 1 and 2 are normal with no murmurs.  S3 present.  [place] beat is not displaced.       Data:        Lab Results   Component Value Date     08/31/2023     08/30/2023    "  07/19/2022     12/16/2020     06/16/2020     05/26/2020    Lab Results   Component Value Date    CHLORIDE 102 08/31/2023    CHLORIDE 98 08/30/2023    CHLORIDE 107 07/19/2022    CHLORIDE 104 07/14/2022    CHLORIDE 104 01/19/2022    CHLORIDE 104 12/16/2020    CHLORIDE 103 06/16/2020    CHLORIDE 102 05/26/2020    Lab Results   Component Value Date    BUN 7.5 08/31/2023    BUN 6.9 08/30/2023    BUN 17 07/19/2022    BUN 16 07/14/2022    BUN 12 01/19/2022    BUN 19 12/16/2020    BUN 19 06/16/2020    BUN 19 05/26/2020      Lab Results   Component Value Date    POTASSIUM 4.0 08/31/2023    POTASSIUM 4.2 08/30/2023    POTASSIUM 3.6 08/30/2023    POTASSIUM 3.9 07/19/2022    POTASSIUM 4.1 07/14/2022    POTASSIUM 4.2 01/19/2022    POTASSIUM 4.4 12/16/2020    POTASSIUM 3.8 10/23/2020    POTASSIUM 3.9 06/16/2020    Lab Results   Component Value Date    CO2 23 08/31/2023    CO2 18 08/30/2023    CO2 27 07/19/2022    CO2 29 07/14/2022    CO2 24 01/19/2022    CO2 23 12/16/2020    CO2 27 06/16/2020    CO2 22 05/26/2020    Lab Results   Component Value Date    CR 0.72 08/31/2023    CR 0.79 08/30/2023    CR 0.74 07/19/2022    CR 1.01 12/16/2020    CR 0.96 10/23/2020    CR 0.92 06/16/2020        Lab Results   Component Value Date    GLC 78 08/31/2023    GLC 77 08/31/2023     (H) 08/31/2023     Lab Results   Component Value Date    HGB 11.1 (L) 08/31/2023    HGB 12.3 (L) 08/30/2023    HGB 14.3 07/14/2022     Lab Results   Component Value Date    TROPI <0.015 12/16/2020    TROPI <0.015 12/26/2017                Assessment and Plan:   Assessment:   Problem List    Elevated troponin    Acute pain of left shoulder  Ischemic cardiomyopathy.  Asymptomatic.  No symptoms or signs of congestive heart failure.  Alcoholism.  Could be contributing to reduced left ventricular systolic function though the regionality suggest more ischemic etiology.  2.4-second pauses yesterday.  Probably due to combination of both  beta-blocker and clonidine both of which can affect the AV node.    * No resolved hospital problems. *       Plan:   1.  Coronary angiogram today.  2.  Stop the metoprolol and start low-dose carvedilol 1.5 mg twice a day.  Watch for pauses.  May have to reduce the dose of clonidine as beta-blockers are such important medications in this patient with reduced ejection fraction.       Attestation:  I have reviewed today's vital signs, notes, medications, labs and imaging.  Care coordination / counseling time: 25 minutes  Total time: 35 minutes         Tomas Jimenez MD, MD 8/31/2023  10:08 AM

## 2023-09-01 ENCOUNTER — TRANSFERRED RECORDS (OUTPATIENT)
Dept: HEALTH INFORMATION MANAGEMENT | Facility: CLINIC | Age: 63
End: 2023-09-01

## 2023-09-01 LAB
ALBUMIN SERPL BCG-MCNC: 3.1 G/DL (ref 3.5–5.2)
ALP SERPL-CCNC: 167 U/L (ref 40–129)
ALT SERPL W P-5'-P-CCNC: 52 U/L (ref 0–70)
ANION GAP SERPL CALCULATED.3IONS-SCNC: 11 MMOL/L (ref 7–15)
AST SERPL W P-5'-P-CCNC: 176 U/L (ref 0–45)
BILIRUB SERPL-MCNC: 8.2 MG/DL
BUN SERPL-MCNC: 7.7 MG/DL (ref 8–23)
CA-I BLD-MCNC: 4.4 MG/DL (ref 4.4–5.2)
CALCIUM SERPL-MCNC: 8 MG/DL (ref 8.8–10.2)
CHLORIDE SERPL-SCNC: 102 MMOL/L (ref 98–107)
CREAT SERPL-MCNC: 0.7 MG/DL (ref 0.67–1.17)
DEPRECATED HCO3 PLAS-SCNC: 20 MMOL/L (ref 22–29)
ERYTHROCYTE [DISTWIDTH] IN BLOOD BY AUTOMATED COUNT: 13.2 % (ref 10–15)
GFR SERPL CREATININE-BSD FRML MDRD: >90 ML/MIN/1.73M2
GLUCOSE BLDC GLUCOMTR-MCNC: 126 MG/DL (ref 70–99)
GLUCOSE BLDC GLUCOMTR-MCNC: 142 MG/DL (ref 70–99)
GLUCOSE BLDC GLUCOMTR-MCNC: 145 MG/DL (ref 70–99)
GLUCOSE BLDC GLUCOMTR-MCNC: 94 MG/DL (ref 70–99)
GLUCOSE SERPL-MCNC: 90 MG/DL (ref 70–99)
HCT VFR BLD AUTO: 32.9 % (ref 40–53)
HGB BLD-MCNC: 11.5 G/DL (ref 13.3–17.7)
HOLD SPECIMEN: NORMAL
MAGNESIUM SERPL-MCNC: 1.6 MG/DL (ref 1.7–2.3)
MCH RBC QN AUTO: 38.1 PG (ref 26.5–33)
MCHC RBC AUTO-ENTMCNC: 35 G/DL (ref 31.5–36.5)
MCV RBC AUTO: 109 FL (ref 78–100)
PHOSPHATE SERPL-MCNC: 1.9 MG/DL (ref 2.5–4.5)
PLATELET # BLD AUTO: 60 10E3/UL (ref 150–450)
POTASSIUM SERPL-SCNC: 4.4 MMOL/L (ref 3.4–5.3)
PROT SERPL-MCNC: 5.5 G/DL (ref 6.4–8.3)
RBC # BLD AUTO: 3.02 10E6/UL (ref 4.4–5.9)
SODIUM SERPL-SCNC: 133 MMOL/L (ref 136–145)
WBC # BLD AUTO: 7.5 10E3/UL (ref 4–11)

## 2023-09-01 PROCEDURE — 258N000003 HC RX IP 258 OP 636: Performed by: INTERNAL MEDICINE

## 2023-09-01 PROCEDURE — 80053 COMPREHEN METABOLIC PANEL: CPT | Performed by: INTERNAL MEDICINE

## 2023-09-01 PROCEDURE — 84100 ASSAY OF PHOSPHORUS: CPT | Performed by: INTERNAL MEDICINE

## 2023-09-01 PROCEDURE — 250N000013 HC RX MED GY IP 250 OP 250 PS 637: Performed by: INTERNAL MEDICINE

## 2023-09-01 PROCEDURE — 83735 ASSAY OF MAGNESIUM: CPT | Performed by: INTERNAL MEDICINE

## 2023-09-01 PROCEDURE — 85027 COMPLETE CBC AUTOMATED: CPT | Performed by: INTERNAL MEDICINE

## 2023-09-01 PROCEDURE — 250N000013 HC RX MED GY IP 250 OP 250 PS 637: Performed by: NURSE PRACTITIONER

## 2023-09-01 PROCEDURE — 82330 ASSAY OF CALCIUM: CPT | Performed by: INTERNAL MEDICINE

## 2023-09-01 PROCEDURE — 99232 SBSQ HOSP IP/OBS MODERATE 35: CPT | Mod: FS | Performed by: NURSE PRACTITIONER

## 2023-09-01 PROCEDURE — 36415 COLL VENOUS BLD VENIPUNCTURE: CPT | Performed by: INTERNAL MEDICINE

## 2023-09-01 PROCEDURE — 120N000001 HC R&B MED SURG/OB

## 2023-09-01 PROCEDURE — 99232 SBSQ HOSP IP/OBS MODERATE 35: CPT | Performed by: INTERNAL MEDICINE

## 2023-09-01 RX ORDER — CALCIUM CARBONATE 500(1250)
500 TABLET ORAL 2 TIMES DAILY WITH MEALS
Status: DISCONTINUED | OUTPATIENT
Start: 2023-09-01 | End: 2023-09-11 | Stop reason: HOSPADM

## 2023-09-01 RX ORDER — MAGNESIUM OXIDE 400 MG/1
400 TABLET ORAL EVERY 4 HOURS
Status: COMPLETED | OUTPATIENT
Start: 2023-09-01 | End: 2023-09-01

## 2023-09-01 RX ORDER — CARVEDILOL 3.12 MG/1
3.12 TABLET ORAL 2 TIMES DAILY WITH MEALS
Status: DISCONTINUED | OUTPATIENT
Start: 2023-09-01 | End: 2023-09-06

## 2023-09-01 RX ORDER — DEXTROSE MONOHYDRATE 25 G/50ML
25-50 INJECTION, SOLUTION INTRAVENOUS
Status: DISCONTINUED | OUTPATIENT
Start: 2023-09-01 | End: 2023-09-11 | Stop reason: HOSPADM

## 2023-09-01 RX ORDER — NICOTINE POLACRILEX 4 MG
15-30 LOZENGE BUCCAL
Status: DISCONTINUED | OUTPATIENT
Start: 2023-09-01 | End: 2023-09-11 | Stop reason: HOSPADM

## 2023-09-01 RX ADMIN — GABAPENTIN 900 MG: 300 CAPSULE ORAL at 16:23

## 2023-09-01 RX ADMIN — DICLOFENAC SODIUM 2 G: 10 GEL TOPICAL at 06:36

## 2023-09-01 RX ADMIN — DICLOFENAC SODIUM 2 G: 10 GEL TOPICAL at 20:33

## 2023-09-01 RX ADMIN — SODIUM CHLORIDE: 9 INJECTION, SOLUTION INTRAVENOUS at 01:59

## 2023-09-01 RX ADMIN — CALCIUM 500 MG: 500 TABLET ORAL at 18:27

## 2023-09-01 RX ADMIN — SODIUM CHLORIDE: 9 INJECTION, SOLUTION INTRAVENOUS at 22:33

## 2023-09-01 RX ADMIN — POTASSIUM & SODIUM PHOSPHATES POWDER PACK 280-160-250 MG 2 PACKET: 280-160-250 PACK at 16:24

## 2023-09-01 RX ADMIN — DICLOFENAC SODIUM 2 G: 10 GEL TOPICAL at 11:16

## 2023-09-01 RX ADMIN — POTASSIUM & SODIUM PHOSPHATES POWDER PACK 280-160-250 MG 2 PACKET: 280-160-250 PACK at 20:31

## 2023-09-01 RX ADMIN — CALCIUM 500 MG: 500 TABLET ORAL at 14:25

## 2023-09-01 RX ADMIN — CARVEDILOL 1.56 MG: 3.12 TABLET, FILM COATED ORAL at 09:19

## 2023-09-01 RX ADMIN — GABAPENTIN 900 MG: 300 CAPSULE ORAL at 08:13

## 2023-09-01 RX ADMIN — FOLIC ACID 1 MG: 1 TABLET ORAL at 08:12

## 2023-09-01 RX ADMIN — MULTIPLE VITAMINS W/ MINERALS TAB 1 TABLET: TAB at 08:13

## 2023-09-01 RX ADMIN — MAGNESIUM OXIDE TAB 400 MG (241.3 MG ELEMENTAL MG) 400 MG: 400 (241.3 MG) TAB at 11:54

## 2023-09-01 RX ADMIN — HYDROXYZINE HYDROCHLORIDE 25 MG: 25 TABLET, FILM COATED ORAL at 18:28

## 2023-09-01 RX ADMIN — INSULIN ASPART 1 UNITS: 100 INJECTION, SOLUTION INTRAVENOUS; SUBCUTANEOUS at 12:32

## 2023-09-01 RX ADMIN — LISINOPRIL 5 MG: 5 TABLET ORAL at 08:12

## 2023-09-01 RX ADMIN — ASPIRIN 81 MG: 81 TABLET, COATED ORAL at 08:15

## 2023-09-01 RX ADMIN — SODIUM CHLORIDE: 9 INJECTION, SOLUTION INTRAVENOUS at 12:46

## 2023-09-01 RX ADMIN — ACETAMINOPHEN 650 MG: 325 TABLET, FILM COATED ORAL at 12:15

## 2023-09-01 RX ADMIN — CARVEDILOL 3.12 MG: 3.12 TABLET, FILM COATED ORAL at 18:28

## 2023-09-01 RX ADMIN — MAGNESIUM OXIDE TAB 400 MG (241.3 MG ELEMENTAL MG) 400 MG: 400 (241.3 MG) TAB at 08:13

## 2023-09-01 RX ADMIN — ACETAMINOPHEN 650 MG: 325 TABLET, FILM COATED ORAL at 18:27

## 2023-09-01 RX ADMIN — THIAMINE HCL TAB 100 MG 100 MG: 100 TAB at 08:13

## 2023-09-01 ASSESSMENT — ACTIVITIES OF DAILY LIVING (ADL)
ADLS_ACUITY_SCORE: 45
ADLS_ACUITY_SCORE: 43
ADLS_ACUITY_SCORE: 45
ADLS_ACUITY_SCORE: 43
ADLS_ACUITY_SCORE: 45
ADLS_ACUITY_SCORE: 43
ADLS_ACUITY_SCORE: 51
ADLS_ACUITY_SCORE: 45
ADLS_ACUITY_SCORE: 43
ADLS_ACUITY_SCORE: 45

## 2023-09-01 NOTE — PROGRESS NOTES
[facility]  Cardiology Progress Note    Date of Service (when I saw the patient): 09/01/2023     Assessment & Plan   Saji Iqbal is a 63 year old male who was admitted on 8/30/2023.    1.  : NSTEMI  - Mild troponin elevation, flat in trajectory 39,39,32  - EKG shows NSR w/ LBBB ( known)  - Plan for coronary angiogram when platelet count stable     2.  : Ischemic cardiomyopathy   - Echocardiogram showed a moderately reduced EF 35-40%, severe inferior wall hypokinesis, severe septal hypokinesis. Normal RV size and function, borderline aortic root and ascending aortic dilation.   - GDMT, low dose carvedilol and lisinopril     3.  : Alcohol abuse/ withdrawal   4.  : Hypertension  5.   :Hyperlipidemia   6.   : Thrombocytopenia     Plan   Currently chest pain free. Minimal troponin bump. Coronary angiogram when platelet count allows. Plt 60 today. Plan for Monday if plt is > 75.   EF 35-40%, per GDMT increase carvedilol to 3.125 mg BID, continue lisinopril. Further uptitration as able.   Continue aspirin.   Educated on alcohol abstinence.         JACKIE Wayne CNP  Text Page  (M-F, 7:30 am - 4:00 pm)    Interval History   No cardiac complaint, denies chest pain, shortness of breath, palpitations, PND, orthopnea, presyncope, or LE edema. Primary complaint is left shoulder pain. Reviewed results of echo w/ patient. Educated on strict alcohol abstinence. Plan for cath when platelets allow, anticipate Monday.     Physical Exam   Temp: 97.8  F (36.6  C) Temp src: Oral BP: 126/68 Pulse: 91   Resp: 16 SpO2: 95 % O2 Device: None (Room air)    Vitals:    08/30/23 0840   Weight: 99.8 kg (220 lb)     Vital Signs with Ranges  Temp:  [97.5  F (36.4  C)-98.8  F (37.1  C)] 97.8  F (36.6  C)  Pulse:  [] 91  Resp:  [16-20] 16  BP: ()/(66-81) 126/68  SpO2:  [95 %-98 %] 95 %  I/O last 3 completed shifts:  In: 3179 [P.O.:240; I.V.:2939]  Out: 1050 [Urine:1050]    GEN:  In general, this is a  overweight male in no acute distress   HEENT:  Pupils equal, round. Sclerae nonicteric. Clear oropharynx. Mucous membranes moist.  NECK: . No JVD  C/V:  Regular rate and rhythm, no murmur, rub or gallop.   RESP: Respirations are unlabored. No use of accessory muscles. Clear to auscultation bilaterally without wheezing, rales, or rhonchi.  GI: Obese Abdomen   EXTREM: No LE edema. No cyanosis or clubbing.  NEURO: Alert and oriented, cooperative.  PSYCH: Normal affect.  SKIN: Warm and dry. No rashes or petechiae appreciated.       Medications    - MEDICATION INSTRUCTIONS -      sodium chloride 150 mL/hr at 08/31/23 1308    sodium chloride 100 mL/hr at 09/01/23 0859      aspirin  81 mg Oral Daily    carvedilol  1.5625 mg Oral BID w/meals    folic acid  1 mg Oral Daily    [START ON 9/6/2023] gabapentin  100 mg Oral Q8H    [START ON 9/4/2023] gabapentin  300 mg Oral Q8H    [START ON 9/2/2023] gabapentin  600 mg Oral Q8H    gabapentin  900 mg Oral Q8H    heparin (porcine)        heparin (porcine)        insulin aspart  1-6 Units Subcutaneous Q4H    lidocaine (PF)        lisinopril  5 mg Oral Daily    magnesium oxide  400 mg Oral Q4H    multivitamin w/minerals  1 tablet Oral Daily    nitroGLYcerin in D5W        sodium chloride (PF)  3 mL Intracatheter Q8H    sodium chloride (PF)  3 mL Intracatheter Q8H    thiamine  100 mg Oral Daily    verapamil           Data   Reviewed       I spent > 20 minutes face-to-face and/or coordinating care. Over 50% of our time on the unit was spent counseling the patient and/or coordinating care        JACKIE Wayne CNP 9/1/2023

## 2023-09-01 NOTE — CONSULTS
"GI CONSULT NOTE      Name: Saji Iqbal  Medical Record #: [id number]  YOB: 1960  Date of Admission: 8/30/2023  Date/Time: 9/1/2023/10:20 AM     CHIEF COMPLAINT:  Shoulder pain    HISTORY OF PRESENT ILLNESS: We were asked to see Saji Iqbal by Kareem Mays MD for hyperbilirubinemia.    Saji Iqbal is a 63 year old year old male with history of CAD s/p PCI, T2DM, HTN, HLD and alcohol use disorder who presented on 8/30/2023 with left shoulder pain.  Found to have elevated troponins.  Echocardiogram showed reduction in left ventricular systolic function, suspected ischemic cardiomyopathy.  Was planning on coronary angiogram yesterday but could not be performed due to platelet count 51.  He was also found to have elevated liver chemistries with AST on admission to 235, alk phos 178, total bilirubin 4.3.  His AST and alk phos have trended down today but total bilirubin is up to 8.2.  INR 1.14.  Today his CBC shows a hemoglobin 11.5, platelet count 60.  He did undergo an abdominal ultrasound which showed increased echogenicity of hepatic parenchyma with decreased acoustic penetrance, consistent with hepatic steatosis and fibrosis.  CBD was not well visualized due to bowel gas.  Trace upper abdominal ascites.    The patient states that he has been aware of elevated liver tests and \"potential liver problems\" since 2016.  He has never seen a hepatologist or undergone evaluation for this and has never been told that he has cirrhosis.  He tells me that he has at least 4-6 alcoholic beverages an average of every other day.  Usually this is in the form of hard liquor/vodka mixed with soda.  Use is long-term; this has been his pattern since he moved in 2015 and became more socially isolated.  Denies any history of illicit drug use, tattoos.  He did have negative HCV last year and hepatitis B surface antigen 2019.    The patient denies any gastrointestinal symptoms.  He is not having any abdominal pain, " nausea, vomiting, unintended weight loss.  He has bowel movements an average of every other day.  He does note some dark stools at times, last a few days ago questions whether this may be related to certain foods (black licorice).  He did have a bowel movement this morning that was brown in color.  Denies hematochezia.    Continues to have shoulder pain that is very related to positional changes.  It bothers him little when he is lying flat.  Unrelated to oral intake whatsoever.  The only thing that seems to improve this discomfort is IcyHot which he is applying every 4 hours.    Denies any relevant family history.    REVIEW OF SYSTEMS (ROS): Complete review of systems negative other than listed in HPI.    PAST MEDICAL HISTORY:  Past Medical History:   Diagnosis Date    Cluster headache     Coronary artery disease     Depression     Diabetes mellitus, type II (H)     Gastroesophageal reflux disease     Heart attack (H)     Hyperlipidemia     Hypertension     Renal disease      hx kidney stones    Rotator cuff arthropathy     Sleep apnea     doesn't use cpap-is broken    Stented coronary artery         FAMILY HISTORY:  Family History   Problem Relation Age of Onset    Coronary Artery Disease Mother     Coronary Artery Disease Father     Cerebrovascular Disease Brother     Anuerysm Sister     Glaucoma No family hx of     Macular Degeneration No family hx of        SOCIAL HISTORY:  Social History     Socioeconomic History    Marital status: Single     Spouse name: Not on file    Number of children: Not on file    Years of education: Not on file    Highest education level: Not on file   Occupational History    Occupation: Manufactor    Tobacco Use    Smoking status: Former     Packs/day: 0.33     Years: 20.00     Pack years: 6.60     Types: Cigarettes     Quit date: 2015     Years since quittin.1     Passive exposure: Never    Smokeless tobacco: Never   Vaping Use    Vaping Use: Never used    Substance and Sexual Activity    Alcohol use: Yes     Alcohol/week: 1.0 standard drink of alcohol     Types: 1 Standard drinks or equivalent per week     Comment: 4-5 drinks nightly on weekend    Drug use: No    Sexual activity: Yes   Other Topics Concern     Service Not Asked    Blood Transfusions Not Asked    Caffeine Concern No     Comment: 0-1 coffee daily    Occupational Exposure Not Asked    Hobby Hazards Not Asked    Sleep Concern Not Asked    Stress Concern Not Asked    Weight Concern Not Asked    Special Diet Yes     Comment: low sodium, no red meat, no butter    Back Care Not Asked    Exercise Yes     Comment: walking 2-3 days per week    Bike Helmet Not Asked    Seat Belt Not Asked    Self-Exams Not Asked    Parent/sibling w/ CABG, MI or angioplasty before 65F 55M? Not Asked   Social History Narrative    Not on file     Social Determinants of Health     Financial Resource Strain: Low Risk  (6/19/2020)    Overall Financial Resource Strain (CARDIA)     Difficulty of Paying Living Expenses: Not hard at all   Food Insecurity: No Food Insecurity (6/19/2020)    Hunger Vital Sign     Worried About Running Out of Food in the Last Year: Never true     Ran Out of Food in the Last Year: Never true   Transportation Needs: No Transportation Needs (6/19/2020)    PRAPARE - Transportation     Lack of Transportation (Medical): No     Lack of Transportation (Non-Medical): No   Physical Activity: Unknown (6/19/2020)    Exercise Vital Sign     Days of Exercise per Week: 5 days     Minutes of Exercise per Session: Not on file   Stress: Stress Concern Present (6/19/2020)    [employer] - Occupational Stress Questionnaire     Feeling of Stress : To some extent   Social Connections: Not on file   Intimate Partner Violence: Not At Risk (6/19/2020)    Humiliation, Afraid, Rape, and Kick questionnaire     Fear of Current or Ex-Partner: No     Emotionally Abused: No     Physically Abused: No     " Sexually Abused: No   Housing Stability: Not on file       MEDICATIONS PRIOR TO ADMISSION:   Medications Prior to Admission   Medication Sig Dispense Refill Last Dose    aspirin 81 MG tablet Take by mouth daily   8/29/2023 at AM    clobetasol (TEMOVATE) 0.05 % external cream Apply topically 2 times daily Trunk, arms, legs and hands and feet 240 g 6 More than a month at PRN    glipiZIDE (GLUCOTROL XL) 10 MG 24 hr tablet Take 1 tablet (10 mg) by mouth daily 90 tablet 0 8/29/2023 at AM    insulin glargine (LANTUS SOLOSTAR) 100 UNIT/ML pen Inject 24 Units Subcutaneous 2 times daily 45 mL 1 8/29/2023 at PM    lisinopril (ZESTRIL) 20 MG tablet Take 1 tablet (20 mg) by mouth daily 90 tablet 1 Past Week    PARoxetine (PAXIL) 20 MG tablet Take 20 mg by mouth daily   8/29/2023 at AM    ACCU-CHEK GUIDE test strip Use to test blood sugar 1 times daily or as directed. 100 strip 0     blood glucose (NO BRAND SPECIFIED) lancets standard Use to test blood sugar 1 times daily or as directed. 100 lancet 0     blood glucose (NO BRAND SPECIFIED) test strip Use to test blood sugar 4 times daily or as directed. Please dispense Accu Chek Ana plus test strips or per patient preference if using a different brand 100 strip 3     blood glucose monitoring (SOFTCLIX) lancets USE TO TEST BLOOD SUGAR ONCE DAILY OR AS DIRECTED 100 each 0     Blood Glucose Monitoring Suppl (ACCU-CHEK GUIDE) w/Device KIT USE TO TEST BLOOD SUGAR 1 TIME DAILY OR AS DIRECTED 1 kit 0     STATIN NOT PRESCRIBED (INTENTIONAL) Please choose reason not prescribed from choices below.       [employer] PEN NEEDLES 31G X 5 MM miscellaneous USE 2 DAILY OR AS DIRECTED 100 each 11           ALLERGIES: Metformin, Honey, and Statins    PHYSICAL EXAM:    /68   Pulse 91   Temp 97.8  F (36.6  C) (Oral)   Resp 16   Ht 1.803 m (5' 11\")   Wt 99.8 kg (220 lb)   SpO2 95%   BMI 30.68 kg/m      GENERAL: Obese male, NAD  NECK: Supple without adenopathy  EYES: +Scleral " icterus  LUNGS: Clear to auscultation bilaterally, mildly inc WOB with position changes  HEART: Regular rate and rhythm, S1 and S2 present, no lower extremity edema  ABDOMEN: Obese/rounded but soft, active BS, no tenderness to palpation  MUSKULOSKELETAL: Difficulty with movement, left shoulder ROM limited s/t pain  SKIN: +Jaundice  NEUROLOGIC: Alert and oriented, no asterixis  PSYCHIATRIC: Appropriate     LAB DATA:  CMP Results:   Recent Labs   Lab Test 09/01/23  0641 09/01/23  0157 08/31/23  2233 08/31/23  0609 08/31/23  0558 08/30/23  2142 08/30/23  2050 08/30/23  0834 08/30/23  0351 08/30/23  0343 07/19/22  0935 07/14/22  0800   *  --   --   --  132*  --   --   --   --  137   < > 139   POTASSIUM 4.4  --   --   --  4.0  --  4.2  --   --  3.6   < > 4.1   CHLORIDE 102  --   --   --  102  --   --   --   --  98   < > 104   CO2 20*  --   --   --  23  --   --   --   --  18*   < > 29   ANIONGAP 11  --   --   --  7  --   --   --   --  21*   < > 6   GLC 90 94 96   < > 77   < >  --    < >  --  154*   < > 152*   BUN 7.7*  --   --   --  7.5*  --   --   --   --  6.9*   < > 16   CR 0.70  --   --   --  0.72  --   --   --   --  0.79   < > 0.92   BILITOTAL 8.2*  --   --   --   --   --   --   --  4.3*  --   --  1.1   ALKPHOS 167*  --   --   --   --   --   --   --  178*  --   --  90   ALT 52  --   --   --   --   --   --   --  61  --   --  95*   *  --   --   --   --   --   --   --  235*  --   --  82*    < > = values in this interval not displayed.      CBC  Recent Labs   Lab 09/01/23  0641 08/31/23  0558 08/30/23  0343   WBC 7.5 6.5 7.7   RBC 3.02* 2.96* 3.27*   HGB 11.5* 11.1* 12.3*   HCT 32.9* 32.8* 34.5*   * 111* 106*   MCH 38.1* 37.5* 37.6*   MCHC 35.0 33.8 35.7   RDW 13.2 13.2 12.4   PLT 60* 51* 79*     INR  Recent Labs   Lab 08/31/23  1102   INR 1.14      No results found for: LIPASE    MELD 3.0: 19 at 9/1/2023  6:41 AM  MELD-Na: 18 at 9/1/2023  6:41 AM  Calculated from:  Serum Creatinine: 0.70 mg/dL (Using  min of 1 mg/dL) at 9/1/2023  6:41 AM  Serum Sodium: 133 mmol/L at 9/1/2023  6:41 AM  Total Bilirubin: 8.2 mg/dL at 9/1/2023  6:41 AM  Serum Albumin: 3.1 g/dL at 9/1/2023  6:41 AM  INR(ratio): 1.14 at 8/31/2023 11:02 AM  Age at listing (hypothetical): 63 years  Sex: Male at 9/1/2023  6:41 AM        IMAGING/ENDOSCOPIC EVALUATION:    Ultrasound Abdomen 8/30/2023  IMPRESSION:  1.  Increased echogenicity of hepatic parenchyma with markedly  decreased acoustic penetrance. Findings could be seen with hepatic  steatosis and fibrosis.  2.  The CBD, main portal vein, and pancreas were not visualized on  this exam due to abnormal liver with decreased acoustic penetration  and bowel gas.  3.  Trace upper abdominal ascites.    ASSESSMENT:  This patient is a 63 year old male with CAD status post PCI, T2DM, HTN, HLP and alcohol use disorder who presented on 8/30/2023 with left shoulder pain.  Found to have elevated liver chemistries, AST, alk phos, and bilirubin.  Ultrasound showed increased echogenicity of hepatic parenchyma, likely consistent with hepatic steatosis and fibrosis.  CBD was not well visualized due to bowel gas.  Noted trace upper abdominal ascites.    1.  Alcoholic Hepatitis.  Pattern of LFT elevation with AST>ALT most consistent with alcoholic hepatitis.  History is consistent with this, the patient the patient reporting minimum of 4-6 alcoholic beverages every other day.  He was also acutely intoxicated and has had withdrawal symptoms during his hospital stay.  There is no indication for steroids with a MDF of 15.5.  MELD-Na 18.  Cirrhosis is very likely given hyponatremia, thrombocytopenia, and trace ascites noted on imaging.  AST and ALP have trended down today but bilirubin is up to 8.  Note that with alcoholic hepatitis we typically see transaminases trending down before the bilirubin peaks.  At this point choledocholithiasis unlikely without any abdominal pain or other GI symptoms.  Shoulder pain seems  musculoskeletal in nature as it occurs with position changes and improves only with IcyHot application.  We will arrange for him to follow-up in Hepatology Clinic at discharge.    2.  Anemia, thrombocytopenia.  This is most likely due to marrow suppression related to chronic alcohol use.  May also indicate a component of synthetic dysfunction due to underlying liver disease.  Though he has noted dark stools on occasion, there no clear melena this admission and his hemoglobin is stable.  Would recommend outpatient EGD and colonoscopy for further evaluation.     PLAN:  1.  Monitor LFTs daily.   2.  No indication for steroids.   3.  Low sodium diet.   4.  Complete alcohol cessation, discussed with patient.   5.  Outpatient EGD and colonoscopy.   6.  Follow-up in Hepatology Clinic. [facility] will call patient to arrange.     Discussed with Dr. Sheridan, GI staff physician.     TIME SPENT: 60 min including chart review, patient interview and care coordination.                                                Reina Vasquez DNP  Thank you for the opportunity to participate in the care of this patient.   Please feel free to call me with any questions or concerns.  Phone number [phone].

## 2023-09-01 NOTE — PLAN OF CARE
Goal Outcome Evaluation:      Plan of Care Reviewed With: patient    Overall Patient Progress: no change    Outcome Evaluation: .    VSS on RA. A&Ox4. Continues to have left shoulder pain, given PRN Voltaren gel that helped with pain relief. Mg protocol. CIWA 3, 4, 4. BG 96, 94. Tele: SR with BBB. Ax2 GB walker with pivot. NS running at 100 ml/hr. Cardiac diet.

## 2023-09-01 NOTE — PLAN OF CARE
Goal Outcome Evaluation:      Plan of Care Reviewed With: patient    Overall Patient Progress: no changeOverall Patient Progress: no change     Pt transferred to 3rd floor; report given to Vesta Scherer RN. VS monitored, remote tele: SR, A2 w/ww and gb, ice/heat for shoulder pain, CIWA 3/3, voiding per urinal, A&Ox4 but slow to respond, BG 95/96, NPO for angiogram at 1630.

## 2023-09-01 NOTE — PLAN OF CARE
Goal Outcome Evaluation:      Plan of Care Reviewed With: patient        VSS on RA, AO4, tele SR BBB. Mg and Phos protocol continue, oral magnesium replaced. CIWAs 4,6, no ativan given. On Gabapentin taper. PRN tylenol & Voltaren gel given for left shoulder with pain relief. NS is infusing at 100ml/hr. Cardiac diet. Up A2 GB with walker. GI consulting.  Elevated troponin, trending down. Cardiac following. Angio on hold, platelet low. Echo EF 35-40%. See report for further details.

## 2023-09-01 NOTE — PROGRESS NOTES
[facility]    Medicine Progress Note - Hospitalist Service    Date of Admission:  8/30/2023    Assessment & Plan     Saji Iqbal is a 63 year old male admitted on 8/30/2023. He has a hx of CAD s/p PCI LAD, type 2 DM, HTN, HLP, alcohol abuse who presents with L shoulder pain.  He was sleeping on his left shoulder and when he woke up he had pain in the left shoulder.  The pain is localized to the left shoulder and does not radiate.  It is made worse with certain movements.  He had a little bit of numbness on his left hand but that has improved.  He denies any chest pain or shortness of breath.  Was noted to have a mildly elevated troponin level and to be intoxicated with alcohol.  He was placed in the hospital for further evaluation and treatment.     Alcohol use disorder.  Acute alcohol intoxication.  Alcohol withdrawals.  -Withdrawals quite pronounced previously.  -Seem to be slowly improving.  -Initially on clonidine.  -Clonidine stopped to allow titration of other cardiac/blood pressure medications.  -Continue gabapentin taper.  -Continue IV fluids.  -Continue folic acid, thiamine, multivitamin.  -CIWA protocol.  -Benzodiazepines if needed.     New cardiomyopathy.  Suspect ischemic.  Non-ST elevation myocardial infarction.  Hypertension.  Hyperlipidemia.  Coronary artery disease.  -Appreciate cardiology input.  -Planning for coronary angiogram.  -Initially started on metoprolol.  -Metoprolol had to be stopped overnight due to sinus pause.  -Clonidine stopped as above.  -Increase carvedilol 3.125 mg twice a day.  -Continue aspirin 81 mg a day.  -Continue lisinopril 5 mg a day.  -No statin at this time due to liver disease.     Thrombocytopenia.  Anemia.  -No obvious hemorrhage.  -Suspect marrow suppression due to ongoing alcohol abuse.  -Needs long-term alcohol cessation.  -INR and PTT okay.  -Recheck CBC tomorrow.     Hyponatremia.  -Mild.  Sodium 132.  -Slightly improved 133  "today.  -Recheck metabolic panel tomorrow.     Hypomagnesemia.  Hypophosphatemia.  Hypocalcemia.  -Magnesium replacement protocol.  -Phosphorus replacement protocol.  -Start calcium carbonate 500 mg twice a day.     Transaminitis.  Hyperbilirubinemia.  -Suspect due to alcohol abuse.  -Hepatic steatosis and fibrosis noted on ultrasound of abdomen.  -Needs long-term alcohol cessation.  -Recheck LFTs tomorrow.  -Bilirubin increased today.  -Gastroenterology consult.             Diet: Combination Diet 2 gm NA Diet; Low Saturated Fat Diet    DVT Prophylaxis: Pneumatic Compression Devices  Durbin Catheter: Not present  Lines: None     Cardiac Monitoring: ACTIVE order. Indication: AMI (NSTEMI/ STEMI) (48 hours)  Code Status: Full Code      Clinically Significant Risk Factors            # Hypomagnesemia: Lowest Mg = 1.3 mg/dL in last 2 days, will replace as needed   # Hypoalbuminemia: Lowest albumin = 3.1 g/dL at 9/1/2023  6:41 AM, will monitor as appropriate   # Thrombocytopenia: Lowest platelets = 51 in last 2 days, will monitor for bleeding   # Hypertension: Noted on problem list  # Chronic heart failure with reduced ejection fraction: last echo with EF <40%       # Obesity: Estimated body mass index is 30.68 kg/m  as calculated from the following:    Height as of this encounter: 1.803 m (5' 11\").    Weight as of this encounter: 99.8 kg (220 lb)., PRESENT ON ADMISSION     # Financial/Environmental Concerns: unable to afford medication(s);unable to afford rent/mortgage;unemployed;insurance inadequate         Disposition Plan     Expected Discharge Date: 09/01/2023      Destination: home            Kareem Mays DO  Hospitalist Service  [facility]  Securely message with [employer] (more info)  Text page via [facility] Paging/Directory   ______________________________________________________________________    Interval History   Denies chest pain, shortness of breath, fevers, chills, nausea, " vomiting.    Physical Exam   Vital Signs: Temp: 97.8  F (36.6  C) Temp src: Oral BP: 126/68 Pulse: 91   Resp: 16 SpO2: 95 % O2 Device: None (Room air)    Weight: 220 lbs 0 oz    Gen:  NAD, A&Ox3.  Eyes:  PERRL, positive scleral icterus.  OP:  MMM, no lesions.  Neck:  Supple.  CV:  Regular, no murmurs.  Lung:  CTA b/l, normal effort.  Ab: Mild distention, +BS, soft.  Skin:  Warm, dry to touch.  No rash.  Ext:  No pitting edema LE b/l.      Medical Decision Making       41 MINUTES SPENT BY ME on the date of service doing chart review, history, exam, documentation & further activities per the note.      Data     I have personally reviewed the following data over the past 24 hrs:    7.5  \   11.5 (L)   / 60 (L)     133 (L) 102 7.7 (L) /  145 (H)   4.4 20 (L) 0.70 \     ALT: 52 AST: 176 (H) AP: 167 (H) TBILI: 8.2 (H)   ALB: 3.1 (L) TOT PROTEIN: 5.5 (L) LIPASE: N/A       Imaging results reviewed over the past 24 hrs:   No results found for this or any previous visit (from the past 24 hour(s)).

## 2023-09-02 LAB
ALBUMIN SERPL BCG-MCNC: 3 G/DL (ref 3.5–5.2)
ALP SERPL-CCNC: 184 U/L (ref 40–129)
ALT SERPL W P-5'-P-CCNC: 44 U/L (ref 0–70)
ANION GAP SERPL CALCULATED.3IONS-SCNC: 12 MMOL/L (ref 7–15)
AST SERPL W P-5'-P-CCNC: 134 U/L (ref 0–45)
BILIRUB SERPL-MCNC: 8.2 MG/DL
BUN SERPL-MCNC: 9.9 MG/DL (ref 8–23)
CALCIUM SERPL-MCNC: 8.6 MG/DL (ref 8.8–10.2)
CHLORIDE SERPL-SCNC: 101 MMOL/L (ref 98–107)
CREAT SERPL-MCNC: 0.69 MG/DL (ref 0.67–1.17)
DEPRECATED HCO3 PLAS-SCNC: 20 MMOL/L (ref 22–29)
ERYTHROCYTE [DISTWIDTH] IN BLOOD BY AUTOMATED COUNT: 13.8 % (ref 10–15)
GFR SERPL CREATININE-BSD FRML MDRD: >90 ML/MIN/1.73M2
GLUCOSE BLDC GLUCOMTR-MCNC: 122 MG/DL (ref 70–99)
GLUCOSE BLDC GLUCOMTR-MCNC: 124 MG/DL (ref 70–99)
GLUCOSE BLDC GLUCOMTR-MCNC: 136 MG/DL (ref 70–99)
GLUCOSE BLDC GLUCOMTR-MCNC: 140 MG/DL (ref 70–99)
GLUCOSE BLDC GLUCOMTR-MCNC: 146 MG/DL (ref 70–99)
GLUCOSE SERPL-MCNC: 136 MG/DL (ref 70–99)
HCT VFR BLD AUTO: 34.6 % (ref 40–53)
HGB BLD-MCNC: 11.9 G/DL (ref 13.3–17.7)
MAGNESIUM SERPL-MCNC: 1.8 MG/DL (ref 1.7–2.3)
MCH RBC QN AUTO: 37.8 PG (ref 26.5–33)
MCHC RBC AUTO-ENTMCNC: 34.4 G/DL (ref 31.5–36.5)
MCV RBC AUTO: 110 FL (ref 78–100)
PHOSPHATE SERPL-MCNC: 2.4 MG/DL (ref 2.5–4.5)
PLATELET # BLD AUTO: 79 10E3/UL (ref 150–450)
POTASSIUM SERPL-SCNC: 4.6 MMOL/L (ref 3.4–5.3)
PROT SERPL-MCNC: 5.8 G/DL (ref 6.4–8.3)
RBC # BLD AUTO: 3.15 10E6/UL (ref 4.4–5.9)
SODIUM SERPL-SCNC: 133 MMOL/L (ref 136–145)
WBC # BLD AUTO: 6.9 10E3/UL (ref 4–11)

## 2023-09-02 PROCEDURE — 250N000013 HC RX MED GY IP 250 OP 250 PS 637: Performed by: INTERNAL MEDICINE

## 2023-09-02 PROCEDURE — 258N000003 HC RX IP 258 OP 636: Performed by: INTERNAL MEDICINE

## 2023-09-02 PROCEDURE — 250N000013 HC RX MED GY IP 250 OP 250 PS 637: Performed by: NURSE PRACTITIONER

## 2023-09-02 PROCEDURE — 83735 ASSAY OF MAGNESIUM: CPT | Performed by: INTERNAL MEDICINE

## 2023-09-02 PROCEDURE — 120N000001 HC R&B MED SURG/OB

## 2023-09-02 PROCEDURE — 85014 HEMATOCRIT: CPT | Performed by: INTERNAL MEDICINE

## 2023-09-02 PROCEDURE — 84100 ASSAY OF PHOSPHORUS: CPT | Performed by: INTERNAL MEDICINE

## 2023-09-02 PROCEDURE — 99232 SBSQ HOSP IP/OBS MODERATE 35: CPT | Performed by: INTERNAL MEDICINE

## 2023-09-02 PROCEDURE — 80053 COMPREHEN METABOLIC PANEL: CPT | Performed by: INTERNAL MEDICINE

## 2023-09-02 PROCEDURE — 87493 C DIFF AMPLIFIED PROBE: CPT | Performed by: INTERNAL MEDICINE

## 2023-09-02 PROCEDURE — 36415 COLL VENOUS BLD VENIPUNCTURE: CPT | Performed by: INTERNAL MEDICINE

## 2023-09-02 RX ORDER — PAROXETINE 20 MG/1
20 TABLET, FILM COATED ORAL DAILY
Status: DISCONTINUED | OUTPATIENT
Start: 2023-09-02 | End: 2023-09-11 | Stop reason: HOSPADM

## 2023-09-02 RX ORDER — MAGNESIUM OXIDE 400 MG/1
400 TABLET ORAL EVERY 4 HOURS
Status: COMPLETED | OUTPATIENT
Start: 2023-09-02 | End: 2023-09-02

## 2023-09-02 RX ORDER — HYDROXYZINE HYDROCHLORIDE 25 MG/1
25-50 TABLET, FILM COATED ORAL EVERY 6 HOURS PRN
Status: DISCONTINUED | OUTPATIENT
Start: 2023-09-02 | End: 2023-09-06

## 2023-09-02 RX ADMIN — POTASSIUM & SODIUM PHOSPHATES POWDER PACK 280-160-250 MG 1 PACKET: 280-160-250 PACK at 15:02

## 2023-09-02 RX ADMIN — CARVEDILOL 3.12 MG: 3.12 TABLET, FILM COATED ORAL at 19:32

## 2023-09-02 RX ADMIN — POTASSIUM & SODIUM PHOSPHATES POWDER PACK 280-160-250 MG 2 PACKET: 280-160-250 PACK at 00:11

## 2023-09-02 RX ADMIN — PAROXETINE HYDROCHLORIDE 20 MG: 20 TABLET, FILM COATED ORAL at 12:02

## 2023-09-02 RX ADMIN — DICLOFENAC SODIUM 2 G: 10 GEL TOPICAL at 22:16

## 2023-09-02 RX ADMIN — CALCIUM 500 MG: 500 TABLET ORAL at 19:33

## 2023-09-02 RX ADMIN — DICLOFENAC SODIUM 2 G: 10 GEL TOPICAL at 13:43

## 2023-09-02 RX ADMIN — LISINOPRIL 5 MG: 5 TABLET ORAL at 08:55

## 2023-09-02 RX ADMIN — MULTIPLE VITAMINS W/ MINERALS TAB 1 TABLET: TAB at 08:53

## 2023-09-02 RX ADMIN — MAGNESIUM OXIDE TAB 400 MG (241.3 MG ELEMENTAL MG) 400 MG: 400 (241.3 MG) TAB at 12:01

## 2023-09-02 RX ADMIN — SODIUM CHLORIDE: 9 INJECTION, SOLUTION INTRAVENOUS at 08:56

## 2023-09-02 RX ADMIN — HYDROXYZINE HYDROCHLORIDE 25 MG: 25 TABLET ORAL at 13:50

## 2023-09-02 RX ADMIN — FOLIC ACID 1 MG: 1 TABLET ORAL at 08:54

## 2023-09-02 RX ADMIN — MAGNESIUM OXIDE TAB 400 MG (241.3 MG ELEMENTAL MG) 400 MG: 400 (241.3 MG) TAB at 08:54

## 2023-09-02 RX ADMIN — GABAPENTIN 600 MG: 300 CAPSULE ORAL at 22:01

## 2023-09-02 RX ADMIN — ACETAMINOPHEN 650 MG: 325 TABLET, FILM COATED ORAL at 08:53

## 2023-09-02 RX ADMIN — GABAPENTIN 600 MG: 300 CAPSULE ORAL at 15:01

## 2023-09-02 RX ADMIN — ASPIRIN 81 MG: 81 TABLET, COATED ORAL at 08:53

## 2023-09-02 RX ADMIN — THIAMINE HCL TAB 100 MG 100 MG: 100 TAB at 08:55

## 2023-09-02 RX ADMIN — CALCIUM 500 MG: 500 TABLET ORAL at 08:54

## 2023-09-02 RX ADMIN — CARVEDILOL 3.12 MG: 3.12 TABLET, FILM COATED ORAL at 08:55

## 2023-09-02 RX ADMIN — GABAPENTIN 900 MG: 300 CAPSULE ORAL at 00:11

## 2023-09-02 RX ADMIN — GABAPENTIN 900 MG: 300 CAPSULE ORAL at 08:52

## 2023-09-02 RX ADMIN — POTASSIUM & SODIUM PHOSPHATES POWDER PACK 280-160-250 MG 1 PACKET: 280-160-250 PACK at 12:02

## 2023-09-02 RX ADMIN — POTASSIUM & SODIUM PHOSPHATES POWDER PACK 280-160-250 MG 1 PACKET: 280-160-250 PACK at 08:56

## 2023-09-02 ASSESSMENT — ACTIVITIES OF DAILY LIVING (ADL)
ADLS_ACUITY_SCORE: 46
ADLS_ACUITY_SCORE: 41
ADLS_ACUITY_SCORE: 42
ADLS_ACUITY_SCORE: 40
ADLS_ACUITY_SCORE: 40
ADLS_ACUITY_SCORE: 50
ADLS_ACUITY_SCORE: 46
ADLS_ACUITY_SCORE: 41
ADLS_ACUITY_SCORE: 50
ADLS_ACUITY_SCORE: 50
ADLS_ACUITY_SCORE: 42
ADLS_ACUITY_SCORE: 40

## 2023-09-02 NOTE — PLAN OF CARE
Goal Outcome Evaluation:      Plan of Care Reviewed With: patient    Overall Patient Progress: improvingOverall Patient Progress: improving    Temp: 98.7  F (37.1  C) Temp src: Oral BP: (!) 151/88 Pulse: 104   Resp: 16 SpO2: 96 % O2 Device: None (Room air)       AO3, forgetful. On RA. A2 huey lift. Phosphorus protocol. Continuous 0.9% NS at 100 mL/hr. Tele SR BBB. Uses urinal. Unsteady and refuses bedpan. CIWA 5, 4. Plan for Angiogram in a few days.

## 2023-09-02 NOTE — PROGRESS NOTES
[facility]    Medicine Progress Note - Hospitalist Service    Date of Admission:  8/30/2023    Assessment & Plan   Saji Iqbal is a 63 year old male admitted on 8/30/2023. He has a hx of CAD s/p PCI LAD, type 2 DM, HTN, HLP, alcohol abuse who presents with L shoulder pain.  He was sleeping on his left shoulder and when he woke up he had pain in the left shoulder.  The pain is localized to the left shoulder and does not radiate.  It is made worse with certain movements.  He had a little bit of numbness on his left hand but that has improved.  He denies any chest pain or shortness of breath.  Was noted to have a mildly elevated troponin level and to be intoxicated with alcohol.  He was placed in the hospital for further evaluation and treatment.     Alcohol use disorder.  Acute alcohol intoxication.  Alcohol withdrawals.  -Withdrawals quite pronounced previously.  -Initially on clonidine.  -Clonidine stopped to allow titration of other cardiac/blood pressure medications.  -Continue gabapentin taper.  -Stop IV fluids.  -Continue folic acid, thiamine, multivitamin.  -[facility] protocol.  -Benzodiazepines if needed.     New cardiomyopathy.  Suspect ischemic.  Non-ST elevation myocardial infarction.  Hypertension.  Hyperlipidemia.  Coronary artery disease.  -Appreciate cardiology input.  -Planning for coronary angiogram if/when platelets improve.  -Initially started on metoprolol.  -Metoprolol had to be stopped overnight due to sinus pause.  -Clonidine stopped as above.  -Continue carvedilol 3.125 mg twice a day.  -Continue aspirin 81 mg a day.  -Continue lisinopril 5 mg a day.  -No statin at this time due to liver disease.     Thrombocytopenia.  Anemia.  -No obvious hemorrhage.  -Suspect marrow suppression due to ongoing alcohol abuse.  -Needs long-term alcohol cessation.  -INR and PTT okay.  -Platelets mildly improved to 79.  -Hemoglobin has been stable during hospital stay.  -Recheck CBC in 2  "days.     Hyponatremia.  -Mild.  Sodium 132.  -Stable at 133 today.  -Recheck metabolic panel intermittently.     Hypomagnesemia.  Hypophosphatemia.  Hypocalcemia.  -Magnesium replacement protocol.  -Phosphorus replacement protocol.  -Ionized calcium okay.     Transaminitis.  Hyperbilirubinemia.  -Suspect due to alcohol abuse.  -Hepatic steatosis and fibrosis noted on ultrasound of abdomen.  -Gastroenterology consult appreciated.  -Recheck LFTs intermittently.  Next plan check 2 days.    Left shoulder pain.  -No acute fractures on imaging.  -Suspect musculoskeletal pain.  -Pain medications as needed.       Diet: Combination Diet 2 gm NA Diet; Low Saturated Fat Diet    DVT Prophylaxis: Pneumatic Compression Devices  Durbin Catheter: Not present  Lines: None     Cardiac Monitoring: ACTIVE order. Indication: AMI (NSTEMI/ STEMI) (48 hours)  Code Status: Full Code      Clinically Significant Risk Factors            # Hypomagnesemia: Lowest Mg = 1.6 mg/dL in last 2 days, will replace as needed   # Hypoalbuminemia: Lowest albumin = 3 g/dL at 9/2/2023  3:33 AM, will monitor as appropriate   # Thrombocytopenia: Lowest platelets = 60 in last 2 days, will monitor for bleeding   # Hypertension: Noted on problem list  # Chronic heart failure with reduced ejection fraction: last echo with EF <40%       # Obesity: Estimated body mass index is 30.68 kg/m  as calculated from the following:    Height as of this encounter: 1.803 m (5' 11\").    Weight as of this encounter: 99.8 kg (220 lb)., PRESENT ON ADMISSION     # Financial/Environmental Concerns: unable to afford medication(s);unable to afford rent/mortgage;unemployed;insurance inadequate         Disposition Plan     Expected Discharge Date: 09/05/2023      Destination: home            Kareem Mays DO  Hospitalist Service  [facility]  Securely message with [employer] (more info)  Text page via [employer] Paging/Directory "   ______________________________________________________________________    Interval History   Still with left shoulder pain.  Denies chest pain, shortness of breath, fevers, chills, nausea, or vomiting.    Physical Exam   Vital Signs: Temp: 98.7  F (37.1  C) Temp src: Oral BP: 131/79 Pulse: 92   Resp: 16 SpO2: 97 % O2 Device: None (Room air)    Weight: 220 lbs 0 oz    Gen:  NAD, A&Ox3.  Eyes:  PERRL, sclera anicteric.  OP:  MMM, no lesions.  Neck:  Supple.  CV:  Regular, no murmurs.  Lung:  CTA b/l, normal effort.  Ab:  +BS, soft.  Skin:  Warm, dry to touch.  No rash.  Ext:  No pitting edema LE b/l.      Medical Decision Making       36 MINUTES SPENT BY ME on the date of service doing chart review, history, exam, documentation & further activities per the note.      Data     I have personally reviewed the following data over the past 24 hrs:    6.9  \   11.9 (L)   / 79 (L)     133 (L) 101 9.9 /  140 (H)   4.6 20 (L) 0.69 \     ALT: 44 AST: 134 (H) AP: 184 (H) TBILI: 8.2 (H)   ALB: 3.0 (L) TOT PROTEIN: 5.8 (L) LIPASE: N/A       Imaging results reviewed over the past 24 hrs:   No results found for this or any previous visit (from the past 24 hour(s)).

## 2023-09-02 NOTE — PROGRESS NOTES
"Cardiology Progress Note   Date of service: 23       Assessment and Plan:       Cardiomyopathy, possibly secondary to alcohol abuse  Troponins have been trivially elevated.  I do not feel that he is a good candidate for cardiac catheterization due to his alcohol abuse and current mental status.  Would favor nuclear stress testing and if no gross ischemia, medically manage.  Due to his ongoing alcohol abuse and thrombocytopenia, elevated risk of complication with percutaneous revascularization.  Continue medical management for his cardiomyopathy.  I reviewed the notes from his 2015 cardiac catheterization where they document his noncompliance with dual antiplatelet therapy and they implanted a bare-metal stent as they did not feel that he would be compliant with dual antiplatelet therapy as he came in with focal stenosis proximal to stent after noncompliance.             35 minutes spent personally today reviewing EMR, decision making, discussing with patient and communication with staff as well as charting.              Interval History:     Patient is unhappy and irritable              Review of Systems:   As per subjective, otherwise 5 systems reviewed and negative.           Physical Exam:     Vitals were reviewed  Blood pressure 115/77, pulse 99, temperature 98.7  F (37.1  C), temperature source Oral, resp. rate 16, height 1.803 m (5' 11\"), weight 99.8 kg (220 lb), SpO2 97 %.  Temperatures:  Current - Temp: 98.7  F (37.1  C); Max - Temp  Av.4  F (36.9  C)  Min: 98.1  F (36.7  C)  Max: 98.7  F (37.1  C)  Respiration range: Resp  Av.1  Min: 16  Max: 20  Pulse range: Pulse  Av  Min: 63  Max: 104  Blood pressure range: Systolic (24hrs), Av , Min:115 , Max:152   ; Diastolic (24hrs), Av, Min:77, Max:97    Pulse oximetry range: SpO2  Av.9 %  Min: 94 %  Max: 97 %    Intake/Output Summary (Last 24 hours) at 2023 3021  Last data filed at 2023 0856  Gross per 24 hour   Intake -- "   Output 301 ml   Net -301 ml            Medications:     Current Facility-Administered Medications Ordered in Epic   Medication Dose Route Frequency Last Rate Last Admin    acetaminophen (TYLENOL) tablet 650 mg  650 mg Oral Q4H PRN   650 mg at 09/02/23 0853    Or    acetaminophen (TYLENOL) Suppository 650 mg  650 mg Rectal Q4H PRN        aspirin EC tablet 81 mg  81 mg Oral Daily   81 mg at 09/02/23 0853    calcium carbonate 500 mg (elemental) (OSCAL) tablet 500 mg  500 mg Oral BID w/meals   500 mg at 09/02/23 0854    carvedilol (COREG) tablet 3.125 mg  3.125 mg Oral BID w/meals   3.125 mg at 09/02/23 0855    glucose gel 15-30 g  15-30 g Oral Q15 Min PRN        Or    dextrose 50 % injection 25-50 mL  25-50 mL Intravenous Q15 Min PRN        Or    glucagon injection 1 mg  1 mg Subcutaneous Q15 Min PRN        diclofenac (VOLTAREN) 1 % topical gel 2 g  2 g Topical 4x Daily PRN   2 g at 09/02/23 1343    flumazenil (ROMAZICON) injection 0.2 mg  0.2 mg Intravenous q1 min prn        folic acid (FOLVITE) tablet 1 mg  1 mg Oral Daily   1 mg at 09/02/23 0854    [START ON 9/6/2023] gabapentin (NEURONTIN) capsule 100 mg  100 mg Oral Q8H        [START ON 9/4/2023] gabapentin (NEURONTIN) capsule 300 mg  300 mg Oral Q8H        gabapentin (NEURONTIN) capsule 600 mg  600 mg Oral Q8H   600 mg at 09/02/23 1501    heparin (porcine) 1000 UNIT/ML injection            heparin (porcine) 1000 UNIT/ML injection            hydrOXYzine (ATARAX) tablet 25-50 mg  25-50 mg Oral Q6H PRN   25 mg at 09/02/23 1350    insulin aspart (NovoLOG) injection (RAPID ACTING)  1-7 Units Subcutaneous TID AC   1 Units at 09/02/23 1219    insulin aspart (NovoLOG) injection (RAPID ACTING)  1-5 Units Subcutaneous At Bedtime        lidocaine (LMX4) cream   Topical Q1H PRN        lidocaine (PF) (XYLOCAINE) 1 % injection            lidocaine 1 % 0.1-1 mL  0.1-1 mL Other Q1H PRN        lisinopril (ZESTRIL) tablet 5 mg  5 mg Oral Daily   5 mg at 09/02/23 0855     LORazepam (ATIVAN) injection 0.5 mg  0.5 mg Intravenous Q2H PRN        Or    LORazepam (ATIVAN) tablet 0.5 mg  0.5 mg Oral Q2H PRN        LORazepam (ATIVAN) tablet 1-2 mg  1-2 mg Oral Q30 Min PRN   1 mg at 08/31/23 1725    Or    LORazepam (ATIVAN) injection 1-2 mg  1-2 mg Intravenous Q30 Min PRN        melatonin tablet 5 mg  5 mg Oral QPM PRN        multivitamin w/minerals (THERA-VIT-M) tablet 1 tablet  1 tablet Oral Daily   1 tablet at 09/02/23 0853    nitroGLYcerin in D5W 100 mcg/mL injection            PARoxetine (PAXIL) tablet 20 mg  20 mg Oral Daily   20 mg at 09/02/23 1202    Patient is NOT allergic to contrast dye   Does not apply DOES NOT GO TO MAR        potassium chloride ER (KLOR-CON M) CR tablet 20 mEq  20 mEq Oral Once PRN        sodium chloride (PF) 0.9% PF flush 3 mL  3 mL Intracatheter Q8H   3 mL at 08/31/23 2058    sodium chloride (PF) 0.9% PF flush 3 mL  3 mL Intracatheter Q1H PRN        sodium chloride (PF) 0.9% PF flush 3 mL  3 mL Intracatheter q1 min prn        sodium chloride (PF) 0.9% PF flush 3 mL  3 mL Intracatheter Q8H   3 mL at 08/30/23 0846    sodium chloride (PF) 0.9% PF flush 3 mL  3 mL Intracatheter q1 min prn        thiamine (B-1) tablet 100 mg  100 mg Oral Daily   100 mg at 09/02/23 0855    verapamil (ISOPTIN) 2.5 MG/ML injection             No current [facility]-ordered outpatient medications on file.                Data:   All laboratory data reviewed  Results for orders placed or performed during the hospital encounter of 08/30/23 (from the past 24 hour(s))   Glucose by meter   Result Value Ref Range    GLUCOSE BY METER POCT 126 (H) 70 - 99 mg/dL   Glucose by meter   Result Value Ref Range    GLUCOSE BY METER POCT 142 (H) 70 - 99 mg/dL   Glucose by meter   Result Value Ref Range    GLUCOSE BY METER POCT 124 (H) 70 - 99 mg/dL   Phosphorus   Result Value Ref Range    Phosphorus 2.4 (L) 2.5 - 4.5 mg/dL   Magnesium   Result Value Ref Range    Magnesium 1.8 1.7 - 2.3 mg/dL   Comprehensive  metabolic panel   Result Value Ref Range    Sodium 133 (L) 136 - 145 mmol/L    Potassium 4.6 3.4 - 5.3 mmol/L    Chloride 101 98 - 107 mmol/L    Carbon Dioxide (CO2) 20 (L) 22 - 29 mmol/L    Anion Gap 12 7 - 15 mmol/L    Urea Nitrogen 9.9 8.0 - 23.0 mg/dL    Creatinine 0.69 0.67 - 1.17 mg/dL    Calcium 8.6 (L) 8.8 - 10.2 mg/dL    Glucose 136 (H) 70 - 99 mg/dL    Alkaline Phosphatase 184 (H) 40 - 129 U/L     (H) 0 - 45 U/L    ALT 44 0 - 70 U/L    Protein Total 5.8 (L) 6.4 - 8.3 g/dL    Albumin 3.0 (L) 3.5 - 5.2 g/dL    Bilirubin Total 8.2 (H) <=1.2 mg/dL    GFR Estimate >90 >60 mL/min/1.73m2   CBC with platelets   Result Value Ref Range    WBC Count 6.9 4.0 - 11.0 10e3/uL    RBC Count 3.15 (L) 4.40 - 5.90 10e6/uL    Hemoglobin 11.9 (L) 13.3 - 17.7 g/dL    Hematocrit 34.6 (L) 40.0 - 53.0 %     (H) 78 - 100 fL    MCH 37.8 (H) 26.5 - 33.0 pg    MCHC 34.4 31.5 - 36.5 g/dL    RDW 13.8 10.0 - 15.0 %    Platelet Count 79 (L) 150 - 450 10e3/uL   Glucose by meter   Result Value Ref Range    GLUCOSE BY METER POCT 122 (H) 70 - 99 mg/dL   Glucose by meter   Result Value Ref Range    GLUCOSE BY METER POCT 140 (H) 70 - 99 mg/dL       All laboratory data reviewed  Lab Results   Component Value Date    CHOL 245 08/31/2023    CHOL 265 06/16/2020     Lab Results   Component Value Date    HDL 15 08/31/2023    HDL 42 06/16/2020     Lab Results   Component Value Date     08/31/2023     06/16/2020     Lab Results   Component Value Date    TRIG 337 08/31/2023    TRIG 241 06/16/2020     No results found for: CHOLHDLRATIO  TSH   Date Value Ref Range Status   11/23/2018 1.69 0.40 - 4.00 mU/L Final     Last Basic Metabolic Panel:  Lab Results   Component Value Date     09/02/2023     12/16/2020      Lab Results   Component Value Date    POTASSIUM 4.6 09/02/2023    POTASSIUM 3.9 07/19/2022    POTASSIUM 4.4 12/16/2020     Lab Results   Component Value Date    CHLORIDE 101 09/02/2023    CHLORIDE 107  07/19/2022    CHLORIDE 104 12/16/2020     Lab Results   Component Value Date    BENY 8.6 09/02/2023    BENY 9.8 12/16/2020     Lab Results   Component Value Date    CO2 20 09/02/2023    CO2 27 07/19/2022    CO2 23 12/16/2020     Lab Results   Component Value Date    BUN 9.9 09/02/2023    BUN 17 07/19/2022    BUN 19 12/16/2020     Lab Results   Component Value Date    CR 0.69 09/02/2023    CR 1.01 12/16/2020     Lab Results   Component Value Date     09/02/2023     07/19/2022     12/16/2020     Lab Results   Component Value Date    WBC 6.9 09/02/2023    WBC 5.2 12/16/2020     Lab Results   Component Value Date    RBC 3.15 09/02/2023    RBC 4.51 12/16/2020     Lab Results   Component Value Date    HGB 11.9 09/02/2023    HGB 15.4 12/16/2020     Lab Results   Component Value Date    HCT 34.6 09/02/2023    HCT 44.0 12/16/2020     Lab Results   Component Value Date     09/02/2023    MCV 98 12/16/2020     Lab Results   Component Value Date    MCH 37.8 09/02/2023    MCH 34.1 12/16/2020     Lab Results   Component Value Date    MCHC 34.4 09/02/2023    MCHC 35.0 12/16/2020     Lab Results   Component Value Date    RDW 13.8 09/02/2023    RDW 13.6 12/16/2020     Lab Results   Component Value Date    PLT 79 09/02/2023     12/16/2020

## 2023-09-02 NOTE — PLAN OF CARE
Goal Outcome Evaluation:    A/O x 4. But forgetful. VSS on RA. C/O left shoulder pain. PRN tylenol and atarax given. PRN Voltaren gel given. Has IV NS running at 100 ml/hr. On PO Phos replacment. CIWA 4, 4. Assist of 2.

## 2023-09-03 ENCOUNTER — APPOINTMENT (OUTPATIENT)
Dept: GENERAL RADIOLOGY | Facility: CLINIC | Age: 63
End: 2023-09-03
Attending: INTERNAL MEDICINE
Payer: MEDICAID

## 2023-09-03 DIAGNOSIS — E11.65 UNCONTROLLED TYPE 2 DIABETES MELLITUS WITH HYPERGLYCEMIA (H): ICD-10-CM

## 2023-09-03 LAB
ALBUMIN UR-MCNC: NEGATIVE MG/DL
AMMONIA PLAS-SCNC: 57 UMOL/L (ref 16–60)
APPEARANCE UR: CLEAR
BASE EXCESS BLDV CALC-SCNC: -1.4 MMOL/L (ref -7.7–1.9)
BILIRUB UR QL STRIP: ABNORMAL
C DIFF TOX B STL QL: NEGATIVE
COLOR UR AUTO: ABNORMAL
GLUCOSE BLDC GLUCOMTR-MCNC: 117 MG/DL (ref 70–99)
GLUCOSE BLDC GLUCOMTR-MCNC: 119 MG/DL (ref 70–99)
GLUCOSE BLDC GLUCOMTR-MCNC: 120 MG/DL (ref 70–99)
GLUCOSE BLDC GLUCOMTR-MCNC: 126 MG/DL (ref 70–99)
GLUCOSE BLDC GLUCOMTR-MCNC: 128 MG/DL (ref 70–99)
GLUCOSE BLDC GLUCOMTR-MCNC: 142 MG/DL (ref 70–99)
GLUCOSE UR STRIP-MCNC: NEGATIVE MG/DL
HCO3 BLDV-SCNC: 23 MMOL/L (ref 21–28)
HGB UR QL STRIP: NEGATIVE
KETONES UR STRIP-MCNC: 10 MG/DL
LEUKOCYTE ESTERASE UR QL STRIP: NEGATIVE
MAGNESIUM SERPL-MCNC: 1.7 MG/DL (ref 1.7–2.3)
MUCOUS THREADS #/AREA URNS LPF: PRESENT /LPF
NITRATE UR QL: NEGATIVE
O2/TOTAL GAS SETTING VFR VENT: 0 %
PCO2 BLDV: 36 MM HG (ref 40–50)
PH BLDV: 7.42 [PH] (ref 7.32–7.43)
PH UR STRIP: 6 [PH] (ref 5–7)
PHOSPHATE SERPL-MCNC: 2.5 MG/DL (ref 2.5–4.5)
PO2 BLDV: 81 MM HG (ref 25–47)
RBC URINE: <1 /HPF
SP GR UR STRIP: 1.02 (ref 1–1.03)
SQUAMOUS EPITHELIAL: <1 /HPF
UROBILINOGEN UR STRIP-MCNC: 2 MG/DL
WBC URINE: 1 /HPF

## 2023-09-03 PROCEDURE — 82140 ASSAY OF AMMONIA: CPT | Performed by: INTERNAL MEDICINE

## 2023-09-03 PROCEDURE — 71045 X-RAY EXAM CHEST 1 VIEW: CPT

## 2023-09-03 PROCEDURE — 120N000001 HC R&B MED SURG/OB

## 2023-09-03 PROCEDURE — 250N000013 HC RX MED GY IP 250 OP 250 PS 637: Performed by: INTERNAL MEDICINE

## 2023-09-03 PROCEDURE — 83735 ASSAY OF MAGNESIUM: CPT | Performed by: INTERNAL MEDICINE

## 2023-09-03 PROCEDURE — 250N000011 HC RX IP 250 OP 636: Performed by: INTERNAL MEDICINE

## 2023-09-03 PROCEDURE — 250N000013 HC RX MED GY IP 250 OP 250 PS 637: Performed by: NURSE PRACTITIONER

## 2023-09-03 PROCEDURE — 82803 BLOOD GASES ANY COMBINATION: CPT | Performed by: INTERNAL MEDICINE

## 2023-09-03 PROCEDURE — 36415 COLL VENOUS BLD VENIPUNCTURE: CPT | Performed by: INTERNAL MEDICINE

## 2023-09-03 PROCEDURE — 81001 URINALYSIS AUTO W/SCOPE: CPT | Performed by: INTERNAL MEDICINE

## 2023-09-03 PROCEDURE — 84100 ASSAY OF PHOSPHORUS: CPT | Performed by: INTERNAL MEDICINE

## 2023-09-03 PROCEDURE — 99207 PR NO BILLABLE SERVICE THIS VISIT: CPT | Performed by: INTERNAL MEDICINE

## 2023-09-03 PROCEDURE — 250N000011 HC RX IP 250 OP 636: Mod: JZ | Performed by: INTERNAL MEDICINE

## 2023-09-03 PROCEDURE — 99232 SBSQ HOSP IP/OBS MODERATE 35: CPT | Performed by: INTERNAL MEDICINE

## 2023-09-03 RX ORDER — MAGNESIUM SULFATE HEPTAHYDRATE 40 MG/ML
2 INJECTION, SOLUTION INTRAVENOUS ONCE
Status: COMPLETED | OUTPATIENT
Start: 2023-09-03 | End: 2023-09-03

## 2023-09-03 RX ADMIN — ASPIRIN 81 MG: 81 TABLET, COATED ORAL at 09:47

## 2023-09-03 RX ADMIN — LORAZEPAM 1 MG: 2 INJECTION INTRAMUSCULAR; INTRAVENOUS at 18:30

## 2023-09-03 RX ADMIN — CALCIUM 500 MG: 500 TABLET ORAL at 18:21

## 2023-09-03 RX ADMIN — LORAZEPAM 0.5 MG: 0.5 TABLET ORAL at 02:11

## 2023-09-03 RX ADMIN — LORAZEPAM 2 MG: 2 INJECTION INTRAMUSCULAR; INTRAVENOUS at 04:52

## 2023-09-03 RX ADMIN — LORAZEPAM 1 MG: 2 INJECTION INTRAMUSCULAR; INTRAVENOUS at 12:19

## 2023-09-03 RX ADMIN — LORAZEPAM 1 MG: 2 INJECTION INTRAMUSCULAR; INTRAVENOUS at 08:27

## 2023-09-03 RX ADMIN — MAGNESIUM SULFATE HEPTAHYDRATE 2 G: 2 INJECTION, SOLUTION INTRAVENOUS at 11:40

## 2023-09-03 RX ADMIN — CARVEDILOL 3.12 MG: 3.12 TABLET, FILM COATED ORAL at 18:21

## 2023-09-03 RX ADMIN — LORAZEPAM 1 MG: 2 INJECTION INTRAMUSCULAR; INTRAVENOUS at 15:46

## 2023-09-03 RX ADMIN — LORAZEPAM 2 MG: 2 INJECTION INTRAMUSCULAR; INTRAVENOUS at 07:29

## 2023-09-03 ASSESSMENT — ACTIVITIES OF DAILY LIVING (ADL)
ADLS_ACUITY_SCORE: 54
ADLS_ACUITY_SCORE: 50
ADLS_ACUITY_SCORE: 54
ADLS_ACUITY_SCORE: 52
ADLS_ACUITY_SCORE: 40
ADLS_ACUITY_SCORE: 50
ADLS_ACUITY_SCORE: 48
ADLS_ACUITY_SCORE: 50
ADLS_ACUITY_SCORE: 40
ADLS_ACUITY_SCORE: 48
ADLS_ACUITY_SCORE: 54
ADLS_ACUITY_SCORE: 50

## 2023-09-03 NOTE — PLAN OF CARE
Goal Outcome Evaluation:    VS elevated at times. Unable to give PO meds this morning due to lethargy. MD aware. Gabapentin on hold. Ammonia level, VBG's and CXR done, see results for details. UA/UCX sent. External cath in place. Pt combative/restless/confused this morning, Sitter at bedside. CIWA scores see flowsheet. Pt received 4mg IV ativan this shift. Tele SR BBB, trops elevated on admission, cardiology following, plan for Brenda scan 9/5. Possible Angio when platelets improve. LFT's elevated on admission.  Jaundice. GI following. No stools this shift. C-diff negative. Phos and mag protocols.

## 2023-09-03 NOTE — PLAN OF CARE
Goal Outcome Evaluation:  A/Ox4 but forgetful. Elevated BP. NS running at 100 ml/hr. Has had frequent loose stools. Dr Mays was notified. Verbal order for C.diff toxin test.

## 2023-09-03 NOTE — PROGRESS NOTES
Mental status : At the beginning of the shift he was very calm and cooperative A&O X 4 . Then His orientation level started to fluctuating (between x4 to x 1)  and mirroring  his CIWA Score.    HEENT- Icteric sclera   CIWA score range between 3 - 14 during the shift . He was given Ativan oral and IV during the course of the shift . Though he was calm for most of the shift he started to get agitated and restless near the end of shift .   In spite of his agitation and restlessness he persuaded to take medication both oral and IV medication . The oral Ativan given some help to but did not controlled his agitation .   Because of his increasing  agitation and restlessness 2 mg IV ativan given , a bed side sitter assigned for observation and security coded for help . His agitation reduced after IV ativan and he started having good sleep though he was easily  arousalbe .   CVS : Tele SR with BB inverted T wave and ST depression  Abdo : Round and enlarged but non tender . visibile distended peripheral abdomin vain   Gu :Stated difficulty of urine   GI : diarrhea for three days  Skin:  brusing on the forarms and abdomen .   MSK:  leg and feet trace edema.  shoulder pain , left . Ice pack applied on the L shoulder and get some relief from the pain .Numbness on the feet which he admitted present for years.

## 2023-09-03 NOTE — PROGRESS NOTES
[facility]    Medicine Progress Note - Hospitalist Service    Date of Admission:  8/30/2023    Assessment & Plan     Saji Iqbal is a 63 year old male admitted on 8/30/2023. He has a hx of CAD s/p PCI LAD, type 2 DM, HTN, HLP, alcohol abuse who presents with L shoulder pain.  He was sleeping on his left shoulder and when he woke up he had pain in the left shoulder.  The pain is localized to the left shoulder and does not radiate.  It is made worse with certain movements.  He had a little bit of numbness on his left hand but that has improved.  He denies any chest pain or shortness of breath.  Was noted to have a mildly elevated troponin level and to be intoxicated with alcohol.  He was placed in the hospital for further evaluation and treatment.     Alcohol use disorder.  Acute alcohol intoxication.  Alcohol withdrawals.  Acute encephalopathy.  -Withdrawals seem quite bit worse on 9/3.  -Has required multiple doses of diazepam so far today.  -Currently quite lethargic.  -Hold gabapentin due to current significant lethargy.  -Initially on clonidine.  -Clonidine stopped to allow titration of other cardiac/blood pressure medications.  -Continue folic acid, thiamine, multivitamin.  -[facility] protocol.  -Benzodiazepines if needed.  -Bedside sitter.  -Suspect encephalopathy due to worsening withdrawals.  -Ammonia at higher end of normal.  -VBG unremarkable.  -Check chest x-ray.  -Check urinalysis.     New cardiomyopathy.  Suspect ischemic.  Non-ST elevation myocardial infarction.  Hypertension.  Hyperlipidemia.  Coronary artery disease.  -Appreciate cardiology input.  -Initial plan had been for coronary angiogram.  -Current plan for Lexiscan stress test.  -Initially started on metoprolol.  -Metoprolol had to be stopped due to sinus pause.  -Clonidine stopped as above.  -Continue carvedilol 3.125 mg twice a day.  -Continue aspirin 81 mg a day.  -Continue lisinopril 5 mg a day.  -No statin at this time  "due to liver disease.     Thrombocytopenia.  Anemia.  -No obvious hemorrhage.  -Suspect marrow suppression due to ongoing alcohol abuse.  -Needs long-term alcohol cessation.  -INR and PTT okay.  -Platelets mildly improved to 79.  -Hemoglobin has been stable during hospital stay.  -Recheck CBC tomorrow.     Hyponatremia.  -Mild.  Sodium 132.  -Stable at 133 today.  -Recheck metabolic panel intermittently.     Hypomagnesemia.  Hypophosphatemia.  Hypocalcemia.  -Magnesium replacement protocol.  -Phosphorus replacement protocol.  -Ionized calcium okay.     Transaminitis.  Hyperbilirubinemia.  -Suspect due to alcohol abuse.  -Hepatic steatosis and fibrosis noted on ultrasound of abdomen.  -Gastroenterology consult appreciated.  -Recheck LFTs intermittently.       Left shoulder pain.  -No acute fractures on imaging.  -Suspect musculoskeletal pain.  -Pain medications as needed.          Diet: Combination Diet 2 gm NA Diet; Low Saturated Fat Diet    DVT Prophylaxis: Pneumatic Compression Devices  Durbin Catheter: Not present  Lines: None     Cardiac Monitoring: ACTIVE order. Indication: AMI (NSTEMI/ STEMI) (48 hours)  Code Status: Full Code      Clinically Significant Risk Factors              # Hypoalbuminemia: Lowest albumin = 3 g/dL at 9/2/2023  3:33 AM, will monitor as appropriate   # Thrombocytopenia: Lowest platelets = 79 in last 2 days, will monitor for bleeding   # Hypertension: Noted on problem list  # Chronic heart failure with reduced ejection fraction: last echo with EF <40%       # Obesity: Estimated body mass index is 30.68 kg/m  as calculated from the following:    Height as of this encounter: 1.803 m (5' 11\").    Weight as of this encounter: 99.8 kg (220 lb).      # Financial/Environmental Concerns: unable to afford medication(s);unable to afford rent/mortgage;unemployed;insurance inadequate         Disposition Plan     Expected Discharge Date: 09/05/2023      Destination: home            Kareem Mays, " DO  Hospitalist Service  [facility]  Securely message with [employer] (more info)  Text page via [employer] Paging/Directory   ______________________________________________________________________    Interval History   Quite sleepy after lorazepam today.  Will awaken briefly, falls immediately back asleep.  Does not stay awake long enough to answer any questions.    Physical Exam   Vital Signs: Temp: 97.9  F (36.6  C) Temp src: Oral BP: 136/89 Pulse: 88   Resp: 20 SpO2: 97 % O2 Device: None (Room air)    Weight: 220 lbs 0 oz    Gen: Quite sleepy.  Will awaken briefly.  Does not stay awake long enough to answer any questions.  Eyes:  Sclera icteric.  Neck:  Supple.  CV:  Regular, no loud murmurs.  Lung:  CTA b/l, normal effort.  Ab: Mildly distended, +BS, soft.  Skin:  Warm, dry to touch.  No rash.  Ext:  Mild non pitting edema LE b/l.      Medical Decision Making       39 MINUTES SPENT BY ME on the date of service doing chart review, history, exam, documentation & further activities per the note.      Data         Imaging results reviewed over the past 24 hrs:   No results found for this or any previous visit (from the past 24 hour(s)).

## 2023-09-03 NOTE — PROVIDER NOTIFICATION
MD paged :  pt got up and fell on floor on knees; pt states he did not hit head. pt having elevated BPs 177/94.

## 2023-09-03 NOTE — PLAN OF CARE
Goal Outcome Evaluation:    A/O x3; forgetful. Having elevated BPs on RA, afebrile. Having loose frequent stools. Stool sample has been collected. PIV saline locked. Up with assist of 2, belt and walker. CIWA 4,4. Bed alarm on for safety.

## 2023-09-03 NOTE — PROGRESS NOTES
Cardiology chart reviewed:    Medical management of coronary artery disease in a patient who has been noncompliant with his cardiac medications and recurrent alcohol abuse.    Ordered nuclear stress test for Tuesday due to holiday on Monday.  If large territory of ischemia, could revisit but I do not feel that patient is a good candidate for cardiac catheterization.    Continue current cardiac medications.  Please call with questions.

## 2023-09-04 LAB
ALBUMIN SERPL BCG-MCNC: 3 G/DL (ref 3.5–5.2)
ALP SERPL-CCNC: 239 U/L (ref 40–129)
ALT SERPL W P-5'-P-CCNC: 46 U/L (ref 0–70)
ANION GAP SERPL CALCULATED.3IONS-SCNC: 9 MMOL/L (ref 7–15)
AST SERPL W P-5'-P-CCNC: 128 U/L (ref 0–45)
BILIRUB SERPL-MCNC: 8.4 MG/DL
BUN SERPL-MCNC: 9.3 MG/DL (ref 8–23)
CALCIUM SERPL-MCNC: 8.8 MG/DL (ref 8.8–10.2)
CHLORIDE SERPL-SCNC: 96 MMOL/L (ref 98–107)
CREAT SERPL-MCNC: 0.55 MG/DL (ref 0.67–1.17)
DEPRECATED HCO3 PLAS-SCNC: 21 MMOL/L (ref 22–29)
ERYTHROCYTE [DISTWIDTH] IN BLOOD BY AUTOMATED COUNT: 14.2 % (ref 10–15)
FOLATE SERPL-MCNC: 20.3 NG/ML (ref 4.6–34.8)
GFR SERPL CREATININE-BSD FRML MDRD: >90 ML/MIN/1.73M2
GLUCOSE BLDC GLUCOMTR-MCNC: 117 MG/DL (ref 70–99)
GLUCOSE BLDC GLUCOMTR-MCNC: 123 MG/DL (ref 70–99)
GLUCOSE BLDC GLUCOMTR-MCNC: 135 MG/DL (ref 70–99)
GLUCOSE BLDC GLUCOMTR-MCNC: 149 MG/DL (ref 70–99)
GLUCOSE SERPL-MCNC: 125 MG/DL (ref 70–99)
HCT VFR BLD AUTO: 34.7 % (ref 40–53)
HGB BLD-MCNC: 12.2 G/DL (ref 13.3–17.7)
HOLD SPECIMEN: NORMAL
MAGNESIUM SERPL-MCNC: 1.8 MG/DL (ref 1.7–2.3)
MCH RBC QN AUTO: 38 PG (ref 26.5–33)
MCHC RBC AUTO-ENTMCNC: 35.2 G/DL (ref 31.5–36.5)
MCV RBC AUTO: 108 FL (ref 78–100)
PHOSPHATE SERPL-MCNC: 2.8 MG/DL (ref 2.5–4.5)
PLATELET # BLD AUTO: 116 10E3/UL (ref 150–450)
POTASSIUM SERPL-SCNC: 4.4 MMOL/L (ref 3.4–5.3)
PROT SERPL-MCNC: 5.8 G/DL (ref 6.4–8.3)
RBC # BLD AUTO: 3.21 10E6/UL (ref 4.4–5.9)
SODIUM SERPL-SCNC: 126 MMOL/L (ref 136–145)
VIT B12 SERPL-MCNC: 1625 PG/ML (ref 232–1245)
WBC # BLD AUTO: 6.9 10E3/UL (ref 4–11)

## 2023-09-04 PROCEDURE — 250N000013 HC RX MED GY IP 250 OP 250 PS 637: Performed by: INTERNAL MEDICINE

## 2023-09-04 PROCEDURE — 120N000001 HC R&B MED SURG/OB

## 2023-09-04 PROCEDURE — 36415 COLL VENOUS BLD VENIPUNCTURE: CPT | Performed by: INTERNAL MEDICINE

## 2023-09-04 PROCEDURE — 250N000013 HC RX MED GY IP 250 OP 250 PS 637: Performed by: NURSE PRACTITIONER

## 2023-09-04 PROCEDURE — 36415 COLL VENOUS BLD VENIPUNCTURE: CPT | Performed by: EMERGENCY MEDICINE

## 2023-09-04 PROCEDURE — 82607 VITAMIN B-12: CPT | Performed by: INTERNAL MEDICINE

## 2023-09-04 PROCEDURE — 99233 SBSQ HOSP IP/OBS HIGH 50: CPT | Performed by: INTERNAL MEDICINE

## 2023-09-04 PROCEDURE — 82746 ASSAY OF FOLIC ACID SERUM: CPT | Performed by: EMERGENCY MEDICINE

## 2023-09-04 PROCEDURE — 85027 COMPLETE CBC AUTOMATED: CPT | Performed by: INTERNAL MEDICINE

## 2023-09-04 PROCEDURE — 83735 ASSAY OF MAGNESIUM: CPT | Performed by: INTERNAL MEDICINE

## 2023-09-04 PROCEDURE — 258N000003 HC RX IP 258 OP 636: Performed by: INTERNAL MEDICINE

## 2023-09-04 PROCEDURE — 80053 COMPREHEN METABOLIC PANEL: CPT | Performed by: INTERNAL MEDICINE

## 2023-09-04 PROCEDURE — 84100 ASSAY OF PHOSPHORUS: CPT | Performed by: INTERNAL MEDICINE

## 2023-09-04 RX ORDER — MAGNESIUM OXIDE 400 MG/1
400 TABLET ORAL EVERY 4 HOURS
Status: COMPLETED | OUTPATIENT
Start: 2023-09-04 | End: 2023-09-04

## 2023-09-04 RX ORDER — SODIUM CHLORIDE 9 MG/ML
INJECTION, SOLUTION INTRAVENOUS CONTINUOUS
Status: DISCONTINUED | OUTPATIENT
Start: 2023-09-04 | End: 2023-09-05

## 2023-09-04 RX ADMIN — PAROXETINE HYDROCHLORIDE 20 MG: 20 TABLET, FILM COATED ORAL at 09:41

## 2023-09-04 RX ADMIN — MULTIPLE VITAMINS W/ MINERALS TAB 1 TABLET: TAB at 09:41

## 2023-09-04 RX ADMIN — LORAZEPAM 1 MG: 1 TABLET ORAL at 23:04

## 2023-09-04 RX ADMIN — MAGNESIUM OXIDE TAB 400 MG (241.3 MG ELEMENTAL MG) 400 MG: 400 (241.3 MG) TAB at 14:17

## 2023-09-04 RX ADMIN — ACETAMINOPHEN 650 MG: 325 TABLET, FILM COATED ORAL at 09:49

## 2023-09-04 RX ADMIN — FOLIC ACID 1 MG: 1 TABLET ORAL at 09:43

## 2023-09-04 RX ADMIN — CALCIUM 500 MG: 500 TABLET ORAL at 17:21

## 2023-09-04 RX ADMIN — SODIUM CHLORIDE: 9 INJECTION, SOLUTION INTRAVENOUS at 11:58

## 2023-09-04 RX ADMIN — CALCIUM 500 MG: 500 TABLET ORAL at 09:43

## 2023-09-04 RX ADMIN — HYDROXYZINE HYDROCHLORIDE 25 MG: 25 TABLET ORAL at 10:55

## 2023-09-04 RX ADMIN — CARVEDILOL 3.12 MG: 3.12 TABLET, FILM COATED ORAL at 09:41

## 2023-09-04 RX ADMIN — MAGNESIUM OXIDE TAB 400 MG (241.3 MG ELEMENTAL MG) 400 MG: 400 (241.3 MG) TAB at 10:52

## 2023-09-04 RX ADMIN — LISINOPRIL 5 MG: 5 TABLET ORAL at 09:43

## 2023-09-04 RX ADMIN — SODIUM CHLORIDE: 9 INJECTION, SOLUTION INTRAVENOUS at 21:17

## 2023-09-04 RX ADMIN — ACETAMINOPHEN 650 MG: 325 TABLET, FILM COATED ORAL at 22:10

## 2023-09-04 RX ADMIN — CARVEDILOL 3.12 MG: 3.12 TABLET, FILM COATED ORAL at 17:21

## 2023-09-04 RX ADMIN — DICLOFENAC SODIUM 2 G: 10 GEL TOPICAL at 09:46

## 2023-09-04 RX ADMIN — ASPIRIN 81 MG: 81 TABLET, COATED ORAL at 09:41

## 2023-09-04 RX ADMIN — LORAZEPAM 1 MG: 1 TABLET ORAL at 04:06

## 2023-09-04 ASSESSMENT — ACTIVITIES OF DAILY LIVING (ADL)
ADLS_ACUITY_SCORE: 48
ADLS_ACUITY_SCORE: 53
ADLS_ACUITY_SCORE: 54
ADLS_ACUITY_SCORE: 48
ADLS_ACUITY_SCORE: 54
ADLS_ACUITY_SCORE: 54
ADLS_ACUITY_SCORE: 47
ADLS_ACUITY_SCORE: 54
ADLS_ACUITY_SCORE: 53

## 2023-09-04 NOTE — PLAN OF CARE
Goal Outcome Evaluation:    Elevated BP. Oriented x 2; self and place; drowsy. Intermittently confused. 2 mg Ativan given this shift. CIWA 9, 8, 6, 5. Sitter at bedside. JACOBO HENSLEY.

## 2023-09-04 NOTE — PROGRESS NOTES
[facility]    Medicine Progress Note - Hospitalist Service    Date of Admission:  8/30/2023    Assessment & Plan     Saji Iqbal is a 63 year old male admitted on 8/30/2023. He has a hx of CAD s/p PCI LAD, type 2 DM, HTN, HLP, alcohol abuse who presents with L shoulder pain.  He was sleeping on his left shoulder and when he woke up he had pain in the left shoulder.  The pain is localized to the left shoulder and does not radiate.  It is made worse with certain movements.  He had a little bit of numbness on his left hand but that has improved.  He denies any chest pain or shortness of breath.  Was noted to have a mildly elevated troponin level and to be intoxicated with alcohol.  He was placed in the hospital for further evaluation and treatment.     Alcohol dependence/Acute alcohol intoxication with withdrawal/ metabolic/toxic encephalopathy.  -Patient presented with an alcohol level 0.17.  Patient has required the CIWA protocol for ongoing withdrawal.  He has been given diazepam.  His CIWA score has been up to 14 overnight.  Patient is confused.  Patient is off of Neurontin due to lethargy and his clonidine was stopped due to needing his other blood pressure medications.  Patient is on vitamins with folic acid, thiamine, and multivitamin.  Continue on withdrawal protocol.  Patient is requiring a one-to-one sitter.  Urinalysis is negative, has no signs or symptoms of infection.  This x-ray is not significant for no pneumonia.  Suspect patient's decreased mentation is due to medications, alcohol drawl     New cardiomyopathy.  Suspect ischemic versus alcohol induced/Non-ST elevation myocardial infarction.  Hypertension/Hyperlipidemia/Coronary artery disease.  -Appreciate cardiology input.  Is a terrible candidate for procedures.  He has been noncompliant. Initial plan had been for coronary angiogram with plan is for a stress test 9/5/2023.  If significant motion abnormalities that should  consider definitive angioplasty.  If necessary, bare-metal stenting should be done rather than drug-eluting.  Patient had been on a beta-blocker but this was stopped due to sinus pause.  Patient currently is on Coreg, aspirin, lisinopril.  He is not on a statin due to his alcohol use and liver disease.  Echocardiogram On 8/30/2023 showing ejection fraction of 35-40% with severe inferior wall hypokinesis and septal hypokinesis.       Thrombocytopenia/Anemia.  -No obvious hemorrhage.  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  Coags actually look okay.  Platelet 79.  Hemoglobin is actually stable at 12.2 with an MCV of 108. Check b12/folic acid levels.  Patient is getting oral folic acid.         Hyponatremia.  -Mild.  Sodium 126-133   this is in the setting of ongoing alcohol use.  Patient really is not eating drinking all that well.  We will start the patient on saline and recheck in the morning.       Hypomagnesemia/Hypophosphatemia/Hypocalcemia.  -Magnesium replacement protocol.  -Phosphorus replacement protocol.  -Ionized calcium okay.     Transaminitis/Hyperbilirubinemia.  -Suspect due to alcohol abuse.  Patient with hepatic steatosis and fibrosis noted on ultrasound of abdomen.  Patient has been seen by GI.  At this point there is nothing more to offer.       Left shoulder pain.  -No acute fractures on imaging.  Suspect musculoskeletal pain.  Continue on pain medications as needed.          Diet: Combination Diet 2 gm NA Diet; Low Saturated Fat Diet    DVT Prophylaxis: Pneumatic Compression Devices  Durbin Catheter: Not present  Lines: None     Cardiac Monitoring: ACTIVE order. Indication: AMI (NSTEMI/ STEMI) (48 hours)  Code Status: Full Code      Clinically Significant Risk Factors         # Hyponatremia: Lowest Na = 126 mmol/L in last 2 days, will monitor as appropriate      # Hypoalbuminemia: Lowest albumin = 3 g/dL at 9/4/2023  7:52 AM,  "will monitor as appropriate     # Thrombocytopenia: Lowest platelets = 116 in last 2 days, will monitor for bleeding     # Hypertension: Noted on problem list    # Chronic heart failure with reduced ejection fraction: last echo with EF <40%       # Obesity: Estimated body mass index is 30.68 kg/m  as calculated from the following:    Height as of this encounter: 1.803 m (5' 11\").    Weight as of this encounter: 99.8 kg (220 lb).          # Financial/Environmental Concerns: unable to afford medication(s);unable to afford rent/mortgage;unemployed;insurance inadequate        Is new to me, discussed with bedside nursing, reviewed the patient's chart in detail along with cardiology notes.       Disposition Plan patient has ongoing withdrawal symptoms.  Will be getting a stress test on 9/5/2023.  If the patient is no longer requiring medications for withdrawal and a stress test is normal he will be able to go home.     Expected Discharge Date: 09/05/2023      Destination: home            Russell Forbes MD  Hospitalist Service  [facility]  Securely message with [employer] (more info)  Text page via [employer] Paging/Directory   ______________________________________________________________________    Interval History   Quite sleepy still.  Patient still has a one-to-one sitter and has been trying to climb out of bed.  He has been impulsive.  He is not very interactive with me and will certainly is not clear enough to give an accurate review of systems.  She does have slight tremor.  He has no asterixis.  CIWA score was up to 14.      Physical Exam   Vital Signs: Temp: 97  F (36.1  C) Temp src: Oral BP: (!) 150/94 Pulse: 95   Resp: 20 SpO2: 93 % O2 Device: None (Room air)    Weight: 220 lbs 0 oz    Exam is really unchanged from yesterday  Gen: Quite sleepy.  Will awaken briefly and is mildly interactive, is in no distress, though certainly is confused.  Is answering some simple questions  HEENT: MMM  CV:  " Regular rate and rhythm, no appreciable murmurs  Lung:  CTA b/l, normal effort.  Good air movement  Ab: Mildly distended, nontender, bowel sounds present throughout, soft.  Ext: Moves all extremities, bilateral ankle 1-2+ edema   Neuro: No focal deficits, patient however is sleepy.      Medical Decision Making       39 MINUTES SPENT BY ME on the date of service doing chart review, history, exam, documentation & further activities per the note.      Data     I have personally reviewed the following data over the past 24 hrs:    6.9  \   12.2 (L)   / 116 (L)     126 (L) 96 (L) 9.3 /  125 (H)   4.4 21 (L) 0.55 (L) \     ALT: 46 AST: 128 (H) AP: 239 (H) TBILI: 8.4 (H)   ALB: 3.0 (L) TOT PROTEIN: 5.8 (L) LIPASE: N/A       Imaging results reviewed over the past 24 hrs:   Recent Results (from the past 24 hour(s))   XR Chest Port 1 View    Narrative    EXAM: XR CHEST PORT 1 VIEW  LOCATION: [facility]  DATE: 9/3/2023    INDICATION: encephalopathy  COMPARISON: 8/30/23      Impression    IMPRESSION:  Lung volumes are low. Medial left basilar opacity likely a confluence of soft tissues and atelectasis. No pleural fluid or pneumothorax. No edema. The cardiomediastinal silhouette is enlarged.

## 2023-09-04 NOTE — PLAN OF CARE
Goal Outcome Evaluation:      Plan of Care Reviewed With: patient    Resumed care at 2315.      A&O to self and place. Elevated BP. Pt denies chest pain, shortness of breath, pain, nausea/vomiting.  Pt responds to voice and command.    CIWAs: 6, 8.   1 mg Ativan given at 0406 for the CIWA of 8. CIWA recheck at 0506 was 4.    PIV L AYDEN keys.  Tele: SR. Mclaughlin at bedside.

## 2023-09-04 NOTE — PLAN OF CARE
Goal Outcome Evaluation:     VS elevated at times. Pt able to take PO meds today.  Gabapentin was on hold now discontinued.  External cath in place.  Pt A&Ox3 forgetful.  CIWA scores 3. No ativan given this shift. Sitter dc'd. Bed alarms in place. Pt tolerated little Lunch today. Monitoring BG's. IVF's NS started today 100cc/hr.  Tele SR BBB, trops elevated on admission, Echo EF 35-40% see report for further details. Cardiology following. Plan for Brenda scan 9/5. Possible Angio when platelets improve. LFT's elevated on admission.  Jaundice. GI following. X2 loose/liq dark brown stools. C-diff negative. Phos and mag protocols. Gave Voltaren gel and Atarax for left shoulder pain.

## 2023-09-05 ENCOUNTER — APPOINTMENT (OUTPATIENT)
Dept: CARDIOLOGY | Facility: CLINIC | Age: 63
End: 2023-09-05
Attending: INTERNAL MEDICINE
Payer: MEDICAID

## 2023-09-05 ENCOUNTER — PATIENT OUTREACH (OUTPATIENT)
Dept: CARE COORDINATION | Facility: CLINIC | Age: 63
End: 2023-09-05
Payer: COMMERCIAL

## 2023-09-05 ENCOUNTER — APPOINTMENT (OUTPATIENT)
Dept: NUCLEAR MEDICINE | Facility: CLINIC | Age: 63
End: 2023-09-05
Attending: INTERNAL MEDICINE
Payer: MEDICAID

## 2023-09-05 LAB
ANION GAP SERPL CALCULATED.3IONS-SCNC: 9 MMOL/L (ref 7–15)
BUN SERPL-MCNC: 9.6 MG/DL (ref 8–23)
CALCIUM SERPL-MCNC: 8.8 MG/DL (ref 8.8–10.2)
CHLORIDE SERPL-SCNC: 96 MMOL/L (ref 98–107)
CREAT SERPL-MCNC: 0.53 MG/DL (ref 0.67–1.17)
CV BLOOD PRESSURE: 32 MMHG
CV STRESS MAX HR HE: 96
DEPRECATED HCO3 PLAS-SCNC: 20 MMOL/L (ref 22–29)
GFR SERPL CREATININE-BSD FRML MDRD: >90 ML/MIN/1.73M2
GLUCOSE BLDC GLUCOMTR-MCNC: 117 MG/DL (ref 70–99)
GLUCOSE BLDC GLUCOMTR-MCNC: 125 MG/DL (ref 70–99)
GLUCOSE BLDC GLUCOMTR-MCNC: 128 MG/DL (ref 70–99)
GLUCOSE BLDC GLUCOMTR-MCNC: 129 MG/DL (ref 70–99)
GLUCOSE BLDC GLUCOMTR-MCNC: 129 MG/DL (ref 70–99)
GLUCOSE BLDC GLUCOMTR-MCNC: 153 MG/DL (ref 70–99)
GLUCOSE SERPL-MCNC: 122 MG/DL (ref 70–99)
MAGNESIUM SERPL-MCNC: 1.7 MG/DL (ref 1.7–2.3)
PHOSPHATE SERPL-MCNC: 3.3 MG/DL (ref 2.5–4.5)
POTASSIUM SERPL-SCNC: 4.1 MMOL/L (ref 3.4–5.3)
RATE PRESSURE PRODUCT: NORMAL
SODIUM SERPL-SCNC: 125 MMOL/L (ref 136–145)
STRESS ECHO BASELINE DIASTOLIC HE: 98
STRESS ECHO BASELINE HR: 88 BPM
STRESS ECHO BASELINE SYSTOLIC BP: 143
STRESS ECHO CALCULATED PERCENT HR: 61 %
STRESS ECHO LAST STRESS DIASTOLIC BP: 85
STRESS ECHO LAST STRESS SYSTOLIC BP: 134
STRESS ECHO TARGET HR: 158

## 2023-09-05 PROCEDURE — 250N000011 HC RX IP 250 OP 636

## 2023-09-05 PROCEDURE — 78452 HT MUSCLE IMAGE SPECT MULT: CPT

## 2023-09-05 PROCEDURE — 250N000013 HC RX MED GY IP 250 OP 250 PS 637: Performed by: INTERNAL MEDICINE

## 2023-09-05 PROCEDURE — 93016 CV STRESS TEST SUPVJ ONLY: CPT | Performed by: INTERNAL MEDICINE

## 2023-09-05 PROCEDURE — 120N000001 HC R&B MED SURG/OB

## 2023-09-05 PROCEDURE — 80048 BASIC METABOLIC PNL TOTAL CA: CPT | Performed by: INTERNAL MEDICINE

## 2023-09-05 PROCEDURE — 36415 COLL VENOUS BLD VENIPUNCTURE: CPT | Performed by: INTERNAL MEDICINE

## 2023-09-05 PROCEDURE — A9500 TC99M SESTAMIBI: HCPCS | Performed by: INTERNAL MEDICINE

## 2023-09-05 PROCEDURE — 84100 ASSAY OF PHOSPHORUS: CPT | Performed by: INTERNAL MEDICINE

## 2023-09-05 PROCEDURE — 93017 CV STRESS TEST TRACING ONLY: CPT

## 2023-09-05 PROCEDURE — 78452 HT MUSCLE IMAGE SPECT MULT: CPT | Mod: 26 | Performed by: INTERNAL MEDICINE

## 2023-09-05 PROCEDURE — 250N000013 HC RX MED GY IP 250 OP 250 PS 637: Performed by: NURSE PRACTITIONER

## 2023-09-05 PROCEDURE — 99232 SBSQ HOSP IP/OBS MODERATE 35: CPT | Performed by: INTERNAL MEDICINE

## 2023-09-05 PROCEDURE — 343N000001 HC RX 343: Performed by: INTERNAL MEDICINE

## 2023-09-05 PROCEDURE — 83735 ASSAY OF MAGNESIUM: CPT | Performed by: INTERNAL MEDICINE

## 2023-09-05 PROCEDURE — 93018 CV STRESS TEST I&R ONLY: CPT | Performed by: INTERNAL MEDICINE

## 2023-09-05 RX ORDER — AMINOPHYLLINE 25 MG/ML
50-100 INJECTION, SOLUTION INTRAVENOUS
Status: DISCONTINUED | OUTPATIENT
Start: 2023-09-05 | End: 2023-09-11 | Stop reason: HOSPADM

## 2023-09-05 RX ORDER — ALBUTEROL SULFATE 90 UG/1
2 AEROSOL, METERED RESPIRATORY (INHALATION) EVERY 5 MIN PRN
Status: DISCONTINUED | OUTPATIENT
Start: 2023-09-05 | End: 2023-09-11 | Stop reason: HOSPADM

## 2023-09-05 RX ORDER — ACYCLOVIR 200 MG/1
0-1 CAPSULE ORAL
Status: DISCONTINUED | OUTPATIENT
Start: 2023-09-05 | End: 2023-09-11 | Stop reason: HOSPADM

## 2023-09-05 RX ORDER — REGADENOSON 0.08 MG/ML
0.4 INJECTION, SOLUTION INTRAVENOUS ONCE
Status: COMPLETED | OUTPATIENT
Start: 2023-09-05 | End: 2023-09-05

## 2023-09-05 RX ORDER — MAGNESIUM OXIDE 400 MG/1
400 TABLET ORAL ONCE
Status: COMPLETED | OUTPATIENT
Start: 2023-09-05 | End: 2023-09-05

## 2023-09-05 RX ORDER — MAGNESIUM OXIDE 400 MG/1
400 TABLET ORAL EVERY 4 HOURS
Status: DISPENSED | OUTPATIENT
Start: 2023-09-05 | End: 2023-09-05

## 2023-09-05 RX ORDER — REGADENOSON 0.08 MG/ML
INJECTION, SOLUTION INTRAVENOUS
Status: COMPLETED
Start: 2023-09-05 | End: 2023-09-05

## 2023-09-05 RX ORDER — CAFFEINE CITRATE 20 MG/ML
60 SOLUTION INTRAVENOUS
Status: DISCONTINUED | OUTPATIENT
Start: 2023-09-05 | End: 2023-09-11 | Stop reason: HOSPADM

## 2023-09-05 RX ADMIN — Medication 11 MILLICURIE: at 07:52

## 2023-09-05 RX ADMIN — MAGNESIUM OXIDE TAB 400 MG (241.3 MG ELEMENTAL MG) 400 MG: 400 (241.3 MG) TAB at 12:29

## 2023-09-05 RX ADMIN — LORAZEPAM 1 MG: 1 TABLET ORAL at 19:58

## 2023-09-05 RX ADMIN — FOLIC ACID 1 MG: 1 TABLET ORAL at 12:37

## 2023-09-05 RX ADMIN — CARVEDILOL 3.12 MG: 3.12 TABLET, FILM COATED ORAL at 17:42

## 2023-09-05 RX ADMIN — CALCIUM 500 MG: 500 TABLET ORAL at 12:32

## 2023-09-05 RX ADMIN — THIAMINE HCL TAB 100 MG 100 MG: 100 TAB at 21:48

## 2023-09-05 RX ADMIN — Medication 33 MILLICURIE: at 10:08

## 2023-09-05 RX ADMIN — REGADENOSON 0.4 MG: 0.08 INJECTION, SOLUTION INTRAVENOUS at 09:53

## 2023-09-05 RX ADMIN — LORAZEPAM 1 MG: 1 TABLET ORAL at 17:42

## 2023-09-05 RX ADMIN — PAROXETINE HYDROCHLORIDE 20 MG: 20 TABLET, FILM COATED ORAL at 12:31

## 2023-09-05 RX ADMIN — CALCIUM 500 MG: 500 TABLET ORAL at 17:42

## 2023-09-05 RX ADMIN — LISINOPRIL 5 MG: 5 TABLET ORAL at 12:37

## 2023-09-05 RX ADMIN — MULTIPLE VITAMINS W/ MINERALS TAB 1 TABLET: TAB at 12:37

## 2023-09-05 RX ADMIN — ASPIRIN 81 MG: 81 TABLET, COATED ORAL at 12:37

## 2023-09-05 RX ADMIN — CARVEDILOL 3.12 MG: 3.12 TABLET, FILM COATED ORAL at 12:37

## 2023-09-05 RX ADMIN — LORAZEPAM 2 MG: 1 TABLET ORAL at 21:50

## 2023-09-05 RX ADMIN — LORAZEPAM 2 MG: 1 TABLET ORAL at 18:50

## 2023-09-05 RX ADMIN — MAGNESIUM OXIDE TAB 400 MG (241.3 MG ELEMENTAL MG) 400 MG: 400 (241.3 MG) TAB at 16:20

## 2023-09-05 ASSESSMENT — ACTIVITIES OF DAILY LIVING (ADL)
ADLS_ACUITY_SCORE: 46
ADLS_ACUITY_SCORE: 52
ADLS_ACUITY_SCORE: 50
ADLS_ACUITY_SCORE: 50
ADLS_ACUITY_SCORE: 54
ADLS_ACUITY_SCORE: 52
ADLS_ACUITY_SCORE: 48
ADLS_ACUITY_SCORE: 49
ADLS_ACUITY_SCORE: 48
ADLS_ACUITY_SCORE: 46
ADLS_ACUITY_SCORE: 52
ADLS_ACUITY_SCORE: 49

## 2023-09-05 NOTE — PLAN OF CARE
"BP (!) 156/93 (BP Location: Right arm)   Pulse 88   Temp 97.7  F (36.5  C) (Oral)   Resp 18   Ht 1.803 m (5' 11\")   Wt 99.8 kg (220 lb)   SpO2 95%   BMI 30.68 kg/m      Pt A&Ox4. A2 pivot and GB. Unsteady gait. SOB with exertion. Sclera yellow. On tele. NSR w/ BBB and prolonged QT. EF 35-40%. Pt had lexiscan done this am. CIWA 1,0. FR 1800 ml. . K 4.1, phos 3.3, mag 1.7 - replaced, recheck labs in am per protocols. Cardiology, PT, PT following. Discharge TBD.        Goal Outcome Evaluation:      Plan of Care Reviewed With: patient    Overall Patient Progress: improvingOverall Patient Progress: improving           "

## 2023-09-05 NOTE — PROGRESS NOTES
[facility]    Medicine Progress Note - Hospitalist Service    Date of Admission:  8/30/2023    Assessment & Plan     Saji Iqbal is a 63 year old male admitted on 8/30/2023. He has a hx of CAD s/p PCI LAD, type 2 DM, HTN, HLP, alcohol abuse who presents with L shoulder pain.  He was sleeping on his left shoulder and when he woke up he had pain in the left shoulder.  The pain is localized to the left shoulder and does not radiate.  It is made worse with certain movements.  He had a little bit of numbness on his left hand but that has improved.  He denies any chest pain or shortness of breath.  Was noted to have a mildly elevated troponin level and to be intoxicated with alcohol.  He was placed in the hospital for further evaluation and treatment.     Alcohol dependence/Acute alcohol intoxication with withdrawal/ metabolic/toxic encephalopathy.  -Patient presented with an alcohol level 0.17.  Patient has required the CIWA protocol for ongoing withdrawal.  He has been given diazepam.  His CIWA score has been up to 14.  Patient is confused.  Patient is off of Neurontin due to lethargy and his clonidine was stopped due to needing his other blood pressure medications.  Patient is on vitamins with folic acid, thiamine, and multivitamin.  Continue on withdrawal protocol.  Patient is requiring a one-to-one sitter.  Urinalysis is negative, has no signs or symptoms of infection.  This x-ray is not significant for no pneumonia.  Suspect patient's decreased mentation is due to medications, alcohol drawl but also could have a component of Wernicke's . His hyponatremia may be playing a roll too.  CIWA score 0-4.  Off 1:1 close observation      New cardiomyopathy.  Suspect ischemic versus alcohol induced/Non-ST elevation myocardial infarction.  Hypertension/Hyperlipidemia/Coronary artery disease.  -Appreciate cardiology input.  Is a terrible candidate for procedures.  He has been noncompliant. Initial plan  had been for coronary angiogram with plan is for a stress test 9/5/2023.  If significant motion abnormalities that should consider definitive angioplasty.  If necessary, bare-metal stenting should be done rather than drug-eluting.  Patient had been on a beta-blocker but this was stopped due to sinus pause.  Patient currently is on Coreg, aspirin, lisinopril.  He is not on a statin due to his alcohol use and liver disease.  Echocardiogram On 8/30/2023 showing ejection fraction of 35-40% with severe inferior wall hypokinesis and septal hypokinesis. Stress test pending 9/5/23, awaiting results.        Thrombocytopenia/Anemia.  -No obvious hemorrhage.  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  Coags actually look okay.  Platelet 79.  Hemoglobin is actually stable at 12.2 with an MCV of 108. Checked b12/folic acid levels both normal.  Patient is getting oral folic acid.         Hyponatremia.  -Mild.  Sodium 126-133   this is in the setting of ongoing alcohol use.  Patient really is not eating drinking all that well.  We will start the patient on saline and recheck in the morning showing sodium of 125.  Stop IV fluids and place on a fluid restriction 1800 cc.  Recheck sodium in am.        Hypomagnesemia/Hypophosphatemia/Hypocalcemia.  -Magnesium replacement protocol.  -Phosphorus replacement protocol.  -Ionized calcium okay.     Transaminitis/Hyperbilirubinemia.  -Suspect due to alcohol abuse.  Patient with hepatic steatosis and fibrosis noted on ultrasound of abdomen.  Patient has been seen by GI.  At this point there is nothing more to offer.       Left shoulder pain.  -No acute fractures on imaging.  Suspect musculoskeletal pain.  Continue on pain medications as needed. Physical therapy to see    Deconditioning.  Physical therapy and occupational therapy to see.  He likely is to need a Tcu at discharge           Diet: Combination Diet 2 gm NA Diet;  "Low Saturated Fat Diet  1800 ml fluid restriction  DVT Prophylaxis: Pneumatic Compression Devices  Durbin Catheter: Not present  Lines: None     Cardiac Monitoring: ACTIVE order. Indication: AMI (NSTEMI/ STEMI) (48 hours)  Code Status: Full Code      Clinically Significant Risk Factors         # Hyponatremia: Lowest Na = 125 mmol/L in last 2 days, will monitor as appropriate      # Hypoalbuminemia: Lowest albumin = 3 g/dL at 9/4/2023  7:52 AM, will monitor as appropriate     # Thrombocytopenia: Lowest platelets = 116 in last 2 days, will monitor for bleeding     # Hypertension: Noted on problem list    # Chronic heart failure with reduced ejection fraction: last echo with EF <40%       # Obesity: Estimated body mass index is 30.68 kg/m  as calculated from the following:    Height as of this encounter: 1.803 m (5' 11\").    Weight as of this encounter: 99.8 kg (220 lb).          # Financial/Environmental Concerns: unable to afford medication(s);unable to afford rent/mortgage;unemployed;insurance inadequate        Discussed with bedside nursing, discussed with social work/case management       Disposition Plan p s.  Will be getting a stress test on 9/5/2023.  If the patient is no longer requiring medications for withdrawal and a stress test is normal and improvement he likely could go.  He still is confused which is going to keep him here and his sodium needs to be better.  Physical therapy and occupational therapy to see if Tcu makes sense.      Expected Discharge Date: 09/07/2023      Destination: home      likely needs TCU       Russell Forbes MD  Hospitalist Service  [facility]  Securely message with [employer] (more info)  Text page via [employer] Paging/Directory   ______________________________________________________________________    Interval History   Quite sleepy still.  Patient still has a one-to-one sitter and has been trying to climb out of bed.  He has been impulsive.  He is not very " interactive with me and will certainly is not clear enough to give an accurate review of systems.  She does have slight tremor.  He has no asterixis.  CIWA score was up to 14.      Physical Exam   Vital Signs: Temp: 97.7  F (36.5  C) Temp src: Oral BP: (!) 156/93 Pulse: 88   Resp: 18 SpO2: 95 % O2 Device: None (Room air)    Weight: 220 lbs 0 oz    Exam is really unchanged from yesterday still is confused  Gen: more alert and less sleepy.  Trying to climb out of bed, more interactive, is in no distress, though certainly is confused.  Is answering some simple questions, is tangled in his lines/monitors  HEENT: MMM  CV:  Regular rate and rhythm, no appreciable murmurs  Lung:  CTA b/l, normal effort.  Good air movement  Ab: Mildly distended, nontender, bowel sounds present throughout, soft.  Ext: Moves all extremities, bilateral ankle 1-2+ edema   Neuro: No focal deficits, patient however is sleepy.      Data     I have personally reviewed the following data over the past 24 hrs:    N/A  \   N/A   / N/A     125 (L) 96 (L) 9.6 /  129 (H)   4.1 20 (L) 0.53 (L) \       Imaging results reviewed over the past 24 hrs:   Recent Results (from the past 24 hour(s))   NM MPI w Lexiscan   Result Value    [employer]

## 2023-09-05 NOTE — PROGRESS NOTES
Clinic Care Coordination Contact  Ambulatory Care Coordination to Inpatient Care Management   Hand-In Communication    Date:  September 5, 2023  Name: Saji Iqbal is enrolled in Ambulatory Care Coordination program and I am the Lead Care Coordinator.  CC Contact Information: Epic InBasket + phone  Payor Source: Payor: [employer] / Plan: PHCS / Product Type: Indemnity /   Current services in place:     Please see the CC Snaphot and Care Management Flowsheets for specific details of this Saji Iqbal care plan.   Additional details/specific concerns r/t this admission:    Financial Concerns unable to afford medications, Substance Abuse ETOH, and Readmission Risk 69%    I will follow this admission in Epic. Please feel free to contact me with questions or for further collaboration in discharge planning.    Dayan Hill RN Care Coordinator  [facility]  Email: [email or web address]  Phone: [phone]

## 2023-09-05 NOTE — PLAN OF CARE
"Goal Outcome Evaluation:      Plan of Care Reviewed With: patient    Overall Patient Progress: improving    Outcome Evaluation: .    Patient calm and cooperative. A/O x4 with intermittent confusion. BP at beginning of shift was slightly elevated, but did come down, VS stable and maintaining O2 sats >90% on RA. Denies chest pain and SOB. Patient reports pain in left shoulder, 9/10, tylenol given and pain decreased to 2/10. CIWA score was 2 and 8, Ativan 1mg PO given. Tele SR with BBB and inverted T's. External catheter in place, changed x2. Output was 1,100. Patient had loose BM. Abdomen is distended. Patient scheduled for lexiscan tomorrow, NPO at midnight. Blood sugars being monitored per order and coverage via sliding scale. No coverage needed on shift. Left PIV CDI, with NS running at 100ml/hr. GI and cardiology following.     Problem: Plan of Care - These are the overarching goals to be used throughout the patient stay.    Goal: Plan of Care Review  Description: The Plan of Care Review/Shift note should be completed every shift.  The Outcome Evaluation is a brief statement about your assessment that the patient is improving, declining, or no change.  This information will be displayed automatically on your shift note.  Outcome: Progressing  Flowsheets (Taken 9/4/2023 2201)  Outcome Evaluation: .  Plan of Care Reviewed With: patient  Overall Patient Progress: improving  Goal: Patient-Specific Goal (Individualized)  Description: You can add care plan individualizations to a care plan. Examples of Individualization might be:  \"Parent requests to be called daily at 9am for status\", \"I have a hard time hearing out of my right ear\", or \"Do not touch me to wake me up as it startles me\".  Outcome: Progressing  Goal: Absence of Hospital-Acquired Illness or Injury  Outcome: Progressing  Intervention: Identify and Manage Fall Risk  Recent Flowsheet Documentation  Taken 9/4/2023 1716 by Bobbi Mackey, RN  Safety " Promotion/Fall Prevention:   activity supervised   assistive device/personal items within reach   clutter free environment maintained   increased rounding and observation   nonskid shoes/slippers when out of bed   room near nurse's station   safety round/check completed  Intervention: Prevent Skin Injury  Recent Flowsheet Documentation  Taken 9/4/2023 2119 by Bobbi Mackey RN  Body Position:   position changed independently   weight shifting  Taken 9/4/2023 1716 by Bobbi Mackey RN  Body Position: position changed independently  Intervention: Prevent and Manage VTE (Venous Thromboembolism) Risk  Recent Flowsheet Documentation  Taken 9/4/2023 1716 by Bobbi Mackey RN  VTE Prevention/Management: SCDs (sequential compression devices) off  Goal: Optimal Comfort and Wellbeing  Outcome: Progressing  Intervention: Monitor Pain and Promote Comfort  Recent Flowsheet Documentation  Taken 9/4/2023 2210 by Bobbi Mackey RN  Pain Management Interventions: medication (see MAR)  Goal: Readiness for Transition of Care  Outcome: Progressing     Problem: Pain Acute  Goal: Optimal Pain Control and Function  Outcome: Progressing  Intervention: Develop Pain Management Plan  Recent Flowsheet Documentation  Taken 9/4/2023 2210 by Bobbi Mackey RN  Pain Management Interventions: medication (see MAR)  Intervention: Prevent or Manage Pain  Recent Flowsheet Documentation  Taken 9/4/2023 1716 by Bobbi Mackey RN  Medication Review/Management: medications reviewed     Problem: Alcohol Withdrawal  Goal: Alcohol Withdrawal Symptom Control  Outcome: Progressing  Goal: Optimal Neurologic Function  Outcome: Progressing  Goal: Readiness for Change Identified  Outcome: Progressing

## 2023-09-05 NOTE — PROGRESS NOTES
Systolic BP is on the higher side .   A &OX4 , Pt is stable , calm and cooperative No abnormal behaviors observed and his CIWA Score was very low . Tele SR with BBB and Long QT on room air . Ext cath changed during the shift and pericare provided.

## 2023-09-06 ENCOUNTER — APPOINTMENT (OUTPATIENT)
Dept: PHYSICAL THERAPY | Facility: CLINIC | Age: 63
End: 2023-09-06
Attending: INTERNAL MEDICINE
Payer: MEDICAID

## 2023-09-06 LAB
ALLEN'S TEST: YES
AMMONIA PLAS-SCNC: 53 UMOL/L (ref 16–60)
ANION GAP SERPL CALCULATED.3IONS-SCNC: 12 MMOL/L (ref 7–15)
BASE EXCESS BLDA CALC-SCNC: -0.2 MMOL/L (ref -9–1.8)
BUN SERPL-MCNC: 10.6 MG/DL (ref 8–23)
CALCIUM SERPL-MCNC: 8.9 MG/DL (ref 8.8–10.2)
CHLORIDE SERPL-SCNC: 96 MMOL/L (ref 98–107)
CK SERPL-CCNC: 73 U/L (ref 39–308)
CREAT SERPL-MCNC: 0.51 MG/DL (ref 0.67–1.17)
DEPRECATED HCO3 PLAS-SCNC: 20 MMOL/L (ref 22–29)
GFR SERPL CREATININE-BSD FRML MDRD: >90 ML/MIN/1.73M2
GLUCOSE BLDC GLUCOMTR-MCNC: 104 MG/DL (ref 70–99)
GLUCOSE BLDC GLUCOMTR-MCNC: 105 MG/DL (ref 70–99)
GLUCOSE BLDC GLUCOMTR-MCNC: 111 MG/DL (ref 70–99)
GLUCOSE BLDC GLUCOMTR-MCNC: 136 MG/DL (ref 70–99)
GLUCOSE BLDC GLUCOMTR-MCNC: 141 MG/DL (ref 70–99)
GLUCOSE SERPL-MCNC: 108 MG/DL (ref 70–99)
HCO3 BLD-SCNC: 24 MMOL/L (ref 21–28)
HOLD SPECIMEN: NORMAL
MAGNESIUM SERPL-MCNC: 1.8 MG/DL (ref 1.7–2.3)
O2/TOTAL GAS SETTING VFR VENT: 21 %
PCO2 BLD: 35 MM HG (ref 35–45)
PH BLD: 7.44 [PH] (ref 7.35–7.45)
PHOSPHATE SERPL-MCNC: 3.4 MG/DL (ref 2.5–4.5)
PO2 BLD: 81 MM HG (ref 80–105)
POTASSIUM SERPL-SCNC: 4.3 MMOL/L (ref 3.4–5.3)
SODIUM SERPL-SCNC: 128 MMOL/L (ref 136–145)

## 2023-09-06 PROCEDURE — 250N000013 HC RX MED GY IP 250 OP 250 PS 637: Performed by: INTERNAL MEDICINE

## 2023-09-06 PROCEDURE — 36415 COLL VENOUS BLD VENIPUNCTURE: CPT | Performed by: INTERNAL MEDICINE

## 2023-09-06 PROCEDURE — 82550 ASSAY OF CK (CPK): CPT | Performed by: INTERNAL MEDICINE

## 2023-09-06 PROCEDURE — 99233 SBSQ HOSP IP/OBS HIGH 50: CPT | Performed by: INTERNAL MEDICINE

## 2023-09-06 PROCEDURE — 83735 ASSAY OF MAGNESIUM: CPT | Performed by: INTERNAL MEDICINE

## 2023-09-06 PROCEDURE — 82803 BLOOD GASES ANY COMBINATION: CPT | Performed by: INTERNAL MEDICINE

## 2023-09-06 PROCEDURE — 80048 BASIC METABOLIC PNL TOTAL CA: CPT | Performed by: INTERNAL MEDICINE

## 2023-09-06 PROCEDURE — 97530 THERAPEUTIC ACTIVITIES: CPT | Mod: GP

## 2023-09-06 PROCEDURE — 120N000001 HC R&B MED SURG/OB

## 2023-09-06 PROCEDURE — 84100 ASSAY OF PHOSPHORUS: CPT | Performed by: INTERNAL MEDICINE

## 2023-09-06 PROCEDURE — 82140 ASSAY OF AMMONIA: CPT | Performed by: INTERNAL MEDICINE

## 2023-09-06 PROCEDURE — 36600 WITHDRAWAL OF ARTERIAL BLOOD: CPT

## 2023-09-06 PROCEDURE — 97162 PT EVAL MOD COMPLEX 30 MIN: CPT | Mod: GP

## 2023-09-06 RX ORDER — LISINOPRIL 20 MG/1
20 TABLET ORAL DAILY
Status: DISCONTINUED | OUTPATIENT
Start: 2023-09-06 | End: 2023-09-11 | Stop reason: HOSPADM

## 2023-09-06 RX ORDER — MAGNESIUM OXIDE 400 MG/1
400 TABLET ORAL EVERY 4 HOURS
Status: COMPLETED | OUTPATIENT
Start: 2023-09-06 | End: 2023-09-06

## 2023-09-06 RX ORDER — CARVEDILOL 6.25 MG/1
6.25 TABLET ORAL 2 TIMES DAILY WITH MEALS
Status: DISCONTINUED | OUTPATIENT
Start: 2023-09-06 | End: 2023-09-11 | Stop reason: HOSPADM

## 2023-09-06 RX ADMIN — CALCIUM 500 MG: 500 TABLET ORAL at 08:34

## 2023-09-06 RX ADMIN — PAROXETINE HYDROCHLORIDE 20 MG: 20 TABLET, FILM COATED ORAL at 08:33

## 2023-09-06 RX ADMIN — CARVEDILOL 6.25 MG: 6.25 TABLET, FILM COATED ORAL at 18:45

## 2023-09-06 RX ADMIN — THIAMINE HCL TAB 100 MG 100 MG: 100 TAB at 08:35

## 2023-09-06 RX ADMIN — CALCIUM 500 MG: 500 TABLET ORAL at 18:45

## 2023-09-06 RX ADMIN — FOLIC ACID 1 MG: 1 TABLET ORAL at 08:34

## 2023-09-06 RX ADMIN — CARVEDILOL 6.25 MG: 6.25 TABLET, FILM COATED ORAL at 08:34

## 2023-09-06 RX ADMIN — MAGNESIUM OXIDE TAB 400 MG (241.3 MG ELEMENTAL MG) 400 MG: 400 (241.3 MG) TAB at 23:25

## 2023-09-06 RX ADMIN — MAGNESIUM OXIDE TAB 400 MG (241.3 MG ELEMENTAL MG) 400 MG: 400 (241.3 MG) TAB at 20:04

## 2023-09-06 RX ADMIN — ASPIRIN 81 MG: 81 TABLET, COATED ORAL at 08:34

## 2023-09-06 RX ADMIN — LISINOPRIL 20 MG: 20 TABLET ORAL at 08:34

## 2023-09-06 RX ADMIN — MULTIPLE VITAMINS W/ MINERALS TAB 1 TABLET: TAB at 08:34

## 2023-09-06 ASSESSMENT — ACTIVITIES OF DAILY LIVING (ADL)
ADLS_ACUITY_SCORE: 46
ADLS_ACUITY_SCORE: 45
ADLS_ACUITY_SCORE: 46
ADLS_ACUITY_SCORE: 41
ADLS_ACUITY_SCORE: 45
ADLS_ACUITY_SCORE: 45
ADLS_ACUITY_SCORE: 46

## 2023-09-06 NOTE — CONSULTS
[facility]    Orthopedic Consultation    Saji Iqbal MRN# [id number]   Age: 63 year old YOB: 1960     Date of Admission:  8/30/2023    Reason for consult: Weakness of LUE, unknown etiology        Requesting physician: Russell Denton MD        Level of consult: Consult, follow and place orders           Assessment and Plan:   Assessment:   Reduced range of motion LUE, unknown etiology as of now       Plan:   Mika is a pleasant 62 yo male admitted with suspected alcohol withdrawal, Non-ST elevation MI event, and evaluation of stroke this morning by primary team. Our ortho team was consulted for suspected musculoskeletal source of left shoulder pain and inability to range this shoulder. On exam, his motor and sensation is intact along radial, median and ulnar nerve distributions. Sensation intact along axillary nerve. He is unable to actively initiate forward flexion or abduction, any movement of the left shoulder. However, he denies any numbness or tingling or bilateral symptoms. He denies any new trauma or falls.  I ordered MRI for further review from musculoskeletal standpoint. We will follow-up with any recommendations once resulted. If no significant findings appreciated, may need to involve neuro for further eval or consider EMG study.     Please contact orthopedic trauma team if any questions or concerns arise.           Chief Complaint:   Left arm pain, difficulty moving and may have rotator cuff injury or nerve injury          History of Present Illness:   This patient is a 63 year old male who presents with the following condition requiring a hospital admission:     Elevated troponin 8/30/2023      Acute pain of left shoulder 8/30/2023      Anemia, unspecified type 8/30/2023     Saji Iqbal is a 63 year old male who has a hx of CAD s/p PCI LAD, type 2 DM, HTN, HLP, alcohol abuse who presents with L shoulder pain.  He was sleeping on his left shoulder and when he woke  up he had pain in the left shoulder.  The pain is localized to the left shoulder and does not radiate.  It is made worse with certain movements.  He had a little bit of numbness on his left hand but that has improved.  He denies any chest pain or shortness of breath.  His last alcohol drink was yesterday. Orthopedic surgery service consulted to evaluate the left shoulder. Upon chart review, it is noted that Mika had elevated troponin and new cardiomyopathy, suspect ischemic vs alcohol induced/ Non-ST elevation MI. Hx of hypertension, hyperlipidemia, CAD. Disorientation during admission consistent with alcohol withdrawal, possible Wernickes. On exam, Mika reports he cannot lift his left UE  overhead as he can actively do with right UE and demonstrates. He has  strength intact. Denies bilateral symptoms and denies any numbness or tingling of both right and left side.           Past Medical History:     Past Medical History:   Diagnosis Date    Cluster headache     Coronary artery disease     Depression     Diabetes mellitus, type II (H)     Gastroesophageal reflux disease     Heart attack (H)     Hyperlipidemia     Hypertension     Renal disease      hx kidney stones    Rotator cuff arthropathy     Sleep apnea     doesn't use cpap-is broken    Stented coronary artery              Past Surgical History:     Past Surgical History:   Procedure Laterality Date    ARTHROSCOPY SHOULDER      REPAIR TENDON FOOT Right 10/23/2020    Procedure: Repair of extensor tendon at the level of the metatarsophalangeal joint, right foot;  Surgeon: Gerard Diaz DPM;  Location: [facility] OR    STENT, CORONARY, ESAU  ,              Social History:     Social History     Tobacco Use    Smoking status: Former     Packs/day: 0.33     Years: 20.00     Pack years: 6.60     Types: Cigarettes     Quit date: 2015     Years since quittin.1     Passive exposure: Never    Smokeless tobacco: Never   Substance Use Topics    Alcohol  use: Yes     Alcohol/week: 1.0 standard drink of alcohol     Types: 1 Standard drinks or equivalent per week     Comment: 4-5 drinks nightly on weekend             Family History:     Family History   Problem Relation Age of Onset    Coronary Artery Disease Mother     Coronary Artery Disease Father     Cerebrovascular Disease Brother     Anuerysm Sister     Glaucoma No family hx of     Macular Degeneration No family hx of              Immunizations:     VACCINE/DOSE   Diptheria   DPT   DTAP   HBIG   Hepatitis A   Hepatitis B   HIB   Influenza   Measles   Meningococcal   MMR   Mumps   Pneumococcal   Polio   Rubella   Small Pox   TDAP   Varicella   Zoster             Allergies:     Allergies   Allergen Reactions    Metformin Diarrhea    Honey Other (See Comments)     Throat irritation    Statins              Medications:     Current Facility-Administered Medications   Medication    acetaminophen (TYLENOL) tablet 650 mg    Or    acetaminophen (TYLENOL) Suppository 650 mg    albuterol (PROVENTIL HFA/VENTOLIN HFA) inhaler    aminophylline  mg    aspirin EC tablet 81 mg    caffeine citrate (CAFCIT) injection 60 mg    calcium carbonate 500 mg (elemental) (OSCAL) tablet 500 mg    carvedilol (COREG) tablet 6.25 mg    glucose gel 15-30 g    Or    dextrose 50 % injection 25-50 mL    Or    glucagon injection 1 mg    diclofenac (VOLTAREN) 1 % topical gel 2 g    folic acid (FOLVITE) tablet 1 mg    heparin (porcine) 1000 UNIT/ML injection    heparin (porcine) 1000 UNIT/ML injection    insulin aspart (NovoLOG) injection (RAPID ACTING)    insulin aspart (NovoLOG) injection (RAPID ACTING)    lidocaine (LMX4) cream    lidocaine (PF) (XYLOCAINE) 1 % injection    lidocaine 1 % 0.1-1 mL    lisinopril (ZESTRIL) tablet 20 mg    melatonin tablet 5 mg    multivitamin w/minerals (THERA-VIT-M) tablet 1 tablet    nitroGLYcerin in D5W 100 mcg/mL injection    PARoxetine (PAXIL) tablet 20 mg    Patient is NOT allergic to contrast dye     potassium chloride ER (KLOR-CON M) CR tablet 20 mEq    sodium chloride (PF) 0.9% PF flush 3 mL    sodium chloride (PF) 0.9% PF flush 3 mL    sodium chloride (PF) 0.9% PF flush 5-10 mL    sodium chloride bacteriostatic 0.9 % flush 0-1 mL    thiamine (B-1) tablet 100 mg    verapamil (ISOPTIN) 2.5 MG/ML injection             Review of Systems:   C: NEGATIVE for fever, chills, change in weight  Cardiology: Positive for elevated troponin   E/M: NEGATIVE for ear, mouth and throat problems  R: NEGATIVE for significant cough or SOB          Physical Exam:   All vitals have been reviewed  Patient Vitals for the past 24 hrs:   BP Temp Temp src Pulse Resp SpO2   09/06/23 1118 120/76 97.3  F (36.3  C) Oral 79 20 96 %   09/06/23 1000 115/85 -- -- 79 20 95 %   09/06/23 0737 (!) 160/111 97.9  F (36.6  C) Oral 76 22 95 %   09/06/23 0559 (!) 168/98 -- -- 91 18 95 %   09/06/23 0204 (!) 148/93 -- -- 90 20 95 %   09/05/23 2102 (!) 147/90 98.6  F (37  C) Oral 80 16 94 %   09/05/23 1923 (!) 166/93 98.3  F (36.8  C) Oral 88 18 96 %   09/05/23 1742 132/85 -- -- 97 -- --   09/05/23 1540 (!) 151/91 98.2  F (36.8  C) Oral 85 16 96 %       Intake/Output Summary (Last 24 hours) at 9/6/2023 1311  Last data filed at 9/6/2023 1050  Gross per 24 hour   Intake 266 ml   Output 1526 ml   Net -1260 ml       Constitutional: Pleasant, alert, appropriate, following commands.  HEENT: Head atraumatic normocephalic. Pupils equal round and reactive.  Respiratory: Unlabored breathing no audible wheeze  Cardiovascular: Regular rate and rhythm per pulses.  Skin: No rashes, no cyanosis, no edema.  Musculoskeletal: Limited active range of motion of left arm, unable to initiate movement. Overall tolerates passive ROM to 180 degrees. Minimal erythema of the surrounding skin.   Kin intact   Left upper extremity is NVI.  Able to flex, extend, abduct, and adduct digits.  Radial pulse palpable.  Digits are warm.  Capillary refill <2 seconds.  Sensation intact to light  touch in the axillary, median, ulnar, and radial nerve distributions.    Neurologic: normal without focal findings, mental status, speech normal, alert and oriented x iii, HYACINTH  Neuropsychiatric: Stable           Data:   All laboratory data reviewed         Attestation:  I have reviewed today's vital signs, notes, medications, labs and imaging   Amount of time performed on this consult: 50 minutes.        Rosanne Pisano PA-C  [facility]

## 2023-09-06 NOTE — PROGRESS NOTES
Notified of patient with disorientation x3.  Currently here with likely alcohol withdrawal, but per the note Wernicke's in the differential as well.  Does not have a current thiamine orders I added this at 100 mg daily to be given now.  Has received 4 mg of lorazepam in the last few hours which is also likely contributing.  He is refusing pulse oximetry, but no hypoxia noted at this point.

## 2023-09-06 NOTE — PROGRESS NOTES
09/06/23 1103   Appointment Info   Signing Clinician's Name / Credentials (PT) Keisha Charles DPT   Living Environment   People in Home alone   Current Living Arrangements apartment   Living Environment Comments Patient initially stated that he does not have elevator, lives on 2nd floor, then later stated that he does have access to elevator to apartment.   Self-Care   Current Activity Tolerance poor   Activity/Exercise/Self-Care Comment Patient unable to state mobility level prior to admission.   General Information   Onset of Illness/Injury or Date of Surgery 08/30/23   Referring Physician Russell Forbes MD   Patient/Family Therapy Goals Statement (PT) Patient unable to state   Pertinent History of Current Problem (include personal factors and/or comorbidities that impact the POC) Per chart review: Saji Iqbal is a 63 year old male admitted on 8/30/2023. He has a hx of CAD s/p PCI LAD, type 2 DM, HTN, HLP, alcohol abuse who presents with L shoulder pain.  He was sleeping on his left shoulder and when he woke up he had pain in the left shoulder.   Patient found to have acute alcohol intoxication with withdrawal encephalopathy, new cardiomyopathy, NSTEMI, thrombocytopenia/anemia, hyponatremia, deconditioning.   Existing Precautions/Restrictions fall   Cognition   Affect/Mental Status (Cognition) low arousal/lethargic   Orientation Status (Cognition) person   Follows Commands (Cognition) follows one-step commands;25-49% accuracy;delayed response/completion;increased processing time needed;initiation impaired;physical/tactile prompts required;repetition of directions required;verbal cues/prompting required   Cognitive Status Comments Patient with intermittent eyes open, required significant cueing to engage, but then able to follow directions for short period before becoming lethargic again   Pain Assessment   Patient Currently in Pain No  (reported no pain during session)   Posture    Posture Forward  head position;Protracted shoulders   Range of Motion (ROM)   ROM Comment WFL at right UE/right LE/left LE.  Patient with passive left UE at least to 90 degrees shoulder flexion (did not assess greater than 90).  Patient with minimal active left shoulder ROM   Strength (Manual Muscle Testing)   Strength (Manual Muscle Testing) Deficits observed during functional mobility   Strength Comments Patient with global strength deficits, although difficult to determine if completely due to decreased strength vs decreased participation from low arousal.  Able to move righ tUE/right LE/left UE against gravity in all planes during functional movement.  Patient able to demonstrate symmetrical left  strength, left forearm pronation/supination through full range.  Patient demonstrated left elbow flexion through approx 1/2 of available range, but unsure if a true deficit or low arousal.  Patient unable to demosntrate more than trace left flexion or abduction.  Dependent for left shoulder mobility.   Bed Mobility   Comment, (Bed Mobility) Max A x2 with direct verbal and tactile cueing   Transfers   Transfers sit-stand transfer   Sit-Stand Transfer   Sit-Stand Smoot (Transfers) moderate assist (50% patient effort);2 person assist;set up;verbal cues;nonverbal cues (demo/gesture)   Assistive Device (Sit-Stand Transfers) walker, front-wheeled   Gait/Stairs (Locomotion)   Comment, (Gait/Stairs) Unable to safely assess at this time   Balance   Balance Comments Required mod A for upright sitting balance and min A x2 for upright standing balance   Clinical Impression   Criteria for Skilled Therapeutic Intervention Yes, treatment indicated   PT Diagnosis (PT) Impaired functional mobility   Influenced by the following impairments decreased alertness, decreased strength, decreased activity tolerance   Functional limitations due to impairments difficulties with bed mobility, transfers, gait   Clinical Presentation (PT Evaluation  Complexity) Evolving/Changing   Clinical Presentation Rationale clinical judgement   Clinical Decision Making (Complexity) moderate complexity   Planned Therapy Interventions (PT) balance training;bed mobility training;gait training;neuromuscular re-education;patient/family education;ROM (range of motion);stair training;strengthening;transfer training;progressive activity/exercise   Risk & Benefits of therapy have been explained evaluation/treatment results reviewed;care plan/treatment goals reviewed;risks/benefits reviewed;current/potential barriers reviewed;participants voiced agreement with care plan;participants included;patient   PT Total Evaluation Time   PT Eval, Moderate Complexity Minutes (48959) 10   Physical Therapy Goals   PT Frequency 5x/week   PT Predicted Duration/Target Date for Goal Attainment 09/13/23   PT Goals Bed Mobility;Transfers;Gait;Stairs   PT: Bed Mobility Supervision/stand-by assist;Supine to/from sit   PT: Transfers Supervision/stand-by assist;Sit to/from stand;Bed to/from chair;Assistive device   PT: Gait Supervision/stand-by assist;Assistive device;150 feet   PT: Stairs Supervision/stand-by assist;Assistive device;Greater than 10 stairs;Rail on right   Interventions   Interventions Quick Adds Therapeutic Activity   Therapeutic Activity   Therapeutic Activities: dynamic activities to improve functional performance Minutes (82523) 25   Symptoms Noted During/After Treatment   (lethargy)   Treatment Detail/Skilled Intervention Patient supine upon arrival, sitter present.  Donned gown with max A; unable to demonstrate active left shoulder flexion and required max A at left UE to don gown.  Attempted to cue patient (verbal/tactile) for bed mobility, however therapist ultimately facilitated transfer between supine and sitting with max A x2. Patient required mod A posteriorly to remain upright, frequently closing eyes and leaning back.  Facilitated scoot to EOB with bilateral UEs on walker  with patient providing hand over hand assist to raise left UE onto walker.  Able to maintain hand on walker once placed on walker.  Facilitated transfers from sitting to standing x3 reps with 2WW and mod A x2, direct verbal cueing for anterior weight shift and standing.  Able to tolerate standing for 10-20 sec with min A x2.  Returned to supine with max A x2.  Repositioned dependently.  In bed with alarm on, all needs within reach at end of session, sitter present.   PT Discharge Planning   PT Plan progress transfers, standing with 2WW, amb if appropriate.  Assess left shoulder movement   PT Discharge Recommendation (DC Rec) Transitional Care Facility   PT Rationale for DC Rec Patient presents significantly below baseline for functional mobility.  Patient limited by lethargy, also has minimal left shoulder movement.  Currently requires Ax2 for bed mobility and transfers between sitting and standing with 2WW.  Unable to demonstrate safe household mobility in order to return to home.  Recommend discharge to TCU in order to increase strength, activity tolerance and independence with mobility.   PT Brief overview of current status Ax2 standing with 2WW.  Santa Stedy or lift for transfers with Ax2 for nurisng.   Total Session Time   Timed Code Treatment Minutes 25   Total Session Time (sum of timed and untimed services) 35

## 2023-09-06 NOTE — PLAN OF CARE
"BP (!) 168/98 (BP Location: Right arm, Patient Position: Supine, Cuff Size: Adult Regular)   Pulse 91   Temp 98.6  F (37  C) (Oral)   Resp 18   Ht 1.803 m (5' 11\")   Wt 99.8 kg (220 lb)   SpO2 95%   BMI 30.68 kg/m      Neuro: A/O x4 but continue to be disoriented x3  Cardiac: Regular rate, no murmurs  Lungs: All lobes clear  GI: All bowel sounds heard  : External Catheter  Pain: No pain  IV: Left forearm  Meds: No Meds given  Labs/tests: Na low 128, Creatinine low 0.51, Albumin low 3.0, AST high 128, Bilirubin high 8.4, Cholesterol high 245, Troponin high 32  Diet: Low fat/low Na, 1800 Fluid restriction  Activity: Ax2  Plan: D/C 9/7/2023 home likely need TCU  "

## 2023-09-06 NOTE — PROGRESS NOTES
2101: Barbi BURNETT notifying that patient is now disorientated x3 but has received a total of 4mg of Ativan since 1730 and is refusing to keep pulse ox on.

## 2023-09-06 NOTE — PROGRESS NOTES
[facility]    Medicine Progress Note - Hospitalist Service    Date of Admission:  8/30/2023    Assessment & Plan     Saji Iqbal is a 63 year old male admitted on 8/30/2023. He has a hx of CAD s/p PCI LAD, type 2 DM, HTN, HLP, alcohol abuse who presents with L shoulder pain.  He was sleeping on his left shoulder and when he woke up he had pain in the left shoulder.  The pain is localized to the left shoulder and does not radiate.  It is made worse with certain movements.  He had a little bit of numbness on his left hand but that has improved.  He denies any chest pain or shortness of breath.  Was noted to have a mildly elevated troponin level and to be intoxicated with alcohol.  He was placed in the hospital for further evaluation and treatment.     Alcohol dependence/Acute alcohol intoxication with withdrawal/ metabolic/toxic encephalopathy.  -Patient presented with an alcohol level 0.17.  Patient has required the CIWA protocol for ongoing withdrawal.  He has been given diazepam.  His CIWA score has been up to 14.  Patient is confused.  Patient is off of Neurontin due to lethargy and his clonidine was stopped due to needing his other blood pressure medications.  Patient has been on vitamins with folic acid, thiamine, and multivitamin.  Continue on withdrawal protocol.  Patient is requiring a one-to-one sitter.  Urinalysis is negative, has no signs or symptoms of infection.  This x-ray is not significant for no pneumonia.  Suspect patient's decreased mentation is due to medications, alcohol withdrawl but also could have a component of Wernicke's . His hyponatremia may be playing a roll too.  CIWA score 0-3, did get benzos last night.  Back on 1:1 close observation.  May need to consider getting a brain MRI as he continues to be quite confused. Hoping he will continue to improve.  Will get rid of CIWA and benzos at this point as they are likely causing more harm than good.  Will send a CK  normal.  Check an ammonia.    Addendum:  ammonia is 53, not to high, could consider starting lactulose if not clearing.      New ischemic cardiomyopathy/ coronary artery disease/ Non-ST elevation myocardial infarction.  Hypertension/Hyperlipidemia  -Appreciate cardiology input.  Is a terrible candidate for procedures.  He has been noncompliant. Initial plan had been for coronary angiogram with plan is for a stress test 9/5/2023.  SPECT his alcohol abuse is also playing a role in his cardiomyopathy.   If necessary, bare-metal stenting should be done rather than drug-eluting.  Patient had been on a beta-blocker but this was stopped due to sinus pause.  Patient currently is on Coreg, aspirin, lisinopril.  He is not on a statin due to his alcohol use and liver disease.  Echocardiogram On 8/30/2023 showing ejection fraction of 35-40% with severe inferior wall hypokinesis and septal hypokinesis. Stress test  9/5/23, results showing large area of infarction but not reversible.  EF 32%.  At this point only medical management.  Will titrate up his coreg and lisinopril dosing.  Will discharge telemetry at this point.        Thrombocytopenia/Anemia.  -No obvious hemorrhage.  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  Coags actually look okay.  Platelet 79.  Hemoglobin is actually stable, 9/4/23 hgb at 12.2 with an MCV of 108. Checked b12/folic acid levels both normal.  Patient is getting oral folic acid.         Hyponatremia.  -Mild.  Sodium 126-133   this is in the setting of ongoing alcohol use.  Patient really is not eating drinking all that well.  We did given his saline without any real improvement.  Placed on a fluid restriction 1800cc.  Now sodium recheck this morning showing sodium of 128.         Hypomagnesemia/Hypophosphatemia/Hypocalcemia.  -Magnesium replacement protocol.  -Phosphorus replacement protocol.  -Ionized calcium okay.    "  Transaminitis/Hyperbilirubinemia.  -Suspect due to alcohol abuse.  Patient with hepatic steatosis and fibrosis noted on ultrasound of abdomen.  Patient has been seen by GI.  At this point there is nothing more to offer.       Left shoulder pain.  -No acute fractures on imaging.  Suspect musculoskeletal pain.  Continue on pain medications as needed. Physical therapy to see, on 9/6/23 not able to lift his arm but has great  strength and sensation.  Ortho consulted and getting a left shoulder MRI.  If not improving may need to get neurology involved ? EMG.      Deconditioning.  Physical therapy and occupational therapy to see.  He needs a Tcu at discharge           Diet: Combination Diet 2 gm NA Diet; Low Saturated Fat Diet  Fluid restriction 1800 ML FLUID  1800 ml fluid restriction  DVT Prophylaxis: Pneumatic Compression Devices  Durbin Catheter: Not present  Lines: None     Cardiac Monitoring: ACTIVE order. Indication: AMI (NSTEMI/ STEMI) (48 hours)  Code Status: Full Code      Clinically Significant Risk Factors         # Hyponatremia: Lowest Na = 125 mmol/L in last 2 days, will monitor as appropriate      # Hypoalbuminemia: Lowest albumin = 3 g/dL at 9/4/2023  7:52 AM, will monitor as appropriate         # Hypertension: Noted on problem list    # Chronic heart failure with reduced ejection fraction: last echo with EF <40%       # Obesity: Estimated body mass index is 30.68 kg/m  as calculated from the following:    Height as of this encounter: 1.803 m (5' 11\").    Weight as of this encounter: 99.8 kg (220 lb).          # Financial/Environmental Concerns: unable to afford medication(s);unable to afford rent/mortgage;unemployed;insurance inadequate        Discussed with bedside nursing, discussed with social work/case management, updated daughter by phone.  Sounds like she has an estranged relationship with the patient.  She was not aware of all his medical problems.        Disposition Plan   . Needs to have " improvement of his encephalopathy and get off close observation.  Then he will need a TCU.              Expected Discharge Date: 09/08/2023      Destination: home      Needs TCU       Russell Forbes MD  Hospitalist Service  [facility]  Securely message with [employer] (more info)  Text page via [employer] Paging/Directory   ______________________________________________________________________    Interval History   Quite sleepy still and confused.  Patient still has a one-to-one sitter and has been trying to climb out of bed.  He has been impulsive.  More interactive today.  Difficulty with lifting up his left arm at the shoulder.   Did get benzos last night.  No new complaints but his ROS is not accurate due to his confusion      Physical Exam   Vital Signs: Temp: 97.3  F (36.3  C) Temp src: Oral BP: 120/76 Pulse: 79   Resp: 20 SpO2: 96 % O2 Device: None (Room air)    Weight: 220 lbs 0 oz    Exam is really unchanged from yesterday still is confused but more alert  Gen: more alert and less sleepy.    more interactive, is in no distress,, though certainly is confused.  Is answering some simple questions, is disheveled and appears older than stated age  HEENT: MMM  CV:  Regular rate and rhythm, no appreciable murmurs  Lung:  CTA b/l, normal effort.  Good air movement  Ab: Mildly distended, nontender, bowel sounds present throughout, soft.  Ext: Moves all extremities, bilateral ankle 1-2+ edema stable   Neuro: No focal deficits,difficulty lifting his left arm, great left arm  strength and sensation intact.       Data     I have personally reviewed the following data over the past 24 hrs:    N/A  \   N/A   / N/A     128 (L) 96 (L) 10.6 /  111 (H)   4.3 20 (L) 0.51 (L) \       Imaging results reviewed over the past 24 hrs:   Recent Results (from the past 24 hour(s))   NM MPI w Lexiscan   Result Value    Target     Baseline Systolic     Baseline Diastolic BP 98    Last Stress Systolic BP  134    Last Stress Diastolic BP 85    Baseline HR 88    Max HR  96    Max Predicted HR  61    Rate Pressure Product 12,864.0    BP 32    Narrative      The nuclear stress test is abnormal.    There is a large area of a severe degree of infarction in the inferior   and septal segment(s) of the left ventricle. No reversible ischemia noted   on this study.    Left ventricular function is moderately reduced.    The left ventricular ejection fraction at rest is 32%.    There is no prior study for comparison.

## 2023-09-06 NOTE — PROGRESS NOTES
Care Management Follow Up    Length of Stay (days): 7    Expected Discharge Date: 09/08/2023     Concerns to be Addressed: financial/insurance, medication     Patient plan of care discussed at interdisciplinary rounds: Yes    Anticipated Discharge Disposition:  TCU     Anticipated Discharge Services:    Anticipated Discharge DME:      Patient/family educated on Medicare website which has current facility and service quality ratings:  NA  Education Provided on the Discharge Plan:  yes  Patient/Family in Agreement with the Plan:  yes    Referrals Placed by CM/SW:    Private pay costs discussed: Not applicable    Additional Information:  Sw following for discharge planning.     Sw observed that PT is recommending TCU. Pt has a 1-1 currently. Sw will wait until pt is off 1-1 to send referrals. Sw also did not meet with pt due to current documented confusion.    Sw will meet with pt for TCU choices and send referrals once pt is off 1-1.    Social work will continue to follow and assist with discharge planning as needed.    PADILLA Lipscomb, [facility]  Inpatient Care Coordination  [facility]  [phone]      PADILLA Lipscomb

## 2023-09-06 NOTE — PLAN OF CARE
"Goal Outcome Evaluation:      Plan of Care Reviewed With: patient    Overall Patient Progress: no change    Outcome Evaluation: .    Patient was restless and anxious most shift. A/O x4 at beginning of shift but as shift continued was disoriented to place, time and situation, MD paged as an FYI. Orientation changed prior to Ativan. BP's were elevated but decreased as shift continued. Maintaining O2 sats >90% on RA, refusing continuous pulse ox, MD notified and aware. Denies chest pain and SOB. CIWA orders followed, see flowsheet. Patient was extremely restless, with anxiety, slight headache, and pulled IV out. New IV placed and no-no placed. Total of 6mg of Ativan PO administered per CIWA, see MAR. 1800 fluid restriction was followed. Tele SR BBB with slight ST depression. Patient denies pain. Blood sugars being monitored per order. Coverage per sliding scale. External cath in place, changed x3. Cardiology, GI and PT/OT following.     Problem: Plan of Care - These are the overarching goals to be used throughout the patient stay.    Goal: Plan of Care Review  Description: The Plan of Care Review/Shift note should be completed every shift.  The Outcome Evaluation is a brief statement about your assessment that the patient is improving, declining, or no change.  This information will be displayed automatically on your shift note.  Outcome: Progressing  Flowsheets (Taken 9/5/2023 2259)  Outcome Evaluation: .  Plan of Care Reviewed With: patient  Overall Patient Progress: no change  Goal: Patient-Specific Goal (Individualized)  Description: You can add care plan individualizations to a care plan. Examples of Individualization might be:  \"Parent requests to be called daily at 9am for status\", \"I have a hard time hearing out of my right ear\", or \"Do not touch me to wake me up as it startles me\".  Outcome: Progressing  Goal: Absence of Hospital-Acquired Illness or Injury  Outcome: Progressing  Intervention: Identify and " Manage Fall Risk  Recent Flowsheet Documentation  Taken 9/5/2023 1600 by Bobbi Mackey RN  Safety Promotion/Fall Prevention:   activity supervised   assistive device/personal items within reach   clutter free environment maintained   nonskid shoes/slippers when out of bed   safety round/check completed  Intervention: Prevent Skin Injury  Recent Flowsheet Documentation  Taken 9/5/2023 1600 by Bobbi Mackey, RN  Body Position: position changed independently  Intervention: Prevent and Manage VTE (Venous Thromboembolism) Risk  Recent Flowsheet Documentation  Taken 9/5/2023 1600 by Bobbi Mackey RN  VTE Prevention/Management: SCDs (sequential compression devices) off  Goal: Optimal Comfort and Wellbeing  Outcome: Progressing  Goal: Readiness for Transition of Care  Outcome: Progressing     Problem: Pain Acute  Goal: Optimal Pain Control and Function  Outcome: Progressing  Intervention: Prevent or Manage Pain  Recent Flowsheet Documentation  Taken 9/5/2023 1600 by Bobbi Mackey RN  Medication Review/Management: medications reviewed     Problem: Alcohol Withdrawal  Goal: Alcohol Withdrawal Symptom Control  Outcome: Progressing  Goal: Optimal Neurologic Function  Outcome: Progressing  Goal: Readiness for Change Identified  Outcome: Progressing

## 2023-09-06 NOTE — PROGRESS NOTES
"CLINICAL NUTRITION SERVICES  -  ASSESSMENT NOTE    Recommendations Ordered by Registered Dietitian (RD):   Diet per MD   Ordered Ensure Enlive between meals (vanilla)   Continue MVI+M as ordered   Malnutrition:   % Weight Loss:  Weight loss does not meet criteria for malnutrition   % Intake:  <75% for > 7 days (moderate malnutrition)  Subcutaneous Fat Loss:  Orbital region moderate depletion and Upper arm region moderate depletion  Muscle Loss:  Temporal region mild-moderate depletion, Clavicle bone region moderate depletion, Acromion bone region moderate depletion, Scapular bone region moderate depletion, and Anterior thigh region moderate depletion  Fluid Retention:  trace    Malnutrition Diagnosis: Severe malnutrition  In Context of:  Chronic illness or disease  Environmental or social circumstances      REASON FOR ASSESSMENT  Saji Iqbal is a 63 year old male seen by Registered Dietitian for LOS    Past medical history: CAD s/p PCI LAD, type 2 DM, HTN, HLP, alcohol abuse who presents with L shoulder pain     Admitted for: L shoulder Pain     NUTRITION HISTORY  - Information obtained from chart and patient - sitter in room and patient noted to be confused at times. Able to answer my questions as below --  - Typical diet at home: watches sodium intake   - Typical food/fluid intake PTA: patient reports a good appetite and PO intake PTA. Notes that he usually consumes 2-3 meals per day.   - Supplements: occasionally \"in a pinch\", for example if he is in a hurry  - Food allergies: honey     CURRENT NUTRITION ORDERS  Diet Order:     Low saturated fat + 2 g Na Diet + Fluid restriction of 1800 mL     Current Intake/Tolerance:  Upon review of the flowsheets, pt is consuming 0-100% of meals ordered. Ordering 1-3 meals per day. Missed meals noted.     Obtained from Chart/Interdisciplinary Team:  - cardiology following   - GI following   - Reviewed stooling patterns  - Please refer to flowsheets for more information on " "skin     ANTHROPOMETRICS  Height: 5' 11\"  Weight: 99.8 kg ( 220 lbs 0 oz)   Body mass index is 30.68 kg/m .  Weight Status:  Obesity Grade I BMI 30-34.9  Weight History: weight fairly stable, patient reports a weight loss from 228 lbs - unclear timeline   Wt Readings from Last 10 Encounters:   08/30/23 99.8 kg (220 lb)   04/07/23 100.6 kg (221 lb 12.8 oz)   03/30/23 100.2 kg (221 lb)   02/17/23 100.2 kg (221 lb)   02/02/23 102.2 kg (225 lb 6.4 oz)   07/19/22 98.5 kg (217 lb 1.6 oz)   07/07/22 99.8 kg (220 lb)   01/19/22 98.9 kg (218 lb)   07/08/21 101.2 kg (223 lb)   01/11/21 103 kg (227 lb)       LABS  Labs reviewed    Labs:  Electrolytes  Potassium (mmol/L)   Date Value   09/06/2023 4.3   09/05/2023 4.1   09/04/2023 4.4   07/19/2022 3.9   07/14/2022 4.1   01/19/2022 4.2   12/16/2020 4.4   10/23/2020 3.8   06/16/2020 3.9     Phosphorus (mg/dL)   Date Value   09/06/2023 3.4   09/05/2023 3.3   09/04/2023 2.8   09/03/2023 2.5   09/02/2023 2.4 (L)    Blood Glucose  Glucose (mg/dL)   Date Value   09/06/2023 108 (H)   07/19/2022 103 (H)   07/14/2022 152 (H)   01/19/2022 166 (H)   12/16/2020 166 (H)   06/16/2020 212 (H)   05/26/2020 234 (H)   05/11/2020 233 (H)   12/14/2018 222 (H)     GLUCOSE BY METER POCT (mg/dL)   Date Value   09/06/2023 136 (H)   09/06/2023 111 (H)   09/06/2023 105 (H)   09/05/2023 129 (H)   09/05/2023 153 (H)     Hemoglobin A1C (%)   Date Value   02/17/2023 7.5 (H)   07/07/2022 6.3 (H)   01/19/2022 7.4 (H)   07/02/2021 8.5 (H)   01/07/2021 7.8 (H)   10/20/2020 6.9 (H)   09/17/2020 7.1 (H)   06/16/2020 9.1 (H)    Inflammatory Markers  WBC (10e9/L)   Date Value   12/16/2020 5.2   10/20/2020 9.0   05/26/2020 9.5     WBC Count (10e3/uL)   Date Value   09/04/2023 6.9   09/02/2023 6.9   09/01/2023 7.5     Albumin (g/dL)   Date Value   09/04/2023 3.0 (L)   09/02/2023 3.0 (L)   09/01/2023 3.1 (L)   07/14/2022 3.9   09/17/2020 4.1   05/26/2020 4.0   05/11/2020 3.9      Magnesium (mg/dL)   Date Value "   09/06/2023 1.8   09/05/2023 1.7   09/04/2023 1.8   12/26/2017 2.0     Sodium (mmol/L)   Date Value   09/06/2023 128 (L)   09/05/2023 125 (L)   09/04/2023 126 (L)   12/16/2020 136   06/16/2020 137   05/26/2020 134    Renal  Urea Nitrogen (mg/dL)   Date Value   09/06/2023 10.6   09/05/2023 9.6   09/04/2023 9.3   07/19/2022 17   07/14/2022 16   01/19/2022 12   12/16/2020 19   06/16/2020 19   05/26/2020 19     Creatinine (mg/dL)   Date Value   09/06/2023 0.51 (L)   09/05/2023 0.53 (L)   09/04/2023 0.55 (L)   12/16/2020 1.01   10/23/2020 0.96   06/16/2020 0.92     Additional  Triglycerides (mg/dL)   Date Value   08/31/2023 337 (H)   07/19/2022 255 (H)   01/19/2022 182 (H)   06/16/2020 241 (H)   05/13/2019 322 (H)   03/27/2018 374 (H)     Ketones Urine (mg/dL)   Date Value   09/03/2023 10 (A)   12/09/2016 80 (A)        MEDICATIONS  Medications reviewed     aspirin  81 mg Oral Daily    calcium carbonate  500 mg Oral BID w/meals    carvedilol  6.25 mg Oral BID w/meals    folic acid  1 mg Oral Daily    heparin (porcine)        heparin (porcine)        insulin aspart  1-7 Units Subcutaneous TID AC    insulin aspart  1-5 Units Subcutaneous At Bedtime    lidocaine (PF)        lisinopril  20 mg Oral Daily    multivitamin w/minerals  1 tablet Oral Daily    nitroGLYcerin in D5W        PARoxetine  20 mg Oral Daily    sodium chloride (PF)  3 mL Intracatheter Q8H    sodium chloride (PF)  3 mL Intracatheter Q8H    thiamine  100 mg Oral Daily    verapamil           - MEDICATION INSTRUCTIONS -        acetaminophen **OR** acetaminophen, albuterol, aminophylline, caffeine citrate, glucose **OR** dextrose **OR** glucagon, diclofenac, heparin (porcine), heparin (porcine), lidocaine 4%, lidocaine (PF), lidocaine (buffered or not buffered), melatonin, nitroGLYcerin in D5W, - MEDICATION INSTRUCTIONS -, potassium chloride, sodium chloride (PF), sodium chloride bacteriostatic, verapamil     ASSESSED NUTRITION NEEDS PER APPROVED PRACTICE  GUIDELINES:    Dosing Weight 99.8 kg   Estimated Energy Needs: 2643-8211 kcals (20-25 Kcal/Kg)  Justification: maintenance  Estimated Protein Needs: 100-120 grams protein (1-1.2 g pro/Kg)  Justification: maintenance and preservation of lean body mass  Estimated Fluid Needs: per MD     NUTRITION DIAGNOSIS:  Inadequate oral intake related to mentation? And poor appetite per report as evidenced by patient likely consuming <75% of nutritional needs over the past > 7 days     NUTRITION INTERVENTIONS  Recommendations / Nutrition Prescription  Diet per MD   Ordered Ensure Enlive between meals (vanilla)   Continue MVI+M as ordered    Implementation  Nutrition education: Provided education on the different oral nutritional supplements offered + indications for use. Discussed the importance of protein for preservation of lean body mass.   Medical Food Supplement and Multivitamin/Mineral: as above    Nutrition Goals  Patient to consume 75% of meals or oral nutritional supplements ordered TID    MONITORING AND EVALUATION:  Progress towards goals will be monitored and evaluated per protocol and Practice Guidelines    Julia Laird RD, LD  Clinical Dietitian     3rd floor/ICU: [phone]  All other floors: [phone]  Weekend/holiday: [phone]  Office: [phone]

## 2023-09-06 NOTE — PLAN OF CARE
Goal Outcome Evaluation:    VSS. Lethargic. Dr. Forbes aware. Amonia wnl ABG pending.  Arouses to voice. Oriented x4 but forgetful and intermittent confusion . Calm and cooperativew.  CIWA 2.  MD cancelled CIWA assessment. Last ativan received 09/05/23 [place] shift. Up to chair with lift.  PCDs on.  Poor appetite d/t lethargy. MRI order to view left shoulder. Pt jessica not lift his left arm very high. Is able to grasp with hand. Na 128 improved from 125. BG stable. Needs assist with feeding.  Psc removed.

## 2023-09-07 ENCOUNTER — APPOINTMENT (OUTPATIENT)
Dept: OCCUPATIONAL THERAPY | Facility: CLINIC | Age: 63
End: 2023-09-07
Attending: INTERNAL MEDICINE
Payer: MEDICAID

## 2023-09-07 LAB
ANION GAP SERPL CALCULATED.3IONS-SCNC: 11 MMOL/L (ref 7–15)
BUN SERPL-MCNC: 11.1 MG/DL (ref 8–23)
CALCIUM SERPL-MCNC: 8.9 MG/DL (ref 8.8–10.2)
CHLORIDE SERPL-SCNC: 94 MMOL/L (ref 98–107)
CREAT SERPL-MCNC: 0.56 MG/DL (ref 0.67–1.17)
DEPRECATED HCO3 PLAS-SCNC: 23 MMOL/L (ref 22–29)
EGFRCR SERPLBLD CKD-EPI 2021: >90 ML/MIN/1.73M2
GLUCOSE BLDC GLUCOMTR-MCNC: 114 MG/DL (ref 70–99)
GLUCOSE BLDC GLUCOMTR-MCNC: 118 MG/DL (ref 70–99)
GLUCOSE BLDC GLUCOMTR-MCNC: 137 MG/DL (ref 70–99)
GLUCOSE BLDC GLUCOMTR-MCNC: 152 MG/DL (ref 70–99)
GLUCOSE BLDC GLUCOMTR-MCNC: 159 MG/DL (ref 70–99)
GLUCOSE SERPL-MCNC: 119 MG/DL (ref 70–99)
MAGNESIUM SERPL-MCNC: 1.9 MG/DL (ref 1.7–2.3)
PHOSPHATE SERPL-MCNC: 3 MG/DL (ref 2.5–4.5)
POTASSIUM SERPL-SCNC: 4.3 MMOL/L (ref 3.4–5.3)
SODIUM SERPL-SCNC: 128 MMOL/L (ref 136–145)

## 2023-09-07 PROCEDURE — 83735 ASSAY OF MAGNESIUM: CPT | Performed by: INTERNAL MEDICINE

## 2023-09-07 PROCEDURE — 99232 SBSQ HOSP IP/OBS MODERATE 35: CPT | Performed by: INTERNAL MEDICINE

## 2023-09-07 PROCEDURE — 250N000011 HC RX IP 250 OP 636: Mod: JZ | Performed by: INTERNAL MEDICINE

## 2023-09-07 PROCEDURE — 250N000013 HC RX MED GY IP 250 OP 250 PS 637: Performed by: INTERNAL MEDICINE

## 2023-09-07 PROCEDURE — 80048 BASIC METABOLIC PNL TOTAL CA: CPT | Performed by: INTERNAL MEDICINE

## 2023-09-07 PROCEDURE — 36415 COLL VENOUS BLD VENIPUNCTURE: CPT | Performed by: INTERNAL MEDICINE

## 2023-09-07 PROCEDURE — 84100 ASSAY OF PHOSPHORUS: CPT | Performed by: INTERNAL MEDICINE

## 2023-09-07 PROCEDURE — 120N000001 HC R&B MED SURG/OB

## 2023-09-07 PROCEDURE — 97530 THERAPEUTIC ACTIVITIES: CPT | Mod: GO

## 2023-09-07 PROCEDURE — 97165 OT EVAL LOW COMPLEX 30 MIN: CPT | Mod: GO

## 2023-09-07 RX ORDER — MAGNESIUM OXIDE 400 MG/1
400 TABLET ORAL EVERY 4 HOURS
Status: COMPLETED | OUTPATIENT
Start: 2023-09-07 | End: 2023-09-07

## 2023-09-07 RX ORDER — ONDANSETRON 2 MG/ML
8 INJECTION INTRAMUSCULAR; INTRAVENOUS EVERY 8 HOURS PRN
Status: DISCONTINUED | OUTPATIENT
Start: 2023-09-07 | End: 2023-09-11 | Stop reason: HOSPADM

## 2023-09-07 RX ADMIN — ONDANSETRON 8 MG: 2 INJECTION INTRAMUSCULAR; INTRAVENOUS at 19:14

## 2023-09-07 RX ADMIN — CALCIUM 500 MG: 500 TABLET ORAL at 18:16

## 2023-09-07 RX ADMIN — CARVEDILOL 6.25 MG: 6.25 TABLET, FILM COATED ORAL at 08:41

## 2023-09-07 RX ADMIN — MAGNESIUM OXIDE TAB 400 MG (241.3 MG ELEMENTAL MG) 400 MG: 400 (241.3 MG) TAB at 18:16

## 2023-09-07 RX ADMIN — MAGNESIUM OXIDE TAB 400 MG (241.3 MG ELEMENTAL MG) 400 MG: 400 (241.3 MG) TAB at 15:29

## 2023-09-07 RX ADMIN — THIAMINE HCL TAB 100 MG 100 MG: 100 TAB at 08:40

## 2023-09-07 RX ADMIN — PAROXETINE HYDROCHLORIDE 20 MG: 20 TABLET, FILM COATED ORAL at 08:41

## 2023-09-07 RX ADMIN — CALCIUM 500 MG: 500 TABLET ORAL at 08:40

## 2023-09-07 RX ADMIN — ASPIRIN 81 MG: 81 TABLET, COATED ORAL at 08:41

## 2023-09-07 RX ADMIN — ACETAMINOPHEN 650 MG: 325 TABLET, FILM COATED ORAL at 09:03

## 2023-09-07 RX ADMIN — DICLOFENAC SODIUM 2 G: 10 GEL TOPICAL at 21:16

## 2023-09-07 RX ADMIN — FOLIC ACID 1 MG: 1 TABLET ORAL at 08:41

## 2023-09-07 RX ADMIN — CARVEDILOL 6.25 MG: 6.25 TABLET, FILM COATED ORAL at 18:16

## 2023-09-07 RX ADMIN — MULTIPLE VITAMINS W/ MINERALS TAB 1 TABLET: TAB at 08:41

## 2023-09-07 RX ADMIN — LISINOPRIL 20 MG: 20 TABLET ORAL at 08:40

## 2023-09-07 ASSESSMENT — ACTIVITIES OF DAILY LIVING (ADL)
ADLS_ACUITY_SCORE: 41
PREVIOUS_RESPONSIBILITIES: MEAL PREP;HOUSEKEEPING;LAUNDRY;SHOPPING;MEDICATION MANAGEMENT;FINANCES;DRIVING
ADLS_ACUITY_SCORE: 41

## 2023-09-07 NOTE — TELEPHONE ENCOUNTER
Routing refill request to provider for review/approval because:  Labs out of range:  creatinine  Labs not current:  A1C  Creatinine   Date Value Ref Range Status   09/07/2023 0.56 (L) 0.67 - 1.17 mg/dL Final   12/16/2020 1.01 0.66 - 1.25 mg/dL Final     Hemoglobin A1C   Date Value Ref Range Status   02/17/2023 7.5 (H) 0.0 - 5.6 % Final     Comment:     Normal <5.7%   Prediabetes 5.7-6.4%    Diabetes 6.5% or higher     Note: Adopted from ADA consensus guidelines.   07/02/2021 8.5 (H) 0 - 5.6 % Final     Comment:     Normal <5.7% Prediabetes 5.7-6.4%  Diabetes 6.5% or higher - adopted from ADA   consensus guidelines.  Reviewed: OK with previous         Christy CARPIO RN

## 2023-09-07 NOTE — PROGRESS NOTES
Care Management Follow Up    Length of Stay (days): 8    Expected Discharge Date: 09/08/2023     Concerns to be Addressed: financial/insurance, medication     Patient plan of care discussed at interdisciplinary rounds: Yes    Anticipated Discharge Disposition: Skilled Nursing Facility, Transitional Care     Anticipated Discharge Services:    Anticipated Discharge DME:      Patient/family educated on Medicare website which has current facility and service quality ratings:    Education Provided on the Discharge Plan:    Patient/Family in Agreement with the Plan: unable to assess    Referrals Placed by CM/SW:  tcu    Additional Information:  Sw met with pt to discuss TCU recommendation and provided pt's limited list of TCU options due to insurance.  Sw provided pt a copy of them.  SW sent to TCUs within a 50 mi radius, except for [place], since pt stated that's too far.  SW stated we will send there if declined by others. Pt agreed. Pt is unsure if he'll go to TCU because of possible out of pocket fees.  SW shared we will find out what these are once accepted somewhere, if any costs.    Financial/insurance counselor plans on meeting pt to day to do online medical application.    PADILLA Bhatti, [facility]  Inpatient Care Coordination  [facility]  [phone]      FELI JUAN

## 2023-09-07 NOTE — PROGRESS NOTES
[facility]  Hospitalist Progress Note  Naseem Garcia MD 09/07/2023    Reason for Stay (Diagnosis): ETOH dependence, withdrawal (improved), LUE pain/weakness at shoulder joint         Assessment and Plan:      Summary of Stay: Saji Iqbal is a 63 year old male admitted on 8/30/2023. He has a hx of CAD s/p PCI LAD, type 2 DM, HTN, HLP, alcohol abuse who presents with L shoulder pain.  He was sleeping on his left shoulder and when he woke up he had pain in the left shoulder.  The pain is localized to the left shoulder and does not radiate.  It is made worse with certain movements.  He had a little bit of numbness on his left hand but that has improved.  He denies any chest pain or shortness of breath.  Was noted to have a mildly elevated troponin level and to be intoxicated with alcohol.  He was placed in the hospital for further evaluation and treatment.    Withdrawal sx have improved/resolved and CIWA has been discontinued    Course complicated by persistent L shoulder pain with LUE weakness at shoulder joint.  Has normal /biceps strength but unable/unwilling to lift LUE.  Orthopedic surgery has been consulted and L shoulder MRI ordered    Plans today:  - no significant changes made today  - await L shoulder MRI (ordered 9/6)  - appreciate orthopedics input  - if no answer with L shoulder MRI, consider neuro input.       Alcohol dependence/Acute alcohol intoxication with withdrawal/ metabolic/toxic encephalopathy.  - was treated with CIWA protocol  - as withdrawal sx improved and due to some confusion felt related to benzos he was receiving via protocol, CIWA was stopped on 9/6  - continue thiamine/folate  - noted ammonia level at 53.  If mentation does not return to baseline within the next 1-2 days would recheck and consider attempting lactulose if rising    New ischemic cardiomyopathy/ coronary artery disease/ Non-ST elevation myocardial  infarction.  Hypertension/Hyperlipidemia  -Appreciate cardiology input.  Is a sub-optimal candidate for procedures.  He has been noncompliant. Initial plan had been for coronary angiogram with plan is for a stress test 9/5/2023.  it is suspected that his alcohol abuse is also playing a role in his cardiomyopathy.   If necessary, bare-metal stenting could be done rather than BERONICA placement.  Patient currently is on Coreg, aspirin, lisinopril.  He is not on a statin due to his alcohol use and liver disease.  Echocardiogram On 8/30/2023 showing ejection fraction of 35-40% with severe inferior wall hypokinesis and septal hypokinesis. Stress test  9/5/23, results showing large area of infarction but not reversible.  EF 32%.  At this point only medical management.          Thrombocytopenia/Anemia.  -No obvious hemorrhage.  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       Hyponatremia.  -Mild.  Sodium 126-133   this is in the setting of ongoing alcohol use.  Patient really is not eating drinking all that well.  We did given his saline without any real improvement.  Placed on a fluid restriction 1800cc.          Hypomagnesemia/Hypophosphatemia/Hypocalcemia.  -Magnesium replacement protocol.  -Phosphorus replacement protocol.  -Ionized calcium okay.     Transaminitis/Hyperbilirubinemia.  -Suspect due to alcohol abuse.  Patient with hepatic steatosis and fibrosis noted on ultrasound of abdomen.  Patient has been seen by GI.  At this point there is nothing more to offer.        Left shoulder pain.  -No acute fractures on imaging.  Suspect musculoskeletal pain.  Continue on pain medications as needed. Physical therapy to see, on 9/6/23 not able to lift his arm but has great  strength and sensation.  Ortho consulted and getting a left shoulder MRI (MRI ordered on 9/6; not yet performed).   - if no pathology on shoulder MRI, would consider neurologic cause    "  Deconditioning.  Physical therapy and occupational therapy to see.  He needs a Tcu at discharge               Diet: Combination Diet 2 gm NA Diet; Low Saturated Fat Diet  Fluid restriction 1800 ML FLUID  1800 ml fluid restriction  DVT Prophylaxis: Pneumatic Compression Devices  Durbin Catheter: Not present  Lines: None     Cardiac Monitoring: ACTIVE order. Indication: AMI (NSTEMI/ STEMI) (48 hours)  Code Status: Full Code             Interval History (Subjective):      Continues to c/o L shoulder pain and decreased mobility of LUE.  No other weakness/pain complaints elsewhere.                    Physical Exam:      Last Vital Signs:  BP (!) 147/84 (BP Location: Left arm)   Pulse 81   Temp 97.8  F (36.6  C) (Oral)   Resp 20   Ht 1.803 m (5' 11\")   Wt 96.4 kg (212 lb 8.4 oz)   SpO2 94%   BMI 29.64 kg/m        Intake/Output Summary (Last 24 hours) at 9/7/2023 1321  Last data filed at 9/7/2023 1007  Gross per 24 hour   Intake 120 ml   Output 250 ml   Net -130 ml       Constitutional: Sleeping; easily awakened, cooperative, no apparent distress     Respiratory: Clear to auscultation bilaterally, no crackles or wheezing   Cardiovascular: Regular rate and rhythm, normal S1 and S2, and no murmur noted   Abdomen: Normal bowel sounds, soft, non-distended, non-tender   Skin: No rashes, no cyanosis, dry to touch   Neuro: Alert and oriented x3, normal B  stength, near normal L biceps strength, unable/unwilling to attempt abduction of LUE at shoulder joint, no numbness, memory loss   Extremities: No edema, normal range of motion   Other(s):        All other systems: Negative          Medications:      All current medications were reviewed with changes reflected in problem list.         Data:      All new lab and imaging data was reviewed.   Labs:  Recent Labs   Lab 09/07/23  1237 09/07/23  0748 09/07/23  0720 09/06/23  0847 09/06/23  0552 09/05/23  0859 09/05/23  0708   NA  --   --  128*  --  128*  --  125* "   POTASSIUM  --   --  4.3  --  4.3  --  4.1   CHLORIDE  --   --  94*  --  96*  --  96*   CO2  --   --  23  --  20*  --  20*   ANIONGAP  --   --  11  --  12  --  9   * 118* 119*   < > 108*   < > 122*   BUN  --   --  11.1  --  10.6  --  9.6   CR  --   --  0.56*  --  0.51*  --  0.53*   GFRESTIMATED  --   --  >90  --  >90  --  >90   BENY  --   --  8.9  --  8.9  --  8.8    < > = values in this interval not displayed.      Imaging:   No results found for this or any previous visit (from the past 24 hour(s)).

## 2023-09-07 NOTE — PLAN OF CARE
Pt is alert and bale to make needs known. Pt denies pain discomfort. VSS. Pt is on potassium and magnesium protocol. Magnesium was replaced during the shift. Pt is on blood sugar checks. Pt is assist of two in transfer cares.pt is on fluid restriction 1800ml/hr. Pt is sleeping in bed. Bed alarm in place and call light within reach. Will continue to monitor and assess pt.

## 2023-09-07 NOTE — PROGRESS NOTES
"   09/07/23 0910   Appointment Info   Signing Clinician's Name / Credentials (OT) Torri Osborn, OTR/L   Living Environment   People in Home alone   Current Living Arrangements apartment   Home Accessibility no concerns   Living Environment Comments Pt lives alone in 2nd floor apartment, full flight to apartment, no elevator, tub/shower, standard height toilet   Self-Care   Usual Activity Tolerance good   Current Activity Tolerance poor   Equipment Currently Used at Home none   Activity/Exercise/Self-Care Comment Pt reports indep in all ADLs, IADLs and mobility tasks with no AD at baseline.   Instrumental Activities of Daily Living (IADL)   Previous Responsibilities meal prep;housekeeping;laundry;shopping;medication management;finances;driving   General Information   Onset of Illness/Injury or Date of Surgery 08/30/23   Referring Physician Russell Forbes MD   Patient/Family Therapy Goal Statement (OT) Pt's goal is to \"feel better\"   Additional Occupational Profile Info/Pertinent History of Current Problem Per chart: Pt is a 63 year old male admitted with L shoulder pain, Alcohol dependence/Acute alcohol intoxication with withdrawal/ metabolic/toxic encephalopathy.   Existing Precautions/Restrictions fall   Cognitive Status Examination   Orientation Status orientation to person, place and time   Cognitive Status Comments Improved cognition this date   Visual Perception   Visual Impairment/Limitations WNL   Sensory   Sensory Quick Adds sensation intact   Pain Assessment   Patient Currently in Pain Yes, see Vital Sign flowsheet  (pain in L shoulder)   Range of Motion Comprehensive   Comment, General Range of Motion RUE WFL; very limited AROM of L shoulder; elbow/wrist/digits on LUE WFL; too painful to attempt PROM to L shoulder this date   Strength Comprehensive (MMT)   Comment, General Manual Muscle Testing (MMT) Assessment Generalized weakness   Bed Mobility   Bed Mobility supine-sit;sit-supine   Supine-Sit " Banner (Bed Mobility) moderate assist (50% patient effort)   Sit-Supine Banner (Bed Mobility) contact guard   Transfers   Transfers bed-chair transfer;sit-stand transfer;toilet transfer;shower transfer   Transfer Skill: Bed to Chair/Chair to Bed   Bed-Chair Banner (Transfers) minimum assist (75% patient effort)   Assistive Device (Bed-Chair Transfers) rolling walker   Sit-Stand Transfer   Sit-Stand Banner (Transfers) minimum assist (75% patient effort)   Assistive Device (Sit-Stand Transfers) walker, front-wheeled   Shower Transfer   Banner Level (Shower Transfer) not tested   Toilet Transfer   Banner Level (Toilet Transfer) minimum assist (75% patient effort)   Assistive Device (Toilet Transfer) walker, front-wheeled   Balance   Balance Comments Mild unsteadiness w/ standing and use of FWW   Activities of Daily Living   BADL Assessment/Intervention upper body dressing;lower body dressing;grooming;toileting   Upper Body Dressing Assessment/Training   Banner Level (Upper Body Dressing) moderate assist (50% patient effort)   Lower Body Dressing Assessment/Training   Banner Level (Lower Body Dressing) moderate assist (50% patient effort)   Grooming Assessment/Training   Banner Level (Grooming) minimum assist (75% patient effort)   Toileting   Banner Level (Toileting) minimum assist (75% patient effort)   Clinical Impression   Criteria for Skilled Therapeutic Interventions Met (OT) Yes, treatment indicated   OT Diagnosis Impaired ADLs, IADLs and mobility tasks   OT Problem List-Impairments impacting ADL problems related to;activity tolerance impaired;balance;cognition;strength;pain   ADL comments/analysis Pt below stated baseline level of functioning   Assessment of Occupational Performance 5 or more Performance Deficits   Identified Performance Deficits Bathing, dressing, grooming, toileting, homemaking, transfers   Planned Therapy Interventions (OT) ADL  retraining;IADL retraining;cognition;strengthening;transfer training;home program guidelines;progressive activity/exercise;risk factor education   Clinical Decision Making Complexity (OT) low complexity   Risk & Benefits of therapy have been explained evaluation/treatment results reviewed;risks/benefits reviewed;care plan/treatment goals reviewed;current/potential barriers reviewed;participants voiced agreement with care plan;participants included;patient   OT Total Evaluation Time   OT Eval, Low Complexity Minutes (30143) 10   OT Goals   Therapy Frequency (OT) 5 times/wk   OT Predicted Duration/Target Date for Goal Attainment 09/14/23   OT Goals Hygiene/Grooming;Upper Body Dressing;Lower Body Dressing;Toilet Transfer/Toileting;OT Goal 1   OT: Hygiene/Grooming supervision/stand-by assist;using adaptive equipment;while standing   OT: Upper Body Dressing Supervision/stand-by assist;using adaptive equipment   OT: Lower Body Dressing Supervision/stand-by assist;using adaptive equipment   OT: Toilet Transfer/Toileting Supervision/stand-by assist;toilet transfer;cleaning and garment management;using adaptive equipment   OT: Goal 1 Pt will perform tub/shower transfer w/ CGA in prep for safe transfer in discharge environment.   OT Discharge Planning   OT Discharge Recommendation (DC Rec) Transitional Care Facility   OT Rationale for DC Rec Pt presents significantly below baseline level of functioning, limited by generalized weakness and severe L shoulder pain. Recommend ongoing skilled OT while IP and in TCU setting to improve strength, functional activity tolerance, balance and safety needed for daily tasks.   OT Brief overview of current status Mod A supine > sit, CGA sit > supine, min A transfers w/ FWW, A&Ox3   Total Session Time   Total Session Time (sum of timed and untimed services) 10

## 2023-09-07 NOTE — PLAN OF CARE
Goal Outcome Evaluation:                      A/O. VSS. Calm and coperative. Pt is on potassium and magnesium protocol. Magnesium was given to pt.Pt rate pain at 5. Acetaminophen was given and stated no pain on post pain assessment.

## 2023-09-07 NOTE — PROGRESS NOTES
"Orthopedic Surgery  Saji Iqbal  09/07/2023     Admit Date:  8/30/2023    Left shoulder pain and weakness    Patient sleeping. In and out of sleep during entire encounter. Reports experiencing left shoulder pain, decreased mobility, and weakness for the past 1 week. States there was \"some type of injury\" but is unable to elaborate on this. Denies any instances of \"popping/cracking\" in the shoulder nor any falls. Pain is diffuse about the left shoulder. Overall improving. Notes some discomfort and stiffness to his neck. Denies numbness and tingling to the left upper extremity. Patient has been working with PT. Patient seen in consultation by ortho trauma team yesterday and a left shoulder MRI is ordered and pending. No acute events overnight.    Temp:  [97.3  F (36.3  C)-98.6  F (37  C)] 97.8  F (36.6  C)  Pulse:  [78-83] 81  Resp:  [16-20] 20  BP: (120-147)/(76-86) 147/84  SpO2:  [94 %-97 %] 94 %    Somnolent, in and out of sleep during exam. Not fully participating at times.  Left upper extremity: Skin intact to examined areas. Two small areas of purplish discoloration to the anterior shoulder/chest wall on the left. No erythema. No significant swelling/edema to the left upper extremity. No point tenderness to the left shoulder, but notes that \"everything is sore.\" No tenderness to the elbow, wrist, and hand/digits. Patient unable/unwilling to attempt active left shoulder forward elevation and abduction. No pain with passive motion of the left shoulder. Patient uncooperative/not comprehending how to complete resisted shoulder ER and IR. Patient is able to tense the deltoid on command. 5-/5 resisted left elbow and wrist flexion and extension. 5/5  strength. Radial pulse 2+. Capillary refill <2 seconds. SILT A/M/R/U nerve distributions.     Labs/Imaging:  Recent Labs   Lab Test 09/04/23  0752 09/02/23  0333 09/01/23  0641   WBC 6.9 6.9 7.5   HGB 12.2* 11.9* 11.5*   * 79* 60*     Recent Labs   Lab Test " 08/31/23  1102   INR 1.14     A/P    Left shoulder pain and weakness of unclear etiology  -Agree with left shoulder MRI without contrast as ordered. Discussed with patient that this is to rule out rotator cuff pathology. If no apparent rotator cuff issues are evident, will likely recommend neurology consult. No surgical indications for the left shoulder at this time.  -Continue with PT/OT.  -Continue with pain regimen.   -Ortho will check in on patient/provide imaging updates once MRI is complete (likely AM 9/8).    Rosio Rob PA-C  [facility]

## 2023-09-08 ENCOUNTER — APPOINTMENT (OUTPATIENT)
Dept: MRI IMAGING | Facility: CLINIC | Age: 63
End: 2023-09-08
Attending: STUDENT IN AN ORGANIZED HEALTH CARE EDUCATION/TRAINING PROGRAM
Payer: MEDICAID

## 2023-09-08 ENCOUNTER — APPOINTMENT (OUTPATIENT)
Dept: OCCUPATIONAL THERAPY | Facility: CLINIC | Age: 63
End: 2023-09-08
Payer: MEDICAID

## 2023-09-08 ENCOUNTER — APPOINTMENT (OUTPATIENT)
Dept: PHYSICAL THERAPY | Facility: CLINIC | Age: 63
End: 2023-09-08
Payer: MEDICAID

## 2023-09-08 LAB
ANION GAP SERPL CALCULATED.3IONS-SCNC: 8 MMOL/L (ref 7–15)
BUN SERPL-MCNC: 10.9 MG/DL (ref 8–23)
CALCIUM SERPL-MCNC: 8.8 MG/DL (ref 8.8–10.2)
CHLORIDE SERPL-SCNC: 98 MMOL/L (ref 98–107)
CREAT SERPL-MCNC: 0.6 MG/DL (ref 0.67–1.17)
DEPRECATED HCO3 PLAS-SCNC: 23 MMOL/L (ref 22–29)
EGFRCR SERPLBLD CKD-EPI 2021: >90 ML/MIN/1.73M2
GLUCOSE BLDC GLUCOMTR-MCNC: 123 MG/DL (ref 70–99)
GLUCOSE BLDC GLUCOMTR-MCNC: 136 MG/DL (ref 70–99)
GLUCOSE BLDC GLUCOMTR-MCNC: 143 MG/DL (ref 70–99)
GLUCOSE BLDC GLUCOMTR-MCNC: 157 MG/DL (ref 70–99)
GLUCOSE BLDC GLUCOMTR-MCNC: 165 MG/DL (ref 70–99)
GLUCOSE SERPL-MCNC: 145 MG/DL (ref 70–99)
MAGNESIUM SERPL-MCNC: 2 MG/DL (ref 1.7–2.3)
PHOSPHATE SERPL-MCNC: 2.9 MG/DL (ref 2.5–4.5)
POTASSIUM SERPL-SCNC: 4.6 MMOL/L (ref 3.4–5.3)
SODIUM SERPL-SCNC: 129 MMOL/L (ref 136–145)

## 2023-09-08 PROCEDURE — 250N000013 HC RX MED GY IP 250 OP 250 PS 637: Performed by: INTERNAL MEDICINE

## 2023-09-08 PROCEDURE — 97110 THERAPEUTIC EXERCISES: CPT | Mod: GP | Performed by: PHYSICAL THERAPIST

## 2023-09-08 PROCEDURE — 73221 MRI JOINT UPR EXTREM W/O DYE: CPT | Mod: LT

## 2023-09-08 PROCEDURE — 97116 GAIT TRAINING THERAPY: CPT | Mod: GP | Performed by: PHYSICAL THERAPIST

## 2023-09-08 PROCEDURE — 250N000011 HC RX IP 250 OP 636: Performed by: PHYSICIAN ASSISTANT

## 2023-09-08 PROCEDURE — 84100 ASSAY OF PHOSPHORUS: CPT | Performed by: INTERNAL MEDICINE

## 2023-09-08 PROCEDURE — 36415 COLL VENOUS BLD VENIPUNCTURE: CPT | Performed by: INTERNAL MEDICINE

## 2023-09-08 PROCEDURE — 97530 THERAPEUTIC ACTIVITIES: CPT | Mod: GP | Performed by: PHYSICAL THERAPIST

## 2023-09-08 PROCEDURE — 73221 MRI JOINT UPR EXTREM W/O DYE: CPT | Mod: 26 | Performed by: RADIOLOGY

## 2023-09-08 PROCEDURE — 83735 ASSAY OF MAGNESIUM: CPT | Performed by: INTERNAL MEDICINE

## 2023-09-08 PROCEDURE — 120N000001 HC R&B MED SURG/OB

## 2023-09-08 PROCEDURE — 97535 SELF CARE MNGMENT TRAINING: CPT | Mod: GO

## 2023-09-08 PROCEDURE — 99232 SBSQ HOSP IP/OBS MODERATE 35: CPT | Performed by: INTERNAL MEDICINE

## 2023-09-08 PROCEDURE — 82310 ASSAY OF CALCIUM: CPT | Performed by: INTERNAL MEDICINE

## 2023-09-08 RX ORDER — HYDROMORPHONE HYDROCHLORIDE 1 MG/ML
0.5 INJECTION, SOLUTION INTRAMUSCULAR; INTRAVENOUS; SUBCUTANEOUS ONCE
Status: COMPLETED | OUTPATIENT
Start: 2023-09-08 | End: 2023-09-08

## 2023-09-08 RX ORDER — MAGNESIUM OXIDE 400 MG/1
400 TABLET ORAL EVERY 4 HOURS
Status: COMPLETED | OUTPATIENT
Start: 2023-09-08 | End: 2023-09-08

## 2023-09-08 RX ORDER — GLIPIZIDE 10 MG/1
10 TABLET, FILM COATED, EXTENDED RELEASE ORAL DAILY
Qty: 90 TABLET | Refills: 0 | Status: SHIPPED | OUTPATIENT
Start: 2023-09-08

## 2023-09-08 RX ADMIN — HYDROMORPHONE HYDROCHLORIDE 0.5 MG: 1 INJECTION, SOLUTION INTRAMUSCULAR; INTRAVENOUS; SUBCUTANEOUS at 13:42

## 2023-09-08 RX ADMIN — CARVEDILOL 6.25 MG: 6.25 TABLET, FILM COATED ORAL at 17:55

## 2023-09-08 RX ADMIN — MAGNESIUM OXIDE TAB 400 MG (241.3 MG ELEMENTAL MG) 400 MG: 400 (241.3 MG) TAB at 11:18

## 2023-09-08 RX ADMIN — CARVEDILOL 6.25 MG: 6.25 TABLET, FILM COATED ORAL at 09:06

## 2023-09-08 RX ADMIN — LISINOPRIL 20 MG: 20 TABLET ORAL at 09:06

## 2023-09-08 RX ADMIN — CALCIUM 500 MG: 500 TABLET ORAL at 09:06

## 2023-09-08 RX ADMIN — MAGNESIUM OXIDE TAB 400 MG (241.3 MG ELEMENTAL MG) 400 MG: 400 (241.3 MG) TAB at 14:47

## 2023-09-08 RX ADMIN — MULTIPLE VITAMINS W/ MINERALS TAB 1 TABLET: TAB at 09:06

## 2023-09-08 RX ADMIN — ASPIRIN 81 MG: 81 TABLET, COATED ORAL at 09:06

## 2023-09-08 RX ADMIN — THIAMINE HCL TAB 100 MG 100 MG: 100 TAB at 09:06

## 2023-09-08 RX ADMIN — FOLIC ACID 1 MG: 1 TABLET ORAL at 09:06

## 2023-09-08 RX ADMIN — PAROXETINE HYDROCHLORIDE 20 MG: 20 TABLET, FILM COATED ORAL at 09:06

## 2023-09-08 RX ADMIN — CALCIUM 500 MG: 500 TABLET ORAL at 17:55

## 2023-09-08 ASSESSMENT — ACTIVITIES OF DAILY LIVING (ADL)
ADLS_ACUITY_SCORE: 41
ADLS_ACUITY_SCORE: 45
ADLS_ACUITY_SCORE: 41
ADLS_ACUITY_SCORE: 42

## 2023-09-08 NOTE — PLAN OF CARE
Goal Outcome Evaluation:       VS stable. Diminished LS. Complained of minimal pain in left shoulder, no intervention wanted. Slept well throughout the night.

## 2023-09-08 NOTE — PROGRESS NOTES
[facility]  Hospitalist Progress Note  Naseem Garcia MD 09/08/2023    Reason for Stay (Diagnosis): ETOH dependence, L shoulder pain         Assessment and Plan:      Summary of Stay: Saji Iqbal is a 63 year old male admitted on 8/30/2023. He has a hx of CAD s/p PCI LAD, type 2 DM, HTN, HLP, alcohol abuse who presents with L shoulder pain.  He was sleeping on his left shoulder and when he woke up he had pain in the left shoulder.  The pain is localized to the left shoulder and does not radiate.  It is made worse with certain movements.  He had a little bit of numbness on his left hand but that has improved.  He denies any chest pain or shortness of breath.  Was noted to have a mildly elevated troponin level and to be intoxicated with alcohol.  He was placed in the hospital for further evaluation and treatment.     Withdrawal sx have improved/resolved and CIWA has been discontinued     Course complicated by persistent L shoulder pain and weakness at shoulder joint.  Has normal /biceps strength but unable/unwilling to lift LUE.  Orthopedic surgery has been consulted and L shoulder MRI ordered.  Was unable to complete MRI on 9/7 due to pain; attempting repeat MRI 9/8 with IV pain medication to try and complete study.  MSK/rotator cuff injury possible source of sx     Plans today:  - no significant changes made today  - await L shoulder MRI - attempt again today with IV pain medication.  I d/w both ortho WAYNE and bedside nurse today  - appreciate orthopedics input  - if no answer with L shoulder MRI, consider neuro input though I do not suspect a primary neurologic etiology here.       Alcohol dependence/Acute alcohol intoxication with withdrawal/ metabolic/toxic encephalopathy.  - was treated with CIWA protocol  - as withdrawal sx improved and due to some confusion felt related to benzos he was receiving via protocol, CIWA was stopped on 9/6  - continue thiamine/folate  - noted ammonia level  at 53.   - mentation has improved and appears to be near baseline now     New ischemic cardiomyopathy/ coronary artery disease/ Non-ST elevation myocardial infarction.  Hypertension/Hyperlipidemia  -Appreciate cardiology input.  Is a sub-optimal candidate for procedures.  He has been noncompliant. Initial plan had been for coronary angiogram with plan is for a stress test 9/5/2023.  it is suspected that his alcohol abuse is also playing a role in his cardiomyopathy.   If necessary, bare-metal stenting could be done rather than BERONICA placement.  Patient currently is on Coreg, aspirin, lisinopril.  He is not on a statin due to his alcohol use and liver disease.  Echocardiogram On 8/30/2023 showing ejection fraction of 35-40% with severe inferior wall hypokinesis and septal hypokinesis. Stress test  9/5/23, results showing large area of infarction but not reversible.  EF 32%.  At this point only medical management.          Thrombocytopenia/Anemia.  -No obvious hemorrhage.  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       Hyponatremia.  -Mild.  Sodium 126-133   this is in the setting of ongoing alcohol use.  Patient really is not eating drinking all that well.  We did given his saline without any real improvement.  Placed on a fluid restriction 1800cc.          Hypomagnesemia/Hypophosphatemia/Hypocalcemia.  -Magnesium replacement protocol.  -Phosphorus replacement protocol.  -Ionized calcium okay.     Transaminitis/Hyperbilirubinemia.  -Suspect due to alcohol abuse.  Patient with hepatic steatosis and fibrosis noted on ultrasound of abdomen.  Patient has been seen by GI.  At this point there is nothing more to offer.        Left shoulder pain.  -No acute fractures on imaging.  Suspect musculoskeletal pain.  Continue on pain medications as needed. Physical therapy to see, on 9/6/23 not able to lift his arm but has great  strength and sensation.  Ortho consulted  "and getting a left shoulder MRI (MRI ordered on 9/6; not yet performed).   - if no pathology on shoulder MRI, would consider neurologic cause     Deconditioning.  Physical therapy and occupational therapy to see.  He needs a Tcu at discharge               Diet: Combination Diet 2 gm NA Diet; Low Saturated Fat Diet  Fluid restriction 1800 ML FLUID  1800 ml fluid restriction  DVT Prophylaxis: Pneumatic Compression Devices  Durbin Catheter: Not present  Lines: None     Cardiac Monitoring: ACTIVE order. Indication: AMI (NSTEMI/ STEMI) (48 hours)  Code Status: Full Code    DISPO:  consider discharge after shoulder pain evaluated and therapeutic plan in place.  TCU has been recommended and SW assisting with planning           Interval History (Subjective):      Continues to have L shoulder pain.  Unable to complete MRI yesterday but willing to try today with pain medication                  Physical Exam:      Last Vital Signs:  /78 (BP Location: Right arm)   Pulse 82   Temp 97.6  F (36.4  C) (Oral)   Resp 18   Ht 1.803 m (5' 11\")   Wt 96.4 kg (212 lb 8.4 oz)   SpO2 94%   BMI 29.64 kg/m        Intake/Output Summary (Last 24 hours) at 9/8/2023 1140  Last data filed at 9/8/2023 0319  Gross per 24 hour   Intake --   Output 250 ml   Net -250 ml       Constitutional: Awake, alert, cooperative, no apparent distress     Respiratory: Clear to auscultation bilaterally, no crackles or wheezing   Cardiovascular: Regular rate and rhythm, normal S1 and S2, and no murmur noted   Abdomen: Normal bowel sounds, soft, non-distended, non-tender   Skin: No rashes, no cyanosis, dry to touch   Neuro: Alert and oriented x3, no weakness, numbness, memory loss   Extremities: No edema, normal range of motion   Other(s): Mentation better today, awake/alert/interactive       All other systems: Negative          Medications:      All current medications were reviewed with changes reflected in problem list.         Data:      All new lab " and imaging data was reviewed.   Labs:  Recent Labs   Lab 09/04/23  0752   WBC 6.9   HGB 12.2*   HCT 34.7*   *   *      Imaging:   No results found for this or any previous visit (from the past 24 hour(s)).

## 2023-09-08 NOTE — PLAN OF CARE
Goal Outcome Evaluation:    A/O x 4 but forgetful. VSS on RA. Calm and cooperative throughout evening. BG ACHS. PRN Zofran given with relief. Pt refused MRI today but stated tomorrow he wanted to try. Bed alarm on for safety.   JACOBO HENSLEY.

## 2023-09-08 NOTE — PLAN OF CARE
Orthopedic Plan of Care    Per chart review and discussion with the patient's hospitalist, the patient did not tolerate his left shoulder MRI yesterday due to pain. A one-time dose of IV Dilaudid was ordered as a pre-medication with the hope of completing the scan later today. Ortho will follow for results and update the patient accordingly with a plan once completed.    ADDENDUM:  Reviewed the patient's left shoulder MRI without contrast with him at bedside. Formal radiologist impression as follows:    Impression:  1. Rotator cuff tendon pathology:    a. Tiny low-grade intrasubstance tear of the supraspinatus/infraspinatus junction fibers at the footprint.   b. Tiny punctate intrasubstance tear of the subscapularis tendon lower fibers at the footprint.    c. Supraspinatus and infraspinatus muscle strain.  2. Moderate to severe acromioclavicular joint degenerative change.    No findings of full-thickness tearing to the left rotator cuff musculature/tendons. Patient does have strains to the supraspinatus and infraspinatus as well as moderate to severe AC joint osteoarthritis. Patient is more awake on today's exam and upon attempting to range the left shoulder, has significant difficulty/restriction with forward flexion and abduction and essentially keeps his shoulder tucked beside him and tenses his trapezius. He also notes focal tenderness/pain to the anterior aspect of his glenohumeral joint. There is no point tenderness at the AC joint. Previously had good biceps, triceps, and wrist flexion/extension strength on yesterday's exam and he remains neurovascularly intact distally.    Based of the patient's abrupt onset of symptoms, exam findings, and MRI results, there is a strong possibility of left glenohumeral joint adhesive capsulitis and symptoms related to his strain that are causing him difficulty with motion and pain. At this time, PT with or without an intraarticular left glenohumeral joint cortisone injection  were recommended. Patient would like to contemplate this option as he does not like needles and discuss further with his hospitalist, Dr. Garcia. I reviewed the above recommendations with Dr. Garcia who agrees an injection and PT would be helpful. I do not see an obvious need for a neurology consult at this time as symptoms seem to be localizing to the left shoulder itself and he is otherwise motor intact. I have placed a left GH joint cortisone injection order to be done by IR under XR or US after discussing plan with Dr. Garcia. He will plan to see the patient this afternoon for further discussion.     Ortho trauma will sign off this hospitalization. Please contact our team if any questions or concerns arise.    Rosio Rob PA-C  [facility]

## 2023-09-08 NOTE — PLAN OF CARE
"Goal Outcome Evaluation:  Vitals:/80   Pulse 70   Temp 98.1  F (36.7  C) (Oral)   Resp 18   Ht 1.803 m (5' 11\")   Wt 96.4 kg (212 lb 8.4 oz)   SpO2 94%   BMI 29.64 kg/m      Pain: reports ongoing pain in left shoulder, declining medication intervention.   Neuro: A&O but forgetful.   GI/: urinal at bedside.   LDAs: PIV saline locked.   Labs: k mag and phos protocols, replaced mag, rechecks in.   Diet: low Na, fluid restriction.   Activity: Assist x1 with GB and walker. Pt had episode of dizziness when up to bathroom with PT. Lowest SBP was 118 per PT, MD was notified.   Plan: MRI completed. Ortho recommending injection, most likely cannot be done until Monday per radiology.                         "

## 2023-09-09 ENCOUNTER — APPOINTMENT (OUTPATIENT)
Dept: OCCUPATIONAL THERAPY | Facility: CLINIC | Age: 63
End: 2023-09-09
Payer: MEDICAID

## 2023-09-09 LAB
ANION GAP SERPL CALCULATED.3IONS-SCNC: 9 MMOL/L (ref 7–15)
BUN SERPL-MCNC: 11.4 MG/DL (ref 8–23)
CALCIUM SERPL-MCNC: 8.7 MG/DL (ref 8.8–10.2)
CHLORIDE SERPL-SCNC: 97 MMOL/L (ref 98–107)
CREAT SERPL-MCNC: 0.59 MG/DL (ref 0.67–1.17)
DEPRECATED HCO3 PLAS-SCNC: 25 MMOL/L (ref 22–29)
EGFRCR SERPLBLD CKD-EPI 2021: >90 ML/MIN/1.73M2
GLUCOSE BLDC GLUCOMTR-MCNC: 136 MG/DL (ref 70–99)
GLUCOSE BLDC GLUCOMTR-MCNC: 163 MG/DL (ref 70–99)
GLUCOSE BLDC GLUCOMTR-MCNC: 184 MG/DL (ref 70–99)
GLUCOSE SERPL-MCNC: 138 MG/DL (ref 70–99)
HOLD SPECIMEN: NORMAL
MAGNESIUM SERPL-MCNC: 2 MG/DL (ref 1.7–2.3)
PHOSPHATE SERPL-MCNC: 2.8 MG/DL (ref 2.5–4.5)
POTASSIUM SERPL-SCNC: 4.7 MMOL/L (ref 3.4–5.3)
SODIUM SERPL-SCNC: 131 MMOL/L (ref 136–145)

## 2023-09-09 PROCEDURE — 97535 SELF CARE MNGMENT TRAINING: CPT | Mod: GO

## 2023-09-09 PROCEDURE — 36415 COLL VENOUS BLD VENIPUNCTURE: CPT | Performed by: INTERNAL MEDICINE

## 2023-09-09 PROCEDURE — 250N000013 HC RX MED GY IP 250 OP 250 PS 637: Performed by: INTERNAL MEDICINE

## 2023-09-09 PROCEDURE — 97530 THERAPEUTIC ACTIVITIES: CPT | Mod: GO

## 2023-09-09 PROCEDURE — 99232 SBSQ HOSP IP/OBS MODERATE 35: CPT | Performed by: INTERNAL MEDICINE

## 2023-09-09 PROCEDURE — 80048 BASIC METABOLIC PNL TOTAL CA: CPT | Performed by: INTERNAL MEDICINE

## 2023-09-09 PROCEDURE — 83735 ASSAY OF MAGNESIUM: CPT | Performed by: INTERNAL MEDICINE

## 2023-09-09 PROCEDURE — 84100 ASSAY OF PHOSPHORUS: CPT | Performed by: INTERNAL MEDICINE

## 2023-09-09 PROCEDURE — 120N000001 HC R&B MED SURG/OB

## 2023-09-09 RX ORDER — MAGNESIUM OXIDE 400 MG/1
400 TABLET ORAL EVERY 4 HOURS
Status: COMPLETED | OUTPATIENT
Start: 2023-09-09 | End: 2023-09-09

## 2023-09-09 RX ADMIN — FOLIC ACID 1 MG: 1 TABLET ORAL at 09:42

## 2023-09-09 RX ADMIN — ASPIRIN 81 MG: 81 TABLET, COATED ORAL at 09:43

## 2023-09-09 RX ADMIN — CALCIUM 500 MG: 500 TABLET ORAL at 09:42

## 2023-09-09 RX ADMIN — MAGNESIUM OXIDE TAB 400 MG (241.3 MG ELEMENTAL MG) 400 MG: 400 (241.3 MG) TAB at 13:53

## 2023-09-09 RX ADMIN — MULTIPLE VITAMINS W/ MINERALS TAB 1 TABLET: TAB at 09:43

## 2023-09-09 RX ADMIN — CARVEDILOL 6.25 MG: 6.25 TABLET, FILM COATED ORAL at 09:43

## 2023-09-09 RX ADMIN — LISINOPRIL 20 MG: 20 TABLET ORAL at 09:43

## 2023-09-09 RX ADMIN — MAGNESIUM OXIDE TAB 400 MG (241.3 MG ELEMENTAL MG) 400 MG: 400 (241.3 MG) TAB at 09:43

## 2023-09-09 RX ADMIN — CARVEDILOL 6.25 MG: 6.25 TABLET, FILM COATED ORAL at 18:36

## 2023-09-09 RX ADMIN — CALCIUM 500 MG: 500 TABLET ORAL at 18:36

## 2023-09-09 RX ADMIN — THIAMINE HCL TAB 100 MG 100 MG: 100 TAB at 09:43

## 2023-09-09 RX ADMIN — PAROXETINE HYDROCHLORIDE 20 MG: 20 TABLET, FILM COATED ORAL at 09:42

## 2023-09-09 ASSESSMENT — ACTIVITIES OF DAILY LIVING (ADL)
ADLS_ACUITY_SCORE: 39
ADLS_ACUITY_SCORE: 41
ADLS_ACUITY_SCORE: 39
ADLS_ACUITY_SCORE: 41
ADLS_ACUITY_SCORE: 39
ADLS_ACUITY_SCORE: 41
ADLS_ACUITY_SCORE: 39
ADLS_ACUITY_SCORE: 41

## 2023-09-09 NOTE — PROGRESS NOTES
"[facility]  Hospitalist Progress Note  Naseem Garcia MD 09/09/2023    Reason for Stay (Diagnosis): L shoulder pain, ETOH dependence, weakness         Assessment and Plan:      Summary of Stay: Saji Iqbal is a 63 year old male admitted on 8/30/2023. He has a hx of CAD s/p PCI LAD, type 2 DM, HTN, HLP, alcohol abuse who presents with L shoulder pain.  He was sleeping on his left shoulder and when he woke up he had pain in the left shoulder.  The pain is localized to the left shoulder and does not radiate.  It is made worse with certain movements.  He had a little bit of numbness on his left hand but that has improved.  He denies any chest pain or shortness of breath.  Was noted to have a mildly elevated troponin level and to be intoxicated with alcohol.  He was placed in the hospital for further evaluation and treatment.     Withdrawal sx have improved/resolved and CIWA has been discontinued     Course complicated by persistent L shoulder pain and weakness at shoulder joint.  Has normal /biceps strength but unable/unwilling to lift LUE.  Orthopedic surgery was consulted and L shoulder MRI performed.  Adhesive capsulitis (\"frozen shoulder\") is suspected diagnosis and L shoulder steroid injection via IR was recommended by orthopedics.  This was ordered on 9/8 and apparently cannot be done over the weekend; likely to be performed on 9/11.      Rehab therapy has recommended TCU; pt still unsure if he wants to pursue TCU vs discharge home.  I d/w care coordinator who will discuss options further with patient     Plans today:  - no significant changes made today  - IR steroid injection for L shoulder ordered yesterday; reportedly will not be able to be done until Monday  - pt still considering dispo options; d/w care coordinator today.  Has not yet been accepted at TCU but still being considered by several; pt not sure he wants to pursue TCU on discharge.  Care coordinator will d/w patient " further to review options     Alcohol dependence/Acute alcohol intoxication with withdrawal/ metabolic/toxic encephalopathy.  - was treated with CIWA protocol  - as withdrawal sx improved and due to some confusion felt related to benzos he was receiving via protocol, CIWA was stopped on 9/6.    - Withdrawal sx have resolved  - continue thiamine/folate  - noted ammonia level at 53.   - mentation has improved and appears to be near baseline now     New ischemic cardiomyopathy/ coronary artery disease/ Non-ST elevation myocardial infarction.  Hypertension/Hyperlipidemia  -Appreciate cardiology input.  Is a sub-optimal candidate for procedures.  He has been noncompliant. Initial plan had been for coronary angiogram with plan is for a stress test 9/5/2023.  it is suspected that his alcohol abuse is also playing a role in his cardiomyopathy.   If necessary, bare-metal stenting could be done rather than BERONICA placement.  Patient currently is on Coreg, aspirin, lisinopril.  He is not on a statin due to his alcohol use and liver disease.  Echocardiogram On 8/30/2023 showing ejection fraction of 35-40% with severe inferior wall hypokinesis and septal hypokinesis. Stress test  9/5/23, results showing large area of infarction but not reversible.  EF 32%.  At this point only medical management.          Thrombocytopenia/Anemia.  -No obvious hemorrhage.  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       Hyponatremia.  -Mild.  Sodium 126-133   this is in the setting of ongoing alcohol use.  Patient really is not eating drinking all that well.  We did given his saline without any real improvement.  Placed on a fluid restriction 1800cc.          Hypomagnesemia/Hypophosphatemia/Hypocalcemia.  -Magnesium replacement protocol.  -Phosphorus replacement protocol.  -Ionized calcium okay.     Transaminitis/Hyperbilirubinemia.  -Suspect due to alcohol abuse.  Patient with hepatic  "steatosis and fibrosis noted on ultrasound of abdomen.  Patient has been seen by GI.  At this point there is nothing more to offer.        Left shoulder pain.  -No acute fractures on imaging.  Suspect musculoskeletal pain.  Continue on pain medications as needed. Rehab seeing; pt unable to lift his arm but has great  strength and sensation.  Ortho consulted and MRI L shoulder showed arthritis and minimal rotator cuff tears; adhesive capsulitis is the suspected diagnosis and L shoulder steroid inj via IR has been ordered.     Deconditioning.  Physical therapy and occupational therapy consult appreciated.  TCU recommended           Diet: Combination Diet 2 gm NA Diet; Low Saturated Fat Diet  Fluid restriction 1800 ML FLUID  1800 ml fluid restriction  DVT Prophylaxis: Pneumatic Compression Devices  Durbin Catheter: Not present  Lines: None     Cardiac Monitoring: ACTIVE order. Indication: AMI (NSTEMI/ STEMI) (48 hours)  Code Status: Full Code    DISPO:  consider discharge after shoulder pain evaluated and therapeutic plan in place.  TCU has been recommended and SW assisting with planning           Interval History (Subjective):      Pt remains undecided on TCU; has been recommended by both PT and OT.  Has not yet been accepted at TCU but several are still considering him.  Await IR injection of L shoulder.  Still has pain and ROM impairment of L shoulder                  Physical Exam:      Last Vital Signs:  BP (!) 152/84 (BP Location: Right arm)   Pulse 74   Temp 98.5  F (36.9  C) (Oral)   Resp 18   Ht 1.803 m (5' 11\")   Wt 96.4 kg (212 lb 8.4 oz)   SpO2 95%   BMI 29.64 kg/m        Intake/Output Summary (Last 24 hours) at 9/9/2023 1050  Last data filed at 9/9/2023 0815  Gross per 24 hour   Intake --   Output 950 ml   Net -950 ml       Constitutional: Awake, alert, cooperative, no apparent distress     Respiratory: Clear to auscultation bilaterally, no crackles or wheezing   Cardiovascular: Regular rate and " rhythm, normal S1 and S2, and no murmur noted   Abdomen: Normal bowel sounds, soft, non-distended, non-tender   Skin: No rashes, no cyanosis, dry to touch   Neuro: Alert and oriented x3, no weakness, numbness, memory loss   Extremities: No edema, normal range of motion   Other(s): Normal L /biceps strength.  Able to move his LUE around a bit easier today than previous       All other systems: Negative          Medications:      All current medications were reviewed with changes reflected in problem list.         Data:      All new lab and imaging data was reviewed.   Labs:  Recent Labs   Lab 09/04/23  0752   WBC 6.9   HGB 12.2*   HCT 34.7*   *   *      Imaging:   Recent Results (from the past 24 hour(s))   MR Shoulder Left w/o Contrast    Narrative    EXAM: MR left shoulder without  contrast 9/8/2023 2:25 PM    TECHNIQUE: Multiplanar, multisequence imaging of the left shoulder  were obtained without administration of intravenous or intra-articular  gadolinium contrast using routine protocol.    History: shoulder weakness, inability to range; Shoulder pain;  Shoulder pain without trauma or other complicating feature; Shoulder  x-ray result available; No known/automatically detected potential  contraindications to MRI    Comparison: 8/30/2023    Findings:    ROTATOR CUFF and ASSOCIATED STRUCTURES  Rotator cuff: On a background of tendinosis, tiny low-grade  intrasubstance tear of the supraspinatus/infraspinatus junction fibers  at the footprint with subcortical cystic changes. Teres minor tendon  is intact.    On a background of tendinosis, tiny punctate intrasubstance tear of  the subscapularis tendon lower fibers at the footprint with  subcortical cystic changes.    Bursa: No subacromial or subdeltoid bursal fluid.    Musculature: Mild fatty infiltration along the teres minor  myotendinous junction. Muscle bulk of rotator cuff is preserved.   Deltoid muscle bulk is also preserved. Supraspinatus and  infraspinatus  muscle edema, most consistent with muscle strain.    Acromioclavicular joint  There are moderate to severe degenerative changes of the  acromioclavicular joint with mass effect on the underlying  supraspinatus myotendinous junction. Acromion is type 1 in sagittal  morphology.  Coracoacromial ligament is not thickened.    OSSEOUS STRUCTURES  No fracture, marrow contusion or marrow infiltration.    LONG BICIPITAL TENDON  The long head of the biceps tendon is normally situated within the  bicipital groove. No complete or partial biceps tendon tear is  present.    GLENOHUMERAL JOINT  Joint fluid: Physiologic amount of joint fluid is  present.    Cartilage and subarticular bone:  No focal hyaline cartilage defects  are noted. No Hill-Sachs, reverse Hill-Sachs, or bony Bankart lesions  are seen.    Labrum: Limited assessment on this study with relative lack of joint  distention shows no gross labral tear. Presumed sublabral foramen  anterosuperiorly.    ANCILLARY FINDINGS:      Impression    Impression:  1. Rotator cuff tendon pathology:    a. Tiny low-grade intrasubstance tear of the  supraspinatus/infraspinatus junction fibers at the footprint.   b. Tiny punctate intrasubstance tear of the subscapularis tendon  lower fibers at the footprint.    c. Supraspinatus and infraspinatus muscle strain.  2. Moderate to severe acromioclavicular joint degenerative change.    [email or web address]         SYSTEM ID:  [id number]

## 2023-09-09 NOTE — PLAN OF CARE
"Goal Outcome Evaluation:  Vitals:/70 (BP Location: Right arm)   Pulse 70   Temp 98.2  F (36.8  C) (Oral)   Resp 18   Ht 1.803 m (5' 11\")   Wt 96.4 kg (212 lb 8.4 oz)   SpO2 95%   BMI 29.64 kg/m      Pain: reports ongoing pain/discomfort in left shoulder. Sling offered for ambulation and OOB.   Neuro: A&O, forgetful.   GI/: incontinent at times, brief on.   LDAs: PIV saline locked.   Labs: Bgs, k mag and phos protocols, mag replaced. Rechecks in.   Diet: low Na, fluid restriction.   Activity: Assist x1 with GB  Plan: continue to monitor per POC. Discharge to TCU.                         "

## 2023-09-09 NOTE — PROGRESS NOTES
Care Management Follow Up    Length of Stay (days): 10    Expected Discharge Date: 09/11/2023     Concerns to be Addressed: financial/insurance, medication     Patient plan of care discussed at interdisciplinary rounds: Yes    Anticipated Discharge Disposition: Skilled Nursing Facility, Transitional Care    Additional Information:  Met with pt to further discuss discharge planning as pt had additional questions per MD. Informed pt that TCU referrals have been sent and are still under review and should know more on Monday. Pt states his insurance is a COBRA plan and not confident that he will have coverage. Discussed home care as a possible option if TCU plan unsuccessful.   Will continue to follow for discharge planning.    Yessy Pelaez RN   Inpatient Care Coordination  [facility]   Phone: [phone]

## 2023-09-09 NOTE — PLAN OF CARE
Pt is alert and oriented able to make needs known. Pt denies pain or discomfort. VSS. No acute distress noted. Pt is on blood sugar checks. No insulin given per sliding scale. Pt is on potassium, magnesium and phosphorus protocol. Pt is on fluid restriction 1800 ml. Pt denies dizziness when out of bed. Pt is assist of one in transfer and cares. Pt is sleeping in bed. Bed alarm in place and call light within reach. Will continue to monitor and assess pt.

## 2023-09-10 ENCOUNTER — APPOINTMENT (OUTPATIENT)
Dept: PHYSICAL THERAPY | Facility: CLINIC | Age: 63
End: 2023-09-10
Payer: MEDICAID

## 2023-09-10 ENCOUNTER — APPOINTMENT (OUTPATIENT)
Dept: OCCUPATIONAL THERAPY | Facility: CLINIC | Age: 63
End: 2023-09-10
Payer: MEDICAID

## 2023-09-10 LAB
ANION GAP SERPL CALCULATED.3IONS-SCNC: 8 MMOL/L (ref 7–15)
BUN SERPL-MCNC: 10.5 MG/DL (ref 8–23)
CALCIUM SERPL-MCNC: 8.7 MG/DL (ref 8.8–10.2)
CHLORIDE SERPL-SCNC: 99 MMOL/L (ref 98–107)
CREAT SERPL-MCNC: 0.58 MG/DL (ref 0.67–1.17)
DEPRECATED HCO3 PLAS-SCNC: 25 MMOL/L (ref 22–29)
EGFRCR SERPLBLD CKD-EPI 2021: >90 ML/MIN/1.73M2
GLUCOSE BLDC GLUCOMTR-MCNC: 124 MG/DL (ref 70–99)
GLUCOSE BLDC GLUCOMTR-MCNC: 129 MG/DL (ref 70–99)
GLUCOSE BLDC GLUCOMTR-MCNC: 151 MG/DL (ref 70–99)
GLUCOSE BLDC GLUCOMTR-MCNC: 182 MG/DL (ref 70–99)
GLUCOSE SERPL-MCNC: 143 MG/DL (ref 70–99)
MAGNESIUM SERPL-MCNC: 2 MG/DL (ref 1.7–2.3)
PHOSPHATE SERPL-MCNC: 2.7 MG/DL (ref 2.5–4.5)
POTASSIUM SERPL-SCNC: 4.7 MMOL/L (ref 3.4–5.3)
SODIUM SERPL-SCNC: 132 MMOL/L (ref 136–145)

## 2023-09-10 PROCEDURE — 97116 GAIT TRAINING THERAPY: CPT | Mod: GP

## 2023-09-10 PROCEDURE — 250N000011 HC RX IP 250 OP 636: Performed by: INTERNAL MEDICINE

## 2023-09-10 PROCEDURE — 80048 BASIC METABOLIC PNL TOTAL CA: CPT | Performed by: INTERNAL MEDICINE

## 2023-09-10 PROCEDURE — 84100 ASSAY OF PHOSPHORUS: CPT | Performed by: INTERNAL MEDICINE

## 2023-09-10 PROCEDURE — 36415 COLL VENOUS BLD VENIPUNCTURE: CPT | Performed by: INTERNAL MEDICINE

## 2023-09-10 PROCEDURE — 99232 SBSQ HOSP IP/OBS MODERATE 35: CPT | Performed by: INTERNAL MEDICINE

## 2023-09-10 PROCEDURE — 250N000013 HC RX MED GY IP 250 OP 250 PS 637: Performed by: INTERNAL MEDICINE

## 2023-09-10 PROCEDURE — 97530 THERAPEUTIC ACTIVITIES: CPT | Mod: GP

## 2023-09-10 PROCEDURE — 97535 SELF CARE MNGMENT TRAINING: CPT | Mod: GO

## 2023-09-10 PROCEDURE — 83735 ASSAY OF MAGNESIUM: CPT | Performed by: INTERNAL MEDICINE

## 2023-09-10 PROCEDURE — 120N000001 HC R&B MED SURG/OB

## 2023-09-10 PROCEDURE — 97530 THERAPEUTIC ACTIVITIES: CPT | Mod: GO

## 2023-09-10 RX ORDER — ONDANSETRON 4 MG/1
4 TABLET, ORALLY DISINTEGRATING ORAL EVERY 6 HOURS PRN
Status: DISCONTINUED | OUTPATIENT
Start: 2023-09-10 | End: 2023-09-11 | Stop reason: HOSPADM

## 2023-09-10 RX ORDER — MAGNESIUM OXIDE 400 MG/1
400 TABLET ORAL EVERY 4 HOURS
Status: COMPLETED | OUTPATIENT
Start: 2023-09-10 | End: 2023-09-10

## 2023-09-10 RX ADMIN — CARVEDILOL 6.25 MG: 6.25 TABLET, FILM COATED ORAL at 18:29

## 2023-09-10 RX ADMIN — LISINOPRIL 20 MG: 20 TABLET ORAL at 09:09

## 2023-09-10 RX ADMIN — PAROXETINE HYDROCHLORIDE 20 MG: 20 TABLET, FILM COATED ORAL at 09:09

## 2023-09-10 RX ADMIN — MULTIPLE VITAMINS W/ MINERALS TAB 1 TABLET: TAB at 09:09

## 2023-09-10 RX ADMIN — ASPIRIN 81 MG: 81 TABLET, COATED ORAL at 09:09

## 2023-09-10 RX ADMIN — CALCIUM 500 MG: 500 TABLET ORAL at 18:29

## 2023-09-10 RX ADMIN — FOLIC ACID 1 MG: 1 TABLET ORAL at 09:09

## 2023-09-10 RX ADMIN — CARVEDILOL 6.25 MG: 6.25 TABLET, FILM COATED ORAL at 09:09

## 2023-09-10 RX ADMIN — CALCIUM 500 MG: 500 TABLET ORAL at 09:09

## 2023-09-10 RX ADMIN — THIAMINE HCL TAB 100 MG 100 MG: 100 TAB at 09:09

## 2023-09-10 RX ADMIN — MAGNESIUM OXIDE TAB 400 MG (241.3 MG ELEMENTAL MG) 400 MG: 400 (241.3 MG) TAB at 09:09

## 2023-09-10 RX ADMIN — ONDANSETRON 4 MG: 4 TABLET, ORALLY DISINTEGRATING ORAL at 10:13

## 2023-09-10 RX ADMIN — MAGNESIUM OXIDE TAB 400 MG (241.3 MG ELEMENTAL MG) 400 MG: 400 (241.3 MG) TAB at 12:58

## 2023-09-10 ASSESSMENT — ACTIVITIES OF DAILY LIVING (ADL)
ADLS_ACUITY_SCORE: 39
ADLS_ACUITY_SCORE: 39
ADLS_ACUITY_SCORE: 43
ADLS_ACUITY_SCORE: 39
ADLS_ACUITY_SCORE: 39
ADLS_ACUITY_SCORE: 43
ADLS_ACUITY_SCORE: 43
ADLS_ACUITY_SCORE: 39
ADLS_ACUITY_SCORE: 43
ADLS_ACUITY_SCORE: 39

## 2023-09-10 NOTE — PROGRESS NOTES
"SPIRITUAL HEALTH SERVICES - Progress Note  RH Med/Surg 3    Referral Source: Length of stay    Introduced pt Mika to [facility].  He deferred  support at this time.  Mika reported that he did not want to be here \"since day one.\"  He named his daughter as being the only person within his support system, but she lives three hours away.  She called Mika yesterday.    Plan: Informed pt how he can request further  support.  This author and other chaplains remain available per pt/family request.     Epifanio Allan M.Div., [facility]  Staff     [facility] routine referrals *[phone]  [facility] available 24/7 for emergent requests/referrals, either by paging the on-call  or by entering an ASAP/STAT consult in Epic (this will also page the on-call ).   "

## 2023-09-10 NOTE — PLAN OF CARE
"Goal Outcome Evaluation:  Vitals:/77 (BP Location: Right arm)   Pulse 73   Temp 98.2  F (36.8  C) (Oral)   Resp 18   Ht 1.803 m (5' 11\")   Wt 96.4 kg (212 lb 8.4 oz)   SpO2 94%   BMI 29.64 kg/m      Pain: reports ongoing shoulder discomfort, declining medication.   Neuro: A&O, forgetful   GI/: reporting some nausea around 10am, gave zofran.   LDAs: PIV saline locked.   Labs: K mag and phos protocols, mag replaced, rechecks in for am.   Diet: cardiac, 1800 ml fluid restriction.   Activity: Assist x1 with GB and walker.   Plan: possible discharge to home on Monday after shoulder injection.              This writer had care of pt from [id number].            "

## 2023-09-10 NOTE — PROGRESS NOTES
"/66 (BP Location: Right arm)   Pulse 73   Temp 98.2  F (36.8  C) (Oral)   Resp 20   Ht 1.803 m (5' 11\")   Wt 96.4 kg (212 lb 8.4 oz)   SpO2 95%   BMI 29.64 kg/m      A&O. A1 GBW. On RA. Denies pain. Mag replaced. Plan is to discharge home tomorrow following left shoulder steroid injection. SW following, declining TCU.   "

## 2023-09-10 NOTE — PROGRESS NOTES
"  [facility]  Hospitalist Progress Note  Naseem Garcia MD 09/10/2023    Reason for Stay (Diagnosis): L shoulder pain, ETOH dependence         Assessment and Plan:      Summary of Stay: Saji Iqbal is a 63 year old male admitted on 8/30/2023. He has a hx of CAD s/p PCI LAD, type 2 DM, HTN, HLP, alcohol abuse who presents with L shoulder pain.  He was sleeping on his left shoulder and when he woke up he had pain in the left shoulder.  The pain is localized to the left shoulder and does not radiate.  It is made worse with certain movements.  He had a little bit of numbness on his left hand but that has improved.  He denies any chest pain or shortness of breath.  Was noted to have a mildly elevated troponin level and to be intoxicated with alcohol.  He was placed in the hospital for further evaluation and treatment.     Withdrawal sx have improved/resolved and CIWA has been discontinued     Course complicated by persistent L shoulder pain and weakness at shoulder joint.  Has normal /biceps strength but unable/unwilling to lift LUE.  Orthopedic surgery was consulted and L shoulder MRI performed.  Adhesive capsulitis (\"frozen shoulder\") is suspected diagnosis and L shoulder steroid injection via IR was recommended by orthopedics.  This was ordered on 9/8 and apparently cannot be done over the weekend; likely to be performed on 9/11.       Rehab therapy has recommended TCU; pt has now decided against TCU and plans to discharge home.      Plans today:  - no significant changes made today  - IR steroid injection for L shoulder ordered yesterday; reportedly will not be able to be done until Monday  - pt has decided against going to TCU  - I offered patient discharge today with outpt steroid injection; he wants to wait until steroid injection in L shoulder done tomorrow then discharge home after that.       Alcohol dependence/Acute alcohol intoxication with withdrawal/ metabolic/toxic " encephalopathy.  - was treated with CIWA protocol  - as withdrawal sx improved and due to some confusion felt related to benzos he was receiving via protocol, CIWA was stopped on 9/6.    - Withdrawal sx have resolved  - continue thiamine/folate  - noted ammonia level at 53.   - mentation has improved and appears to be near baseline now     New ischemic cardiomyopathy/ coronary artery disease/ Non-ST elevation myocardial infarction.  Hypertension/Hyperlipidemia  -Appreciate cardiology input.  Is a sub-optimal candidate for procedures.  He has been noncompliant. Initial plan had been for coronary angiogram with plan is for a stress test 9/5/2023.  it is suspected that his alcohol abuse is also playing a role in his cardiomyopathy.   If necessary, bare-metal stenting could be done rather than BERONICA placement.  Patient currently is on Coreg, aspirin, lisinopril.  He is not on a statin due to his alcohol use and liver disease.  Echocardiogram On 8/30/2023 showing ejection fraction of 35-40% with severe inferior wall hypokinesis and septal hypokinesis. Stress test  9/5/23, results showing large area of infarction but not reversible.  EF 32%.  At this point only medical management.          Thrombocytopenia/Anemia.  -No obvious hemorrhage.  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       Hyponatremia.  -Mild.  Sodium 126-133   this is in the setting of ongoing alcohol use.  Patient really is not eating drinking all that well.  We did given his saline without any real improvement.  Placed on a fluid restriction 1800cc.          Hypomagnesemia/Hypophosphatemia/Hypocalcemia.  -Magnesium replacement protocol.  -Phosphorus replacement protocol.  -Ionized calcium okay.     Transaminitis/Hyperbilirubinemia.  -Suspect due to alcohol abuse.  Patient with hepatic steatosis and fibrosis noted on ultrasound of abdomen.  Patient has been seen by GI.  At this point there is nothing  "more to offer.        Left shoulder pain.  -No acute fractures on imaging.  Suspect musculoskeletal pain.  Continue on pain medications as needed. Rehab seeing; pt unable to lift his arm but has great  strength and sensation.  Ortho consulted and MRI L shoulder showed arthritis and minimal rotator cuff tears; adhesive capsulitis is the suspected diagnosis and L shoulder steroid inj via IR has been ordered.     Deconditioning.  Physical therapy and occupational therapy consult appreciated.  TCU recommended           Diet: Combination Diet 2 gm NA Diet; Low Saturated Fat Diet  Fluid restriction 1800 ML FLUID  1800 ml fluid restriction  DVT Prophylaxis: Pneumatic Compression Devices  Durbin Catheter: Not present  Lines: None     Cardiac Monitoring: ACTIVE order. Indication: AMI (NSTEMI/ STEMI) (48 hours)  Code Status: Full Code    DISPO:  home tomorrow.  TCU offered which patient has declined.  Anticipate outpatient/home therapies on discharge        Interval History (Subjective):      Pt has decided not to go to TCU.  He wants to make sure he has his shoulder injection done prior to discharge                  Physical Exam:      Last Vital Signs:  /77 (BP Location: Right arm)   Pulse 73   Temp 98.2  F (36.8  C) (Oral)   Resp 18   Ht 1.803 m (5' 11\")   Wt 96.4 kg (212 lb 8.4 oz)   SpO2 94%   BMI 29.64 kg/m        Intake/Output Summary (Last 24 hours) at 9/10/2023 1102  Last data filed at 9/10/2023 0745  Gross per 24 hour   Intake 640 ml   Output 1400 ml   Net -760 ml       Constitutional: Awake, alert, cooperative, no apparent distress     Respiratory: Clear to auscultation bilaterally, no crackles or wheezing   Cardiovascular: Regular rate and rhythm, normal S1 and S2, and no murmur noted   Abdomen: Normal bowel sounds, soft, non-distended, non-tender   Skin: No rashes, no cyanosis, dry to touch   Neuro: Alert and oriented x3, no weakness, numbness, memory loss   Extremities: No edema, normal range of " motion   Other(s):        All other systems: Negative          Medications:      All current medications were reviewed with changes reflected in problem list.         Data:      All new lab and imaging data was reviewed.   Labs:  Recent Labs   Lab 09/04/23  0752   WBC 6.9   HGB 12.2*   HCT 34.7*   *   *      Imaging:   No results found for this or any previous visit (from the past 24 hour(s)).

## 2023-09-11 ENCOUNTER — APPOINTMENT (OUTPATIENT)
Dept: PHYSICAL THERAPY | Facility: CLINIC | Age: 63
End: 2023-09-11
Payer: MEDICAID

## 2023-09-11 ENCOUNTER — APPOINTMENT (OUTPATIENT)
Dept: GENERAL RADIOLOGY | Facility: CLINIC | Age: 63
End: 2023-09-11
Attending: PHYSICIAN ASSISTANT
Payer: MEDICAID

## 2023-09-11 VITALS
BODY MASS INDEX: 29.75 KG/M2 | TEMPERATURE: 97.7 F | WEIGHT: 212.52 LBS | DIASTOLIC BLOOD PRESSURE: 76 MMHG | OXYGEN SATURATION: 95 % | SYSTOLIC BLOOD PRESSURE: 117 MMHG | RESPIRATION RATE: 18 BRPM | HEIGHT: 71 IN | HEART RATE: 75 BPM

## 2023-09-11 LAB
ANION GAP SERPL CALCULATED.3IONS-SCNC: 7 MMOL/L (ref 7–15)
BUN SERPL-MCNC: 14.4 MG/DL (ref 8–23)
CALCIUM SERPL-MCNC: 8.8 MG/DL (ref 8.8–10.2)
CHLORIDE SERPL-SCNC: 98 MMOL/L (ref 98–107)
CREAT SERPL-MCNC: 0.67 MG/DL (ref 0.67–1.17)
DEPRECATED HCO3 PLAS-SCNC: 25 MMOL/L (ref 22–29)
EGFRCR SERPLBLD CKD-EPI 2021: >90 ML/MIN/1.73M2
GLUCOSE BLDC GLUCOMTR-MCNC: 135 MG/DL (ref 70–99)
GLUCOSE BLDC GLUCOMTR-MCNC: 156 MG/DL (ref 70–99)
GLUCOSE SERPL-MCNC: 154 MG/DL (ref 70–99)
MAGNESIUM SERPL-MCNC: 2.1 MG/DL (ref 1.7–2.3)
PHOSPHATE SERPL-MCNC: 2.9 MG/DL (ref 2.5–4.5)
POTASSIUM SERPL-SCNC: 5 MMOL/L (ref 3.4–5.3)
SODIUM SERPL-SCNC: 130 MMOL/L (ref 136–145)

## 2023-09-11 PROCEDURE — 250N000009 HC RX 250

## 2023-09-11 PROCEDURE — 250N000013 HC RX MED GY IP 250 OP 250 PS 637: Performed by: INTERNAL MEDICINE

## 2023-09-11 PROCEDURE — 97530 THERAPEUTIC ACTIVITIES: CPT | Mod: GP | Performed by: PHYSICAL THERAPIST

## 2023-09-11 PROCEDURE — 99238 HOSP IP/OBS DSCHRG MGMT 30/<: CPT | Performed by: INTERNAL MEDICINE

## 2023-09-11 PROCEDURE — 84100 ASSAY OF PHOSPHORUS: CPT | Performed by: INTERNAL MEDICINE

## 2023-09-11 PROCEDURE — 250N000011 HC RX IP 250 OP 636: Mod: JZ | Performed by: PHYSICIAN ASSISTANT

## 2023-09-11 PROCEDURE — 97116 GAIT TRAINING THERAPY: CPT | Mod: GP | Performed by: PHYSICAL THERAPIST

## 2023-09-11 PROCEDURE — 83735 ASSAY OF MAGNESIUM: CPT | Performed by: INTERNAL MEDICINE

## 2023-09-11 PROCEDURE — 20610 DRAIN/INJ JOINT/BURSA W/O US: CPT | Mod: LT

## 2023-09-11 PROCEDURE — 80048 BASIC METABOLIC PNL TOTAL CA: CPT | Performed by: INTERNAL MEDICINE

## 2023-09-11 PROCEDURE — 36415 COLL VENOUS BLD VENIPUNCTURE: CPT | Performed by: INTERNAL MEDICINE

## 2023-09-11 PROCEDURE — 255N000002 HC RX 255 OP 636: Mod: JZ | Performed by: PHYSICIAN ASSISTANT

## 2023-09-11 RX ORDER — BUPIVACAINE HYDROCHLORIDE 5 MG/ML
5 INJECTION, SOLUTION EPIDURAL; INTRACAUDAL ONCE
Status: COMPLETED | OUTPATIENT
Start: 2023-09-11 | End: 2023-09-11

## 2023-09-11 RX ORDER — LIDOCAINE HYDROCHLORIDE 10 MG/ML
5 INJECTION, SOLUTION EPIDURAL; INFILTRATION; INTRACAUDAL; PERINEURAL ONCE
Status: DISCONTINUED | OUTPATIENT
Start: 2023-09-11 | End: 2023-09-11

## 2023-09-11 RX ORDER — CARVEDILOL 6.25 MG/1
6.25 TABLET ORAL 2 TIMES DAILY WITH MEALS
Qty: 60 TABLET | Refills: 1 | Status: ON HOLD | OUTPATIENT
Start: 2023-09-11 | End: 2023-10-26

## 2023-09-11 RX ORDER — IOPAMIDOL 408 MG/ML
10 INJECTION, SOLUTION INTRATHECAL ONCE
Status: COMPLETED | OUTPATIENT
Start: 2023-09-11 | End: 2023-09-11

## 2023-09-11 RX ORDER — LIDOCAINE HYDROCHLORIDE 10 MG/ML
5 INJECTION, SOLUTION EPIDURAL; INFILTRATION; INTRACAUDAL; PERINEURAL ONCE
Status: COMPLETED | OUTPATIENT
Start: 2023-09-11 | End: 2023-09-11

## 2023-09-11 RX ORDER — TRIAMCINOLONE ACETONIDE 40 MG/ML
40 INJECTION, SUSPENSION INTRA-ARTICULAR; INTRAMUSCULAR ONCE
Status: COMPLETED | OUTPATIENT
Start: 2023-09-11 | End: 2023-09-11

## 2023-09-11 RX ADMIN — ACETAMINOPHEN 650 MG: 325 TABLET, FILM COATED ORAL at 00:18

## 2023-09-11 RX ADMIN — FOLIC ACID 1 MG: 1 TABLET ORAL at 09:39

## 2023-09-11 RX ADMIN — TRIAMCINOLONE ACETONIDE 40 MG: 40 INJECTION, SUSPENSION INTRA-ARTICULAR; INTRAMUSCULAR at 10:30

## 2023-09-11 RX ADMIN — CARVEDILOL 6.25 MG: 6.25 TABLET, FILM COATED ORAL at 09:39

## 2023-09-11 RX ADMIN — BUPIVACAINE HYDROCHLORIDE 15 MG: 5 INJECTION, SOLUTION EPIDURAL; INTRACAUDAL; PERINEURAL at 10:29

## 2023-09-11 RX ADMIN — LIDOCAINE HYDROCHLORIDE 5 ML: 10 INJECTION, SOLUTION EPIDURAL; INFILTRATION; INTRACAUDAL; PERINEURAL at 10:27

## 2023-09-11 RX ADMIN — IOPAMIDOL 1 ML: 408 INJECTION, SOLUTION INTRATHECAL at 10:29

## 2023-09-11 RX ADMIN — CALCIUM 500 MG: 500 TABLET ORAL at 09:39

## 2023-09-11 RX ADMIN — MULTIPLE VITAMINS W/ MINERALS TAB 1 TABLET: TAB at 09:39

## 2023-09-11 RX ADMIN — ASPIRIN 81 MG: 81 TABLET, COATED ORAL at 09:39

## 2023-09-11 RX ADMIN — THIAMINE HCL TAB 100 MG 100 MG: 100 TAB at 09:39

## 2023-09-11 RX ADMIN — LISINOPRIL 20 MG: 20 TABLET ORAL at 09:39

## 2023-09-11 RX ADMIN — PAROXETINE HYDROCHLORIDE 20 MG: 20 TABLET, FILM COATED ORAL at 09:41

## 2023-09-11 ASSESSMENT — ACTIVITIES OF DAILY LIVING (ADL)
ADLS_ACUITY_SCORE: 43

## 2023-09-11 NOTE — DISCHARGE SUMMARY
"[facility]  Hospitalist Discharge Summary      Date of Admission:  8/30/2023  Date of Discharge:  9/11/2023 12:38 PM  Discharging Provider: Naseem Garcia MD  Discharge Service: Hospitalist Service    Discharge Diagnoses   Alcohol withdrawal  Alcohol dependence  Left shoulder pain, suspect due to adhesive capsulitis.    - s/p L shoulder steroid injection this admission    PMH:    CAD s/p PCI LAD  Type 2 DM  HTN  HLP  alcohol abuse     Clinically Significant Risk Factors     # Overweight: Estimated body mass index is 29.64 kg/m  as calculated from the following:    Height as of this encounter: 1.803 m (5' 11\").    Weight as of this encounter: 96.4 kg (212 lb 8.4 oz).  # Severe Malnutrition: based on nutrition assessment      Follow-ups Needed After Discharge   Follow-up Appointments     Follow-up and recommended labs and tests       See your primary MD in 1-2 weeks  Medication change:  Lantus insulin has been discontinued.    New medication:  carvedilol has been started for blood pressure management  Avoid alcohol use.  Recommend AA (or similar program) to help abstain from   alcohol use            Unresulted Labs Ordered in the Past 30 Days of this Admission       No orders found from 7/31/2023 to 8/31/2023.            Discharge Disposition   Discharged to home  Condition at discharge: Stable    Hospital Course    63 year old male admitted on 8/30/2023. He has a hx of CAD s/p PCI LAD, type 2 DM, HTN, HLP, alcohol abuse who presents with L shoulder pain.  He was sleeping on his left shoulder and when he woke up he had pain in the left shoulder.  The pain is localized to the left shoulder and does not radiate.  It is made worse with certain movements.  He had a little bit of numbness on his left hand but that has improved.  He denies any chest pain or shortness of breath.  Was noted to have a mildly elevated troponin level and to be intoxicated with alcohol.  He was placed in the hospital " for further evaluation and treatment.    His left shoulder pain was evaluated with imaging, including an MRI which showed arthritis of the AC joint and minimal tear of the rotator cuff.  Orthopedic surgery was consulted and felt that his symptoms were best explained by adhesive capsulitis.  IR steroid injection into the left shoulder was completed prior to discharge and outpatient PT/OT was ordered on discharge.      Hospital course was complicated by alcohol withdrawal.  He was treated with intermittent benzodiazepine dosing and withdrawal symptoms completely resolved by time of discharge    TCU was considered by the patient but he opted to discharge home instead.         Consultations This Hospital Stay   CARE MANAGEMENT / SOCIAL WORK IP CONSULT  CARDIOLOGY IP CONSULT  GASTROENTEROLOGY IP CONSULT  PHYSICAL THERAPY ADULT IP CONSULT  OCCUPATIONAL THERAPY ADULT IP CONSULT  ORTHOPEDIC SURGERY IP CONSULT    Code Status   Full Code    Time Spent on this Encounter   I, Naseem Garcia MD, personally saw the patient today and spent less than or equal to 30 minutes discharging this patient.       Naseem Garcia MD  [place] [address] [facility]  [address] [place] [address]  Phone: [phone]  Fax: [phone]  ______________________________________________________________________    Physical Exam   Vital Signs: Temp: 97.7  F (36.5  C) Temp src: Oral BP: 117/76 Pulse: 75   Resp: 18 SpO2: 95 % O2 Device: None (Room air)    Weight: 212 lbs 8.38 oz  Awake, alert, oriented, interactive  RRR  CTA bilaterally  R hand with normal  strength, R radial pulse easily palpable      Naseem Esparza      Discharge Orders      Physical Therapy Referral      Occupational Therapy Referral      Primary Care - Care Coordination Referral      Reason for your hospital stay    Weakness, alcohol dependence and withdrawal, left shoulder pain due to adhesive capsulitis     Follow-up and  recommended labs and tests     See your primary MD in 1-2 weeks  Medication change:  Lantus insulin has been discontinued.    New medication:  carvedilol has been started for blood pressure management  Avoid alcohol use.  Recommend AA (or similar program) to help abstain from alcohol use     Activity    Your activity upon discharge: activity as tolerated     Diet    Follow this diet upon discharge: regular diet       Significant Results and Procedures   Results for orders placed or performed during the hospital encounter of 08/30/23   XR Shoulder Left G/E 3 Views    Narrative    EXAM: XR SHOULDER LEFT G/E 3 VIEWS  LOCATION: [facility]  DATE: 8/30/2023    INDICATION: left shoulder pain  COMPARISON: None.      Impression    IMPRESSION: Mild osteoarthritic change at the acromioclavicular joint. No displaced fracture or dislocation.   Chest XR,  PA & LAT    Narrative    EXAM: XR CHEST 2 VIEWS  LOCATION: [facility]  DATE: 8/30/2023    INDICATION: left shoulder pain, cardiac workup  COMPARISON: 12/22/2014      Impression    IMPRESSION: Borderline enlarged cardiac silhouette. Lungs are clear. No pneumothorax or pleural effusion.   US Abdomen Limited    Narrative    US ABDOMEN LIMITED 8/30/2023 9:26 AM    CLINICAL HISTORY: elevated LFT's., elevated LFT's.  TECHNIQUE: Limited abdominal ultrasound.    COMPARISON: None.    FINDINGS:    GALLBLADDER: The gallbladder is normal. No gallstones, wall  thickening, or pericholecystic fluid. Negative sonographic Fish's  sign.    BILE DUCTS: The common bile duct was not visualized, likely secondary  to poor acoustic penetration of the liver.    LIVER: Increased echogenicity of the hepatic parenchyma with markedly  decreased acoustic penetrance. The entire liver was incompletely  visualized due to abnormal echogenicity and poor acoustic penetration.  The main portal vein was not visualized, likely secondary to poor  acoustic  penetration.    RIGHT KIDNEY: No hydronephrosis.    PANCREAS: The pancreas is largely obscured by overlying gas.    Trace ascites.      Impression    IMPRESSION:  1.  Increased echogenicity of hepatic parenchyma with markedly  decreased acoustic penetrance. Findings could be seen with hepatic  steatosis and fibrosis.  2.  The CBD, main portal vein, and pancreas were not visualized on  this exam due to abnormal liver with decreased acoustic penetration  and bowel gas.  3.  Trace upper abdominal ascites.    NITESH OWEN MD         SYSTEM ID:  [id number]   XR Chest Port 1 View    Narrative    EXAM: XR CHEST PORT 1 VIEW  LOCATION: [facility]  DATE: 9/3/2023    INDICATION: encephalopathy  COMPARISON: 8/30/23      Impression    IMPRESSION:  Lung volumes are low. Medial left basilar opacity likely a confluence of soft tissues and atelectasis. No pleural fluid or pneumothorax. No edema. The cardiomediastinal silhouette is enlarged.    MR Shoulder Left w/o Contrast    Narrative    EXAM: MR left shoulder without  contrast 9/8/2023 2:25 PM    TECHNIQUE: Multiplanar, multisequence imaging of the left shoulder  were obtained without administration of intravenous or intra-articular  gadolinium contrast using routine protocol.    History: shoulder weakness, inability to range; Shoulder pain;  Shoulder pain without trauma or other complicating feature; Shoulder  x-ray result available; No known/automatically detected potential  contraindications to MRI    Comparison: 8/30/2023    Findings:    ROTATOR CUFF and ASSOCIATED STRUCTURES  Rotator cuff: On a background of tendinosis, tiny low-grade  intrasubstance tear of the supraspinatus/infraspinatus junction fibers  at the footprint with subcortical cystic changes. Teres minor tendon  is intact.    On a background of tendinosis, tiny punctate intrasubstance tear of  the subscapularis tendon lower fibers at the footprint with  subcortical cystic  changes.    Bursa: No subacromial or subdeltoid bursal fluid.    Musculature: Mild fatty infiltration along the teres minor  myotendinous junction. Muscle bulk of rotator cuff is preserved.   Deltoid muscle bulk is also preserved. Supraspinatus and infraspinatus  muscle edema, most consistent with muscle strain.    Acromioclavicular joint  There are moderate to severe degenerative changes of the  acromioclavicular joint with mass effect on the underlying  supraspinatus myotendinous junction. Acromion is type 1 in sagittal  morphology.  Coracoacromial ligament is not thickened.    OSSEOUS STRUCTURES  No fracture, marrow contusion or marrow infiltration.    LONG BICIPITAL TENDON  The long head of the biceps tendon is normally situated within the  bicipital groove. No complete or partial biceps tendon tear is  present.    GLENOHUMERAL JOINT  Joint fluid: Physiologic amount of joint fluid is  present.    Cartilage and subarticular bone:  No focal hyaline cartilage defects  are noted. No Hill-Sachs, reverse Hill-Sachs, or bony Bankart lesions  are seen.    Labrum: Limited assessment on this study with relative lack of joint  distention shows no gross labral tear. Presumed sublabral foramen  anterosuperiorly.    ANCILLARY FINDINGS:      Impression    Impression:  1. Rotator cuff tendon pathology:    a. Tiny low-grade intrasubstance tear of the  supraspinatus/infraspinatus junction fibers at the footprint.   b. Tiny punctate intrasubstance tear of the subscapularis tendon  lower fibers at the footprint.    c. Supraspinatus and infraspinatus muscle strain.  2. Moderate to severe acromioclavicular joint degenerative change.    [employer]IE[employer]olution         SYSTEM ID:  [id number]   XR Joint Injection Major Left    Narrative    XR JOINT INJECTION MAJOR LEFT                        9/11/2023 10:41  AM       HISTORY:  Left shoulder pain and restricted ROM, concern for adhesive  capsulitis - recommend left GH joint cortisone injection  "under XR or  US with IR.       PROCEDURE:  The risks (bleeding, infection, reaction to contrast and  medications) and benefits of the procedure were explained to the  patient and consent was obtained.  Using sterile technique and  fluoroscopic guidance a 22 gauge needle was placed into the  glenohumeral joint.  A small amount of contrast was injected to  confirm position of the needle tip.  Kenalog 40mg and 3 mL Marcaine  0.5% were injected. Estimated blood loss during the procedure was less  than 5 mL. No specimens collected. No initial complication.       Fluoroscopy time: 0.1 minutes  Images Obtained: 2  Medications used: 3 mL 1% lidocaine, 1 mL of Isovue-M 200, 40 mg  Kenalog and 3 mL of Marcaine 0.5%    The patients pain level (0-10 scale) were as follows:   PRE INJECTION    9   POST INJECTION  \"improving\"        Impression    IMPRESSION:  Technically successful left shoulder steroid/anesthetic  injection with favorable initial pain relief.  Long-term results  pending. The patient also has AC joint osteoarthritis as noted in  previous MRI. The patient may be a candidate for a intra-articular AC  joint injection as an outpatient if clinically indicated.    KATERINA ABDUL PA-C         SYSTEM ID:  [id number]   Echocardiogram Complete     Value    LVEF  35-40%    Narrative    [id number]  [id number]  [id number]  [id number]^LESLIE^JORGE^MELVINA     [facility]  Echocardiography Laboratory  [address] [place] [address]     Name: TESHA GUTIÉRREZ  MRN: [id number]  : 1960  Study Date: 2023 07:58 AM  Age: 63 yrs  Gender: Male  Patient Location: [facility]  Reason For Study: Chest Pain  Ordering Physician: JORGE NELSON  Performed By: Diya Busch     BSA: 2.2 m2  Height: 71 in  Weight: 221 lb  HR: 96  BP: 129/86 mmHg  ______________________________________________________________________________  Procedure  Complete Portable Echo Adult. Optison (NDC #[id number]) given " intravenously.  ______________________________________________________________________________  Interpretation Summary     The visual ejection fraction is 35-40%.  The ascending aorta is Mildly dilated.  There is severe inferior wall hypokinesis.  There is severe septal hypokinesis.  There is no comparison study available. Contrast was used without apparent  complications.  ______________________________________________________________________________  Left Ventricle  The left ventricle is normal in size. There is normal left ventricular wall  thickness. A sigmoid septum is present. The visual ejection fraction is 35-  40%. Left ventricular diastolic function is abnormal. There is severe inferior  wall hypokinesis. There is severe septal hypokinesis.     Right Ventricle  The right ventricle is normal in structure, function and size.     Atria  The left atrium is mildly dilated. Right atrial size is normal.     Aortic Valve  There is mild trileaflet aortic sclerosis.     Pulmonic Valve  The pulmonic valve is not well visualized.     Vessels  Borderline aortic root dilatation. The ascending aorta is Mildly dilated.     Pericardium  There is no pericardial effusion.     Rhythm  Sinus rhythm was noted.     ______________________________________________________________________________  MMode/2D Measurements & Calculations  IVSd: 1.5 cm  LVIDd: 5.2 cm  LVIDs: 4.4 cm  LVPWd: 1.2 cm  FS: 15.6 %  LV mass(C)d: 283.4 grams  LV mass(C)dI: 128.8 grams/m2  Ao root diam: 3.9 cm  asc Aorta Diam: 4.0 cm     LVOT diam: 2.0 cm  LVOT area: 3.1 cm2  Ao root diam Index (cm/m2): 1.8  asc Aorta Diam Index (cm/m2): 1.8  RWT: 0.45  TAPSE: 1.9 cm     ______________________________________________________________________________  Report approved by: Kaden Avilez 08/30/2023 10:32 AM         NM MPI w Lexiscan     Value    Target     Baseline Systolic     Baseline Diastolic BP 98    Last Stress Systolic     Last Stress  Diastolic BP 85    Baseline HR 88    Max HR  96    Max Predicted HR  61    Rate Pressure Product 12,864.0    BP 32    Narrative      The nuclear stress test is abnormal.    There is a large area of a severe degree of infarction in the inferior   and septal segment(s) of the left ventricle. No reversible ischemia noted   on this study.    Left ventricular function is moderately reduced.    The left ventricular ejection fraction at rest is 32%.    There is no prior study for comparison.         Discharge Medications   Discharge Medication List as of 9/11/2023 12:09 PM        START taking these medications    Details   carvedilol (COREG) 6.25 MG tablet Take 1 tablet (6.25 mg) by mouth 2 times daily (with meals), Disp-60 tablet, R-1, E-Prescribe           CONTINUE these medications which have NOT CHANGED    Details   !! ACCU-CHEK GUIDE test strip Use to test blood sugar 1 times daily or as directed., Disp-100 strip, R-0, E-Prescribe      aspirin 81 MG tablet Take by mouth daily, Historical      blood glucose (NO BRAND SPECIFIED) lancets standard Use to test blood sugar 1 times daily or as directed.Disp-100 lancet, [id number]      !! blood glucose (NO BRAND SPECIFIED) test strip Use to test blood sugar 4 times daily or as directed. Please dispense Accu Chek Ana plus test strips or per patient preference if using a different brand, Disp-100 strip, R-3, E-Prescribe      blood glucose monitoring (SOFTCLIX) lancets USE TO TEST BLOOD SUGAR ONCE DAILY OR AS DIRECTED, Disp-100 each, R-0, E-Prescribe      Blood Glucose Monitoring Suppl (ACCU-CHEK GUIDE) w/Device KIT USE TO TEST BLOOD SUGAR 1 TIME DAILY OR AS DIRECTED, Disp-1 kit, R-0, E-Prescribe      clobetasol (TEMOVATE) 0.05 % external cream Apply topically 2 times daily Trunk, arms, legs and hands and feetDisp-240 g, [id number]      glipiZIDE (GLUCOTROL XL) 10 MG 24 hr tablet TAKE 1 TABLET (10 MG) BY MOUTH DAILY., Disp-90 tablet, R-0, E-Prescribe      lisinopril  (ZESTRIL) 20 MG tablet Take 1 tablet (20 mg) by mouth daily, Disp-90 tablet, R-1, E-Prescribe      PARoxetine (PAXIL) 20 MG tablet Take 20 mg by mouth daily, Historical      STATIN NOT PRESCRIBED (INTENTIONAL) Reason Statin was Not Prescribed: Allergy to statinNo Print Out      ULTICARE PEN NEEDLES 31G X 5 MM miscellaneous USE 2 DAILY OR AS DIRECTED, Disp-100 each, R-11, E-Prescribe       !! - Potential duplicate medications found. Please discuss with provider.        STOP taking these medications       insulin glargine (LANTUS SOLOSTAR) 100 UNIT/ML pen Comments:   Reason for Stopping:             Allergies   Allergies   Allergen Reactions    Metformin Diarrhea    Honey Other (See Comments)     Throat irritation    Statins

## 2023-09-11 NOTE — PROGRESS NOTES
Care Management Discharge Note    Discharge Date: 09/11/2023       Discharge Disposition: Home    Discharge Services:      Discharge DME:      Discharge Transportation:      Private pay costs discussed: Not applicable    Does the patient's insurance plan have a 3 day qualifying hospital stay waiver?  Yes   Will the waiver be used for post-acute placement? No    PAS Confirmation Code:    Patient/family educated on Medicare website which has current facility and service quality ratings:      Education Provided on the Discharge Plan:    Persons Notified of Discharge Plans:   Patient/Family in Agreement with the Plan: yes    Handoff Referral Completed: yes    Additional Information:    Noted discharge orders are placed, per chart review pt remains interested in discharge to home. OP rehab in discharge orders. No SW needs identified at this time.     PADILLA Dejesus, ADONIS  Inpatient Care Coordination  Emergency Room /Float  [phone]  Tanisha Lauren, ADONIS

## 2023-09-11 NOTE — PLAN OF CARE
Occupational Therapy Discharge Summary    Reason for therapy discharge:    Discharged to home with home therapy.    Progress towards therapy goal(s). See goals on Care Plan in [facility] electronic health record for goal details.  Goals not met.  Barriers to achieving goals:   discharge from facility.    Therapy recommendation(s):    Continued therapy is recommended.  Rationale/Recommendations:  Per chart review, pt recommended continued OT in TCU. Pt refused TCU. Pt will require assist with ADLS and continued HHOT to progress to prior level of function.

## 2023-09-11 NOTE — PLAN OF CARE
Cared for [id number]    Pt is alert and oriented x4. Vital signs stable. Pt reports numbness in lower extremities which is baseline. Pt reports pain in shoulder, PRN tylenol administered. Pt is up 1 assist with GB and walker. Lung sounds clear. K+, pot, and phos protocol recheck AM. PT/OT following. BG for shift was 124 and 156. Will continue with plan of care.

## 2023-09-11 NOTE — PLAN OF CARE
Goal Outcome Evaluation:    Pt A&O, VSS on RA, CMS intact, received steroid injection into L) shoulder this morning for pain, dressing C/D/I. Patient discharged, medications reviewed, PT/OT ordered outpatient, belongings returned, and questions answered, discharge instructions discussed and discharged meds given to patient.

## 2023-09-11 NOTE — PROGRESS NOTES
RADIOLOGY PROCEDURE NOTE  Patient name: Saji Iqbal  MRN: [id number]  : 1960    Pre-procedure diagnosis: Left shoulder pain  Post-procedure diagnosis: Same    Procedure Date/Time: 2023  10:37 AM  Procedure: Left shoulder steroid injection  Estimated blood loss: None  Specimen(s) collected with description: none  The patient tolerated the procedure well with no immediate complications.  Significant findings:Pt may also benefit from a left AC joint steroid injection.  This could be done as an outpatient if clinically appropriate.    See imaging dictation for procedural details.    Provider name: Tomas Cochran PA-C  Assistant(s):None

## 2023-09-11 NOTE — PROGRESS NOTES
I saw and examined this patient today    He remains interested in discharge home; declining TCU    Anticipate discharge home today after IR injection of L shoulder    Will order outpatient rehab    Home later today after procedure completed

## 2023-09-11 NOTE — PROGRESS NOTES
Slightly improvedPt was in Radiology today for a left shoulder steroid injection. Pt tolerated ortho procedure well. Pre procedure pain was 9/10 post procedure pain level slightly improved.Procedure was completed by MELVINA SMITH. There were no complications during this procedure. Pt verbalized understanding of written and verbal instructions and left department in stable and satisfactory condition with transport. There is no evidence of bleeding or any other complications upon discharge.

## 2023-09-12 ENCOUNTER — PATIENT OUTREACH (OUTPATIENT)
Dept: CARE COORDINATION | Facility: CLINIC | Age: 63
End: 2023-09-12
Payer: COMMERCIAL

## 2023-09-12 NOTE — PROGRESS NOTES
Clinic Care Coordination Contact  [facility]/Voicemail    Clinical Data: Care Coordinator Outreach  Outreach attempted x 1.  Left message on patient's voicemail with call back information and requested return call.  Plan: Care Coordinator will try to reach patient again in 1-2 business days.    Dayan Hill, RN Care Coordinator  [facility]  Email: [email or web address]  Phone: [phone]

## 2023-09-12 NOTE — PLAN OF CARE
Physical Therapy Discharge Summary    Reason for therapy discharge:    Discharged to home with home therapy.    Progress towards therapy goal(s). See goals on Care Plan in [facility] electronic health record for goal details.  Goals partially met.  Barriers to achieving goals:   discharge from facility.    Therapy recommendation(s):    Continued therapy is recommended.  Rationale/Recommendations:  recommend continued PT with HHPT.

## 2023-09-12 NOTE — LETTER
[facility] CARE COORDINATION  [id number]  [facility] [address]     September 13, 2023    Saji Iqbal  [address] [address]      Dear Saji,    I have been unsuccessful in reaching you since our last contact. At this time the Care Coordination team will make no further attempts to reach you, however this does not change your ability to continue receiving care from your providers at your primary care clinic. If you need additional support from a care coordinator in the future please contact me at [phone].    All of us at [facility] are invested in your health and are here to assist you in meeting your goals.     Sincerely,    Dayan Hill, RN Care Coordinator  [facility] [place], [place], [place]  Email: [email or web address]  Phone: [phone]

## 2023-09-13 ENCOUNTER — TELEPHONE (OUTPATIENT)
Dept: CARDIOLOGY | Facility: CLINIC | Age: 63
End: 2023-09-13
Payer: COMMERCIAL

## 2023-09-13 DIAGNOSIS — I25.10 CAD (CORONARY ARTERY DISEASE): Primary | ICD-10-CM

## 2023-09-13 NOTE — TELEPHONE ENCOUNTER
Patient was admitted to [facility] on 8/30/23 with left shoulder pain and elevated troponin. New onset cardiomyopathy, thrombocytopenia and alcoholism with withdrawal during admission.    PMH: CAD s/p PCI LAD with BMS, type 2 DM, HTN, HLP, alcohol abuse, medication and medical follow up noncompliance.     8/30/23: Echo showed EF of 35-40%. The ascending aorta is mildly dilated. There is severe inferior wall hypokinesis.There is severe septal hypokinesis. There is no comparison study available.     Coronary angiogram could not be performed today because the platelet count is 51,000. The risk of bleeding would be prohibitive. Coronary angiogram when platelet count allows.     9/5/23: NM Lexiscan was abnormal. There is a large area of a severe degree of infarction in the inferior and septal segment(s) of the left ventricle. No reversible ischemia noted on this study. Left ventricular function is moderately reduced. The left ventricular ejection fraction at rest is 32%.    Medical management of coronary artery disease in a patient who has been noncompliant with his cardiac medications and recurrent alcohol abuse.     Pt was started on Coreg at time of discharge.    Pt needs to schedule for a cardiology WAYNE follow up OV as per Dr. Watts's recommendation. Order placed.    Writer attempted to call pt for a cardiology post discharge phone call, but no answer. VM left to call back with any non emergent cardiac related or medication questions. Reminder left of need to scheduled OV as above. Scheduling and writer's phone numbers were provided. DAHIANA Sandra RN.

## 2023-09-13 NOTE — PROGRESS NOTES
Clinic Care Coordination Contact  [facility]/Voicemail    Clinical Data: Care Coordinator Outreach  Outreach attempted x 2.  Left message on patient's voicemail with call back information and requested return call.  Plan: Care Coordinator will send disenrollment letter with care coordinator contact information via [employer]. Care Coordinator will do no further outreaches at this time.    Dayan Hill RN Care Coordinator  [facility] [place]  Email: [email or web address]  Phone: [phone]

## 2023-09-17 ENCOUNTER — NURSE TRIAGE (OUTPATIENT)
Dept: NURSING | Facility: CLINIC | Age: 63
End: 2023-09-17
Payer: COMMERCIAL

## 2023-09-17 NOTE — TELEPHONE ENCOUNTER
"Patient calling.    Recent hospital stay at [facility].   On Monday, he states that he got a steroid injection in left arm for torn rotator cuff.  Now he states that he does not have any range of motion, \"cannot open car door, cannot use turn signal, cannot type\". Offered to triage, patient declined. He is wanting to file for disability and wanting info r/t to procedure.   Connected patient to hospital answering service.    Juliette Cosby RN on 9/17/2023 at 1:43 PM     "

## 2023-09-18 ENCOUNTER — TELEPHONE (OUTPATIENT)
Dept: FAMILY MEDICINE | Facility: CLINIC | Age: 63
End: 2023-09-18
Payer: COMMERCIAL

## 2023-09-18 NOTE — TELEPHONE ENCOUNTER
Received call from patient. Patient is asking for hospital follow up appointment to help coordinate steroid injections and follow up from ED regarding shoulder. Routing to scheduling team to assist. Sent [employer] message to patient with physical therapy referral information per patient request.     Korey REYES RN 9/18/2023 at 11:40 AM

## 2023-09-25 NOTE — PROGRESS NOTES
HPI   SUBJECTIVE:                                                    Saji Iqbal is a 56 year old male who presents to clinic today for the following health issues:    Acute Illness   Acute illness concerns: cough   Onset: Monday morning   Fever: no  Chills/Sweats: YES  Headache (location?): YES  Sinus Pressure:YES- post-nasal drainage  Conjunctivitis:  no  Ear Pain: YES: both  Rhinorrhea: YES  Congestion: YES- chest  Sore Throat: YES   Cough: YES-non-productive  Wheeze: no  Decreased Appetite: YES  Nausea: no  Vomiting: no  Diarrhea:  no  Dysuria/Freq.: no  Fatigue/Achiness: YES  Sick/Strep Exposure: no     Therapies Tried and outcome: mucinex and vicks dayquil and tylenol for headache-Has helped symptoms a little bit.   He notes that he can hear a squeaking in his ear due to fluid, and he reports bas postnasal drip, stomach ache, ear ache, and congestion. He states that he is unsure who got him sick.     Diabetes Follow-up    Patient is checking blood sugars:two-three times daily.     Diabetic concerns: None     Symptoms of hypoglycemia (low blood sugar): none     Paresthesias (numbness or burning in feet) or sores: No   The patient notes that his sugars have been better lately, in the 120s in the morning. He notes that he takes metformin and it gives him diarrhea, and he has not taken it today yet because he has not been up to eating food. He states that he has been cutting back on insulin shots when not eating like this, and he is trying to drink juice.     Skin: the patient notes that the rough spot on his left ear has been there for a few days and he also has some rough spots on his head. He reports that he has a history os psoriasis.    Problem list and histories reviewed & adjusted, as indicated.  Additional history: as documented    BP Readings from Last 3 Encounters:   01/19/17 124/80   01/11/17 166/80   12/26/16 147/98    Wt Readings from Last 3 Encounters:   01/19/17 103.193 kg (227 lb 8 oz)   01/11/17  102.059 kg (225 lb)   12/26/16 100.472 kg (221 lb 8 oz)        Labs reviewed in EPIC  Problem list, Medication list, Allergies, and Medical/Social/Surgical histories reviewed in [facility] and updated as appropriate.    ROS:  Constitutional, HEENT, cardiovascular, pulmonary, gi and gu systems are negative, except as otherwise noted.    This document serves as a record of the services and decisions personally performed and made by Asya Vincent MD. It was created on her behalf by Lorna Mar, a trained medical scribe. The creation of this document is based the provider's statements to the medical scribe.  Lorna Mar January 19, 2017 9:28 AM     OBJECTIVE:                                                    /80 mmHg  Pulse 73  Temp(Src) 98.9  F (37.2  C) (Oral)  Resp 20  Wt 103.193 kg (227 lb 8 oz)  SpO2 96%  Body mass index is 30.85 kg/(m^2).  GENERAL: healthy, alert and no distress  EYES: Eyes grossly normal to inspection, PERRL and conjunctivae and sclerae normal  HENT: ear canals and TM's some fluid, nose and mouth without ulcers or lesions  NECK: no adenopathy, no asymmetry, masses, or scars and thyroid normal to palpation  RESP: lungs clear to auscultation - no rales, rhonchi or wheezes  CV: regular rate and rhythm, normal S1 S2, no S3 or S4, no murmur, click or rub, no peripheral edema and peripheral pulses strong  MS: no gross musculoskeletal defects noted, no edema  SKIN: no suspicious lesions or rashes, rough skin spot noted on left tear  NEURO: Normal strength and tone, mentation intact and speech normal  PSYCH: mentation appears normal, affect normal/bright    Diagnostic Test Results:  Influenza swab: negative for A and B     ASSESSMENT/PLAN:                                                    (R68.89) Flu-like symptoms  Comment: The patient reports chills, headache, congestion, ear pain, rhinorrhea, throat pain, cough, decreased appetite, and fatigue. Influenza split screen was negative today. I  recommended cough syrup with codeine and azithromycin. Potential medication side effects were discussed with the patient; let me know if any occur.   Plan: Influenza A/B antigen, azithromycin (ZITHROMAX)-to take only if getting worse this week-end, discussed urgent care is available in [place]        250 MG tablet, guaiFENesin-codeine (ROBITUSSIN         AC) 100-10 MG/5ML SOLN solution        Follow up if not improving      The information in this document, created by the medical scribe for me, accurately reflects the services I personally performed and the decisions made by me. I have reviewed and approved this document for accuracy prior to leaving the patient care area.  Asya Vincent MD 9:28 AM 1/19/2017           Asya Vincent MD  [facility]      Physical Exam    lamin     Stelara Counseling:  I discussed with the patient the risks of ustekinumab including but not limited to immunosuppression, malignancy, posterior leukoencephalopathy syndrome, and serious infections.  The patient understands that monitoring is required including a PPD at baseline and must alert us or the primary physician if symptoms of infection or other concerning signs are noted.

## 2023-10-07 ENCOUNTER — HEALTH MAINTENANCE LETTER (OUTPATIENT)
Age: 63
End: 2023-10-07

## 2023-10-16 NOTE — PROGRESS NOTES
Pt was instructed on crutch use, demonstrated safe use, heel touch only.  Prescriptions given for scooter.   Pt verbalized concerns about getting meals for himself at home, small kitchen.  Pt will be staying with a friend at friend's house . Advised to contact MD if he needs assistance at home once he returns to his own home.  Pt discharged home without voiding.Instructed that he needs to void a stream of urine before 12 midnight to avoid bladder distention. If unable to void, he needs to go the the Emergency room.  Instructions given to Nahid, his friend, per phone.     None known

## 2023-10-24 ENCOUNTER — HOSPITAL ENCOUNTER (INPATIENT)
Facility: CLINIC | Age: 63
LOS: 21 days | Discharge: NURSING, REHAB OR LONG-TERM CARE | End: 2023-11-14
Attending: EMERGENCY MEDICINE | Admitting: INTERNAL MEDICINE
Payer: MEDICAID

## 2023-10-24 ENCOUNTER — APPOINTMENT (OUTPATIENT)
Dept: GENERAL RADIOLOGY | Facility: CLINIC | Age: 63
End: 2023-10-24
Attending: EMERGENCY MEDICINE
Payer: MEDICAID

## 2023-10-24 DIAGNOSIS — F33.1 MODERATE EPISODE OF RECURRENT MAJOR DEPRESSIVE DISORDER (H): ICD-10-CM

## 2023-10-24 DIAGNOSIS — D64.9 ANEMIA, UNSPECIFIED TYPE: ICD-10-CM

## 2023-10-24 DIAGNOSIS — K59.01 SLOW TRANSIT CONSTIPATION: ICD-10-CM

## 2023-10-24 DIAGNOSIS — S91.203A: Primary | ICD-10-CM

## 2023-10-24 DIAGNOSIS — R53.1 GENERALIZED WEAKNESS: ICD-10-CM

## 2023-10-24 DIAGNOSIS — R79.89 ELEVATED TROPONIN: ICD-10-CM

## 2023-10-24 DIAGNOSIS — I25.10 CORONARY ARTERY DISEASE INVOLVING NATIVE CORONARY ARTERY OF NATIVE HEART WITHOUT ANGINA PECTORIS: ICD-10-CM

## 2023-10-24 DIAGNOSIS — K70.31 ALCOHOLIC CIRRHOSIS OF LIVER WITH ASCITES (H): ICD-10-CM

## 2023-10-24 DIAGNOSIS — T14.8XXA OPEN WOUND: ICD-10-CM

## 2023-10-24 PROBLEM — F41.9 ANXIETY AND DEPRESSION: Status: ACTIVE | Noted: 2023-10-24

## 2023-10-24 PROBLEM — F32.A DEPRESSIVE DISORDER: Status: ACTIVE | Noted: 2023-10-24

## 2023-10-24 PROBLEM — F32.A ANXIETY AND DEPRESSION: Status: ACTIVE | Noted: 2023-10-24

## 2023-10-24 LAB
ALBUMIN SERPL BCG-MCNC: 2.5 G/DL (ref 3.5–5.2)
ALBUMIN SERPL BCG-MCNC: 2.7 G/DL (ref 3.5–5.2)
ALP SERPL-CCNC: 240 U/L (ref 40–129)
ALT SERPL W P-5'-P-CCNC: 61 U/L (ref 0–70)
ANION GAP SERPL CALCULATED.3IONS-SCNC: 16 MMOL/L (ref 7–15)
AST SERPL W P-5'-P-CCNC: 206 U/L (ref 0–45)
BASOPHILS # BLD AUTO: 0.1 10E3/UL (ref 0–0.2)
BASOPHILS NFR BLD AUTO: 1 %
BILIRUB SERPL-MCNC: 8.2 MG/DL
BUN SERPL-MCNC: 17.5 MG/DL (ref 8–23)
CALCIUM SERPL-MCNC: 8.3 MG/DL (ref 8.8–10.2)
CHLORIDE SERPL-SCNC: 94 MMOL/L (ref 98–107)
CREAT SERPL-MCNC: 0.92 MG/DL (ref 0.67–1.17)
DEPRECATED HCO3 PLAS-SCNC: 25 MMOL/L (ref 22–29)
EGFRCR SERPLBLD CKD-EPI 2021: >90 ML/MIN/1.73M2
EOSINOPHIL # BLD AUTO: 0.1 10E3/UL (ref 0–0.7)
EOSINOPHIL NFR BLD AUTO: 1 %
ERYTHROCYTE [DISTWIDTH] IN BLOOD BY AUTOMATED COUNT: 14.2 % (ref 10–15)
ETHANOL SERPL-MCNC: <0.01 G/DL
GLUCOSE BLDC GLUCOMTR-MCNC: 121 MG/DL (ref 70–99)
GLUCOSE BLDC GLUCOMTR-MCNC: 122 MG/DL (ref 70–99)
GLUCOSE SERPL-MCNC: 111 MG/DL (ref 70–99)
HBA1C MFR BLD: 5.7 %
HCT VFR BLD AUTO: 41.8 % (ref 40–53)
HGB BLD-MCNC: 14 G/DL (ref 13.3–17.7)
HOLD SPECIMEN: NORMAL
IMM GRANULOCYTES # BLD: 0 10E3/UL
IMM GRANULOCYTES NFR BLD: 0 %
INR PPP: 1.33 (ref 0.85–1.15)
LYMPHOCYTES # BLD AUTO: 0.7 10E3/UL (ref 0.8–5.3)
LYMPHOCYTES NFR BLD AUTO: 8 %
MAGNESIUM SERPL-MCNC: 1.7 MG/DL (ref 1.7–2.3)
MCH RBC QN AUTO: 36.1 PG (ref 26.5–33)
MCHC RBC AUTO-ENTMCNC: 33.5 G/DL (ref 31.5–36.5)
MCV RBC AUTO: 108 FL (ref 78–100)
MONOCYTES # BLD AUTO: 0.8 10E3/UL (ref 0–1.3)
MONOCYTES NFR BLD AUTO: 8 %
NEUTROPHILS # BLD AUTO: 7.7 10E3/UL (ref 1.6–8.3)
NEUTROPHILS NFR BLD AUTO: 82 %
NRBC # BLD AUTO: 0 10E3/UL
NRBC BLD AUTO-RTO: 0 /100
PLATELET # BLD AUTO: 203 10E3/UL (ref 150–450)
POTASSIUM SERPL-SCNC: 3.2 MMOL/L (ref 3.4–5.3)
PROT SERPL-MCNC: 6.1 G/DL (ref 6.4–8.3)
RBC # BLD AUTO: 3.88 10E6/UL (ref 4.4–5.9)
SODIUM SERPL-SCNC: 135 MMOL/L (ref 135–145)
TROPONIN T SERPL HS-MCNC: 22 NG/L
TROPONIN T SERPL HS-MCNC: 24 NG/L
WBC # BLD AUTO: 9.4 10E3/UL (ref 4–11)

## 2023-10-24 PROCEDURE — 85610 PROTHROMBIN TIME: CPT | Performed by: EMERGENCY MEDICINE

## 2023-10-24 PROCEDURE — 93005 ELECTROCARDIOGRAM TRACING: CPT

## 2023-10-24 PROCEDURE — 36415 COLL VENOUS BLD VENIPUNCTURE: CPT | Performed by: EMERGENCY MEDICINE

## 2023-10-24 PROCEDURE — 80053 COMPREHEN METABOLIC PANEL: CPT | Performed by: EMERGENCY MEDICINE

## 2023-10-24 PROCEDURE — 71046 X-RAY EXAM CHEST 2 VIEWS: CPT

## 2023-10-24 PROCEDURE — 250N000013 HC RX MED GY IP 250 OP 250 PS 637: Performed by: INTERNAL MEDICINE

## 2023-10-24 PROCEDURE — 82962 GLUCOSE BLOOD TEST: CPT

## 2023-10-24 PROCEDURE — 99285 EMERGENCY DEPT VISIT HI MDM: CPT | Mod: 25

## 2023-10-24 PROCEDURE — 120N000001 HC R&B MED SURG/OB

## 2023-10-24 PROCEDURE — 85004 AUTOMATED DIFF WBC COUNT: CPT | Performed by: EMERGENCY MEDICINE

## 2023-10-24 PROCEDURE — 83735 ASSAY OF MAGNESIUM: CPT | Performed by: EMERGENCY MEDICINE

## 2023-10-24 PROCEDURE — 82077 ASSAY SPEC XCP UR&BREATH IA: CPT | Performed by: EMERGENCY MEDICINE

## 2023-10-24 PROCEDURE — 84484 ASSAY OF TROPONIN QUANT: CPT | Performed by: EMERGENCY MEDICINE

## 2023-10-24 PROCEDURE — 99223 1ST HOSP IP/OBS HIGH 75: CPT | Performed by: INTERNAL MEDICINE

## 2023-10-24 PROCEDURE — 83036 HEMOGLOBIN GLYCOSYLATED A1C: CPT | Performed by: INTERNAL MEDICINE

## 2023-10-24 RX ORDER — GABAPENTIN 100 MG/1
200 CAPSULE ORAL DAILY
COMMUNITY

## 2023-10-24 RX ORDER — SPIRONOLACTONE 25 MG/1
50 TABLET ORAL DAILY
Status: DISCONTINUED | OUTPATIENT
Start: 2023-10-24 | End: 2023-10-25

## 2023-10-24 RX ORDER — NICOTINE POLACRILEX 4 MG
15-30 LOZENGE BUCCAL
Status: DISCONTINUED | OUTPATIENT
Start: 2023-10-24 | End: 2023-11-14 | Stop reason: HOSPADM

## 2023-10-24 RX ORDER — POTASSIUM CHLORIDE 1.5 G/1.58G
40 POWDER, FOR SOLUTION ORAL ONCE
Status: COMPLETED | OUTPATIENT
Start: 2023-10-24 | End: 2023-10-24

## 2023-10-24 RX ORDER — DEXTROSE MONOHYDRATE 25 G/50ML
25-50 INJECTION, SOLUTION INTRAVENOUS
Status: DISCONTINUED | OUTPATIENT
Start: 2023-10-24 | End: 2023-11-14 | Stop reason: HOSPADM

## 2023-10-24 RX ORDER — ONDANSETRON 4 MG/1
4 TABLET, ORALLY DISINTEGRATING ORAL EVERY 6 HOURS PRN
Status: DISCONTINUED | OUTPATIENT
Start: 2023-10-24 | End: 2023-11-14 | Stop reason: HOSPADM

## 2023-10-24 RX ORDER — FUROSEMIDE 20 MG
20 TABLET ORAL DAILY
Status: DISCONTINUED | OUTPATIENT
Start: 2023-10-24 | End: 2023-10-25

## 2023-10-24 RX ORDER — ACETAMINOPHEN 325 MG/1
325 TABLET ORAL EVERY 6 HOURS PRN
Status: DISCONTINUED | OUTPATIENT
Start: 2023-10-24 | End: 2023-11-14 | Stop reason: HOSPADM

## 2023-10-24 RX ORDER — LISINOPRIL 5 MG/1
5 TABLET ORAL DAILY
Status: DISCONTINUED | OUTPATIENT
Start: 2023-10-24 | End: 2023-10-25

## 2023-10-24 RX ORDER — ATORVASTATIN CALCIUM 20 MG/1
20 TABLET, FILM COATED ORAL DAILY
Status: ON HOLD | COMMUNITY
End: 2023-11-12

## 2023-10-24 RX ORDER — GLIPIZIDE 10 MG/1
10 TABLET, FILM COATED, EXTENDED RELEASE ORAL DAILY
Status: DISCONTINUED | OUTPATIENT
Start: 2023-10-25 | End: 2023-11-04

## 2023-10-24 RX ORDER — ONDANSETRON 2 MG/ML
4 INJECTION INTRAMUSCULAR; INTRAVENOUS EVERY 6 HOURS PRN
Status: DISCONTINUED | OUTPATIENT
Start: 2023-10-24 | End: 2023-11-14 | Stop reason: HOSPADM

## 2023-10-24 RX ORDER — CARVEDILOL 6.25 MG/1
6.25 TABLET ORAL 2 TIMES DAILY WITH MEALS
Status: DISCONTINUED | OUTPATIENT
Start: 2023-10-24 | End: 2023-10-25

## 2023-10-24 RX ORDER — PAROXETINE 20 MG/1
20 TABLET, FILM COATED ORAL DAILY
Status: DISCONTINUED | OUTPATIENT
Start: 2023-10-24 | End: 2023-10-24

## 2023-10-24 RX ADMIN — SPIRONOLACTONE 50 MG: 25 TABLET ORAL at 19:48

## 2023-10-24 RX ADMIN — CARVEDILOL 6.25 MG: 6.25 TABLET, FILM COATED ORAL at 19:49

## 2023-10-24 RX ADMIN — FUROSEMIDE 20 MG: 20 TABLET ORAL at 19:49

## 2023-10-24 RX ADMIN — LISINOPRIL 5 MG: 5 TABLET ORAL at 19:49

## 2023-10-24 RX ADMIN — POTASSIUM CHLORIDE FOR ORAL SOLUTION 40 MEQ: 1.5 POWDER, FOR SOLUTION ORAL at 19:48

## 2023-10-24 ASSESSMENT — ACTIVITIES OF DAILY LIVING (ADL)
ADLS_ACUITY_SCORE: 35
ADLS_ACUITY_SCORE: 35
ADLS_ACUITY_SCORE: 39
ADLS_ACUITY_SCORE: 37
ADLS_ACUITY_SCORE: 37

## 2023-10-24 NOTE — ED PROVIDER NOTES
History     Chief Complaint:  Generalized Weakness       The history is provided by the patient.      Saji Iqbal is a 63 year old male with a history of CAD, diabetes mellitus, hypertension, and hyperlipidemia who presents via EMS after his daughter and  called for a welfare check. Patient was hospitalized last month for shortness of breath and says that he was discharged too soon. Patient says that he becomes short of breath when walking short distances and while doing daily tasks like putting clothing on. He says that he has not eaten for the past two days because he does not want to stand long enough to get food, though he has had an appetite. He presents with jaundice and abdominal distension. No history of paracentesis or cirrhosis. Patient denies any chest pain, cough, or fever. Denies history of cirrhosis. Patient has a history of alcohol use, though denies currently drinking. He reports last drink was 5 weeks prior.       Independent Historian:    None- patient only     Review of External Notes:  I reviewed discharge summary from 9/11/2023 in which patient was admitted for alcohol withdrawal and dependence.    Medications:  Crestor  Prilosec  Lopressor  Lisinopril  Lantus  Ibuprofen  Atarax  [place]  Gabapentin   Invokana  Diprolene   Aspirin      Past Medical History:     Cluster headache  CAD  Depression  Diabetes mellitus   GERD  Heart attack   Hyperlipidemia   Hypertension   Renal disease  Sleep apnea   Stented coronary artery   Tobacco abuse  Hypertriglyceridemia   Psoriasis   ETOH abuse    Anxiety  Depressive disorder      Past Surgical History:    Arthroscopy shoulder  Repair tendon foot  Coronary stent      Physical Exam   Patient Vitals for the past 24 hrs:   BP Temp Temp src Pulse Resp SpO2   10/24/23 1752 112/85 -- -- 99 21 95 %   10/24/23 1634 -- -- -- 98 26 91 %   10/24/23 1615 (!) 118/98 -- -- 101 22 97 %   10/24/23 1545 97/69 -- -- 94 18 98 %   10/24/23 1530 116/79 -- -- 98 23  94 %   10/24/23 1515 127/88 -- -- 99 -- 97 %   10/24/23 1443 -- -- -- 105 14 97 %   10/24/23 1415 104/80 -- -- 98 25 96 %   10/24/23 1408 119/75 98.1  F (36.7  C) Oral 105 22 97 %        Physical Exam    Constitutional:  Chronically ill appearing male  HEENT:    Oropharynx is moist, without lesions or trismus.  Eyes:    Conjunctiva normal     Scleral icterus  Neck:     Supple, no meningismus.     CV:     Regular rate and rhythm.      No murmurs, rubs or gallops.     No lower extremity edema.  PULM:    Clear to auscultation bilateral.       No respiratory distress.      Good air exchange.  ABD:    Firm, distended     Tympanic to percussion     Minimal abdominal tenderness throughout.     No pulsatile masses.       No rebound, guarding or rigidity.     No CVA tenderness.      + hepatomegaly.  MSK:     No gross deformity to all four extremities.   LYMPH:   No cervical lymphadenopathy.  NEURO:   Alert.  Good muscular tone, no atrophy.   Skin:    Warm, dry     Jaundice  Psych:    Mood is good and affect is appropriate.      Emergency Department Course   ECG  ECG results from 10/24/23   EKG 12-lead, tracing only     Value    Systolic Blood Pressure     Diastolic Blood Pressure     Ventricular Rate 101    Atrial Rate 101    AR Interval 168    QRS Duration 146        QTc 547    P Axis -7    R AXIS -18    T Axis 135    Interpretation ECG      Sinus tachycardia with Premature supraventricular complexes  Left bundle branch block  Abnormal ECG  When compared with ECG of 05-SEP-2023 10:03,  Fusion complexes are no longer Present  Premature ventricular complexes are no longer Present  Premature supraventricular complexes are now Present        Imaging:  Chest XR,  PA & LAT   Final Result      US Paracentesis with Albumin    (Results Pending)     Report per radiology    Laboratory:  Labs Ordered and Resulted from Time of ED Arrival to Time of ED Departure   COMPREHENSIVE METABOLIC PANEL - Abnormal       Result Value     Sodium 135      Potassium 3.2 (*)     Carbon Dioxide (CO2) 25      Anion Gap 16 (*)     Urea Nitrogen 17.5      Creatinine 0.92      GFR Estimate >90      Calcium 8.3 (*)     Chloride 94 (*)     Glucose 111 (*)     Alkaline Phosphatase 240 (*)      (*)     ALT 61      Protein Total 6.1 (*)     Albumin 2.7 (*)     Bilirubin Total 8.2 (*)    CBC WITH PLATELETS AND DIFFERENTIAL - Abnormal    WBC Count 9.4      RBC Count 3.88 (*)     Hemoglobin 14.0      Hematocrit 41.8       (*)     MCH 36.1 (*)     MCHC 33.5      RDW 14.2      Platelet Count 203      % Neutrophils 82      % Lymphocytes 8      % Monocytes 8      % Eosinophils 1      % Basophils 1      % Immature Granulocytes 0      NRBCs per 100 WBC 0      Absolute Neutrophils 7.7      Absolute Lymphocytes 0.7 (*)     Absolute Monocytes 0.8      Absolute Eosinophils 0.1      Absolute Basophils 0.1      Absolute Immature Granulocytes 0.0      Absolute NRBCs 0.0     INR - Abnormal    INR 1.33 (*)    TROPONIN T, HIGH SENSITIVITY - Abnormal    Troponin T, High Sensitivity 24 (*)    ETHYL ALCOHOL LEVEL - Normal    Alcohol ethyl <0.01     MAGNESIUM - Normal    Magnesium 1.7     TROPONIN T, HIGH SENSITIVITY - Normal    Troponin T, High Sensitivity 22     GLUCOSE MONITOR NURSING POCT   HEMOGLOBIN A1C   GLUCOSE MONITOR NURSING POCT   CELL COUNT WITH DIFFERENTIAL FLUID        Emergency Department Course & Assessments:     Interventions:  Medications   carvedilol (COREG) tablet 6.25 mg (has no administration in time range)   glipiZIDE (GLUCOTROL XL) 24 hr tablet 10 mg (has no administration in time range)   PARoxetine (PAXIL) tablet 20 mg (has no administration in time range)   melatonin tablet 1 mg (has no administration in time range)   acetaminophen (TYLENOL) tablet 325 mg (has no administration in time range)   ondansetron (ZOFRAN ODT) ODT tab 4 mg (has no administration in time range)     Or   ondansetron (ZOFRAN) injection 4 mg (has no administration in time  range)   furosemide (LASIX) tablet 20 mg (has no administration in time range)   spironolactone (ALDACTONE) tablet 50 mg (has no administration in time range)   glucose gel 15-30 g (has no administration in time range)     Or   dextrose 50 % injection 25-50 mL (has no administration in time range)     Or   glucagon injection 1 mg (has no administration in time range)   insulin aspart (NovoLOG) injection (RAPID ACTING) (has no administration in time range)   insulin aspart (NovoLOG) injection (RAPID ACTING) (has no administration in time range)   lisinopril (ZESTRIL) tablet 5 mg (has no administration in time range)        Assessments:  1426 I obtained history and examined the patient as noted above. Patient is compliant with plans for admission.     Independent Interpretation (X-rays, CTs, rhythm strip):    I independently reviewed chest x-ray which is no pneumothorax or focal infiltrate.    Consultations/Discussion of Management or Tests:  162 I consulted with hospitalist Julien Chau MD. Patient will be admitted.        Social Determinants of Health affecting care:  Patient says that he lives alone in an apartment. He has been unable to leave the house to get groceries.      Disposition:  The patient was admitted to the hospital under the care of Julien Chau MD.     Impression & Plan    Medical Decision Makin-year-old male presents with generalized weakness, exertional dyspnea and inability to care for himself.  Patient clearly has developing ascites and cirrhosis secondary to alcohol use.  Bedside ultrasound was performed confirming presence of ascites.  Examination is not consistent with SBP.  No indication for emergent paracentesis and can be accomplished during hospital stay.  EKG nonischemic.  Initial troponin mildly elevated consistent with prior levels.  2-hour troponin level sent and without concerning trend.  Very low suspicion for ACS.  Patient unfit to care for himself.   Patient transferred to an inpatient bed for ongoing management of developing cirrhosis with ascites.        Diagnosis:    ICD-10-CM    1. Alcoholic cirrhosis of liver with ascites (H)  K70.31       2. Generalized weakness  R53.1       3. Elevated troponin  R79.89            Scribe Disclosure:  I, Iwona James, am serving as a scribe at 2:38 PM on 10/24/2023 to document services personally performed by Jeovanny Ramirez MD based on my observations and the provider's statements to me.    10/24/2023   Jeovanny Ramirez MD Matthews, Jeremiah R, MD  10/24/23 1829

## 2023-10-24 NOTE — H&P
[facility]    History and Physical - Hospitalist Service       Date of Admission:  10/24/2023    Assessment & Plan      Saji Iqbal is a 63 year old male admitted on 10/24/2023. He was admitted here from 8/30 to 9/11 with adhesive capsulitis of the left shoulder and alcohol withdrawal.  During that hospitalization he was also found to have new alcoholic cardiomyopathy with ejection fraction 35 to 40%.  Upon discharge home he said he was able to walk 100 feet but got quite tired.  He thought he would get better at home but has slowly continued to worsen.  He has had progressive dyspnea on exertion.  He said he could maybe walk 25 steps now before having to stop and rest.  He gets short of breath just while dressing.  He did not feel he could go up or down stairs due to weakness and dyspnea.  He has noticed some increased firmness of his abdomen but no definite swelling.  He has not noticed any lower extremity swelling and has no pain to the abdomen or chest.  His left shoulder pain is resolved since steroid injection.  His appetite is good but he is having difficulty preparing his food as he is not able to stand at the stove and therefore has not been eating much.  His daughter requested a welfare check on him today and he was brought in the hospital for further evaluation and treatment.  He said he has been sober for 5 weeks since his previous hospitalization.    In the ER vital signs were stable.  Bilirubin is 8.2 and AST is 206.  These are both stable from previous hospitalization.  Potassium 3.2 and albumin 2.7.  Chest x-ray is unremarkable.  Bedside ultrasound by ER physician showed ascites.    He is admitted for failure to thrive, severe dyspnea on exertion, new ascites secondary to decompensated end-stage liver disease and severe generalized weakness.    Past medical history significant for alcohol use disorder, coronary artery disease with 2 previous stents in the left anterior  descending artery, diabetes mellitus, hypertension, dyslipidemia, obstructive sleep apnea, left shoulder adhesive capsulitis treated with steroid injection during last hospitalization, ischemic and alcoholic cardiomyopathy with heart failure with reduced ejection fraction.    Severe dyspnea on exertion and ascites/new diagnosis of decompensated end-stage liver disease:  --Ultrasound in August revealed evidence of cirrhosis.  Bilirubin is 8.2 and it was 8.4 at the beginning of September.  --AST is also slightly improved at 206 from 239 during recent hospitalization.  --Seems most likely that progressive ascites resulting in decreased diaphragmatic excursion and dyspnea on exertion.  There is no other clear etiology for his progressive symptoms.  -- Per ER physician he has significant ascites on bedside ultrasound.  -- We will request ultrasound-guided paracentesis tomorrow.  He has no signs of spontaneous bacterial peritonitis, will get routine labs.  -- Start low-dose Aldactone 50 mg and Lasix 20 mg a day.  -- Request GI consultation for longer-term management of new diagnosis of decompensated end-stage liver disease.  --MELD score is 17  -- If dyspnea does not improve with paracentesis, consider worsening heart failure as a potential etiology.    Failure to thrive/likely malnutrition with hypoalbuminemia and generalized weakness:  -- Seems primarily secondary to dyspnea exertion but he also describes some element of generalized weakness.  -- He has not been eating as he said he only has food in his house that need preparation and he has not felt up to preparing food.  He does say he has a good appetite.  -- Social work, physical therapy, Occupational Therapy evaluations to help with needs assessment and disposition planning    3.  Heart failure with reduced ejection fraction:  --Likely ischemic and alcohol related etiologies  -- This was a new diagnosis on his recent hospitalization.  -- He previously had 2 stents  in the LAD.  -- He had a mildly elevated troponin during the hospitalization.  -- Echocardiogram revealed ejection fraction of 35 to 40% with inferior wall motion abnormality.  -- Nuclear stress test confirmed an inferior infarct which was not reversible  -- He was deemed high risk for intervention with likely minimal benefit given the fixed defect.  -- Medical management was recommended.  --trop 24, down from 32 at last hospitalization  -- We will resume Coreg and aspirin.  -- Decrease lisinopril from 20 mg to 5 mg a day while initiating Aldactone and Lasix.  -- Monitor intake, output and weights      4.  Hypokalemia  -- Replace with close monitoring while on Aldactone, lisinopril and Lasix.    5.  Diabetes mellitus:  -- Resume glipizide XL 10 mg a day  -- Sliding scale insulin as needed    6.  Alcohol use disorder:  --He reports being abstinent since his last hospitalization.  --He says he has not felt like drinking alcohol.      Will need at least 2 night hospital stay given overall complexity    I reviewed previous medical records in epic and discussed the case with the ER provider            Diet: Combination Diet Regular Diet Adult; Moderate Consistent Carb (60 g CHO per Meal) Diet; 2 gm NA Diet  NPO per Anesthesia Guidelines for Procedure/Surgery Except for: Meds, Ice Chips    DVT Prophylaxis: Pneumatic Compression Devices  Durbin Catheter: Not present  Lines: None     Cardiac Monitoring: None  Code Status: Full Code      Clinically Significant Risk Factors Present on Admission        # Hypokalemia: Lowest K = 3.2 mmol/L in last 2 days, will replace as needed       # Hypoalbuminemia: Lowest albumin = 2.7 g/dL at 10/24/2023  2:22 PM, will monitor as appropriate  # Coagulation Defect: INR = 1.33 (Ref range: 0.85 - 1.15) and/or PTT = 30 Seconds (Ref range: 22 - 38 Seconds), will monitor for bleeding  # Drug Induced Platelet Defect: home medication list includes an antiplatelet medication   # Hypertension: Noted  on problem list  # Chronic heart failure with reduced ejection fraction: last echo with EF <40%         # Financial/Environmental Concerns:           Disposition Plan      Expected Discharge Date: 10/26/2023                  Julien Chau MD  Hospitalist Service  [facility]  Securely message with "[employer]" (more info)  Text page via [facility] Paging/Directory     ______________________________________________________________________    Chief Complaint   Progressive, severe dyspnea on exertion and generalized weakness    History is obtained from the patient    History of Present Illness   Saji Iqbal is a 63 year old male who was admitted here from 8/30 to 9/11 with adhesive capsulitis of the left shoulder and alcohol withdrawal.  He was also found to have new alcoholic cardiomyopathy with ejection fraction 35 to 40%.  Upon discharge home he said he was able to walk 100 feet but got quite tired.  He thought he would get better at home but has slowly continued to worsen.  He has had progressive dyspnea on exertion.  He said he could maybe walk 25 steps now before having to stop and rest.  He gets short of breath just while dressing.  He did not feel he could go up or down stairs due to weakness and dyspnea.  He has noticed some increased firmness of his abdomen but no definite swelling.  He has not noticed any lower extremity swelling and has no pain to the abdomen or chest.  His left shoulder pain is resolved since steroid injection.  His appetite is good but he is having difficulty preparing his food as he is not able to stand at the stove and therefore has not been eating much.  His daughter requested a welfare check on him today and he was brought in the hospital for further evaluation and treatment.  He said he has been sober for 5 weeks since his previous hospitalization.      Past Medical History    Past Medical History:   Diagnosis Date    Cluster headache     Coronary artery disease      Depression     Diabetes mellitus, type II (H)     Gastroesophageal reflux disease     Heart attack (H)     Hyperlipidemia     Hypertension     Renal disease      hx kidney stones    Rotator cuff arthropathy     Sleep apnea     doesn't use cpap-is broken    Stented coronary artery    Heart failure with reduced ejection fraction  Alcohol use disorder    Past Surgical History   Past Surgical History:   Procedure Laterality Date    ARTHROSCOPY SHOULDER      REPAIR TENDON FOOT Right 10/23/2020    Procedure: Repair of extensor tendon at the level of the metatarsophalangeal joint, right foot;  Surgeon: Gerard Diaz DPM;  Location: [facility] OR    STENT, CORONARY, ESAU  2014, 2015       Prior to Admission Medications   Prior to Admission Medications   Prescriptions Last Dose Informant Patient Reported? Taking?   ACCU-CHEK GUIDE test strip   No No   Sig: Use to test blood sugar 1 times daily or as directed.   Blood Glucose Monitoring Suppl (ACCU-CHEK GUIDE) w/Device KIT   No No   Sig: USE TO TEST BLOOD SUGAR 1 TIME DAILY OR AS DIRECTED   PARoxetine (PAXIL) 20 MG tablet   Yes No   Sig: Take 20 mg by mouth daily   STATIN NOT PRESCRIBED (INTENTIONAL)   No No   Sig: Please choose reason not prescribed from choices below.   ULTICARE PEN NEEDLES 31G X 5 MM miscellaneous   No No   Sig: USE 2 DAILY OR AS DIRECTED   aspirin 81 MG tablet   Yes No   Sig: Take by mouth daily   blood glucose (NO BRAND SPECIFIED) lancets standard   No No   Sig: Use to test blood sugar 1 times daily or as directed.   blood glucose (NO BRAND SPECIFIED) test strip   No No   Sig: Use to test blood sugar 4 times daily or as directed. Please dispense Accu Chek Ana plus test strips or per patient preference if using a different brand   blood glucose monitoring (SOFTCLIX) lancets   No No   Sig: USE TO TEST BLOOD SUGAR ONCE DAILY OR AS DIRECTED   carvedilol (COREG) 6.25 MG tablet   No No   Sig: Take 1 tablet (6.25 mg) by mouth 2 times daily (with meals)    clobetasol (TEMOVATE) 0.05 % external cream   No No   Sig: Apply topically 2 times daily Trunk, arms, legs and hands and feet   glipiZIDE (GLUCOTROL XL) 10 MG 24 hr tablet   No Yes   Sig: TAKE 1 TABLET (10 MG) BY MOUTH DAILY.   lisinopril (ZESTRIL) 20 MG tablet   No No   Sig: Take 1 tablet (20 mg) by mouth daily      Facility-Administered Medications: None       He lives alone in an independent apartment.  He is currently not working and contemplating [employer].    Physical Exam   Vital Signs: Temp: 98.1  F (36.7  C) Temp src: Oral BP: 112/85 Pulse: 99   Resp: 21 SpO2: 95 % O2 Device: None (Room air)    Weight: 0 lbs 0 oz      Vital signs reviewed  General:  Alert, calm, NAD  CV: regular rate and rhythm, no murmurs or rubs  Lungs:  Clear to ascultation bilaterally, normal respiratory effort  HEENT:  Pupil round, equal, conjuctivae, sclerae anicteric and lids normal, neck is supple  Abdomen:  Soft, nontender, moderately distended, no masses, normal bowel sounds  Extremities:  No edema  Neuro: normal strength and sensation in all 4 extremities, cranial nerves grossly intact  Psychiatric:  Mood and affect within normal limits  Skin: Mild jaundice      Medical Decision Making       80 MINUTES SPENT BY ME on the date of service doing chart review, history, exam, documentation & further activities per the note.      Data     I have personally reviewed the following data over the past 24 hrs:    9.4  \   14.0   / 203     135 94 (L) 17.5 /  111 (H)   3.2 (L) 25 0.92 \     ALT: 61 AST: 206 (H) AP: 240 (H) TBILI: 8.2 (H)   ALB: 2.7 (L) TOT PROTEIN: 6.1 (L) LIPASE: N/A     Trop: 22 BNP: N/A     INR:  1.33 (H) PTT:  N/A   D-dimer:  N/A Fibrinogen:  N/A       Imaging results reviewed over the past 24 hrs:   Recent Results (from the past 24 hour(s))   Chest XR,  PA & LAT    Narrative    XR CHEST 2 VIEWS 10/24/2023 2:59 PM    HISTORY: dyspnea    COMPARISON: 9/3/2023    FINDINGS: Low lung volumes. No consolidation, pleural  effusions, or  pneumothorax. Normal cardiac size.    JOHN CLARK MD         SYSTEM ID:  [id number]

## 2023-10-24 NOTE — PHARMACY-ADMISSION MEDICATION HISTORY
Pharmacist Admission Medication History    Admission medication history is complete. The information provided in this note is only as accurate as the sources available at the time of the update.    Information Source(s): Patient and CareEverywhere/SureScripts via in-person    Pertinent Information:     Patient has not taken a majority of previously prescribed medications due to a lack of insurance.  He had 4 pill bottles with him that he states he is still taking - most are still full and have dispense dates as far back as 07/2022.     He also had bottles of folic acid, methotrexate, and rosuvastatin with him but says he is not taking these.     Changes made to PTA medication list:  Added: Atorvastatin, Gabapentin  Deleted: Clobetasol, Lisinopril, Paroxetine, BG testing supplies  Changed: None    Medication Affordability:  Not including over the counter (OTC) medications, was there a time in the past 3 months when you did not take your medications as prescribed because of cost?: Yes (No insurance)    Allergies reviewed with patient and updates made in EHR: yes    Medication History Completed By: Garry Boateng [facility] 10/24/2023 6:15 PM    PTA Med List   Medication Sig Last Dose    aspirin 81 MG tablet Take by mouth daily 10/24/2023 at AM    atorvastatin (LIPITOR) 20 MG tablet Take 20 mg by mouth daily 10/23/2023 at HS    carvedilol (COREG) 6.25 MG tablet Take 1 tablet (6.25 mg) by mouth 2 times daily (with meals) 10/24/2023 at x1    gabapentin (NEURONTIN) 100 MG capsule Take 200 mg by mouth daily 10/23/2023 at AM    glipiZIDE (GLUCOTROL XL) 10 MG 24 hr tablet TAKE 1 TABLET (10 MG) BY MOUTH DAILY. 10/24/2023 at AM

## 2023-10-24 NOTE — ED TRIAGE NOTES
Pt BIBA after daughter and  called welfare check.  Per pt report he has not been able to eat for several days d/t immobility/ generalized weakness or care for himself.  Pt presents jaundice with severe abdominal distention reporting he was suppose to follow up regarding his liver.  He did request to go to [facility] but [facility] brought pt here instead.     Triage Assessment (Adult)       Row Name 10/24/23 1403          Triage Assessment    Airway WDL WDL        Respiratory WDL    Respiratory WDL WDL        Skin Circulation/Temperature WDL    Skin Circulation/Temperature WDL all     Skin Circulation --  yellow     Skin Temperature cool        Cardiac WDL    Cardiac WDL WDL

## 2023-10-24 NOTE — ED NOTES
Pt concerned and wanting to order dinner, writer did explain while in ER meals would automatically come.

## 2023-10-24 NOTE — ED NOTES
[facility]  ED Nurse Handoff Report    RECEIVING UNIT ED HANDOFF REVIEW    Above ED Nurse Handoff Report was reviewed: yes  Reviewed by: Clifton Liu RN on October 24, 2023 at 7:04 PM    ED Chief complaint: Generalized Weakness  . ED Diagnosis:   Final diagnoses:   Alcoholic cirrhosis of liver with ascites (H)   Generalized weakness   Elevated troponin       Allergies:   Allergies   Allergen Reactions    Metformin Diarrhea    Honey Other (See Comments)     Throat irritation    Statins        Code Status: Full Code    Activity level - Baseline/Home:  independent.  Activity Level - Current:   assist of 2.   Lift room needed: No.   Bariatric: No   Needed: No   Isolation: No.   Infection: Not Applicable.     Respiratory status: Room air    Vital Signs (within 30 minutes):   Vitals:    10/24/23 1515 10/24/23 1530 10/24/23 1545 10/24/23 1615   BP: 127/88 116/79 97/69 (!) 118/98   Pulse: 99 98 94 101   Resp:  23 18 22   Temp:       TempSrc:       SpO2: 97% 94% 98% 97%       Cardiac Rhythm:  ,      Pain level:    Patient confused: No.   Patient Falls Risk: nonskid shoes/slippers when out of bed, arm band in place, and patient and family education.   Elimination Status:  No Urge      Patient Report - Initial Complaint: Generalized Weakness.   Focused Assessment: Per Provider Note:  63 year old male with a history of CAD, diabetes mellitus, hypertension, and hyperlipidemia who presents via EMS after his daughter and  called for a welfare check. Patient was hospitalized last month for shortness of breath and says that he was discharged too soon. Patient says that he becomes short of breath when walking short distances and while doing daily tasks like putting clothing on. He says that he has not eaten for the past two days because he does not want to stand long enough to get food, though he has had an appetite. He presents with jaundice and abdominal distension. No history of  paracentesis. Patient denies any chest pain, cough, or fever. Denies history of cirrhosis. Patient has a history of alcohol use, though denies currently drinking.      Abnormal Results:   Labs Ordered and Resulted from Time of ED Arrival to Time of ED Departure   COMPREHENSIVE METABOLIC PANEL - Abnormal       Result Value    Sodium 135      Potassium 3.2 (*)     Carbon Dioxide (CO2) 25      Anion Gap 16 (*)     Urea Nitrogen 17.5      Creatinine 0.92      GFR Estimate >90      Calcium 8.3 (*)     Chloride 94 (*)     Glucose 111 (*)     Alkaline Phosphatase 240 (*)      (*)     ALT 61      Protein Total 6.1 (*)     Albumin 2.7 (*)     Bilirubin Total 8.2 (*)    CBC WITH PLATELETS AND DIFFERENTIAL - Abnormal    WBC Count 9.4      RBC Count 3.88 (*)     Hemoglobin 14.0      Hematocrit 41.8       (*)     MCH 36.1 (*)     MCHC 33.5      RDW 14.2      Platelet Count 203      % Neutrophils 82      % Lymphocytes 8      % Monocytes 8      % Eosinophils 1      % Basophils 1      % Immature Granulocytes 0      NRBCs per 100 WBC 0      Absolute Neutrophils 7.7      Absolute Lymphocytes 0.7 (*)     Absolute Monocytes 0.8      Absolute Eosinophils 0.1      Absolute Basophils 0.1      Absolute Immature Granulocytes 0.0      Absolute NRBCs 0.0     INR - Abnormal    INR 1.33 (*)    TROPONIN T, HIGH SENSITIVITY - Abnormal    Troponin T, High Sensitivity 24 (*)    ETHYL ALCOHOL LEVEL - Normal    Alcohol ethyl <0.01     MAGNESIUM - Normal    Magnesium 1.7     TROPONIN T, HIGH SENSITIVITY        Chest XR,  PA & LAT   Final Result          Treatments provided: Consults  Family Comments: Not present  OBS brochure/video discussed/provided to patient:  No  ED Medications: Medications - No data to display    Drips infusing:  No  For the majority of the shift this patient was Green.   Interventions performed were NA.    Sepsis treatment initiated: No    Cares/treatment/interventions/medications to be completed following ED care:  None    ED Nurse Name: Marie Almazan RN  4:27 PM

## 2023-10-25 ENCOUNTER — APPOINTMENT (OUTPATIENT)
Dept: ULTRASOUND IMAGING | Facility: CLINIC | Age: 63
End: 2023-10-25
Attending: INTERNAL MEDICINE
Payer: MEDICAID

## 2023-10-25 LAB
% LINING CELLS, BODY FLUID: 3 %
ABSOLUTE NEUTROPHILS, BODY FLUID: 15.1 /UL
ALBUMIN BODY FLUID SOURCE: NORMAL
ALBUMIN FLD-MCNC: 0.7 G/DL
ANION GAP SERPL CALCULATED.3IONS-SCNC: 10 MMOL/L (ref 7–15)
APPEARANCE FLD: CLEAR
ATRIAL RATE - MUSE: 101 BPM
BUN SERPL-MCNC: 19 MG/DL (ref 8–23)
CALCIUM SERPL-MCNC: 7.9 MG/DL (ref 8.8–10.2)
CELL COUNT BODY FLUID SOURCE: NORMAL
CHLORIDE SERPL-SCNC: 98 MMOL/L (ref 98–107)
COLOR FLD: YELLOW
CREAT SERPL-MCNC: 0.79 MG/DL (ref 0.67–1.17)
DEPRECATED HCO3 PLAS-SCNC: 24 MMOL/L (ref 22–29)
DIASTOLIC BLOOD PRESSURE - MUSE: NORMAL MMHG
EGFRCR SERPLBLD CKD-EPI 2021: >90 ML/MIN/1.73M2
ERYTHROCYTE [DISTWIDTH] IN BLOOD BY AUTOMATED COUNT: 14.2 % (ref 10–15)
GLUCOSE BLDC GLUCOMTR-MCNC: 112 MG/DL (ref 70–99)
GLUCOSE BLDC GLUCOMTR-MCNC: 115 MG/DL (ref 70–99)
GLUCOSE BLDC GLUCOMTR-MCNC: 124 MG/DL (ref 70–99)
GLUCOSE SERPL-MCNC: 107 MG/DL (ref 70–99)
HCT VFR BLD AUTO: 38.1 % (ref 40–53)
HGB BLD-MCNC: 12.8 G/DL (ref 13.3–17.7)
INTERPRETATION ECG - MUSE: NORMAL
LYMPHOCYTES NFR FLD MANUAL: 35 %
MCH RBC QN AUTO: 35.8 PG (ref 26.5–33)
MCHC RBC AUTO-ENTMCNC: 33.6 G/DL (ref 31.5–36.5)
MCV RBC AUTO: 106 FL (ref 78–100)
MONOS+MACROS NFR FLD MANUAL: 51 %
NEUTS BAND NFR FLD MANUAL: 11 %
NT-PROBNP SERPL-MCNC: 398 PG/ML (ref 0–900)
P AXIS - MUSE: -7 DEGREES
PLATELET # BLD AUTO: 178 10E3/UL (ref 150–450)
POTASSIUM SERPL-SCNC: 3.5 MMOL/L (ref 3.4–5.3)
POTASSIUM SERPL-SCNC: 3.7 MMOL/L (ref 3.4–5.3)
PR INTERVAL - MUSE: 168 MS
PROT FLD-MCNC: 1.1 G/DL
PROTEIN BODY FLUID SOURCE: NORMAL
QRS DURATION - MUSE: 146 MS
QT - MUSE: 422 MS
QTC - MUSE: 547 MS
R AXIS - MUSE: -18 DEGREES
RBC # BLD AUTO: 3.58 10E6/UL (ref 4.4–5.9)
SODIUM SERPL-SCNC: 132 MMOL/L (ref 135–145)
SYSTOLIC BLOOD PRESSURE - MUSE: NORMAL MMHG
T AXIS - MUSE: 135 DEGREES
VENTRICULAR RATE- MUSE: 101 BPM
WBC # BLD AUTO: 8 10E3/UL (ref 4–11)
WBC # FLD AUTO: 137 /UL

## 2023-10-25 PROCEDURE — 272N000706 US PARACENTESIS WITH ALBUMIN

## 2023-10-25 PROCEDURE — 250N000013 HC RX MED GY IP 250 OP 250 PS 637: Performed by: INTERNAL MEDICINE

## 2023-10-25 PROCEDURE — 250N000009 HC RX 250: Performed by: RADIOLOGY

## 2023-10-25 PROCEDURE — 83880 ASSAY OF NATRIURETIC PEPTIDE: CPT | Performed by: INTERNAL MEDICINE

## 2023-10-25 PROCEDURE — 36415 COLL VENOUS BLD VENIPUNCTURE: CPT | Performed by: PHYSICIAN ASSISTANT

## 2023-10-25 PROCEDURE — 85027 COMPLETE CBC AUTOMATED: CPT | Performed by: PHYSICIAN ASSISTANT

## 2023-10-25 PROCEDURE — 120N000001 HC R&B MED SURG/OB

## 2023-10-25 PROCEDURE — 80321 ALCOHOLS BIOMARKERS 1OR 2: CPT | Performed by: INTERNAL MEDICINE

## 2023-10-25 PROCEDURE — 80048 BASIC METABOLIC PNL TOTAL CA: CPT | Performed by: INTERNAL MEDICINE

## 2023-10-25 PROCEDURE — 82042 OTHER SOURCE ALBUMIN QUAN EA: CPT | Performed by: INTERNAL MEDICINE

## 2023-10-25 PROCEDURE — 36415 COLL VENOUS BLD VENIPUNCTURE: CPT | Performed by: INTERNAL MEDICINE

## 2023-10-25 PROCEDURE — 89050 BODY FLUID CELL COUNT: CPT | Performed by: INTERNAL MEDICINE

## 2023-10-25 PROCEDURE — 84157 ASSAY OF PROTEIN OTHER: CPT | Performed by: INTERNAL MEDICINE

## 2023-10-25 PROCEDURE — 84132 ASSAY OF SERUM POTASSIUM: CPT | Performed by: INTERNAL MEDICINE

## 2023-10-25 PROCEDURE — 99233 SBSQ HOSP IP/OBS HIGH 50: CPT | Performed by: INTERNAL MEDICINE

## 2023-10-25 RX ORDER — FUROSEMIDE 20 MG
20 TABLET ORAL DAILY
Status: DISCONTINUED | OUTPATIENT
Start: 2023-10-25 | End: 2023-11-02

## 2023-10-25 RX ORDER — SPIRONOLACTONE 25 MG/1
50 TABLET ORAL DAILY
Status: DISCONTINUED | OUTPATIENT
Start: 2023-10-25 | End: 2023-10-26

## 2023-10-25 RX ORDER — HYDROXYZINE HYDROCHLORIDE 50 MG/1
50 TABLET, FILM COATED ORAL EVERY 6 HOURS PRN
Status: DISCONTINUED | OUTPATIENT
Start: 2023-10-25 | End: 2023-11-14 | Stop reason: HOSPADM

## 2023-10-25 RX ADMIN — LIDOCAINE HYDROCHLORIDE 10 ML: 10 INJECTION, SOLUTION EPIDURAL; INFILTRATION; INTRACAUDAL; PERINEURAL at 13:56

## 2023-10-25 RX ADMIN — SPIRONOLACTONE 50 MG: 25 TABLET ORAL at 12:37

## 2023-10-25 RX ADMIN — SPIRONOLACTONE 50 MG: 25 TABLET ORAL at 09:36

## 2023-10-25 RX ADMIN — ACETAMINOPHEN 325 MG: 325 TABLET, FILM COATED ORAL at 11:23

## 2023-10-25 RX ADMIN — FUROSEMIDE 20 MG: 20 TABLET ORAL at 12:37

## 2023-10-25 RX ADMIN — GLIPIZIDE 10 MG: 10 TABLET, EXTENDED RELEASE ORAL at 09:35

## 2023-10-25 RX ADMIN — FUROSEMIDE 20 MG: 20 TABLET ORAL at 09:36

## 2023-10-25 ASSESSMENT — ACTIVITIES OF DAILY LIVING (ADL)
ADLS_ACUITY_SCORE: 35
ADLS_ACUITY_SCORE: 39
ADLS_ACUITY_SCORE: 35
ADLS_ACUITY_SCORE: 39
ADLS_ACUITY_SCORE: 35
ADLS_ACUITY_SCORE: 39

## 2023-10-25 NOTE — PLAN OF CARE
"VSS on RA, complained of MONTE and inability to take full breath pre-paracentesis, relieved slightly by laying down and not talking (Dr. Forbes notified)--reported SOB significantly decreased after 5.4 L pulled. Utilizes urinal or A2 gaitbelt walker to toilet. Skin and sclera jaundiced, changed from NPO to low sodium diet s/p paracentesis.   Goal Outcome Evaluation:    Plan of Care Reviewed With: patient Overall Patient Progress: improving    Problem: Adult Inpatient Plan of Care  Goal: Plan of Care Review  Description: The Plan of Care Review/Shift note should be completed every shift.  The Outcome Evaluation is a brief statement about your assessment that the patient is improving, declining, or no change.  This information will be displayed automatically on your shift  note.  Outcome: Progressing  Flowsheets (Taken 10/25/2023 1607)  Plan of Care Reviewed With: patient  Overall Patient Progress: improving  Goal: Patient-Specific Goal (Individualized)  Description: You can add care plan individualizations to a care plan. Examples of Individualization might be:  \"Parent requests to be called daily at 9am for status\", \"I have a hard time hearing out of my right ear\", or \"Do not touch me to wake me up as it startles  me\".  Outcome: Progressing  Goal: Absence of Hospital-Acquired Illness or Injury  Outcome: Progressing  Intervention: Identify and Manage Fall Risk  Recent Flowsheet Documentation  Taken 10/25/2023 1123 by Tea Mcneil, RN  Safety Promotion/Fall Prevention: safety round/check completed  Goal: Optimal Comfort and Wellbeing  Outcome: Progressing  Intervention: Monitor Pain and Promote Comfort  Recent Flowsheet Documentation  Taken 10/25/2023 1235 by Tea Mcneil, RN  Pain Management Interventions: declines  Taken 10/25/2023 1123 by Tea Mcneil, RN  Pain Management Interventions: medication (see MAR)  Goal: Readiness for Transition of Care  Outcome: Progressing     Problem: Liver Failure  Goal: Optimal Coping " with Liver Failure  Outcome: Progressing  Goal: Fluid and Electrolyte Balance  Outcome: Progressing  Goal: Optimal Gastrointestinal Function  Outcome: Progressing  Goal: Blood Glucose Level Within Target Range  Outcome: Progressing  Goal: Optimal Coagulation Function  Outcome: Progressing  Goal: Absence of Infection Signs and Symptoms  Outcome: Progressing  Goal: Optimal Neurologic Function  Outcome: Progressing  Goal: Improved Oral Intake  Outcome: Progressing  Goal: Optimal Pain Control, Comfort and Function  Outcome: Progressing  Intervention: Prevent or Manage Pain  Recent Flowsheet Documentation  Taken 10/25/2023 1235 by Tea Mcneil RN  Pain Management Interventions: declines  Taken 10/25/2023 1123 by Tea Mcneil RN  Pain Management Interventions: medication (see MAR)  Goal: Optimize Renal Function  Outcome: Progressing  Goal: Effective Oxygenation and Ventilation  Outcome: Progressing  Intervention: Promote Airway Secretion Clearance  Recent Flowsheet Documentation  Taken 10/25/2023 1123 by Tea Mcneil RN  Cough And Deep Breathing: done with encouragement     Problem: Heart Failure  Goal: Optimal Coping  Outcome: Progressing  Goal: Optimal Cardiac Output  Outcome: Progressing  Goal: Stable Heart Rate and Rhythm  Outcome: Progressing  Goal: Optimal Functional Ability  Outcome: Progressing  Goal: Fluid and Electrolyte Balance  Outcome: Progressing  Goal: Improved Oral Intake  Outcome: Progressing  Goal: Effective Oxygenation and Ventilation  Outcome: Progressing  Intervention: Promote Airway Secretion Clearance  Recent Flowsheet Documentation  Taken 10/25/2023 1123 by Tea Mcneil RN  Cough And Deep Breathing: done with encouragement  Goal: Effective Breathing Pattern During Sleep  Outcome: Progressing

## 2023-10-25 NOTE — PROGRESS NOTES
Paracentesis completed per Dr. Pisano without difficulty for 5300 ml clear jelly fluid, patient tolerated well. Fluid to lab for diagnostics as ordered. Patient stated he felt his face felt numb and that his speech seemed slower than normal to him. Dr. Forbes notifed, no new orders. VSS no facial droop noted, symptoms resolved quickly. Report called to bedside RN and patient transferred to room via cart in stable condition.

## 2023-10-25 NOTE — PROGRESS NOTES
[facility]    Medicine Progress Note - Hospitalist Service    Date of Admission:  10/24/2023    Primary Care Physician   Naseem Esparza  CONSULTANTS: GI    Assessment & Plan     Saji Iqbal is a 63 year old male with a past medical history significant for alcohol use disorder, coronary artery disease with 2 previous stents in the left anterior descending artery, diabetes mellitus, hypertension, dyslipidemia, obstructive sleep apnea, left shoulder adhesive capsulitis treated with steroid injection during last hospitalization, ischemic and alcoholic cardiomyopathy with heart failure with reduced ejection fraction.  He was admitted on 10/24/2023. He was admitted here from 8/30 to 9/11 with adhesive capsulitis of the left shoulder and alcohol withdrawal.  During that hospitalization he was also found to have new alcoholic cardiomyopathy with ejection fraction 35 to 40%.  Upon discharge home he said he was able to walk 100 feet but got quite tired.  He thought he would get better at home but has slowly continued to worsen.  He has had progressive dyspnea on exertion.  He said he could maybe walk 25 steps now before having to stop and rest.  He gets short of breath just while dressing.  He did not feel he could go up or down stairs due to weakness and dyspnea.  He has noticed some increased firmness of his abdomen but no definite swelling.  He has not noticed any lower extremity swelling and has no pain to the abdomen or chest.  His left shoulder pain is resolved since steroid injection.  His appetite is good but he is having difficulty preparing his food as he is not able to stand at the stove and therefore has not been eating much.  His daughter requested a welfare check on him and he was brought in the hospital for further evaluation and treatment.  He said he has been sober for 5 weeks since his previous hospitalization. In the ER, his vital signs were stable.  Bilirubin is 8.2 and AST  is 206.  These are both stable from previous hospitalization.  Potassium 3.2 and albumin 2.7.  Chest x-ray is unremarkable.  Bedside ultrasound by ER physician showed ascites.  He is admitted for failure to thrive, severe dyspnea on exertion, new ascites secondary to decompensated end-stage liver disease, possible congstive heart failure, and severe generalized weakness.          Severe dyspnea on exertion and ascites/new diagnosis of decompensated end-stage liver disease/ cirrhosis/ congestive heart failure  The patient had an Ultrasound in August revealed evidence of cirrhosis.  Bilirubin is 8.2 and it was 8.4 at the beginning of September. His AST is also slightly improved at 206 from 239 during recent hospitalization. Seems most likely that progressive ascites resulting in decreased diaphragmatic excursion and dyspnea on exertion. Could be due to his ascites but also congstive heart failure.  His BNP was only 398.  CXR showed low volumes.  ON US bedside in the emergency room, he was found to have significant ascites.  Will get a paracentesis and rule out Spontaneous bacterial peritonitis.  Will need to be on diuretics, this should help with ascites as well as congstive heart failure.  His blood pressure though is on the low side so his home coreg and ace inhibitor need to be stopped.  Gastroenterology to see.  MELD 21,      Failure to thrive/malnutrition with hypoalbuminemia and generalized weakness:  Albumin only 2.5.  Seems primarily secondary to dyspnea exertion but he also describes some element of generalized weakness.   He has not been eating as he said he only has food in his house that need preparation and he has not felt up to preparing food.  He does say he has a good appetite. Social work, physical therapy, Occupational Therapy evaluations to help with needs assessment and disposition planning     3. Acute on chronic systolic Heart failure/ coronary artery disease/ hypotension:  Patient has a known  reduced ejection fraction of 35%.  This is likely due to a combination of ischemic and alcohol related heart disease.  This was a new diagnosis on his recent hospitalization.  He previously had 2 stents in the LAD. He had a mildly elevated troponin during the hospitalization. Echocardiogram revealed ejection fraction of 35 to 40% with inferior wall motion abnormality. Nuclear stress test confirmed an inferior infarct which was not reversible. He was deemed high risk for intervention with likely minimal benefit given the fixed defect.Medical management was recommended.  Troponin 24, down from 32 at last hospitalization.  He has been on lisinopril and coreg but his blood pressure is running in the 80s so these will be held to allow for diuresis. Is on asa.  Monitor intake, output and weights        4.  Hypokalemia  Replace with close monitoring while on Aldactone and Lasix. Potassium today is 3.5, ace inhibitor is held.      5.  Diabetes mellitus type 2, non insulin dependent:        6.  Alcohol dependence:   He reports being abstinent since his last hospitalization, sober for 3 weeks.  He says he has not felt like drinking alcohol. Sobriety was discussed again by me.     7.  Obstructive sleep apnea   Is noncompliant with CPAP, has not uses since 2014           Discussed plan of care with bedside nurse, social, case management      Diet: NPO per Anesthesia Guidelines for Procedure/Surgery Except for: Meds, Ice Chips    DVT Prophylaxis: Pneumatic Compression Devices  Durbin Catheter: Not present  Lines: None     Cardiac Monitoring: None    RESTRAINTS: Not indicated  Code Status: Full Code        Follow up plan: GI following      This document was created using voice recognition technology.  Please excuse any typographical errors that may have occurred.  Please call with any questions.         Clinically Significant Risk Factors Present on Admission        # Hypokalemia: Lowest K = 3.2 mmol/L in last 2 days, will replace  "as needed       # Hypoalbuminemia: Lowest albumin = 2.5 g/dL at 10/24/2023  4:32 PM, will monitor as appropriate  # Coagulation Defect: INR = 1.33 (Ref range: 0.85 - 1.15) and/or PTT = 30 Seconds (Ref range: 22 - 38 Seconds), will monitor for bleeding  # Drug Induced Platelet Defect: home medication list includes an antiplatelet medication   # Hypertension: Noted on problem list  # Chronic heart failure with reduced ejection fraction: last echo with EF <40%     # Overweight: Estimated body mass index is 28.58 kg/m  as calculated from the following:    Height as of this encounter: 1.803 m (5' 10.98\").    Weight as of this encounter: 92.9 kg (204 lb 12.9 oz).       # Financial/Environmental Concerns:           Disposition Plan      Expected Discharge Date: 10/28/2023                  Barrier to discharge: Dyspnea on exertion, needs paracentesis    uRssell Forbes MD  Hospitalist Service  [facility]  Securely message with [employer] (more info)  Text page via [facility] Paging/Directory   ______________________________________________________________________    Interval History   Patient is new to me.  Patient was a little cranky overnight per nursing.  Patient continues to have dyspnea on exertion but is on room air with normal saturations while lying in bed.  Blood pressures been running on the low side today.  Denied any dizziness.  Patient is not happy that he is n.p.o. awaiting his paracentesis.      ROS: A comprehensive review of systems was negative except for items noted in the HPI.  Patient currently denies any fever, chills, sweats, nausea, vomiting, diarrhea, shortness of breath, or chest pain.    Physical Exam   Vital Signs: Temp: 98.3  F (36.8  C) Temp src: Oral BP: (!) 83/46 Pulse: 80   Resp: 20 SpO2: 94 % O2 Device: None (Room air)    Weight: 204 lbs 12.92 oz      General appearance: Patient is alert and oriented x3, no apparent distress, pleasant and conversing normally, speaking in full " sentences, appears stated age, lying in bed  HEENT:    Mucous membranes are moist  RESPIRATORY: Clear to auscultation bilateral, good air movement  CARDIOVASCULAR: Regular rate and rhythm, normal S1/S2, no murmurs   GASTROINTESTINAL:  distended, positive fluid wave, non-tender, soft, bowel sounds present throughout  NEUROLOGIC:  Cranial nerves II-XII intact, without any focal deficits, strength 5/5 throughout  EXTREMITIES:  Moves all extremities, no clubbing, cyanosis, interestingly nor any edema  :  Durbin not present         Data     I have personally reviewed the following data over the past 24 hrs:    8.0  \   12.8 (L)   / 178     132 (L) 98 19.0 /  107 (H)   3.5 24 0.79 \     ALT: 61 AST: 206 (H) AP: 240 (H) TBILI: 8.2 (H)   ALB: 2.5 (L) TOT PROTEIN: 6.1 (L) LIPASE: N/A     Trop: 22 BNP: 398     TSH: N/A T4: N/A A1C: 5.7 (H)     INR:  1.33 (H) PTT:  N/A   D-dimer:  N/A Fibrinogen:  N/A       Imaging:   Results for orders placed or performed during the hospital encounter of 10/24/23   Chest XR,  PA & LAT    Narrative    XR CHEST 2 VIEWS 10/24/2023 2:59 PM    HISTORY: dyspnea    COMPARISON: 9/3/2023    FINDINGS: Low lung volumes. No consolidation, pleural effusions, or  pneumothorax. Normal cardiac size.    JOHN CLARK MD         SYSTEM ID:  [id number]     Procedures: Paracentesis pending    I have personally have reviewed the patient's most up to date radiologic exams, labs, orders, and medications myself

## 2023-10-25 NOTE — ED NOTES
"[facility]    ED Boarding Nurse Handoff Addendum Report:    Date/time: 10/24/2023, 7:02 PM    Activity Level: assist of 2    Fall Risk: Yes:  nonskid shoes/slippers when out of bed, arm band in place, patient and family education, assistive device/personal items within reach, activity supervised, and mobility aid in reach    Active Infusions: None    Current Meds Due: Meds not verified yet    Current care needs: None    Oxygen requirements (liters/min and/or FiO2): None    Respiratory status: Room air    Vital signs (within last 30 minutes):    Vitals:    10/24/23 1545 10/24/23 1615 10/24/23 1634 10/24/23 1752   BP: 97/69 (!) 118/98  112/85   BP Location:    Right arm   Patient Position:    Supine   Cuff Size:    Adult Regular   Pulse: 94 101 98 99   Resp: 18 22 26 21   Temp:       TempSrc:       SpO2: 98% 97% 91% 95%   Weight:    92.9 kg (204 lb 12.9 oz)   Height:    1.803 m (5' 11\")       Focused assessment within last 30 minutes:    A&Ox4. VSS. Pt able to stand on scale next to bed for me. States he is still very weak and any exertion causes shortness of breath. Warning popped up when K protocol was ran that K and Spironolactone need to be monitored together. MD notified and stated we will monitor.     ED Boarding Nurse name: Cary Keating RN      "

## 2023-10-25 NOTE — PROGRESS NOTES
Patient Transfer Information  Patient connected to monitoring equipment on arrival: N/A     Patient connected to wall oxygen on arrival: N/A    Belongings: No belongings present    Safety check completed: Yes

## 2023-10-25 NOTE — PLAN OF CARE
Goal Outcome Evaluation:      Plan of Care Reviewed With: patient        Temp: 98.2  F (36.8  C) Temp src: Oral BP: 96/68 Pulse: 87   Resp: 24 SpO2: 94 % O2 Device: None (Room air)        VSS on RA, with MONTE. A&O x4. Assist of 2, walkerDEL. Denies pain. On K protocol. Recheck in AM. PT, OT and SW consulted. Plan to have paracentesis today.

## 2023-10-25 NOTE — CONSULTS
GASTROENTEROLOGY CONSULTATION      Saji Iqbal  [address] [address]  63 year old male     Admission Date/Time: 10/24/2023  Primary Care Provider: Naseem Esparza  Referring / Attending Physician:  Dr. Chau      We were asked to see the patient in consultation by Dr. Chau for evaluation of liver disease.        HPI:  Saji Iqbal is a 63 year old male with medical history of coronary artery disease, type 2 diabetes mellitus, and alcohol abuse with dependency who presents to the emergency room with worsening shortness of breath and generalized weakness.    Patient was recently hospitalized last month for left shoulder pain and alcohol withdrawal.  During that hospitalization he was found to have new alcoholic cardiomyopathy and likely liver cirrhosis.  At home the patient was having difficulty standing due to weakness.  He notes that he was not eating much.  He denies that he was drinking alcohol.  Patient reports he has been sober for about 8 weeks.    He does note that his belly is large and distended.  There is no nausea or vomiting.  He denies melena but there is question of some red blood in the toilet bowl this morning.  In the ER his AST is 206, alkaline phosphatase 240, ALT 61 total bilirubin 8.2.  A bedside ultrasound in the ER showed ascites.  BNP was 398.  Hemoglobin on admission was 14.  Platelets 203K.  INR 1.33.  Awaiting CBC this morning.     PAST MEDICAL HISTORY:  Patient Active Problem List    Diagnosis Date Noted    Anxiety and depression 10/24/2023     Priority: Medium    Depressive disorder 10/24/2023     Priority: Medium     Formatting of this note might be different from the original. Depressive disorder, not elsewhere classified      Generalized weakness 10/24/2023     Priority: Medium    Alcoholic cirrhosis of liver with ascites (H) 10/24/2023     Priority: Medium    Elevated troponin 08/30/2023     Priority: Medium    Acute pain of left shoulder  08/30/2023     Priority: Medium    Anemia, unspecified type 08/30/2023     Priority: Medium    Moderate episode of recurrent major depressive disorder (H) 01/19/2022     Priority: Medium    Laceration of extensor tendon of right foot, initial encounter 10/13/2020     Priority: Medium     Added automatically from request for surgery [id number]      ETOH abuse 06/16/2020     Priority: Medium    Psoriasis 01/11/2019     Priority: Medium    Hypertriglyceridemia 02/12/2018     Priority: Medium    NIKKI (obstructive sleep apnea) 12/27/2017     Priority: Medium    Uncontrolled type 2 diabetes mellitus with hyperglycemia (H) 12/26/2016     Priority: Medium    Chronic gastroesophageal reflux disease 12/12/2016     Priority: Medium    Coronary artery disease involving native coronary artery without angina pectoris 04/11/2016     Priority: Medium    Hyperlipidemia LDL goal <100 04/11/2016     Priority: Medium    Benign essential hypertension 04/11/2016     Priority: Medium    Obesity due to excess calories 04/11/2016     Priority: Medium    Tobacco abuse 12/31/2015     Priority: Medium    ST elevation myocardial infarction (STEMI) (H) 01/30/2014     Priority: Medium          ROS: A comprehensive ten point review of systems was negative aside from those in mentioned in the HPI.       MEDICATIONS:   Prior to Admission medications    Medication Sig Start Date End Date Taking? Authorizing Provider   aspirin 81 MG tablet Take by mouth daily   Yes Reported, Patient   atorvastatin (LIPITOR) 20 MG tablet Take 20 mg by mouth daily   Yes Unknown, Entered By History   carvedilol (COREG) 6.25 MG tablet Take 1 tablet (6.25 mg) by mouth 2 times daily (with meals) 9/11/23  Yes Naseem Garcia MD   gabapentin (NEURONTIN) 100 MG capsule Take 200 mg by mouth daily   Yes Unknown, Entered By History   glipiZIDE (GLUCOTROL XL) 10 MG 24 hr tablet TAKE 1 TABLET (10 MG) BY MOUTH DAILY. 9/8/23  Yes Naseem Esparza MD        ALLERGIES:  "  Allergies   Allergen Reactions    Metformin Diarrhea    Honey Other (See Comments)     Throat irritation        SOCIAL HISTORY:  Social History     Tobacco Use    Smoking status: Former     Packs/day: 0.33     Years: 20.00     Additional pack years: 0.00     Total pack years: 6.60     Types: Cigarettes     Quit date: 2015     Years since quittin.2     Passive exposure: Never    Smokeless tobacco: Never   Vaping Use    Vaping Use: Never used   Substance Use Topics    Alcohol use: Yes     Alcohol/week: 1.0 standard drink of alcohol     Types: 1 Standard drinks or equivalent per week     Comment: 4-5 drinks nightly on weekend    Drug use: No        FAMILY HISTORY:  Family History   Problem Relation Age of Onset    Coronary Artery Disease Mother     Coronary Artery Disease Father     Cerebrovascular Disease Brother     Anuerysm Sister     Glaucoma No family hx of     Macular Degeneration No family hx of         PHYSICAL EXAM:     BP (!) 83/46 (BP Location: Right arm)   Pulse 80   Temp 98.3  F (36.8  C) (Oral)   Resp 20   Ht 1.803 m (5' 10.98\")   Wt 92.9 kg (204 lb 12.9 oz)   SpO2 94%   BMI 28.58 kg/m       PHYSICAL EXAM:  GENERAL:  NAD  SKIN: no suspicious lesions, rashes, jaundice  HEAD: Normocephalic. Atraumatic.  NECK: Neck supple. No adenopathy.   EYES: No scleral icterus  GASTROINTESTINAL: large, non tender, distended, no guarding/rebound  JOINT/EXTREMITIES:  no gross deformities noted, normal muscle tone  NEURO: CN 2-12 grossly intact, no focal deficits  PSYCH: Normal affect          ADDITIONAL COMMENTS:   I reviewed the patient's new clinical lab test results.     Recent Labs   Lab 10/24/23  1422   WBC 9.4   RBC 3.88*   HGB 14.0   HCT 41.8   *   MCH 36.1*   MCHC 33.5   RDW 14.2        Recent Labs   Lab Test 10/25/23  0741 10/25/23  0028 10/24/23  1422 23  0612   POTASSIUM 3.5 3.7 3.2* 5.0   CHLORIDE 98  --  94* 98   CO2   --     BUN 19.0  --  17.5 14.4   ANIONGAP 10  " --  16* 7     Recent Labs   Lab Test 10/24/23  1632 10/24/23  1422 09/04/23  0752 09/03/23  1432 09/02/23  0333 07/14/22  0800 01/19/22  0737 12/27/21  1126   ALBUMIN 2.5* 2.7* 3.0*  --  3.0*   < >  --   --    BILITOTAL  --  8.2* 8.4*  --  8.2*   < >  --   --    ALT  --  61 46  --  44   < >  --   --    AST  --  206* 128*  --  134*   < >  --   --    PROTEIN  --   --   --  Negative  --   --  Negative Negative    < > = values in this interval not displayed.       Recent Labs   Lab 10/24/23  1422   INR 1.33*     No lab results found in last 7 days.  MELD 3.0: 22 at 10/25/2023  7:41 AM  MELD-Na: 21 at 10/25/2023  7:41 AM  Calculated from:  Serum Creatinine: 0.79 mg/dL (Using min of 1 mg/dL) at 10/25/2023  7:41 AM  Serum Sodium: 132 mmol/L at 10/25/2023  7:41 AM  Total Bilirubin: 8.2 mg/dL at 10/24/2023  2:22 PM  Serum Albumin: 2.5 g/dL at 10/24/2023  4:32 PM  INR(ratio): 1.33 at 10/24/2023  2:22 PM  Age at listing (hypothetical): 63 years  Sex: Male at 10/25/2023  7:41 AM       IMAGING / ENDOSCOPY     Recent Results (from the past 24 hour(s))   Chest XR,  PA & LAT    Narrative    XR CHEST 2 VIEWS 10/24/2023 2:59 PM    HISTORY: dyspnea    COMPARISON: 9/3/2023    FINDINGS: Low lung volumes. No consolidation, pleural effusions, or  pneumothorax. Normal cardiac size.    JOHN CLARK MD         SYSTEM ID:  [id number]         CONSULTATION ASSESSMENT AND PLAN:    Saji Iqbal is a 63 year old with medical history of coronary artery disease, type 2 diabetes mellitus, and alcohol abuse with dependency who presents to the emergency room with worsening shortness of breath and generalized weakness found to have decompensated liver disease with worsening ascites.    1.  Alcoholic liver disease: Patient has significant abdominal distention with large ascites on bedside ultrasound.  LFTs are elevated but stable compared to last month.  No evidence of infection or encephalopathy at this point.  Will monitor for any evidence of  overt GI bleeding.  He is hypotensive.    -- MELD NA score is 21  -- Hold off on diuretics given hypotension.  Creatinine is within normal limits.  -- We will plan for paracentesis once hemodynamically stable, both diagnostic and therapeutic.  -- Monitor serial hemoglobin, document stool output to assess for melena.  -- Encouraged abstinence from all alcohol.    I discussed the patient plan with Dr. De Los Santos, GI staff physician. Thank you for asking us to participate in the care of this patient.     60 min of total time was spent providing patient care, including patient evaluation, reviewing documentation/ test results, and .     Eunice Garrett PA-C  [facility] ( [facility])

## 2023-10-26 ENCOUNTER — APPOINTMENT (OUTPATIENT)
Dept: PHYSICAL THERAPY | Facility: CLINIC | Age: 63
End: 2023-10-26
Attending: INTERNAL MEDICINE
Payer: MEDICAID

## 2023-10-26 ENCOUNTER — APPOINTMENT (OUTPATIENT)
Dept: OCCUPATIONAL THERAPY | Facility: CLINIC | Age: 63
End: 2023-10-26
Attending: INTERNAL MEDICINE
Payer: MEDICAID

## 2023-10-26 LAB
ANION GAP SERPL CALCULATED.3IONS-SCNC: 10 MMOL/L (ref 7–15)
BUN SERPL-MCNC: 18.9 MG/DL (ref 8–23)
CALCIUM SERPL-MCNC: 8.1 MG/DL (ref 8.8–10.2)
CHLORIDE SERPL-SCNC: 99 MMOL/L (ref 98–107)
CREAT SERPL-MCNC: 0.76 MG/DL (ref 0.67–1.17)
DEPRECATED HCO3 PLAS-SCNC: 25 MMOL/L (ref 22–29)
EGFRCR SERPLBLD CKD-EPI 2021: >90 ML/MIN/1.73M2
GLUCOSE BLDC GLUCOMTR-MCNC: 103 MG/DL (ref 70–99)
GLUCOSE BLDC GLUCOMTR-MCNC: 168 MG/DL (ref 70–99)
GLUCOSE BLDC GLUCOMTR-MCNC: 65 MG/DL (ref 70–99)
GLUCOSE BLDC GLUCOMTR-MCNC: 89 MG/DL (ref 70–99)
GLUCOSE BLDC GLUCOMTR-MCNC: 95 MG/DL (ref 70–99)
GLUCOSE SERPL-MCNC: 79 MG/DL (ref 70–99)
POTASSIUM SERPL-SCNC: 3.6 MMOL/L (ref 3.4–5.3)
SODIUM SERPL-SCNC: 134 MMOL/L (ref 135–145)

## 2023-10-26 PROCEDURE — 80048 BASIC METABOLIC PNL TOTAL CA: CPT | Performed by: INTERNAL MEDICINE

## 2023-10-26 PROCEDURE — 250N000013 HC RX MED GY IP 250 OP 250 PS 637: Performed by: INTERNAL MEDICINE

## 2023-10-26 PROCEDURE — 97165 OT EVAL LOW COMPLEX 30 MIN: CPT | Mod: GO | Performed by: OCCUPATIONAL THERAPIST

## 2023-10-26 PROCEDURE — 97535 SELF CARE MNGMENT TRAINING: CPT | Mod: GO | Performed by: OCCUPATIONAL THERAPIST

## 2023-10-26 PROCEDURE — 97161 PT EVAL LOW COMPLEX 20 MIN: CPT | Mod: GP | Performed by: PHYSICAL THERAPIST

## 2023-10-26 PROCEDURE — 99232 SBSQ HOSP IP/OBS MODERATE 35: CPT | Performed by: INTERNAL MEDICINE

## 2023-10-26 PROCEDURE — G0463 HOSPITAL OUTPT CLINIC VISIT: HCPCS

## 2023-10-26 PROCEDURE — 97530 THERAPEUTIC ACTIVITIES: CPT | Mod: GP | Performed by: PHYSICAL THERAPIST

## 2023-10-26 PROCEDURE — 120N000001 HC R&B MED SURG/OB

## 2023-10-26 PROCEDURE — 36415 COLL VENOUS BLD VENIPUNCTURE: CPT | Performed by: INTERNAL MEDICINE

## 2023-10-26 RX ORDER — SPIRONOLACTONE 50 MG/1
50 TABLET, FILM COATED ORAL DAILY
Qty: 30 TABLET | Refills: 0 | Status: SHIPPED | OUTPATIENT
Start: 2023-10-26 | End: 2023-10-27

## 2023-10-26 RX ORDER — FUROSEMIDE 20 MG
20 TABLET ORAL DAILY
Qty: 30 TABLET | Refills: 0 | Status: SHIPPED | OUTPATIENT
Start: 2023-10-26 | End: 2023-11-12

## 2023-10-26 RX ORDER — SPIRONOLACTONE 25 MG/1
25 TABLET ORAL DAILY
Status: DISCONTINUED | OUTPATIENT
Start: 2023-10-27 | End: 2023-11-02

## 2023-10-26 RX ADMIN — SPIRONOLACTONE 50 MG: 25 TABLET ORAL at 08:13

## 2023-10-26 RX ADMIN — FUROSEMIDE 20 MG: 20 TABLET ORAL at 08:14

## 2023-10-26 RX ADMIN — GLIPIZIDE 10 MG: 10 TABLET, EXTENDED RELEASE ORAL at 08:14

## 2023-10-26 ASSESSMENT — ACTIVITIES OF DAILY LIVING (ADL)
ADLS_ACUITY_SCORE: 35
DEPENDENT_IADLS:: INDEPENDENT
ADLS_ACUITY_SCORE: 35

## 2023-10-26 NOTE — PLAN OF CARE
Goal Outcome Evaluation:      Plan of Care Reviewed With: patient        Temp: 98.3  F (36.8  C) Temp src: Axillary BP: 112/69 Pulse: 74   Resp: 16 SpO2: 95 % O2 Device: None (Room air)          VSS on RA. A&O x4. Assist of 1, DEL, walker. On K protocol, recheck in AM. On  carb count insulin. Bg at 0200: 65. Snack and juice given. Recheck B. Uses urinal. R. PIV SL. Denies pain. Plan to continue lasix, monitor weight, and I/Os.

## 2023-10-26 NOTE — PROGRESS NOTES
10/26/23 0859   Appointment Info   Signing Clinician's Name / Credentials (PT) Joycelyn Pineda DPHAWA   Living Environment   People in Home alone   Current Living Arrangements apartment   Home Accessibility stairs to enter home   Number of Stairs, Main Entrance other (see comments)  (14)   Stair Railings, Main Entrance railings on both sides of stairs   Living Environment Comments Pt is in a 2nd floor apartment   Self-Care   Equipment Currently Used at Home none   Fall history within last six months no   General Information   Onset of Illness/Injury or Date of Surgery 10/24/23   Referring Physician Julien Chau MD   Pertinent History of Current Problem (include personal factors and/or comorbidities that impact the POC) Saji Iqbal is a 63 year old male with a past medical history significant for alcohol use disorder, coronary artery disease with 2 previous stents in the left anterior descending artery, diabetes mellitus, hypertension, dyslipidemia, obstructive sleep apnea, left shoulder adhesive capsulitis treated with steroid injection during last hospitalization, ischemic and alcoholic cardiomyopathy with heart failure with reduced ejection fraction.  He was admitted on 10/24/2023. He was admitted here from 8/30 to 9/11 with adhesive capsulitis of the left shoulder and alcohol withdrawal.  During that hospitalization he was also found to have new alcoholic cardiomyopathy with ejection fraction 35 to 40%.  Upon discharge home he said he was able to walk 100 feet but got quite tired.  He thought he would get better at home but has slowly continued to worsen.  He has had progressive dyspnea on exertion.  He said he could maybe walk 25 steps now before having to stop and rest.  He gets short of breath just while dressing.  He did not feel he could go up or down stairs due to weakness and dyspnea.  He has noticed some increased firmness of his abdomen but no definite swelling.  He has not noticed any lower  "extremity swelling and has no pain to the abdomen or chest.  His left shoulder pain is resolved since steroid injection.  His appetite is good but he is having difficulty preparing his food as he is not able to stand at the stove and therefore has not been eating much.  His daughter requested a welfare check on him and he was brought in the hospital for further evaluation and treatment.  He said he has been sober for 5 weeks since his previous hospitalization.   Existing Precautions/Restrictions fall   Cognition   Affect/Mental Status (Cognition) low arousal/lethargic   Orientation Status (Cognition) oriented x 3   Follows Commands (Cognition) follows two-step commands   Pain Assessment   Patient Currently in Pain Yes, see Vital Sign flowsheet  (HA)   Integumentary/Edema   Integumentary/Edema Comments noted severe abdominal swelling   Posture    Posture Forward head position;Protracted shoulders   Range of Motion (ROM)   ROM Comment WFL   Strength (Manual Muscle Testing)   Strength (Manual Muscle Testing) Deficits observed during functional mobility;Able to perform R SLR;Able to perform L SLR   Bed Mobility   Comment, (Bed Mobility) Heavy use of rail, CGA   Transfers   Comment, (Transfers) Pt reporting feeling anxious about attempt to stand as \"it didn't go well last time\" Reports he is fearful of falling. Offered jennifer steady, CGA x 1 with attempt   Gait/Stairs (Locomotion)   Comment, (Gait/Stairs) limited due to dizziness upon standing BP 82/57 with transition   Balance   Balance Comments poor ability to wt shift in standing   Clinical Impression   Criteria for Skilled Therapeutic Intervention Yes, treatment indicated   PT Diagnosis (PT) decreased functional mobility   Influenced by the following impairments pain, edema in abdominal region, decreased functional strength   Functional limitations due to impairments decreased bed mob, transfers, ambulation, stairs   Clinical Presentation (PT Evaluation Complexity) " stable   Clinical Presentation Rationale slow improvement medically   Clinical Decision Making (Complexity) low complexity   Planned Therapy Interventions (PT) balance training;bed mobility training;gait training;patient/family education;strengthening;transfer training;progressive activity/exercise;risk factor education;home program guidelines   Risk & Benefits of therapy have been explained evaluation/treatment results reviewed;care plan/treatment goals reviewed;risks/benefits reviewed;current/potential barriers reviewed;participants voiced agreement with care plan;participants included;patient   PT Total Evaluation Time   PT Eval, Low Complexity Minutes (00731) 5   Physical Therapy Goals   PT Frequency Daily   PT Predicted Duration/Target Date for Goal Attainment 10/31/23   PT Goals Bed Mobility;Transfers;Gait;Stairs;Aerobic Activity   PT: Bed Mobility Independent;Supine to/from sit   PT: Transfers Modified independent;Sit to/from stand;Bed to/from chair;Assistive device   PT: Gait Modified independent;Rolling walker;50 feet   PT: Stairs Modified independent;Greater than 10 stairs;Assistive device   PT: Perform aerobic activity with stable cardiovascular response intermittent activity;25 minutes;ambulation   Interventions   Interventions Quick Adds Therapeutic Activity   Therapeutic Activity   Therapeutic Activities: dynamic activities to improve functional performance Minutes (93594) 15   Symptoms Noted During/After Treatment Dizziness;Significant change in vital signs   Treatment Detail/Skilled Intervention Pt was educated on importance of mobility as overall hesitant. Pt was cued for sit<>stands with jennifer steady to build confidence and increase safety wtih transition into standing. Pt was cued for attempts to wt shift LEs, unable continues to feel dizzy despite continued standing. 92/65. Pt declining futher acticvity due to dizziness. Pt cued for slow to stand to sit, tolerating this well, CGA. Pt returning to  supine, SBA   PT Discharge Planning   PT Plan progress all mobility, W/C follow?   PT Discharge Recommendation (DC Rec) Transitional Care Facility   PT Rationale for DC Rec Pt currently limited by BP being low with activity of sit to stand. Pt inde at baseline and well below as unable to ambulate with therapy this am.   PT Brief overview of current status limited to sit<>stands with jennifer steady   PT Equipment Needed at Discharge walker, rolling   Total Session Time   Timed Code Treatment Minutes 15   Total Session Time (sum of timed and untimed services) 20

## 2023-10-26 NOTE — PROGRESS NOTES
"GASTROENTEROLOGY PROGRESS NOTE     SUBJECTIVE:  Felt dizzy with  PT today. Breathing  better since paracentesis. Less abdominal distension. Denies nausea, vomiting. No BMs today.      OBJECTIVE:  BP (!) 82/57 (BP Location: Right arm)   Pulse 81   Temp 98  F (36.7  C) (Oral)   Resp 18   Ht 1.803 m (5' 10.98\")   Wt 87 kg (191 lb 12.8 oz)   SpO2 97%   BMI 26.76 kg/m    Temp (24hrs), Av.9  F (36.6  C), Min:97.4  F (36.3  C), Max:98.3  F (36.8  C)    Patient Vitals for the past 72 hrs:   Weight   10/26/23 0607 87 kg (191 lb 12.8 oz)   10/25/23 0451 92.9 kg (204 lb 12.9 oz)   10/24/23 1919 92.7 kg (204 lb 5.9 oz)   10/24/23 1752 92.9 kg (204 lb 12.9 oz)       Intake/Output Summary (Last 24 hours) at 10/26/2023 1318  Last data filed at 10/26/2023 0800  Gross per 24 hour   Intake 1160 ml   Output 350 ml   Net 810 ml        PHYSICAL EXAM  Gen: alert, oriented, NAD, +jaundice.  Abd: soft, mildly distended, +BS  Ext: no edema.            Additional Comments:  ROS, FH, SH: See initial GI consult for details.     I have reviewed the patient's new clinical lab results:     Recent Labs   Lab Test 10/25/23  1043 10/24/23  1422 23  0752 23  0641 23  1102   WBC 8.0 9.4 6.9   < >  --    HGB 12.8* 14.0 12.2*   < >  --    * 108* 108*   < >  --     203 116*   < >  --    INR  --  1.33*  --   --  1.14    < > = values in this interval not displayed.     Recent Labs   Lab Test 10/26/23  0731 10/25/23  0741 10/25/23  0028 10/24/23  1422   POTASSIUM 3.6 3.5 3.7 3.2*   CHLORIDE 99 98  --  94*   CO2 25 24  --  25   BUN 18.9 19.0  --  17.5   ANIONGAP 10 10  --  16*     Recent Labs   Lab Test 10/24/23  1632 10/24/23  1422 23  0752 23  1432 23  0333 22  0800 22  0737 21  1126   ALBUMIN 2.5* 2.7* 3.0*  --  3.0*   < >  --   --    BILITOTAL  --  8.2* 8.4*  --  8.2*   < >  --   --    ALT  --  61 46  --  44   < >  --   --    AST  --  206* 128*  --  134*   < >  --   --  "   PROTEIN  --   --   --  Negative  --   --  Negative Negative    < > = values in this interval not displayed.        Assessment/Plan:  63 year old with medical history of coronary artery disease, type 2 diabetes mellitus, and alcohol abuse with dependency admitted 10/24 with worsening shortness of breath and generalized weakness found to have decompensated liver disease with worsening ascites.     1. ETOH related liver disease with ascites. Sober x 3 weeks on admit. US paracentesis 10/25 with 5.3 L removed. No evidence of SBP. SAAG consistent with portal hypertension. No evidence of GI bleeding or encephalopathy. MELD-na 21 10/25. Has had issues with intermittent hypotension limiting ability to use higher dose diuretics. Spironolactone to be reduced tomorrow to 25mg.     US abd 8/2023 with increased echogenicity consistent with hepatic steatosis and fibrosis, trace ascites.     Total bili stable from 9/4/23 on admit at 8.2. Elevated , ALT normal 61, alk phos 240. INR 1.33 (previously normal in August). HGB stable with mild anemia, normal platelets. Creatinine normal. Hep B/C serology negative 7/2022.     --Start 2g  Na diet.   --Continue diuretics.    --Monitor BP.   --US paracentesis with albumin as needed.   --Continue to avoid alcohol.   --Will arrange outpatient [facility] hepatology follow up.     Time spent: 30 minutes, greater than 50% of the visit was spent in counseling/coordination of care.     Sehila Mcclelland, PAC  [facility] ([facility])

## 2023-10-26 NOTE — CONSULTS
Care Management Initial Consult    General Information  Assessment completed with: Patient,    Type of CM/SW Visit: Initial Assessment    Primary Care Provider verified and updated as needed: Yes   Readmission within the last 30 days: no previous admission in last 30 days      Reason for Consult: discharge planning     Communication Assessment  Patient's communication style: spoken language (English or Bilingual)    Wear Glasses or Blind: no    Cognitive  Cognitive/Neuro/Behavioral: WDL                      Living Environment:   People in home: alone     Current living Arrangements:  (Pt is being evicted from apartment the end of this month.)      Able to return to prior arrangements: no  Living Arrangement Comments: Pt is being evicted from his apartment the end of this month.    Family/Social Support:  Care provided by: self  Provides care for: no one, unable/limited ability to care for self  Marital Status: Single             Description of Support System:      Support Assessment: Lacks adequate physical care    Current Resources:   Patient receiving home care services:  No     Community Resources:    Equipment currently used at home: none  Supplies currently used at home:      Does the patient's insurance plan have a 3 day qualifying hospital stay waiver?  No    Lifestyle & Psychosocial Needs:  Social Determinants of Health     Food Insecurity: No Food Insecurity (6/19/2020)    Hunger Vital Sign     Worried About Running Out of Food in the Last Year: Never true     Ran Out of Food in the Last Year: Never true   Depression: Not at risk (2/17/2023)    PHQ-2     PHQ-2 Score: 2   Housing Stability: Not on file   Tobacco Use: Medium Risk (6/5/2023)    Patient History     Smoking Tobacco Use: Former     Smokeless Tobacco Use: Never     Passive Exposure: Never   Financial Resource Strain: Low Risk  (6/19/2020)    Overall Financial Resource Strain (CARDIA)     Difficulty of Paying Living Expenses: Not hard at all    Alcohol Use: Not on file   Transportation Needs: No Transportation Needs (6/19/2020)    PRAPARE - Transportation     Lack of Transportation (Medical): No     Lack of Transportation (Non-Medical): No   Physical Activity: Unknown (6/19/2020)    Exercise Vital Sign     Days of Exercise per Week: 5 days     Minutes of Exercise per Session: Not on file   Interpersonal Safety: Not At Risk (6/19/2020)    Humiliation, Afraid, Rape, and Kick questionnaire     Fear of Current or Ex-Partner: No     Emotionally Abused: No     Physically Abused: No     Sexually Abused: No   Stress: Stress Concern Present (6/19/2020)    [employer] - Occupational Stress Questionnaire     Feeling of Stress : To some extent   Social Connections: Not on file       Functional Status:  Prior to admission patient needed assistance:   Dependent ADLs:: Independent  Dependent IADLs:: Independent  Assesssment of Functional Status: Not at  functional baseline    Additional Information:  CM consulted for discharge planning. Pt admitted with severe dyspnea on exertion and ascites/new diagnosis of decompensated liver disease/ cirrhosis/ congestive heart failure. Met with pt at bedside. Pt states he is being evicted from his apartment the end of the month. Daughter has been working with the [facility] to explore housing options. Pt gave permission to call daughter Richa P: [phone] as needed for additional information. Pt provided the name of the [facility] worker that has been assisting pt, Palak Rainey P: [phone].   Therapy has recommended TCU. Pt agreed to have referrals sent. Would prefer referrals be sent close to daughter, she lives in [place], [place]. Referrals sent to [facility] in [facility] in [place].   Pt states daughter will be able to provide transportation.  Will continue to follow for discharge planning.    Yessy Pelaez RN   Inpatient Care  Coordination  [facility]   Phone: [phone]

## 2023-10-26 NOTE — PROVIDER NOTIFICATION
"Dr Forbes messaged at 10a \"grabbed WOC to take a look at wound on L toe from attempting to cut nails, she endorses WOC consult\"  "

## 2023-10-26 NOTE — PLAN OF CARE
"Goal Outcome Evaluation:      Plan of Care Reviewed With: patient    Overall Patient Progress: improving     Temp: 97.4  F (36.3  C) Temp src: Oral BP: 107/66 Pulse: 101   Resp: 16 SpO2: 96 % O2 Device: None (Room air)       A/O x4, VSS on RA. C/o pain but can tolerate. On K protocol, recheck in the AM. . Left PIV occluded, new PIV on right arm is patent and SL. Pt is 2A gb wk, uses urinal at bedside. Will continue POC.       Problem: Adult Inpatient Plan of Care  Goal: Plan of Care Review  Description: The Plan of Care Review/Shift note should be completed every shift.  The Outcome Evaluation is a brief statement about your assessment that the patient is improving, declining, or no change.  This information will be displayed automatically on your shift  note.  Outcome: Progressing  Flowsheets (Taken 10/25/2023 2305)  Plan of Care Reviewed With: patient  Overall Patient Progress: improving  Goal: Patient-Specific Goal (Individualized)  Description: You can add care plan individualizations to a care plan. Examples of Individualization might be:  \"Parent requests to be called daily at 9am for status\", \"I have a hard time hearing out of my right ear\", or \"Do not touch me to wake me up as it startles  me\".  Outcome: Progressing  Goal: Absence of Hospital-Acquired Illness or Injury  Outcome: Progressing  Intervention: Identify and Manage Fall Risk  Recent Flowsheet Documentation  Taken 10/25/2023 1800 by Iveth Parsons RN  Safety Promotion/Fall Prevention:   activity supervised   clutter free environment maintained   increase visualization of patient   nonskid shoes/slippers when out of bed   patient and family education   room near nurse's station   room organization consistent   safety round/check completed   supervised activity  Intervention: Prevent Skin Injury  Recent Flowsheet Documentation  Taken 10/25/2023 1800 by Iveth Parsons RN  Body Position:   position changed independently   side-lying   " right  Intervention: Prevent and Manage VTE (Venous Thromboembolism) Risk  Recent Flowsheet Documentation  Taken 10/25/2023 1800 by Iveth Parsons RN  VTE Prevention/Management: SCDs (sequential compression devices) off  Intervention: Prevent Infection  Recent Flowsheet Documentation  Taken 10/25/2023 1800 by Iveth Parsons RN  Infection Prevention:   hand hygiene promoted   rest/sleep promoted   single patient room provided  Goal: Optimal Comfort and Wellbeing  Outcome: Progressing  Intervention: Monitor Pain and Promote Comfort  Recent Flowsheet Documentation  Taken 10/25/2023 1800 by Iveth Parsons RN  Pain Management Interventions:   rest   repositioned  Goal: Readiness for Transition of Care  Outcome: Progressing     Problem: Liver Failure  Goal: Optimal Coping with Liver Failure  Outcome: Progressing  Goal: Fluid and Electrolyte Balance  Outcome: Progressing  Goal: Optimal Gastrointestinal Function  Outcome: Progressing  Intervention: Monitor and Support Gastrointestinal Function  Recent Flowsheet Documentation  Taken 10/25/2023 1800 by Iveth Parsons RN  Body Position:   position changed independently   side-lying   right  Goal: Blood Glucose Level Within Target Range  Outcome: Progressing  Goal: Optimal Coagulation Function  Outcome: Progressing  Goal: Absence of Infection Signs and Symptoms  Outcome: Progressing  Goal: Optimal Neurologic Function  Outcome: Progressing  Goal: Improved Oral Intake  Outcome: Progressing  Goal: Optimal Pain Control, Comfort and Function  Outcome: Progressing  Intervention: Prevent or Manage Pain  Recent Flowsheet Documentation  Taken 10/25/2023 1800 by Iveth Parsons RN  Pain Management Interventions:   rest   repositioned  Goal: Optimize Renal Function  Outcome: Progressing  Goal: Effective Oxygenation and Ventilation  Outcome: Progressing  Intervention: Promote Airway Secretion Clearance  Recent Flowsheet Documentation  Taken 10/25/2023 1800 by Iveth Parsons RN  Cough And Deep  Breathing: done with encouragement  Activity Management: activity adjusted per tolerance  Intervention: Optimize Oxygenation and Ventilation  Recent Flowsheet Documentation  Taken 10/25/2023 1800 by Iveth Parsons RN  Head of Bed ([facility]) Positioning: HOB at 15 degrees     Problem: Heart Failure  Goal: Optimal Coping  Outcome: Progressing  Goal: Optimal Cardiac Output  Outcome: Progressing  Goal: Stable Heart Rate and Rhythm  Outcome: Progressing  Goal: Optimal Functional Ability  Outcome: Progressing  Intervention: Optimize Functional Ability  Recent Flowsheet Documentation  Taken 10/25/2023 1800 by Iveth Parsons RN  Activity Management: activity adjusted per tolerance  Goal: Fluid and Electrolyte Balance  Outcome: Progressing  Goal: Improved Oral Intake  Outcome: Progressing  Goal: Effective Oxygenation and Ventilation  Outcome: Progressing  Intervention: Promote Airway Secretion Clearance  Recent Flowsheet Documentation  Taken 10/25/2023 1800 by Iveth Parsons RN  Cough And Deep Breathing: done with encouragement  Activity Management: activity adjusted per tolerance  Intervention: Optimize Oxygenation and Ventilation  Recent Flowsheet Documentation  Taken 10/25/2023 1800 by Iveth Parsons RN  Head of Bed ([facility]) Positioning: HOB at 15 degrees  Goal: Effective Breathing Pattern During Sleep  Outcome: Progressing  Intervention: Monitor and Manage Obstructive Sleep Apnea  Recent Flowsheet Documentation  Taken 10/25/2023 1800 by Iveth Parsons RN  Medication Review/Management: medications reviewed

## 2023-10-26 NOTE — PLAN OF CARE
"VSS RA, Bps slightly soft post HTN meds but denies SOB and MD aware and meds adjusted. Denies pain, WOC consulted for L great to injury, GI following. Aox4 sodium restricted diet, utilizes urinal or pivot A2 gairbelt walker to commode. Medically redy for discharge, barriers include placement in TCU.    Goal Outcome Evaluation:    Plan of Care Reviewed With: patient Overall Patient Progress: improving  Problem: Adult Inpatient Plan of Care  Goal: Plan of Care Review  Description: The Plan of Care Review/Shift note should be completed every shift.  The Outcome Evaluation is a brief statement about your assessment that the patient is improving, declining, or no change.  This information will be displayed automatically on your shift  note.  Outcome: Progressing  Flowsheets (Taken 10/26/2023 1538)  Plan of Care Reviewed With: patient  Overall Patient Progress: improving  Goal: Patient-Specific Goal (Individualized)  Description: You can add care plan individualizations to a care plan. Examples of Individualization might be:  \"Parent requests to be called daily at 9am for status\", \"I have a hard time hearing out of my right ear\", or \"Do not touch me to wake me up as it startles  me\".  Outcome: Progressing  Goal: Absence of Hospital-Acquired Illness or Injury  Outcome: Progressing  Intervention: Identify and Manage Fall Risk  Recent Flowsheet Documentation  Taken 10/26/2023 0800 by Tea Mcneil, RN  Safety Promotion/Fall Prevention:   activity supervised   safety round/check completed  Goal: Optimal Comfort and Wellbeing  Outcome: Progressing  Goal: Readiness for Transition of Care  Outcome: Progressing  Flowsheets (Taken 10/26/2023 1538)  Anticipated Changes Related to Illness: inability to care for self  Concerns to be Addressed:   homelessness   basic needs   substance/tobacco abuse/use  Intervention: Mutually Develop Transition Plan  Recent Flowsheet Documentation  Taken 10/26/2023 1538 by Tea Mcneil, RN  Anticipated " Changes Related to Illness: inability to care for self  Concerns to be Addressed:   homelessness   basic needs   substance/tobacco abuse/use     Problem: Liver Failure  Goal: Optimal Coping with Liver Failure  Outcome: Progressing  Goal: Fluid and Electrolyte Balance  Outcome: Progressing  Goal: Optimal Gastrointestinal Function  Outcome: Progressing  Goal: Blood Glucose Level Within Target Range  Outcome: Progressing  Goal: Optimal Coagulation Function  Outcome: Progressing  Goal: Absence of Infection Signs and Symptoms  Outcome: Progressing  Goal: Optimal Neurologic Function  Outcome: Progressing  Goal: Improved Oral Intake  Outcome: Progressing  Goal: Optimal Pain Control, Comfort and Function  Outcome: Progressing  Goal: Optimize Renal Function  Outcome: Progressing  Goal: Effective Oxygenation and Ventilation  Outcome: Progressing  Intervention: Promote Airway Secretion Clearance  Recent Flowsheet Documentation  Taken 10/26/2023 0800 by Tea Mcneil RN  Cough And Deep Breathing: done independently per patient     Problem: Heart Failure  Goal: Optimal Coping  Outcome: Progressing  Goal: Optimal Cardiac Output  Outcome: Progressing  Goal: Stable Heart Rate and Rhythm  Outcome: Progressing  Goal: Optimal Functional Ability  Outcome: Progressing  Goal: Fluid and Electrolyte Balance  Outcome: Progressing  Goal: Improved Oral Intake  Outcome: Progressing  Goal: Effective Oxygenation and Ventilation  Outcome: Progressing  Intervention: Promote Airway Secretion Clearance  Recent Flowsheet Documentation  Taken 10/26/2023 0800 by Tea Mcneil RN  Cough And Deep Breathing: done independently per patient  Goal: Effective Breathing Pattern During Sleep  Outcome: Progressing

## 2023-10-26 NOTE — PROGRESS NOTES
[facility]    Medicine Progress Note - Hospitalist Service    Date of Admission:  10/24/2023    Primary Care Physician   Naseem Esparza  CONSULTANTS: GI    Assessment & Plan     Saji Iqbal is a 63 year old male with a past medical history significant for alcohol use disorder, coronary artery disease with 2 previous stents in the left anterior descending artery, diabetes mellitus, hypertension, dyslipidemia, obstructive sleep apnea, left shoulder adhesive capsulitis treated with steroid injection during last hospitalization, ischemic and alcoholic cardiomyopathy with heart failure with reduced ejection fraction.  He was admitted on 10/24/2023. He was admitted here from 8/30 to 9/11 with adhesive capsulitis of the left shoulder and alcohol withdrawal.  During that hospitalization he was also found to have new alcoholic cardiomyopathy with ejection fraction 35 to 40%.  Upon discharge home he said he was able to walk 100 feet but got quite tired.  He thought he would get better at home but has slowly continued to worsen.  He has had progressive dyspnea on exertion.  He said he could maybe walk 25 steps now before having to stop and rest.  He gets short of breath just while dressing.  He did not feel he could go up or down stairs due to weakness and dyspnea.  He has noticed some increased firmness of his abdomen but no definite swelling.  He has not noticed any lower extremity swelling and has no pain to the abdomen or chest.  His left shoulder pain is resolved since steroid injection.  His appetite is good but he is having difficulty preparing his food as he is not able to stand at the stove and therefore has not been eating much.  His daughter requested a welfare check on him and he was brought in the hospital for further evaluation and treatment.  He said he has been sober for 5 weeks since his previous hospitalization. In the ER, his vital signs were stable.  Bilirubin is 8.2 and AST  is 206.  These are both stable from previous hospitalization.  Potassium 3.2 and albumin 2.7.  Chest x-ray is unremarkable.  Bedside ultrasound by ER physician showed ascites.  He is admitted for failure to thrive, severe dyspnea on exertion, new ascites secondary to decompensated end-stage liver disease, possible congstive heart failure, and severe generalized weakness.          Severe dyspnea on exertion and ascites/new diagnosis of decompensated liver disease/ cirrhosis/ congestive heart failure  The patient had an Ultrasound in August revealed evidence of cirrhosis.  Bilirubin is 8.2 and it was 8.4 at the beginning of September. His AST is also slightly improved at 206 from 239 during recent hospitalization. Seems most likely that progressive ascites resulting in decreased diaphragmatic excursion and dyspnea on exertion. Could be due to his ascites but also congstive heart failure.  His BNP was only 398.  CXR showed low volumes.  ON US bedside in the emergency room, he was found to have significant ascites. On 10/25 did get a paracentesis and rule out Spontaneous bacterial peritonitis. Had over 5 liters removed.   Will need to be on diuretics, this should help with ascites as well as congstive heart failure.  His blood pressure though is on the low side so his home coreg and ace inhibitor need to be stopped.  Gastroenterology has seen.   MELD 21, patient with some dizziness with standing.  Blood pressure has been down to the 80s.  Therapy seeing and will need TCU. His breathing is improved and he is on room air.      Failure to thrive/malnutrition with hypoalbuminemia and generalized weakness:  Albumin only 2.5.  Seems primarily secondary to dyspnea exertion but he also describes some element of generalized weakness.   He has not been eating as he said he only has food in his house that need preparation and he has not felt up to preparing food.  He does say he has a good appetite. Social work, physical therapy,  Occupational Therapy evaluations to help with needs assessment.  Physical therapy recommends TCU.       3. Acute on chronic systolic Heart failure/ coronary artery disease/ hypotension:  Patient has a known reduced ejection fraction of 35%.  This is likely due to a combination of ischemic and alcohol related heart disease.  This was a new diagnosis on his recent hospitalization.  He previously had 2 stents in the LAD. He had a mildly elevated troponin during the hospitalization. Echocardiogram revealed ejection fraction of 35 to 40% with inferior wall motion abnormality. Nuclear stress test confirmed an inferior infarct which was not reversible. He was deemed high risk for intervention with likely minimal benefit given the fixed defect.Medical management was recommended.  Troponin 24, down from 32 at last hospitalization.  He has been on lisinopril and coreg but his blood pressure is running in the 80s so these will be held to allow for diuresis. Is on asa.  Monitor intake, output and weights.  Continue on aldactone/lasix will cut his aldactone dose due to hypotension.   Bmp showing a creatinine 0.76, potassium 2.6, sodium 134 on 10/26/23        4.  Hypokalemia  Replace with close monitoring while on Aldactone and Lasix. Potassium today is 3.6, ace inhibitor is held.      5.  Diabetes mellitus type 2, non insulin dependent:   BS has been . Is on glipizide and an insulin correction scale     6.  Alcohol dependence:   He reports being abstinent since his last hospitalization, sober for 3 weeks.  He says he has not felt like drinking alcohol. Sobriety was discussed again by me.     7.  Obstructive sleep apnea   Is noncompliant with CPAP, has not uses since 2014    8.. left toe wound   WOCN to see      Discussed plan of care with bedside nurse, social, case management once again      Diet: Low Saturated Fat Na <2400 mg  Diet    DVT Prophylaxis: Pneumatic Compression Devices  Durbin Catheter: Not present  Lines:  "None     Cardiac Monitoring: None    RESTRAINTS: Not indicated  Code Status: Full Code        Follow up plan: GI following      This document was created using voice recognition technology.  Please excuse any typographical errors that may have occurred.  Please call with any questions.         Clinically Significant Risk Factors        # Hypokalemia: Lowest K = 3.2 mmol/L in last 2 days, will replace as needed       # Hypoalbuminemia: Lowest albumin = 2.5 g/dL at 10/24/2023  4:32 PM, will monitor as appropriate    # Coagulation Defect: INR = 1.33 (Ref range: 0.85 - 1.15) and/or PTT = 30 Seconds (Ref range: 22 - 38 Seconds), will monitor for bleeding    # Hypertension: Noted on problem list  # Chronic heart failure with reduced ejection fraction: last echo with EF <40%       # Overweight: Estimated body mass index is 26.76 kg/m  as calculated from the following:    Height as of this encounter: 1.803 m (5' 10.98\").    Weight as of this encounter: 87 kg (191 lb 12.8 oz)., PRESENT ON ADMISSION       # Financial/Environmental Concerns:           Disposition Plan      Expected Discharge Date: 10/26/2023                  Barrier to discharge:  awaiting tcu placement.     Russell Forbes MD  Hospitalist Service  [facility]  Securely message with Glance (more info)  Text page via [employer] Paging/Directory   ______________________________________________________________________    Interval History     Patient underwent a paracentesis with having over 5 L of fluid removed yesterday.  Labs are not consistent with infection.  His breathing is much better.  His biggest issue now is hypotension and dizziness.  Patient try to get up with therapy and was dizzy.  Is a balancing act between his fluid status and his diuresis.  His home blood pressure medications have been held other than his diuretics.  Denies any current chest pain.  Has no fever, chills, sweats.  No abdominal pain.  States that his abdominal " distention is much improved.    ROS: A comprehensive review of systems was negative except for items noted in the HPI.  Patient currently denies any fever, chills, sweats, nausea, vomiting, diarrhea, shortness of breath, or chest pain.    Physical Exam   Vital Signs: Temp: 98  F (36.7  C) Temp src: Oral BP: (!) 82/57 Pulse: 81   Resp: 18 SpO2: 97 % O2 Device: None (Room air)    Weight: 191 lbs 12.8 oz    Exam is improved from yesterday  General appearance: Patient is alert and oriented x3, no apparent distress, pleasant and conversing normally, speaking in full sentences, appears stated age, lying in bed  HEENT:    Mucous membranes are moist  RESPIRATORY: Clear to auscultation bilateral, good air movement  CARDIOVASCULAR: Regular rate and rhythm, normal S1/S2, no murmurs   GASTROINTESTINAL:less  distended and softer,   non-tender, soft, bowel sounds present throughout  NEUROLOGIC:  Cranial nerves II-XII intact, without any focal deficits, strength 5/5 throughout  EXTREMITIES:  Moves all extremities, no clubbing, cyanosis, interestingly nor any edema  :  Durbin not present         Data     I have personally reviewed the following data over the past 24 hrs:    8.0  \   12.8 (L)   / 178     134 (L) 99 18.9 /  79   3.6 25 0.76 \       Imaging:   Results for orders placed or performed during the hospital encounter of 10/24/23   Chest XR,  PA & LAT    Narrative    XR CHEST 2 VIEWS 10/24/2023 2:59 PM    HISTORY: dyspnea    COMPARISON: 9/3/2023    FINDINGS: Low lung volumes. No consolidation, pleural effusions, or  pneumothorax. Normal cardiac size.    JOHN CLARK MD         SYSTEM ID:  [id number]     Procedures: Paracentesis done 10/25 per radiology, 5.3 liters removed    I have personally have reviewed the patient's most up to date labs, orders, and medications myself

## 2023-10-26 NOTE — PROGRESS NOTES
10/26/23 1055   Appointment Info   Signing Clinician's Name / Credentials (OT) Tacos Haque, Mark, OTR/L   Living Environment   People in Home alone   Current Living Arrangements apartment   Home Accessibility stairs to enter home   Number of Stairs, Main Entrance other (see comments)  (14)   Stair Railings, Main Entrance railings on both sides of stairs   Living Environment Comments pt was in a second floor apartment.  States he has now been evicted, not sure where he is going to go.  Has social work help with this issue per pt report   Self-Care   Usual Activity Tolerance poor   Current Activity Tolerance moderate   Equipment Currently Used at Home none   Fall history within last six months no   General Information   Onset of Illness/Injury or Date of Surgery 10/24/23   Referring Physician Julien Chau   Patient/Family Therapy Goal Statement (OT) not stated, relying on social work to help with housing   Additional Occupational Profile Info/Pertinent History of Current Problem Pt is a 63 year old male, admitted with failure to thrive, additional signs of alcoholic cardiomyopathy.  Pt complaining of difficulty and some pain moving his left shoulder adhesive capsulitis in shoulder.  PMH includes alcohol use disorder, CAD with 2 previous stents placed, DM, HTN, dyslipidemia   Performance Patterns (Routines, Roles, Habits) pt vague about his performance of ADLs, stating left UE issues have made things harder.   Existing Precautions/Restrictions fall   General Observations and Info pt supine in bed, willing to try some limited activity.   Cognitive Status Examination   Orientation Status person;place   Cognitive Status Comments monitor and screen as needed.  Per pt report he has limited supports   Visual Perception   Visual Impairment/Limitations WNL   Pain Assessment   Patient Currently in Pain Yes, see Vital Sign flowsheet   Range of Motion Comprehensive   General Range of Motion upper extremity range of motion  deficits identified   Comment, General Range of Motion Right UE is WNL.  Pt having some trouble getting left UE past 90 degrees of flexion on his own.  Able to get above 90 degrees with light stablization of his left hand.   Strength Comprehensive (MMT)   General Manual Muscle Testing (MMT) Assessment no strength deficits identified   Comment, General Manual Muscle Testing (MMT) Assessment pt complaining of left UE weakness, testing of arm below shoulder extension are WNL.  Pt refusing to try to hold left arm out straight in a testing position   Coordination   Upper Extremity Coordination No deficits were identified   Bed Mobility   Bed Mobility supine-sit   Supine-Sit [place] (Bed Mobility) independent   Assistive Device (Bed Mobility) bed rails   Comment (Bed Mobility) pt willing to sit EOB.  Not willing to try standing.  Stated he became dizzy trying it with PT earlier this morning   Clinical Impression   Criteria for Skilled Therapeutic Interventions Met (OT) Yes, treatment indicated   OT Diagnosis decreased independence in ADLS   OT Problem List-Impairments impacting ADL problems related to;activity tolerance impaired;strength;range of motion (ROM)   Assessment of Occupational Performance 3-5 Performance Deficits   Identified Performance Deficits decreased independence for grooming, dressing, functional mobility, home management   Planned Therapy Interventions (OT) ADL retraining;ROM;strengthening;home program guidelines   Clinical Decision Making Complexity (OT) problem focused assessment/low complexity   Risk & Benefits of therapy have been explained evaluation/treatment results reviewed;care plan/treatment goals reviewed;spouse/significant other   OT Total Evaluation Time   OT Eval, Low Complexity Minutes (88488) 15   OT Goals   Therapy Frequency (OT) Daily   OT Predicted Duration/Target Date for Goal Attainment 11/02/23   OT Goals Hygiene/Grooming;Upper Body Dressing;Lower Body Dressing;Toilet  Transfer/Toileting;OT Goal 1   OT: Hygiene/Grooming modified independent;while standing   OT: Upper Body Dressing Independent;including set-up/clothing retrieval   OT: Lower Body Dressing Independent;including set-up/clothing retrieval   OT: Toilet Transfer/Toileting Independent;toilet transfer;cleaning and garment management   OT: Goal 1 Pt will participate in 10+ minutes B HEP to increase endurance and independence in ADLS   Interventions   Interventions Quick Adds Self-Care/Home Management;Neuromuscular Re-education   Self-Care/Home Management   Self-Care/Home Mgmt/ADL, Compensatory, Meal Prep Minutes (63433) 25   Symptoms Noted During/After Treatment (Meal Preparation/Planning Training) fatigue;increased pain   Treatment Detail/Skilled Intervention OT: Pt supine in bed, agreed to try activities sitting EOB.  Declined trying to stand despite encouragement.  Worried about dizziness and difficulty in taking steps.  Came up to EOB without issues.  Able to sit that way for 15 minutes, no complaints.  Demonstrated movement difficuties with his left arm.  Able to bend to feet for LE dressing.  Stated he usually stands to don pants but has gone to sitting to do it.  However, still has trouble standing from a sitting position.  Returned to bed at end of session.  Alarm on.   OT Discharge Planning   OT Plan OT: Red or yellow theraband exercises for B HEP.  Grooming at sink, toilet transfer.   OT Discharge Recommendation (DC Rec) Transitional Care Facility   OT Rationale for DC Rec Pt is currently below baseline for ADLs.  Has limited support at home and reports being evicted.  Has no home to return to.  Demonstrates some decreased left UE movement and strength which limites endurance for ADLs and IADLs.  Would benefit from skilled OT here and at TCU to increase endurance and independence to return to baseline.   OT Brief overview of current status Good mobility to EOB, declined standing secondary to dizziness and  weakness.  Fair to good use of left arm for ADLS below shoulder level, limited with movements at shoulder level or higher.   Total Session Time   Timed Code Treatment Minutes 25   Total Session Time (sum of timed and untimed services) 40

## 2023-10-27 ENCOUNTER — APPOINTMENT (OUTPATIENT)
Dept: PHYSICAL THERAPY | Facility: CLINIC | Age: 63
End: 2023-10-27
Payer: MEDICAID

## 2023-10-27 ENCOUNTER — APPOINTMENT (OUTPATIENT)
Dept: OCCUPATIONAL THERAPY | Facility: CLINIC | Age: 63
End: 2023-10-27
Payer: MEDICAID

## 2023-10-27 LAB
ANION GAP SERPL CALCULATED.3IONS-SCNC: 9 MMOL/L (ref 7–15)
BUN SERPL-MCNC: 16.6 MG/DL (ref 8–23)
CALCIUM SERPL-MCNC: 8.1 MG/DL (ref 8.8–10.2)
CHLORIDE SERPL-SCNC: 96 MMOL/L (ref 98–107)
CREAT SERPL-MCNC: 0.63 MG/DL (ref 0.67–1.17)
DEPRECATED HCO3 PLAS-SCNC: 25 MMOL/L (ref 22–29)
EGFRCR SERPLBLD CKD-EPI 2021: >90 ML/MIN/1.73M2
GLUCOSE BLDC GLUCOMTR-MCNC: 104 MG/DL (ref 70–99)
GLUCOSE BLDC GLUCOMTR-MCNC: 116 MG/DL (ref 70–99)
GLUCOSE BLDC GLUCOMTR-MCNC: 120 MG/DL (ref 70–99)
GLUCOSE BLDC GLUCOMTR-MCNC: 128 MG/DL (ref 70–99)
GLUCOSE BLDC GLUCOMTR-MCNC: 143 MG/DL (ref 70–99)
GLUCOSE BLDC GLUCOMTR-MCNC: 156 MG/DL (ref 70–99)
GLUCOSE BLDC GLUCOMTR-MCNC: 207 MG/DL (ref 70–99)
GLUCOSE SERPL-MCNC: 148 MG/DL (ref 70–99)
LABORATORY REPORT: NORMAL
PLPETH BLD-MCNC: 150 NG/ML
POPETH BLD-MCNC: 328 NG/ML
POTASSIUM SERPL-SCNC: 3.6 MMOL/L (ref 3.4–5.3)
SODIUM SERPL-SCNC: 130 MMOL/L (ref 135–145)

## 2023-10-27 PROCEDURE — 99239 HOSP IP/OBS DSCHRG MGMT >30: CPT | Performed by: INTERNAL MEDICINE

## 2023-10-27 PROCEDURE — 250N000012 HC RX MED GY IP 250 OP 636 PS 637: Performed by: INTERNAL MEDICINE

## 2023-10-27 PROCEDURE — 82374 ASSAY BLOOD CARBON DIOXIDE: CPT | Performed by: INTERNAL MEDICINE

## 2023-10-27 PROCEDURE — 250N000013 HC RX MED GY IP 250 OP 250 PS 637: Performed by: INTERNAL MEDICINE

## 2023-10-27 PROCEDURE — 97116 GAIT TRAINING THERAPY: CPT | Mod: GP

## 2023-10-27 PROCEDURE — 120N000001 HC R&B MED SURG/OB

## 2023-10-27 PROCEDURE — 97530 THERAPEUTIC ACTIVITIES: CPT | Mod: GP

## 2023-10-27 PROCEDURE — 36415 COLL VENOUS BLD VENIPUNCTURE: CPT | Performed by: INTERNAL MEDICINE

## 2023-10-27 PROCEDURE — 97110 THERAPEUTIC EXERCISES: CPT | Mod: GO

## 2023-10-27 RX ORDER — POLYETHYLENE GLYCOL 3350 17 G/17G
17 POWDER, FOR SOLUTION ORAL DAILY
Status: DISCONTINUED | OUTPATIENT
Start: 2023-10-27 | End: 2023-11-02

## 2023-10-27 RX ORDER — AMOXICILLIN 250 MG
1 CAPSULE ORAL AT BEDTIME
Status: DISCONTINUED | OUTPATIENT
Start: 2023-10-27 | End: 2023-11-05

## 2023-10-27 RX ORDER — SPIRONOLACTONE 25 MG/1
25 TABLET ORAL DAILY
Start: 2023-10-27 | End: 2023-11-12

## 2023-10-27 RX ADMIN — INSULIN ASPART 1 UNITS: 100 INJECTION, SOLUTION INTRAVENOUS; SUBCUTANEOUS at 21:44

## 2023-10-27 RX ADMIN — POLYETHYLENE GLYCOL 3350 17 G: 17 POWDER, FOR SOLUTION ORAL at 13:12

## 2023-10-27 RX ADMIN — FUROSEMIDE 20 MG: 20 TABLET ORAL at 09:27

## 2023-10-27 RX ADMIN — GLIPIZIDE 10 MG: 10 TABLET, EXTENDED RELEASE ORAL at 09:27

## 2023-10-27 RX ADMIN — SENNOSIDES AND DOCUSATE SODIUM 1 TABLET: 8.6; 5 TABLET ORAL at 21:00

## 2023-10-27 RX ADMIN — SPIRONOLACTONE 25 MG: 25 TABLET ORAL at 09:27

## 2023-10-27 ASSESSMENT — ACTIVITIES OF DAILY LIVING (ADL)
ADLS_ACUITY_SCORE: 35

## 2023-10-27 NOTE — DISCHARGE SUMMARY
[facility]  Hospitalist Discharge Summary      Date of Admission:  10/24/2023  Date of Discharge:  10/28/2023 if TCU bed can be found  Discharging Provider: Russell Forbes MD  Discharge Service: Hospitalist Service  Primary Care Physician   Naseem Esparza    Discharge Diagnoses   Severe dyspnea on exertion  Ascites  Decompensated liver disease/cirrhosis  Acute on chronic systolic chronic CHF  Failure to thrive  Malnutrition with hypoalbuminemia  Generalized weakness  Coronary artery disease  Hypotension  Hypokalemia  Type 2 diabetes, non-insulin-dependent  Alcohol dependence  Obstructive sleep apnea  Left toe wound    Hospital Course       Saji Iqbal is a 63 year old male with a past medical history significant for alcohol use disorder, coronary artery disease with 2 previous stents in the left anterior descending artery, diabetes mellitus, hypertension, dyslipidemia, obstructive sleep apnea, left shoulder adhesive capsulitis treated with steroid injection during last hospitalization, ischemic and alcoholic cardiomyopathy with heart failure with reduced ejection fraction.  He was admitted on 10/24/2023. He was admitted here from 8/30 to 9/11 with adhesive capsulitis of the left shoulder and alcohol withdrawal.  During that hospitalization he was also found to have new alcoholic cardiomyopathy with ejection fraction 35 to 40%.  Upon discharge home he said he was able to walk 100 feet but got quite tired.  He thought he would get better at home but has slowly continued to worsen.  He has had progressive dyspnea on exertion.  He said he could maybe walk 25 steps now before having to stop and rest.  He gets short of breath just while dressing.  He did not feel he could go up or down stairs due to weakness and dyspnea.  He has noticed some increased firmness of his abdomen but no definite swelling.  He has not noticed any lower extremity swelling and has no pain to the abdomen or chest.   His left shoulder pain is resolved since steroid injection.  His appetite is good but he is having difficulty preparing his food as he is not able to stand at the stove and therefore has not been eating much.  His daughter requested a welfare check on him and he was brought in the hospital for further evaluation and treatment.  He said he has been sober for 5 weeks since his previous hospitalization. In the ER, his vital signs were stable.  Bilirubin is 8.2 and AST is 206.  These are both stable from previous hospitalization.  Potassium 3.2 and albumin 2.7.  Chest x-ray is unremarkable.  Bedside ultrasound by ER physician showed ascites.  He is admitted for failure to thrive, severe dyspnea on exertion, new ascites secondary to decompensated end-stage liver disease, possible congstive heart failure, and severe generalized weakness.           Severe dyspnea on exertion and ascites/new diagnosis of decompensated liver disease/ cirrhosis/ congestive heart failure  The patient had an Ultrasound in August revealed evidence of cirrhosis.  Bilirubin is 8.2 and it was 8.4 at the beginning of September. His AST is also slightly improved at 206 from 239 during recent hospitalization. Seems most likely that progressive ascites resulting in decreased diaphragmatic excursion and dyspnea on exertion. Could be due to his ascites but also congstive heart failure.  His BNP was only 398.  CXR showed low volumes.  ON US bedside in the emergency room, he was found to have significant ascites. On 10/25 did get a paracentesis and rule out Spontaneous bacterial peritonitis. Had over 5 liters removed.   Will need to be on diuretics, this should help with ascites as well as congstive heart failure.  His blood pressure though is on the low side so his home coreg and ace inhibitor need to be stopped.  Gastroenterology has seen.   MELD 21, patient with some dizziness with standing.  Blood pressure has been down to the 80s.  Therapy seeing and  recommends a  TCU. His breathing is improved and he is on room air.  Patient has been refusing a Tcu, if goes home needs home care at a minimum.  He has finally agreed to a Tcu at dispo.      Failure to thrive/malnutrition with hypoalbuminemia and generalized weakness:  Albumin only 2.5.  Seems primarily secondary to dyspnea exertion but he also describes some element of generalized weakness.   He has not been eating as he said he only has food in his house that need preparation and he has not felt up to preparing food.  He does say he has a good appetite. Social work, physical therapy, Occupational Therapy evaluations to help with needs assessment.  Physical therapy recommends TCU and the patient agrees at this point.        3. Acute on chronic systolic Heart failure/ coronary artery disease/ hypotension:  Patient has a known reduced ejection fraction of 35%.  This is likely due to a combination of ischemic and alcohol related heart disease.  This was a new diagnosis on his recent hospitalization.  He previously had 2 stents in the LAD. He had a mildly elevated troponin during the hospitalization. Echocardiogram revealed ejection fraction of 35 to 40% with inferior wall motion abnormality. Nuclear stress test confirmed an inferior infarct which was not reversible. He was deemed high risk for intervention with likely minimal benefit given the fixed defect.Medical management was recommended.  Troponin 24, down from 32 at last hospitalization.  He has been on lisinopril and coreg but his blood pressure is running in the 80s so these will be held to allow for diuresis. Is on asa.  Monitor intake, output and weights.  Continue on aldactone/lasix will cut his aldactone dose due to hypotension.   Bmp showing a creatinine 0.63, potassium 3.6, sodium 130 on 10/27/23.  Patient blood pressure has been running in the 80s which is consistent with his liver disease but he has been asymptomatic.  We will continue just on  "diuresis.        4.  Hypokalemia  Replace with close monitoring while on Aldactone and Lasix. Potassium 10/27/23 is 3.6, ace inhibitor is held.      5.  Diabetes mellitus type 2, non insulin dependent:   BS has [id number]. Is on glipizide and an insulin correction scale     6.  Alcohol dependence:   He reports being abstinent since his last hospitalization, sober for 3 weeks.  He says he has not felt like drinking alcohol. Sobriety was discussed again by me.      7.  Obstructive sleep apnea              Is noncompliant with CPAP, has not uses since 2014     8.. left toe wound              WOCN to see, recommends outpatient podiatry cares.     Clinically Significant Risk Factors   # Hypokalemia: Lowest K = 3.2 mmol/L in last 2 days, will replace as needed       # Hypoalbuminemia: Lowest albumin = 2.5 g/dL at 10/24/2023  4:32 PM, will monitor as appropriate     # Coagulation Defect: INR = 1.33 (Ref range: 0.85 - 1.15) and/or PTT = 30 Seconds (Ref range: 22 - 38 Seconds), will monitor for bleeding    # Hypertension: Noted on problem list  # Chronic heart failure with reduced ejection fraction: last echo with EF <40%       # Overweight: Estimated body mass index is 26.76 kg/m  as calculated from the following:    Height as of this encounter: 1.803 m (5' 10.98\").    Weight as of this encounter: 87 kg (191 lb 12.8 oz)., PRESENT ON ADMISSION        # Overweight: Estimated body mass index is 27.25 kg/m  as calculated from the following:    Height as of this encounter: 1.803 m (5' 10.98\").    Weight as of this encounter: 88.6 kg (195 lb 5.2 oz).       Significant Results and Procedures   Most Recent 3 CBC's:  Recent Labs   Lab Test 10/25/23  1043 10/24/23  1422 09/04/23  0752   WBC 8.0 9.4 6.9   HGB 12.8* 14.0 12.2*   * 108* 108*    203 116*     Most Recent 3 BMP's:  Recent Labs   Lab Test 10/27/23  0749 10/27/23  0741 10/27/23  0249 10/26/23  1226 10/26/23  0731 10/25/23  1118 10/25/23  0741   *  --   -- "   --  134*  --  132*   POTASSIUM 3.6  --   --   --  3.6  --  3.5   CHLORIDE 96*  --   --   --  99  --  98   CO2 25  --   --   --  25  --  24   BUN 16.6  --   --   --  18.9  --  19.0   CR 0.63*  --   --   --  0.76  --  0.79   ANIONGAP 9  --   --   --  10  --  10   BENY 8.1*  --   --   --  8.1*  --  7.9*   * 143* 116*   < > 79   < > 107*    < > = values in this interval not displayed.     Most Recent 2 LFT's:  Recent Labs   Lab Test 10/24/23  1422 09/04/23  0752   * 128*   ALT 61 46   ALKPHOS 240* 239*   BILITOTAL 8.2* 8.4*   ,   Results for orders placed or performed during the hospital encounter of 10/24/23   Chest XR,  PA & LAT    Narrative    XR CHEST 2 VIEWS 10/24/2023 2:59 PM    HISTORY: dyspnea    COMPARISON: 9/3/2023    FINDINGS: Low lung volumes. No consolidation, pleural effusions, or  pneumothorax. Normal cardiac size.    JOHN CLARK MD         SYSTEM ID:  [id number]   US Paracentesis with Albumin    Narrative    US PARACENTESIS WITH ALBUMIN 10/25/2023 2:21 PM     HISTORY: new ascites, symptomatic    FINDINGS: Limited preprocedure ultrasound was performed, images show a  sufficient amount of ascites for paracentesis. An image was archived.  Written and oral informed consent was obtained. A pause for the cause  procedure to verify the correct patient and correct procedure. The  skin overlying the right lower quadrant was prepped and draped in the  usual sterile fashion. The subcutaneous tissues were anesthetized with  1% lidocaine. Under direct ultrasound guidance the catheter was  advanced into the peritoneal space and 5.3 L of  straw colored fluid  was drained. The catheter was removed and a sterile dressing was  applied. There were no immediate complications. Ultrasound images were  permanently stored.  Patient left the ultrasound suite in satisfactory  condition.      Impression    IMPRESSION: Technically successful paracentesis without immediate  complications.    LUCI MATA,  DO         SYSTEM ID:  [id number]          Follow up/instructions: Patient will need to have his blood pressure, electrolytes, ascites, CHF followed closely by his primary care provider.  Goal is for him to go to a TCU if he is agreeable but if not we will go home with home care.    Pending test results at discharge:     Unresulted Labs Ordered in the Past 30 Days of this Admission       Date and Time Order Name Status Description    10/25/2023 12:45 PM Phosphatidylethanol (PEth), Whole Blood In process             Discharge Orders      Reason for your hospital stay    Decompensated liver failure, congstive heart failure     Follow-up and recommended labs and tests     Follow up with primary care provider, Naseem Esparza, within 7 days for hospital follow- up.  The following labs/tests are recommended: BMP, follow up edema/ascites, blood pressure.     Activity    Your activity upon discharge: activity as tolerated     General info for SNF    Length of Stay Estimate: Short Term Care: Estimated # of Days <30  Condition at Discharge: Stable  Level of care:skilled   Rehabilitation Potential: Good  Admission H&P remains valid and up-to-date: Yes  Recent Chemotherapy: N/A  Use Nursing Home Standing Orders: Yes     Mantoux instructions    Give two-step Mantoux (PPD) Per Facility Policy Yes     Follow Up and recommended labs and tests    Follow up with custodial physician.  The following labs/tests are recommended: BMP, follow vitals/blood pressure.     Reason for your hospital stay    Compensated liver failure, CHF     Glucose monitor nursing POCT    Before meals and at bedtime     Daily weights    Call Provider for weight gain of more than 2 pounds per day or 5 pounds per week.    Check weights Mon/Wed/Friday     Full Code     Physical Therapy Adult Consult    Evaluate and treat as clinically indicated.    Reason:  congstive heart failure, cirrhosis, weakness     Occupational Therapy Adult Consult    Evaluate and  treat as clinically indicated.    Reason:  cognitive eval     Fall precautions     Diet    Follow this diet upon discharge: Orders Placed This Encounter      Low Saturated Fat Na <2400 mg       Discharge Disposition   Discharged to short-term care facility  Condition at discharge: Stable      Consultations This Hospital Stay   GASTROENTEROLOGY IP CONSULT  CARE MANAGEMENT / SOCIAL WORK IP CONSULT  OCCUPATIONAL THERAPY ADULT IP CONSULT  PHYSICAL THERAPY ADULT IP CONSULT  PHYSICAL THERAPY ADULT IP CONSULT  OCCUPATIONAL THERAPY ADULT IP CONSULT  WOUND OSTOMY CONTINENCE NURSE  IP CONSULT    Code Status   Full Code    Time Spent on this Encounter   I, Russell Forbes MD, personally saw the patient today and spent greater than 30 minutes discharging this patient.  Discussed with bedside nursing, social work, case management           This document was created using voice recognition technology.  Please excuse any typographical errors that may have occurred.  Please call with any questions.       Russell Forbes MD  [address] SURG[facility]  [address] [place] [address]  Phone: [phone]  Fax: [phone]  ______________________________________________________________________    Physical Exam   Vital Signs: Temp: 98  F (36.7  C) Temp src: Oral BP: 99/68 Pulse: 81   Resp: 18 SpO2: 95 % O2 Device: None (Room air)    Weight: 195 lbs 5.24 oz    Exam is stable from yesterday  General appearance: Patient is alert and oriented x3, no apparent distress, pleasant and conversing normally, speaking in full sentences, appears stated age, sitting up in   HEENT:    Mucous membranes are moist  RESPIRATORY: Clear to auscultation bilateral, good air movement  CARDIOVASCULAR: Regular rate and rhythm, normal S1/S2, no murmurs   GASTROINTESTINAL:less distended from admission and softer,   non-tender, soft, bowel sounds present throughout  NEUROLOGIC:  Cranial nerves II-XII intact, without any focal  deficits, strength 5/5 throughout  EXTREMITIES:  Moves all extremities, no clubbing, cyanosis, interestingly nor any edema  :  [place] not present        Discharge Medications   Current Discharge Medication List        START taking these medications    Details   furosemide (LASIX) 20 MG tablet Take 1 tablet (20 mg) by mouth daily  Qty: 30 tablet, Refills: 0    Associated Diagnoses: Alcoholic cirrhosis of liver with ascites (H)      spironolactone (ALDACTONE) 25 MG tablet Take 1 tablet (25 mg) by mouth daily    Associated Diagnoses: Alcoholic cirrhosis of liver with ascites (H)           CONTINUE these medications which have NOT CHANGED    Details   aspirin 81 MG tablet Take by mouth daily      atorvastatin (LIPITOR) 20 MG tablet Take 20 mg by mouth daily      gabapentin (NEURONTIN) 100 MG capsule Take 200 mg by mouth daily      glipiZIDE (GLUCOTROL XL) 10 MG 24 hr tablet TAKE 1 TABLET (10 MG) BY MOUTH DAILY.  Qty: 90 tablet, Refills: 0    Associated Diagnoses: Uncontrolled type 2 diabetes mellitus with hyperglycemia (H)           STOP taking these medications       carvedilol (COREG) 6.25 MG tablet Comments:   Reason for Stopping:             Allergies   Allergies   Allergen Reactions    Metformin Diarrhea    Honey Other (See Comments)     Throat irritation

## 2023-10-27 NOTE — PLAN OF CARE
Goal Outcome Evaluation:         Assumed cares at 1500. A&Ox4. VSS ex: soft BP. Denies dizziness, sob. A1 with walker; up to chair this evening. Medically ready for discharge. Waiting for placement.

## 2023-10-27 NOTE — PLAN OF CARE
"BP 90/65 (BP Location: Right arm)   Pulse 58   Temp 98  F (36.7  C) (Oral)   Resp 18   Ht 1.803 m (5' 10.98\")   Wt 88.6 kg (195 lb 5.2 oz)   SpO2 91%   BMI 27.25 kg/m      VSS, PT denies pain, SoB, CP. Up with A1/2. A&Ox4. Cleared for discharge by provider, awaiting placement.   "

## 2023-10-27 NOTE — PLAN OF CARE
"Goal Outcome Evaluation:      Plan of Care Reviewed With: patient    Overall Patient Progress: improvingOverall Patient Progress: improving    Outcome Evaluation: .    VSS on RA. A 1 walker/GB. A/O, calls appropriately. Low fat/Na diet. MONTESTEVEN Clemente MD notified. Left toe wound, WOC following. Uses urinal at bedside. Jaundiced. SW/PT following. Awaiting a palace to discharge.     Problem: Adult Inpatient Plan of Care  Goal: Plan of Care Review  Description: The Plan of Care Review/Shift note should be completed every shift.  The Outcome Evaluation is a brief statement about your assessment that the patient is improving, declining, or no change.  This information will be displayed automatically on your shift  note.  Outcome: Progressing  Flowsheets (Taken 10/27/2023 0304)  Outcome Evaluation: .  Plan of Care Reviewed With: patient  Overall Patient Progress: improving  Goal: Patient-Specific Goal (Individualized)  Description: You can add care plan individualizations to a care plan. Examples of Individualization might be:  \"Parent requests to be called daily at 9am for status\", \"I have a hard time hearing out of my right ear\", or \"Do not touch me to wake me up as it startles  me\".  Outcome: Progressing  Goal: Absence of Hospital-Acquired Illness or Injury  Outcome: Progressing  Intervention: Identify and Manage Fall Risk  Recent Flowsheet Documentation  Taken 10/26/2023 2100 by Jareth Mar, RN  Safety Promotion/Fall Prevention:   activity supervised   assistive device/personal items within reach   clutter free environment maintained   increased rounding and observation   lighting adjusted   increase visualization of patient   treat underlying cause   treat reversible contributory factors   supervised activity   safety round/check completed   room organization consistent   room near nurse's station   patient and family education   mobility aid in reach   nonskid shoes/slippers when out of bed  Intervention: Prevent " Skin Injury  Recent Flowsheet Documentation  Taken 10/26/2023 2100 by Jareth Mar RN  Body Position: position changed independently  Intervention: Prevent and Manage VTE (Venous Thromboembolism) Risk  Recent Flowsheet Documentation  Taken 10/26/2023 2100 by Jareth Mar RN  VTE Prevention/Management: SCDs (sequential compression devices) off  Intervention: Prevent Infection  Recent Flowsheet Documentation  Taken 10/26/2023 2100 by Jareth Mar RN  Infection Prevention:   rest/sleep promoted   personal protective equipment utilized   hand hygiene promoted  Goal: Optimal Comfort and Wellbeing  Outcome: Progressing  Goal: Readiness for Transition of Care  Outcome: Progressing     Problem: Liver Failure  Goal: Optimal Coping with Liver Failure  Outcome: Progressing  Goal: Fluid and Electrolyte Balance  Outcome: Progressing  Goal: Optimal Gastrointestinal Function  Outcome: Progressing  Intervention: Monitor and Support Gastrointestinal Function  Recent Flowsheet Documentation  Taken 10/26/2023 2100 by Jareth Mar RN  Body Position: position changed independently  Goal: Blood Glucose Level Within Target Range  Outcome: Progressing  Goal: Optimal Coagulation Function  Outcome: Progressing  Goal: Absence of Infection Signs and Symptoms  Outcome: Progressing  Goal: Optimal Neurologic Function  Outcome: Progressing  Goal: Improved Oral Intake  Outcome: Progressing  Goal: Optimal Pain Control, Comfort and Function  Outcome: Progressing  Goal: Optimize Renal Function  Outcome: Progressing  Goal: Effective Oxygenation and Ventilation  Outcome: Progressing  Intervention: Promote Airway Secretion Clearance  Recent Flowsheet Documentation  Taken 10/26/2023 2100 by Jareth Mar RN  Cough And Deep Breathing: done independently per patient  Activity Management: activity adjusted per tolerance  Intervention: Optimize Oxygenation and Ventilation  Recent Flowsheet Documentation  Taken 10/26/2023 2100 by Jareth Mar RN  Head of  Bed (HOB) Positioning: HOB flat     Problem: Heart Failure  Goal: Optimal Coping  Outcome: Progressing  Goal: Optimal Cardiac Output  Outcome: Progressing  Goal: Stable Heart Rate and Rhythm  Outcome: Progressing  Goal: Optimal Functional Ability  Outcome: Progressing  Intervention: Optimize Functional Ability  Recent Flowsheet Documentation  Taken 10/26/2023 2100 by Jareth Mar RN  Activity Management: activity adjusted per tolerance  Goal: Fluid and Electrolyte Balance  Outcome: Progressing  Goal: Improved Oral Intake  Outcome: Progressing  Goal: Effective Oxygenation and Ventilation  Outcome: Progressing  Intervention: Promote Airway Secretion Clearance  Recent Flowsheet Documentation  Taken 10/26/2023 2100 by Jareth Mar RN  Cough And Deep Breathing: done independently per patient  Activity Management: activity adjusted per tolerance  Intervention: Optimize Oxygenation and Ventilation  Recent Flowsheet Documentation  Taken 10/26/2023 2100 by Jareth Mar, RN  Head of Bed (HOB) Positioning: HOB flat  Goal: Effective Breathing Pattern During Sleep  Outcome: Progressing  Intervention: Monitor and Manage Obstructive Sleep Apnea  Recent Flowsheet Documentation  Taken 10/26/2023 2100 by Jareth Mar, RN  Medication Review/Management: medications reviewed

## 2023-10-27 NOTE — PROGRESS NOTES
Cross Cover    Called for Asx hypotension 84/57, so MAP is 66  Recent paracentesis with 5 L removed, did not get post procedure albumin and renal function looks normal  Cont to monitor closely, if he becomes sxtic would give albumin to boost oncotic pressure, if proves to be more chronic could consider midodrine

## 2023-10-28 ENCOUNTER — APPOINTMENT (OUTPATIENT)
Dept: OCCUPATIONAL THERAPY | Facility: CLINIC | Age: 63
End: 2023-10-28
Payer: MEDICAID

## 2023-10-28 ENCOUNTER — APPOINTMENT (OUTPATIENT)
Dept: PHYSICAL THERAPY | Facility: CLINIC | Age: 63
End: 2023-10-28
Payer: MEDICAID

## 2023-10-28 LAB
GLUCOSE BLDC GLUCOMTR-MCNC: 127 MG/DL (ref 70–99)
GLUCOSE BLDC GLUCOMTR-MCNC: 147 MG/DL (ref 70–99)
GLUCOSE BLDC GLUCOMTR-MCNC: 169 MG/DL (ref 70–99)
GLUCOSE BLDC GLUCOMTR-MCNC: 193 MG/DL (ref 70–99)
GLUCOSE BLDC GLUCOMTR-MCNC: 85 MG/DL (ref 70–99)
POTASSIUM SERPL-SCNC: 4.1 MMOL/L (ref 3.4–5.3)

## 2023-10-28 PROCEDURE — 120N000001 HC R&B MED SURG/OB

## 2023-10-28 PROCEDURE — 97116 GAIT TRAINING THERAPY: CPT | Mod: GP | Performed by: PHYSICAL THERAPIST

## 2023-10-28 PROCEDURE — 84132 ASSAY OF SERUM POTASSIUM: CPT | Performed by: INTERNAL MEDICINE

## 2023-10-28 PROCEDURE — 97530 THERAPEUTIC ACTIVITIES: CPT | Mod: GP | Performed by: PHYSICAL THERAPIST

## 2023-10-28 PROCEDURE — 250N000013 HC RX MED GY IP 250 OP 250 PS 637: Performed by: INTERNAL MEDICINE

## 2023-10-28 PROCEDURE — 99232 SBSQ HOSP IP/OBS MODERATE 35: CPT | Performed by: INTERNAL MEDICINE

## 2023-10-28 PROCEDURE — 36415 COLL VENOUS BLD VENIPUNCTURE: CPT | Performed by: INTERNAL MEDICINE

## 2023-10-28 PROCEDURE — 97530 THERAPEUTIC ACTIVITIES: CPT | Mod: GO

## 2023-10-28 RX ADMIN — ACETAMINOPHEN 325 MG: 325 TABLET, FILM COATED ORAL at 14:52

## 2023-10-28 RX ADMIN — GLIPIZIDE 10 MG: 10 TABLET, EXTENDED RELEASE ORAL at 08:52

## 2023-10-28 RX ADMIN — SPIRONOLACTONE 25 MG: 25 TABLET ORAL at 08:52

## 2023-10-28 RX ADMIN — FUROSEMIDE 20 MG: 20 TABLET ORAL at 08:52

## 2023-10-28 RX ADMIN — POLYETHYLENE GLYCOL 3350 17 G: 17 POWDER, FOR SOLUTION ORAL at 08:52

## 2023-10-28 ASSESSMENT — ACTIVITIES OF DAILY LIVING (ADL)
ADLS_ACUITY_SCORE: 35
ADLS_ACUITY_SCORE: 37
ADLS_ACUITY_SCORE: 39
ADLS_ACUITY_SCORE: 37
ADLS_ACUITY_SCORE: 35
ADLS_ACUITY_SCORE: 39
ADLS_ACUITY_SCORE: 37
ADLS_ACUITY_SCORE: 39
ADLS_ACUITY_SCORE: 37
ADLS_ACUITY_SCORE: 39
ADLS_ACUITY_SCORE: 35
ADLS_ACUITY_SCORE: 35

## 2023-10-28 NOTE — PLAN OF CARE
"/80 (BP Location: Right arm, Patient Position: Supine, Cuff Size: Adult Regular)   Pulse 94   Temp 98.1  F (36.7  C) (Oral)   Resp 16   Ht 1.803 m (5' 10.98\")   Wt 88.1 kg (194 lb 4.8 oz)   SpO2 96%   BMI 27.11 kg/m      VSS, PT denies pain, SoB, CP. Up with A1/2. A&Ox4. Cleared for discharge by provider, awaiting placement. Goal is to discharge to TCU when available.   "

## 2023-10-28 NOTE — PLAN OF CARE
"/68 (BP Location: Right arm)   Pulse 91   Temp 97.8  F (36.6  C) (Oral)   Resp 16   Ht 1.803 m (5' 10.98\")   Wt 88.1 kg (194 lb 4.8 oz)   SpO2 96%   BMI 27.11 kg/m      Pt t questioning what his experience at a TCU would be like, concerned that he would be in PT/OT all day long. He stated he likes to sleep a lot. VVS, Left toe dressing clean dry and intact    Problem: Adult Inpatient Plan of Care  Goal: Plan of Care Review  Description: The Plan of Care Review/Shift note should be completed every shift.  The Outcome Evaluation is a brief statement about your assessment that the patient is improving, declining, or no change.  This information will be displayed automatically on your shift  note.  10/28/2023 0643 by Anahi Harris RN  Outcome: Progressing  Flowsheets (Taken 10/28/2023 0643)  Outcome Evaluation: .  Plan of Care Reviewed With: patient  Overall Patient Progress: improving  10/28/2023 0642 by Anahi Harris RN  Outcome: Progressing  Flowsheets (Taken 10/28/2023 0642)  Outcome Evaluation: reassured patient that he was safe  Plan of Care Reviewed With: patient  Overall Patient Progress: improving  Goal: Absence of Hospital-Acquired Illness or Injury  Intervention: Identify and Manage Fall Risk  Recent Flowsheet Documentation  Taken 10/27/2023 2100 by Anahi Harris RN  Safety Promotion/Fall Prevention: safety round/check completed  Intervention: Prevent Skin Injury  Recent Flowsheet Documentation  Taken 10/27/2023 2100 by Anahi Harris RN  Body Position: position changed independently  Intervention: Prevent Infection  Recent Flowsheet Documentation  Taken 10/27/2023 2100 by Anahi Harris RN  Infection Prevention:   single patient room provided   hand hygiene promoted     Problem: Liver Failure  Goal: Optimal Gastrointestinal Function  Intervention: Monitor and Support Gastrointestinal Function  Recent Flowsheet Documentation  Taken 10/27/2023 2100 by Anahi Harris RN  Body " Position: position changed independently  Goal: Effective Oxygenation and Ventilation  Intervention: Optimize Oxygenation and Ventilation  Recent Flowsheet Documentation  Taken 10/27/2023 2100 by Anahi Harris RN  Head of Bed ([facility] [place]) Positioning: HOB at 20-30 degrees     Problem: Heart Failure  Goal: Effective Oxygenation and Ventilation  Intervention: Optimize Oxygenation and Ventilation  Recent Flowsheet Documentation  Taken 10/27/2023 2100 by Anahi Harris RN  Head of Bed ([facility] [place]) Positioning: HOB at 20-30 degrees  Goal: Effective Breathing Pattern During Sleep  Intervention: Monitor and Manage Obstructive Sleep Apnea  Recent Flowsheet Documentation  Taken 10/27/2023 2100 by Anahi Harris, RN  Medication Review/Management: medications reviewed   Goal Outcome Evaluation:      Plan of Care Reviewed With: patient    Overall Patient Progress: improvingOverall Patient Progress: improving    Outcome Evaluation: .pt questioning TCU placement and what to expect.

## 2023-10-28 NOTE — PROGRESS NOTES
[facility]    Medicine Progress Note - Hospitalist Service    Date of Admission:  10/24/2023    Primary Care Physician   Naseem Esparza  CONSULTANTS:     Assessment & Plan     Severe dyspnea on exertion  Ascites  Decompensated liver disease/cirrhosis  Acute on chronic systolic chronic CHF  Failure to thrive  Malnutrition with hypoalbuminemia  Generalized weakness  Coronary artery disease  Hypotension  Hypokalemia  Type 2 diabetes, non-insulin-dependent  Alcohol dependence  Obstructive sleep apnea  Left toe wound     Patient stayed another night until a TCU bed can be found.  Patient is finally agreeing to a TCU and not demanding ongoing to his assisted living.  Continues to be weak but is stable.  He can go at any time.  His discharge orders as well as his summary from 10/27/2023 have been updated by me. IF STILL HERE check a [facility] tomorrow        Hospital Course      Saji Iqbal is a 63 year old male with a past medical history significant for alcohol use disorder, coronary artery disease with 2 previous stents in the left anterior descending artery, diabetes mellitus, hypertension, dyslipidemia, obstructive sleep apnea, left shoulder adhesive capsulitis treated with steroid injection during last hospitalization, ischemic and alcoholic cardiomyopathy with heart failure with reduced ejection fraction.  He was admitted on 10/24/2023. He was admitted here from 8/30 to 9/11 with adhesive capsulitis of the left shoulder and alcohol withdrawal.  During that hospitalization he was also found to have new alcoholic cardiomyopathy with ejection fraction 35 to 40%.  Upon discharge home he said he was able to walk 100 feet but got quite tired.  He thought he would get better at home but has slowly continued to worsen.  He has had progressive dyspnea on exertion.  He said he could maybe walk 25 steps now before having to stop and rest.  He gets short of breath just while dressing.  He did not feel  he could go up or down stairs due to weakness and dyspnea.  He has noticed some increased firmness of his abdomen but no definite swelling.  He has not noticed any lower extremity swelling and has no pain to the abdomen or chest.  His left shoulder pain is resolved since steroid injection.  His appetite is good but he is having difficulty preparing his food as he is not able to stand at the stove and therefore has not been eating much.  His daughter requested a welfare check on him and he was brought in the hospital for further evaluation and treatment.  He said he has been sober for 5 weeks since his previous hospitalization. In the ER, his vital signs were stable.  Bilirubin is 8.2 and AST is 206.  These are both stable from previous hospitalization.  Potassium 3.2 and albumin 2.7.  Chest x-ray is unremarkable.  Bedside ultrasound by ER physician showed ascites.  He is admitted for failure to thrive, severe dyspnea on exertion, new ascites secondary to decompensated end-stage liver disease, possible congstive heart failure, and severe generalized weakness.           Severe dyspnea on exertion and ascites/new diagnosis of decompensated liver disease/ cirrhosis/ congestive heart failure  The patient had an Ultrasound in August revealed evidence of cirrhosis.  Bilirubin is 8.2 and it was 8.4 at the beginning of September. His AST is also slightly improved at 206 from 239 during recent hospitalization. Seems most likely that progressive ascites resulting in decreased diaphragmatic excursion and dyspnea on exertion. Could be due to his ascites but also congstive heart failure.  His BNP was only 398.  CXR showed low volumes.  ON US bedside in the emergency room, he was found to have significant ascites. On 10/25 did get a paracentesis and rule out Spontaneous bacterial peritonitis. Had over 5 liters removed.   Will need to be on diuretics, this should help with ascites as well as congstive heart failure.  His blood  pressure though is on the low side so his home coreg and ace inhibitor need to be stopped.  Gastroenterology has seen.   MELD 21, patient with some dizziness with standing.  Blood pressure has been down to the 80s.  Therapy seeing and recommends a  TCU. His breathing is improved and he is on room air.  Patient has been refusing a Tcu, if goes home needs home care at a minimum.  He has finally agreed to a Tcu at [facility].  Check a bmp 10/28 if still hospitalized.  Blood pressure also seems to be improving.  In the future if he can tolerate it goal would be to be back on a beta-blocker/ACE inhibitor.     Failure to thrive/malnutrition with hypoalbuminemia and generalized weakness:  Albumin only 2.5.  Seems primarily secondary to dyspnea exertion but he also describes some element of generalized weakness.   He has not been eating as he said he only has food in his house that need preparation and he has not felt up to preparing food.  He does say he has a good appetite. Social work, physical therapy, Occupational Therapy evaluations to help with needs assessment.  Physical therapy recommends TCU and the patient agrees at this point.        3. Acute on chronic systolic Heart failure/ coronary artery disease/ hypotension:  Patient has a known reduced ejection fraction of 35%.  This is likely due to a combination of ischemic and alcohol related heart disease.  This was a new diagnosis on his recent hospitalization.  He previously had 2 stents in the LAD. He had a mildly elevated troponin during the hospitalization. Echocardiogram revealed ejection fraction of 35 to 40% with inferior wall motion abnormality. Nuclear stress test confirmed an inferior infarct which was not reversible. He was deemed high risk for intervention with likely minimal benefit given the fixed defect.Medical management was recommended.  Troponin 24, down from 32 at last hospitalization.  He has been on lisinopril and coreg but his blood pressure is  "running in the 80s so these will be held to allow for diuresis. Is on asa.  Monitor intake, output and weights.  Continue on aldactone/lasix will cut his aldactone dose due to hypotension.   Bmp showing a creatinine 0.63, potassium 3.6, sodium 130 on 10/27/23.  Patient blood pressure has been running in the 80s which is consistent with his liver disease but he has been asymptomatic.  We will continue just on diuresis.        4.  Hypokalemia  Replace with close monitoring while on Aldactone and Lasix. Potassium 10/27/23 is 3.6, ace inhibitor is held.      5.  Diabetes mellitus type 2, non insulin dependent:   BS has [id number]. Is on glipizide and an insulin correction scale     6.  Alcohol dependence:   He reports being abstinent since his last hospitalization, sober for 3 weeks.  He says he has not felt like drinking alcohol. Sobriety was discussed again by me.      7.  Obstructive sleep apnea              Is noncompliant with CPAP, has not uses since 2014     8.. left toe wound              WOCN to see, recommends outpatient podiatry cares.            Clinically Significant Risk Factors   # Hypokalemia: Lowest K = 3.2 mmol/L in last 2 days, will replace as needed       # Hypoalbuminemia: Lowest albumin = 2.5 g/dL at 10/24/2023  4:32 PM, will monitor as appropriate     # Coagulation Defect: INR = 1.33 (Ref range: 0.85 - 1.15) and/or PTT = 30 Seconds (Ref range: 22 - 38 Seconds), will monitor for bleeding    # Hypertension: Noted on problem list  # Chronic heart failure with reduced ejection fraction: last echo with EF <40%       # Overweight: Estimated body mass index is 26.76 kg/m  as calculated from the following:    Height as of this encounter: 1.803 m (5' 10.98\").    Weight as of this encounter: 87 kg (191 lb 12.8 oz)., PRESENT ON ADMISSION        # Overweight: Estimated body mass index is 27.25 kg/m  as calculated from the following:    Height as of this encounter: 1.803 m (5' 10.98\").    Weight as of this " encounter: 88.6 kg (195 lb 5.2 oz).       Discussed plan of care with nursing      Diet: Diet  Low Saturated Fat Na <2400 mg    DVT Prophylaxis: Pneumatic Compression Devices  Durbin Catheter: Not present  Lines: None     Cardiac Monitoring: None      Code Status: Full Code        Follow up plan: Patient will need to have his blood pressure followed very closely at the TCU as he is on diuretics.  He will need to have his creatinine electrolytes followed as well and titrate his diuretics accordingly.       This document was created using voice recognition technology.  Please excuse any typographical errors that may have occurred.  Please call with any questions.       Disposition Plan      Expected Discharge Date: 10/28/2023      Destination: inpatient rehabilitation facility      CAN GO ONCE A TCU BED iS AVAILABLE       Barrier to discharge: BED AVAILABILITY    Russell Forbes MD  Hospitalist Service  [facility]  Securely message with [employer] (more info)  Text page via [employer] Paging/Directory   ______________________________________________________________________    Interval History   Patient without any new complaints.  He is still here until a TCU bed can be found.  He is finally agreeable to going to rehab.  There is no new changes.  States that he did get a little bit dizzy with occupational therapy but he thinks he overdid it.  Pressure actually seems to be doing better as he is in the 100s/60-80s.      ROS: A comprehensive review of systems was negative except for items noted in the HPI.  Patient currently denies any fever, chills, sweats, nausea, vomiting, diarrhea, shortness of breath, or chest pain.       Physical Exam   Vital Signs: Temp: 98  F (36.7  C) Temp src: Oral BP: 125/81 Pulse: 93   Resp: 16 SpO2: 95 % O2 Device: None (Room air)    Weight: 194 lbs 4.8 oz      Exam is stable from a are not interaction/exam yesterday  General appearance: Patient is alert and oriented x3, no  apparent distress, pleasant and conversing normally, speaking in full sentences, appears stated age, actually lying flat in bed  HEENT:    Mucous membranes are moist  RESPIRATORY: Clear to auscultation bilateral, good air movement  CARDIOVASCULAR: Regular rate and rhythm, normal S1/S2, no murmurs   GASTROINTESTINAL:less distended from admission and softer,   non-tender, soft, bowel sounds present throughout  NEUROLOGIC:  Cranial nerves II-XII intact, without any focal deficits, strength 5/5 throughout  EXTREMITIES:  Moves all extremities, no clubbing, cyanosis, interestingly nor any edema  :  Durbin not present        Data     I have personally reviewed the following data over the past 24 hrs:    N/A  \   N/A   / N/A     N/A N/A N/A /  127 (H)   4.1 N/A N/A \       Imaging:   Results for orders placed or performed during the hospital encounter of 10/24/23   Chest XR,  PA & LAT    Narrative    XR CHEST 2 VIEWS 10/24/2023 2:59 PM    HISTORY: dyspnea    COMPARISON: 9/3/2023    FINDINGS: Low lung volumes. No consolidation, pleural effusions, or  pneumothorax. Normal cardiac size.    JOHN CLARK MD         SYSTEM ID:  [id number]   US Paracentesis with Albumin    Narrative    US PARACENTESIS WITH ALBUMIN 10/25/2023 2:21 PM     HISTORY: new ascites, symptomatic    FINDINGS: Limited preprocedure ultrasound was performed, images show a  sufficient amount of ascites for paracentesis. An image was archived.  Written and oral informed consent was obtained. A pause for the cause  procedure to verify the correct patient and correct procedure. The  skin overlying the right lower quadrant was prepped and draped in the  usual sterile fashion. The subcutaneous tissues were anesthetized with  1% lidocaine. Under direct ultrasound guidance the catheter was  advanced into the peritoneal space and 5.3 L of  straw colored fluid  was drained. The catheter was removed and a sterile dressing was  applied. There were no immediate  complications. Ultrasound images were  permanently stored.  Patient left the ultrasound suite in satisfactory  condition.      Impression    IMPRESSION: Technically successful paracentesis without immediate  complications.    LUCI MATA DO         SYSTEM ID:  [id number]       I have personally have reviewed the patient's most up to date radiologic exams,  labs, orders, and medications myself

## 2023-10-29 ENCOUNTER — APPOINTMENT (OUTPATIENT)
Dept: OCCUPATIONAL THERAPY | Facility: CLINIC | Age: 63
End: 2023-10-29
Payer: MEDICAID

## 2023-10-29 LAB
ANION GAP SERPL CALCULATED.3IONS-SCNC: 8 MMOL/L (ref 7–15)
BUN SERPL-MCNC: 14.4 MG/DL (ref 8–23)
CALCIUM SERPL-MCNC: 8.4 MG/DL (ref 8.8–10.2)
CHLORIDE SERPL-SCNC: 99 MMOL/L (ref 98–107)
CREAT SERPL-MCNC: 0.64 MG/DL (ref 0.67–1.17)
DEPRECATED HCO3 PLAS-SCNC: 24 MMOL/L (ref 22–29)
EGFRCR SERPLBLD CKD-EPI 2021: >90 ML/MIN/1.73M2
GLUCOSE BLDC GLUCOMTR-MCNC: 113 MG/DL (ref 70–99)
GLUCOSE BLDC GLUCOMTR-MCNC: 122 MG/DL (ref 70–99)
GLUCOSE BLDC GLUCOMTR-MCNC: 135 MG/DL (ref 70–99)
GLUCOSE BLDC GLUCOMTR-MCNC: 141 MG/DL (ref 70–99)
GLUCOSE BLDC GLUCOMTR-MCNC: 226 MG/DL (ref 70–99)
GLUCOSE SERPL-MCNC: 101 MG/DL (ref 70–99)
POTASSIUM SERPL-SCNC: 4.6 MMOL/L (ref 3.4–5.3)
SODIUM SERPL-SCNC: 131 MMOL/L (ref 135–145)

## 2023-10-29 PROCEDURE — 250N000013 HC RX MED GY IP 250 OP 250 PS 637: Performed by: INTERNAL MEDICINE

## 2023-10-29 PROCEDURE — 99233 SBSQ HOSP IP/OBS HIGH 50: CPT | Performed by: HOSPITALIST

## 2023-10-29 PROCEDURE — 120N000001 HC R&B MED SURG/OB

## 2023-10-29 PROCEDURE — 80048 BASIC METABOLIC PNL TOTAL CA: CPT | Performed by: INTERNAL MEDICINE

## 2023-10-29 PROCEDURE — 36415 COLL VENOUS BLD VENIPUNCTURE: CPT | Performed by: INTERNAL MEDICINE

## 2023-10-29 PROCEDURE — 97530 THERAPEUTIC ACTIVITIES: CPT | Mod: GO

## 2023-10-29 RX ADMIN — FUROSEMIDE 20 MG: 20 TABLET ORAL at 09:03

## 2023-10-29 RX ADMIN — SPIRONOLACTONE 25 MG: 25 TABLET ORAL at 09:03

## 2023-10-29 RX ADMIN — GLIPIZIDE 10 MG: 10 TABLET, EXTENDED RELEASE ORAL at 09:03

## 2023-10-29 RX ADMIN — INSULIN ASPART 1 UNITS: 100 INJECTION, SOLUTION INTRAVENOUS; SUBCUTANEOUS at 22:25

## 2023-10-29 ASSESSMENT — ACTIVITIES OF DAILY LIVING (ADL)
ADLS_ACUITY_SCORE: 37
ADLS_ACUITY_SCORE: 37
ADLS_ACUITY_SCORE: 36
ADLS_ACUITY_SCORE: 37

## 2023-10-29 NOTE — PROGRESS NOTES
Care Management Follow Up    Length of Stay (days): 5    Expected Discharge Date: 10/31/2023     Concerns to be Addressed: homelessness, basic needs, substance/tobacco abuse/use     Patient plan of care discussed at interdisciplinary rounds: Yes    Anticipated Discharge Disposition: Transitional Care     Anticipated Discharge Services:    Anticipated Discharge DME:      Patient/family educated on Medicare website which has current facility and service quality ratings: yes    Referrals Placed by CM/SW: Post Acute Facilities  Private pay costs discussed: Not applicable    Additional Information:  Met with patient and daughter to update that we are still looking for TCU placement. 2 out of the 3 initial places have declined. More TCU referrals placed. Daughter indicated she would prefer closer to [facility] as she lives in [place].   She will transport at discharge.    Mali Duvall RN  Care Coordinator  [facility]

## 2023-10-29 NOTE — PLAN OF CARE
"/71 (BP Location: Right arm)   Pulse 89   Temp 98.5  F (36.9  C) (Oral)   Resp 16   Ht 1.803 m (5' 10.98\")   Wt 88.1 kg (194 lb 4.8 oz)   SpO2 93%   BMI 27.11 kg/m      Pt calm and cooperative tonight. Dressing changed on Right great Toe. Pt continues to have Tea colored urine, encouraged to drink plenty of fluids. Pt slept well most of the night.     Problem: Adult Inpatient Plan of Care  Goal: Plan of Care Review  Description: The Plan of Care Review/Shift note should be completed every shift.  The Outcome Evaluation is a brief statement about your assessment that the patient is improving, declining, or no change.  This information will be displayed automatically on your shift  note.  Outcome: Progressing  Flowsheets (Taken 10/29/2023 0619)  Outcome Evaluation: discussed POC for the noc with the patient and need to promote sleep. discussed better ways to deal with toe nail hygiene.  Plan of Care Reviewed With: patient  Overall Patient Progress: improving  Goal: Absence of Hospital-Acquired Illness or Injury  Intervention: Identify and Manage Fall Risk  Recent Flowsheet Documentation  Taken 10/28/2023 2100 by Anahi Harris RN  Safety Promotion/Fall Prevention:   safety round/check completed   clutter free environment maintained  Intervention: Prevent and Manage VTE (Venous Thromboembolism) Risk  Recent Flowsheet Documentation  Taken 10/28/2023 2100 by Anahi Harris RN  VTE Prevention/Management: patient refused intervention  Intervention: Prevent Infection  Recent Flowsheet Documentation  Taken 10/28/2023 2100 by Anahi Harris RN  Infection Prevention:   single patient room provided   hand hygiene promoted     Problem: Heart Failure  Goal: Effective Breathing Pattern During Sleep  Intervention: Monitor and Manage Obstructive Sleep Apnea  Recent Flowsheet Documentation  Taken 10/28/2023 2100 by Anahi Harris RN  Medication Review/Management:   medications reviewed   high-risk medications " identified     Goal Outcome Evaluation:      Plan of Care Reviewed With: patient    Overall Patient Progress: improvingOverall Patient Progress: improving    Outcome Evaluation: discussed POC for the noc with the patient and need to promote sleep. discussed better ways to deal with toe nail hygiene.

## 2023-10-29 NOTE — PLAN OF CARE
"/67 (BP Location: Right arm)   Pulse 94   Temp 98.3  F (36.8  C) (Oral)   Resp 18   Ht 1.803 m (5' 10.98\")   Wt 88.2 kg (194 lb 6.4 oz)   SpO2 95%   BMI 27.13 kg/m      VSS, PT denies pain, SoB, CP. Up in morfin today with PT, ambulating well with SBA.  A&Ox4. Cleared for discharge by provider, awaiting placement. Goal is to discharge to TCU when available.   "

## 2023-10-29 NOTE — PROGRESS NOTES
[facility]    Hospitalist Progress Note             Date of Admission:  10/24/2023                   Day of hospitalization: 5    Assessment and Plan:   Saji Iqbal is a 63 year old male with a past medical history significant for alcohol use disorder, coronary artery disease with 2 previous stents in the left anterior descending artery, diabetes mellitus, hypertension, dyslipidemia, obstructive sleep apnea, left shoulder adhesive capsulitis treated with steroid injection during last hospitalization, ischemic and alcoholic cardiomyopathy with heart failure with reduced ejection fraction.  He was admitted on 10/24/2023. He was admitted here from 8/30 to 9/11 with adhesive capsulitis of the left shoulder and alcohol withdrawal.  During that hospitalization he was also found to have new alcoholic cardiomyopathy with ejection fraction 35 to 40%.  Upon discharge home he said he was able to walk 100 feet but got quite tired.  He thought he would get better at home but has slowly continued to worsen.  He has had progressive dyspnea on exertion.  He said he could maybe walk 25 steps now before having to stop and rest.  He gets short of breath just while dressing.  He did not feel he could go up or down stairs due to weakness and dyspnea.  He has noticed some increased firmness of his abdomen but no definite swelling.  He has not noticed any lower extremity swelling and has no pain to the abdomen or chest.  His left shoulder pain is resolved since steroid injection.  His appetite is good but he is having difficulty preparing his food as he is not able to stand at the stove and therefore has not been eating much.  His daughter requested a welfare check on him and he was brought in the hospital for further evaluation and treatment.  He said he has been sober for 5 weeks since his previous hospitalization. In the ER, his vital signs were stable.  Bilirubin is 8.2 and AST is 206.  These are both stable  from previous hospitalization.  Potassium 3.2 and albumin 2.7.  Chest x-ray is unremarkable.  Bedside ultrasound by ER physician showed ascites.  He is admitted for failure to thrive, severe dyspnea on exertion, new ascites secondary to decompensated end-stage liver disease, possible congstive heart failure, and severe generalized weakness.        Severe dyspnea on exertion and ascites/  new diagnosis of decompensated liver disease/   cirrhosis/  congestive heart failure, systolic heart failure EF of 35 to 40%  The patient had an Ultrasound in August revealed evidence of cirrhosis.  Bilirubin is 8.2 and it was 8.4 at the beginning of September. His AST is also slightly improved at 206 from 239 during recent hospitalization. Seems most likely that progressive ascites resulting in decreased diaphragmatic excursion and dyspnea on exertion. Could be due to his ascites but also congstive heart failure.  His BNP was only 398.  CXR showed low volumes.  ON US bedside in the emergency room, he was found to have significant ascites. On 10/25 did get a paracentesis and rule out Spontaneous bacterial peritonitis. Had over 5 liters removed.     - His blood pressure though is on the low side so his home coreg and ace inhibitor need to be stopped.  Gastroenterology has seen.   MELD 21, patient with some dizziness with standing.  Blood pressure has been down to the 80s.    -Therapy seeing and recommends a  TCU. His breathing is improved and he is on room air.    -awaiting TCU at this point  -Hold off ACE inhibitor in the setting of cirrhosis, continue diuretics with Lasix and spironolactone, not on beta-blocker due to bradycardia     Failure to thrive/malnutrition with hypoalbuminemia and generalized weakness:  -Albumin only 2.5.  Seems primarily secondary to dyspnea exertion but he also describes some element of generalized weakness.   He has not been eating as he said he only has food in his house that need preparation and he has  not felt up to preparing food.  He does say he has a good appetite. Social work, physical therapy, Occupational Therapy evaluations to help with needs assessment.    -Needs TCU      Acute on chronic systolic Heart failure/ coronary artery disease/ hypotension:  Patient has a known reduced ejection fraction of 35%.    -Likely due to a combination of ischemic and alcohol related heart disease.  This was a new diagnosis on his recent hospitalization.    -He previously had 2 stents in the LAD. He had a mildly elevated troponin during the hospitalization.   -Echocardiogram revealed ejection fraction of 35 to 40% with inferior wall motion abnormality.   -Nuclear stress test confirmed an inferior infarct which was not reversible. He was deemed high risk for intervention with likely minimal benefit given the fixed defect.  -Medical management was recommended.    -Troponin 24, down from 32 at last hospitalization.   -He has been on lisinopril and coreg but his blood pressure is running in the 80s so these will be held to allow for diuresis.   -Is on asa.  Monitor intake, output and weights.    -Continue on aldactone/lasix   -Bmp showing a creatinine 0.63, potassium 3.6, sodium 130 on 10/27/23.  Patient blood pressure has been running in the 80s which is consistent with his liver disease but he has been asymptomatic.  We will continue just on diuresis.        Hypokalemia  Replace with close monitoring while on Aldactone and Lasix.     Diabetes mellitus type 2, non insulin dependent:  BS has [id number]. Is on glipizide and an insulin correction scale     Alcohol dependence:   He reports being abstinent since his last hospitalization, sober for 3 weeks.  He says he has not felt like drinking alcohol. Sobriety was discussed again by me.      Obstructive sleep apnea  Is noncompliant with CPAP, has not uses since 2014     Left toe wound  WOCN to see, recommends outpatient podiatry cares.      # Code status: Full code  #  "Anticipated discharge date and Disposition:1-2 days  # DVT: SCDS  # IVF: None                        Neela Vincent MD  Text Page (7am - 6pm, M-F)               Subjective   Chief Complaint:  Abdominal ascites  Subjective:.  Patient is alert abdominal slightly bigger but denies any shortness of breath no abdominal pain.  Otherwise no shortness of breath nausea vomiting        Objective   /83 (BP Location: Right arm)   Pulse 54   Temp 97.7  F (36.5  C) (Oral)   Resp 18   Ht 1.803 m (5' 10.98\")   Wt 88.2 kg (194 lb 6.4 oz)   SpO2 99%   BMI 27.13 kg/m       Physical Exam  General: Pt in NAD, normal appearance  HEENT: OP clear MMM, no JVD  Lungs: Clear to Auscultation Bilateral, normal breathing  without accessory muscle usage, no wheezing, rhonchi or crackles  Cardiac: +S1, S2, RRR, no MRG, no edema  Abdominal: normal bowel sounds, NT/+ascitesstop on the TV   Skin: warm, dry, normal turgor, no rash  Psyche: A& O x3, appropriate affect             Intake/Output Summary (Last 24 hours) at 10/29/2023 1510  Last data filed at 10/29/2023 0640  Gross per 24 hour   Intake --   Output 350 ml   Net -350 ml           Labs and Imaging Results:      Recent Labs   Lab 10/25/23  1043 10/24/23  1422   WBC 8.0 9.4   HGB 12.8* 14.0    203        Recent Labs   Lab 10/29/23  0705 10/27/23  0749   * 130*   CO2 24 25   BUN 14.4 16.6        Recent Labs   Lab 10/24/23  1422   INR 1.33*      No results for input(s): \"CKMB\" in the last 168 hours.    Invalid input(s): \"TROPONINT\"     Recent Labs   Lab 10/24/23  1632 10/24/23  1422   ALBUMIN 2.5* 2.7*   AST  --  206*   ALT  --  61   ALKPHOS  --  240*        Micro:     Radio:  US Paracentesis with Albumin   Final Result   IMPRESSION: Technically successful paracentesis without immediate   complications.      LUCI MATA DO            SYSTEM ID:  [id number]      Chest XR,  PA & LAT   Final Result              Medications:      Scheduled Meds:     furosemide  20 mg " Oral Daily    glipiZIDE  10 mg Oral Daily    insulin aspart  1-7 Units Subcutaneous TID AC    insulin aspart  1-5 Units Subcutaneous At Bedtime    polyethylene glycol  17 g Oral Daily    senna-docusate  1 tablet Oral At Bedtime    spironolactone  25 mg Oral Daily     Continuous Infusions:    PRN Meds:  acetaminophen, glucose **OR** dextrose **OR** glucagon, hydrOXYzine, melatonin, ondansetron **OR** ondansetron

## 2023-10-30 ENCOUNTER — APPOINTMENT (OUTPATIENT)
Dept: PHYSICAL THERAPY | Facility: CLINIC | Age: 63
End: 2023-10-30
Payer: MEDICAID

## 2023-10-30 LAB
GLUCOSE BLDC GLUCOMTR-MCNC: 101 MG/DL (ref 70–99)
GLUCOSE BLDC GLUCOMTR-MCNC: 146 MG/DL (ref 70–99)
GLUCOSE BLDC GLUCOMTR-MCNC: 158 MG/DL (ref 70–99)
GLUCOSE BLDC GLUCOMTR-MCNC: 182 MG/DL (ref 70–99)
GLUCOSE BLDC GLUCOMTR-MCNC: 82 MG/DL (ref 70–99)
POTASSIUM SERPL-SCNC: 4.2 MMOL/L (ref 3.4–5.3)

## 2023-10-30 PROCEDURE — 84132 ASSAY OF SERUM POTASSIUM: CPT | Performed by: HOSPITALIST

## 2023-10-30 PROCEDURE — 250N000011 HC RX IP 250 OP 636: Performed by: INTERNAL MEDICINE

## 2023-10-30 PROCEDURE — 120N000001 HC R&B MED SURG/OB

## 2023-10-30 PROCEDURE — 250N000013 HC RX MED GY IP 250 OP 250 PS 637: Performed by: INTERNAL MEDICINE

## 2023-10-30 PROCEDURE — 97116 GAIT TRAINING THERAPY: CPT | Mod: GP | Performed by: PHYSICAL THERAPIST

## 2023-10-30 PROCEDURE — 97110 THERAPEUTIC EXERCISES: CPT | Mod: GP | Performed by: PHYSICAL THERAPIST

## 2023-10-30 PROCEDURE — 99233 SBSQ HOSP IP/OBS HIGH 50: CPT | Performed by: HOSPITALIST

## 2023-10-30 PROCEDURE — 36415 COLL VENOUS BLD VENIPUNCTURE: CPT | Performed by: HOSPITALIST

## 2023-10-30 RX ADMIN — FUROSEMIDE 20 MG: 20 TABLET ORAL at 09:03

## 2023-10-30 RX ADMIN — ONDANSETRON 4 MG: 4 TABLET, ORALLY DISINTEGRATING ORAL at 06:23

## 2023-10-30 RX ADMIN — ACETAMINOPHEN 325 MG: 325 TABLET, FILM COATED ORAL at 13:21

## 2023-10-30 RX ADMIN — SPIRONOLACTONE 25 MG: 25 TABLET ORAL at 09:03

## 2023-10-30 ASSESSMENT — ACTIVITIES OF DAILY LIVING (ADL)
ADLS_ACUITY_SCORE: 36

## 2023-10-30 NOTE — PROGRESS NOTES
[facility]    Hospitalist Progress Note             Date of Admission:  10/24/2023                   Day of hospitalization: 6    Assessment and Plan:   Saji Iqbal is a 63 year old male with a past medical history significant for alcohol use disorder, coronary artery disease with 2 previous stents in the left anterior descending artery, diabetes mellitus, hypertension, dyslipidemia, obstructive sleep apnea, left shoulder adhesive capsulitis treated with steroid injection during last hospitalization, ischemic and alcoholic cardiomyopathy with heart failure with reduced ejection fraction.  He was admitted on 10/24/2023. He was admitted here from 8/30 to 9/11 with adhesive capsulitis of the left shoulder and alcohol withdrawal.  During that hospitalization he was also found to have new alcoholic cardiomyopathy with ejection fraction 35 to 40%.  Upon discharge home he said he was able to walk 100 feet but got quite tired.  He thought he would get better at home but has slowly continued to worsen.  He has had progressive dyspnea on exertion.  He said he could maybe walk 25 steps now before having to stop and rest.  He gets short of breath just while dressing.  He did not feel he could go up or down stairs due to weakness and dyspnea.  He has noticed some increased firmness of his abdomen but no definite swelling.  He has not noticed any lower extremity swelling and has no pain to the abdomen or chest.  His left shoulder pain is resolved since steroid injection.  His appetite is good but he is having difficulty preparing his food as he is not able to stand at the stove and therefore has not been eating much.  His daughter requested a welfare check on him and he was brought in the hospital for further evaluation and treatment.  He said he has been sober for 5 weeks since his previous hospitalization. In the ER, his vital signs were stable.  Bilirubin is 8.2 and AST is 206.  These are both stable  from previous hospitalization.  Potassium 3.2 and albumin 2.7.  Chest x-ray is unremarkable.  Bedside ultrasound by ER physician showed ascites.  He is admitted for failure to thrive, severe dyspnea on exertion, new ascites secondary to decompensated end-stage liver disease, possible congstive heart failure, and severe generalized weakness.        Severe dyspnea on exertion and ascites/  new diagnosis of decompensated liver disease/   cirrhosis/  congestive heart failure, systolic heart failure EF of 35 to 40%  The patient had an Ultrasound in August revealed evidence of cirrhosis.  Bilirubin is 8.2 and it was 8.4 at the beginning of September. His AST is also slightly improved at 206 from 239 during recent hospitalization. Seems most likely that progressive ascites resulting in decreased diaphragmatic excursion and dyspnea on exertion. Could be due to his ascites but also congstive heart failure.  His BNP was only 398.  CXR showed low volumes.  ON US bedside in the emergency room, he was found to have significant ascites. On 10/25 did get a paracentesis and rule out Spontaneous bacterial peritonitis. Had over 5 liters removed.     - His blood pressure though is on the low side so his home coreg and ace inhibitor need to be stopped.  Gastroenterology has seen.   MELD 21  -Therapy seeing and recommends a  TCU. His breathing is improved and he is on room air.    -awaiting TCU at this point  -Hold off ACE inhibitor in the setting of cirrhosis, continue diuretics with Lasix and spironolactone, not on beta-blocker due to bradycardia  -Paracentesis ordered today    Failure to thrive/malnutrition with hypoalbuminemia and generalized weakness:  -Albumin only 2.5.  Seems primarily secondary to dyspnea exertion but he also describes some element of generalized weakness.   He has not been eating as he said he only has food in his house that need preparation and he has not felt up to preparing food.  He does say he has a good  appetite. Social work, physical therapy, Occupational Therapy evaluations to help with needs assessment.    -Needs TCU      Acute on chronic systolic Heart failure/ coronary artery disease/ hypotension:  Patient has a known reduced ejection fraction of 35%.    -Likely due to a combination of ischemic and alcohol related heart disease.  This was a new diagnosis on his recent hospitalization.    -He previously had 2 stents in the LAD. He had a mildly elevated troponin during the hospitalization.   -Echocardiogram revealed ejection fraction of 35 to 40% with inferior wall motion abnormality.   -Nuclear stress test confirmed an inferior infarct which was not reversible. He was deemed high risk for intervention with likely minimal benefit given the fixed defect.  -Medical management was recommended.    -Troponin 24, down from 32 at last hospitalization.   -He has been on lisinopril and coreg but his blood pressure is running in the 80s so these will be held to allow for diuresis.   -Is on asa.  Monitor intake, output and weights.    -Continue on aldactone/lasix      Hypokalemia  Replace with close monitoring while on Aldactone and Lasix.     Diabetes mellitus type 2, non insulin dependent:  BS has [id number]. Is on glipizide and an insulin correction scale     Alcohol dependence:   He reports being abstinent since his last hospitalization, sober for 3 weeks.  He says he has not felt like drinking alcohol. Sobriety was discussed again by me.      Obstructive sleep apnea  Is noncompliant with CPAP, has not uses since 2014     Left toe wound  WOCN to see, recommends outpatient podiatry cares.      # Code status: Full code  # Anticipated discharge date and Disposition:1-2 days  # DVT: SCDS  # IVF: None                        Neela Vincent MD  Text Page (7am - 6pm, M-F)               Subjective   Chief Complaint:  Abdominal ascites  Subjective:.  Patient is alert abdominal slightly bigger, states he looked in the mirror today  "and felt his abdomen was weak compared to when he came in.  Denies any shortness of breath nausea vomiting or abdominal pain        Objective   /82 (BP Location: Right arm)   Pulse 84   Temp 97  F (36.1  C) (Oral)   Resp 18   Ht 1.803 m (5' 10.98\")   Wt 88.2 kg (194 lb 6.4 oz)   SpO2 96%   BMI 27.13 kg/m       Physical Exam  General: Pt in NAD, normal appearance  HEENT: OP clear MMM, no JVD  Lungs: Clear to Auscultation Bilateral, normal breathing  without accessory muscle usage, no wheezing, rhonchi or crackles  Cardiac: +S1, S2, RRR, no MRG, no edema  Abdominal: normal bowel sounds, NT/+ascites  Skin: warm, dry, normal turgor, no rash  Psyche: A& O x3, appropriate affect             Intake/Output Summary (Last 24 hours) at 10/29/2023 1510  Last data filed at 10/29/2023 0640  Gross per 24 hour   Intake --   Output 350 ml   Net -350 ml           Labs and Imaging Results:      Recent Labs   Lab 10/25/23  1043 10/24/23  1422   WBC 8.0 9.4   HGB 12.8* 14.0    203        Recent Labs   Lab 10/29/23  0705 10/27/23  0749   * 130*   CO2 24 25   BUN 14.4 16.6        Recent Labs   Lab 10/24/23  1422   INR 1.33*      No results for input(s): \"CKMB\" in the last 168 hours.    Invalid input(s): \"TROPONINT\"     Recent Labs   Lab 10/24/23  1632 10/24/23  1422   ALBUMIN 2.5* 2.7*   AST  --  206*   ALT  --  61   ALKPHOS  --  240*        Micro:     Radio:  US Paracentesis with Albumin   Final Result   IMPRESSION: Technically successful paracentesis without immediate   complications.      LUCI MATA DO            SYSTEM ID:  [id number]      Chest XR,  PA & LAT   Final Result      US Paracentesis with Albumin    (Results Pending)           Medications:      Scheduled Meds:     furosemide  20 mg Oral Daily    glipiZIDE  10 mg Oral Daily    insulin aspart  1-7 Units Subcutaneous TID AC    insulin aspart  1-5 Units Subcutaneous At Bedtime    polyethylene glycol  17 g Oral Daily    senna-docusate  1 tablet " Oral At Bedtime    spironolactone  25 mg Oral Daily     Continuous Infusions:    PRN Meds:  acetaminophen, glucose **OR** dextrose **OR** glucagon, hydrOXYzine, melatonin, ondansetron **OR** ondansetron

## 2023-10-30 NOTE — PROGRESS NOTES
Care Management Follow Up    Length of Stay (days): 6    Expected Discharge Date: 10/31/2023     Concerns to be Addressed: homelessness, basic needs, substance/tobacco abuse/use     Patient plan of care discussed at interdisciplinary rounds: Yes    Anticipated Discharge Disposition: Transitional Care vs home care      Anticipated Discharge Services:  tcu vs home care   Anticipated Discharge DME:      Patient/family educated on Medicare website which has current facility and service quality ratings: yes  Education Provided on the Discharge Plan:    Patient/Family in Agreement with the Plan:  no    Referrals Placed by CM/SW: Post Acute Facilities  Private pay costs discussed: Not applicable    Additional Information:  SW met with patient at bedside to discuss discharge plan. PT recs home care but patient does not have any place to go.     SW asked what patient's plan was for living prior to coming to the hospital. Patient states he did not have a plan. Patient states he does not have parents, cannot live with siblings, and cannot live with his daughter.     Patient has medicaid on file but states he does not have it.     Patient asks about applying for SSDI. SW explained the process.     Patient is not wanting to stay in the hospital but we do not have a discharge plan for him. There are no accepting agencies at this time. Patient does not want to expand his range and cannot get home care with no address. Patient does not have an address to get home care. [place] or [place] a shelter.     Patient states he does not want to discuss his discharge plan any further today. Wants to re-discuss tomorrow.     PADILLA Sparks, LGSW  Emergency Room   Please contact the SW on the floor in which the patient is staying for any questions or concerns

## 2023-10-30 NOTE — PROGRESS NOTES
CLINICAL NUTRITION SERVICES  -  ASSESSMENT NOTE    Recommendations Ordered by Registered Dietitian (RD):   Ordered strawberry Glucerna BID at breakfast and 2 pm snack.   Provided a copy of the Cardiac Diet menu  Encouraged po intake, emphasizing the importance of protein to preserve lean muscle mass   Malnutrition:   % Weight Loss:  > 10% in 6 months (severe malnutrition)  % Intake:  </= 50% for >/= 1 month (severe malnutrition)  Subcutaneous Fat Loss:  Orbital region moderate depletion and Upper arm region severe depletion  Muscle Loss:  Temporal region moderate depletion, Clavicle bone region severe depletion, Acromion bone region moderate depletion, Upper Arm region severe, Dorsal hand region moderate depletion, and Posterior calf region severe depletion  Fluid Retention:  None noted    Malnutrition Diagnosis: Severe malnutrition  In Context of:  Chronic illness or disease     REASON FOR ASSESSMENT  Saji Iqbal is a 63 year old male seen by Registered Dietitian for Admission Nutrition Risk Screen for positive unintentional wt loss of 24-33 lbs    PMH of: for alcohol use disorder, coronary artery disease with 2 previous stents in the left anterior descending artery, diabetes mellitus, hypertension, dyslipidemia, obstructive sleep apnea, left shoulder adhesive capsulitis treated with steroid injection during last hospitalization, ischemic and alcoholic cardiomyopathy with heart failure with reduced ejection fraction.      Admit 2/2: He was admitted on 10/24/2023. He was admitted here from 8/30 to 9/11 with adhesive capsulitis of the left shoulder and alcohol withdrawal.  During that hospitalization he was also found to have new alcoholic cardiomyopathy with ejection fraction 35 to 40%.      NUTRITION HISTORY  - Information obtained from chart review and pt at bedside  - Diet at home: regular   - Barriers to PO intakes: dry mouth/chapped lips   - Use of oral supplements: has tired in the past and not a fan of  "chocolate flavors (tastes chalky)but agreeable to strawberry Glucerna BID  - Meal preparation/grocery shopping: pt; if able, stated dyspnea made this challenging PTA  - Allergies: NKFA  - Mika endorsed the recent wt loss, but attributed it to a recent paracentesis removal of 5 kg of fluid. When asked if he noticed the wt change over the summer, he confirmed this as well. Mika explained when he was working, he would walk around 8-10 miles/day at work. He added the dyspnea was getting so bad that stairs became difficult for him and even ambulating for daily tasks was becoming more and more challenging. Mika went on to say his appetite during this time got worse, and he often wouldn't eat for a couple days because he couldn't get up to get food. He said on average he'd eat about 3 days per week, not very much of 1 or 2 meals a day, and this was going on for 1 to 1.5 months. Mika daughter has brought him some snacks, but he wasn't eating a ton of them, saying the nuts in trail mix were very hard.     CURRENT NUTRITION ORDERS  Diet Order:   Low Saturated Fat/Low Cholesterol/2400 mg Sodium     Current Intake/Tolerance: Mika said his current diet is \"depressing\" and he's having a hard time findings things to eat. He's had things like fruit, applesauce, and got a bagel with peanut butter, but the bagel wasn't good, so he only ate the peanut butter. Mika asked about a cardiac diet menu he had received in the past.     Per flow sheet, pt has a good appetite most days, consuming 75 or 100% of 1 meals per day. Per [employer] (meal ordering system), pt ordering a 4-day average of 1183 kcal and 57 g PRO.     GI: Pt either has x1 of x4 BM every other day    NUTRITION FOCUSED PHYSICAL ASSESSMENT FOR DIAGNOSING MALNUTRITION)  Yes         Observed:    Muscle wasting (refer to documentation in Malnutrition section) and Subcutaneous fat loss (refer to documentation in Malnutrition section)    Obtained from Chart/Interdisciplinary " "Team:  Ascites   WOCN 10/26, Wound location: left distal great toe, Wound due to: Trauma, STATUS: initial assessment     ANTHROPOMETRICS  Height: 5' 10.984\"  Weight: 194 lbs 6.4 oz (88.3 kg)  Body mass index is 27.13 kg/m .  Weight Status:  Overweight BMI 25-29.9  IBW: 172 lb (78.2 kg)  % IBW: 113%  Weight History: -12.4 kg (14%) in 6 months   Wt Readings from Last 10 Encounters:   10/29/23 88.2 kg (194 lb 6.4 oz)   09/06/23 96.4 kg (212 lb 8.4 oz)   04/07/23 100.6 kg (221 lb 12.8 oz)   03/30/23 100.2 kg (221 lb)   02/17/23 100.2 kg (221 lb)   02/02/23 102.2 kg (225 lb 6.4 oz)   07/19/22 98.5 kg (217 lb 1.6 oz)   07/07/22 99.8 kg (220 lb)   01/19/22 98.9 kg (218 lb)   07/08/21 101.2 kg (223 lb)      LABS  Na+ 131 (L), Cr 0.64 (L), -103    MEDICATIONS  Lasix, Novolog medium sliding scale,    ASSESSED NUTRITION NEEDS PER APPROVED PRACTICE GUIDELINES:  Dosing Weight: 88 kg (actual)    Estimated Energy Needs: 7320-7363 kcals (30-35+ Kcal/Kg)  Justification: ESLD guidelines  Estimated Protein Needs: 132-166 grams protein (1.5-2 g pro/Kg)  Justification: ESLD guidelines for malnourished and wound healing  Estimated Fluid Needs: 1 ml/kcal  Justification: per provider pending fluid status    MALNUTRITION:  % Weight Loss:  > 10% in 6 months (severe malnutrition)  % Intake:  </= 50% for >/= 1 month (severe malnutrition)  Subcutaneous Fat Loss:  Orbital region moderate depletion and Upper arm region severe depletion  Muscle Loss:  Temporal region moderate depletion, Clavicle bone region severe depletion, Acromion bone region moderate depletion, Upper Arm region severe, Dorsal hand region moderate depletion, and Posterior calf region severe depletion  Fluid Retention:  None noted    Malnutrition Diagnosis: Severe malnutrition  In Context of:  Chronic illness or disease    NUTRITION DIAGNOSIS:  Malnutrition related to inadequate oral intake secondary to dyspnea as evidenced by 14% wt loss in 6 months, <50% intake for >1 " month, moderate fat wasting and severe muscle wasting.     NUTRITION INTERVENTIONS  Recommendations / Nutrition Prescription  Ordered strawberry Glucerna BID at breakfast and 2 pm snack.   Provided a copy of the Cardiac Diet menu  Encouraged po intake, emphasizing the importance of protein to preserve lean muscle mass    Implementation  Nutrition education: Per Provider order if indicated   Composition of Meals and Snacks, General/healthful diet, and Medical Food Supplement    Nutrition Goals  Patient to consume % of nutritionally adequate meal trays TID, or the equivalent with supplements/snacks.    MONITORING AND EVALUATION:  Progress towards goals will be monitored and evaluated per protocol and Practice Guidelines    Rigo Porras RD

## 2023-10-30 NOTE — PLAN OF CARE
Goal Outcome Evaluation:    Plan of Care Reviewed With: patient    Overall Patient Progress: no change    A&Ox4. Pain in head and back, gave Tylenol. SBA. LS: clear. No tele. Distended abdomen, plan for paracentesis today or tomorrow. K protocol, recheck in AM. B/146. Uses urinal. Discharge TBD, see NEFTALI's note.

## 2023-10-30 NOTE — PLAN OF CARE
"Goal Outcome Evaluation:      Plan of Care Reviewed With: patient    Overall Patient Progress: improvingOverall Patient Progress: improving         Vitals: BP 95/68 (BP Location: Right arm, Patient Position: Supine, Cuff Size: Adult Regular)   Pulse 86   Temp 98.3  F (36.8  C) (Oral)   Resp 18   Ht 1.803 m (5' 10.98\")   Wt 88.2 kg (194 lb 6.4 oz)   SpO2 94%   BMI 27.13 kg/m     Cognitive: A&O x 4. Pt seems lonely and enjoys conversation. Pt expressed extreme boredom with being in a hospital and feels there is nothing to do besides sleep. Pt can not read the books in his room because he needs his reading glasses. Staff attempted to find books with large print but was unsuccessful. Hopeful that pt's family will bring reading glasses today.   Neurovascular: Numbness present in toes. Denies numbness in fingers.   GI: Abdomen distended. Denies tenderness and discomfort associated with ascites. Refused scheduled senna, \"I got to get up to the bathroom all the time. I'm done with that stuff.\"   Gave some PRN Zofran @0670 for nausea.   : Pt is on strict I&O's. Voids spontaneously in handheld urinal. Urine is a dark jelly - almost orange.   Skin: Darryl and pale. Abrasion on left big toe. Wound cares complete and dressing changed. Extra ointment was ordered and is in the room.   Diet: Pt has many outside snacks in his room. Claimed that he did not have a huge appetite for hospital food.   Activity: Standby assist with gait belt.   Plan: TCU placement pending acceptance   "

## 2023-10-31 ENCOUNTER — APPOINTMENT (OUTPATIENT)
Dept: ULTRASOUND IMAGING | Facility: CLINIC | Age: 63
End: 2023-10-31
Attending: HOSPITALIST
Payer: MEDICAID

## 2023-10-31 ENCOUNTER — APPOINTMENT (OUTPATIENT)
Dept: PHYSICAL THERAPY | Facility: CLINIC | Age: 63
End: 2023-10-31
Payer: MEDICAID

## 2023-10-31 LAB
GLUCOSE BLDC GLUCOMTR-MCNC: 119 MG/DL (ref 70–99)
GLUCOSE BLDC GLUCOMTR-MCNC: 161 MG/DL (ref 70–99)
GLUCOSE BLDC GLUCOMTR-MCNC: 180 MG/DL (ref 70–99)
GLUCOSE BLDC GLUCOMTR-MCNC: 181 MG/DL (ref 70–99)
GLUCOSE BLDC GLUCOMTR-MCNC: 198 MG/DL (ref 70–99)
POTASSIUM SERPL-SCNC: 4.4 MMOL/L (ref 3.4–5.3)

## 2023-10-31 PROCEDURE — 250N000013 HC RX MED GY IP 250 OP 250 PS 637: Performed by: INTERNAL MEDICINE

## 2023-10-31 PROCEDURE — 97116 GAIT TRAINING THERAPY: CPT | Mod: GP | Performed by: PHYSICAL THERAPIST

## 2023-10-31 PROCEDURE — 250N000009 HC RX 250: Performed by: RADIOLOGY

## 2023-10-31 PROCEDURE — 120N000001 HC R&B MED SURG/OB

## 2023-10-31 PROCEDURE — 99232 SBSQ HOSP IP/OBS MODERATE 35: CPT | Performed by: HOSPITALIST

## 2023-10-31 PROCEDURE — 84132 ASSAY OF SERUM POTASSIUM: CPT | Performed by: HOSPITALIST

## 2023-10-31 PROCEDURE — 97530 THERAPEUTIC ACTIVITIES: CPT | Mod: GP | Performed by: PHYSICAL THERAPIST

## 2023-10-31 PROCEDURE — 36415 COLL VENOUS BLD VENIPUNCTURE: CPT | Performed by: HOSPITALIST

## 2023-10-31 PROCEDURE — 49083 ABD PARACENTESIS W/IMAGING: CPT

## 2023-10-31 RX ADMIN — FUROSEMIDE 20 MG: 20 TABLET ORAL at 08:18

## 2023-10-31 RX ADMIN — LIDOCAINE HYDROCHLORIDE 10 ML: 10 INJECTION, SOLUTION EPIDURAL; INFILTRATION; INTRACAUDAL; PERINEURAL at 12:02

## 2023-10-31 RX ADMIN — GLIPIZIDE 10 MG: 10 TABLET, EXTENDED RELEASE ORAL at 08:18

## 2023-10-31 RX ADMIN — SPIRONOLACTONE 25 MG: 25 TABLET ORAL at 08:18

## 2023-10-31 ASSESSMENT — ACTIVITIES OF DAILY LIVING (ADL)
ADLS_ACUITY_SCORE: 36
ADLS_ACUITY_SCORE: 40
ADLS_ACUITY_SCORE: 40
ADLS_ACUITY_SCORE: 36
ADLS_ACUITY_SCORE: 40

## 2023-10-31 NOTE — PLAN OF CARE
Goal Outcome Evaluation:      Plan of Care Reviewed With: patient    Overall Patient Progress: no changeOverall Patient Progress: no change     Temp: 97.5  F (36.4  C) Temp src: Oral BP: 109/65 Pulse: 86   Resp: 18 SpO2: 96 % O2 Device: None (Room air)      A&Ox4, denies pain. ACHS with sliding scale. SBA, remained in bed for duration of shift. Urinal at bedside. Low sat fat 2400 mg Sodium diet maintained. K+ rechecks in the AM. Will have paracentesis tomorrow AM. Has no IV access. Expressed frustration over his situation and not knowing what the plan is for him going forward. Discharge TBD, possibly TCU as pt is homeless and doesn't think he will ever have a chance to go to TCU to get better again.

## 2023-10-31 NOTE — PROGRESS NOTES
[facility]    Hospitalist Progress Note             Date of Admission:  10/24/2023                   Day of hospitalization: 7    Assessment and Plan:   Saji Iqbal is a 63 year old male with a past medical history significant for alcohol use disorder, coronary artery disease with 2 previous stents in the left anterior descending artery, diabetes mellitus, hypertension, dyslipidemia, obstructive sleep apnea, left shoulder adhesive capsulitis treated with steroid injection during last hospitalization, ischemic and alcoholic cardiomyopathy with heart failure with reduced ejection fraction.  He was admitted on 10/24/2023. He was admitted here from 8/30 to 9/11 with adhesive capsulitis of the left shoulder and alcohol withdrawal.  During that hospitalization he was also found to have new alcoholic cardiomyopathy with ejection fraction 35 to 40%.  Upon discharge home he said he was able to walk 100 feet but got quite tired.  He thought he would get better at home but has slowly continued to worsen.  He has had progressive dyspnea on exertion.  He said he could maybe walk 25 steps now before having to stop and rest.  He gets short of breath just while dressing.  He did not feel he could go up or down stairs due to weakness and dyspnea.  He has noticed some increased firmness of his abdomen but no definite swelling.  He has not noticed any lower extremity swelling and has no pain to the abdomen or chest.  His left shoulder pain is resolved since steroid injection.  His appetite is good but he is having difficulty preparing his food as he is not able to stand at the stove and therefore has not been eating much.  His daughter requested a welfare check on him and he was brought in the hospital for further evaluation and treatment.  He said he has been sober for 5 weeks since his previous hospitalization. In the ER, his vital signs were stable.  Bilirubin is 8.2 and AST is 206.  These are both stable  from previous hospitalization.  Potassium 3.2 and albumin 2.7.  Chest x-ray is unremarkable.  Bedside ultrasound by ER physician showed ascites.  He is admitted for failure to thrive, severe dyspnea on exertion, new ascites secondary to decompensated end-stage liver disease, possible congstive heart failure, and severe generalized weakness.     Discharge fairly challenging as patient is homeless and has been refused by multiple facilities for short-term rehab patient does not feel safe at a shelter, with discussion with patient daughter does not want to take him home.    Severe dyspnea on exertion and ascites/  new diagnosis of decompensated liver disease/   cirrhosis/  congestive heart failure, systolic heart failure EF of 35 to 40%  The patient had an Ultrasound in August revealed evidence of cirrhosis.  Bilirubin is 8.2 and it was 8.4 at the beginning of September. His AST is also slightly improved at 206 from 239 during recent hospitalization. Seems most likely that progressive ascites resulting in decreased diaphragmatic excursion and dyspnea on exertion. Could be due to his ascites but also congstive heart failure.  His BNP was only 398.  CXR showed low volumes.  ON US bedside in the emergency room, he was found to have significant ascites. On 10/25 did get a paracentesis and rule out Spontaneous bacterial peritonitis. Had over 5 liters removed.     - His blood pressure though is on the low side so his home coreg and ace inhibitor need to be stopped.  Gastroenterology has seen.   MELD 21  -Therapy seeing and recommends a  TCU. His breathing is improved and he is on room air.    -awaiting TCU at this point  -Hold off ACE inhibitor in the setting of cirrhosis, continue diuretics with Lasix and spironolactone, not on beta-blocker due to bradycardia  -Paracentesis ordered today    Failure to thrive/malnutrition with hypoalbuminemia and generalized weakness:  -Albumin only 2.5.  Seems primarily secondary to dyspnea  exertion but he also describes some element of generalized weakness.   He has not been eating as he said he only has food in his house that need preparation and he has not felt up to preparing food.  He does say he has a good appetite. Social work, physical therapy, Occupational Therapy evaluations to help with needs assessment.    -Needs TCU      Acute on chronic systolic Heart failure/ coronary artery disease/ hypotension:  Patient has a known reduced ejection fraction of 35%.    -Likely due to a combination of ischemic and alcohol related heart disease.  This was a new diagnosis on his recent hospitalization.    -He previously had 2 stents in the LAD. He had a mildly elevated troponin during the hospitalization.   -Echocardiogram revealed ejection fraction of 35 to 40% with inferior wall motion abnormality.   -Nuclear stress test confirmed an inferior infarct which was not reversible. He was deemed high risk for intervention with likely minimal benefit given the fixed defect.  -Medical management was recommended.    -Troponin 24, down from 32 at last hospitalization.   -He has been on lisinopril and coreg but his blood pressure is running in the 80s so these will be held to allow for diuresis.   -Is on asa.  Monitor intake, output and weights.    -Continue on aldactone/lasix      Hypokalemia  Replace with close monitoring while on Aldactone and Lasix.     Diabetes mellitus type 2, non insulin dependent:  BS has [id number]. Is on glipizide and an insulin correction scale     Alcohol dependence:   He reports being abstinent since his last hospitalization, sober for 3 weeks.  He says he has not felt like drinking alcohol. Sobriety was discussed again by me.      Obstructive sleep apnea  Is noncompliant with CPAP, has not uses since 2014     Left toe wound  WOCN to see, recommends outpatient podiatry cares.      Chronic hyponatremia  - monitor, at baseline.    Severe Protein-Calorie Malnutrition   -per dietician    #  "Code status: Full code  # Anticipated discharge date and Disposition:1-2 days  # DVT: SCDS  # IVF: None                        Neela Vincent MD  Text Page (7am - 6pm, M-F)               Subjective   Chief Complaint:  Abdominal ascites  Subjective:.  Minimal complaints today states his nose is dry discussed about discharge planning today        Objective   /71   Pulse 76   Temp 98  F (36.7  C) (Oral)   Resp 16   Ht 1.803 m (5' 10.98\")   Wt 88.3 kg (194 lb 9.6 oz)   SpO2 94%   BMI 27.15 kg/m       Physical Exam  General: Pt in NAD, normal appearance  HEENT: OP clear MMM, no JVD  Lungs: Clear to Auscultation Bilateral, normal breathing  without accessory muscle usage, no wheezing, rhonchi or crackles  Cardiac: +S1, S2, RRR, no MRG, no edema  Abdominal: normal bowel sounds, NT/+ascites  Skin: warm, dry, normal turgor, no rash  Psyche: A& O x3, appropriate affect             Intake/Output Summary (Last 24 hours) at 10/29/2023 1510  Last data filed at 10/29/2023 0640  Gross per 24 hour   Intake --   Output 350 ml   Net -350 ml           Labs and Imaging Results:      Recent Labs   Lab 10/25/23  1043 10/24/23  1422   WBC 8.0 9.4   HGB 12.8* 14.0    203        Recent Labs   Lab 10/29/23  0705 10/27/23  0749   * 130*   CO2 24 25   BUN 14.4 16.6        Recent Labs   Lab 10/24/23  1422   INR 1.33*      No results for input(s): \"CKMB\" in the last 168 hours.    Invalid input(s): \"TROPONINT\"     Recent Labs   Lab 10/24/23  1632 10/24/23  1422   ALBUMIN 2.5* 2.7*   AST  --  206*   ALT  --  61   ALKPHOS  --  240*        Micro:     Radio:  US Paracentesis with Albumin   Final Result   IMPRESSION: Technically successful paracentesis without immediate   complications.      LUCI MATA DO            SYSTEM ID:  [id number]      US Paracentesis with Albumin   Final Result   IMPRESSION: Technically successful paracentesis without immediate   complications.      LUCI MATA DO            SYSTEM ID:  " [id number]      Chest XR,  PA & LAT   Final Result              Medications:      Scheduled Meds:     furosemide  20 mg Oral Daily    glipiZIDE  10 mg Oral Daily    insulin aspart  1-7 Units Subcutaneous TID AC    insulin aspart  1-5 Units Subcutaneous At Bedtime    polyethylene glycol  17 g Oral Daily    senna-docusate  1 tablet Oral At Bedtime    spironolactone  25 mg Oral Daily     Continuous Infusions:    PRN Meds:  acetaminophen, glucose **OR** dextrose **OR** glucagon, hydrOXYzine, melatonin, ondansetron **OR** ondansetron

## 2023-10-31 NOTE — PLAN OF CARE
"Goal Outcome Evaluation:      Plan of Care Reviewed With: patient    Overall Patient Progress: no changeOverall Patient Progress: no change    A & O x 4, denies pain, SBA, urinal within reach. K+ protocol recheck this AM, paracentesis this AM, No PIV. Discharge pending, TCU placement possible.  VS: /76 (BP Location: Right arm)   Pulse 90   Temp 98.6  F (37  C) (Oral)   Resp 18   Ht 1.803 m (5' 10.98\")   Wt 88.2 kg (194 lb 6.4 oz)   SpO2 93%   BMI 27.13 kg/m              "

## 2023-10-31 NOTE — PROGRESS NOTES
Care Management Follow Up    Length of Stay (days): 7    Expected Discharge Date: 11/01/2023     Concerns to be Addressed: homelessness, basic needs, substance/tobacco abuse/use     Patient plan of care discussed at interdisciplinary rounds: Yes    Anticipated Discharge Disposition: Transitional Care     Anticipated Discharge Services:  PT OT  Anticipated Discharge DME:      Patient/family educated on Medicare website which has current facility and service quality ratings: yes  Education Provided on the Discharge Plan:  TCU  Patient/Family in Agreement with the Plan:  yes     Referrals Placed by CM/SW: Post Acute Facilities  Private pay costs discussed: Not applicable    Additional Information:  SW received a call from [facility] Liaison. They are unable to verify patient's Multiple [employer] insurance group. SW met with patient at bedside. He does not have an insurance card with him. He states he is unsure what that insurance policy is. Patient states he has insurance. SW asked if he has a cost sharing type of plan or if it is insurance. He states he has insurance. Patient states his daughter may know and gave SW permission to call about the insurnance.     SW spoke with daughter and she does not know his insurance.     SW called registration. Patient only had Medicaid on file during last admission.     Patient states he has a [facility] plan. Registration is not familiar with this plan.     LVM for [facility].     PADILLA Sparks, LGSW  Emergency Room   Please contact the SW on the floor in which the patient is staying for any questions or concerns

## 2023-10-31 NOTE — PLAN OF CARE
Goal Outcome Evaluation: Pt up with 1 assist gait belt and walker. Alert and oriented. Paracentesis today and tolerated well. Up in halls with PT. Good appetite. Awaiting discharge to TCU.Will monitor.

## 2023-10-31 NOTE — PROGRESS NOTES
Care Management Follow Up    Length of Stay (days): 7    Expected Discharge Date: 11/01/2023      Additional Information:  Patient's insurance information by registration is as follows:     Payor: [employer]    Diagnosis/ICDs: K70.31    Benefits Contact: New/amkayla  Benefits Phone: [phone]    Notification Contact: N/A  Notification Phone: [phone]  Notification Number: [id number]    Assigned Clinic: No assigned PCC  Ins Follows L&D Mandate: N/A    Per Makayla, any medical records get submitted with the bill. Plan pays   up to $1000 per day with a max of 3 days    PADILLA Sparks, LGSW  Emergency Room   Please contact the SW on the floor in which the patient is staying for any questions or concerns

## 2023-10-31 NOTE — PROGRESS NOTES
Patient tolerated paracentesis well.  3700 mL of hazy straw colored fluid drained done by Dr Pisano Dressing clean dry and intact with no drainage.  No albumin given. Patient back to nursing unit via cart.  Report called to bedside RN. Consent on file from previous paracentesis.

## 2023-11-01 ENCOUNTER — APPOINTMENT (OUTPATIENT)
Dept: ULTRASOUND IMAGING | Facility: CLINIC | Age: 63
End: 2023-11-01
Attending: HOSPITALIST
Payer: MEDICAID

## 2023-11-01 ENCOUNTER — APPOINTMENT (OUTPATIENT)
Dept: PHYSICAL THERAPY | Facility: CLINIC | Age: 63
End: 2023-11-01
Payer: MEDICAID

## 2023-11-01 ENCOUNTER — APPOINTMENT (OUTPATIENT)
Dept: OCCUPATIONAL THERAPY | Facility: CLINIC | Age: 63
End: 2023-11-01
Payer: MEDICAID

## 2023-11-01 LAB
% LINING CELLS, BODY FLUID: 17 %
ABSOLUTE NEUTROPHILS, BODY FLUID: 22.1 /UL
ALBUMIN BODY FLUID SOURCE: NORMAL
ALBUMIN FLD-MCNC: 0.4 G/DL
ALBUMIN SERPL BCG-MCNC: 2.6 G/DL (ref 3.5–5.2)
ALBUMIN UR-MCNC: 20 MG/DL
ALP SERPL-CCNC: 260 U/L (ref 40–129)
ALT SERPL W P-5'-P-CCNC: 51 U/L (ref 0–70)
ANION GAP SERPL CALCULATED.3IONS-SCNC: 9 MMOL/L (ref 7–15)
APPEARANCE FLD: CLEAR
APPEARANCE UR: CLEAR
AST SERPL W P-5'-P-CCNC: 125 U/L (ref 0–45)
BACTERIA #/AREA URNS HPF: ABNORMAL /HPF
BASOPHILS NFR FLD MANUAL: 1 %
BILIRUB SERPL-MCNC: 4.5 MG/DL
BILIRUB UR QL STRIP: ABNORMAL
BUN SERPL-MCNC: 12.7 MG/DL (ref 8–23)
CALCIUM SERPL-MCNC: 8.7 MG/DL (ref 8.8–10.2)
CELL COUNT BODY FLUID SOURCE: NORMAL
CHLORIDE SERPL-SCNC: 98 MMOL/L (ref 98–107)
COLOR FLD: YELLOW
COLOR UR AUTO: ABNORMAL
CREAT SERPL-MCNC: 0.68 MG/DL (ref 0.67–1.17)
DEPRECATED HCO3 PLAS-SCNC: 23 MMOL/L (ref 22–29)
EGFRCR SERPLBLD CKD-EPI 2021: >90 ML/MIN/1.73M2
ERYTHROCYTE [DISTWIDTH] IN BLOOD BY AUTOMATED COUNT: 13.8 % (ref 10–15)
GLUCOSE BLDC GLUCOMTR-MCNC: 145 MG/DL (ref 70–99)
GLUCOSE BLDC GLUCOMTR-MCNC: 173 MG/DL (ref 70–99)
GLUCOSE BLDC GLUCOMTR-MCNC: 186 MG/DL (ref 70–99)
GLUCOSE BLDC GLUCOMTR-MCNC: 187 MG/DL (ref 70–99)
GLUCOSE BLDC GLUCOMTR-MCNC: 213 MG/DL (ref 70–99)
GLUCOSE SERPL-MCNC: 214 MG/DL (ref 70–99)
GLUCOSE UR STRIP-MCNC: NEGATIVE MG/DL
HCT VFR BLD AUTO: 40 % (ref 40–53)
HGB BLD-MCNC: 13.7 G/DL (ref 13.3–17.7)
HGB UR QL STRIP: NEGATIVE
HYALINE CASTS: 1 /LPF
KETONES UR STRIP-MCNC: NEGATIVE MG/DL
LEUKOCYTE ESTERASE UR QL STRIP: NEGATIVE
LYMPHOCYTES NFR FLD MANUAL: 24 %
MCH RBC QN AUTO: 36 PG (ref 26.5–33)
MCHC RBC AUTO-ENTMCNC: 34.3 G/DL (ref 31.5–36.5)
MCV RBC AUTO: 105 FL (ref 78–100)
MONOS+MACROS NFR FLD MANUAL: 48 %
MUCOUS THREADS #/AREA URNS LPF: PRESENT /LPF
NEUTS BAND NFR FLD MANUAL: 10 %
NITRATE UR QL: NEGATIVE
PH UR STRIP: 5.5 [PH] (ref 5–7)
PLATELET # BLD AUTO: 241 10E3/UL (ref 150–450)
POTASSIUM SERPL-SCNC: 4.2 MMOL/L (ref 3.4–5.3)
POTASSIUM SERPL-SCNC: 4.5 MMOL/L (ref 3.4–5.3)
PROT FLD-MCNC: 0.9 G/DL
PROT SERPL-MCNC: 6.2 G/DL (ref 6.4–8.3)
PROTEIN BODY FLUID SOURCE: NORMAL
RBC # BLD AUTO: 3.81 10E6/UL (ref 4.4–5.9)
RBC URINE: <1 /HPF
SODIUM SERPL-SCNC: 130 MMOL/L (ref 135–145)
SP GR UR STRIP: 1.02 (ref 1–1.03)
SQUAMOUS EPITHELIAL: 1 /HPF
UROBILINOGEN UR STRIP-MCNC: 6 MG/DL
WBC # BLD AUTO: 12.5 10E3/UL (ref 4–11)
WBC # FLD AUTO: 221 /UL
WBC URINE: 7 /HPF

## 2023-11-01 PROCEDURE — 250N000013 HC RX MED GY IP 250 OP 250 PS 637: Performed by: INTERNAL MEDICINE

## 2023-11-01 PROCEDURE — 81001 URINALYSIS AUTO W/SCOPE: CPT | Performed by: HOSPITALIST

## 2023-11-01 PROCEDURE — 85027 COMPLETE CBC AUTOMATED: CPT | Performed by: HOSPITALIST

## 2023-11-01 PROCEDURE — 84157 ASSAY OF PROTEIN OTHER: CPT | Performed by: HOSPITALIST

## 2023-11-01 PROCEDURE — 84132 ASSAY OF SERUM POTASSIUM: CPT | Performed by: HOSPITALIST

## 2023-11-01 PROCEDURE — 97116 GAIT TRAINING THERAPY: CPT | Mod: GP

## 2023-11-01 PROCEDURE — 36415 COLL VENOUS BLD VENIPUNCTURE: CPT | Performed by: HOSPITALIST

## 2023-11-01 PROCEDURE — 82042 OTHER SOURCE ALBUMIN QUAN EA: CPT | Performed by: HOSPITALIST

## 2023-11-01 PROCEDURE — 87070 CULTURE OTHR SPECIMN AEROBIC: CPT | Performed by: HOSPITALIST

## 2023-11-01 PROCEDURE — 99233 SBSQ HOSP IP/OBS HIGH 50: CPT | Performed by: HOSPITALIST

## 2023-11-01 PROCEDURE — 89050 BODY FLUID CELL COUNT: CPT | Performed by: HOSPITALIST

## 2023-11-01 PROCEDURE — 80053 COMPREHEN METABOLIC PANEL: CPT | Performed by: HOSPITALIST

## 2023-11-01 PROCEDURE — 97530 THERAPEUTIC ACTIVITIES: CPT | Mod: GP

## 2023-11-01 PROCEDURE — 250N000009 HC RX 250: Performed by: RADIOLOGY

## 2023-11-01 PROCEDURE — 97535 SELF CARE MNGMENT TRAINING: CPT | Mod: GO

## 2023-11-01 PROCEDURE — 120N000001 HC R&B MED SURG/OB

## 2023-11-01 PROCEDURE — 49083 ABD PARACENTESIS W/IMAGING: CPT

## 2023-11-01 RX ADMIN — POLYETHYLENE GLYCOL 3350 17 G: 17 POWDER, FOR SOLUTION ORAL at 16:38

## 2023-11-01 RX ADMIN — GLIPIZIDE 10 MG: 10 TABLET, EXTENDED RELEASE ORAL at 08:40

## 2023-11-01 RX ADMIN — SENNOSIDES AND DOCUSATE SODIUM 1 TABLET: 8.6; 5 TABLET ORAL at 21:36

## 2023-11-01 RX ADMIN — SPIRONOLACTONE 25 MG: 25 TABLET ORAL at 08:40

## 2023-11-01 RX ADMIN — FUROSEMIDE 20 MG: 20 TABLET ORAL at 08:40

## 2023-11-01 RX ADMIN — LIDOCAINE HYDROCHLORIDE 10 ML: 10 INJECTION, SOLUTION EPIDURAL; INFILTRATION; INTRACAUDAL; PERINEURAL at 15:08

## 2023-11-01 ASSESSMENT — ACTIVITIES OF DAILY LIVING (ADL)
ADLS_ACUITY_SCORE: 40
ADLS_ACUITY_SCORE: 32
ADLS_ACUITY_SCORE: 32
ADLS_ACUITY_SCORE: 40
ADLS_ACUITY_SCORE: 32
ADLS_ACUITY_SCORE: 40

## 2023-11-01 NOTE — PROGRESS NOTES
Patient tolerated diagnostic paracentesiscentesis well.  1100 mL of cloudy yellow fluid drained done by Dr Pisano Dressing clean dry and intact with no drainage.  Sample collected brought to lab for processing.  Patient back to nursing unit via cart.  Report called to Bedside RN.

## 2023-11-01 NOTE — PLAN OF CARE
[facility]     The SLUMS ([facility] Mental Status Exam) is a 30-point standardized cognitive screen used to identify the presence of cognitive deficits and/or to identify a change in cognition over time.  This screen assesses cognitive abilities in various domains.  (aging@\[facility]\"".[facility])     Patient's performance was as follows:     Total Score: 23/30     Scoring If High School Educated If Less than High School Educated   Normal 27-30 25-30   Mild Neurocognitive Disorder 21-26 20-24   Dementia 1-20 1-19      Score Interpretation: Score places patient in the mild cognitive impairment category.  Patient demonstrates deficits in the following areas: Pt with difficulties in areas of STM/recall (3/5 words, 3/4 story questions), math/money calculation, and naming/language (13 animals in 1 minute).  Please note that this examination is used to screen individuals to look for the presence of cognitive deficits and to identify changes in cognition over time.  This is not a diagnosis.  This examination can be followed by further cognitive assessments if appropriate and deemed necessary.

## 2023-11-01 NOTE — PROGRESS NOTES
[facility]    Hospitalist Progress Note             Date of Admission:  10/24/2023                   Day of hospitalization: 8    Assessment and Plan:   Saji Iqbal is a 63 year old male with a past medical history significant for alcohol use disorder, coronary artery disease with 2 previous stents in the left anterior descending artery, diabetes mellitus, hypertension, dyslipidemia, obstructive sleep apnea, left shoulder adhesive capsulitis treated with steroid injection during last hospitalization, ischemic and alcoholic cardiomyopathy with heart failure with reduced ejection fraction.  He was admitted on 10/24/2023. He was admitted here from 8/30 to 9/11 with adhesive capsulitis of the left shoulder and alcohol withdrawal.  During that hospitalization he was also found to have new alcoholic cardiomyopathy with ejection fraction 35 to 40%.  Upon discharge home he said he was able to walk 100 feet but got quite tired.  He thought he would get better at home but has slowly continued to worsen.  He has had progressive dyspnea on exertion.  He said he could maybe walk 25 steps now before having to stop and rest.  He gets short of breath just while dressing.  He did not feel he could go up or down stairs due to weakness and dyspnea.  He has noticed some increased firmness of his abdomen but no definite swelling.  He has not noticed any lower extremity swelling and has no pain to the abdomen or chest.  His left shoulder pain is resolved since steroid injection.  His appetite is good but he is having difficulty preparing his food as he is not able to stand at the stove and therefore has not been eating much.  His daughter requested a welfare check on him and he was brought in the hospital for further evaluation and treatment.  He said he has been sober for 5 weeks since his previous hospitalization. In the ER, his vital signs were stable.  Bilirubin is 8.2 and AST is 206.  These are both stable  from previous hospitalization.  Potassium 3.2 and albumin 2.7.  Chest x-ray is unremarkable.  Bedside ultrasound by ER physician showed ascites.  He is admitted for failure to thrive, severe dyspnea on exertion, new ascites secondary to decompensated end-stage liver disease, possible congstive heart failure, and severe generalized weakness.     Discharge fairly challenging as patient is homeless and has been refused by multiple facilities for short-term rehab patient does not feel safe at a shelter, with discussion with patient daughter does not want to take him home.    Severe dyspnea on exertion and ascites/  new diagnosis of decompensated liver disease/   cirrhosis/  congestive heart failure, systolic heart failure EF of 35 to 40%  The patient had an Ultrasound in August revealed evidence of cirrhosis.  Bilirubin is 8.2 and it was 8.4 at the beginning of September. His AST is also slightly improved at 206 from 239 during recent hospitalization. Seems most likely that progressive ascites resulting in decreased diaphragmatic excursion and dyspnea on exertion. Could be due to his ascites but also congstive heart failure.  His BNP was only 398.  CXR showed low volumes.  ON US bedside in the emergency room, he was found to have significant ascites. On 10/25 did get a paracentesis and rule out Spontaneous bacterial peritonitis. Had over 5 liters removed.     - His blood pressure though is on the low side so his home coreg and ace inhibitor need to be stopped.  Gastroenterology has seen.   MELD 21  -Therapy seeing and recommends a  TCU. His breathing is improved and he is on room air.    -awaiting TCU at this point, discussed with daughter she is not able to take father home as she does not have a home for herself  -Hold off ACE inhibitor in the setting of cirrhosis, continue diuretics with Lasix and spironolactone, not on beta-blocker due to bradycardia    Abdominal pain  -Abdominal pain left lower quadrant, no  associated symptoms of dysuria or hematuria or diarrhea or dyschezia or melena   -UA not significantly positive only 7 WBCs  -Labs show mild leukocytosis today  -We will order diagnostic paracentesis    Failure to thrive/malnutrition with hypoalbuminemia and generalized weakness:  -Albumin only 2.5.  Seems primarily secondary to dyspnea exertion but he also describes some element of generalized weakness.   He has not been eating as he said he only has food in his house that need preparation and he has not felt up to preparing food.  He does say he has a good appetite. Social work, physical therapy, Occupational Therapy evaluations to help with needs assessment.    -Needs TCU      Acute on chronic systolic Heart failure/ coronary artery disease/ hypotension:  Patient has a known reduced ejection fraction of 35%.    -Likely due to a combination of ischemic and alcohol related heart disease.  This was a new diagnosis on his recent hospitalization.    -He previously had 2 stents in the LAD. He had a mildly elevated troponin during the hospitalization.   -Echocardiogram revealed ejection fraction of 35 to 40% with inferior wall motion abnormality.   -Nuclear stress test confirmed an inferior infarct which was not reversible. He was deemed high risk for intervention with likely minimal benefit given the fixed defect.  -Medical management was recommended.    -Troponin 24, down from 32 at last hospitalization.   -He has been on lisinopril and coreg but his blood pressure is running in the 80s so these will be held to allow for diuresis.   -Is on asa.  Monitor intake, output and weights.    -Continue on aldactone/lasix      Hypokalemia  Replace with close monitoring while on Aldactone and Lasix.     Diabetes mellitus type 2, non insulin dependent:  BS has [id number]. Is on glipizide and an insulin correction scale     Alcohol dependence:   He reports being abstinent since his last hospitalization, sober for 3 weeks.  He says  "he has not felt like drinking alcohol. Sobriety was discussed again by me.      Obstructive sleep apnea  Is noncompliant with CPAP, has not uses since 2014     Left toe wound  WOCN to see, recommends outpatient podiatry cares.      Chronic hyponatremia  - monitor, at baseline.    Severe Protein-Calorie Malnutrition   -per dietician    # Code status: Full code  # Anticipated discharge date and Disposition:1-2 days  # DVT: SCDS  # IVF: None                        Neela Vincent MD  Text Page (7am - 6pm, M-F)               Subjective   Chief Complaint:  Abdominal ascites  Subjective:.  Complains of abdominal pain today.  Otherwise no nausea no vomiting no fevers or chills diarrhea melena hematochezia.  Abdominal pain left lower quadrant no dysuria hematuria        Objective   /69 (BP Location: Right arm)   Pulse 94   Temp 98.6  F (37  C) (Oral)   Resp 18   Ht 1.803 m (5' 10.98\")   Wt 84.6 kg (186 lb 6.4 oz)   SpO2 96%   BMI 26.01 kg/m       Physical Exam  General: Pt in NAD, normal appearance  HEENT: OP clear MMM, no JVD  Lungs: Clear to Auscultation Bilateral, normal breathing  without accessory muscle usage, no wheezing, rhonchi or crackles  Cardiac: +S1, S2, RRR, no MRG, no edema  Abdominal: normal bowel sounds, left lower quadrant tenderness/+ascites  Skin: warm, dry, normal turgor, no rash  Psyche: A& O x3, appropriate affect             Intake/Output Summary (Last 24 hours) at 10/29/2023 1510  Last data filed at 10/29/2023 0640  Gross per 24 hour   Intake --   Output 350 ml   Net -350 ml           Labs and Imaging Results:      Recent Labs   Lab 11/01/23  1011   WBC 12.5*   HGB 13.7           Recent Labs   Lab 11/01/23  1011 10/29/23  0705   * 131*   CO2 23 24   BUN 12.7 14.4        No results for input(s): \"INR\", \"PTT\" in the last 168 hours.     No results for input(s): \"CKMB\" in the last 168 hours.    Invalid input(s): \"TROPONINT\"     Recent Labs   Lab 11/01/23  1011   ALBUMIN 2.6* "   *   ALT 51   ALKPHOS 260*        Micro:     Radio:  US Paracentesis with Albumin   Final Result   IMPRESSION: Technically successful paracentesis without immediate   complications.      LUCI MATA DO            SYSTEM ID:  [id number]      US Paracentesis with Albumin   Final Result   IMPRESSION: Technically successful paracentesis without immediate   complications.      LUCI MATA DO            SYSTEM ID:  [id number]      Chest XR,  PA & LAT   Final Result      US Paracentesis with Albumin    (Results Pending)           Medications:      Scheduled Meds:     furosemide  20 mg Oral Daily    glipiZIDE  10 mg Oral Daily    insulin aspart  1-7 Units Subcutaneous TID AC    insulin aspart  1-5 Units Subcutaneous At Bedtime    polyethylene glycol  17 g Oral Daily    senna-docusate  1 tablet Oral At Bedtime    spironolactone  25 mg Oral Daily     Continuous Infusions:    PRN Meds:  acetaminophen, glucose **OR** dextrose **OR** glucagon, hydrOXYzine, melatonin, ondansetron **OR** ondansetron

## 2023-11-01 NOTE — PLAN OF CARE
Cared for pt from [id number],  at dinner, good appetite, paracentesis site CDI, denies pain, possible discharge tomorrow per SW note. Will keep monitoring.

## 2023-11-01 NOTE — PLAN OF CARE
Goal Outcome Evaluation:      Plan of Care Reviewed With: patient      Shift: 7pm-7am    Vitals: Temp: 98.3  F (36.8  C) Temp src: Oral BP: 94/64 Pulse: 99   Resp: 18 SpO2: 95 % O2 Device: None (Room air)       Orientation: alert  Pain: denies  Tele: --  Activity: Assist of 1  Resp: clear on RA  Diet: low sodium diet   How to take meds: whole w/ fluids   GI & : up to toilet, one BM this shift, pt denied senna   Protocol: potassium   BG: ACHS - 180   Skin: wound on left toe- wound care done   Lines: no IV access   Plan:  possible discharge to TCU pending insurance

## 2023-11-02 ENCOUNTER — APPOINTMENT (OUTPATIENT)
Dept: GENERAL RADIOLOGY | Facility: CLINIC | Age: 63
End: 2023-11-02
Attending: HOSPITALIST
Payer: MEDICAID

## 2023-11-02 LAB
ERYTHROCYTE [DISTWIDTH] IN BLOOD BY AUTOMATED COUNT: 13.7 % (ref 10–15)
FLUAV RNA SPEC QL NAA+PROBE: NEGATIVE
FLUBV RNA RESP QL NAA+PROBE: NEGATIVE
GLUCOSE BLDC GLUCOMTR-MCNC: 109 MG/DL (ref 70–99)
GLUCOSE BLDC GLUCOMTR-MCNC: 111 MG/DL (ref 70–99)
GLUCOSE BLDC GLUCOMTR-MCNC: 146 MG/DL (ref 70–99)
GLUCOSE BLDC GLUCOMTR-MCNC: 161 MG/DL (ref 70–99)
GLUCOSE BLDC GLUCOMTR-MCNC: 90 MG/DL (ref 70–99)
HCT VFR BLD AUTO: 40.9 % (ref 40–53)
HGB BLD-MCNC: 14.3 G/DL (ref 13.3–17.7)
LACTATE SERPL-SCNC: 2.2 MMOL/L (ref 0.7–2)
LACTATE SERPL-SCNC: 2.4 MMOL/L (ref 0.7–2)
LACTATE SERPL-SCNC: 2.5 MMOL/L (ref 0.7–2)
LACTATE SERPL-SCNC: 2.6 MMOL/L (ref 0.7–2)
MCH RBC QN AUTO: 35.6 PG (ref 26.5–33)
MCHC RBC AUTO-ENTMCNC: 35 G/DL (ref 31.5–36.5)
MCV RBC AUTO: 102 FL (ref 78–100)
PLATELET # BLD AUTO: 246 10E3/UL (ref 150–450)
POTASSIUM SERPL-SCNC: 4.9 MMOL/L (ref 3.4–5.3)
RBC # BLD AUTO: 4.02 10E6/UL (ref 4.4–5.9)
RSV RNA SPEC NAA+PROBE: NEGATIVE
SARS-COV-2 RNA RESP QL NAA+PROBE: POSITIVE
WBC # BLD AUTO: 16.7 10E3/UL (ref 4–11)

## 2023-11-02 PROCEDURE — 84132 ASSAY OF SERUM POTASSIUM: CPT | Performed by: HOSPITALIST

## 2023-11-02 PROCEDURE — 250N000013 HC RX MED GY IP 250 OP 250 PS 637: Performed by: INTERNAL MEDICINE

## 2023-11-02 PROCEDURE — 83605 ASSAY OF LACTIC ACID: CPT | Performed by: HOSPITALIST

## 2023-11-02 PROCEDURE — 250N000013 HC RX MED GY IP 250 OP 250 PS 637: Performed by: HOSPITALIST

## 2023-11-02 PROCEDURE — 99232 SBSQ HOSP IP/OBS MODERATE 35: CPT | Performed by: HOSPITALIST

## 2023-11-02 PROCEDURE — 87040 BLOOD CULTURE FOR BACTERIA: CPT | Performed by: HOSPITALIST

## 2023-11-02 PROCEDURE — 250N000011 HC RX IP 250 OP 636: Performed by: INTERNAL MEDICINE

## 2023-11-02 PROCEDURE — 87637 SARSCOV2&INF A&B&RSV AMP PRB: CPT | Performed by: HOSPITALIST

## 2023-11-02 PROCEDURE — 71045 X-RAY EXAM CHEST 1 VIEW: CPT

## 2023-11-02 PROCEDURE — 120N000001 HC R&B MED SURG/OB

## 2023-11-02 PROCEDURE — 258N000003 HC RX IP 258 OP 636: Performed by: INTERNAL MEDICINE

## 2023-11-02 PROCEDURE — 85027 COMPLETE CBC AUTOMATED: CPT | Performed by: INTERNAL MEDICINE

## 2023-11-02 PROCEDURE — G0463 HOSPITAL OUTPT CLINIC VISIT: HCPCS

## 2023-11-02 PROCEDURE — 36415 COLL VENOUS BLD VENIPUNCTURE: CPT | Performed by: HOSPITALIST

## 2023-11-02 RX ORDER — SPIRONOLACTONE 25 MG/1
100 TABLET ORAL DAILY
Status: DISCONTINUED | OUTPATIENT
Start: 2023-11-03 | End: 2023-11-04

## 2023-11-02 RX ORDER — FUROSEMIDE 40 MG
40 TABLET ORAL DAILY
Status: DISCONTINUED | OUTPATIENT
Start: 2023-11-03 | End: 2023-11-04

## 2023-11-02 RX ORDER — POLYETHYLENE GLYCOL 3350 17 G/17G
17 POWDER, FOR SOLUTION ORAL 2 TIMES DAILY
Status: DISCONTINUED | OUTPATIENT
Start: 2023-11-02 | End: 2023-11-05

## 2023-11-02 RX ORDER — SODIUM CHLORIDE 9 MG/ML
INJECTION, SOLUTION INTRAVENOUS CONTINUOUS
Status: DISCONTINUED | OUTPATIENT
Start: 2023-11-02 | End: 2023-11-02

## 2023-11-02 RX ORDER — CEFTRIAXONE 1 G/1
1 INJECTION, POWDER, FOR SOLUTION INTRAMUSCULAR; INTRAVENOUS EVERY 24 HOURS
Status: DISCONTINUED | OUTPATIENT
Start: 2023-11-02 | End: 2023-11-02

## 2023-11-02 RX ADMIN — SENNOSIDES AND DOCUSATE SODIUM 1 TABLET: 8.6; 5 TABLET ORAL at 21:14

## 2023-11-02 RX ADMIN — ACETAMINOPHEN 325 MG: 325 TABLET, FILM COATED ORAL at 16:06

## 2023-11-02 RX ADMIN — ONDANSETRON 4 MG: 4 TABLET, ORALLY DISINTEGRATING ORAL at 17:23

## 2023-11-02 RX ADMIN — SPIRONOLACTONE 25 MG: 25 TABLET ORAL at 08:10

## 2023-11-02 RX ADMIN — GLIPIZIDE 10 MG: 10 TABLET, EXTENDED RELEASE ORAL at 08:10

## 2023-11-02 RX ADMIN — HYDROXYZINE HYDROCHLORIDE 50 MG: 50 TABLET, FILM COATED ORAL at 11:39

## 2023-11-02 RX ADMIN — FUROSEMIDE 20 MG: 20 TABLET ORAL at 08:10

## 2023-11-02 RX ADMIN — SODIUM CHLORIDE 500 ML: 9 INJECTION, SOLUTION INTRAVENOUS at 22:46

## 2023-11-02 ASSESSMENT — ACTIVITIES OF DAILY LIVING (ADL)
ADLS_ACUITY_SCORE: 32

## 2023-11-02 NOTE — PLAN OF CARE
"Shift:  1900    Vitals: Temp: 98.4  F (36.9  C) Temp src: Oral BP: 124/77 Pulse: 83   Resp: 18 SpO2: 93 % O2 Device: None (Room air)       Orientation: x4  Pain: cramping abd pain, comes and goes; rest effective  Activity: slept through shift;   How to take meds: whole  GI: last BM   : voids spontaneously   Protocol: potassium replacement; WNL next draw  0600  B - 186; 0230 - 161  Skin: L big toe wound  Lines: no IV    Problem: Adult Inpatient Plan of Care  Goal: Plan of Care Review  Description: The Plan of Care Review/Shift note should be completed every shift.  The Outcome Evaluation is a brief statement about your assessment that the patient is improving, declining, or no change.  This information will be displayed automatically on your shift  note.  Outcome: Not Progressing  Flowsheets (Taken 2023 0500)  Plan of Care Reviewed With: patient  Goal: Patient-Specific Goal (Individualized)  Description: You can add care plan individualizations to a care plan. Examples of Individualization might be:  \"Parent requests to be called daily at 9am for status\", \"I have a hard time hearing out of my right ear\", or \"Do not touch me to wake me up as it startles  me\".  Outcome: Not Progressing  Goal: Absence of Hospital-Acquired Illness or Injury  Outcome: Not Progressing  Intervention: Identify and Manage Fall Risk  Recent Flowsheet Documentation  Taken 2023 by Tamera Villegas RN  Safety Promotion/Fall Prevention:   treat underlying cause   treat reversible contributory factors   safety round/check completed   room organization consistent   clutter free environment maintained  Intervention: Prevent Skin Injury  Recent Flowsheet Documentation  Taken 2023 by Tamera Villegas RN  Body Position: position changed independently  Intervention: Prevent and Manage VTE (Venous Thromboembolism) Risk  Recent Flowsheet Documentation  Taken 2023 by Tamera Villegas RN  VTE " Prevention/Management:   SCDs (sequential compression devices) off   patient refused intervention  Intervention: Prevent Infection  Recent Flowsheet Documentation  Taken 11/1/2023 2141 by Tamera Villegas RN  Infection Prevention:   single patient room provided   rest/sleep promoted  Goal: Optimal Comfort and Wellbeing  Outcome: Not Progressing  Intervention: Monitor Pain and Promote Comfort  Recent Flowsheet Documentation  Taken 11/1/2023 2112 by Tamera Villegas RN  Pain Management Interventions:   repositioned   rest  Goal: Readiness for Transition of Care  Outcome: Not Progressing     Problem: Liver Failure  Goal: Optimal Coping with Liver Failure  Outcome: Not Progressing  Goal: Fluid and Electrolyte Balance  Outcome: Not Progressing  Goal: Optimal Gastrointestinal Function  Outcome: Not Progressing  Intervention: Monitor and Support Gastrointestinal Function  Recent Flowsheet Documentation  Taken 11/1/2023 2141 by Tamera Villegas RN  Body Position: position changed independently  Goal: Blood Glucose Level Within Target Range  Outcome: Not Progressing  Goal: Optimal Coagulation Function  Outcome: Not Progressing  Goal: Absence of Infection Signs and Symptoms  Outcome: Not Progressing  Goal: Optimal Neurologic Function  Outcome: Not Progressing  Goal: Improved Oral Intake  Outcome: Not Progressing  Goal: Optimal Pain Control, Comfort and Function  Outcome: Not Progressing  Intervention: Prevent or Manage Pain  Recent Flowsheet Documentation  Taken 11/1/2023 2112 by Tamera Villegas RN  Pain Management Interventions:   repositioned   rest  Goal: Optimize Renal Function  Outcome: Not Progressing  Goal: Effective Oxygenation and Ventilation  Outcome: Not Progressing  Intervention: Promote Airway Secretion Clearance  Recent Flowsheet Documentation  Taken 11/1/2023 2141 by Tamera Villegas RN  Cough And Deep Breathing: done with encouragement  Activity Management: activity adjusted per tolerance  Intervention: Optimize  Oxygenation and Ventilation  Recent Flowsheet Documentation  Taken 11/1/2023 2141 by Tamera Villegas RN  Head of Bed (HOB) Positioning: HOB at 15 degrees     Problem: Heart Failure  Goal: Optimal Coping  Outcome: Not Progressing  Goal: Optimal Cardiac Output  Outcome: Not Progressing  Goal: Stable Heart Rate and Rhythm  Outcome: Not Progressing  Goal: Optimal Functional Ability  Outcome: Not Progressing  Intervention: Optimize Functional Ability  Recent Flowsheet Documentation  Taken 11/1/2023 2141 by Tamera Villegas RN  Activity Management: activity adjusted per tolerance  Goal: Fluid and Electrolyte Balance  Outcome: Not Progressing  Goal: Improved Oral Intake  Outcome: Not Progressing  Goal: Effective Oxygenation and Ventilation  Outcome: Not Progressing  Intervention: Promote Airway Secretion Clearance  Recent Flowsheet Documentation  Taken 11/1/2023 2141 by Tamera Villegas RN  Cough And Deep Breathing: done with encouragement  Activity Management: activity adjusted per tolerance  Intervention: Optimize Oxygenation and Ventilation  Recent Flowsheet Documentation  Taken 11/1/2023 2141 by Tamera Villegas RN  Head of Bed ([facility] [place]) Positioning: HOB at 15 degrees  Goal: Effective Breathing Pattern During Sleep  Outcome: Not Progressing  Intervention: Monitor and Manage Obstructive Sleep Apnea  Recent Flowsheet Documentation  Taken 11/1/2023 2141 by Tamera Villegas RN  Medication Review/Management: medications reviewed   Goal Outcome Evaluation:      Plan of Care Reviewed With: patient

## 2023-11-02 NOTE — PROGRESS NOTES
Care Management Discharge Note    Discharge Date: 11/03/2023       Discharge Disposition: Transitional Care @ the [facility]    Discharge Services:  TCU    Discharge DME:  TCU will provide    Discharge Transportation: wheelchair    Private pay costs discussed: private room/amenity fees and transportation costs    Does the patient's insurance plan have a 3 day qualifying hospital stay waiver?  No    PAS Confirmation Code:  [id number]  Patient/family educated on Medicare website which has current facility and service quality ratings: yes    Education Provided on the Discharge Plan:  yes  Persons Notified of Discharge Plans: patient and he will let his daughter know  Patient/Family in Agreement with the Plan:  yes    Handoff Referral Completed: No    Additional Information:  Met with patient to let him know that he has been accepted at the [facility]. He was accepting of this placement.    Discharge Plan: Discharge tomorrow 11/3/2023 to the [facility] TCU via wheelchair between 1040- 1125.     Updated patient, the facility, MD and unit.    AGGIE Torres   Inpatient Care Coordination   Supervisor  [facility]  [phone]      AGGIE Dewitt

## 2023-11-02 NOTE — PROVIDER NOTIFICATION
Barbi BURNETT r/t pt sustaining HR in 130s, new Temp of 100.7, c/o dizziness, light-headed, nauseous. Sepsis BPA triggered, lactic pending.     MD ordered CXR, UA, blood cx. X-cover to come assess.     X-cover ordered covid swab, ct abdomen/pelvis.

## 2023-11-02 NOTE — PROGRESS NOTES
[facility] Nurse Inpatient Assessment     Consulted for: Left great toe      Patient History (according to Hospitalist provider note(s) 10/26/23:      Saji Iqbal is a 63 year old male with a past medical history significant for alcohol use disorder, coronary artery disease with 2 previous stents in the left anterior descending artery, diabetes mellitus, hypertension, dyslipidemia, obstructive sleep apnea, left shoulder adhesive capsulitis treated with steroid injection during last hospitalization, ischemic and alcoholic cardiomyopathy with heart failure with reduced ejection fraction.  He was admitted on 10/24/2023. He was admitted here from 8/30 to 9/11 with adhesive capsulitis of the left shoulder and alcohol withdrawal.  During that hospitalization he was also found to have new alcoholic cardiomyopathy with ejection fraction 35 to 40%.  Upon discharge home he said he was able to walk 100 feet but got quite tired.  He thought he would get better at home but has slowly continued to worsen.  He has had progressive dyspnea on exertion.  He said he could maybe walk 25 steps now before having to stop and rest.  He gets short of breath just while dressing.  He did not feel he could go up or down stairs due to weakness and dyspnea.  He has noticed some increased firmness of his abdomen but no definite swelling.  He has not noticed any lower extremity swelling and has no pain to the abdomen or chest.  His left shoulder pain is resolved since steroid injection.  His appetite is good but he is having difficulty preparing his food as he is not able to stand at the stove and therefore has not been eating much.  His daughter requested a welfare check on him and he was brought in the hospital for further evaluation and treatment.  He said he has been sober for 5 weeks since his previous hospitalization. In the ER, his vital signs were stable.  Bilirubin is 8.2 and AST is 206.  These are both  stable from previous hospitalization.  Potassium 3.2 and albumin 2.7.  Chest x-ray is unremarkable.  Bedside ultrasound by ER physician showed ascites.  He is admitted for failure to thrive, severe dyspnea on exertion, new ascites secondary to decompensated end-stage liver disease, possible congstive heart failure, and severe generalized weakness.           Severe dyspnea on exertion and ascites/new diagnosis of decompensated liver disease/ cirrhosis/ congestive heart failure  The patient had an Ultrasound in August revealed evidence of cirrhosis.  Bilirubin is 8.2 and it was 8.4 at the beginning of September. His AST is also slightly improved at 206 from 239 during recent hospitalization. Seems most likely that progressive ascites resulting in decreased diaphragmatic excursion and dyspnea on exertion. Could be due to his ascites but also congstive heart failure.  His BNP was only 398.  CXR showed low volumes.  ON US bedside in the emergency room, he was found to have significant ascites. On 10/25 did get a paracentesis and rule out Spontaneous bacterial peritonitis. Had over 5 liters removed.   Will need to be on diuretics, this should help with ascites as well as congstive heart failure.  His blood pressure though is on the low side so his home coreg and ace inhibitor need to be stopped.  Gastroenterology has seen.   MELD 21, patient with some dizziness with standing.  Blood pressure has been down to the 80s.  Therapy seeing and will need TCU. His breathing is improved and he is on room air.      Failure to thrive/malnutrition with hypoalbuminemia and generalized weakness:  Albumin only 2.5.  Seems primarily secondary to dyspnea exertion but he also describes some element of generalized weakness.   He has not been eating as he said he only has food in his house that need preparation and he has not felt up to preparing food.  He does say he has a good appetite. Social work, physical therapy, Occupational Therapy  evaluations to help with needs assessment.  Physical therapy recommends TCU.       3. Acute on chronic systolic Heart failure/ coronary artery disease/ hypotension:  Patient has a known reduced ejection fraction of 35%.  This is likely due to a combination of ischemic and alcohol related heart disease.  This was a new diagnosis on his recent hospitalization.  He previously had 2 stents in the LAD. He had a mildly elevated troponin during the hospitalization. Echocardiogram revealed ejection fraction of 35 to 40% with inferior wall motion abnormality. Nuclear stress test confirmed an inferior infarct which was not reversible. He was deemed high risk for intervention with likely minimal benefit given the fixed defect.Medical management was recommended.  Troponin 24, down from 32 at last hospitalization.  He has been on lisinopril and coreg but his blood pressure is running in the 80s so these will be held to allow for diuresis. Is on asa.  Monitor intake, output and weights.  Continue on aldactone/lasix will cut his aldactone dose due to hypotension.   Bmp showing a creatinine 0.76, potassium 2.6, sodium 134 on 10/26/23        4.  Hypokalemia  Replace with close monitoring while on Aldactone and Lasix. Potassium today is 3.6, ace inhibitor is held.      5.  Diabetes mellitus type 2, non insulin dependent:   BS has been . Is on glipizide and an insulin correction scale     6.  Alcohol dependence:   He reports being abstinent since his last hospitalization, sober for 3 weeks.  He says he has not felt like drinking alcohol. Sobriety was discussed again by me.      7.  Obstructive sleep apnea              Is noncompliant with CPAP, has not uses since 2014     8.. left toe wound              WOCN to see    Assessment:      Areas visualized during today's visit:  left foot    Wound location: left distal great toe    Last photo: 11/2/23    Wound due to: Trauma  Wound history/plan of care: per pt wound happened while he  was trying to cut thickened great toenail and he nicked the skin, also has an intact scab to Left 4th toe from same time  Wound base: 100 %dark/dried blood is stained on the nail      Palpation of the wound bed: normal      Drainage: scant     Description of drainage: serosanguinous     Measurements (length x width x depth, in cm): linear under nail 0.4cm x 1.3cm.      Tunneling: N/A     Undermining: N/A  Periwound skin: Intact      Color: normal and consistent with surrounding tissue      Temperature: normal   Odor: none  Pain: denies , none  Pain interventions prior to dressing change: patient tolerated well  Treatment goal: Heal , Maintain (prevention of deterioration), and Protection  STATUS: improving  Supplies ordered: supplies stored on unit, discussed with RN, and discussed with patient        Treatment Plan:     Left great toe wound(s): Every other day, even days  Paint scabbed area with betadine.   Keep socks on at all time to protect toes    Orders: Updated    RECOMMEND PRIMARY TEAM ORDER: Podiatry consult outpatient for nail cares and follow-up on wound  Education provided: plan of care and wound progress  Discussed plan of care with: Patient and Nurse  WOC nurse follow-up plan: weekly  Notify WOC if wound(s) deteriorate.  Nursing to notify the Provider(s) and re-consult the WOC Nurse if new skin concern.    DATA:     Current support surface: Standard  Standard gel/foam mattress (IsoFlex, Atmos air, etc)  Containment of urine/stool: Incontinent pad in bed  BMI: Body mass index is 25.93 kg/m .   Active diet order: Orders Placed This Encounter      Diet      Low Saturated Fat Na <2400 mg     Output: I/O last 3 completed shifts:  In: 660 [P.O.:660]  Out: 400 [Urine:400]     Labs:   Recent Labs   Lab 11/01/23  1011   ALBUMIN 2.6*   HGB 13.7   WBC 12.5*     Pressure injury risk assessment:   Sensory Perception: 3-->slightly limited  Moisture: 4-->rarely moist  Activity: 3-->walks occasionally  Mobility:  3-->slightly limited  Nutrition: 3-->adequate  Friction and Shear: 3-->no apparent problem  Aristeo Score: 19    Salma Banks RN CWOCN  Contact Via [facility] Nurse [facility])  Dept. Office Number: [phone]

## 2023-11-02 NOTE — PLAN OF CARE
[id number]    VSS on RA. A/O x4, denies CP, SOB. C/o pain in lower abdomen. Diagnostic paracentesis done today, sample sent to lab, 1.1 L off. Up SBA GB W, walked halls x4 this shift. UA sent. K am check. ACHS, last bg 187. Had BM. L toe wound w/ cares due 11/2. Continue w/ POC.

## 2023-11-02 NOTE — CARE PLAN
Goal Evaluation Outcome:    Pt A&O. Patient denies any pain but had episode of headache- treated with tylenol, nausea- treated with zofran. CMS: Patient denies any sensation of numbness and tingling. Ambulation: Assist of 1 using gait belt and walker. Potassium was 4.9 this morning, labs ordered for tomorrow morning. ACHS with sliding scale; BS 90, 111, 109 during shift.Left big toe wound- followed by WOC. Tolerates low saturated and sodium <2400mg diet. Fever 101F and heart rate sustaining 120-130s. Patient c/o dizziness and light-headedness- see provider notification note. Patient triggered sepsis X2; lactic 2.6. Patient now on special precaution to rule out COVID. Plan: Discharge TBD.

## 2023-11-02 NOTE — PROGRESS NOTES
[facility]    Hospitalist Progress Note             Date of Admission:  10/24/2023                   Day of hospitalization: 9    Assessment and Plan:   Saji Iqbal is a 63 year old male with a past medical history significant for alcohol use disorder, coronary artery disease with 2 previous stents in the left anterior descending artery, diabetes mellitus, hypertension, dyslipidemia, obstructive sleep apnea, left shoulder adhesive capsulitis treated with steroid injection during last hospitalization, ischemic and alcoholic cardiomyopathy with heart failure with reduced ejection fraction.  He was admitted on 10/24/2023. He was admitted here from 8/30 to 9/11 with adhesive capsulitis of the left shoulder and alcohol withdrawal.  During that hospitalization he was also found to have new alcoholic cardiomyopathy with ejection fraction 35 to 40%.  Upon discharge home he said he was able to walk 100 feet but got quite tired.  He thought he would get better at home but has slowly continued to worsen.  He has had progressive dyspnea on exertion.  He said he could maybe walk 25 steps now before having to stop and rest.  He gets short of breath just while dressing.  He did not feel he could go up or down stairs due to weakness and dyspnea.  He has noticed some increased firmness of his abdomen but no definite swelling.  He has not noticed any lower extremity swelling and has no pain to the abdomen or chest.  His left shoulder pain is resolved since steroid injection.  His appetite is good but he is having difficulty preparing his food as he is not able to stand at the stove and therefore has not been eating much.  His daughter requested a welfare check on him and he was brought in the hospital for further evaluation and treatment.  He said he has been sober for 5 weeks since his previous hospitalization. In the ER, his vital signs were stable.  Bilirubin is 8.2 and AST is 206.  These are both stable  from previous hospitalization.  Potassium 3.2 and albumin 2.7.  Chest x-ray is unremarkable.  Bedside ultrasound by ER physician showed ascites.  He is admitted for failure to thrive, severe dyspnea on exertion, new ascites secondary to decompensated end-stage liver disease, possible congstive heart failure, and severe generalized weakness.     Discharge fairly challenging as patient is homeless and has been refused by multiple facilities for short-term rehab patient does not feel safe at a shelter, with discussion with patient daughter does not want to take him home.    Severe dyspnea on exertion   Ascites  New diagnosis of decompensated cirrhosis  Congestive heart failure, systolic heart failure EF of 35 to 40%  The patient had an Ultrasound in August revealed evidence of cirrhosis.  Bilirubin is 8.2 and it was 8.4 at the beginning of September. His AST is also slightly improved at 206 from 239 during recent hospitalization. Seems most likely that progressive ascites resulting in decreased diaphragmatic excursion and dyspnea on exertion. Could be due to his ascites but also congstive heart failure.  His BNP was only 398.  CXR showed low volumes.  ON US bedside in the emergency room, he was found to have significant ascites.   -On 10/25 did get a paracentesis and rule out Spontaneous bacterial peritonitis. -Had over 5 liters removed, repeat paracentesis 10/31  -His blood pressure though is on the low side so his home coreg and ace inhibitor need to be stopped.    -Gastroenterology has seen.   MELD 21  -Seems to be requiring a paracentesis once a week  -Hold off ACE inhibitor in the setting of cirrhosis, not on beta-blocker due to bradycardia   -Increase Lasix to 40 mg and aldactone 100 mg for tomorrow    Abdominal pain  -Abdominal pain left lower quadrant, no associated symptoms of dysuria or hematuria or diarrhea or dyschezia or melena   -UA not significantly positive only 7 WBCs  -Diagnostic paracentesis negative  for SBP 11/1  -We will monitor abdominal pain seems to be improving    Failure to thrive  malnutrition with hypoalbuminemia and generalized weakness:  Mild cognitive impairment  -Albumin only 2.5.  Seems primarily secondary to dyspnea exertion but he also describes some element of generalized weakness.   He has not been eating as he said he only has food in his house that need preparation and he has not felt up to preparing food.  He does say he has a good appetite. Social work, physical therapy, Occupational Therapy evaluations to help with needs assessment.    -Needs TCU  -Therapy seeing and recommends a  TCU. His breathing is improved and he is on room air.  Okay  -Awaiting TCU at this point, discussed with daughter she is not able to take father home as she does not have a home for herself for right now.     Acute on chronic systolic Heart failure/   coronary artery disease/ hypotension:  Patient has a known reduced ejection fraction of 35%.    -Likely due to a combination of ischemic and alcohol related heart disease.  This was a new diagnosis on his recent hospitalization.    -He previously had 2 stents in the LAD. He had a mildly elevated troponin during the hospitalization.   -Echocardiogram revealed ejection fraction of 35 to 40% with inferior wall motion abnormality.   -Nuclear stress test confirmed an inferior infarct which was not reversible. He was deemed high risk for intervention with likely minimal benefit given the fixed defect.  -Medical management was recommended.    -Troponin 24, down from 32 at last hospitalization.   -He has been on lisinopril and coreg but his blood pressure is running in the 80s so these will be held to allow for diuresis.   -Is on asa.  Monitor intake, output and weights.    -Continue on aldactone/lasix at increased dosing     Hypokalemia  Replace with close monitoring while on Aldactone and Lasix.     Diabetes mellitus type 2, non insulin dependent:  BS has [id number]. Is  "on glipizide and an insulin correction scale     Alcohol dependence:   He reports being abstinent since his last hospitalization, sober for 3 weeks.  He says he has not felt like drinking alcohol. Sobriety was discussed again by me.      Obstructive sleep apnea  Is noncompliant with CPAP, has not uses since 2014     Left toe wound  WOCN to see, recommends outpatient podiatry cares.      Chronic hyponatremia  - monitor, at baseline.    Severe Protein-Calorie Malnutrition   -per dietician    # Code status: Full code  # Anticipated discharge date and Disposition:Needs TCU  # DVT: SCDS  # IVF: None                        Neela Vincent MD  Text Page (7am - 6pm, M-F)               Subjective   Chief Complaint:  Abdominal ascites  Subjective:.  Abdominal pain seems to be improving otherwise no complaint of nausea vomiting chest pain shortness of breath.  No melena hematochezia.        Objective   /78 (BP Location: Right arm, Patient Position: Supine, Cuff Size: Adult Regular)   Pulse 102   Temp 98.6  F (37  C) (Axillary)   Resp 20   Ht 1.803 m (5' 10.98\")   Wt 84.3 kg (185 lb 12.8 oz)   SpO2 96%   BMI 25.93 kg/m       Physical Exam  General: Pt in NAD, normal appearance  HEENT: OP clear MMM, no JVD  Lungs: Clear to Auscultation Bilateral, normal breathing  without accessory muscle usage, no wheezing, rhonchi or crackles  Cardiac: +S1, S2, RRR, no MRG, no edema  Abdominal: normal bowel sounds, NT+ascites  Skin: warm, dry, normal turgor, no rash  Psyche: A& O x3, appropriate affect             Intake/Output Summary (Last 24 hours) at 10/29/2023 1510  Last data filed at 10/29/2023 0640  Gross per 24 hour   Intake --   Output 350 ml   Net -350 ml           Labs and Imaging Results:      Recent Labs   Lab 11/01/23  1011   WBC 12.5*   HGB 13.7           Recent Labs   Lab 11/01/23  1011 10/29/23  0705   * 131*   CO2 23 24   BUN 12.7 14.4        No results for input(s): \"INR\", \"PTT\" in the last 168 " "hours.     No results for input(s): \"CKMB\" in the last 168 hours.    Invalid input(s): \"TROPONINT\"     Recent Labs   Lab 11/01/23  1011   ALBUMIN 2.6*   *   ALT 51   ALKPHOS 260*        Micro:     Radio:  US Paracentesis with Albumin   Final Result   IMPRESSION: Technically successful paracentesis without immediate   complications.      LUCI MATA DO            SYSTEM ID:  [id number]      US Paracentesis with Albumin   Final Result   IMPRESSION: Technically successful paracentesis without immediate   complications.      LUCI MATA DO            SYSTEM ID:  [id number]      Chest XR,  PA & LAT   Final Result      US Paracentesis with Albumin    (Results Pending)           Medications:      Scheduled Meds:     [START ON 11/3/2023] furosemide  40 mg Oral Daily    glipiZIDE  10 mg Oral Daily    insulin aspart  1-7 Units Subcutaneous TID AC    insulin aspart  1-5 Units Subcutaneous At Bedtime    polyethylene glycol  17 g Oral BID    senna-docusate  1 tablet Oral At Bedtime    [START ON 11/3/2023] spironolactone  100 mg Oral Daily     Continuous Infusions:    PRN Meds:  acetaminophen, glucose **OR** dextrose **OR** glucagon, hydrOXYzine, melatonin, ondansetron **OR** ondansetron            "

## 2023-11-03 ENCOUNTER — APPOINTMENT (OUTPATIENT)
Dept: PHYSICAL THERAPY | Facility: CLINIC | Age: 63
End: 2023-11-03
Payer: MEDICAID

## 2023-11-03 ENCOUNTER — TELEPHONE (OUTPATIENT)
Dept: NURSING | Facility: CLINIC | Age: 63
End: 2023-11-03
Payer: MEDICAID

## 2023-11-03 LAB
ALBUMIN UR-MCNC: 30 MG/DL
APPEARANCE UR: CLEAR
BACTERIA #/AREA URNS HPF: ABNORMAL /HPF
BILIRUB UR QL STRIP: ABNORMAL
COLOR UR AUTO: ABNORMAL
GLUCOSE BLDC GLUCOMTR-MCNC: 127 MG/DL (ref 70–99)
GLUCOSE BLDC GLUCOMTR-MCNC: 187 MG/DL (ref 70–99)
GLUCOSE BLDC GLUCOMTR-MCNC: 207 MG/DL (ref 70–99)
GLUCOSE BLDC GLUCOMTR-MCNC: 289 MG/DL (ref 70–99)
GLUCOSE BLDC GLUCOMTR-MCNC: 76 MG/DL (ref 70–99)
GLUCOSE BLDC GLUCOMTR-MCNC: 97 MG/DL (ref 70–99)
GLUCOSE BLDC GLUCOMTR-MCNC: 98 MG/DL (ref 70–99)
GLUCOSE UR STRIP-MCNC: NEGATIVE MG/DL
HGB UR QL STRIP: NEGATIVE
HYALINE CASTS: 3 /LPF
KETONES UR STRIP-MCNC: NEGATIVE MG/DL
LACTATE SERPL-SCNC: 2 MMOL/L (ref 0.7–2)
LACTATE SERPL-SCNC: 2.9 MMOL/L (ref 0.7–2)
LEUKOCYTE ESTERASE UR QL STRIP: NEGATIVE
MUCOUS THREADS #/AREA URNS LPF: PRESENT /LPF
NITRATE UR QL: NEGATIVE
PH UR STRIP: 6 [PH] (ref 5–7)
POTASSIUM SERPL-SCNC: 4.6 MMOL/L (ref 3.4–5.3)
PROCALCITONIN SERPL IA-MCNC: 0.55 NG/ML
RBC URINE: <1 /HPF
SP GR UR STRIP: 1.03 (ref 1–1.03)
SQUAMOUS EPITHELIAL: 1 /HPF
UROBILINOGEN UR STRIP-MCNC: 8 MG/DL
WBC URINE: 5 /HPF

## 2023-11-03 PROCEDURE — 84132 ASSAY OF SERUM POTASSIUM: CPT | Performed by: HOSPITALIST

## 2023-11-03 PROCEDURE — 83605 ASSAY OF LACTIC ACID: CPT | Performed by: HOSPITALIST

## 2023-11-03 PROCEDURE — 99233 SBSQ HOSP IP/OBS HIGH 50: CPT | Performed by: INTERNAL MEDICINE

## 2023-11-03 PROCEDURE — 120N000001 HC R&B MED SURG/OB

## 2023-11-03 PROCEDURE — 81001 URINALYSIS AUTO W/SCOPE: CPT | Performed by: INTERNAL MEDICINE

## 2023-11-03 PROCEDURE — 250N000013 HC RX MED GY IP 250 OP 250 PS 637: Performed by: INTERNAL MEDICINE

## 2023-11-03 PROCEDURE — 250N000013 HC RX MED GY IP 250 OP 250 PS 637: Performed by: HOSPITALIST

## 2023-11-03 PROCEDURE — 250N000011 HC RX IP 250 OP 636: Performed by: HOSPITALIST

## 2023-11-03 PROCEDURE — 250N000012 HC RX MED GY IP 250 OP 636 PS 637: Performed by: INTERNAL MEDICINE

## 2023-11-03 PROCEDURE — 36415 COLL VENOUS BLD VENIPUNCTURE: CPT | Performed by: INTERNAL MEDICINE

## 2023-11-03 PROCEDURE — 83605 ASSAY OF LACTIC ACID: CPT | Performed by: INTERNAL MEDICINE

## 2023-11-03 PROCEDURE — 36415 COLL VENOUS BLD VENIPUNCTURE: CPT | Performed by: HOSPITALIST

## 2023-11-03 PROCEDURE — 97530 THERAPEUTIC ACTIVITIES: CPT | Mod: GP

## 2023-11-03 PROCEDURE — 250N000011 HC RX IP 250 OP 636: Mod: JZ | Performed by: INTERNAL MEDICINE

## 2023-11-03 PROCEDURE — 258N000003 HC RX IP 258 OP 636: Performed by: INTERNAL MEDICINE

## 2023-11-03 PROCEDURE — 84145 PROCALCITONIN (PCT): CPT | Performed by: HOSPITALIST

## 2023-11-03 PROCEDURE — 258N000003 HC RX IP 258 OP 636: Performed by: HOSPITALIST

## 2023-11-03 RX ORDER — PIPERACILLIN SODIUM, TAZOBACTAM SODIUM 3; .375 G/15ML; G/15ML
3.38 INJECTION, POWDER, LYOPHILIZED, FOR SOLUTION INTRAVENOUS EVERY 6 HOURS
Status: DISCONTINUED | OUTPATIENT
Start: 2023-11-03 | End: 2023-11-06

## 2023-11-03 RX ORDER — FOLIC ACID 1 MG/1
1 TABLET ORAL DAILY
Status: DISCONTINUED | OUTPATIENT
Start: 2023-11-03 | End: 2023-11-14 | Stop reason: HOSPADM

## 2023-11-03 RX ORDER — ASPIRIN 81 MG/1
81 TABLET ORAL DAILY
Status: DISCONTINUED | OUTPATIENT
Start: 2023-11-03 | End: 2023-11-14 | Stop reason: HOSPADM

## 2023-11-03 RX ORDER — CARVEDILOL 3.12 MG/1
1.56 TABLET, FILM COATED ORAL 2 TIMES DAILY WITH MEALS
Status: DISCONTINUED | OUTPATIENT
Start: 2023-11-03 | End: 2023-11-14 | Stop reason: HOSPADM

## 2023-11-03 RX ORDER — MULTIPLE VITAMINS W/ MINERALS TAB 9MG-400MCG
1 TAB ORAL DAILY
Status: DISCONTINUED | OUTPATIENT
Start: 2023-11-03 | End: 2023-11-14 | Stop reason: HOSPADM

## 2023-11-03 RX ADMIN — INSULIN ASPART 2 UNITS: 100 INJECTION, SOLUTION INTRAVENOUS; SUBCUTANEOUS at 22:27

## 2023-11-03 RX ADMIN — PIPERACILLIN AND TAZOBACTAM 3.38 G: 3; .375 INJECTION, POWDER, LYOPHILIZED, FOR SOLUTION INTRAVENOUS at 06:57

## 2023-11-03 RX ADMIN — MULTIPLE VITAMINS W/ MINERALS TAB 1 TABLET: TAB at 16:01

## 2023-11-03 RX ADMIN — SODIUM CHLORIDE 500 ML: 9 INJECTION, SOLUTION INTRAVENOUS at 03:51

## 2023-11-03 RX ADMIN — PIPERACILLIN AND TAZOBACTAM 3.38 G: 3; .375 INJECTION, POWDER, LYOPHILIZED, FOR SOLUTION INTRAVENOUS at 18:07

## 2023-11-03 RX ADMIN — SODIUM CHLORIDE 50 ML: 9 INJECTION, SOLUTION INTRAVENOUS at 13:27

## 2023-11-03 RX ADMIN — PIPERACILLIN AND TAZOBACTAM 3.38 G: 3; .375 INJECTION, POWDER, LYOPHILIZED, FOR SOLUTION INTRAVENOUS at 11:23

## 2023-11-03 RX ADMIN — DEXAMETHASONE 6 MG: 2 TABLET ORAL at 10:48

## 2023-11-03 RX ADMIN — REMDESIVIR 200 MG: 100 INJECTION, POWDER, LYOPHILIZED, FOR SOLUTION INTRAVENOUS at 12:25

## 2023-11-03 RX ADMIN — ACETAMINOPHEN 325 MG: 325 TABLET, FILM COATED ORAL at 02:04

## 2023-11-03 RX ADMIN — SENNOSIDES AND DOCUSATE SODIUM 1 TABLET: 8.6; 5 TABLET ORAL at 22:24

## 2023-11-03 RX ADMIN — PIPERACILLIN AND TAZOBACTAM 3.38 G: 3; .375 INJECTION, POWDER, LYOPHILIZED, FOR SOLUTION INTRAVENOUS at 22:32

## 2023-11-03 RX ADMIN — ACETAMINOPHEN 325 MG: 325 TABLET, FILM COATED ORAL at 11:29

## 2023-11-03 RX ADMIN — FOLIC ACID 1 MG: 1 TABLET ORAL at 16:01

## 2023-11-03 RX ADMIN — ASPIRIN 81 MG: 81 TABLET, COATED ORAL at 16:01

## 2023-11-03 RX ADMIN — CARVEDILOL 1.56 MG: 3.12 TABLET, FILM COATED ORAL at 18:07

## 2023-11-03 RX ADMIN — THIAMINE HCL TAB 100 MG 100 MG: 100 TAB at 16:01

## 2023-11-03 ASSESSMENT — ACTIVITIES OF DAILY LIVING (ADL)
ADLS_ACUITY_SCORE: 40
ADLS_ACUITY_SCORE: 34
ADLS_ACUITY_SCORE: 32
ADLS_ACUITY_SCORE: 34
ADLS_ACUITY_SCORE: 40
ADLS_ACUITY_SCORE: 34
ADLS_ACUITY_SCORE: 40

## 2023-11-03 NOTE — PLAN OF CARE
Goal Outcome Evaluation:      Plan of Care Reviewed With: patient    Overall Patient Progress: improvingOverall Patient Progress: improving         Shift : 0700 - 1900    Vitals Temp: 99.9  F (37.7  C) Temp src: Oral BP: 100/63 Pulse: 74   Resp: 18 SpO2: 94 % O2 Device: None (Room air) Oxygen Delivery: 1/2 LPM   Orientation: x4, frustrated in the morning with refusing cares. Better later on, and very improved in the afternoon.    Pain: denies pain  Tele: SR, BBB, inverted T and prolonged Qtc   Activity: ambulated one time in room with GB and one assist, otherwise in bed   Resp: LS clear, stable on RA this afternoon.   Diet: ate his meals, increased appetite in the afternoon with eating snacks between meals   How to take meds: whole with fluids   GI:  hard solid BM  this shift, refused stool softening this morning   : voids into urinal, urine is orange / dark   Protocol: K: 4.6 recheck tomorrow   B, 76. 97, 187,207, pt did not eat much for breakfast, but agreed to drink juice after BG came down. Ate different snacks in the afternoon   Skin: dry scabs on toes   Lines: PIV SL and patent - used for Zosyn and remdesivir   Plan:  plan to discharge to TCU after 10 days of Covid quarantine

## 2023-11-03 NOTE — PLAN OF CARE
Goal Outcome Evaluation:       pt is consuming on average % of meals ordered since last reassessment. One meal documented at 50% consumed. Ordering meals TID as able. On average ordering 1598 kcal and 54 g protein. Encouraged protein with meals, will change oral nutritional supplements to chocolate.     Julia Laird RD, LD  Clinical Dietitian     Pager - 3rd floor/ICU: [phone]  Pager - All other floors: [phone]  Pager - Weekend/holiday: [phone]  Office phone: [phone]

## 2023-11-03 NOTE — PROGRESS NOTES
[facility]    Medicine Progress Note - Hospitalist Service    Date of Admission:  10/24/2023    Assessment & Plan     Saji Iqbal is a 63 year old male with a past medical history significant for alcohol use disorder, coronary artery disease with 2 previous stents in the left anterior descending artery, diabetes mellitus, hypertension, dyslipidemia, obstructive sleep apnea, left shoulder adhesive capsulitis treated with steroid injection during last hospitalization, ischemic and alcoholic cardiomyopathy with heart failure with reduced ejection fraction.  He was admitted on 10/24/2023. He was admitted here from 8/30 to 9/11 with adhesive capsulitis of the left shoulder and alcohol withdrawal.  During that hospitalization he was also found to have new alcoholic cardiomyopathy with ejection fraction 35 to 40%.  Upon discharge home he said he was able to walk 100 feet but got quite tired.  He thought he would get better at home but has slowly continued to worsen.  He has had progressive dyspnea on exertion.  He said he could maybe walk 25 steps now before having to stop and rest.  He gets short of breath just while dressing.  He did not feel he could go up or down stairs due to weakness and dyspnea.  He has noticed some increased firmness of his abdomen but no definite swelling.  He has not noticed any lower extremity swelling and has no pain to the abdomen or chest.  His left shoulder pain is resolved since steroid injection.  His appetite is good but he is having difficulty preparing his food as he is not able to stand at the stove and therefore has not been eating much.  His daughter requested a welfare check on him and he was brought in the hospital for further evaluation and treatment.  He said he has been sober for 5 weeks since his previous hospitalization. In the ER, his vital signs were stable.  Bilirubin is 8.2 and AST is 206.  These are both stable from previous hospitalization.   Potassium 3.2 and albumin 2.7.  Chest x-ray is unremarkable.  Bedside ultrasound by ER physician showed ascites.  He is admitted for failure to thrive, severe dyspnea on exertion, new ascites secondary to decompensated end-stage liver disease, possible congstive heart failure, and severe generalized weakness.  He has now developed COVID-19 infection.    COVID-19 infection.  -SARS-CoV-2 PCR negative on 11/2.  -Requiring supplemental oxygen.  -Does state he wants all available medications to try to get better as fast as possible.  -Start 5-day course of IV remdesivir.  -Start dexamethasone 6 mg a day.  -Supplemental oxygen as needed.  -No pneumonia on chest x-ray.  -Check CRP and D-dimer tomorrow.    Decompensated cirrhosis.  Ascites.  Alcohol use disorder.  -Had reportedly stopped drinking alcohol just recently.  -Required paracentesis on 10/25, 10/31, and 11/1.  -Holding diuretics today.  -Needs continued alcohol cessation.  -Continue multivitamin.  -Start folic acid and thiamine.    Diabetes mellitus type 2.  -Hold glipizide.  -Aspart insulin sliding scale as needed.    Hyperlipidemia.  Coronary artery disease.  Cardiomyopathy.  -Atorvastatin, carvedilol, and aspirin have been on hold.  -Echocardiogram in August showed EF 35 to 40%.  Areas of hypokinesia noted.  -Lexiscan stress test in September did not show areas of reversible ischemia.  -Restart aspirin 81 mg a day.  -Restart carvedilol 1.5265 mg twice a day.  -Recheck LFTs tomorrow.  -If continued improvement, restart atorvastatin.    Deconditioning.  Failure to thrive.  -Continue to work with therapy.  -Current plan for TCU at discharge.    Severe malnutrition.  -Registered dietitian following.    Hyponatremia.  Hypokalemia.  -Potassium replacement protocol.  -Recheck metabolic panel tomorrow.  -Check magnesium level tomorrow.  -Hold diuretics for today.          Diet: Diet  Low Saturated Fat Na <2400 mg  Snacks/Supplements Adult: Glucerna; With Meals    DVT  "Prophylaxis: Pneumatic Compression Devices  Durbin Catheter: Not present  Lines: None     Cardiac Monitoring: ACTIVE order. Indication: Acute decompensated heart failure (48 hours)  Code Status: Full Code      Clinically Significant Risk Factors              # Hypoalbuminemia: Lowest albumin = 2.5 g/dL at 10/24/2023  4:32 PM, will monitor as appropriate     # Hypertension: Noted on problem list  # Chronic heart failure with reduced ejection fraction: last echo with EF <40%       # Overweight: Estimated body mass index is 26.79 kg/m  as calculated from the following:    Height as of this encounter: 1.803 m (5' 10.98\").    Weight as of this encounter: 87.1 kg (192 lb 0.3 oz).   # Severe Malnutrition: based on nutrition assessment    # Financial/Environmental Concerns:           Disposition Plan      Expected Discharge Date: 11/13/2023      Destination: inpatient rehabilitation facility              Kareem Mays DO  Hospitalist Service  [facility]  Securely message with [employer] (more info)  Text page via [employer] Paging/Directory   ______________________________________________________________________    Interval History   Coughing.  Short of breath at times.  Feels weak.  Occasional nausea.  Denies chest pain, fevers, chills.    Physical Exam   Vital Signs: Temp: 100.4  F (38  C) Temp src: Oral BP: 109/65 Pulse: 106   Resp: 20 SpO2: 95 % O2 Device: Nasal cannula Oxygen Delivery: 1/2 LPM  Weight: 192 lbs .33 oz    Gen:  NAD, A&O seemingly x3.  Eyes:  PERRL, positive scleral icterus.  OP:  MMM, no lesions.  Neck:  Supple.  CV:  Regular, no loud murmurs.  Lung:  CTA b/l, normal effort.  Ab:  +BS, soft.  Skin:  Warm, dry to touch.  No rash.  Ext:  Mild non pitting edema LE b/l.      Medical Decision Making       55 MINUTES SPENT BY ME on the date of service doing chart review, history, exam, documentation & further activities per the note.      Data     I have personally reviewed the following " data over the past 24 hrs:    16.7 (H)  \   14.3   / 246     N/A N/A N/A /  97   4.6 N/A N/A \     Procal: 0.55 (H) CRP: N/A Lactic Acid: 2.0         Imaging results reviewed over the past 24 hrs:   Recent Results (from the past 24 hour(s))   XR Chest Port 1 View    Narrative    EXAM: XR CHEST PORT 1 VIEW  LOCATION: [facility]  DATE: 11/2/2023    INDICATION: sob  COMPARISON: 9/3/2023      Impression    IMPRESSION: Negative chest.

## 2023-11-03 NOTE — PROGRESS NOTES
XC    LA 2.9, up from prior. Give 500 ml NS bolus. Recheck in 2 hr. Consider IV albumin given low EF. Will start zosyn given critical illness and somewhat unexplained source, although COVID could possibly explain. Continue to work on getting UA. BC already sent. Sent procal. Consider para to eval SBP.    Tacos Laurent, DO

## 2023-11-03 NOTE — PROGRESS NOTES
Care Management Follow Up    Length of Stay (days): 10    Expected Discharge Date: 11/03/2023     Concerns to be Addressed: TCU placement had been found for patient but now has Covid and can't go to TCU for 10 days. 11/13/23  Patient plan of care discussed at interdisciplinary rounds: Yes    Anticipated Discharge Disposition: Transitional Care accepted at the [facility] but now cannot go due to Covid positive. A new referral will have to be done after 10 days.     Anticipated Discharge Services:  TCU  Anticipated Discharge DME:  TCU will provide    Patient/family educated on Medicare website which has current facility and service quality ratings: yes  Education Provided on the Discharge Plan:  yes  Patient/Family in Agreement with the Plan:  yes    Referrals Placed by CM/SW: Post Acute Facilities  Private pay costs discussed: private room/amenity fees and transportation costs    Additional Information:  Plan was for patient to go to TCU today but is now has Covid. TCU can not take him until 10 days after diagnosis.    Updated facility, MD and patient.    AGGIE Torres   Inpatient Care Coordination   Supervisor  [facility]  [phone]      AGGIE Dewitt

## 2023-11-03 NOTE — PROGRESS NOTES
"Cared for [id number]    Pt is alert and oriented x4 with intermittent confusion. Low BPs throughout shift, 500 mL NS bolus given. UA sent to lab. Pt denies numbness or tingling. Pt denies pain. Pt is up 1-2 assist with GB and walker. Lung sounds clear, O2 went maricruz to 90%, put on 1/2 L NC. PT/OT following. BG for shift was 127. Triggered sepsis protocol, Lactic was 2.9, started on IV zosyn. Tylenol given for low grade temp. 1 IV lost due to pain at site, 1 IV lost to infiltration of NS.    /65 (BP Location: Left arm, Patient Position: Supine)   Pulse 97   Temp 98.6  F (37  C) (Oral)   Resp 22   Ht 1.803 m (5' 10.98\")   Wt 87.1 kg (192 lb 0.3 oz)   SpO2 96%   BMI 26.79 kg/m     "

## 2023-11-03 NOTE — PLAN OF CARE
"Care from 1900 - 2300:    Goal Outcome Evaluation:      Plan of Care Reviewed With: patient    Overall Patient Progress: declining     Temp: 99.4  F (37.4  C) Temp src: Oral BP: 94/48 Pulse: (!) 129   Resp: 26 SpO2: 93 % O2 Device: None (Room air)       A/O x3, disoriented to time, w/ some confusion and intermittent forgetfulness. No c/o pain. Nose swabbed, pt is positive for Covid. Special precaution in place. New PIV placed on right arm. UA sample still needed. Lactic 2.2 and 2.5. Pt is 1A gb wk, very unsteady on feet, bed alarm on for safety. NS bolus 500 ml given 1x. Will continue POC.       Problem: Adult Inpatient Plan of Care  Goal: Plan of Care Review  Description: The Plan of Care Review/Shift note should be completed every shift.  The Outcome Evaluation is a brief statement about your assessment that the patient is improving, declining, or no change.  This information will be displayed automatically on your shift  note.  Outcome: Progressing  Flowsheets (Taken 11/3/2023 0036)  Plan of Care Reviewed With: patient  Overall Patient Progress: declining  Goal: Patient-Specific Goal (Individualized)  Description: You can add care plan individualizations to a care plan. Examples of Individualization might be:  \"Parent requests to be called daily at 9am for status\", \"I have a hard time hearing out of my right ear\", or \"Do not touch me to wake me up as it startles  me\".  Outcome: Progressing  Goal: Absence of Hospital-Acquired Illness or Injury  Outcome: Progressing  Intervention: Identify and Manage Fall Risk  Recent Flowsheet Documentation  Taken 11/2/2023 2015 by Iveth Parsons, RN  Safety Promotion/Fall Prevention:   activity supervised   clutter free environment maintained   increased rounding and observation   increase visualization of patient   lighting adjusted   nonskid shoes/slippers when out of bed   patient and family education   room near nurse's station   room organization consistent   safety round/check " completed   supervised activity  Intervention: Prevent Skin Injury  Recent Flowsheet Documentation  Taken 11/2/2023 2015 by Iveth Parsons RN  Body Position: position changed independently  Intervention: Prevent and Manage VTE (Venous Thromboembolism) Risk  Recent Flowsheet Documentation  Taken 11/2/2023 2015 by Iveth Parsons RN  VTE Prevention/Management: SCDs (sequential compression devices) off  Intervention: Prevent Infection  Recent Flowsheet Documentation  Taken 11/2/2023 2015 by Iveth Parsons RN  Infection Prevention:   single patient room provided   hand hygiene promoted   equipment surfaces disinfected  Goal: Optimal Comfort and Wellbeing  Outcome: Progressing  Goal: Readiness for Transition of Care  Outcome: Progressing     Problem: Liver Failure  Goal: Optimal Coping with Liver Failure  Outcome: Progressing  Goal: Fluid and Electrolyte Balance  Outcome: Progressing  Goal: Optimal Gastrointestinal Function  Outcome: Progressing  Intervention: Monitor and Support Gastrointestinal Function  Recent Flowsheet Documentation  Taken 11/2/2023 2015 by Iveth Parsons RN  Body Position: position changed independently  Goal: Blood Glucose Level Within Target Range  Outcome: Progressing  Goal: Optimal Coagulation Function  Outcome: Progressing  Goal: Absence of Infection Signs and Symptoms  Outcome: Progressing  Intervention: Prevent or Manage Infection  Recent Flowsheet Documentation  Taken 11/2/2023 2015 by Iveth Parsons RN  Isolation Precautions: special precautions maintained  Goal: Optimal Neurologic Function  Outcome: Progressing  Goal: Improved Oral Intake  Outcome: Progressing  Goal: Optimal Pain Control, Comfort and Function  Outcome: Progressing  Goal: Optimize Renal Function  Outcome: Progressing  Goal: Effective Oxygenation and Ventilation  Outcome: Progressing  Intervention: Promote Airway Secretion Clearance  Recent Flowsheet Documentation  Taken 11/2/2023 2015 by Iveth Parsons RN  Cough And Deep Breathing: done  with encouragement  Activity Management: activity adjusted per tolerance  Intervention: Optimize Oxygenation and Ventilation  Recent Flowsheet Documentation  Taken 11/2/2023 2015 by Iveth Parsons RN  Head of Bed ([facility]) Positioning: HOB at 15 degrees     Problem: Heart Failure  Goal: Optimal Coping  Outcome: Progressing  Goal: Optimal Cardiac Output  Outcome: Progressing  Goal: Stable Heart Rate and Rhythm  Outcome: Progressing  Goal: Optimal Functional Ability  Outcome: Progressing  Intervention: Optimize Functional Ability  Recent Flowsheet Documentation  Taken 11/2/2023 2015 by Iveth Parsons RN  Activity Management: activity adjusted per tolerance  Goal: Fluid and Electrolyte Balance  Outcome: Progressing  Goal: Improved Oral Intake  Outcome: Progressing  Goal: Effective Oxygenation and Ventilation  Outcome: Progressing  Intervention: Promote Airway Secretion Clearance  Recent Flowsheet Documentation  Taken 11/2/2023 2015 by Iveth Parsons RN  Cough And Deep Breathing: done with encouragement  Activity Management: activity adjusted per tolerance  Intervention: Optimize Oxygenation and Ventilation  Recent Flowsheet Documentation  Taken 11/2/2023 2015 by Iveth Parsons RN  Head of Bed ([facility]) Positioning: HOB at 15 degrees  Goal: Effective Breathing Pattern During Sleep  Outcome: Progressing  Intervention: Monitor and Manage Obstructive Sleep Apnea  Recent Flowsheet Documentation  Taken 11/2/2023 2015 by Iveth Parsons RN  Medication Review/Management: medications reviewed

## 2023-11-03 NOTE — TELEPHONE ENCOUNTER
Patient classified as COVID treatment eligible by Epic high risk algorithm:  No    Coronavirus (COVID-19) Notification    Reason for call  Notify of POSITIVE COVID-19 lab result, assess symptoms,  review [facility] recommendations    Lab Result   Lab test for 2019-nCoV rRt-PCR or SARS-COV-2 PCR  Oropharyngeal AND/OR nasopharyngeal swabs were POSITIVE for 2019-nCoV RNA [OR] SARS-COV-2 RNA (COVID-19) RNA     We have been unable to reach patient by phone at this time to notify of their Positive COVID-19 result.    Left voicemail message requesting a call back to [phone] [facility] for results.        A Positive COVID-19 letter will be sent via [employer] or the mail.    Angélica Condon

## 2023-11-03 NOTE — PROGRESS NOTES
Patient had fever this evening. UA/UC and BC ordered. Covid 19 B PCR came back positive. Currently not hypoxic. CXR negative for acute infiltrates. Closely monitor vitals. Will need treatment with dexamethasone if becomes hypoxic. Signed out to Dr. Laurent.

## 2023-11-03 NOTE — PROGRESS NOTES
CLINICAL NUTRITION SERVICES - REASSESSMENT NOTE    Recommendations Ordered by Registered Dietitian (RD):   Diet per MD   Continue oral nutritional supplements - change to chocolate   Ordered MVI+M    Malnutrition:   % Weight Loss:  > 10% in 6 months (severe malnutrition)  % Intake:  </= 50% for >/= 1 month (severe malnutrition)  Subcutaneous Fat Loss:  Orbital region moderate depletion and Upper arm region severe depletion  Muscle Loss:  Temporal region moderate depletion, Clavicle bone region severe depletion, Acromion bone region moderate depletion, Upper Arm region severe, Dorsal hand region moderate depletion, and Posterior calf region severe depletion  Fluid Retention:  None noted     Malnutrition Diagnosis: Severe malnutrition  In Context of:  Chronic illness or disease     EVALUATION OF PROGRESS TOWARD GOALS   Diet: Low Saturated Fat, <2400 mg Sodium + Glucerna strawberry at breakfast and 2pm    Intake/Tolerance: Upon review of the flowsheets, pt is consuming on average % of meals ordered since last reassessment. One meal documented at 50% consumed. Ordering meals TID as able. On average ordering 1598 kcal and 54 g protein.     Spoke with patient via phone, reports ongoing poor appetite r/t diet restriction. Will plan to print off cardiac diet menu to aide in meal ordering. Discussed the importance of protein with meals for increased needs with cirrhosis and preservation of lean body mass.       ASSESSED NUTRITION NEEDS PER APPROVED PRACTICE GUIDELINES:  Dosing Weight: 84.6 kg (lowest weight during admission) - continue to trend   Estimated Energy Needs: 2538+ kcals (30+ Kcal/Kg)  Justification: Increased needs   Estimated Protein Needs: 102-127+ grams protein (1.2-1.5+ g pro/Kg)  Justification: ESLD guidelines for malnourished and wound healing (d/t trauma not PI)   Estimated Fluid Needs: per MD     NEW FINDINGS:   - [facility] following, per their note on 11/02: left distal great toe - trauma  - Paracentesis  on 10/31  - TCU in 10 days with new COVID-19 diagnosis   - labs:  Labs:  Electrolytes  Potassium (mmol/L)   Date Value   11/03/2023 4.6   11/02/2023 4.9   11/01/2023 4.5   07/19/2022 3.9   07/14/2022 4.1   01/19/2022 4.2   12/16/2020 4.4   10/23/2020 3.8   06/16/2020 3.9     Phosphorus (mg/dL)   Date Value   09/11/2023 2.9   09/10/2023 2.7   09/09/2023 2.8   09/08/2023 2.9   09/07/2023 3.0    Blood Glucose  Glucose (mg/dL)   Date Value   07/19/2022 103 (H)   07/14/2022 152 (H)   01/19/2022 166 (H)   12/16/2020 166 (H)   06/16/2020 212 (H)   05/26/2020 234 (H)   05/11/2020 233 (H)   12/14/2018 222 (H)     GLUCOSE BY METER POCT (mg/dL)   Date Value   11/03/2023 97   11/03/2023 76   11/03/2023 98   11/03/2023 127 (H)   11/02/2023 146 (H)     Hemoglobin A1C (%)   Date Value   10/24/2023 5.7 (H)   02/17/2023 7.5 (H)   07/07/2022 6.3 (H)   01/19/2022 7.4 (H)   07/02/2021 8.5 (H)   01/07/2021 7.8 (H)   10/20/2020 6.9 (H)   09/17/2020 7.1 (H)   06/16/2020 9.1 (H)    Inflammatory Markers  WBC (10e9/L)   Date Value   12/16/2020 5.2   10/20/2020 9.0   05/26/2020 9.5     WBC Count (10e3/uL)   Date Value   11/02/2023 16.7 (H)   11/01/2023 12.5 (H)   10/25/2023 8.0     Albumin (g/dL)   Date Value   11/01/2023 2.6 (L)   10/24/2023 2.5 (L)   10/24/2023 2.7 (L)   07/14/2022 3.9   09/17/2020 4.1   05/26/2020 4.0   05/11/2020 3.9      Magnesium (mg/dL)   Date Value   10/24/2023 1.7   09/11/2023 2.1   09/10/2023 2.0   12/26/2017 2.0     Sodium (mmol/L)   Date Value   11/01/2023 130 (L)   10/29/2023 131 (L)   10/27/2023 130 (L)   12/16/2020 136   06/16/2020 137   05/26/2020 134    Renal  Urea Nitrogen (mg/dL)   Date Value   11/01/2023 12.7   10/29/2023 14.4   10/27/2023 16.6   07/19/2022 17   07/14/2022 16   01/19/2022 12   12/16/2020 19   06/16/2020 19   05/26/2020 19     Creatinine (mg/dL)   Date Value   11/01/2023 0.68   10/29/2023 0.64 (L)   10/27/2023 0.63 (L)   12/16/2020 1.01   10/23/2020 0.96   06/16/2020 0.92      Additional  Triglycerides (mg/dL)   Date Value   08/31/2023 337 (H)   07/19/2022 255 (H)   01/19/2022 182 (H)   06/16/2020 241 (H)   05/13/2019 322 (H)   03/27/2018 374 (H)     Ketones Urine (mg/dL)   Date Value   11/03/2023 Negative   12/09/2016 80 (A)        - medications:   dexAMETHasone  6 mg Oral Daily    [Held by provider] furosemide  40 mg Oral Daily    [Held by provider] glipiZIDE  10 mg Oral Daily    insulin aspart  1-7 Units Subcutaneous TID AC    insulin aspart  1-5 Units Subcutaneous At Bedtime    piperacillin-tazobactam  3.375 g Intravenous Q6H    polyethylene glycol  17 g Oral BID    [START ON 11/4/2023] remdesivir  100 mg Intravenous Q24H    And    [START ON 11/4/2023] sodium chloride 0.9%  50 mL Intravenous Q24H    remdesivir  200 mg Intravenous Once    Followed by    sodium chloride 0.9%  50 mL Intravenous Once    senna-docusate  1 tablet Oral At Bedtime    [Held by provider] spironolactone  100 mg Oral Daily         acetaminophen, glucose **OR** dextrose **OR** glucagon, hydrOXYzine, melatonin, ondansetron **OR** ondansetron     - weight: patient diuresed and received paracentesis on 10/31, difficult to interpret weight trends with fluid status   Vitals:    10/24/23 1752 10/24/23 1919 10/25/23 0451 10/26/23 0607   Weight: 92.9 kg (204 lb 12.9 oz) 92.7 kg (204 lb 5.9 oz) 92.9 kg (204 lb 12.9 oz) 87 kg (191 lb 12.8 oz)    10/27/23 0500 10/28/23 0542 10/29/23 0649 10/31/23 0619   Weight: 88.6 kg (195 lb 5.2 oz) 88.1 kg (194 lb 4.8 oz) 88.2 kg (194 lb 6.4 oz) 88.3 kg (194 lb 9.6 oz)    11/01/23 0630 11/02/23 0635 11/03/23 0535   Weight: 84.6 kg (186 lb 6.4 oz) 84.3 kg (185 lb 12.8 oz) 87.1 kg (192 lb 0.3 oz)     Previous Goals:   Patient to consume % of nutritionally adequate meal trays TID, or the equivalent with supplements/snacks.   Evaluation: Not met    Previous Nutrition Diagnosis:   Malnutrition related to inadequate oral intake secondary to dyspnea as evidenced by 14% wt loss in 6 months,  <50% intake for >1 month, moderate fat wasting and severe muscle wasting.    Evaluation: No change    CURRENT NUTRITION DIAGNOSIS  Malnutrition related to inadequate oral intake secondary to dyspnea as evidenced by 14% wt loss in 6 months, <50% intake for >1 month, moderate fat wasting and severe muscle wasting.      INTERVENTIONS  Recommendations / Nutrition Prescription  Diet per MD   Continue oral nutritional supplements - change to chocolate   Ordered MVI+M     Implementation  Medical Food Supplement and Multivitamin/Mineral: as above     Goals  Patient to consume % of nutritionally adequate meal trays TID, or the equivalent with supplements/snacks.     MONITORING AND EVALUATION:  Progress towards goals will be monitored and evaluated per protocol and Practice Guidelines    Julia Laird RD, LD  Clinical Dietitian     3rd floor/ICU: [phone]  All other floors: [phone]  Weekend/holiday: [phone]  Office: [phone]

## 2023-11-04 ENCOUNTER — APPOINTMENT (OUTPATIENT)
Dept: CT IMAGING | Facility: CLINIC | Age: 63
End: 2023-11-04
Attending: INTERNAL MEDICINE
Payer: MEDICAID

## 2023-11-04 ENCOUNTER — APPOINTMENT (OUTPATIENT)
Dept: PHYSICAL THERAPY | Facility: CLINIC | Age: 63
End: 2023-11-04
Payer: MEDICAID

## 2023-11-04 LAB
ACANTHOCYTES BLD QL SMEAR: NORMAL
ALBUMIN SERPL BCG-MCNC: 2.4 G/DL (ref 3.5–5.2)
ALP SERPL-CCNC: 214 U/L (ref 40–129)
ALT SERPL W P-5'-P-CCNC: 60 U/L (ref 0–70)
ANION GAP SERPL CALCULATED.3IONS-SCNC: 10 MMOL/L (ref 7–15)
AST SERPL W P-5'-P-CCNC: 225 U/L (ref 0–45)
AUER BODIES BLD QL SMEAR: NORMAL
BASO STIPL BLD QL SMEAR: NORMAL
BILIRUB SERPL-MCNC: 2.9 MG/DL
BITE CELLS BLD QL SMEAR: NORMAL
BLISTER CELLS BLD QL SMEAR: NORMAL
BUN SERPL-MCNC: 15.7 MG/DL (ref 8–23)
BURR CELLS BLD QL SMEAR: NORMAL
CALCIUM SERPL-MCNC: 7.9 MG/DL (ref 8.8–10.2)
CHLORIDE SERPL-SCNC: 98 MMOL/L (ref 98–107)
CREAT SERPL-MCNC: 0.65 MG/DL (ref 0.67–1.17)
CRP SERPL-MCNC: 46.59 MG/L
D DIMER PPP FEU-MCNC: 2.38 UG/ML FEU (ref 0–0.5)
DACRYOCYTES BLD QL SMEAR: NORMAL
DEPRECATED HCO3 PLAS-SCNC: 19 MMOL/L (ref 22–29)
EGFRCR SERPLBLD CKD-EPI 2021: >90 ML/MIN/1.73M2
ELLIPTOCYTES BLD QL SMEAR: NORMAL
ERYTHROCYTE [DISTWIDTH] IN BLOOD BY AUTOMATED COUNT: 13.5 % (ref 10–15)
FRAGMENTS BLD QL SMEAR: NORMAL
GLUCOSE BLDC GLUCOMTR-MCNC: 296 MG/DL (ref 70–99)
GLUCOSE BLDC GLUCOMTR-MCNC: 335 MG/DL (ref 70–99)
GLUCOSE BLDC GLUCOMTR-MCNC: 350 MG/DL (ref 70–99)
GLUCOSE BLDC GLUCOMTR-MCNC: 406 MG/DL (ref 70–99)
GLUCOSE BLDC GLUCOMTR-MCNC: 423 MG/DL (ref 70–99)
GLUCOSE SERPL-MCNC: 351 MG/DL (ref 70–99)
HCT VFR BLD AUTO: 34.2 % (ref 40–53)
HGB BLD-MCNC: 11.8 G/DL (ref 13.3–17.7)
HGB C CRYSTALS: NORMAL
HOWELL-JOLLY BOD BLD QL SMEAR: NORMAL
MAGNESIUM SERPL-MCNC: 2 MG/DL (ref 1.7–2.3)
MCH RBC QN AUTO: 36 PG (ref 26.5–33)
MCHC RBC AUTO-ENTMCNC: 34.5 G/DL (ref 31.5–36.5)
MCV RBC AUTO: 104 FL (ref 78–100)
NEUTS HYPERSEG BLD QL SMEAR: NORMAL
PLAT MORPH BLD: NORMAL
PLATELET # BLD AUTO: 133 10E3/UL (ref 150–450)
POLYCHROMASIA BLD QL SMEAR: NORMAL
POTASSIUM SERPL-SCNC: 4.4 MMOL/L (ref 3.4–5.3)
PROT SERPL-MCNC: 5.4 G/DL (ref 6.4–8.3)
RBC # BLD AUTO: 3.28 10E6/UL (ref 4.4–5.9)
RBC AGGLUT BLD QL: NORMAL
RBC MORPH BLD: NORMAL
ROULEAUX BLD QL SMEAR: NORMAL
SICKLE CELLS BLD QL SMEAR: NORMAL
SMUDGE CELLS BLD QL SMEAR: NORMAL
SODIUM SERPL-SCNC: 127 MMOL/L (ref 135–145)
SPHEROCYTES BLD QL SMEAR: NORMAL
STOMATOCYTES BLD QL SMEAR: NORMAL
TARGETS BLD QL SMEAR: NORMAL
TOXIC GRANULES BLD QL SMEAR: NORMAL
VARIANT LYMPHS BLD QL SMEAR: NORMAL
WBC # BLD AUTO: 7.7 10E3/UL (ref 4–11)

## 2023-11-04 PROCEDURE — 250N000012 HC RX MED GY IP 250 OP 636 PS 637: Performed by: INTERNAL MEDICINE

## 2023-11-04 PROCEDURE — 86140 C-REACTIVE PROTEIN: CPT | Performed by: INTERNAL MEDICINE

## 2023-11-04 PROCEDURE — 250N000011 HC RX IP 250 OP 636: Performed by: INTERNAL MEDICINE

## 2023-11-04 PROCEDURE — 250N000013 HC RX MED GY IP 250 OP 250 PS 637: Performed by: INTERNAL MEDICINE

## 2023-11-04 PROCEDURE — 97110 THERAPEUTIC EXERCISES: CPT | Mod: GP

## 2023-11-04 PROCEDURE — 71275 CT ANGIOGRAPHY CHEST: CPT

## 2023-11-04 PROCEDURE — 85379 FIBRIN DEGRADATION QUANT: CPT | Performed by: INTERNAL MEDICINE

## 2023-11-04 PROCEDURE — 83735 ASSAY OF MAGNESIUM: CPT | Performed by: INTERNAL MEDICINE

## 2023-11-04 PROCEDURE — 258N000003 HC RX IP 258 OP 636: Performed by: INTERNAL MEDICINE

## 2023-11-04 PROCEDURE — 120N000001 HC R&B MED SURG/OB

## 2023-11-04 PROCEDURE — 85027 COMPLETE CBC AUTOMATED: CPT | Performed by: INTERNAL MEDICINE

## 2023-11-04 PROCEDURE — 99233 SBSQ HOSP IP/OBS HIGH 50: CPT | Performed by: INTERNAL MEDICINE

## 2023-11-04 PROCEDURE — 80053 COMPREHEN METABOLIC PANEL: CPT | Performed by: INTERNAL MEDICINE

## 2023-11-04 PROCEDURE — 250N000011 HC RX IP 250 OP 636: Mod: JZ | Performed by: HOSPITALIST

## 2023-11-04 PROCEDURE — 250N000009 HC RX 250: Performed by: INTERNAL MEDICINE

## 2023-11-04 PROCEDURE — 36415 COLL VENOUS BLD VENIPUNCTURE: CPT | Performed by: INTERNAL MEDICINE

## 2023-11-04 PROCEDURE — 250N000011 HC RX IP 250 OP 636: Mod: JZ | Performed by: INTERNAL MEDICINE

## 2023-11-04 RX ORDER — IOPAMIDOL 755 MG/ML
500 INJECTION, SOLUTION INTRAVASCULAR ONCE
Status: COMPLETED | OUTPATIENT
Start: 2023-11-04 | End: 2023-11-04

## 2023-11-04 RX ORDER — FUROSEMIDE 20 MG/1
10 TABLET ORAL DAILY
Status: DISCONTINUED | OUTPATIENT
Start: 2023-11-04 | End: 2023-11-05

## 2023-11-04 RX ORDER — SPIRONOLACTONE 25 MG
12.5 TABLET ORAL DAILY
Status: DISCONTINUED | OUTPATIENT
Start: 2023-11-04 | End: 2023-11-05

## 2023-11-04 RX ADMIN — SODIUM CHLORIDE 94 ML: 9 INJECTION, SOLUTION INTRAVENOUS at 16:02

## 2023-11-04 RX ADMIN — PIPERACILLIN AND TAZOBACTAM 3.38 G: 3; .375 INJECTION, POWDER, LYOPHILIZED, FOR SOLUTION INTRAVENOUS at 11:41

## 2023-11-04 RX ADMIN — REMDESIVIR 100 MG: 100 INJECTION, POWDER, LYOPHILIZED, FOR SOLUTION INTRAVENOUS at 18:04

## 2023-11-04 RX ADMIN — PIPERACILLIN AND TAZOBACTAM 3.38 G: 3; .375 INJECTION, POWDER, LYOPHILIZED, FOR SOLUTION INTRAVENOUS at 16:59

## 2023-11-04 RX ADMIN — MULTIPLE VITAMINS W/ MINERALS TAB 1 TABLET: TAB at 08:24

## 2023-11-04 RX ADMIN — FOLIC ACID 1 MG: 1 TABLET ORAL at 08:24

## 2023-11-04 RX ADMIN — PIPERACILLIN AND TAZOBACTAM 3.38 G: 3; .375 INJECTION, POWDER, LYOPHILIZED, FOR SOLUTION INTRAVENOUS at 22:44

## 2023-11-04 RX ADMIN — CARVEDILOL 1.56 MG: 3.12 TABLET, FILM COATED ORAL at 19:05

## 2023-11-04 RX ADMIN — ACETAMINOPHEN 325 MG: 325 TABLET, FILM COATED ORAL at 08:24

## 2023-11-04 RX ADMIN — IOPAMIDOL 76 ML: 755 INJECTION, SOLUTION INTRAVENOUS at 16:02

## 2023-11-04 RX ADMIN — SODIUM CHLORIDE 50 ML: 9 INJECTION, SOLUTION INTRAVENOUS at 18:00

## 2023-11-04 RX ADMIN — INSULIN ASPART 5 UNITS: 100 INJECTION, SOLUTION INTRAVENOUS; SUBCUTANEOUS at 21:16

## 2023-11-04 RX ADMIN — CARVEDILOL 1.56 MG: 3.12 TABLET, FILM COATED ORAL at 08:24

## 2023-11-04 RX ADMIN — INSULIN GLARGINE 7 UNITS: 100 INJECTION, SOLUTION SUBCUTANEOUS at 22:43

## 2023-11-04 RX ADMIN — PIPERACILLIN AND TAZOBACTAM 3.38 G: 3; .375 INJECTION, POWDER, LYOPHILIZED, FOR SOLUTION INTRAVENOUS at 05:25

## 2023-11-04 RX ADMIN — ASPIRIN 81 MG: 81 TABLET, COATED ORAL at 08:24

## 2023-11-04 RX ADMIN — FUROSEMIDE 10 MG: 20 TABLET ORAL at 16:18

## 2023-11-04 RX ADMIN — THIAMINE HCL TAB 100 MG 100 MG: 100 TAB at 08:24

## 2023-11-04 RX ADMIN — DEXAMETHASONE 6 MG: 2 TABLET ORAL at 08:24

## 2023-11-04 RX ADMIN — SPIRONOLACTONE 12.5 MG: 25 TABLET ORAL at 16:58

## 2023-11-04 ASSESSMENT — ACTIVITIES OF DAILY LIVING (ADL)
ADLS_ACUITY_SCORE: 40
ADLS_ACUITY_SCORE: 42
ADLS_ACUITY_SCORE: 40
ADLS_ACUITY_SCORE: 42
ADLS_ACUITY_SCORE: 40
ADLS_ACUITY_SCORE: 36
ADLS_ACUITY_SCORE: 42
ADLS_ACUITY_SCORE: 40
ADLS_ACUITY_SCORE: 40
ADLS_ACUITY_SCORE: 42
ADLS_ACUITY_SCORE: 40
ADLS_ACUITY_SCORE: 36

## 2023-11-04 NOTE — PROGRESS NOTES
[facility]    Medicine Progress Note - Hospitalist Service    Date of Admission:  10/24/2023    Assessment & Plan     Saji Iqbal is a 63 year old male with a past medical history significant for alcohol use disorder, coronary artery disease with 2 previous stents in the left anterior descending artery, diabetes mellitus, hypertension, dyslipidemia, obstructive sleep apnea, left shoulder adhesive capsulitis treated with steroid injection during last hospitalization, ischemic and alcoholic cardiomyopathy with heart failure with reduced ejection fraction.  He was admitted on 10/24/2023. He was admitted here from 8/30 to 9/11 with adhesive capsulitis of the left shoulder and alcohol withdrawal.  During that hospitalization he was also found to have new alcoholic cardiomyopathy with ejection fraction 35 to 40%.  Upon discharge home he said he was able to walk 100 feet but got quite tired.  He thought he would get better at home but has slowly continued to worsen.  He has had progressive dyspnea on exertion.  He said he could maybe walk 25 steps now before having to stop and rest.  He gets short of breath just while dressing.  He did not feel he could go up or down stairs due to weakness and dyspnea.  He has noticed some increased firmness of his abdomen but no definite swelling.  He has not noticed any lower extremity swelling and has no pain to the abdomen or chest.  His left shoulder pain is resolved since steroid injection.  His appetite is good but he is having difficulty preparing his food as he is not able to stand at the stove and therefore has not been eating much.  His daughter requested a welfare check on him and he was brought in the hospital for further evaluation and treatment.  He said he has been sober for 5 weeks since his previous hospitalization. In the ER, his vital signs were stable.  Bilirubin is 8.2 and AST is 206.  These are both stable from previous hospitalization.   Potassium 3.2 and albumin 2.7.  Chest x-ray is unremarkable.  Bedside ultrasound by ER physician showed ascites.  He is admitted for failure to thrive, severe dyspnea on exertion, new ascites secondary to decompensated end-stage liver disease, possible congstive heart failure, and severe generalized weakness.  He has now developed COVID-19 infection.     COVID-19 infection.  -SARS-CoV-2 PCR negative on 11/2.  -Previously, requiring supplemental oxygen.  -Does state he wants all available medications to try to get better as fast as possible.  -Continue 5-day course of IV remdesivir.  -Continue dexamethasone 6 mg a day.  -Supplemental oxygen as needed.  -No pneumonia on chest x-ray.  -D-dimer significantly elevated.  -Check CT of the chest.  -Recheck LFTs tomorrow.     Decompensated cirrhosis.  Ascites.  Alcohol use disorder.  -Had reportedly stopped drinking alcohol just recently.  -Required paracentesis on 10/25, 10/31, and 11/1.  -Restart furosemide 10 mg a day.  -Restart spironolactone 12.5 mg a day.  -Needs continued alcohol cessation.  -Continue multivitamin.  -Continue folic acid and thiamine.  -Recheck LFTs tomorrow.     Diabetes mellitus type 2.  -Hemoglobin A1c only 5.7.  -Stop glipizide.  -Blood glucose now significantly elevated with initiation of dexamethasone.  -Start glargine insulin 7 units a day.  -Aspart insulin sliding scale as needed.     Hyperlipidemia.  Coronary artery disease.  Cardiomyopathy.  -Atorvastatin, carvedilol, and aspirin have been on hold.  -Echocardiogram in August showed EF 35 to 40%.  Areas of hypokinesia noted.  -Lexiscan stress test in September did not show areas of reversible ischemia.  -Continue aspirin 81 mg a day.  -Continue carvedilol 1.5265 mg twice a day.  -Recheck LFTs tomorrow.  -Continue to hold atorvastatin due to transaminitis.     Deconditioning.  Failure to thrive.  -Continue to work with therapy.  -Current plan for TCU at discharge.     Severe  "malnutrition.  -Registered dietitian following.     Hyponatremia.  Hypokalemia.  -Potassium replacement protocol.  -Recheck metabolic panel tomorrow.  -Restart furosemide and spironolactone as above.       I did update daughter, Richa, by phone on 11/4.          Diet: Diet  Low Saturated Fat Na <2400 mg  Snacks/Supplements Adult: Glucerna; With Meals    DVT Prophylaxis: Pneumatic Compression Devices  Durbin Catheter: Not present  Lines: None     Cardiac Monitoring: ACTIVE order. Indication: Acute decompensated heart failure (48 hours)  Code Status: Full Code      Clinically Significant Risk Factors         # Hyponatremia: Lowest Na = 127 mmol/L in last 2 days, will monitor as appropriate      # Hypoalbuminemia: Lowest albumin = 2.4 g/dL at 11/4/2023  6:01 AM, will monitor as appropriate     # Hypertension: Noted on problem list  # Chronic heart failure with reduced ejection fraction: last echo with EF <40%       # Overweight: Estimated body mass index is 26.76 kg/m  as calculated from the following:    Height as of this encounter: 1.803 m (5' 10.98\").    Weight as of this encounter: 87 kg (191 lb 12.8 oz).   # Severe Malnutrition: based on nutrition assessment    # Financial/Environmental Concerns:           Disposition Plan      Expected Discharge Date: 11/13/2023      Destination: inpatient rehabilitation facility              Kareem Mays DO  Hospitalist Service  [facility]  Securely message with [employer] (more info)  Text page via [employer] Paging/Directory   ______________________________________________________________________    Interval History   Short of breath at times.  Coughing.  Feels weak.  Denies chest pain, fevers, chills, nausea, vomiting.    Physical Exam   Vital Signs: Temp: 98  F (36.7  C) Temp src: Oral BP: 120/78 Pulse: 81   Resp: 18 SpO2: 97 % O2 Device: None (Room air)    Weight: 191 lbs 12.8 oz    Gen:  NAD, A&O seemingly x3.  Maybe slight trouble with time.  Eyes:  " PERRL, mild positive sclera icterus.  OP:  MMM, no lesions.  Neck:  Supple.  CV:  Regular, no murmurs.  Lung:  CTA b/l, normal effort.  Ab:  +BS, soft.  Skin:  Warm, dry to touch.  No rash.  Ext:  No pitting edema LE b/l.      Medical Decision Making       52 MINUTES SPENT BY ME on the date of service doing chart review, history, exam, documentation & further activities per the note.      Data     I have personally reviewed the following data over the past 24 hrs:    7.7  \   11.8 (L)   / 133 (L)     127 (L) 98 15.7 /  335 (H)   4.4 19 (L) 0.65 (L) \     ALT: 60 AST: 225 (H) AP: 214 (H) TBILI: 2.9 (H)   ALB: 2.4 (L) TOT PROTEIN: 5.4 (L) LIPASE: N/A     Procal: N/A CRP: 46.59 (H) Lactic Acid: N/A       INR:  N/A PTT:  N/A   D-dimer:  2.38 (H) Fibrinogen:  N/A       Imaging results reviewed over the past 24 hrs:   No results found for this or any previous visit (from the past 24 hour(s)).

## 2023-11-04 NOTE — CARE PLAN
"/66 (BP Location: Right arm, Patient Position: Supine)   Pulse 80   Temp 98  F (36.7  C) (Oral)   Resp 20   Ht 1.803 m (5' 10.98\")   Wt 87.1 kg (192 lb 0.3 oz)   SpO2 94%   BMI 26.79 kg/m      Neuro: A/O X4  Cardiac: SR, Inverted T waves, BBB  Lungs: Crackles on all Lobes  GI: All quadrants audible  : Urinal Void  Pain: 0/10  IV: RPIV Saline Lock  Meds: Senna, Insulin, Zosyn  Labs/tests: K Protocol  Diet: Low Fat/Na  Activity: Ax1/2  Plan: Discharge 11/13/2023 to TCU  "

## 2023-11-04 NOTE — PLAN OF CARE
Goal Outcome Evaluation:      Plan of Care Reviewed With: patient    Overall Patient Progress: improvingOverall Patient Progress: improving       Shift : 0700 -1900    Vitals:Temp: 98  F (36.7  C) Temp src: Oral BP: 120/78 Pulse: 81   Resp: 18 SpO2: 97 % O2 Device: None (Room air)       Orientation: x4, very pleasant   Pain: sore throat, tylenol prn given with great success. Pain free for the rest of the day  Tele: SR, BBB, prolonged QTc ST depression in the morning, SR with BBB in the afternoon   Activity: ambulated in room, much more steady today. Was in chair all day and felt good about it   Resp: LS clear, diminished in lower lobes. Incentive Spirometer given, pt is using it and improved through how the day   Diet:ate his meals and ate some snacks, good appetite    How to take meds: whole with fluids   GI: urinates without issue but not often, urine is orange   : BM this shift   Protocol: K+ in range, - protocol discontinued   B, 335, 425, pt wanted insulin to injected to abdomen, educated about possible diminished absorption in abdomen due to ascites, pt agreed to use arm instead.   Skin: scabs on toes, dry, some scattered bruising   Other: Chest CT done today, see result   Lines: PIV SL, patent   Plan:   cont with plan of care

## 2023-11-05 ENCOUNTER — APPOINTMENT (OUTPATIENT)
Dept: OCCUPATIONAL THERAPY | Facility: CLINIC | Age: 63
End: 2023-11-05
Payer: MEDICAID

## 2023-11-05 LAB
ALBUMIN SERPL BCG-MCNC: 2.4 G/DL (ref 3.5–5.2)
ALP SERPL-CCNC: 200 U/L (ref 40–129)
ALT SERPL W P-5'-P-CCNC: 57 U/L (ref 0–70)
ANION GAP SERPL CALCULATED.3IONS-SCNC: 8 MMOL/L (ref 7–15)
AST SERPL W P-5'-P-CCNC: 131 U/L (ref 0–45)
BILIRUB SERPL-MCNC: 2.4 MG/DL
BUN SERPL-MCNC: 21.6 MG/DL (ref 8–23)
C DIFF TOX B STL QL: NEGATIVE
CALCIUM SERPL-MCNC: 8.1 MG/DL (ref 8.8–10.2)
CHLORIDE SERPL-SCNC: 98 MMOL/L (ref 98–107)
CREAT SERPL-MCNC: 0.69 MG/DL (ref 0.67–1.17)
CRP SERPL-MCNC: 19.48 MG/L
DEPRECATED HCO3 PLAS-SCNC: 20 MMOL/L (ref 22–29)
EGFRCR SERPLBLD CKD-EPI 2021: >90 ML/MIN/1.73M2
GLUCOSE BLDC GLUCOMTR-MCNC: 225 MG/DL (ref 70–99)
GLUCOSE BLDC GLUCOMTR-MCNC: 274 MG/DL (ref 70–99)
GLUCOSE BLDC GLUCOMTR-MCNC: 320 MG/DL (ref 70–99)
GLUCOSE BLDC GLUCOMTR-MCNC: 333 MG/DL (ref 70–99)
GLUCOSE BLDC GLUCOMTR-MCNC: 334 MG/DL (ref 70–99)
GLUCOSE BLDC GLUCOMTR-MCNC: 337 MG/DL (ref 70–99)
GLUCOSE SERPL-MCNC: 234 MG/DL (ref 70–99)
POTASSIUM SERPL-SCNC: 4.8 MMOL/L (ref 3.4–5.3)
PROT SERPL-MCNC: 5.4 G/DL (ref 6.4–8.3)
SODIUM SERPL-SCNC: 126 MMOL/L (ref 135–145)

## 2023-11-05 PROCEDURE — 87493 C DIFF AMPLIFIED PROBE: CPT | Performed by: INTERNAL MEDICINE

## 2023-11-05 PROCEDURE — 80053 COMPREHEN METABOLIC PANEL: CPT | Performed by: INTERNAL MEDICINE

## 2023-11-05 PROCEDURE — 86140 C-REACTIVE PROTEIN: CPT | Performed by: INTERNAL MEDICINE

## 2023-11-05 PROCEDURE — 250N000013 HC RX MED GY IP 250 OP 250 PS 637: Performed by: INTERNAL MEDICINE

## 2023-11-05 PROCEDURE — 250N000011 HC RX IP 250 OP 636: Mod: JZ | Performed by: INTERNAL MEDICINE

## 2023-11-05 PROCEDURE — 250N000012 HC RX MED GY IP 250 OP 636 PS 637: Performed by: INTERNAL MEDICINE

## 2023-11-05 PROCEDURE — 250N000011 HC RX IP 250 OP 636: Mod: JZ | Performed by: HOSPITALIST

## 2023-11-05 PROCEDURE — 97110 THERAPEUTIC EXERCISES: CPT | Mod: GO

## 2023-11-05 PROCEDURE — 99232 SBSQ HOSP IP/OBS MODERATE 35: CPT | Performed by: INTERNAL MEDICINE

## 2023-11-05 PROCEDURE — 258N000003 HC RX IP 258 OP 636: Performed by: INTERNAL MEDICINE

## 2023-11-05 PROCEDURE — 120N000001 HC R&B MED SURG/OB

## 2023-11-05 PROCEDURE — 36415 COLL VENOUS BLD VENIPUNCTURE: CPT | Performed by: INTERNAL MEDICINE

## 2023-11-05 PROCEDURE — 97535 SELF CARE MNGMENT TRAINING: CPT | Mod: GO

## 2023-11-05 RX ORDER — SPIRONOLACTONE 25 MG/1
25 TABLET ORAL DAILY
Status: DISCONTINUED | OUTPATIENT
Start: 2023-11-06 | End: 2023-11-07

## 2023-11-05 RX ORDER — FUROSEMIDE 20 MG
20 TABLET ORAL DAILY
Status: DISCONTINUED | OUTPATIENT
Start: 2023-11-06 | End: 2023-11-06

## 2023-11-05 RX ADMIN — INSULIN ASPART 3 UNITS: 100 INJECTION, SOLUTION INTRAVENOUS; SUBCUTANEOUS at 18:34

## 2023-11-05 RX ADMIN — REMDESIVIR 100 MG: 100 INJECTION, POWDER, LYOPHILIZED, FOR SOLUTION INTRAVENOUS at 17:28

## 2023-11-05 RX ADMIN — INSULIN ASPART 3 UNITS: 100 INJECTION, SOLUTION INTRAVENOUS; SUBCUTANEOUS at 13:58

## 2023-11-05 RX ADMIN — SPIRONOLACTONE 12.5 MG: 25 TABLET ORAL at 09:11

## 2023-11-05 RX ADMIN — CARVEDILOL 1.56 MG: 3.12 TABLET, FILM COATED ORAL at 18:40

## 2023-11-05 RX ADMIN — INSULIN ASPART 4 UNITS: 100 INJECTION, SOLUTION INTRAVENOUS; SUBCUTANEOUS at 09:17

## 2023-11-05 RX ADMIN — DEXAMETHASONE 6 MG: 2 TABLET ORAL at 09:12

## 2023-11-05 RX ADMIN — ASPIRIN 81 MG: 81 TABLET, COATED ORAL at 09:12

## 2023-11-05 RX ADMIN — FOLIC ACID 1 MG: 1 TABLET ORAL at 09:12

## 2023-11-05 RX ADMIN — PIPERACILLIN AND TAZOBACTAM 3.38 G: 3; .375 INJECTION, POWDER, LYOPHILIZED, FOR SOLUTION INTRAVENOUS at 22:55

## 2023-11-05 RX ADMIN — THIAMINE HCL TAB 100 MG 100 MG: 100 TAB at 09:12

## 2023-11-05 RX ADMIN — PIPERACILLIN AND TAZOBACTAM 3.38 G: 3; .375 INJECTION, POWDER, LYOPHILIZED, FOR SOLUTION INTRAVENOUS at 11:52

## 2023-11-05 RX ADMIN — PIPERACILLIN AND TAZOBACTAM 3.38 G: 3; .375 INJECTION, POWDER, LYOPHILIZED, FOR SOLUTION INTRAVENOUS at 18:30

## 2023-11-05 RX ADMIN — CARVEDILOL 1.56 MG: 3.12 TABLET, FILM COATED ORAL at 10:06

## 2023-11-05 RX ADMIN — PIPERACILLIN AND TAZOBACTAM 3.38 G: 3; .375 INJECTION, POWDER, LYOPHILIZED, FOR SOLUTION INTRAVENOUS at 04:38

## 2023-11-05 RX ADMIN — INSULIN ASPART 3 UNITS: 100 INJECTION, SOLUTION INTRAVENOUS; SUBCUTANEOUS at 22:56

## 2023-11-05 RX ADMIN — MULTIPLE VITAMINS W/ MINERALS TAB 1 TABLET: TAB at 09:12

## 2023-11-05 RX ADMIN — FUROSEMIDE 10 MG: 20 TABLET ORAL at 09:12

## 2023-11-05 RX ADMIN — SODIUM CHLORIDE 50 ML: 9 INJECTION, SOLUTION INTRAVENOUS at 18:29

## 2023-11-05 ASSESSMENT — ACTIVITIES OF DAILY LIVING (ADL)
ADLS_ACUITY_SCORE: 36

## 2023-11-05 NOTE — CARE PLAN
"/78 (BP Location: Left arm, Patient Position: Sitting, Cuff Size: Adult Regular)   Pulse 90   Temp 97.4  F (36.3  C) (Oral)   Resp 18   Ht 1.803 m (5' 10.98\")   Wt 87 kg (191 lb 12.8 oz)   SpO2 97%   BMI 26.76 kg/m      Neuro: A/O X4  Cardiac: SR, Inverted T waves w/ BBB  Lungs: Crackles on lower lobes  GI: All Quadrants audible  : Urinal  Pain: 0/10  IV: RPIV Saline Lock  Meds: Insulin, Zosyn  Diet: Low Fat/Na  Activity: Ax1  Plan: Discharge 11/13/2023 TCU  "

## 2023-11-05 NOTE — PROGRESS NOTES
[facility]    Medicine Progress Note - Hospitalist Service    Date of Admission:  10/24/2023    Assessment & Plan     Saji Iqbal is a 63 year old male with a past medical history significant for alcohol use disorder, coronary artery disease with 2 previous stents in the left anterior descending artery, diabetes mellitus, hypertension, dyslipidemia, obstructive sleep apnea, left shoulder adhesive capsulitis treated with steroid injection during last hospitalization, ischemic and alcoholic cardiomyopathy with heart failure with reduced ejection fraction.  He was admitted on 10/24/2023. He was admitted here from 8/30 to 9/11 with adhesive capsulitis of the left shoulder and alcohol withdrawal.  During that hospitalization he was also found to have new alcoholic cardiomyopathy with ejection fraction 35 to 40%.  Upon discharge home he said he was able to walk 100 feet but got quite tired.  He thought he would get better at home but has slowly continued to worsen.  He has had progressive dyspnea on exertion.  He said he could maybe walk 25 steps now before having to stop and rest.  He gets short of breath just while dressing.  He did not feel he could go up or down stairs due to weakness and dyspnea.  He has noticed some increased firmness of his abdomen but no definite swelling.  He has not noticed any lower extremity swelling and has no pain to the abdomen or chest.  His left shoulder pain is resolved since steroid injection.  His appetite is good but he is having difficulty preparing his food as he is not able to stand at the stove and therefore has not been eating much.  His daughter requested a welfare check on him and he was brought in the hospital for further evaluation and treatment.  He said he has been sober for 5 weeks since his previous hospitalization. In the ER, his vital signs were stable.  Bilirubin is 8.2 and AST is 206.  These are both stable from previous hospitalization.   Potassium 3.2 and albumin 2.7.  Chest x-ray is unremarkable.  Bedside ultrasound by ER physician showed ascites.  He is admitted for failure to thrive, severe dyspnea on exertion, new ascites secondary to decompensated end-stage liver disease, possible congstive heart failure, and severe generalized weakness.  He has now developed COVID-19 infection.     COVID-19 infection.  -SARS-CoV-2 PCR negative on 11/2.  -Previously, requiring supplemental oxygen.  -Does state he wants all available medications to try to get better as fast as possible.  -Continue 5-day course of IV remdesivir.  -Continue dexamethasone 6 mg a day.  -Supplemental oxygen as needed.  -No pneumonia on chest x-ray.  -D-dimer significantly elevated.  -CT of the chest with small left effusion and atelectasis.  -Avoid IV fluids as able.  -Use incentive spirometry.  -Was started on IV Zosyn on 11/3 to cover possible bacterial sepsis.  -Continue IV Zosyn through today to complete 3-day course.  -Stop Zosyn tomorrow if no cultures positive by then.     Decompensated cirrhosis.  Ascites.  Alcohol use disorder.  -Had reportedly stopped drinking alcohol just recently.  -Required paracentesis on 10/25, 10/31, and 11/1.  -Increase furosemide to 20 mg a day.  -Increase spironolactone to 25 mg a day.  -Needs continued alcohol cessation.  -Continue multivitamin.  -Continue folic acid and thiamine.  -Recheck LFTs tomorrow.     Diabetes mellitus type 2.  -Hemoglobin A1c only 5.7.  -Stop glipizide.  -Blood glucose now significantly elevated with initiation of dexamethasone.  -Increase glargine insulin to 8 units a day.  -Start scheduled aspart on carb counting basis with meals.  -Aspart insulin sliding scale as needed.     Hyperlipidemia.  Coronary artery disease.  Cardiomyopathy.  -Atorvastatin, carvedilol, and aspirin have been on hold.  -Echocardiogram in August showed EF 35 to 40%.  Areas of hypokinesia noted.  -Lexiscan stress test in September did not show  "areas of reversible ischemia.  -Continue aspirin 81 mg a day.  -Continue carvedilol 1.5265 mg twice a day.  -Recheck LFTs tomorrow.  -Continue to hold atorvastatin due to transaminitis.     Deconditioning.  Failure to thrive.  -Continue to work with therapy.  -Current plan for TCU at discharge.     Severe malnutrition.  -Registered dietitian following.     Hyponatremia.  Hypokalemia.  -Recheck metabolic panel tomorrow.  -Increase furosemide and spironolactone as above.    Diarrhea.  -Stop scheduled MiraLAX and senna S.  -Check C. difficile toxin.             Diet: Diet  Low Saturated Fat Na <2400 mg  Snacks/Supplements Adult: Glucerna; With Meals    DVT Prophylaxis: Pneumatic Compression Devices  Durbin Catheter: Not present  Lines: None     Cardiac Monitoring: None  Code Status: Full Code      Clinically Significant Risk Factors         # Hyponatremia: Lowest Na = 126 mmol/L in last 2 days, will monitor as appropriate      # Hypoalbuminemia: Lowest albumin = 2.4 g/dL at 11/5/2023  7:13 AM, will monitor as appropriate     # Hypertension: Noted on problem list  # Chronic heart failure with reduced ejection fraction: last echo with EF <40%       # Overweight: Estimated body mass index is 27.24 kg/m  as calculated from the following:    Height as of this encounter: 1.803 m (5' 10.98\").    Weight as of this encounter: 88.5 kg (195 lb 3.2 oz).   # Severe Malnutrition: based on nutrition assessment    # Financial/Environmental Concerns:           Disposition Plan     Expected Discharge Date: 11/13/2023      Destination: inpatient rehabilitation facility              Kareem Mays DO  Hospitalist Service  [facility]  Securely message with [employer] (more info)  Text page via [facility] Paging/Directory   ______________________________________________________________________    Interval History   Coughing.  Short of breath.  Developed profuse diarrhea last night.  Denies chest pain, fevers, chills, " nausea, vomiting.    Physical Exam   Vital Signs: Temp: 97.4  F (36.3  C) Temp src: Oral BP: 118/78 Pulse: 90   Resp: 18 SpO2: 97 % O2 Device: None (Room air)    Weight: 195 lbs 3.2 oz    Gen:  NAD, A&Ox3.  Eyes:  PERRL, mild scleral icterus.  OP:  MMM, no lesions.  Neck:  Supple.  CV:  Regular, no murmurs.  Lung:  CTA b/l, normal effort.  Ab:  +BS, soft.  Skin:  Warm, dry to touch.  No rash.  Ext:  No pitting edema LE b/l.      Medical Decision Making       42 MINUTES SPENT BY ME on the date of service doing chart review, history, exam, documentation & further activities per the note.      Data     I have personally reviewed the following data over the past 24 hrs:    N/A  \   N/A   / N/A     126 (L) 98 21.6 /  333 (H)   4.8 20 (L) 0.69 \     ALT: 57 AST: 131 (H) AP: 200 (H) TBILI: 2.4 (H)   ALB: 2.4 (L) TOT PROTEIN: 5.4 (L) LIPASE: N/A     Procal: N/A CRP: 19.48 (H) Lactic Acid: N/A         Imaging results reviewed over the past 24 hrs:   Recent Results (from the past 24 hour(s))   CT Chest Pulmonary Embolism w Contrast    Narrative    EXAM: CT CHEST PULMONARY EMBOLISM W CONTRAST  LOCATION: [facility]  DATE: 11/4/2023    INDICATION: Covid, significantly elevated d dimer  COMPARISON: None.  TECHNIQUE: CT chest pulmonary angiogram during arterial phase injection of IV contrast. Multiplanar reformats and MIP reconstructions were performed. Dose reduction techniques were used.   CONTRAST: 76mL Isovue 370    FINDINGS:  ANGIOGRAM CHEST: Pulmonary arteries are normal caliber and negative for pulmonary emboli. Thoracic aorta is negative for dissection. No CT evidence of right heart strain.    LUNGS AND PLEURA: Small left effusion with atelectasis. Bibasilar atelectasis. Mosaic attenuation of the lungs suggesting small airway disease. No focal consolidation to suggest pneumonia.    MEDIASTINUM/AXILLAE: Heart is moderately enlarged, unchanged. No mediastinal, axillary, or hilar adenopathy.    CORONARY  ARTERY CALCIFICATION: Severe.    UPPER ABDOMEN: Cirrhotic liver. Upper abdominal ascites.    MUSCULOSKELETAL: Degenerative changes of the spine.      Impression    IMPRESSION:  1.  No pulmonary embolism. Small left effusion with atelectasis.

## 2023-11-06 ENCOUNTER — APPOINTMENT (OUTPATIENT)
Dept: PHYSICAL THERAPY | Facility: CLINIC | Age: 63
End: 2023-11-06
Payer: MEDICAID

## 2023-11-06 LAB
ALBUMIN SERPL BCG-MCNC: 2.5 G/DL (ref 3.5–5.2)
ALP SERPL-CCNC: 162 U/L (ref 40–129)
ALT SERPL W P-5'-P-CCNC: 58 U/L (ref 0–70)
ANION GAP SERPL CALCULATED.3IONS-SCNC: 8 MMOL/L (ref 7–15)
AST SERPL W P-5'-P-CCNC: 111 U/L (ref 0–45)
BACTERIA FLD CULT: NO GROWTH
BILIRUB SERPL-MCNC: 2.2 MG/DL
BUN SERPL-MCNC: 24.2 MG/DL (ref 8–23)
CALCIUM SERPL-MCNC: 8.1 MG/DL (ref 8.8–10.2)
CHLORIDE SERPL-SCNC: 98 MMOL/L (ref 98–107)
CREAT SERPL-MCNC: 0.7 MG/DL (ref 0.67–1.17)
CRP SERPL-MCNC: 9.28 MG/L
DEPRECATED HCO3 PLAS-SCNC: 23 MMOL/L (ref 22–29)
EGFRCR SERPLBLD CKD-EPI 2021: >90 ML/MIN/1.73M2
ERYTHROCYTE [DISTWIDTH] IN BLOOD BY AUTOMATED COUNT: 13.3 % (ref 10–15)
GLUCOSE BLDC GLUCOMTR-MCNC: 221 MG/DL (ref 70–99)
GLUCOSE BLDC GLUCOMTR-MCNC: 281 MG/DL (ref 70–99)
GLUCOSE BLDC GLUCOMTR-MCNC: 283 MG/DL (ref 70–99)
GLUCOSE BLDC GLUCOMTR-MCNC: 293 MG/DL (ref 70–99)
GLUCOSE BLDC GLUCOMTR-MCNC: 312 MG/DL (ref 70–99)
GLUCOSE BLDC GLUCOMTR-MCNC: 336 MG/DL (ref 70–99)
GLUCOSE SERPL-MCNC: 280 MG/DL (ref 70–99)
HCT VFR BLD AUTO: 36.4 % (ref 40–53)
HGB BLD-MCNC: 12.1 G/DL (ref 13.3–17.7)
MCH RBC QN AUTO: 35.7 PG (ref 26.5–33)
MCHC RBC AUTO-ENTMCNC: 33.2 G/DL (ref 31.5–36.5)
MCV RBC AUTO: 107 FL (ref 78–100)
PLATELET # BLD AUTO: 117 10E3/UL (ref 150–450)
POTASSIUM SERPL-SCNC: 4.6 MMOL/L (ref 3.4–5.3)
PROT SERPL-MCNC: 5 G/DL (ref 6.4–8.3)
RBC # BLD AUTO: 3.39 10E6/UL (ref 4.4–5.9)
SODIUM SERPL-SCNC: 129 MMOL/L (ref 135–145)
WBC # BLD AUTO: 11.9 10E3/UL (ref 4–11)

## 2023-11-06 PROCEDURE — 86140 C-REACTIVE PROTEIN: CPT | Performed by: INTERNAL MEDICINE

## 2023-11-06 PROCEDURE — 99232 SBSQ HOSP IP/OBS MODERATE 35: CPT | Performed by: INTERNAL MEDICINE

## 2023-11-06 PROCEDURE — 250N000012 HC RX MED GY IP 250 OP 636 PS 637: Performed by: INTERNAL MEDICINE

## 2023-11-06 PROCEDURE — 36415 COLL VENOUS BLD VENIPUNCTURE: CPT | Performed by: INTERNAL MEDICINE

## 2023-11-06 PROCEDURE — 250N000011 HC RX IP 250 OP 636: Mod: JZ | Performed by: HOSPITALIST

## 2023-11-06 PROCEDURE — 120N000001 HC R&B MED SURG/OB

## 2023-11-06 PROCEDURE — 250N000013 HC RX MED GY IP 250 OP 250 PS 637: Performed by: INTERNAL MEDICINE

## 2023-11-06 PROCEDURE — 250N000011 HC RX IP 250 OP 636: Mod: JZ | Performed by: INTERNAL MEDICINE

## 2023-11-06 PROCEDURE — 97530 THERAPEUTIC ACTIVITIES: CPT | Mod: GP | Performed by: PHYSICAL THERAPIST

## 2023-11-06 PROCEDURE — 85027 COMPLETE CBC AUTOMATED: CPT | Performed by: INTERNAL MEDICINE

## 2023-11-06 PROCEDURE — 258N000003 HC RX IP 258 OP 636: Performed by: INTERNAL MEDICINE

## 2023-11-06 PROCEDURE — 80053 COMPREHEN METABOLIC PANEL: CPT | Performed by: INTERNAL MEDICINE

## 2023-11-06 RX ORDER — GUAIFENESIN 200 MG/10ML
200 LIQUID ORAL EVERY 4 HOURS PRN
Status: DISCONTINUED | OUTPATIENT
Start: 2023-11-06 | End: 2023-11-14 | Stop reason: HOSPADM

## 2023-11-06 RX ORDER — BENZONATATE 100 MG/1
100 CAPSULE ORAL 3 TIMES DAILY PRN
Status: DISCONTINUED | OUTPATIENT
Start: 2023-11-06 | End: 2023-11-14 | Stop reason: HOSPADM

## 2023-11-06 RX ORDER — PANTOPRAZOLE SODIUM 40 MG/1
40 TABLET, DELAYED RELEASE ORAL
Status: DISCONTINUED | OUTPATIENT
Start: 2023-11-06 | End: 2023-11-12

## 2023-11-06 RX ORDER — FUROSEMIDE 40 MG
40 TABLET ORAL DAILY
Status: DISCONTINUED | OUTPATIENT
Start: 2023-11-07 | End: 2023-11-14

## 2023-11-06 RX ADMIN — PIPERACILLIN AND TAZOBACTAM 3.38 G: 3; .375 INJECTION, POWDER, LYOPHILIZED, FOR SOLUTION INTRAVENOUS at 05:34

## 2023-11-06 RX ADMIN — INSULIN ASPART 3 UNITS: 100 INJECTION, SOLUTION INTRAVENOUS; SUBCUTANEOUS at 12:55

## 2023-11-06 RX ADMIN — GUAIFENESIN 200 MG: 200 SOLUTION ORAL at 14:05

## 2023-11-06 RX ADMIN — PANTOPRAZOLE SODIUM 40 MG: 40 TABLET, DELAYED RELEASE ORAL at 17:32

## 2023-11-06 RX ADMIN — DEXAMETHASONE 6 MG: 2 TABLET ORAL at 08:46

## 2023-11-06 RX ADMIN — INSULIN ASPART 4 UNITS: 100 INJECTION, SOLUTION INTRAVENOUS; SUBCUTANEOUS at 17:53

## 2023-11-06 RX ADMIN — INSULIN ASPART 6 UNITS: 100 INJECTION, SOLUTION INTRAVENOUS; SUBCUTANEOUS at 08:44

## 2023-11-06 RX ADMIN — INSULIN ASPART 2 UNITS: 100 INJECTION, SOLUTION INTRAVENOUS; SUBCUTANEOUS at 23:21

## 2023-11-06 RX ADMIN — THIAMINE HCL TAB 100 MG 100 MG: 100 TAB at 08:45

## 2023-11-06 RX ADMIN — REMDESIVIR 100 MG: 100 INJECTION, POWDER, LYOPHILIZED, FOR SOLUTION INTRAVENOUS at 17:37

## 2023-11-06 RX ADMIN — FOLIC ACID 1 MG: 1 TABLET ORAL at 08:45

## 2023-11-06 RX ADMIN — FUROSEMIDE 20 MG: 20 TABLET ORAL at 08:46

## 2023-11-06 RX ADMIN — ASPIRIN 81 MG: 81 TABLET, COATED ORAL at 08:45

## 2023-11-06 RX ADMIN — SPIRONOLACTONE 25 MG: 25 TABLET ORAL at 08:45

## 2023-11-06 RX ADMIN — CARVEDILOL 1.56 MG: 3.12 TABLET, FILM COATED ORAL at 08:46

## 2023-11-06 RX ADMIN — MULTIPLE VITAMINS W/ MINERALS TAB 1 TABLET: TAB at 08:45

## 2023-11-06 RX ADMIN — SODIUM CHLORIDE 50 ML: 9 INJECTION, SOLUTION INTRAVENOUS at 17:46

## 2023-11-06 ASSESSMENT — ACTIVITIES OF DAILY LIVING (ADL)
ADLS_ACUITY_SCORE: 32

## 2023-11-06 NOTE — PLAN OF CARE
Cared for [id number]    Pt A/O x 4 (can be forgetful), VSS; Pt denies pain, headache, dizziness, & N/V.  Pt can be dyspneic upon exertion. Pt up Asst: 1 w/gait belt & walker. Lung sounds diminished/occ cough, RA. Blood glucose levels: 221, 293. Neph, PT, OT & Social Work following. Plan to discharge to TCU after 11/13/23. Will continue with plan of care.    Pt has had 3 loose/black/bright red stools this shift - MD notified - will consult GI

## 2023-11-06 NOTE — PROGRESS NOTES
[facility]    Medicine Progress Note - Hospitalist Service    Date of Admission:  10/24/2023    Assessment & Plan     Saji Iqbal is a 63 year old male with a past medical history significant for alcohol use disorder, coronary artery disease with 2 previous stents in the left anterior descending artery, diabetes mellitus, hypertension, dyslipidemia, obstructive sleep apnea, left shoulder adhesive capsulitis treated with steroid injection during last hospitalization, ischemic and alcoholic cardiomyopathy with heart failure with reduced ejection fraction.  He was admitted on 10/24/2023. He was admitted here from 8/30 to 9/11 with adhesive capsulitis of the left shoulder and alcohol withdrawal.  During that hospitalization he was also found to have new alcoholic cardiomyopathy with ejection fraction 35 to 40%.  Upon discharge home he said he was able to walk 100 feet but got quite tired.  He thought he would get better at home but has slowly continued to worsen.  He has had progressive dyspnea on exertion.  He said he could maybe walk 25 steps now before having to stop and rest.  He gets short of breath just while dressing.  He did not feel he could go up or down stairs due to weakness and dyspnea.  He has noticed some increased firmness of his abdomen but no definite swelling.  He has not noticed any lower extremity swelling and has no pain to the abdomen or chest.  His left shoulder pain is resolved since steroid injection.  His appetite is good but he is having difficulty preparing his food as he is not able to stand at the stove and therefore has not been eating much.  His daughter requested a welfare check on him and he was brought in the hospital for further evaluation and treatment.  He said he has been sober for 5 weeks since his previous hospitalization. In the ER, his vital signs were stable.  Bilirubin is 8.2 and AST is 206.  These are both stable from previous hospitalization.   Potassium 3.2 and albumin 2.7.  Chest x-ray is unremarkable.  Bedside ultrasound by ER physician showed ascites.  He is admitted for failure to thrive, severe dyspnea on exertion, new ascites secondary to decompensated end-stage liver disease, possible congstive heart failure, and severe generalized weakness.  He has now developed COVID-19 infection.     COVID-19 infection.  -SARS-CoV-2 PCR negative on 11/2.  -Previously, requiring supplemental oxygen.  -Does state he wants all available medications to try to get better as fast as possible.  -Continue 5-day course of IV remdesivir.  -Continue dexamethasone 6 mg a day.  -Supplemental oxygen as needed.  -No pneumonia on chest x-ray.  -D-dimer significantly elevated.  -CT of the chest with small left effusion and atelectasis.  -Avoid IV fluids as able.  -Use incentive spirometry.  -Was started on IV Zosyn on 11/3 to cover possible bacterial sepsis.  -Stop IV Zosyn now.     Decompensated cirrhosis.  Ascites.  Alcohol use disorder.  -Had reportedly stopped drinking alcohol just recently.  -Required paracentesis on 10/25, 10/31, and 11/1.  -Increase furosemide to 40 mg a day.  -Continue spironolactone 25 mg a day.  -Needs continued alcohol cessation.  -Continue multivitamin.  -Continue folic acid and thiamine.  -Recheck LFTs tomorrow.     Diabetes mellitus type 2.  -Hemoglobin A1c only 5.7.  -Stop glipizide.  -Blood glucose now significantly elevated with initiation of dexamethasone.  -Increase glargine insulin to 10 units a day.  -Continue scheduled aspart on carb counting basis with meals.  -Continue aspart insulin sliding scale as needed.     Hyperlipidemia.  Coronary artery disease.  Cardiomyopathy.  -Atorvastatin, carvedilol, and aspirin have been on hold.  -Echocardiogram in August showed EF 35 to 40%.  Areas of hypokinesia noted.  -Lexiscan stress test in September did not show areas of reversible ischemia.  -Continue aspirin 81 mg a day.  -Continue carvedilol  "1.5265 mg twice a day.  -Continue to hold atorvastatin due to transaminitis.     Deconditioning.  Failure to thrive.  -Continue to work with therapy.  -Current plan for TCU at discharge.     Severe malnutrition.  -Registered dietitian following.     Hyponatremia.  Hypokalemia.  -Recheck metabolic panel intermittently.  -furosemide and spironolactone as above.     Diarrhea.  -Stop scheduled MiraLAX and senna S.  -C. difficile toxin negative.     Hematochezia.  -Gastroenterology consult.             Diet: Diet  Low Saturated Fat Na <2400 mg  Snacks/Supplements Adult: Glucerna; With Meals    DVT Prophylaxis: Pneumatic Compression Devices  Durbin Catheter: Not present  Lines: None     Cardiac Monitoring: None  Code Status: Full Code      Clinically Significant Risk Factors         # Hyponatremia: Lowest Na = 126 mmol/L in last 2 days, will monitor as appropriate      # Hypoalbuminemia: Lowest albumin = 2.4 g/dL at 11/5/2023  7:13 AM, will monitor as appropriate   # Thrombocytopenia: Lowest platelets = 117 in last 2 days, will monitor for bleeding   # Hypertension: Noted on problem list  # Chronic heart failure with reduced ejection fraction: last echo with EF <40%       # Overweight: Estimated body mass index is 27.52 kg/m  as calculated from the following:    Height as of this encounter: 1.803 m (5' 10.98\").    Weight as of this encounter: 89.4 kg (197 lb 3.2 oz).   # Severe Malnutrition: based on nutrition assessment    # Financial/Environmental Concerns:           Disposition Plan      Expected Discharge Date: 11/13/2023      Destination: inpatient rehabilitation facility              Kareem Mays DO  Hospitalist Service  [facility]  Securely message with [employer] (more info)  Text page via [facility] Paging/Directory   ______________________________________________________________________    Interval History   Coughing at times.  Feels weak.  Denies chest pain, shortness of breath, fevers, " chills, nausea, vomiting.    Physical Exam   Vital Signs: Temp: 97  F (36.1  C) Temp src: Temporal BP: 136/83 Pulse: 55   Resp: 18 SpO2: 96 % O2 Device: None (Room air)    Weight: 197 lbs 3.2 oz    Gen:  NAD, A&Ox3.  Eyes:  PERRL, sclera anicteric.  OP:  MMM, no lesions.  Neck:  Supple.  CV:  Regular, no murmurs.  Lung:  CTA b/l, normal effort.  Ab:  +BS, soft.  Skin:  Warm, dry to touch.  No rash.  Ext:  No pitting edema LE b/l.      Medical Decision Making       40 MINUTES SPENT BY ME on the date of service doing chart review, history, exam, documentation & further activities per the note.      Data     I have personally reviewed the following data over the past 24 hrs:    11.9 (H)  \   12.1 (L)   / 117 (L)     129 (L) 98 24.2 (H) /  293 (H)   4.6 23 0.70 \     ALT: 58 AST: 111 (H) AP: 162 (H) TBILI: 2.2 (H)   ALB: 2.5 (L) TOT PROTEIN: 5.0 (L) LIPASE: N/A     Procal: N/A CRP: 9.28 (H) Lactic Acid: N/A         Imaging results reviewed over the past 24 hrs:   No results found for this or any previous visit (from the past 24 hour(s)).

## 2023-11-06 NOTE — CARE PLAN
"/71 (BP Location: Right arm)   Pulse 56   Temp 97.5  F (36.4  C) (Oral)   Resp 18   Ht 1.803 m (5' 10.98\")   Wt 88.5 kg (195 lb 3.2 oz)   SpO2 96%   BMI 27.24 kg/m      Neuro: A/O X4  Cardiac: Tele discontinue  Lungs: LL Diminished  GI: Acities, soft, non-tender, audible  : WDL Urinal  Pain: 0/10  IV: RPIV Saline Lock  Meds: Insulin, Zosyn  Diet: Low Fat/Na  Activity: Ax1  Plan: Discharge 11/13/2023 TCU  "

## 2023-11-06 NOTE — PROGRESS NOTES
GI chart check:  Reached out to Dr. Mays and he reported patient is stable to see tomorrow.  We will see him in the morning.    Tahir Garcia MD  [facility]

## 2023-11-06 NOTE — PLAN OF CARE
Goal Outcome Evaluation:      Plan of Care Reviewed With: patient    Overall Patient Progress: improvingOverall Patient Progress: improving         Shift : 0700 -1900    Vitals: Temp: (!) 96.3  F (35.7  C) Temp src: Temporal BP: 103/72 Pulse: 66   Resp: 18 SpO2: 97 % O2 Device: None (Room air)       Orientation: x4, some frustration at times otherwise very pleasant.   Pain: denies pain  Tele: discontinued   Activity: ambulates with standby assist in room, steady gait. Bed and chair alarm on.  Resp: LS clear, no cough noted this shift, on RA  Diet:ate his meals, no snacking this shift   How to take meds: whole with fluids   GI: still loose stools, dark, C-diff negative   : voids spontaneously, not very often, urine is now yellow   B, 333, 337, insulin given, arm used not abdomen for better absorption   Lines: PIV SL ,patent, used for Zosyn and remdesivir   Plan: cont. With plan of care

## 2023-11-07 ENCOUNTER — APPOINTMENT (OUTPATIENT)
Dept: OCCUPATIONAL THERAPY | Facility: CLINIC | Age: 63
End: 2023-11-07
Payer: MEDICAID

## 2023-11-07 LAB
ANION GAP SERPL CALCULATED.3IONS-SCNC: 8 MMOL/L (ref 7–15)
BACTERIA BLD CULT: NO GROWTH
BACTERIA BLD CULT: NO GROWTH
BUN SERPL-MCNC: 23.6 MG/DL (ref 8–23)
CALCIUM SERPL-MCNC: 8.2 MG/DL (ref 8.8–10.2)
CHLORIDE SERPL-SCNC: 98 MMOL/L (ref 98–107)
CREAT SERPL-MCNC: 0.76 MG/DL (ref 0.67–1.17)
DEPRECATED HCO3 PLAS-SCNC: 24 MMOL/L (ref 22–29)
EGFRCR SERPLBLD CKD-EPI 2021: >90 ML/MIN/1.73M2
ERYTHROCYTE [DISTWIDTH] IN BLOOD BY AUTOMATED COUNT: 13.3 % (ref 10–15)
GLUCOSE BLDC GLUCOMTR-MCNC: 190 MG/DL (ref 70–99)
GLUCOSE BLDC GLUCOMTR-MCNC: 278 MG/DL (ref 70–99)
GLUCOSE BLDC GLUCOMTR-MCNC: 278 MG/DL (ref 70–99)
GLUCOSE BLDC GLUCOMTR-MCNC: 280 MG/DL (ref 70–99)
GLUCOSE BLDC GLUCOMTR-MCNC: 294 MG/DL (ref 70–99)
GLUCOSE SERPL-MCNC: 209 MG/DL (ref 70–99)
HCT VFR BLD AUTO: 37.6 % (ref 40–53)
HGB BLD-MCNC: 12.8 G/DL (ref 13.3–17.7)
MCH RBC QN AUTO: 35.8 PG (ref 26.5–33)
MCHC RBC AUTO-ENTMCNC: 34 G/DL (ref 31.5–36.5)
MCV RBC AUTO: 105 FL (ref 78–100)
PLATELET # BLD AUTO: 120 10E3/UL (ref 150–450)
POTASSIUM SERPL-SCNC: 4.8 MMOL/L (ref 3.4–5.3)
RBC # BLD AUTO: 3.58 10E6/UL (ref 4.4–5.9)
SODIUM SERPL-SCNC: 130 MMOL/L (ref 135–145)
WBC # BLD AUTO: 11.2 10E3/UL (ref 4–11)

## 2023-11-07 PROCEDURE — 99232 SBSQ HOSP IP/OBS MODERATE 35: CPT | Performed by: INTERNAL MEDICINE

## 2023-11-07 PROCEDURE — 120N000001 HC R&B MED SURG/OB

## 2023-11-07 PROCEDURE — XW033E5 INTRODUCTION OF REMDESIVIR ANTI-INFECTIVE INTO PERIPHERAL VEIN, PERCUTANEOUS APPROACH, NEW TECHNOLOGY GROUP 5: ICD-10-PCS | Performed by: INTERNAL MEDICINE

## 2023-11-07 PROCEDURE — 97110 THERAPEUTIC EXERCISES: CPT | Mod: GO

## 2023-11-07 PROCEDURE — 0W9G3ZZ DRAINAGE OF PERITONEAL CAVITY, PERCUTANEOUS APPROACH: ICD-10-PCS | Performed by: RADIOLOGY

## 2023-11-07 PROCEDURE — 80048 BASIC METABOLIC PNL TOTAL CA: CPT | Performed by: INTERNAL MEDICINE

## 2023-11-07 PROCEDURE — 250N000013 HC RX MED GY IP 250 OP 250 PS 637: Performed by: INTERNAL MEDICINE

## 2023-11-07 PROCEDURE — 36415 COLL VENOUS BLD VENIPUNCTURE: CPT | Performed by: INTERNAL MEDICINE

## 2023-11-07 PROCEDURE — 250N000011 HC RX IP 250 OP 636: Mod: JZ | Performed by: INTERNAL MEDICINE

## 2023-11-07 PROCEDURE — 258N000003 HC RX IP 258 OP 636: Performed by: INTERNAL MEDICINE

## 2023-11-07 PROCEDURE — 85027 COMPLETE CBC AUTOMATED: CPT | Performed by: INTERNAL MEDICINE

## 2023-11-07 PROCEDURE — 250N000012 HC RX MED GY IP 250 OP 636 PS 637: Performed by: INTERNAL MEDICINE

## 2023-11-07 RX ORDER — PSYLLIUM SEED (WITH DEXTROSE)
2 POWDER (GRAM) ORAL DAILY
Status: DISCONTINUED | OUTPATIENT
Start: 2023-11-07 | End: 2023-11-07

## 2023-11-07 RX ORDER — SPIRONOLACTONE 25 MG/1
50 TABLET ORAL DAILY
Status: DISCONTINUED | OUTPATIENT
Start: 2023-11-08 | End: 2023-11-14

## 2023-11-07 RX ADMIN — GUAIFENESIN 200 MG: 200 SOLUTION ORAL at 14:49

## 2023-11-07 RX ADMIN — CARVEDILOL 1.56 MG: 3.12 TABLET, FILM COATED ORAL at 18:39

## 2023-11-07 RX ADMIN — INSULIN ASPART 10 UNITS: 100 INJECTION, SOLUTION INTRAVENOUS; SUBCUTANEOUS at 10:26

## 2023-11-07 RX ADMIN — SODIUM CHLORIDE 50 ML: 9 INJECTION, SOLUTION INTRAVENOUS at 18:37

## 2023-11-07 RX ADMIN — PSYLLIUM HUSK 1 PACKET: 3.4 POWDER ORAL at 14:49

## 2023-11-07 RX ADMIN — FOLIC ACID 1 MG: 1 TABLET ORAL at 08:30

## 2023-11-07 RX ADMIN — REMDESIVIR 100 MG: 100 INJECTION, POWDER, LYOPHILIZED, FOR SOLUTION INTRAVENOUS at 17:47

## 2023-11-07 RX ADMIN — INSULIN ASPART 2 UNITS: 100 INJECTION, SOLUTION INTRAVENOUS; SUBCUTANEOUS at 23:06

## 2023-11-07 RX ADMIN — DEXAMETHASONE 6 MG: 2 TABLET ORAL at 08:30

## 2023-11-07 RX ADMIN — PANTOPRAZOLE SODIUM 40 MG: 40 TABLET, DELAYED RELEASE ORAL at 08:31

## 2023-11-07 RX ADMIN — INSULIN ASPART 7 UNITS: 100 INJECTION, SOLUTION INTRAVENOUS; SUBCUTANEOUS at 18:37

## 2023-11-07 RX ADMIN — SPIRONOLACTONE 25 MG: 25 TABLET ORAL at 08:29

## 2023-11-07 RX ADMIN — PANTOPRAZOLE SODIUM 40 MG: 40 TABLET, DELAYED RELEASE ORAL at 17:48

## 2023-11-07 RX ADMIN — MULTIPLE VITAMINS W/ MINERALS TAB 1 TABLET: TAB at 08:30

## 2023-11-07 RX ADMIN — INSULIN ASPART 12 UNITS: 100 INJECTION, SOLUTION INTRAVENOUS; SUBCUTANEOUS at 13:27

## 2023-11-07 RX ADMIN — CARVEDILOL 1.56 MG: 3.12 TABLET, FILM COATED ORAL at 10:27

## 2023-11-07 RX ADMIN — FUROSEMIDE 40 MG: 40 TABLET ORAL at 08:29

## 2023-11-07 RX ADMIN — ASPIRIN 81 MG: 81 TABLET, COATED ORAL at 08:29

## 2023-11-07 RX ADMIN — THIAMINE HCL TAB 100 MG 100 MG: 100 TAB at 08:31

## 2023-11-07 ASSESSMENT — ACTIVITIES OF DAILY LIVING (ADL)
ADLS_ACUITY_SCORE: 32

## 2023-11-07 NOTE — CONSULTS
GASTROENTEROLOGY CONSULTATION      Saji Iqbal  [address]   [place] [id number]  63 year old male     Admission Date/Time: 10/24/2023  Primary Care Provider: Naseem Esparza     We were asked to see the patient in consultation by Dr. Mays for evaluation of hematochezia.    CC: abdominal distension     HPI:  Saji Iqbal is a 63 year old male with past medical history significant for alcohol use disorder, presumed cirrhosis, CAD with previous cardiac stents, ischemic cardiomyopathy, CHF, diabetes, HTN, NIKKI, dyslipidemia, left shoulder adhesive capsulitis, psoriasis, recent hospitalization for alcohol related cardiomyopathy admitted 10/24 with dyspnea and abdominal firmness, nonbloody diarrhea with workup notable for COVID-19 infection, worsening ascites.     We are consulted regarding new onset rectal bleeding yesterday. By report, patient had an episode of dark stool with bright red blood in his stool yesterday. Today, stool is brown. Patient denies any blood in the stool yesterday this was reported by nursing. He does note stool has been chronically loose. Patient reports sobriety from alcohol for 5 weeks. No evidence of melena, nausea, vomiting, hematemesis, confusion.     US paracentesis initially performed 10/25 5.3L removed without evidence of SBP. SAAG 1.8 consistent with portal hypertension. Repeat paracentesis done 10/31 with 3.7 L removed, and again 11/1 with 1100 removed. 11/1 fluid negative for SBP. Aerobic culture of peritoneal fluid negative from 11/1.      No record of prior EGD or colonoscopy. Patient denies colonoscopy as he states he has no one to bring him to the appointment in the past. Most recent RUQ ultrasound August 2023 with evidence of increased echogenecity of hepatic parenchyma with marked decreased acoustic penetrence consistent with steatosis, fibrosis, portal vasculature not visualized due to abnormal liver with decreased acoustic penetrance, trace upper abdominal  ascites. Hepatitis B/C serology negative 7/2022.     HGB on admission 12.8 and remains stable today at 12.8. Labs on admit showed elevated bilirubin/LFTs which are all improving as of 11/6. Bilirubin 8.2 to 2.2,  to111, ALT normal 61 and remaining normal, alk phos 240 to 162. WBC elevated on 11/1, normalized 11/4 but up slightly over the last 2 days, 11.2 today. Creatinine normal this admission. INR normal in August and 1.34 on 10/24. Platelets normal on admission, declining the last 3 days from 133 to 120 today. C diff PCR negative 11/5. Blood cultures on admission negative.     COVID PCR positive 11/2.     PAST MEDICAL HISTORY:  Patient Active Problem List    Diagnosis Date Noted    Anxiety and depression 10/24/2023     Priority: Medium    Depressive disorder 10/24/2023     Priority: Medium     Formatting of this note might be different from the original. Depressive disorder, not elsewhere classified      Generalized weakness 10/24/2023     Priority: Medium    Alcoholic cirrhosis of liver with ascites (H) 10/24/2023     Priority: Medium    Elevated troponin 08/30/2023     Priority: Medium    Acute pain of left shoulder 08/30/2023     Priority: Medium    Anemia, unspecified type 08/30/2023     Priority: Medium    Moderate episode of recurrent major depressive disorder (H) 01/19/2022     Priority: Medium    Laceration of extensor tendon of right foot, initial encounter 10/13/2020     Priority: Medium     Added automatically from request for surgery [id number]      ETOH abuse 06/16/2020     Priority: Medium    Psoriasis 01/11/2019     Priority: Medium    Hypertriglyceridemia 02/12/2018     Priority: Medium    NIKKI (obstructive sleep apnea) 12/27/2017     Priority: Medium    Uncontrolled type 2 diabetes mellitus with hyperglycemia (H) 12/26/2016     Priority: Medium    Chronic gastroesophageal reflux disease 12/12/2016     Priority: Medium    Coronary artery disease involving native coronary artery without angina  pectoris 2016     Priority: Medium    Hyperlipidemia LDL goal <100 2016     Priority: Medium    Benign essential hypertension 2016     Priority: Medium    Obesity due to excess calories 2016     Priority: Medium    Tobacco abuse 2015     Priority: Medium    ST elevation myocardial infarction (STEMI) (H) 2014     Priority: Medium       ROS: A comprehensive ten point review of systems was negative aside from those in mentioned in the HPI.       MEDICATIONS:   Prior to Admission medications    Medication Sig Start Date End Date Taking? Authorizing Provider   aspirin 81 MG tablet Take by mouth daily   Yes Reported, Patient   atorvastatin (LIPITOR) 20 MG tablet Take 20 mg by mouth daily   Yes Unknown, Entered By History   furosemide (LASIX) 20 MG tablet Take 1 tablet (20 mg) by mouth daily 10/26/23  Yes Russell Forbes MD   gabapentin (NEURONTIN) 100 MG capsule Take 200 mg by mouth daily   Yes Unknown, Entered By History   glipiZIDE (GLUCOTROL XL) 10 MG 24 hr tablet TAKE 1 TABLET (10 MG) BY MOUTH DAILY. 23  Yes Naseem Esparza MD   spironolactone (ALDACTONE) 25 MG tablet Take 1 tablet (25 mg) by mouth daily 10/27/23  Yes Russell Forbes MD        ALLERGIES:   Allergies   Allergen Reactions    Metformin Diarrhea    Honey Other (See Comments)     Throat irritation        SOCIAL HISTORY:  Social History     Tobacco Use    Smoking status: Former     Packs/day: 0.33     Years: 20.00     Additional pack years: 0.00     Total pack years: 6.60     Types: Cigarettes     Quit date: 2015     Years since quittin.3     Passive exposure: Never    Smokeless tobacco: Never   Vaping Use    Vaping Use: Never used   Substance Use Topics    Alcohol use: Yes     Alcohol/week: 1.0 standard drink of alcohol     Types: 1 Standard drinks or equivalent per week     Comment: 4-5 drinks nightly on weekend    Drug use: No        FAMILY HISTORY:  Family History   Problem Relation Age  "of Onset    Coronary Artery Disease Mother     Coronary Artery Disease Father     Cerebrovascular Disease Brother     Anuerysm Sister     Glaucoma No family hx of     Macular Degeneration No family hx of         PHYSICAL EXAM:   /88 (BP Location: Right arm)   Pulse 65   Temp 97.4  F (36.3  C) (Oral)   Resp 16   Ht 1.803 m (5' 10.98\")   Wt 89 kg (196 lb 1.6 oz)   SpO2 97%   BMI 27.36 kg/m       PHYSICAL EXAM:  General: alert, oriented, NAD  SKIN: no suspicious lesions, rashes, jaundice, or spider angiomas  HEAD: Normocephalic. No masses, lesions, tenderness or abnormalities  NECK: Neck supple. No adenopathy. Thyroid symmetric, normal size.  EYES: No scleral icterus  ENT: ENT exam normal, no neck nodes or sinus tenderness  RESPIRATORY: negative, Good diaphragmatic excursion. Lungs clear  CARDIOVASCULAR: negative, PMI normal. No lifts, heaves, or thrills. RRR. No murmurs, clicks gallops or rub  GASTROINTESTINAL: +BS, soft, NT, mild distension, no HSM, no masses/guarding/rebound  JOINT/EXTREMITIES: extremities normal- no gross deformities noted, gait normal and normal muscle tone  NEURO: Reflexes grossly normal and symmetric. Sensation grossly WNL.  PSYCH: no abnormal anxiety/depression  LYMPH: No anterior cervical, posterior cervical, or supraclavicular adenopathy     LABS:  I reviewed the patient's new clinical lab test results.   Recent Labs   Lab Test 11/07/23 0721 11/06/23  0947 11/04/23  0601 10/25/23  1043 10/24/23  1422 09/01/23  0641 08/31/23  1102   WBC 11.2* 11.9* 7.7   < > 9.4   < >  --    HGB 12.8* 12.1* 11.8*   < > 14.0   < >  --    * 107* 104*   < > 108*   < >  --    * 117* 133*   < > 203   < >  --    INR  --   --   --   --  1.33*  --  1.14    < > = values in this interval not displayed.     Recent Labs   Lab Test 11/07/23  0721 11/06/23  0947 11/05/23  0713   * 129* 126*   POTASSIUM 4.8 4.6 4.8   CHLORIDE 98 98 98   CO2 24 23 20*   BUN 23.6* 24.2* 21.6   ANIONGAP 8 8 8 "   BENY 8.2* 8.1* 8.1*     Recent Labs   Lab Test 11/06/23  0947 11/05/23  0713 11/04/23  0601 11/03/23  0638 11/01/23  1235 09/04/23  0752 09/03/23  1432   ALBUMIN 2.5* 2.4* 2.4*  --   --    < >  --    BILITOTAL 2.2* 2.4* 2.9*  --   --    < >  --    ALT 58 57 60  --   --    < >  --    * 131* 225*  --   --    < >  --    ALKPHOS 162* 200* 214*  --   --    < >  --    PROTEIN  --   --   --  30* 20*  --  Negative    < > = values in this interval not displayed.        IMAGING  CT chest 11/4 - negative for PE.     RUQ US 8/30/23:    IMPRESSION:  1.  Increased echogenicity of hepatic parenchyma with markedly  decreased acoustic penetrance. Findings could be seen with hepatic  steatosis and fibrosis.  2.  The CBD, main portal vein, and pancreas were not visualized on  this exam due to abnormal liver with decreased acoustic penetration  and bowel gas.  3.  Trace upper abdominal ascites.    MELD 3.0: 21 at 10/26/2023  7:31 AM  MELD-Na: 19 at 10/26/2023  7:31 AM  Calculated from:  Serum Creatinine: 0.76 mg/dL (Using min of 1 mg/dL) at 10/26/2023  7:31 AM  Serum Sodium: 134 mmol/L at 10/26/2023  7:31 AM  Total Bilirubin: 8.2 mg/dL at 10/24/2023  2:22 PM  Serum Albumin: 2.5 g/dL at 10/24/2023  4:32 PM  INR(ratio): 1.33 at 10/24/2023  2:22 PM  Age at listing (hypothetical): 63 years  Sex: Male at 10/26/2023  7:31 AM    CONSULTATION ASSESSMENT AND PLAN:    63 year old male with past medical history significant for alcohol use disorder, presumed cirrhosis, CAD with previous cardiac stents, ischemic cardiomyopathy, CHF, diabetes, HTN, NIKKI, dyslipidemia, left shoulder adhesive capsulitis, psoriasis, recent hospitalization for alcohol related cardiomyopathy admitted 10/24 with dyspnea and abdominal firmness, nonbloody diarrhea with workup notable for COVID-19 infection, worsening ascites.     1. Hematochezia. Most likely outlet bleeding in the setting of thrombocytopenia, fixed coagulopathy from chronic liver disease with INR  10/24 1.34. HGB stable. This has resolved. No prior colonoscopy. Has chronic loose stools which is likely due to lack of fiber, poor nutrition. Recent antibiotics could contribute. C diff PCR has been negative. Only 1 stool today.  --Metamucil to help with bulk up stool.   --Avoid NSAID medications.   --Recommend outpatient colonoscopy. Reviewed options to discuss with primary clinic for resources with getting to medical appts and some financial concerns.  is already following patient.     2. Presumed alcohol related liver cirrhosis. Increasing ascites/decompensation this admission likely secondary to COVID-19 infection. Reports sobriety x 5 weeks. No encephalopathy. No GI bleeding presently. Total bili and LFTs improving. INR with only mild elevation. Paracentesis with decreasing output, most recent 11/1. No evidence of SBP. SAAG consistent with portal hypertension. US without any liver lesions on 8/30/23. Hepatitis B/C negative 7/2022. Receiving furosemide/spironolactone. Furosemide increased from 20 to 40mg today. Renal function normal. Hemodynamically stable. MELD-na 10/26 - 19.   --Continue diuretics. May be able to titrate up on spironolactone, currently getting 25mg daily.   --2g Na diet recommended.   --US paracentesis prn.   --Will arrange outpatient [facility] hepatology follow up for liver monitoring/management.     3. COVID-19 infection. Positive PCR 11/2. Received remdesivir. Getting dexamethasone. Was on empiric Zosyn - stopped 11/6.     Discussed with Dr. Garcia. Will sign off, please call with questions.     Total time spent: 60 minutes was spent providing patient care, including patient evaluation, reviewing documentation/test results, and . Thank you for asking us to participate in the care of this patient.      Sheila Mcclelland, PAC  [facility] ([facility])    -----------------------  I agree with the assessment and plan of Sheila Mcclelland.  The patient's nurse reported he had a  bloody stool yesterday, the patient denies having any bloody stools.  He does not have any abdominal pain.  On exam he is reclined on a chair, mild labored breathing, abdomen distended with ascites.  Assessment and plan -rectal bleeding:This was 1 episode and likely an outlet source of bleeding, reassuring his hemoglobin is stable, he should have a colonoscopy as an outpatient not needed urgently as an inpatient.  He also has alcoholic liver disease with portal hypertension.  We will have him follow-up in our liver clinic.  Signing off but please call with any questions or concerns.    This was a shared visit and I spent about 20 minutes in the care of this patient.    Tahir Garcia MD  [facility]

## 2023-11-07 NOTE — PROGRESS NOTES
[facility]    Medicine Progress Note - Hospitalist Service    Date of Admission:  10/24/2023    Assessment & Plan     Saji Iqbal is a 63 year old male with a past medical history significant for alcohol use disorder, coronary artery disease with 2 previous stents in the left anterior descending artery, diabetes mellitus, hypertension, dyslipidemia, obstructive sleep apnea, left shoulder adhesive capsulitis treated with steroid injection during last hospitalization, ischemic and alcoholic cardiomyopathy with heart failure with reduced ejection fraction.  He was admitted on 10/24/2023. He was admitted here from 8/30 to 9/11 with adhesive capsulitis of the left shoulder and alcohol withdrawal.  During that hospitalization he was also found to have new alcoholic cardiomyopathy with ejection fraction 35 to 40%.  Upon discharge home he said he was able to walk 100 feet but got quite tired.  He thought he would get better at home but has slowly continued to worsen.  He has had progressive dyspnea on exertion.  He said he could maybe walk 25 steps now before having to stop and rest.  He gets short of breath just while dressing.  He did not feel he could go up or down stairs due to weakness and dyspnea.  He has noticed some increased firmness of his abdomen but no definite swelling.  He has not noticed any lower extremity swelling and has no pain to the abdomen or chest.  His left shoulder pain is resolved since steroid injection.  His appetite is good but he is having difficulty preparing his food as he is not able to stand at the stove and therefore has not been eating much.  His daughter requested a welfare check on him and he was brought in the hospital for further evaluation and treatment.  He said he has been sober for 5 weeks since his previous hospitalization. In the ER, his vital signs were stable.  Bilirubin is 8.2 and AST is 206.  These are both stable from previous hospitalization.   Potassium 3.2 and albumin 2.7.  Chest x-ray is unremarkable.  Bedside ultrasound by ER physician showed ascites.  He is admitted for failure to thrive, severe dyspnea on exertion, new ascites secondary to decompensated end-stage liver disease, possible congstive heart failure, and severe generalized weakness.  He has now developed COVID-19 infection.     COVID-19 infection.  -SARS-CoV-2 PCR negative on 11/2.  -Previously, requiring supplemental oxygen.  -Does state he wants all available medications to try to get better as fast as possible.  -Finish 5-day course of IV remdesivir on 11/7.  -Continue dexamethasone 6 mg a day with plan for 10-day course.  -Supplemental oxygen as needed.  -No pneumonia on chest x-ray.  -D-dimer significantly elevated.  -CT of the chest on 11/4 with small left effusion and atelectasis.  -Avoid IV fluids as able.  -Use incentive spirometry.  -Was started on IV Zosyn on 11/3 to cover possible bacterial sepsis.  -Stopped IV Zosyn after 3-day course.     Decompensated cirrhosis.  Ascites.  Alcohol use disorder.  -Had reportedly stopped drinking alcohol just recently.  -Required paracentesis on 10/25, 10/31, and 11/1.  -Continue furosemide 40 mg a day.  -Increase spironolactone to 50 mg a day.  -Needs continued alcohol cessation.  -Continue multivitamin.  -Continue folic acid and thiamine.  -Recheck LFTs intermittently     Diabetes mellitus type 2.  -Hemoglobin A1c only 5.7.  -Stop glipizide.  -Blood glucose now significantly elevated with initiation of dexamethasone.  -Increase glargine insulin to 12 units a day.  -Increase scheduled aspart on carb counting basis to 1 unit per 10 g carbs with meals.  -Continue aspart insulin sliding scale as needed.     Hyperlipidemia.  Coronary artery disease.  Cardiomyopathy.  -Atorvastatin, carvedilol, and aspirin have been on hold.  -Echocardiogram in August showed EF 35 to 40%.  Areas of hypokinesia noted.  -Lexiscan stress test in September did not  "show areas of reversible ischemia.  -Continue aspirin 81 mg a day.  -Continue carvedilol 1.5265 mg twice a day.  -Continue to hold atorvastatin due to transaminitis.     Deconditioning.  Failure to thrive.  -Continue to work with therapy.  -Current plan for TCU at discharge.     Severe malnutrition.  -Registered dietitian following.     Hyponatremia.  Hypokalemia.  -Recheck metabolic panel intermittently.  -furosemide and spironolactone as above.     Diarrhea.  -Stop scheduled MiraLAX and senna S.  -C. difficile toxin negative.     Hematochezia.  -Gastroenterology consult appreciated.  -Recheck hemoglobin tomorrow.             Diet: Diet  Snacks/Supplements Adult: Glucerna; With Meals  Regular Diet Adult    DVT Prophylaxis: Pneumatic Compression Devices  Durbin Catheter: Not present  Lines: None     Cardiac Monitoring: None  Code Status: Full Code      Clinically Significant Risk Factors         # Hyponatremia: Lowest Na = 129 mmol/L in last 2 days, will monitor as appropriate      # Hypoalbuminemia: Lowest albumin = 2.4 g/dL at 11/5/2023  7:13 AM, will monitor as appropriate   # Thrombocytopenia: Lowest platelets = 117 in last 2 days, will monitor for bleeding   # Hypertension: Noted on problem list  # Chronic heart failure with reduced ejection fraction: last echo with EF <40%       # Overweight: Estimated body mass index is 27.36 kg/m  as calculated from the following:    Height as of this encounter: 1.803 m (5' 10.98\").    Weight as of this encounter: 89 kg (196 lb 1.6 oz).   # Severe Malnutrition: based on nutrition assessment    # Financial/Environmental Concerns:           Disposition Plan      Expected Discharge Date: 11/13/2023      Destination: inpatient rehabilitation facility              Kareem Mays DO  Hospitalist Service  [facility]  Securely message with [employer] (more info)  Text page via [facility] Paging/Directory "   ______________________________________________________________________    Interval History   Occasional cough.  Denies chest pain, shortness of breath, fevers, chills, nausea, vomiting.    Physical Exam   Vital Signs: Temp: 97.4  F (36.3  C) Temp src: Oral BP: 124/88 Pulse: 65   Resp: 16 SpO2: 97 % O2 Device: None (Room air)    Weight: 196 lbs 1.6 oz    Gen:  NAD, A&Ox3.  Eyes:  PERRL, mild scleral icterus.  OP:  MMM, no lesions.  Neck:  Supple.  CV:  Regular, no murmurs.  Lung:  CTA b/l, normal effort.  Ab: Mild distention, +BS, soft.  Skin:  Warm, dry to touch.  No rash.  Ext:  No pitting edema LE b/l.      Medical Decision Making       38 MINUTES SPENT BY ME on the date of service doing chart review, history, exam, documentation & further activities per the note.      Data     I have personally reviewed the following data over the past 24 hrs:    11.2 (H)  \   12.8 (L)   / 120 (L)     130 (L) 98 23.6 (H) /  278 (H)   4.8 24 0.76 \       Imaging results reviewed over the past 24 hrs:   No results found for this or any previous visit (from the past 24 hour(s)).

## 2023-11-07 NOTE — PROGRESS NOTES
A&O X4  No abnormal behavior observed except frustration for being staying more days in the hospital . Denied chest pain , SOB or abdominal discomfort. Abdomen is distended but soft and non tender . Had bowel . pt reported it looks like gravel and brown in color . Having numbness and tingling sensation on both feet . Motor function are grossly intact.   Med : Insuline aspart and lantus according to the care plan    Isolation room : COVID -19 droplet infection .

## 2023-11-07 NOTE — PLAN OF CARE
Goal Outcome Evaluation:      Plan of Care Reviewed With: patient    Overall Patient Progress: improvingOverall Patient Progress: improving         Shift : 0700 - 1900    Vitals: Temp: 97.4  F (36.3  C) Temp src: Oral BP: 124/88 Pulse: 65   Resp: 16 SpO2: 97 % O2 Device: None (Room air)       Orientation: x4, calm and cooperative, some frustration about being here  Pain: denies   Activity: ambulates with walker in room, steady gait. PT worked with pt.   Resp: LS clear, on RA, infrequent cough, requested Robitussin, given  Diet: ate his meals, pt is concerned about the high amount of insulin needed.   How to take meds:  whole with fluids   GI: very small amount of loose stool with each urination. GI consult done, see note   : voids spontaneously without issue - urine is yellow   B, 278  Skin: scattered bruising, jaundiced   Lines: PIV SL and patent   Other: pt is worried about all the medications he needs, including the amount of insulin. Education for lower carb count done when ordering food. Paged MD for possible diet change to 60g carb diet.   Plan:  cont with plan of care

## 2023-11-08 ENCOUNTER — APPOINTMENT (OUTPATIENT)
Dept: PHYSICAL THERAPY | Facility: CLINIC | Age: 63
End: 2023-11-08
Payer: MEDICAID

## 2023-11-08 LAB
ANION GAP SERPL CALCULATED.3IONS-SCNC: 6 MMOL/L (ref 7–15)
BUN SERPL-MCNC: 25.1 MG/DL (ref 8–23)
CALCIUM SERPL-MCNC: 8.3 MG/DL (ref 8.8–10.2)
CHLORIDE SERPL-SCNC: 101 MMOL/L (ref 98–107)
CREAT SERPL-MCNC: 0.65 MG/DL (ref 0.67–1.17)
CRP SERPL-MCNC: 5.17 MG/L
DEPRECATED HCO3 PLAS-SCNC: 22 MMOL/L (ref 22–29)
EGFRCR SERPLBLD CKD-EPI 2021: >90 ML/MIN/1.73M2
ERYTHROCYTE [DISTWIDTH] IN BLOOD BY AUTOMATED COUNT: 13.3 % (ref 10–15)
GLUCOSE BLDC GLUCOMTR-MCNC: 162 MG/DL (ref 70–99)
GLUCOSE BLDC GLUCOMTR-MCNC: 179 MG/DL (ref 70–99)
GLUCOSE BLDC GLUCOMTR-MCNC: 227 MG/DL (ref 70–99)
GLUCOSE BLDC GLUCOMTR-MCNC: 238 MG/DL (ref 70–99)
GLUCOSE BLDC GLUCOMTR-MCNC: 264 MG/DL (ref 70–99)
GLUCOSE BLDC GLUCOMTR-MCNC: 289 MG/DL (ref 70–99)
GLUCOSE BLDC GLUCOMTR-MCNC: 339 MG/DL (ref 70–99)
GLUCOSE SERPL-MCNC: 186 MG/DL (ref 70–99)
HCT VFR BLD AUTO: 35.9 % (ref 40–53)
HGB BLD-MCNC: 12.4 G/DL (ref 13.3–17.7)
MCH RBC QN AUTO: 35.5 PG (ref 26.5–33)
MCHC RBC AUTO-ENTMCNC: 34.5 G/DL (ref 31.5–36.5)
MCV RBC AUTO: 103 FL (ref 78–100)
PLATELET # BLD AUTO: 124 10E3/UL (ref 150–450)
POTASSIUM SERPL-SCNC: 4.4 MMOL/L (ref 3.4–5.3)
RBC # BLD AUTO: 3.49 10E6/UL (ref 4.4–5.9)
SODIUM SERPL-SCNC: 129 MMOL/L (ref 135–145)
WBC # BLD AUTO: 13.2 10E3/UL (ref 4–11)

## 2023-11-08 PROCEDURE — 36415 COLL VENOUS BLD VENIPUNCTURE: CPT | Performed by: INTERNAL MEDICINE

## 2023-11-08 PROCEDURE — 250N000013 HC RX MED GY IP 250 OP 250 PS 637: Performed by: INTERNAL MEDICINE

## 2023-11-08 PROCEDURE — 250N000012 HC RX MED GY IP 250 OP 636 PS 637: Performed by: INTERNAL MEDICINE

## 2023-11-08 PROCEDURE — 85027 COMPLETE CBC AUTOMATED: CPT | Performed by: INTERNAL MEDICINE

## 2023-11-08 PROCEDURE — 80048 BASIC METABOLIC PNL TOTAL CA: CPT | Performed by: INTERNAL MEDICINE

## 2023-11-08 PROCEDURE — 86140 C-REACTIVE PROTEIN: CPT | Performed by: INTERNAL MEDICINE

## 2023-11-08 PROCEDURE — G0463 HOSPITAL OUTPT CLINIC VISIT: HCPCS

## 2023-11-08 PROCEDURE — 250N000013 HC RX MED GY IP 250 OP 250 PS 637: Performed by: HOSPITALIST

## 2023-11-08 PROCEDURE — 97530 THERAPEUTIC ACTIVITIES: CPT | Mod: GP

## 2023-11-08 PROCEDURE — 120N000001 HC R&B MED SURG/OB

## 2023-11-08 PROCEDURE — 99232 SBSQ HOSP IP/OBS MODERATE 35: CPT | Performed by: HOSPITALIST

## 2023-11-08 RX ORDER — ALBUMIN (HUMAN) 12.5 G/50ML
25-50 SOLUTION INTRAVENOUS ONCE
Status: DISCONTINUED | OUTPATIENT
Start: 2023-11-08 | End: 2023-11-09

## 2023-11-08 RX ADMIN — INSULIN GLARGINE 20 UNITS: 100 INJECTION, SOLUTION SUBCUTANEOUS at 22:08

## 2023-11-08 RX ADMIN — SERTRALINE HYDROCHLORIDE 50 MG: 50 TABLET ORAL at 16:02

## 2023-11-08 RX ADMIN — INSULIN ASPART 8 UNITS: 100 INJECTION, SOLUTION INTRAVENOUS; SUBCUTANEOUS at 13:47

## 2023-11-08 RX ADMIN — PSYLLIUM HUSK 1 PACKET: 3.4 POWDER ORAL at 11:31

## 2023-11-08 RX ADMIN — THIAMINE HCL TAB 100 MG 100 MG: 100 TAB at 09:41

## 2023-11-08 RX ADMIN — MULTIPLE VITAMINS W/ MINERALS TAB 1 TABLET: TAB at 09:40

## 2023-11-08 RX ADMIN — FUROSEMIDE 40 MG: 40 TABLET ORAL at 09:41

## 2023-11-08 RX ADMIN — CARVEDILOL 1.56 MG: 3.12 TABLET, FILM COATED ORAL at 18:20

## 2023-11-08 RX ADMIN — INSULIN ASPART 6 UNITS: 100 INJECTION, SOLUTION INTRAVENOUS; SUBCUTANEOUS at 16:04

## 2023-11-08 RX ADMIN — DEXAMETHASONE 6 MG: 2 TABLET ORAL at 09:41

## 2023-11-08 RX ADMIN — CARVEDILOL 1.56 MG: 3.12 TABLET, FILM COATED ORAL at 09:42

## 2023-11-08 RX ADMIN — SPIRONOLACTONE 50 MG: 25 TABLET ORAL at 09:42

## 2023-11-08 RX ADMIN — FOLIC ACID 1 MG: 1 TABLET ORAL at 09:41

## 2023-11-08 RX ADMIN — PANTOPRAZOLE SODIUM 40 MG: 40 TABLET, DELAYED RELEASE ORAL at 16:02

## 2023-11-08 RX ADMIN — INSULIN ASPART 9 UNITS: 100 INJECTION, SOLUTION INTRAVENOUS; SUBCUTANEOUS at 09:49

## 2023-11-08 RX ADMIN — PANTOPRAZOLE SODIUM 40 MG: 40 TABLET, DELAYED RELEASE ORAL at 09:41

## 2023-11-08 RX ADMIN — ASPIRIN 81 MG: 81 TABLET, COATED ORAL at 09:41

## 2023-11-08 RX ADMIN — INSULIN ASPART 2 UNITS: 100 INJECTION, SOLUTION INTRAVENOUS; SUBCUTANEOUS at 22:08

## 2023-11-08 ASSESSMENT — ACTIVITIES OF DAILY LIVING (ADL)
ADLS_ACUITY_SCORE: 33
ADLS_ACUITY_SCORE: 35
ADLS_ACUITY_SCORE: 33
ADLS_ACUITY_SCORE: 33
ADLS_ACUITY_SCORE: 32
ADLS_ACUITY_SCORE: 33
ADLS_ACUITY_SCORE: 32
ADLS_ACUITY_SCORE: 33
ADLS_ACUITY_SCORE: 35
ADLS_ACUITY_SCORE: 33

## 2023-11-08 NOTE — PROVIDER NOTIFICATION
Barbi BURNETT: possible interventions to his bad mood might be needed.    MD: will check it out, open curtains and walk the halls.     Barbi BURNETT: pt is getting verbally agressive and is throwing things down vs laying things down.     MD: visited earlier, flat affect and frustrated with situation.   Will order SSRI

## 2023-11-08 NOTE — PLAN OF CARE
Goal Outcome Evaluation:                 Outcome Evaluation: VSS on RA, lungs sound diminished. Blood glucose 339 MD notified.  per MD -Give 10 units aspart now and will Increase glargine to 20 units tomorrow night. current , no c/o of pain/SOB.

## 2023-11-08 NOTE — PLAN OF CARE
Goal Outcome Evaluation:      Plan of Care Reviewed With: patient    Overall Patient Progress: improvingOverall Patient Progress: improving         VSS. A/Ox4. LS clear, on RA. Pt endorses cough. Denies pain, CP, Sob and N/V. . PT and OT following. Up A1 GBW. Steady gait. Cared for pt from [id number].

## 2023-11-08 NOTE — PROGRESS NOTES
CLINICAL NUTRITION SERVICES - REASSESSMENT NOTE    Recommendations Ordered by Registered Dietitian (RD):   Diet per MD   Continue glucerna BID between meals - change flavors to vanilla and chocolate  Continue MVI+M as ordered    Malnutrition:   % Weight Loss:  > 10% in 6 months (severe malnutrition)  % Intake:  </= 75% for >/= 1 month (moderate malnutrition) --> improving greatly during admission   Subcutaneous Fat Loss:  Orbital region moderate depletion and Upper arm region severe depletion - 10/30  Muscle Loss:  Temporal region moderate depletion, Clavicle bone region severe depletion, Acromion bone region moderate depletion, Upper Arm region severe, Dorsal hand region moderate depletion, and Posterior calf region severe depletion - 10/30  Fluid Retention:  None noted     Malnutrition Diagnosis: Severe malnutrition  In Context of:  Chronic illness or disease     EVALUATION OF PROGRESS TOWARD GOALS   Diet: Moderate Consistent CHO Diet (60 g CHO per meal) + Glucerna BID with meals     Intake/Tolerance:  Upon review of the flowsheets, pt is consuming 100% of meals ordered. Ordering meals TID.     Spoke with patient via phone, noted that his appetite is ok - appears to be overall doing better with the moderate CHO diet over the cardiac diet. Discussed oral nutritional supplements, he would like the flavors to alternate between vanilla and chocolate. Also asking for an updated menu - printed off handout from [employer] noting the carbohydrate content of menu items.     ASSESSED NUTRITION NEEDS PER APPROVED PRACTICE GUIDELINES:  Dosing Weight: 84.6 kg (lowest weight during admission) - continue to trend   Estimated Energy Needs: 2538+ kcals (30+ Kcal/Kg)  Justification: Increased needs   Estimated Protein Needs: 102-127+ grams protein (1.2-1.5+ g pro/Kg)  Justification: ESLD guidelines for malnourished and wound healing (d/t trauma not PI)   Estimated Fluid Needs: per MD     NEW FINDINGS:   - GI following   - WOC  following - wound to left distal great toe d/t trauma   - labs:  Labs:  Electrolytes  Potassium (mmol/L)   Date Value   11/08/2023 4.4   11/07/2023 4.8   11/06/2023 4.6   07/19/2022 3.9   07/14/2022 4.1   01/19/2022 4.2   12/16/2020 4.4   10/23/2020 3.8   06/16/2020 3.9     Phosphorus (mg/dL)   Date Value   09/11/2023 2.9   09/10/2023 2.7   09/09/2023 2.8   09/08/2023 2.9   09/07/2023 3.0    Blood Glucose  Glucose (mg/dL)   Date Value   11/08/2023 186 (H)   07/19/2022 103 (H)   07/14/2022 152 (H)   01/19/2022 166 (H)   12/16/2020 166 (H)   06/16/2020 212 (H)   05/26/2020 234 (H)   05/11/2020 233 (H)   12/14/2018 222 (H)     GLUCOSE BY METER POCT (mg/dL)   Date Value   11/08/2023 238 (H)   11/08/2023 162 (H)   11/08/2023 179 (H)   11/08/2023 227 (H)   11/08/2023 339 (H)     Hemoglobin A1C (%)   Date Value   10/24/2023 5.7 (H)   02/17/2023 7.5 (H)   07/07/2022 6.3 (H)   01/19/2022 7.4 (H)   07/02/2021 8.5 (H)   01/07/2021 7.8 (H)   10/20/2020 6.9 (H)   09/17/2020 7.1 (H)   06/16/2020 9.1 (H)    Inflammatory Markers  WBC (10e9/L)   Date Value   12/16/2020 5.2   10/20/2020 9.0   05/26/2020 9.5     WBC Count (10e3/uL)   Date Value   11/08/2023 13.2 (H)   11/07/2023 11.2 (H)   11/06/2023 11.9 (H)     Albumin (g/dL)   Date Value   11/06/2023 2.5 (L)   11/05/2023 2.4 (L)   11/04/2023 2.4 (L)   07/14/2022 3.9   09/17/2020 4.1   05/26/2020 4.0   05/11/2020 3.9      Magnesium (mg/dL)   Date Value   11/04/2023 2.0   10/24/2023 1.7   09/11/2023 2.1   12/26/2017 2.0     Sodium (mmol/L)   Date Value   11/08/2023 129 (L)   11/07/2023 130 (L)   11/06/2023 129 (L)   12/16/2020 136   06/16/2020 137   05/26/2020 134    Renal  Urea Nitrogen (mg/dL)   Date Value   11/08/2023 25.1 (H)   11/07/2023 23.6 (H)   11/06/2023 24.2 (H)   07/19/2022 17   07/14/2022 16   01/19/2022 12   12/16/2020 19   06/16/2020 19   05/26/2020 19     Creatinine (mg/dL)   Date Value   11/08/2023 0.65 (L)   11/07/2023 0.76   11/06/2023 0.70   12/16/2020 1.01    10/23/2020 0.96   06/16/2020 0.92     Additional  Triglycerides (mg/dL)   Date Value   08/31/2023 337 (H)   07/19/2022 255 (H)   01/19/2022 182 (H)   06/16/2020 241 (H)   05/13/2019 322 (H)   03/27/2018 374 (H)     Ketones Urine (mg/dL)   Date Value   11/03/2023 Negative   12/09/2016 80 (A)        - medications:   albumin human  25-50 g Intravenous Once    aspirin  81 mg Oral Daily    carvedilol  1.5625 mg Oral BID w/meals    dexAMETHasone  6 mg Oral Daily    folic acid  1 mg Oral Daily    furosemide  40 mg Oral Daily    insulin aspart   Subcutaneous TID AC    insulin aspart  1-7 Units Subcutaneous TID AC    insulin aspart  1-5 Units Subcutaneous At Bedtime    insulin glargine  20 Units Subcutaneous At Bedtime    multivitamin w/minerals  1 tablet Oral Daily    pantoprazole  40 mg Oral BID AC    psyllium  1 packet Oral Daily    spironolactone  50 mg Oral Daily    thiamine  100 mg Oral Daily         acetaminophen, benzonatate, glucose **OR** dextrose **OR** glucagon, guaiFENesin, hydrOXYzine, melatonin, ondansetron **OR** ondansetron     - weight: patient diuresed and received paracentesis on 10/31, difficult to interpret weight trends with fluid status . trace edema noted in flowsheets   Vitals:    10/24/23 1752 10/24/23 1919 10/25/23 0451 10/26/23 0607   Weight: 92.9 kg (204 lb 12.9 oz) 92.7 kg (204 lb 5.9 oz) 92.9 kg (204 lb 12.9 oz) 87 kg (191 lb 12.8 oz)    10/27/23 0500 10/28/23 0542 10/29/23 0649 10/31/23 0619   Weight: 88.6 kg (195 lb 5.2 oz) 88.1 kg (194 lb 4.8 oz) 88.2 kg (194 lb 6.4 oz) 88.3 kg (194 lb 9.6 oz)    11/01/23 0630 11/02/23 0635 11/03/23 0535 11/04/23 0500   Weight: 84.6 kg (186 lb 6.4 oz) 84.3 kg (185 lb 12.8 oz) 87.1 kg (192 lb 0.3 oz) 87 kg (191 lb 12.8 oz)    11/05/23 0506 11/06/23 0551 11/07/23 0612   Weight: 88.5 kg (195 lb 3.2 oz) 89.4 kg (197 lb 3.2 oz) 89 kg (196 lb 1.6 oz)     Previous Goals:   Patient to consume % of nutritionally adequate meal trays TID, or the equivalent  with supplements/snacks.    Evaluation: Met    Previous Nutrition Diagnosis:   Malnutrition related to inadequate oral intake secondary to dyspnea as evidenced by 14% wt loss in 6 months, <50% intake for >1 month, moderate fat wasting and severe muscle wasting.     Evaluation: Improving    CURRENT NUTRITION DIAGNOSIS  Malnutrition related to inadequate oral intake secondary to dyspnea as evidenced by 14% wt loss in 6 months, <75% intake for >1 month, moderate fat wasting and severe muscle wasting.       INTERVENTIONS  Recommendations / Nutrition Prescription  Diet per MD   Continue glucerna BID between meals - change flavors to vanilla and chocolate  Continue MVI+M as ordered     Implementation  Medical Food Supplement and Multivitamin/Mineral: as above     Goals  Patient to consume % of nutritionally adequate meal trays TID, or the equivalent with supplements/snacks.      MONITORING AND EVALUATION:  Progress towards goals will be monitored and evaluated per protocol and Practice Guidelines    Julia Laird RD, LD  Clinical Dietitian     3rd floor/ICU: [phone]  All other floors: [phone]  Weekend/holiday: [phone]  Office: [phone]

## 2023-11-08 NOTE — PLAN OF CARE
Goal Outcome Evaluation:       PO intake appears to be trending upwards, consuming 100% of three meals per day. Adjusted oral nutritional supplement flavors and provided menu handout.     Julia Laird RD, LD  Clinical Dietitian     Pager - 3rd floor/ICU: [phone]  Pager - All other floors: [phone]  Pager - Weekend/holiday: [phone]  Office phone: [phone]

## 2023-11-08 NOTE — PROGRESS NOTES
[facility]    Medicine Progress Note - Hospitalist Service    Date of Admission:  10/24/2023    Assessment & Plan   Saji Iqbal is a 63 year old male with a past medical history significant for alcohol use disorder, coronary artery disease with 2 previous stents in the left anterior descending artery, diabetes mellitus, hypertension, dyslipidemia, obstructive sleep apnea, left shoulder adhesive capsulitis treated with steroid injection during last hospitalization, ischemic and alcoholic cardiomyopathy with heart failure with reduced ejection fraction.  He was admitted on 10/24/2023. He was admitted here from 8/30 to 9/11 with adhesive capsulitis of the left shoulder and alcohol withdrawal.  During that hospitalization he was also found to have new alcoholic cardiomyopathy with ejection fraction 35 to 40%.  Upon discharge home he said he was able to walk 100 feet but got quite tired.  He thought he would get better at home but has slowly continued to worsen.  He has had progressive dyspnea on exertion.  He said he could maybe walk 25 steps now before having to stop and rest.  He gets short of breath just while dressing.  He did not feel he could go up or down stairs due to weakness and dyspnea.  He has noticed some increased firmness of his abdomen but no definite swelling.  He has not noticed any lower extremity swelling and has no pain to the abdomen or chest.  His left shoulder pain is resolved since steroid injection.  His appetite is good but he is having difficulty preparing his food as he is not able to stand at the stove and therefore has not been eating much.  His daughter requested a welfare check on him and he was brought in the hospital for further evaluation and treatment.  He said he has been sober for 5 weeks since his previous hospitalization. In the ER, his vital signs were stable.  Bilirubin is 8.2 and AST is 206.  These are both stable from previous hospitalization.   Potassium 3.2 and albumin 2.7.  Chest x-ray is unremarkable.  Bedside ultrasound by ER physician showed ascites.  He is admitted for failure to thrive, severe dyspnea on exertion, new ascites secondary to decompensated end-stage liver disease, possible congstive heart failure, and severe generalized weakness.  He has now developed COVID-19 infection.     COVID-19 infection.  -SARS-CoV-2 PCR negative on 11/2.  -Previously, requiring supplemental oxygen.  -Finish 5-day course of IV remdesivir on 11/7.  -Continue dexamethasone 6 mg a day with plan for 10-day course.  -CT of the chest on 11/4 with small left effusion and atelectasis.  -IV Zosyn on 11/3 to cover possible bacterial sepsis, stopped after 3 days  -resolved, however due to precautions unable to go to TCU until 13th     Decompensated cirrhosis.  Ascites.  Alcohol use disorder.  -Had reportedly stopped drinking alcohol just recently.  -Required paracentesis on 10/25, 10/31, and 11/1.  -Needs continued alcohol cessation.  -Continue multivitamin, folic acid and thiamine.  -Continue furosemide 40 mg a day, increased spironolactone to 50 mg a day   -order for repeat paracentesis placed, recommend prior to discharge on 13th    Diabetes mellitus type 2.  -Hemoglobin A1c only 5.7.  -Stop glipizide.  -Blood glucose now significantly elevated with initiation of dexamethasone.  -Increased glargine insulin to 20 units a day.  -cont carb count insulin and ISS     Hyperlipidemia.  Coronary artery disease.  Cardiomyopathy.  -Atorvastatin, carvedilol, and aspirin have been on hold.  -Echocardiogram in August showed EF 35 to 40%.  Areas of hypokinesia noted.  -Lexiscan stress test in September did not show areas of reversible ischemia.  -Continue aspirin 81 mg a day.  -Continue carvedilol 1.5265 mg twice a day.  -Continue to hold atorvastatin due to transaminitis.     Deconditioning.  Failure to thrive.  -Continue to work with therapy.  -Current plan for TCU at discharge on  13th     Severe malnutrition.  -Registered dietitian following.     Hyponatremia.  Hypokalemia.  -Recheck metabolic panel intermittently.  -furosemide and spironolactone as above.     Diarrhea.  -Stop scheduled MiraLAX and senna S.  -C. difficile toxin negative.     Hematochezia.  -Gastroenterology consult appreciated, Hgb stable. Ok to cont ASA  -no active bleeding, plan for outpt colonoscopy       Diet: Diet  Moderate Consistent Carb (60 g CHO per Meal) Diet  Snacks/Supplements Adult: Glucerna; With Meals    DVT Prophylaxis: Pneumatic Compression Devices  Durbin Catheter: Not present  Lines: None     Cardiac Monitoring: None  Code Status: Full Code        Disposition Plan     Expected Discharge Date: 11/13/2023      Destination: inpatient rehabilitation facility              Epifanio Anthony DO  Hospitalist Service  [facility]  Securely message with [employer] (more info)  Text page via [employer] Paging/Directory   ______________________________________________________________________    Interval History   No overnight events, denies any bloody or melenotic stools. Off oxygen, no sob or CP. States he is bored since he can't discharge to TCU until covid precautions are off    Physical Exam   Vital Signs: Temp: 97.4  F (36.3  C) Temp src: Oral BP: 118/72 Pulse: 62   Resp: 18 SpO2: 95 % O2 Device: None (Room air)    Weight: 196 lbs 1.6 oz    Constitutional: awake, alert, and cooperative  Eyes: pupils equal, round and reactive to light and conjunctiva normal  ENT: normocepalic, without obvious abnormality, atramatic  Respiratory: no increased work of breathing, good air exchange, and clear to auscultation  Cardiovascular: regular rate and rhythm and no murmur noted  GI: normal bowel sounds, soft, and non-distended  Skin: no bleeding noted, scattered bruises on extremeties  Neurologic: alert, oriented x4, weak appearing, blunted affect    45 MINUTES SPENT BY ME on the date of service doing chart review,  history, exam, documentation & further activities per the note.      Data   ------------------------- PAST 24 HR DATA REVIEWED -----------------------------------------------    I have personally reviewed the following data over the past 24 hrs:    13.2 (H)  \   12.4 (L)   / 124 (L)     129 (L) 101 25.1 (H) /  238 (H)   4.4 22 0.65 (L) \     Procal: N/A CRP: 5.17 (H) Lactic Acid: N/A         Imaging results reviewed over the past 24 hrs:   No results found for this or any previous visit (from the past 24 hour(s)).

## 2023-11-08 NOTE — PROGRESS NOTES
[facility] Nurse Inpatient Assessment     Consulted for: Left great toe      Patient History (according to Hospitalist provider note(s) 10/26/23:      Saji Iqbal is a 63 year old male with a past medical history significant for alcohol use disorder, coronary artery disease with 2 previous stents in the left anterior descending artery, diabetes mellitus, hypertension, dyslipidemia, obstructive sleep apnea, left shoulder adhesive capsulitis treated with steroid injection during last hospitalization, ischemic and alcoholic cardiomyopathy with heart failure with reduced ejection fraction.  He was admitted on 10/24/2023. He was admitted here from 8/30 to 9/11 with adhesive capsulitis of the left shoulder and alcohol withdrawal.  During that hospitalization he was also found to have new alcoholic cardiomyopathy with ejection fraction 35 to 40%.  Upon discharge home he said he was able to walk 100 feet but got quite tired.  He thought he would get better at home but has slowly continued to worsen.  He has had progressive dyspnea on exertion.  He said he could maybe walk 25 steps now before having to stop and rest.  He gets short of breath just while dressing.  He did not feel he could go up or down stairs due to weakness and dyspnea.  He has noticed some increased firmness of his abdomen but no definite swelling.  He has not noticed any lower extremity swelling and has no pain to the abdomen or chest.  His left shoulder pain is resolved since steroid injection.  His appetite is good but he is having difficulty preparing his food as he is not able to stand at the stove and therefore has not been eating much.  His daughter requested a welfare check on him and he was brought in the hospital for further evaluation and treatment.  He said he has been sober for 5 weeks since his previous hospitalization. In the ER, his vital signs were stable.  Bilirubin is 8.2 and AST is 206.  These are both  stable from previous hospitalization.  Potassium 3.2 and albumin 2.7.  Chest x-ray is unremarkable.  Bedside ultrasound by ER physician showed ascites.  He is admitted for failure to thrive, severe dyspnea on exertion, new ascites secondary to decompensated end-stage liver disease, possible congstive heart failure, and severe generalized weakness.           Severe dyspnea on exertion and ascites/new diagnosis of decompensated liver disease/ cirrhosis/ congestive heart failure  The patient had an Ultrasound in August revealed evidence of cirrhosis.  Bilirubin is 8.2 and it was 8.4 at the beginning of September. His AST is also slightly improved at 206 from 239 during recent hospitalization. Seems most likely that progressive ascites resulting in decreased diaphragmatic excursion and dyspnea on exertion. Could be due to his ascites but also congstive heart failure.  His BNP was only 398.  CXR showed low volumes.  ON US bedside in the emergency room, he was found to have significant ascites. On 10/25 did get a paracentesis and rule out Spontaneous bacterial peritonitis. Had over 5 liters removed.   Will need to be on diuretics, this should help with ascites as well as congstive heart failure.  His blood pressure though is on the low side so his home coreg and ace inhibitor need to be stopped.  Gastroenterology has seen.   MELD 21, patient with some dizziness with standing.  Blood pressure has been down to the 80s.  Therapy seeing and will need TCU. His breathing is improved and he is on room air.      Failure to thrive/malnutrition with hypoalbuminemia and generalized weakness:  Albumin only 2.5.  Seems primarily secondary to dyspnea exertion but he also describes some element of generalized weakness.   He has not been eating as he said he only has food in his house that need preparation and he has not felt up to preparing food.  He does say he has a good appetite. Social work, physical therapy, Occupational Therapy  evaluations to help with needs assessment.  Physical therapy recommends TCU.       3. Acute on chronic systolic Heart failure/ coronary artery disease/ hypotension:  Patient has a known reduced ejection fraction of 35%.  This is likely due to a combination of ischemic and alcohol related heart disease.  This was a new diagnosis on his recent hospitalization.  He previously had 2 stents in the LAD. He had a mildly elevated troponin during the hospitalization. Echocardiogram revealed ejection fraction of 35 to 40% with inferior wall motion abnormality. Nuclear stress test confirmed an inferior infarct which was not reversible. He was deemed high risk for intervention with likely minimal benefit given the fixed defect.Medical management was recommended.  Troponin 24, down from 32 at last hospitalization.  He has been on lisinopril and coreg but his blood pressure is running in the 80s so these will be held to allow for diuresis. Is on asa.  Monitor intake, output and weights.  Continue on aldactone/lasix will cut his aldactone dose due to hypotension.   Bmp showing a creatinine 0.76, potassium 2.6, sodium 134 on 10/26/23        4.  Hypokalemia  Replace with close monitoring while on Aldactone and Lasix. Potassium today is 3.6, ace inhibitor is held.      5.  Diabetes mellitus type 2, non insulin dependent:   BS has been . Is on glipizide and an insulin correction scale     6.  Alcohol dependence:   He reports being abstinent since his last hospitalization, sober for 3 weeks.  He says he has not felt like drinking alcohol. Sobriety was discussed again by me.      7.  Obstructive sleep apnea              Is noncompliant with CPAP, has not uses since 2014     8.. left toe wound              WOCN to see    Assessment:      Areas visualized during today's visit:  left foot    Wound location: left distal great toe    Last photo: 11/8/23    Wound due to: Trauma  Wound history/plan of care: per pt wound happened while he  was trying to cut thickened great toenail and he nicked the skin, also has an intact scab to Left 4th toe from same time  Wound base: healed, bedside RN reports scab came off a couple days ago, now with intact skin where scab previously saw  Pain: denies , none  Pain interventions prior to dressing change: patient tolerated well  Treatment goal: Heal , Maintain (prevention of deterioration), and Protection  STATUS: healed  Supplies ordered: at bedside and discussed with RN       Treatment Plan:     Left great toe wound(s): Open to air    Orders: Updated    RECOMMEND PRIMARY TEAM ORDER: Podiatry consult outpatient for nail cares and follow-up on wound  Education provided: plan of care and wound progress  Discussed plan of care with: Patient and Nurse  WO nurse follow-up plan: signing off  Notify WOC if wound(s) deteriorate.  Nursing to notify the Provider(s) and re-consult the [facility] Nurse if new skin concern.    DATA:     Current support surface: Standard  Standard gel/foam mattress (IsoFlex, Atmos air, etc)  Containment of urine/stool: Incontinent pad in bed  BMI: Body mass index is 27.36 kg/m .   Active diet order: Orders Placed This Encounter      Diet      Moderate Consistent Carb (60 g CHO per Meal) Diet     Output: No intake/output data recorded.     Labs:   Recent Labs   Lab 11/08/23  0653 11/07/23  0721 11/06/23  0947   ALBUMIN  --   --  2.5*   HGB 12.4*   < > 12.1*   WBC 13.2*   < > 11.9*    < > = values in this interval not displayed.     Pressure injury risk assessment:   Sensory Perception: 4-->no impairment  Moisture: 4-->rarely moist  Activity: 3-->walks occasionally  Mobility: 3-->slightly limited  Nutrition: 3-->adequate  Friction and Shear: 3-->no apparent problem  Aristeo Score: 20    TATYANA Cintron [facility]  Dept. Office Number: [phone]

## 2023-11-08 NOTE — PROGRESS NOTES
Blood glucose 339.  Has been hyperglycemic at 190-330 throughout hospitalization secondary to steroid-induced hyperglycemia.  Already received increased dose glargine at 12 units up from 10 units last night.  -Give 10 units aspart now  -Increase glargine to 20 units tomorrow night

## 2023-11-08 NOTE — PLAN OF CARE
Goal Outcome Evaluation:      Plan of Care Reviewed With: patient    Overall Patient Progress: no changeOverall Patient Progress: no change         Shift : 0700 - 1900    Vitals: Temp: 97.4  F (36.3  C) Temp src: Oral BP: 118/72 Pulse: 62   Resp: 18 SpO2: 95 % O2 Device: None (Room air)       Orientation: x4, very frustrated about hospital and personal situation. Active listening done, encouraged to see things positive, opened drapes, walked in halls with pt to cheer up. Barbi BURNETT for depression interventions. - new order for Zoloft daily    Pain: denies pain  Activity: up with GB and walker, walked the morfin today, otherwise in chair all day   Resp: LS clear, O2 stable, 100% after walking.   Diet: ate all his meals, good appetite   How to take meds: whole with fluids   GI: no BM today,   :  voids spontaneously into toilette   BG: still elevated: 162, 238 and 289, no snacks between meals.   Skin: WDL  Lines: PIV SL and patent   Other: pt talks to family on the phone.   Possible paracentesis prior discharge on Monday  Plan:  TCU after recovery of COVID - 11/13. Cont. With plan of care

## 2023-11-09 ENCOUNTER — APPOINTMENT (OUTPATIENT)
Dept: OCCUPATIONAL THERAPY | Facility: CLINIC | Age: 63
End: 2023-11-09
Payer: MEDICAID

## 2023-11-09 ENCOUNTER — APPOINTMENT (OUTPATIENT)
Dept: ULTRASOUND IMAGING | Facility: CLINIC | Age: 63
End: 2023-11-09
Attending: HOSPITALIST
Payer: MEDICAID

## 2023-11-09 LAB
ANION GAP SERPL CALCULATED.3IONS-SCNC: 10 MMOL/L (ref 7–15)
BUN SERPL-MCNC: 31.8 MG/DL (ref 8–23)
CALCIUM SERPL-MCNC: 8.5 MG/DL (ref 8.8–10.2)
CHLORIDE SERPL-SCNC: 97 MMOL/L (ref 98–107)
CREAT SERPL-MCNC: 0.78 MG/DL (ref 0.67–1.17)
DEPRECATED HCO3 PLAS-SCNC: 22 MMOL/L (ref 22–29)
EGFRCR SERPLBLD CKD-EPI 2021: >90 ML/MIN/1.73M2
ERYTHROCYTE [DISTWIDTH] IN BLOOD BY AUTOMATED COUNT: 13.5 % (ref 10–15)
GLUCOSE BLDC GLUCOMTR-MCNC: 180 MG/DL (ref 70–99)
GLUCOSE BLDC GLUCOMTR-MCNC: 227 MG/DL (ref 70–99)
GLUCOSE BLDC GLUCOMTR-MCNC: 227 MG/DL (ref 70–99)
GLUCOSE BLDC GLUCOMTR-MCNC: 268 MG/DL (ref 70–99)
GLUCOSE BLDC GLUCOMTR-MCNC: 274 MG/DL (ref 70–99)
GLUCOSE SERPL-MCNC: 250 MG/DL (ref 70–99)
HCT VFR BLD AUTO: 38.2 % (ref 40–53)
HGB BLD-MCNC: 13.3 G/DL (ref 13.3–17.7)
MCH RBC QN AUTO: 34.9 PG (ref 26.5–33)
MCHC RBC AUTO-ENTMCNC: 34.8 G/DL (ref 31.5–36.5)
MCV RBC AUTO: 100 FL (ref 78–100)
PLATELET # BLD AUTO: 144 10E3/UL (ref 150–450)
POTASSIUM SERPL-SCNC: 4.8 MMOL/L (ref 3.4–5.3)
RBC # BLD AUTO: 3.81 10E6/UL (ref 4.4–5.9)
SODIUM SERPL-SCNC: 129 MMOL/L (ref 135–145)
WBC # BLD AUTO: 16.1 10E3/UL (ref 4–11)

## 2023-11-09 PROCEDURE — 250N000013 HC RX MED GY IP 250 OP 250 PS 637: Performed by: HOSPITALIST

## 2023-11-09 PROCEDURE — 250N000009 HC RX 250: Performed by: RADIOLOGY

## 2023-11-09 PROCEDURE — 49083 ABD PARACENTESIS W/IMAGING: CPT

## 2023-11-09 PROCEDURE — 99232 SBSQ HOSP IP/OBS MODERATE 35: CPT | Performed by: HOSPITALIST

## 2023-11-09 PROCEDURE — 120N000001 HC R&B MED SURG/OB

## 2023-11-09 PROCEDURE — 85027 COMPLETE CBC AUTOMATED: CPT | Performed by: HOSPITALIST

## 2023-11-09 PROCEDURE — 250N000012 HC RX MED GY IP 250 OP 636 PS 637: Performed by: INTERNAL MEDICINE

## 2023-11-09 PROCEDURE — 250N000013 HC RX MED GY IP 250 OP 250 PS 637: Performed by: INTERNAL MEDICINE

## 2023-11-09 PROCEDURE — 80048 BASIC METABOLIC PNL TOTAL CA: CPT | Performed by: HOSPITALIST

## 2023-11-09 PROCEDURE — 97110 THERAPEUTIC EXERCISES: CPT | Mod: GO

## 2023-11-09 PROCEDURE — 36415 COLL VENOUS BLD VENIPUNCTURE: CPT | Performed by: HOSPITALIST

## 2023-11-09 RX ORDER — LIDOCAINE HYDROCHLORIDE 10 MG/ML
10 INJECTION, SOLUTION EPIDURAL; INFILTRATION; INTRACAUDAL; PERINEURAL ONCE
Status: DISCONTINUED | OUTPATIENT
Start: 2023-11-09 | End: 2023-11-09

## 2023-11-09 RX ADMIN — SPIRONOLACTONE 50 MG: 25 TABLET ORAL at 08:49

## 2023-11-09 RX ADMIN — PANTOPRAZOLE SODIUM 40 MG: 40 TABLET, DELAYED RELEASE ORAL at 16:30

## 2023-11-09 RX ADMIN — SERTRALINE HYDROCHLORIDE 50 MG: 50 TABLET ORAL at 08:50

## 2023-11-09 RX ADMIN — DEXAMETHASONE 6 MG: 2 TABLET ORAL at 08:50

## 2023-11-09 RX ADMIN — GUAIFENESIN 200 MG: 200 SOLUTION ORAL at 16:26

## 2023-11-09 RX ADMIN — PANTOPRAZOLE SODIUM 40 MG: 40 TABLET, DELAYED RELEASE ORAL at 06:33

## 2023-11-09 RX ADMIN — FUROSEMIDE 40 MG: 40 TABLET ORAL at 08:50

## 2023-11-09 RX ADMIN — INSULIN ASPART 4 UNITS: 100 INJECTION, SOLUTION INTRAVENOUS; SUBCUTANEOUS at 14:24

## 2023-11-09 RX ADMIN — FOLIC ACID 1 MG: 1 TABLET ORAL at 08:50

## 2023-11-09 RX ADMIN — CARVEDILOL 1.56 MG: 3.12 TABLET, FILM COATED ORAL at 18:29

## 2023-11-09 RX ADMIN — MULTIPLE VITAMINS W/ MINERALS TAB 1 TABLET: TAB at 08:50

## 2023-11-09 RX ADMIN — THIAMINE HCL TAB 100 MG 100 MG: 100 TAB at 08:50

## 2023-11-09 RX ADMIN — INSULIN ASPART 1 UNITS: 100 INJECTION, SOLUTION INTRAVENOUS; SUBCUTANEOUS at 22:04

## 2023-11-09 RX ADMIN — INSULIN ASPART 6 UNITS: 100 INJECTION, SOLUTION INTRAVENOUS; SUBCUTANEOUS at 08:48

## 2023-11-09 RX ADMIN — LIDOCAINE HYDROCHLORIDE 10 ML: 10 INJECTION, SOLUTION EPIDURAL; INFILTRATION; INTRACAUDAL; PERINEURAL at 09:45

## 2023-11-09 RX ADMIN — ASPIRIN 81 MG: 81 TABLET, COATED ORAL at 08:50

## 2023-11-09 RX ADMIN — CARVEDILOL 1.56 MG: 3.12 TABLET, FILM COATED ORAL at 08:49

## 2023-11-09 ASSESSMENT — ACTIVITIES OF DAILY LIVING (ADL)
ADLS_ACUITY_SCORE: 33

## 2023-11-09 NOTE — PROGRESS NOTES
[facility]    Medicine Progress Note - Hospitalist Service    Date of Admission:  10/24/2023    Assessment & Plan   Saji Iqbal is a 63 year old male with a past medical history significant for alcohol use disorder, coronary artery disease with 2 previous stents in the left anterior descending artery, diabetes mellitus, hypertension, dyslipidemia, obstructive sleep apnea, left shoulder adhesive capsulitis treated with steroid injection during last hospitalization, ischemic and alcoholic cardiomyopathy with heart failure with reduced ejection fraction.  He was admitted on 10/24/2023. He was admitted here from 8/30 to 9/11 with adhesive capsulitis of the left shoulder and alcohol withdrawal.  During that hospitalization he was also found to have new alcoholic cardiomyopathy with ejection fraction 35 to 40%.  Upon discharge home he said he was able to walk 100 feet but got quite tired.  He thought he would get better at home but has slowly continued to worsen.  He has had progressive dyspnea on exertion.  He said he could maybe walk 25 steps now before having to stop and rest.  He gets short of breath just while dressing.  He did not feel he could go up or down stairs due to weakness and dyspnea.  He has noticed some increased firmness of his abdomen but no definite swelling.  He has not noticed any lower extremity swelling and has no pain to the abdomen or chest.  His left shoulder pain is resolved since steroid injection.  His appetite is good but he is having difficulty preparing his food as he is not able to stand at the stove and therefore has not been eating much.  His daughter requested a welfare check on him and he was brought in the hospital for further evaluation and treatment.  He said he has been sober for 5 weeks since his previous hospitalization. In the ER, his vital signs were stable.  Bilirubin is 8.2 and AST is 206.  These are both stable from previous hospitalization.   Potassium 3.2 and albumin 2.7.  Chest x-ray is unremarkable.  Bedside ultrasound by ER physician showed ascites.  He is admitted for failure to thrive, severe dyspnea on exertion, new ascites secondary to decompensated end-stage liver disease, possible congstive heart failure, and severe generalized weakness.  He has now developed COVID-19 infection.     COVID-19 infection.  -SARS-CoV-2 PCR negative on 11/2.  -Previously, requiring supplemental oxygen.  -Finish 5-day course of IV remdesivir on 11/7.  -Continue dexamethasone 6 mg a day with plan for 10-day course.  -CT of the chest on 11/4 with small left effusion and atelectasis.  -IV Zosyn on 11/3 to cover possible bacterial sepsis, stopped after 3 days  -resolved, however due to precautions unable to go to TCU until 13th     Decompensated cirrhosis.  Ascites.  Alcohol use disorder.  -Had reportedly stopped drinking alcohol just recently.  -Required paracentesis on 10/25, 10/31, and 11/1.  -Needs continued alcohol cessation.  -Continue multivitamin, folic acid and thiamine.  -Continue furosemide 40 mg a day, increased spironolactone to 50 mg a day   -s/p repeat para 11/9 with 3.1L removed    Diabetes mellitus type 2.  -Hemoglobin A1c only 5.7.  -Stop glipizide.  -Blood glucose now significantly elevated with initiation of dexamethasone.  -Increased glargine insulin to 25 units a day.  -cont carb count insulin and ISS     Hyperlipidemia.  Coronary artery disease.  Cardiomyopathy.  -Atorvastatin, carvedilol, and aspirin have been on hold.  -Echocardiogram in August showed EF 35 to 40%.  Areas of hypokinesia noted.  -Lexiscan stress test in September did not show areas of reversible ischemia.  -Continue aspirin 81 mg a day.  -Continue carvedilol 1.5265 mg twice a day.  -Continue to hold atorvastatin due to transaminitis.     Deconditioning.  Failure to thrive.  depression  -Continue to work with therapy.  -Current plan for TCU at discharge on 13th  -blunt affect,  seems depressed. Trial of low dose zoloft initiated     Severe malnutrition.  -Registered dietitian following.     Hyponatremia.  Hypokalemia.  -Recheck metabolic panel intermittently.  -furosemide and spironolactone as above.     Diarrhea.  -Stop scheduled MiraLAX and senna S.  -C. difficile toxin negative.     Hematochezia.  -Gastroenterology consult appreciated, Hgb stable. Ok to cont ASA  -no active bleeding, plan for outpt colonoscopy        Diet: Diet  Moderate Consistent Carb (60 g CHO per Meal) Diet  Snacks/Supplements Adult: Glucerna; With Meals    DVT Prophylaxis: Pneumatic Compression Devices  Durbin Catheter: Not present  Lines: None     Cardiac Monitoring: None  Code Status: Full Code        Disposition Plan     Expected Discharge Date: 11/13/2023      Destination: inpatient rehabilitation facility              Epifanio Anthony DO  Hospitalist Service  [facility]  Securely message with [employer] (more info)  Text page via [employer] Paging/Directory   ______________________________________________________________________    Interval History   No overnight events, did have some agitation with staff yesterday that has now resolved. Had paracentesis today and feels abdomen is less distended now.    Physical Exam   Vital Signs: Temp: 97.9  F (36.6  C) Temp src: Oral BP: 136/82 Pulse: 54   Resp: 18 SpO2: 97 % O2 Device: None (Room air)    Weight: 193 lbs 12.55 oz  Constitutional: awake, alert, and cooperative  Eyes: pupils equal, round and reactive to light and conjunctiva normal  ENT: normocepalic, without obvious abnormality, atramatic  Respiratory: no increased work of breathing, good air exchange, and clear to auscultation  Cardiovascular: regular rate and rhythm and no murmur noted  GI: normal bowel sounds, soft, and non-distended  Skin: no bleeding noted, scattered bruises on extremeties  Neurologic: alert, oriented x4, weak appearing, blunted affect    45 MINUTES SPENT BY ME on the date  of service doing chart review, history, exam, documentation & further activities per the note.      Data   ------------------------- PAST 24 HR DATA REVIEWED -----------------------------------------------    I have personally reviewed the following data over the past 24 hrs:    16.1 (H)  \   13.3   / 144 (L)     129 (L) 97 (L) 31.8 (H) /  250 (H)   4.8 22 0.78 \       Imaging results reviewed over the past 24 hrs:   Recent Results (from the past 24 hour(s))   US Paracentesis without Albumin    Narrative    ULTRASOUND GUIDED PARACENTESIS   11/9/2023 10:33 AM     HISTORY:  recurrent ascites    PROCEDURE:   Informed consent was obtained from the patient prior to  the procedure with discussion including the possible risks of  bleeding, infection and organ injury . Using 5 mL of 1% lidocaine for  local anesthesia, sterile technique, and sonographic guidance with  permanent image documentation, I placed an 8F paracentesis catheter  into the peritoneal fluid collection. This was used to aspirate 3100  mL of yellow, serous fluid in vacuum bottles, and some of this was  sent for any laboratory studies that had been ordered. There were no  immediate complications.       Impression    IMPRESSION:  Ultrasound guided paracentesis.    MONIKA AIKEN MD         SYSTEM ID:  [id number]

## 2023-11-09 NOTE — PLAN OF CARE
"Goal Outcome Evaluation:      Plan of Care Reviewed With: patient    Overall Patient Progress: improvingOverall Patient Progress: improving     Temp: 97.5  F (36.4  C) Temp src: Oral BP: 127/85 Pulse: 79   Resp: 16 SpO2: 96 % O2 Device: None (Room air)       VSS. A&Ox4. PIV saline locked, patent. Productive cough. Denies SOB, nausea, pain.  at 0200. Patient reports he has not eaten this shift (not hungry).     Problem: Adult Inpatient Plan of Care  Goal: Plan of Care Review  Description: The Plan of Care Review/Shift note should be completed every shift.  The Outcome Evaluation is a brief statement about your assessment that the patient is improving, declining, or no change.  This information will be displayed automatically on your shift  note.  Outcome: Progressing  Flowsheets (Taken 11/9/2023 0422)  Plan of Care Reviewed With: patient  Overall Patient Progress: improving  Goal: Patient-Specific Goal (Individualized)  Description: You can add care plan individualizations to a care plan. Examples of Individualization might be:  \"Parent requests to be called daily at 9am for status\", \"I have a hard time hearing out of my right ear\", or \"Do not touch me to wake me up as it startles  me\".  Outcome: Progressing  Goal: Absence of Hospital-Acquired Illness or Injury  Outcome: Progressing  Intervention: Identify and Manage Fall Risk  Recent Flowsheet Documentation  Taken 11/9/2023 0151 by Tamera Villegas RN  Safety Promotion/Fall Prevention:   supervised activity   nonskid shoes/slippers when out of bed   clutter free environment maintained  Intervention: Prevent Skin Injury  Recent Flowsheet Documentation  Taken 11/9/2023 0151 by Tamera Villegas RN  Body Position: position changed independently  Intervention: Prevent and Manage VTE (Venous Thromboembolism) Risk  Recent Flowsheet Documentation  Taken 11/9/2023 0151 by Tamera Villegas RN  VTE Prevention/Management: SCDs (sequential compression devices) off  Intervention: " Prevent Infection  Recent Flowsheet Documentation  Taken 11/9/2023 0151 by Tamera Villegas RN  Infection Prevention: single patient room provided  Goal: Optimal Comfort and Wellbeing  Outcome: Progressing  Goal: Readiness for Transition of Care  Outcome: Progressing     Problem: Liver Failure  Goal: Optimal Coping with Liver Failure  Outcome: Progressing  Goal: Fluid and Electrolyte Balance  Outcome: Progressing  Goal: Optimal Gastrointestinal Function  Outcome: Progressing  Intervention: Monitor and Support Gastrointestinal Function  Recent Flowsheet Documentation  Taken 11/9/2023 0151 by Tamera Villegas RN  Body Position: position changed independently  Goal: Blood Glucose Level Within Target Range  Outcome: Progressing  Goal: Optimal Coagulation Function  Outcome: Progressing  Goal: Absence of Infection Signs and Symptoms  Outcome: Progressing  Intervention: Prevent or Manage Infection  Recent Flowsheet Documentation  Taken 11/9/2023 0151 by Tamera Villegas RN  Isolation Precautions: special precautions maintained  Goal: Optimal Neurologic Function  Outcome: Progressing  Goal: Improved Oral Intake  Outcome: Progressing  Goal: Optimal Pain Control, Comfort and Function  Outcome: Progressing  Goal: Optimize Renal Function  Outcome: Progressing  Goal: Effective Oxygenation and Ventilation  Outcome: Progressing  Intervention: Promote Airway Secretion Clearance  Recent Flowsheet Documentation  Taken 11/9/2023 0151 by Tamera Villegas RN  Cough And Deep Breathing: done independently per patient  Activity Management:   activity adjusted per tolerance   ambulated to bathroom   back to bed  Intervention: Optimize Oxygenation and Ventilation  Recent Flowsheet Documentation  Taken 11/9/2023 0151 by Tamrea Villegas RN  Head of Bed (HOB) Positioning: HOB at 20 degrees     Problem: Heart Failure  Goal: Optimal Coping  Outcome: Progressing  Goal: Optimal Cardiac Output  Outcome: Progressing  Goal: Stable Heart Rate and  Rhythm  Outcome: Progressing  Goal: Optimal Functional Ability  Outcome: Progressing  Intervention: Optimize Functional Ability  Recent Flowsheet Documentation  Taken 11/9/2023 0151 by Tamera Villegas RN  Activity Management:   activity adjusted per tolerance   ambulated to bathroom   back to bed  Goal: Fluid and Electrolyte Balance  Outcome: Progressing  Goal: Improved Oral Intake  Outcome: Progressing  Goal: Effective Oxygenation and Ventilation  Outcome: Progressing  Intervention: Promote Airway Secretion Clearance  Recent Flowsheet Documentation  Taken 11/9/2023 0151 by Tamera Villegas RN  Cough And Deep Breathing: done independently per patient  Activity Management:   activity adjusted per tolerance   ambulated to bathroom   back to bed  Intervention: Optimize Oxygenation and Ventilation  Recent Flowsheet Documentation  Taken 11/9/2023 0151 by Tamera Villegas RN  Head of Bed (HOB) Positioning: HOB at 20 degrees  Goal: Effective Breathing Pattern During Sleep  Outcome: Progressing  Intervention: Monitor and Manage Obstructive Sleep Apnea  Recent Flowsheet Documentation  Taken 11/9/2023 0151 by Tamera Villegas RN  Medication Review/Management: medications reviewed

## 2023-11-09 NOTE — PROGRESS NOTES
Paracentesis complete. Consent obtained with Dr. Niño.  Pt tolerated well, drained 3100mls straw colored fluid. Site at RLQ. Site covered with bandage. Reviewed discharge instructions with pt. Pt transferred back to floor via cart. Report called to RN

## 2023-11-09 NOTE — PLAN OF CARE
Goal Outcome Evaluation:    Pt alert and oriented. VSS on RA. Denies pain. PRN robitussin given for cough. Covid precautions maintained. On dexamethasone. , 227, 268. On sliding scale and carb count. Lantus dose increased per MD. Up SBA lisa/walker. PT/OT following. On PO lasix and aldactone. Paracentesis completed today. Plan for TCU at discharge after recovery of Covid 11/13.      Plan of Care Reviewed With: patient    Overall Patient Progress: no change

## 2023-11-09 NOTE — PLAN OF CARE
Patient Transfer Information  Patient connected to monitoring equipment on arrival: N/A     Patient connected to wall oxygen on arrival: N/A    Belongings: stayed in room    Safety check completed: Yes

## 2023-11-10 ENCOUNTER — APPOINTMENT (OUTPATIENT)
Dept: OCCUPATIONAL THERAPY | Facility: CLINIC | Age: 63
End: 2023-11-10
Payer: MEDICAID

## 2023-11-10 ENCOUNTER — APPOINTMENT (OUTPATIENT)
Dept: PHYSICAL THERAPY | Facility: CLINIC | Age: 63
End: 2023-11-10
Payer: MEDICAID

## 2023-11-10 LAB
ANION GAP SERPL CALCULATED.3IONS-SCNC: 11 MMOL/L (ref 7–15)
BUN SERPL-MCNC: 34.7 MG/DL (ref 8–23)
CALCIUM SERPL-MCNC: 8.3 MG/DL (ref 8.8–10.2)
CHLORIDE SERPL-SCNC: 97 MMOL/L (ref 98–107)
CREAT SERPL-MCNC: 0.74 MG/DL (ref 0.67–1.17)
DEPRECATED HCO3 PLAS-SCNC: 24 MMOL/L (ref 22–29)
EGFRCR SERPLBLD CKD-EPI 2021: >90 ML/MIN/1.73M2
ERYTHROCYTE [DISTWIDTH] IN BLOOD BY AUTOMATED COUNT: 13.6 % (ref 10–15)
GLUCOSE BLDC GLUCOMTR-MCNC: 133 MG/DL (ref 70–99)
GLUCOSE BLDC GLUCOMTR-MCNC: 147 MG/DL (ref 70–99)
GLUCOSE BLDC GLUCOMTR-MCNC: 164 MG/DL (ref 70–99)
GLUCOSE BLDC GLUCOMTR-MCNC: 195 MG/DL (ref 70–99)
GLUCOSE BLDC GLUCOMTR-MCNC: 233 MG/DL (ref 70–99)
GLUCOSE SERPL-MCNC: 148 MG/DL (ref 70–99)
HCT VFR BLD AUTO: 35.8 % (ref 40–53)
HGB BLD-MCNC: 12.4 G/DL (ref 13.3–17.7)
MCH RBC QN AUTO: 34.9 PG (ref 26.5–33)
MCHC RBC AUTO-ENTMCNC: 34.6 G/DL (ref 31.5–36.5)
MCV RBC AUTO: 101 FL (ref 78–100)
PLATELET # BLD AUTO: 134 10E3/UL (ref 150–450)
POTASSIUM SERPL-SCNC: 4.4 MMOL/L (ref 3.4–5.3)
RBC # BLD AUTO: 3.55 10E6/UL (ref 4.4–5.9)
SODIUM SERPL-SCNC: 132 MMOL/L (ref 135–145)
WBC # BLD AUTO: 14.1 10E3/UL (ref 4–11)

## 2023-11-10 PROCEDURE — 36415 COLL VENOUS BLD VENIPUNCTURE: CPT | Performed by: HOSPITALIST

## 2023-11-10 PROCEDURE — 250N000011 HC RX IP 250 OP 636: Performed by: INTERNAL MEDICINE

## 2023-11-10 PROCEDURE — 250N000013 HC RX MED GY IP 250 OP 250 PS 637: Performed by: HOSPITALIST

## 2023-11-10 PROCEDURE — 250N000013 HC RX MED GY IP 250 OP 250 PS 637: Performed by: INTERNAL MEDICINE

## 2023-11-10 PROCEDURE — 99232 SBSQ HOSP IP/OBS MODERATE 35: CPT | Performed by: HOSPITALIST

## 2023-11-10 PROCEDURE — 120N000001 HC R&B MED SURG/OB

## 2023-11-10 PROCEDURE — 80048 BASIC METABOLIC PNL TOTAL CA: CPT | Performed by: HOSPITALIST

## 2023-11-10 PROCEDURE — 85027 COMPLETE CBC AUTOMATED: CPT | Performed by: HOSPITALIST

## 2023-11-10 PROCEDURE — 97110 THERAPEUTIC EXERCISES: CPT | Mod: GO

## 2023-11-10 PROCEDURE — 250N000012 HC RX MED GY IP 250 OP 636 PS 637: Performed by: INTERNAL MEDICINE

## 2023-11-10 PROCEDURE — 97530 THERAPEUTIC ACTIVITIES: CPT | Mod: GP

## 2023-11-10 RX ORDER — SERTRALINE HYDROCHLORIDE 25 MG/1
25 TABLET, FILM COATED ORAL DAILY
Status: DISCONTINUED | OUTPATIENT
Start: 2023-11-11 | End: 2023-11-14 | Stop reason: HOSPADM

## 2023-11-10 RX ADMIN — FUROSEMIDE 40 MG: 40 TABLET ORAL at 09:31

## 2023-11-10 RX ADMIN — DEXAMETHASONE 6 MG: 2 TABLET ORAL at 09:32

## 2023-11-10 RX ADMIN — INSULIN ASPART 4 UNITS: 100 INJECTION, SOLUTION INTRAVENOUS; SUBCUTANEOUS at 17:36

## 2023-11-10 RX ADMIN — PANTOPRAZOLE SODIUM 40 MG: 40 TABLET, DELAYED RELEASE ORAL at 06:37

## 2023-11-10 RX ADMIN — MULTIPLE VITAMINS W/ MINERALS TAB 1 TABLET: TAB at 09:30

## 2023-11-10 RX ADMIN — CARVEDILOL 1.56 MG: 3.12 TABLET, FILM COATED ORAL at 09:33

## 2023-11-10 RX ADMIN — FOLIC ACID 1 MG: 1 TABLET ORAL at 09:31

## 2023-11-10 RX ADMIN — ONDANSETRON 4 MG: 4 TABLET, ORALLY DISINTEGRATING ORAL at 09:33

## 2023-11-10 RX ADMIN — SERTRALINE HYDROCHLORIDE 50 MG: 50 TABLET ORAL at 09:32

## 2023-11-10 RX ADMIN — THIAMINE HCL TAB 100 MG 100 MG: 100 TAB at 09:31

## 2023-11-10 RX ADMIN — ASPIRIN 81 MG: 81 TABLET, COATED ORAL at 09:30

## 2023-11-10 RX ADMIN — INSULIN ASPART 4 UNITS: 100 INJECTION, SOLUTION INTRAVENOUS; SUBCUTANEOUS at 09:28

## 2023-11-10 RX ADMIN — PANTOPRAZOLE SODIUM 40 MG: 40 TABLET, DELAYED RELEASE ORAL at 17:35

## 2023-11-10 RX ADMIN — SPIRONOLACTONE 50 MG: 25 TABLET ORAL at 09:31

## 2023-11-10 RX ADMIN — CARVEDILOL 1.56 MG: 3.12 TABLET, FILM COATED ORAL at 17:35

## 2023-11-10 RX ADMIN — INSULIN ASPART 5 UNITS: 100 INJECTION, SOLUTION INTRAVENOUS; SUBCUTANEOUS at 13:16

## 2023-11-10 ASSESSMENT — ACTIVITIES OF DAILY LIVING (ADL)
ADLS_ACUITY_SCORE: 34
ADLS_ACUITY_SCORE: 33
ADLS_ACUITY_SCORE: 34
ADLS_ACUITY_SCORE: 33
ADLS_ACUITY_SCORE: 34
ADLS_ACUITY_SCORE: 33
ADLS_ACUITY_SCORE: 34
ADLS_ACUITY_SCORE: 33
ADLS_ACUITY_SCORE: 34
ADLS_ACUITY_SCORE: 33

## 2023-11-10 NOTE — PLAN OF CARE
Cared for [id number]     Pt A/O x 4 (can be forgetful), VSS; Pt denies pain, headache, & dizziness. Pt reports mild nausea - administered PO Zofran. Pt can be dyspneic upon exertion. Pt up Asst: 1 w/gait belt & walker. Lung sounds diminished, RA. Blood glucose levels: 147, 133. Wounds on toes are open to air. Paracentetics site (R abd) - unchanged, dressing is clean, dry, intact. Neph, PT, OT & Social Work following. Plan to discharge to TCU after 11/13/23. Will continue with plan of care.

## 2023-11-10 NOTE — PROGRESS NOTES
Care Management Follow Up    Length of Stay (days): 17    Expected Discharge Date: 11/13/2023     Concerns to be Addressed: homelessness, basic needs, substance/tobacco abuse/use     Patient plan of care discussed at interdisciplinary rounds: Yes    Anticipated Discharge Disposition: Transitional Care     Anticipated Discharge Services:    Anticipated Discharge DME:      Referrals Placed by CM/SW: Post Acute Facilities  Private pay costs discussed: transportation costs    Additional Information:    NEFTALI spoke with [facility] liaison who reports that she would like to review notes and request to touch base Monday to ensure that they have a bed available as pt will be out of contact precautions Monday 11/13.     NEFTALI spoke with pt over the phone who is still agreeable to discharge to TCU on Monday. SW answered pt questions about TCU.    PADILLA Dejesus, ADONIS  Inpatient Care Coordination  Emergency Room /Tanisha Cuevas, ADONIS

## 2023-11-10 NOTE — PROGRESS NOTES
[facility]    Medicine Progress Note - Hospitalist Service    Date of Admission:  10/24/2023    Assessment & Plan   Saji Iqbal is a 63 year old male with a past medical history significant for alcohol use disorder, coronary artery disease with 2 previous stents in the left anterior descending artery, diabetes mellitus, hypertension, dyslipidemia, obstructive sleep apnea, left shoulder adhesive capsulitis treated with steroid injection during last hospitalization, ischemic and alcoholic cardiomyopathy with heart failure with reduced ejection fraction.  He was admitted on 10/24/2023. He was admitted here from 8/30 to 9/11 with adhesive capsulitis of the left shoulder and alcohol withdrawal.  During that hospitalization he was also found to have new alcoholic cardiomyopathy with ejection fraction 35 to 40%.  Upon discharge home he said he was able to walk 100 feet but got quite tired.  He thought he would get better at home but has slowly continued to worsen.  He has had progressive dyspnea on exertion.  He said he could maybe walk 25 steps now before having to stop and rest.  He gets short of breath just while dressing.  He did not feel he could go up or down stairs due to weakness and dyspnea.  He has noticed some increased firmness of his abdomen but no definite swelling.  He has not noticed any lower extremity swelling and has no pain to the abdomen or chest.  His left shoulder pain is resolved since steroid injection.  His appetite is good but he is having difficulty preparing his food as he is not able to stand at the stove and therefore has not been eating much.  His daughter requested a welfare check on him and he was brought in the hospital for further evaluation and treatment.  He said he has been sober for 5 weeks since his previous hospitalization. In the ER, his vital signs were stable.  Bilirubin is 8.2 and AST is 206.  These are both stable from previous hospitalization.   Potassium 3.2 and albumin 2.7.  Chest x-ray is unremarkable.  Bedside ultrasound by ER physician showed ascites.  He is admitted for failure to thrive, severe dyspnea on exertion, new ascites secondary to decompensated end-stage liver disease, possible congstive heart failure, and severe generalized weakness.  He has now developed COVID-19 infection.     COVID-19 infection.  -SARS-CoV-2 PCR negative on 11/2.  -Previously, requiring supplemental oxygen.  -Finish 5-day course of IV remdesivir on 11/7.  -Continue dexamethasone 6 mg a day with plan for 10-day course.  -CT of the chest on 11/4 with small left effusion and atelectasis.  -IV Zosyn on 11/3 to cover possible bacterial sepsis, stopped after 3 days  -resolved, however due to precautions unable to go to TCU until 13th     Decompensated cirrhosis.  Ascites.  Alcohol use disorder.  -Had reportedly stopped drinking alcohol just recently.  -Required paracentesis on 10/25, 10/31, and 11/1.  -Needs continued alcohol cessation.  -Continue multivitamin, folic acid and thiamine.  -Continue furosemide 40 mg a day, increased spironolactone to 50 mg a day   -s/p repeat para 11/9 with 3.1L removed    Diabetes mellitus type 2.  -Hemoglobin A1c only 5.7.  -Stop glipizide.  -Blood glucose now significantly elevated with initiation of dexamethasone.  -Increased glargine insulin to 25 units a day.  -cont carb count insulin and ISS     Hyperlipidemia.  Coronary artery disease.  Cardiomyopathy.  -Atorvastatin, carvedilol, and aspirin have been on hold.  -Echocardiogram in August showed EF 35 to 40%.  Areas of hypokinesia noted.  -Lexiscan stress test in September did not show areas of reversible ischemia.  -Continue aspirin 81 mg a day.  -Continue carvedilol 1.5265 mg twice a day.  -Continue to hold atorvastatin due to transaminitis.     Deconditioning.  Failure to thrive.  depression  -Continue to work with therapy.  -Current plan for TCU at discharge on 13th  -blunt affect,  seems depressed. Trial of low dose zoloft initiated, some nausea so dose decreased to 25 daily     Severe malnutrition.  -Registered dietitian following.     Hyponatremia.  Hypokalemia.  -Recheck metabolic panel intermittently.  -furosemide and spironolactone as above.     Diarrhea.  -Stop scheduled MiraLAX and senna S.  -C. difficile toxin negative.     Hematochezia.  -Gastroenterology consult appreciated, Hgb stable. Ok to cont ASA  -no active bleeding, plan for outpt colonoscopy      Diet: Diet  Moderate Consistent Carb (60 g CHO per Meal) Diet  Snacks/Supplements Adult: Glucerna; With Meals    DVT Prophylaxis: Pneumatic Compression Devices  Durbin Catheter: Not present  Lines: None     Cardiac Monitoring: None  Code Status: Full Code        Disposition Plan     Expected Discharge Date: 11/13/2023      Destination: inpatient rehabilitation facility              Epifanio Anthony DO  Hospitalist Service  [facility]  Securely message with [employer] (more info)  Text page via [employer] Paging/Directory   ______________________________________________________________________    Interval History   No overnight events, reports some nausea he attributes to the zoloft. States he's 'been on them all'. Agreeable to dose adjustment but does feel it's helping, no respiratory complaints today.    Physical Exam   Vital Signs: Temp: 97.6  F (36.4  C) Temp src: Oral BP: 116/72 Pulse: 62   Resp: 16 SpO2: 95 % O2 Device: None (Room air)    Weight: 193 lbs 12.55 oz  Constitutional: awake, alert, and cooperative  Eyes: pupils equal, round and reactive to light and conjunctiva normal  ENT: normocepalic, without obvious abnormality, atramatic  Respiratory: no increased work of breathing, good air exchange, and clear to auscultation  Cardiovascular: regular rate and rhythm and no murmur noted  GI: normal bowel sounds, soft, and non-distended  Skin: no bleeding noted, scattered bruises on extremeties  Neurologic: alert,  oriented x4, weak appearing, blunted affect    45 MINUTES SPENT BY ME on the date of service doing chart review, history, exam, documentation & further activities per the note.      Data   ------------------------- PAST 24 HR DATA REVIEWED -----------------------------------------------    I have personally reviewed the following data over the past 24 hrs:    14.1 (H)  \   12.4 (L)   / 134 (L)     132 (L) 97 (L) 34.7 (H) /  147 (H)   4.4 24 0.74 \       Imaging results reviewed over the past 24 hrs:   No results found for this or any previous visit (from the past 24 hour(s)).

## 2023-11-10 NOTE — PLAN OF CARE
"/74 (BP Location: Right arm)   Pulse 62   Temp 97.8  F (36.6  C) (Oral)   Resp 18   Ht 1.803 m (5' 10.98\")   Wt 87.9 kg (193 lb 12.6 oz)   SpO2 96%   BMI 27.04 kg/m      Pt A&Ox4. Denies pain. Infrequent cough, Pt frustrated this shift, refused assessments. IV saline locked. nutrition, PT, OT, and WOC following. Plan for patient to discharge on Monday to TCU.         Goal Outcome Evaluation:      Plan of Care Reviewed With: patient    Overall Patient Progress: improvingOverall Patient Progress: improving           "

## 2023-11-10 NOTE — PLAN OF CARE
"Goal Outcome Evaluation:      Plan of Care Reviewed With: patient    Overall Patient Progress: improvingOverall Patient Progress: improving     Temp: 97.5  F (36.4  C) Temp src: Oral BP: 119/74 Pulse: 66   Resp: 16 SpO2: 95 % O2 Device: None (Room air)       A&O x4. COVID precautions maintained. BG at 2200 - 227; 0200 - 164. Did not eat dinner. Productive cough, VSS. Denies pain.     Problem: Adult Inpatient Plan of Care  Goal: Plan of Care Review  Description: The Plan of Care Review/Shift note should be completed every shift.  The Outcome Evaluation is a brief statement about your assessment that the patient is improving, declining, or no change.  This information will be displayed automatically on your shift  note.  Outcome: Progressing  Flowsheets (Taken 11/10/2023 0611)  Plan of Care Reviewed With: patient  Overall Patient Progress: improving  Goal: Patient-Specific Goal (Individualized)  Description: You can add care plan individualizations to a care plan. Examples of Individualization might be:  \"Parent requests to be called daily at 9am for status\", \"I have a hard time hearing out of my right ear\", or \"Do not touch me to wake me up as it startles  me\".  Outcome: Progressing  Goal: Absence of Hospital-Acquired Illness or Injury  Outcome: Progressing  Intervention: Identify and Manage Fall Risk  Recent Flowsheet Documentation  Taken 11/9/2023 2217 by Tamera Villegas RN  Safety Promotion/Fall Prevention:   treat underlying cause   treat reversible contributory factors   supervised activity   safety round/check completed   room organization consistent   room near nurse's station   nonskid shoes/slippers when out of bed   clutter free environment maintained  Intervention: Prevent Skin Injury  Recent Flowsheet Documentation  Taken 11/9/2023 2217 by Tamera Villegas RN  Body Position: position changed independently  Intervention: Prevent and Manage VTE (Venous Thromboembolism) Risk  Recent Flowsheet " Documentation  Taken 11/9/2023 2217 by Tamera Villegas RN  VTE Prevention/Management: SCDs (sequential compression devices) off  Intervention: Prevent Infection  Recent Flowsheet Documentation  Taken 11/9/2023 2217 by Tamera Villegas RN  Infection Prevention:   single patient room provided   rest/sleep promoted   personal protective equipment utilized   hand hygiene promoted   equipment surfaces disinfected  Goal: Optimal Comfort and Wellbeing  Outcome: Progressing  Goal: Readiness for Transition of Care  Outcome: Progressing     Problem: Liver Failure  Goal: Optimal Coping with Liver Failure  Outcome: Progressing  Goal: Fluid and Electrolyte Balance  Outcome: Progressing  Goal: Optimal Gastrointestinal Function  Outcome: Progressing  Intervention: Monitor and Support Gastrointestinal Function  Recent Flowsheet Documentation  Taken 11/9/2023 2217 by Tamera Villegas RN  Body Position: position changed independently  Goal: Blood Glucose Level Within Target Range  Outcome: Progressing  Goal: Optimal Coagulation Function  Outcome: Progressing  Goal: Absence of Infection Signs and Symptoms  Outcome: Progressing  Intervention: Prevent or Manage Infection  Recent Flowsheet Documentation  Taken 11/9/2023 2217 by Tamera Villegas RN  Isolation Precautions: special precautions maintained  Goal: Optimal Neurologic Function  Outcome: Progressing  Goal: Improved Oral Intake  Outcome: Progressing  Goal: Optimal Pain Control, Comfort and Function  Outcome: Progressing  Goal: Optimize Renal Function  Outcome: Progressing  Goal: Effective Oxygenation and Ventilation  Outcome: Progressing  Intervention: Promote Airway Secretion Clearance  Recent Flowsheet Documentation  Taken 11/9/2023 2217 by Tamera Villegas RN  Cough And Deep Breathing: done independently per patient  Activity Management:   ambulated to bathroom   back to bed  Intervention: Optimize Oxygenation and Ventilation  Recent Flowsheet Documentation  Taken 11/9/2023 2217 by  Tamera Villegas RN  Head of Bed ([facility]) Positioning: HOB at 30 degrees     Problem: Heart Failure  Goal: Optimal Coping  Outcome: Progressing  Goal: Optimal Cardiac Output  Outcome: Progressing  Goal: Stable Heart Rate and Rhythm  Outcome: Progressing  Goal: Optimal Functional Ability  Outcome: Progressing  Intervention: Optimize Functional Ability  Recent Flowsheet Documentation  Taken 11/9/2023 2217 by Tamera Villegas RN  Activity Management:   ambulated to bathroom   back to bed  Goal: Fluid and Electrolyte Balance  Outcome: Progressing  Goal: Improved Oral Intake  Outcome: Progressing  Goal: Effective Oxygenation and Ventilation  Outcome: Progressing  Intervention: Promote Airway Secretion Clearance  Recent Flowsheet Documentation  Taken 11/9/2023 2217 by Tamera Villegas RN  Cough And Deep Breathing: done independently per patient  Activity Management:   ambulated to bathroom   back to bed  Intervention: Optimize Oxygenation and Ventilation  Recent Flowsheet Documentation  Taken 11/9/2023 2217 by Tamera Villegas RN  Head of Bed ([facility]) Positioning: HOB at 30 degrees  Goal: Effective Breathing Pattern During Sleep  Outcome: Progressing  Intervention: Monitor and Manage Obstructive Sleep Apnea  Recent Flowsheet Documentation  Taken 11/9/2023 2217 by Tamera Villegas RN  Medication Review/Management: medications reviewed

## 2023-11-11 ENCOUNTER — APPOINTMENT (OUTPATIENT)
Dept: PHYSICAL THERAPY | Facility: CLINIC | Age: 63
End: 2023-11-11
Payer: MEDICAID

## 2023-11-11 LAB
ANION GAP SERPL CALCULATED.3IONS-SCNC: 10 MMOL/L (ref 7–15)
BUN SERPL-MCNC: 38.4 MG/DL (ref 8–23)
CALCIUM SERPL-MCNC: 8.2 MG/DL (ref 8.8–10.2)
CHLORIDE SERPL-SCNC: 95 MMOL/L (ref 98–107)
CREAT SERPL-MCNC: 0.76 MG/DL (ref 0.67–1.17)
DEPRECATED HCO3 PLAS-SCNC: 24 MMOL/L (ref 22–29)
EGFRCR SERPLBLD CKD-EPI 2021: >90 ML/MIN/1.73M2
ERYTHROCYTE [DISTWIDTH] IN BLOOD BY AUTOMATED COUNT: 13.6 % (ref 10–15)
GLUCOSE BLDC GLUCOMTR-MCNC: 122 MG/DL (ref 70–99)
GLUCOSE BLDC GLUCOMTR-MCNC: 143 MG/DL (ref 70–99)
GLUCOSE BLDC GLUCOMTR-MCNC: 153 MG/DL (ref 70–99)
GLUCOSE BLDC GLUCOMTR-MCNC: 157 MG/DL (ref 70–99)
GLUCOSE BLDC GLUCOMTR-MCNC: 218 MG/DL (ref 70–99)
GLUCOSE SERPL-MCNC: 170 MG/DL (ref 70–99)
HCT VFR BLD AUTO: 34.6 % (ref 40–53)
HGB BLD-MCNC: 12.1 G/DL (ref 13.3–17.7)
MCH RBC QN AUTO: 35.5 PG (ref 26.5–33)
MCHC RBC AUTO-ENTMCNC: 35 G/DL (ref 31.5–36.5)
MCV RBC AUTO: 102 FL (ref 78–100)
PLATELET # BLD AUTO: 107 10E3/UL (ref 150–450)
POTASSIUM SERPL-SCNC: 4 MMOL/L (ref 3.4–5.3)
RBC # BLD AUTO: 3.41 10E6/UL (ref 4.4–5.9)
SODIUM SERPL-SCNC: 129 MMOL/L (ref 135–145)
WBC # BLD AUTO: 12 10E3/UL (ref 4–11)

## 2023-11-11 PROCEDURE — 85027 COMPLETE CBC AUTOMATED: CPT | Performed by: HOSPITALIST

## 2023-11-11 PROCEDURE — 250N000013 HC RX MED GY IP 250 OP 250 PS 637: Performed by: HOSPITALIST

## 2023-11-11 PROCEDURE — 97530 THERAPEUTIC ACTIVITIES: CPT | Mod: GP | Performed by: PHYSICAL THERAPIST

## 2023-11-11 PROCEDURE — 36415 COLL VENOUS BLD VENIPUNCTURE: CPT | Performed by: HOSPITALIST

## 2023-11-11 PROCEDURE — 250N000012 HC RX MED GY IP 250 OP 636 PS 637: Performed by: INTERNAL MEDICINE

## 2023-11-11 PROCEDURE — 120N000001 HC R&B MED SURG/OB

## 2023-11-11 PROCEDURE — 80048 BASIC METABOLIC PNL TOTAL CA: CPT | Performed by: HOSPITALIST

## 2023-11-11 PROCEDURE — 99232 SBSQ HOSP IP/OBS MODERATE 35: CPT | Performed by: HOSPITALIST

## 2023-11-11 PROCEDURE — 250N000013 HC RX MED GY IP 250 OP 250 PS 637: Performed by: INTERNAL MEDICINE

## 2023-11-11 RX ADMIN — ASPIRIN 81 MG: 81 TABLET, COATED ORAL at 08:54

## 2023-11-11 RX ADMIN — INSULIN ASPART 4 UNITS: 100 INJECTION, SOLUTION INTRAVENOUS; SUBCUTANEOUS at 19:37

## 2023-11-11 RX ADMIN — CARVEDILOL 1.56 MG: 3.12 TABLET, FILM COATED ORAL at 09:00

## 2023-11-11 RX ADMIN — INSULIN ASPART 9 UNITS: 100 INJECTION, SOLUTION INTRAVENOUS; SUBCUTANEOUS at 12:56

## 2023-11-11 RX ADMIN — FOLIC ACID 1 MG: 1 TABLET ORAL at 08:54

## 2023-11-11 RX ADMIN — CARVEDILOL 1.56 MG: 3.12 TABLET, FILM COATED ORAL at 18:51

## 2023-11-11 RX ADMIN — SPIRONOLACTONE 50 MG: 25 TABLET ORAL at 08:54

## 2023-11-11 RX ADMIN — SERTRALINE HYDROCHLORIDE 25 MG: 25 TABLET ORAL at 08:53

## 2023-11-11 RX ADMIN — INSULIN ASPART 8 UNITS: 100 INJECTION, SOLUTION INTRAVENOUS; SUBCUTANEOUS at 08:51

## 2023-11-11 RX ADMIN — PANTOPRAZOLE SODIUM 40 MG: 40 TABLET, DELAYED RELEASE ORAL at 16:47

## 2023-11-11 RX ADMIN — MULTIPLE VITAMINS W/ MINERALS TAB 1 TABLET: TAB at 08:53

## 2023-11-11 RX ADMIN — PANTOPRAZOLE SODIUM 40 MG: 40 TABLET, DELAYED RELEASE ORAL at 07:00

## 2023-11-11 RX ADMIN — FUROSEMIDE 40 MG: 40 TABLET ORAL at 08:54

## 2023-11-11 RX ADMIN — THIAMINE HCL TAB 100 MG 100 MG: 100 TAB at 08:53

## 2023-11-11 ASSESSMENT — ACTIVITIES OF DAILY LIVING (ADL)
ADLS_ACUITY_SCORE: 40
ADLS_ACUITY_SCORE: 42
ADLS_ACUITY_SCORE: 40
ADLS_ACUITY_SCORE: 40
ADLS_ACUITY_SCORE: 42
ADLS_ACUITY_SCORE: 40

## 2023-11-11 NOTE — PLAN OF CARE
Goal Outcome Evaluation:      Plan of Care Reviewed With: patient      Shift: 7pm-7am    Vitals: Temp: 97.4  F (36.3  C) Temp src: Oral BP: 111/74 Pulse: 80   Resp: 18 SpO2: 95 % O2 Device: None (Room air)       Orientation: intermittent confusion   Pain: denies   Tele: --  Activity: Assist of 1 w/ gb and walker   Resp: clear, on RA   Diet: mod carb diet   How to take meds: whole w/ fluids   GI & : using bedside urinal, no bm this shift   Protocol: --  BG: ACHS - 195 & 218   Skin: wound on toe unable to access, pt refused   Lines: IV on right arm saline locked   Plan: discharge Monday to TCU

## 2023-11-11 NOTE — PLAN OF CARE
Cared for [id number]     Pt A/O x 4 (can be forgetful), VSS; Pt denies pain, headache, N/V, & dizziness. Pt can be dyspneic upon exertion. Pt up Asst: 1 w/gait belt & walker. Lung sounds clear, RA. Blood glucose levels: 157, 143. Wounds on toes are open to air. Paracentetics site (R abd) - open to air. Neph, PT, OT & Social Work following. Plan to discharge to TCU after 11/13/23. Will continue with plan of care.

## 2023-11-11 NOTE — PROGRESS NOTES
[facility]    Medicine Progress Note - Hospitalist Service    Date of Admission:  10/24/2023    Assessment & Plan   Saji Iqbal is a 63 year old male with a past medical history significant for alcohol use disorder, coronary artery disease with 2 previous stents in the left anterior descending artery, diabetes mellitus, hypertension, dyslipidemia, obstructive sleep apnea, left shoulder adhesive capsulitis treated with steroid injection during last hospitalization, ischemic and alcoholic cardiomyopathy with heart failure with reduced ejection fraction.  He was admitted on 10/24/2023. He was admitted here from 8/30 to 9/11 with adhesive capsulitis of the left shoulder and alcohol withdrawal.  During that hospitalization he was also found to have new alcoholic cardiomyopathy with ejection fraction 35 to 40%.  Upon discharge home he said he was able to walk 100 feet but got quite tired.  He thought he would get better at home but has slowly continued to worsen.  He has had progressive dyspnea on exertion.  He said he could maybe walk 25 steps now before having to stop and rest.  He gets short of breath just while dressing.  He did not feel he could go up or down stairs due to weakness and dyspnea.  He has noticed some increased firmness of his abdomen but no definite swelling.  He has not noticed any lower extremity swelling and has no pain to the abdomen or chest.  His left shoulder pain is resolved since steroid injection.  His appetite is good but he is having difficulty preparing his food as he is not able to stand at the stove and therefore has not been eating much.  His daughter requested a welfare check on him and he was brought in the hospital for further evaluation and treatment.  He said he has been sober for 5 weeks since his previous hospitalization. In the ER, his vital signs were stable.  Bilirubin is 8.2 and AST is 206.  These are both stable from previous hospitalization.   Potassium 3.2 and albumin 2.7.  Chest x-ray is unremarkable.  Bedside ultrasound by ER physician showed ascites.  He is admitted for failure to thrive, severe dyspnea on exertion, new ascites secondary to decompensated end-stage liver disease, possible congstive heart failure, and severe generalized weakness.  He has now developed COVID-19 infection.     COVID-19 infection.  -SARS-CoV-2 PCR negative on 11/2.  -Previously, requiring supplemental oxygen.  -Finish 5-day course of IV remdesivir on 11/7 and course of decadron  -CT of the chest on 11/4 with small left effusion and atelectasis.  -IV Zosyn on 11/3 to cover possible bacterial sepsis, stopped after 3 days  -resolved and off oxygen, however due to precautions unable to go to TCU until 13th     Decompensated cirrhosis.  Ascites.  Alcohol use disorder.  -Had reportedly stopped drinking alcohol just recently.  -Required paracentesis on 10/25, 10/31, and 11/1.  -Needs continued alcohol cessation.  -Continue multivitamin, folic acid and thiamine.  -Continue furosemide 40 mg a day, increased spironolactone to 50 mg a day   -s/p repeat para 11/9 with 3.1L removed    Diabetes mellitus type 2.  -Hemoglobin A1c only 5.7.  -Stop glipizide.  -Blood glucose now significantly elevated with initiation of dexamethasone.  -Increased glargine insulin to 25 units a day but monitor now that off decadron  -cont carb count insulin and ISS     Hyperlipidemia.  Coronary artery disease.  Cardiomyopathy.  -Atorvastatin, carvedilol, and aspirin have been on hold.  -Echocardiogram in August showed EF 35 to 40%.  Areas of hypokinesia noted.  -Lexiscan stress test in September did not show areas of reversible ischemia.  -Continue aspirin 81 mg a day.  -Continue carvedilol 1.5265 mg twice a day.  -Continue to hold atorvastatin due to transaminitis.     Deconditioning.  Failure to thrive.  depression  -Continue to work with therapy.  -Current plan for TCU at discharge on 13th  -blunt affect,  seems depressed. Trial of low dose zoloft initiated, some nausea so dose decreased to 25 daily     Severe malnutrition.  -Registered dietitian following.     Hyponatremia.  Hypokalemia.  -Recheck metabolic panel intermittently.  -furosemide and spironolactone as above.     Diarrhea.  -Stop scheduled MiraLAX and senna S.  -C. difficile toxin negative.     Hematochezia.  -Gastroenterology consult appreciated, Hgb stable. Ok to cont ASA  -no active bleeding, plan for outpt colonoscopy      Diet: Diet  Moderate Consistent Carb (60 g CHO per Meal) Diet  Snacks/Supplements Adult: Glucerna; With Meals    DVT Prophylaxis: Pneumatic Compression Devices  Durbin Catheter: Not present  Lines: None     Cardiac Monitoring: None  Code Status: Full Code        Disposition Plan     Expected Discharge Date: 11/13/2023      Destination: inpatient rehabilitation facility              Epifanio Anthony DO  Hospitalist Service  [facility]  Securely message with [employer] (more info)  Text page via [employer] Paging/Directory   ______________________________________________________________________    Interval History   No overnight events, feels that his nausea is improved today. Awaiting placement on monday    Physical Exam   Vital Signs: Temp: 97.3  F (36.3  C) Temp src: Oral BP: 117/72 Pulse: 63   Resp: 18 SpO2: 96 % O2 Device: None (Room air)    Weight: 193 lbs 12.55 oz  Constitutional: awake, alert, and cooperative  Eyes: pupils equal, round and reactive to light and conjunctiva normal  ENT: normocepalic, without obvious abnormality, atramatic  Respiratory: no increased work of breathing, good air exchange, and clear to auscultation  Cardiovascular: regular rate and rhythm and no murmur noted  GI: normal bowel sounds, soft, and non-distended  Skin: no bleeding noted, scattered bruises on extremeties  Neurologic: alert, oriented x4, weak appearing, blunted affect    45 MINUTES SPENT BY ME on the date of service doing  chart review, history, exam, documentation & further activities per the note.      Data   ------------------------- PAST 24 HR DATA REVIEWED -----------------------------------------------    I have personally reviewed the following data over the past 24 hrs:    12.0 (H)  \   12.1 (L)   / 107 (L)     129 (L) 95 (L) 38.4 (H) /  157 (H)   4.0 24 0.76 \       Imaging results reviewed over the past 24 hrs:   No results found for this or any previous visit (from the past 24 hour(s)).

## 2023-11-12 LAB
ANION GAP SERPL CALCULATED.3IONS-SCNC: 8 MMOL/L (ref 7–15)
BUN SERPL-MCNC: 34.3 MG/DL (ref 8–23)
CALCIUM SERPL-MCNC: 7.7 MG/DL (ref 8.8–10.2)
CHLORIDE SERPL-SCNC: 95 MMOL/L (ref 98–107)
CREAT SERPL-MCNC: 0.76 MG/DL (ref 0.67–1.17)
DEPRECATED HCO3 PLAS-SCNC: 25 MMOL/L (ref 22–29)
EGFRCR SERPLBLD CKD-EPI 2021: >90 ML/MIN/1.73M2
ERYTHROCYTE [DISTWIDTH] IN BLOOD BY AUTOMATED COUNT: 13.5 % (ref 10–15)
GLUCOSE BLDC GLUCOMTR-MCNC: 107 MG/DL (ref 70–99)
GLUCOSE BLDC GLUCOMTR-MCNC: 119 MG/DL (ref 70–99)
GLUCOSE BLDC GLUCOMTR-MCNC: 156 MG/DL (ref 70–99)
GLUCOSE BLDC GLUCOMTR-MCNC: 162 MG/DL (ref 70–99)
GLUCOSE BLDC GLUCOMTR-MCNC: 185 MG/DL (ref 70–99)
GLUCOSE BLDC GLUCOMTR-MCNC: 86 MG/DL (ref 70–99)
GLUCOSE SERPL-MCNC: 111 MG/DL (ref 70–99)
HCT VFR BLD AUTO: 36.4 % (ref 40–53)
HGB BLD-MCNC: 12.4 G/DL (ref 13.3–17.7)
MCH RBC QN AUTO: 34.9 PG (ref 26.5–33)
MCHC RBC AUTO-ENTMCNC: 34.1 G/DL (ref 31.5–36.5)
MCV RBC AUTO: 103 FL (ref 78–100)
PLATELET # BLD AUTO: 100 10E3/UL (ref 150–450)
POTASSIUM SERPL-SCNC: 3.7 MMOL/L (ref 3.4–5.3)
RBC # BLD AUTO: 3.55 10E6/UL (ref 4.4–5.9)
SODIUM SERPL-SCNC: 128 MMOL/L (ref 135–145)
WBC # BLD AUTO: 13.4 10E3/UL (ref 4–11)

## 2023-11-12 PROCEDURE — 250N000013 HC RX MED GY IP 250 OP 250 PS 637: Performed by: INTERNAL MEDICINE

## 2023-11-12 PROCEDURE — 99232 SBSQ HOSP IP/OBS MODERATE 35: CPT | Performed by: HOSPITALIST

## 2023-11-12 PROCEDURE — 120N000001 HC R&B MED SURG/OB

## 2023-11-12 PROCEDURE — 85027 COMPLETE CBC AUTOMATED: CPT | Performed by: HOSPITALIST

## 2023-11-12 PROCEDURE — 80048 BASIC METABOLIC PNL TOTAL CA: CPT | Performed by: HOSPITALIST

## 2023-11-12 PROCEDURE — 36415 COLL VENOUS BLD VENIPUNCTURE: CPT | Performed by: HOSPITALIST

## 2023-11-12 PROCEDURE — 250N000013 HC RX MED GY IP 250 OP 250 PS 637: Performed by: HOSPITALIST

## 2023-11-12 RX ORDER — PANTOPRAZOLE SODIUM 40 MG/1
40 TABLET, DELAYED RELEASE ORAL
Status: DISCONTINUED | OUTPATIENT
Start: 2023-11-12 | End: 2023-11-14 | Stop reason: HOSPADM

## 2023-11-12 RX ORDER — FUROSEMIDE 40 MG
40 TABLET ORAL DAILY
DISCHARGE
Start: 2023-11-12 | End: 2023-11-14

## 2023-11-12 RX ORDER — PANTOPRAZOLE SODIUM 40 MG/1
40 TABLET, DELAYED RELEASE ORAL DAILY
DISCHARGE
Start: 2023-11-12

## 2023-11-12 RX ORDER — FOLIC ACID 1 MG/1
1 TABLET ORAL DAILY
DISCHARGE
Start: 2023-11-12

## 2023-11-12 RX ORDER — SERTRALINE HYDROCHLORIDE 25 MG/1
25 TABLET, FILM COATED ORAL DAILY
DISCHARGE
Start: 2023-11-12

## 2023-11-12 RX ORDER — SPIRONOLACTONE 25 MG/1
25 TABLET ORAL DAILY
DISCHARGE
Start: 2023-11-12 | End: 2024-04-16 | Stop reason: SINTOL

## 2023-11-12 RX ORDER — MULTIPLE VITAMINS W/ MINERALS TAB 9MG-400MCG
1 TAB ORAL DAILY
DISCHARGE
Start: 2023-11-12

## 2023-11-12 RX ORDER — CARVEDILOL 3.12 MG/1
1.56 TABLET ORAL 2 TIMES DAILY WITH MEALS
DISCHARGE
Start: 2023-11-12

## 2023-11-12 RX ORDER — LANOLIN ALCOHOL/MO/W.PET/CERES
100 CREAM (GRAM) TOPICAL DAILY
DISCHARGE
Start: 2023-11-12 | End: 2024-07-29

## 2023-11-12 RX ORDER — GLIPIZIDE 10 MG/1
10 TABLET, FILM COATED, EXTENDED RELEASE ORAL DAILY
Status: DISCONTINUED | OUTPATIENT
Start: 2023-11-12 | End: 2023-11-14 | Stop reason: HOSPADM

## 2023-11-12 RX ADMIN — PSYLLIUM HUSK 1 PACKET: 3.4 POWDER ORAL at 10:01

## 2023-11-12 RX ADMIN — CARVEDILOL 1.56 MG: 3.12 TABLET, FILM COATED ORAL at 09:55

## 2023-11-12 RX ADMIN — INSULIN ASPART 7 UNITS: 100 INJECTION, SOLUTION INTRAVENOUS; SUBCUTANEOUS at 09:56

## 2023-11-12 RX ADMIN — PANTOPRAZOLE SODIUM 40 MG: 40 TABLET, DELAYED RELEASE ORAL at 09:56

## 2023-11-12 RX ADMIN — MULTIPLE VITAMINS W/ MINERALS TAB 1 TABLET: TAB at 09:55

## 2023-11-12 RX ADMIN — Medication 5 MG: at 22:00

## 2023-11-12 RX ADMIN — HYDROXYZINE HYDROCHLORIDE 50 MG: 50 TABLET, FILM COATED ORAL at 22:01

## 2023-11-12 RX ADMIN — GLIPIZIDE 10 MG: 10 TABLET, EXTENDED RELEASE ORAL at 09:55

## 2023-11-12 RX ADMIN — SPIRONOLACTONE 50 MG: 25 TABLET ORAL at 09:55

## 2023-11-12 RX ADMIN — THIAMINE HCL TAB 100 MG 100 MG: 100 TAB at 09:56

## 2023-11-12 RX ADMIN — FOLIC ACID 1 MG: 1 TABLET ORAL at 09:56

## 2023-11-12 RX ADMIN — ASPIRIN 81 MG: 81 TABLET, COATED ORAL at 09:56

## 2023-11-12 RX ADMIN — FUROSEMIDE 40 MG: 40 TABLET ORAL at 09:55

## 2023-11-12 RX ADMIN — INSULIN ASPART 6 UNITS: 100 INJECTION, SOLUTION INTRAVENOUS; SUBCUTANEOUS at 12:50

## 2023-11-12 RX ADMIN — SERTRALINE HYDROCHLORIDE 25 MG: 25 TABLET ORAL at 09:56

## 2023-11-12 ASSESSMENT — ACTIVITIES OF DAILY LIVING (ADL)
ADLS_ACUITY_SCORE: 39
ADLS_ACUITY_SCORE: 39
ADLS_ACUITY_SCORE: 40
ADLS_ACUITY_SCORE: 39
ADLS_ACUITY_SCORE: 39
ADLS_ACUITY_SCORE: 40
ADLS_ACUITY_SCORE: 39
ADLS_ACUITY_SCORE: 40

## 2023-11-12 NOTE — PLAN OF CARE
"Goal Outcome Evaluation:      Plan of Care Reviewed With: patient    Overall Patient Progress: improving     Temp: 98  F (36.7  C) Temp src: Oral BP: 106/71 Pulse: 65   Resp: 18 SpO2: 96 % O2 Device: None (Room air)       A/O x4, w/ some forgetfulness. VSS, afebrile. LS dim and clear, on RA. No c/o pain, c/p, n/v, dizziness, SOB. PIV on right arm is patent and SL.  and 153, insulin given per protocols. Had one medium BM this shift. Assist of 1 gb wk, uses urinal @ bedside. Special precautions maintained. PT and OT following. Plan to discharge to TCU on Monday. Will continue POC.     Problem: Adult Inpatient Plan of Care  Goal: Plan of Care Review  Description: The Plan of Care Review/Shift note should be completed every shift.  The Outcome Evaluation is a brief statement about your assessment that the patient is improving, declining, or no change.  This information will be displayed automatically on your shift  note.  Outcome: Progressing  Flowsheets (Taken 11/11/2023 2154)  Plan of Care Reviewed With: patient  Overall Patient Progress: improving  Goal: Patient-Specific Goal (Individualized)  Description: You can add care plan individualizations to a care plan. Examples of Individualization might be:  \"Parent requests to be called daily at 9am for status\", \"I have a hard time hearing out of my right ear\", or \"Do not touch me to wake me up as it startles  me\".  Outcome: Progressing  Goal: Absence of Hospital-Acquired Illness or Injury  Outcome: Progressing  Intervention: Identify and Manage Fall Risk  Recent Flowsheet Documentation  Taken 11/11/2023 1651 by Iveth Parsons, RN  Safety Promotion/Fall Prevention:   activity supervised   assistive device/personal items within reach   clutter free environment maintained   increase visualization of patient   lighting adjusted   mobility aid in reach   nonskid shoes/slippers when out of bed   patient and family education   room near nurse's station   room organization " consistent   safety round/check completed   supervised activity  Intervention: Prevent Skin Injury  Recent Flowsheet Documentation  Taken 11/11/2023 1651 by Iveth Parsons RN  Body Position: position changed independently  Intervention: Prevent and Manage VTE (Venous Thromboembolism) Risk  Recent Flowsheet Documentation  Taken 11/11/2023 1651 by Iveth Parsons RN  VTE Prevention/Management: SCDs (sequential compression devices) off  Intervention: Prevent Infection  Recent Flowsheet Documentation  Taken 11/11/2023 1651 by Iveth Parsons RN  Infection Prevention:   single patient room provided   personal protective equipment utilized   hand hygiene promoted   equipment surfaces disinfected   visitors restricted/screened  Goal: Optimal Comfort and Wellbeing  Outcome: Progressing  Goal: Readiness for Transition of Care  Outcome: Progressing     Problem: Liver Failure  Goal: Optimal Coping with Liver Failure  Outcome: Progressing  Intervention: Support Response to Liver Disease  Recent Flowsheet Documentation  Taken 11/11/2023 1651 by Iveth Parsons RN  Supportive Measures:   active listening utilized   self-care encouraged   positive reinforcement provided   verbalization of feelings encouraged  Goal: Fluid and Electrolyte Balance  Outcome: Progressing  Goal: Optimal Gastrointestinal Function  Outcome: Progressing  Intervention: Monitor and Support Gastrointestinal Function  Recent Flowsheet Documentation  Taken 11/11/2023 1651 by Iveth Parsons RN  Body Position: position changed independently  Goal: Blood Glucose Level Within Target Range  Outcome: Progressing  Goal: Optimal Coagulation Function  Outcome: Progressing  Goal: Absence of Infection Signs and Symptoms  Outcome: Progressing  Intervention: Prevent or Manage Infection  Recent Flowsheet Documentation  Taken 11/11/2023 1651 by Iveth Parsons RN  Isolation Precautions: special precautions maintained  Goal: Optimal Neurologic Function  Outcome: Progressing  Goal: Improved  Oral Intake  Outcome: Progressing  Goal: Optimal Pain Control, Comfort and Function  Outcome: Progressing  Goal: Optimize Renal Function  Outcome: Progressing  Goal: Effective Oxygenation and Ventilation  Outcome: Progressing  Intervention: Promote Airway Secretion Clearance  Recent Flowsheet Documentation  Taken 11/11/2023 1651 by Iveth Parsons RN  Cough And Deep Breathing: done independently per patient  Activity Management: activity adjusted per tolerance  Intervention: Optimize Oxygenation and Ventilation  Recent Flowsheet Documentation  Taken 11/11/2023 1651 by Iveth Parsons RN  Head of Bed (HOB) Positioning: HOB at 20-30 degrees     Problem: Heart Failure  Goal: Optimal Coping  Outcome: Progressing  Intervention: Support Psychosocial Response  Recent Flowsheet Documentation  Taken 11/11/2023 1651 by Iveth Parsons RN  Supportive Measures:   active listening utilized   self-care encouraged   positive reinforcement provided   verbalization of feelings encouraged  Goal: Optimal Cardiac Output  Outcome: Progressing  Intervention: Optimize Cardiac Output  Recent Flowsheet Documentation  Taken 11/11/2023 1651 by Iveth Parsons RN  Environmental Support:   calm environment promoted   rest periods encouraged  Goal: Stable Heart Rate and Rhythm  Outcome: Progressing  Goal: Optimal Functional Ability  Outcome: Progressing  Intervention: Optimize Functional Ability  Recent Flowsheet Documentation  Taken 11/11/2023 1651 by Iveth Parsons RN  Activity Management: activity adjusted per tolerance  Goal: Fluid and Electrolyte Balance  Outcome: Progressing  Goal: Improved Oral Intake  Outcome: Progressing  Goal: Effective Oxygenation and Ventilation  Outcome: Progressing  Intervention: Promote Airway Secretion Clearance  Recent Flowsheet Documentation  Taken 11/11/2023 1651 by Iveth Parsons RN  Cough And Deep Breathing: done independently per patient  Activity Management: activity adjusted per tolerance  Intervention: Optimize  Oxygenation and Ventilation  Recent Flowsheet Documentation  Taken 11/11/2023 1651 by Iveth Parsons, RN  Head of Bed (HOB) Positioning: HOB at 20-30 degrees  Goal: Effective Breathing Pattern During Sleep  Outcome: Progressing  Intervention: Monitor and Manage Obstructive Sleep Apnea  Recent Flowsheet Documentation  Taken 11/11/2023 1651 by Iveth Parsons, RN  Medication Review/Management: medications reviewed

## 2023-11-12 NOTE — PROGRESS NOTES
[facility]    Medicine Progress Note - Hospitalist Service    Date of Admission:  10/24/2023    Assessment & Plan   Saji Iqbal is a 63 year old male with a past medical history significant for alcohol use disorder, coronary artery disease with 2 previous stents in the left anterior descending artery, diabetes mellitus, hypertension, dyslipidemia, obstructive sleep apnea, left shoulder adhesive capsulitis treated with steroid injection during last hospitalization, ischemic and alcoholic cardiomyopathy with heart failure with reduced ejection fraction.  He was admitted on 10/24/2023. He was admitted here from 8/30 to 9/11 with adhesive capsulitis of the left shoulder and alcohol withdrawal.  During that hospitalization he was also found to have new alcoholic cardiomyopathy with ejection fraction 35 to 40%.  Upon discharge home he said he was able to walk 100 feet but got quite tired.  He thought he would get better at home but has slowly continued to worsen.  He has had progressive dyspnea on exertion.  He said he could maybe walk 25 steps now before having to stop and rest.  He gets short of breath just while dressing.  He did not feel he could go up or down stairs due to weakness and dyspnea.  He has noticed some increased firmness of his abdomen but no definite swelling.  He has not noticed any lower extremity swelling and has no pain to the abdomen or chest.  His left shoulder pain is resolved since steroid injection.  His appetite is good but he is having difficulty preparing his food as he is not able to stand at the stove and therefore has not been eating much.  His daughter requested a welfare check on him and he was brought in the hospital for further evaluation and treatment.  He said he has been sober for 5 weeks since his previous hospitalization. In the ER, his vital signs were stable.  Bilirubin is 8.2 and AST is 206.  These are both stable from previous hospitalization.   Potassium 3.2 and albumin 2.7.  Chest x-ray is unremarkable.  Bedside ultrasound by ER physician showed ascites.  He is admitted for failure to thrive, severe dyspnea on exertion, new ascites secondary to decompensated end-stage liver disease, possible congstive heart failure, and severe generalized weakness.  He has now developed COVID-19 infection.     COVID-19 infection.  -SARS-CoV-2 PCR negative on 11/2.  -Previously, requiring supplemental oxygen.  -Finished 5-day course of IV remdesivir on 11/7 and course of decadron  -CT of the chest on 11/4 with small left effusion and atelectasis.  -IV Zosyn on 11/3 to cover possible bacterial sepsis, stopped after 3 days  -resolved and off oxygen, however due to precautions unable to go to TCU until 13th     Decompensated cirrhosis.  Ascites.  Alcohol use disorder.  -Had reportedly stopped drinking alcohol just recently.  -Required paracentesis on 10/25, 10/31, and 11/1.  -Needs continued alcohol cessation.  -Continue multivitamin, folic acid and thiamine.  -Continue furosemide 40 mg a day, increased spironolactone to 50 mg a day   -s/p repeat para 11/9 with 3.1L removed    Diabetes mellitus type 2.  -Hemoglobin A1c only 5.7.  -cont carb count insulin and ISS  -off decadron now so stop lantus and resume home glipizide today     Hyperlipidemia.  Coronary artery disease.  Cardiomyopathy.  -Atorvastatin, carvedilol, and aspirin have been on hold.  -Echocardiogram in August showed EF 35 to 40%.  Areas of hypokinesia noted.  -Lexiscan stress test in September did not show areas of reversible ischemia.  -Continue aspirin 81 mg a day.  -home coreg dose reduced due to borderline hypotension  -home atorvastatin held on discharge due to transaminitis.     Deconditioning.  Failure to thrive.  depression  -Continue to work with therapy.  -Current plan for TCU at discharge on 13th  -blunt affect, seems depressed. Trial of low dose zoloft initiated, some nausea so dose decreased to 25  daily     Severe malnutrition.  -Registered dietitian following.     Hyponatremia.  Hypokalemia.  -Recheck metabolic panel intermittently.  -furosemide and spironolactone as above.     Diarrhea.  -Stop scheduled MiraLAX and senna S.  -C. difficile toxin negative.     Hematochezia.  -Gastroenterology consult appreciated, Hgb stable. Ok to cont ASA  -no active bleeding, plan for outpt colonoscopy        Diet: Diet  Moderate Consistent Carb (60 g CHO per Meal) Diet  Snacks/Supplements Adult: Glucerna; With Meals    DVT Prophylaxis: Pneumatic Compression Devices  Durbin Catheter: Not present  Lines: None     Cardiac Monitoring: None  Code Status: Full Code        Disposition Plan     Expected Discharge Date: 11/13/2023      Destination: inpatient rehabilitation facility              Epifanio Anthony DO  Hospitalist Service  [facility]  Securely message with [employer] (more info)  Text page via [employer] Paging/Directory   ______________________________________________________________________    Interval History   No significant overnight events, had some difficulty sleeping so will increase his melatonin. Eating and drinking well, no complaints    Physical Exam   Vital Signs: Temp: 98  F (36.7  C) Temp src: Oral BP: 108/72 Pulse: 70   Resp: 18 SpO2: 97 % O2 Device: None (Room air)    Weight: 193 lbs 12.55 oz  Constitutional: awake, alert, and cooperative  Eyes: pupils equal, round and reactive to light and conjunctiva normal  ENT: normocepalic, without obvious abnormality, atramatic  Respiratory: no increased work of breathing, good air exchange, and clear to auscultation  Cardiovascular: regular rate and rhythm and no murmur noted  GI: normal bowel sounds, soft, and non-distended  Skin: no bleeding noted, scattered bruises on extremeties  Neurologic: alert, oriented x4, weak appearing, blunted affect    45 MINUTES SPENT BY ME on the date of service doing chart review, history, exam, documentation &  further activities per the note.      Data   ------------------------- PAST 24 HR DATA REVIEWED -----------------------------------------------    I have personally reviewed the following data over the past 24 hrs:    13.4 (H)  \   12.4 (L)   / 100 (L)     128 (L) 95 (L) 34.3 (H) /  107 (H)   3.7 25 0.76 \       Imaging results reviewed over the past 24 hrs:   No results found for this or any previous visit (from the past 24 hour(s)).

## 2023-11-12 NOTE — PLAN OF CARE
Cared for [id number]     Pt A/O x 4 (can be forgetful), VSS; Pt denies pain, headache, N/V, & dizziness. Pt can be dyspneic upon exertion. Pt up Asst: 1 w/gait belt & walker. Lung sounds clear, RA. Blood glucose levels: 107, 185. Wounds on toes are open to air. Paracentetics site (R abd) - open to air. Neph, PT, OT & Social Work following. Plan to discharge to TCU after 11/13/23. Will continue with plan of care.    Pt reports difficulty with sleep overnight - writer suggested PRN Melatonin and Atrax for tonight.

## 2023-11-12 NOTE — PLAN OF CARE
Goal Outcome Evaluation:      Plan of Care Reviewed With: patient    Overall Patient Progress: no changeOverall Patient Progress: no change    Outcome Evaluation: Pt A/O x 4  forgetful at times, c/o weakness VSS on RA; Pt denies pain, headache, N/V, & dizziness.  Plan to discharge to TCU after 11/13/23. Will continue with plan of care.    Temp: 98  F (36.7  C) Temp src: Oral BP: 91/67 Pulse: 71   Resp: 20 SpO2: 95 % O2 Device: None (Room air)

## 2023-11-13 ENCOUNTER — APPOINTMENT (OUTPATIENT)
Dept: OCCUPATIONAL THERAPY | Facility: CLINIC | Age: 63
End: 2023-11-13
Payer: MEDICAID

## 2023-11-13 LAB
GLUCOSE BLDC GLUCOMTR-MCNC: 111 MG/DL (ref 70–99)
GLUCOSE BLDC GLUCOMTR-MCNC: 162 MG/DL (ref 70–99)
GLUCOSE BLDC GLUCOMTR-MCNC: 183 MG/DL (ref 70–99)
GLUCOSE BLDC GLUCOMTR-MCNC: 193 MG/DL (ref 70–99)
GLUCOSE BLDC GLUCOMTR-MCNC: 218 MG/DL (ref 70–99)

## 2023-11-13 PROCEDURE — 250N000013 HC RX MED GY IP 250 OP 250 PS 637: Performed by: INTERNAL MEDICINE

## 2023-11-13 PROCEDURE — 97535 SELF CARE MNGMENT TRAINING: CPT | Mod: GO | Performed by: OCCUPATIONAL THERAPIST

## 2023-11-13 PROCEDURE — 97110 THERAPEUTIC EXERCISES: CPT | Mod: GO | Performed by: OCCUPATIONAL THERAPIST

## 2023-11-13 PROCEDURE — 120N000001 HC R&B MED SURG/OB

## 2023-11-13 PROCEDURE — 250N000013 HC RX MED GY IP 250 OP 250 PS 637: Performed by: HOSPITALIST

## 2023-11-13 PROCEDURE — 99232 SBSQ HOSP IP/OBS MODERATE 35: CPT | Performed by: INTERNAL MEDICINE

## 2023-11-13 RX ORDER — SENNOSIDES 8.6 MG
1 TABLET ORAL 2 TIMES DAILY
Status: DISCONTINUED | OUTPATIENT
Start: 2023-11-13 | End: 2023-11-14 | Stop reason: HOSPADM

## 2023-11-13 RX ORDER — POLYETHYLENE GLYCOL 3350 17 G/17G
17 POWDER, FOR SOLUTION ORAL 2 TIMES DAILY PRN
Status: DISCONTINUED | OUTPATIENT
Start: 2023-11-13 | End: 2023-11-14 | Stop reason: HOSPADM

## 2023-11-13 RX ADMIN — INSULIN ASPART 5 UNITS: 100 INJECTION, SOLUTION INTRAVENOUS; SUBCUTANEOUS at 13:30

## 2023-11-13 RX ADMIN — FOLIC ACID 1 MG: 1 TABLET ORAL at 09:04

## 2023-11-13 RX ADMIN — CARVEDILOL 1.56 MG: 3.12 TABLET, FILM COATED ORAL at 18:20

## 2023-11-13 RX ADMIN — INSULIN ASPART: 100 INJECTION, SOLUTION INTRAVENOUS; SUBCUTANEOUS at 09:14

## 2023-11-13 RX ADMIN — THIAMINE HCL TAB 100 MG 100 MG: 100 TAB at 09:04

## 2023-11-13 RX ADMIN — BENZONATATE 100 MG: 100 CAPSULE ORAL at 21:48

## 2023-11-13 RX ADMIN — FUROSEMIDE 40 MG: 40 TABLET ORAL at 09:04

## 2023-11-13 RX ADMIN — ACETAMINOPHEN 325 MG: 325 TABLET, FILM COATED ORAL at 10:59

## 2023-11-13 RX ADMIN — SENNOSIDES 1 TABLET: 8.6 TABLET, FILM COATED ORAL at 11:00

## 2023-11-13 RX ADMIN — SERTRALINE HYDROCHLORIDE 25 MG: 25 TABLET ORAL at 09:04

## 2023-11-13 RX ADMIN — MULTIPLE VITAMINS W/ MINERALS TAB 1 TABLET: TAB at 09:01

## 2023-11-13 RX ADMIN — ASPIRIN 81 MG: 81 TABLET, COATED ORAL at 09:04

## 2023-11-13 RX ADMIN — CARVEDILOL 1.56 MG: 3.12 TABLET, FILM COATED ORAL at 09:17

## 2023-11-13 RX ADMIN — SENNOSIDES 1 TABLET: 8.6 TABLET, FILM COATED ORAL at 21:22

## 2023-11-13 RX ADMIN — PANTOPRAZOLE SODIUM 40 MG: 40 TABLET, DELAYED RELEASE ORAL at 09:04

## 2023-11-13 RX ADMIN — BENZONATATE 100 MG: 100 CAPSULE ORAL at 11:00

## 2023-11-13 RX ADMIN — SPIRONOLACTONE 50 MG: 25 TABLET ORAL at 09:00

## 2023-11-13 RX ADMIN — GUAIFENESIN 200 MG: 200 SOLUTION ORAL at 09:04

## 2023-11-13 RX ADMIN — GLIPIZIDE 10 MG: 10 TABLET, EXTENDED RELEASE ORAL at 09:04

## 2023-11-13 RX ADMIN — INSULIN ASPART 3 UNITS: 100 INJECTION, SOLUTION INTRAVENOUS; SUBCUTANEOUS at 18:13

## 2023-11-13 ASSESSMENT — ACTIVITIES OF DAILY LIVING (ADL)
ADLS_ACUITY_SCORE: 39
ADLS_ACUITY_SCORE: 37
ADLS_ACUITY_SCORE: 38
ADLS_ACUITY_SCORE: 39
ADLS_ACUITY_SCORE: 39

## 2023-11-13 NOTE — PLAN OF CARE
Goal Outcome Evaluation:      Plan of Care Reviewed With: patient      Pt A&O x 4. VSS. Assist x 1 with gait belt and walker. On room air. LS clear and diminished. BS+a and, distended. C/0 abdominal pain and urinary hesitancy. Bladder scan volume  481. Strait cath 500 ml. PRN Tylenol given for pain. Special precaution for COVID 19 discontinued by provider. On Lasix. PIV on R arm SL. Paracentesis 11/9/23 and 3100 ml was removed. Has hx of ETOH and Ascites.    , 183 and 218. Sliding scale and carb count insulin given

## 2023-11-13 NOTE — PROGRESS NOTES
[facility]    Medicine Progress Note - Hospitalist Service    Date of Admission:  10/24/2023    Assessment & Plan   Saji Iqbal is a 63 year old male with a past medical history significant for alcohol use disorder, coronary artery disease with 2 previous stents in the left anterior descending artery, diabetes mellitus, hypertension, dyslipidemia, obstructive sleep apnea, left shoulder adhesive capsulitis treated with steroid injection during last hospitalization, ischemic and alcoholic cardiomyopathy with heart failure with reduced ejection fraction.  He was admitted on 10/24/2023. He was admitted here from 8/30 to 9/11 with adhesive capsulitis of the left shoulder and alcohol withdrawal.  During that hospitalization he was also found to have new alcoholic cardiomyopathy with ejection fraction 35 to 40%.  Upon discharge home he said he was able to walk 100 feet but got quite tired.  He thought he would get better at home but has slowly continued to worsen.  He has had progressive dyspnea on exertion.  He said he could maybe walk 25 steps now before having to stop and rest.  He gets short of breath just while dressing.  He did not feel he could go up or down stairs due to weakness and dyspnea.  He has noticed some increased firmness of his abdomen but no definite swelling.  He has not noticed any lower extremity swelling and has no pain to the abdomen or chest.  His left shoulder pain is resolved since steroid injection.  His appetite is good but he is having difficulty preparing his food as he is not able to stand at the stove and therefore has not been eating much.  His daughter requested a welfare check on him and he was brought in the hospital for further evaluation and treatment.  He said he has been sober for 5 weeks since his previous hospitalization. In the ER, his vital signs were stable.  Bilirubin is 8.2 and AST is 206.  These are both stable from previous hospitalization.   Potassium 3.2 and albumin 2.7.  Chest x-ray is unremarkable.  Bedside ultrasound by ER physician showed ascites.  He is admitted for failure to thrive, severe dyspnea on exertion, new ascites secondary to decompensated end-stage liver disease, possible congstive heart failure, and severe generalized weakness.  He has now developed COVID-19 infection.     COVID-19 infection. recovered  -SARS-CoV-2 PCR negative on 11/2, isolation discontinued 11/13.  -Previously, requiring supplemental oxygen, currently on room air.  -Completed 5-day course of IV remdesivir on 11/7 and course of decadron  -CT of the chest on 11/4 with small left effusion and atelectasis.  -IV Zosyn on 11/3 to cover possible bacterial sepsis, stopped after 3 days     Decompensated cirrhosis.  Ascites.  Alcohol use disorder.  -Had reportedly stopped drinking alcohol recently.  -Required paracentesis on 10/25, 10/31, and 11/1.  -Needs continued alcohol cessation.  -Continue multivitamin, folic acid and thiamine.  -Continue furosemide 40 mg a day, increased spironolactone to 50 mg a day   -S/p repeat paracentesis on  11/9 with 3.1L removed    DM II.  -Hemoglobin A1c only 5.7.  -cont carb count insulin and ISS  -Continue oral medications, including glipizide.     Hyperlipidemia.  CAD.  Cardiomyopathy.  -Atorvastatin, carvedilol, and aspirin have been on hold.  -Echocardiogram in August showed EF 35 to 40%.  Areas of hypokinesia noted.  -Lexiscan stress test in September did not show areas of reversible ischemia.  -Continue aspirin 81 mg a day.  -home coreg dose reduced due to borderline hypotension  -home atorvastatin held on discharge due to transaminitis.     Deconditioning.  Failure to thrive.  depression  -Continue to work with therapy.  -Current plan for TCU at discharge on 13th  -blunt affect, seems depressed. Trial of low dose zoloft initiated, some nausea so dose decreased to 25 daily     Severe malnutrition.  -Registered dietitian following.    "  Hyponatremia.  Hypokalemia.  -Recheck metabolic panel intermittently.  -furosemide and spironolactone as above.     Diarrhea.  Now with constipation  -Stop scheduled MiraLAX and senna S.  -C. difficile toxin negative.  -Started on Miralaax and senna.    ?Urinary retention:  C/o of difficulty to urinate.  -Will use intermittent catheterization if bladder scan is >500.  -May need outpatient Urology evaluation if it does not resolve.    Hematochezia.  -Gastroenterology consult appreciated, Hgb stable. Ok to cont ASA  -No active bleeding, plan for outpt colonoscopy.    Clinically Significant Risk Factors         # Hyponatremia: Lowest Na = 128 mmol/L in last 2 days, will monitor as appropriate      # Hypoalbuminemia: Lowest albumin = 2.4 g/dL at 11/5/2023  7:13 AM, will monitor as appropriate   # Thrombocytopenia: Lowest platelets = 100 in last 2 days, will monitor for bleeding   # Hypertension: Noted on problem list  # Chronic heart failure with reduced ejection fraction: last echo with EF <40%       # Overweight: Estimated body mass index is 27.04 kg/m  as calculated from the following:    Height as of this encounter: 1.803 m (5' 10.98\").    Weight as of this encounter: 87.9 kg (193 lb 12.6 oz).   # Severe Malnutrition: based on nutrition assessment    # Financial/Environmental Concerns:                 Diet: Diet  Moderate Consistent Carb (60 g CHO per Meal) Diet  Snacks/Supplements Adult: Glucerna; With Meals    DVT Prophylaxis: Pneumatic Compression Devices  Durbin Catheter: Not present  Lines: None     Cardiac Monitoring: None  Code Status: Full Code        Disposition Plan     Expected Discharge Date: 11/14/2023 , if he continues to improve, abdominal pain is better, has bowel movement and urinary issues resolves.  Destination: [facility]            Marcus Medrano MD  Hospitalist Service  [facility]  Securely message with [employer] (more info)  Text page via [employer] Paging/Directory "   ______________________________________________________________________    Interval History   Patient seen and examined, assumed care today, complaining of abdominal pain, difficulty to move his bowels, also urinary difficulties.  No nausea or vomiting, tolerating oral intake.  States that he was straining to pee many times but was not successful.  Discussed with RN, will get repeat bladder scan and if more than 500 ml, will use intermittent catheterization.    Physical Exam   Vital Signs: Temp: 98  F (36.7  C) Temp src: Oral BP: 122/74 Pulse: 84   Resp: 18 SpO2: 95 % O2 Device: None (Room air)    Weight: 193 lbs 12.55 oz  GENERAL: Awake, alert, and cooperative  Eyes: pupils equal, round and reactive to light and conjunctiva normal  ENT: normocepalic, without obvious abnormality, atramatic  CHEST: No increased work of breathing, good air exchange, and clear to auscultation  CVS: regular rate and rhythm and no murmur noted  GI: normal bowel sounds, soft, and non-distended  Skin: no bleeding noted, scattered bruises on extremeties  Neurologic: alert, oriented x4, weak appearing, blunted affect    40 MINUTES SPENT BY ME on the date of service doing chart review, history, exam, documentation & further activities per the note.      Data   ------------------------- PAST 24 HR DATA REVIEWED -----------------------------------------------.  Recent Labs   Lab 11/12/23  0741 11/11/23  0717 11/10/23  0722   WBC 13.4* 12.0* 14.1*   HGB 12.4* 12.1* 12.4*   HCT 36.4* 34.6* 35.8*   * 102* 101*   * 107* 134*     Recent Labs   Lab 11/13/23  1224 11/13/23  0722 11/13/23  0159 11/12/23  0811 11/12/23  0741 11/11/23  0811 11/11/23  0717 11/10/23  0732 11/10/23  0722   NA  --   --   --   --  128*  --  129*  --  132*   POTASSIUM  --   --   --   --  3.7  --  4.0  --  4.4   CHLORIDE  --   --   --   --  95*  --  95*  --  97*   CO2  --   --   --   --  25  --  24  --  24   ANIONGAP  --   --   --   --  8  --  10  --  11   GLC  183* 162* 111*   < > 111*   < > 170*   < > 148*   BUN  --   --   --   --  34.3*  --  38.4*  --  34.7*   CR  --   --   --   --  0.76  --  0.76  --  0.74   GFRESTIMATED  --   --   --   --  >90  --  >90  --  >90   BENY  --   --   --   --  7.7*  --  8.2*  --  8.3*    < > = values in this interval not displayed.            Imaging results reviewed over the past 24 hrs:   No results found for this or any previous visit (from the past 24 hour(s)).

## 2023-11-13 NOTE — PLAN OF CARE
"Goal Outcome Evaluation:      Plan of Care Reviewed With: patient    Overall Patient Progress: no change    Temp: 98  F (36.7  C) Temp src: Oral BP: 95/71 Pulse: 71   Resp: 20 SpO2: 95 % O2 Device: None (Room air)        A/O x4, forgetful at times. VSS except soft BP, scheduled Coreg held per MD order. LS clear, on RA. No c/o pain, c/p, dizziness, n/v. Pt didn't eat dinner, poor appetite. BG 86, juice given, rechecked 156 and 162. Melatonin and Atarax given @ hs for sleeping difficulty. Wounds on toes are open to air. Assist of 1 gb & wk, uses urinal @ bedside. Plan for possible discharge to TCU on Monday. Will continue POC.     Problem: Adult Inpatient Plan of Care  Goal: Plan of Care Review  Description: The Plan of Care Review/Shift note should be completed every shift.  The Outcome Evaluation is a brief statement about your assessment that the patient is improving, declining, or no change.  This information will be displayed automatically on your shift  note.  Outcome: Progressing  Flowsheets (Taken 11/12/2023 2059)  Plan of Care Reviewed With: patient  Overall Patient Progress: no change  Goal: Patient-Specific Goal (Individualized)  Description: You can add care plan individualizations to a care plan. Examples of Individualization might be:  \"Parent requests to be called daily at 9am for status\", \"I have a hard time hearing out of my right ear\", or \"Do not touch me to wake me up as it startles  me\".  Outcome: Progressing  Goal: Absence of Hospital-Acquired Illness or Injury  Outcome: Progressing  Intervention: Identify and Manage Fall Risk  Recent Flowsheet Documentation  Taken 11/12/2023 1648 by Iveth Parsons, RN  Safety Promotion/Fall Prevention:   activity supervised   assistive device/personal items within reach   clutter free environment maintained   increase visualization of patient   lighting adjusted   nonskid shoes/slippers when out of bed   patient and family education   room near nurse's station   " room organization consistent   safety round/check completed   supervised activity  Intervention: Prevent Skin Injury  Recent Flowsheet Documentation  Taken 11/12/2023 1648 by Iveth Parsons RN  Body Position: position changed independently  Intervention: Prevent Infection  Recent Flowsheet Documentation  Taken 11/12/2023 1648 by Iveth Parsons RN  Infection Prevention:   personal protective equipment utilized   hand hygiene promoted   equipment surfaces disinfected   rest/sleep promoted   single patient room provided  Goal: Optimal Comfort and Wellbeing  Outcome: Progressing  Goal: Readiness for Transition of Care  Outcome: Progressing     Problem: Liver Failure  Goal: Optimal Coping with Liver Failure  Outcome: Progressing  Goal: Fluid and Electrolyte Balance  Outcome: Progressing  Goal: Optimal Gastrointestinal Function  Outcome: Progressing  Intervention: Monitor and Support Gastrointestinal Function  Recent Flowsheet Documentation  Taken 11/12/2023 1648 by Iveth Parsons RN  Body Position: position changed independently  Goal: Blood Glucose Level Within Target Range  Outcome: Progressing  Goal: Optimal Coagulation Function  Outcome: Progressing  Goal: Absence of Infection Signs and Symptoms  Outcome: Progressing  Intervention: Prevent or Manage Infection  Recent Flowsheet Documentation  Taken 11/12/2023 1648 by Iveth Parsons RN  Isolation Precautions: special precautions maintained  Goal: Optimal Neurologic Function  Outcome: Progressing  Goal: Improved Oral Intake  Outcome: Progressing  Goal: Optimal Pain Control, Comfort and Function  Outcome: Progressing  Goal: Optimize Renal Function  Outcome: Progressing  Goal: Effective Oxygenation and Ventilation  Outcome: Progressing  Intervention: Promote Airway Secretion Clearance  Recent Flowsheet Documentation  Taken 11/12/2023 1648 by Iveth Parsons RN  Cough And Deep Breathing: done independently per patient  Activity Management: activity adjusted per  tolerance  Intervention: Optimize Oxygenation and Ventilation  Recent Flowsheet Documentation  Taken 11/12/2023 1648 by Iveth Parsons RN  Head of Bed (HOB) Positioning: HOB at 20-30 degrees     Problem: Heart Failure  Goal: Optimal Coping  Outcome: Progressing  Goal: Optimal Cardiac Output  Outcome: Progressing  Goal: Stable Heart Rate and Rhythm  Outcome: Progressing  Goal: Optimal Functional Ability  Outcome: Progressing  Intervention: Optimize Functional Ability  Recent Flowsheet Documentation  Taken 11/12/2023 1648 by Iveth Parsons RN  Activity Management: activity adjusted per tolerance  Goal: Fluid and Electrolyte Balance  Outcome: Progressing  Goal: Improved Oral Intake  Outcome: Progressing  Goal: Effective Oxygenation and Ventilation  Outcome: Progressing  Intervention: Promote Airway Secretion Clearance  Recent Flowsheet Documentation  Taken 11/12/2023 1648 by Iveth Parsons RN  Cough And Deep Breathing: done independently per patient  Activity Management: activity adjusted per tolerance  Intervention: Optimize Oxygenation and Ventilation  Recent Flowsheet Documentation  Taken 11/12/2023 1648 by Iveth Parsons RN  Head of Bed ([facility]) Positioning: HOB at 20-30 degrees  Goal: Effective Breathing Pattern During Sleep  Outcome: Progressing  Intervention: Monitor and Manage Obstructive Sleep Apnea  Recent Flowsheet Documentation  Taken 11/12/2023 1648 by Iveth Parsons RN  Medication Review/Management: medications reviewed

## 2023-11-13 NOTE — PLAN OF CARE
Goal Outcome Evaluation:           Overall Patient Progress: no changeOverall Patient Progress: no change    Outcome Evaluation: Pt A/O x 4  forgetful at times, c/o weakness VSS on RA; Pt denies pain, headache, N/V, & dizziness.  Possible DC today to a TCU.

## 2023-11-14 ENCOUNTER — APPOINTMENT (OUTPATIENT)
Dept: PHYSICAL THERAPY | Facility: CLINIC | Age: 63
End: 2023-11-14
Payer: MEDICAID

## 2023-11-14 VITALS
OXYGEN SATURATION: 96 % | DIASTOLIC BLOOD PRESSURE: 69 MMHG | BODY MASS INDEX: 26.02 KG/M2 | HEIGHT: 71 IN | SYSTOLIC BLOOD PRESSURE: 108 MMHG | RESPIRATION RATE: 16 BRPM | TEMPERATURE: 98 F | HEART RATE: 86 BPM | WEIGHT: 185.9 LBS

## 2023-11-14 LAB
ANION GAP SERPL CALCULATED.3IONS-SCNC: 8 MMOL/L (ref 7–15)
BUN SERPL-MCNC: 24.4 MG/DL (ref 8–23)
CALCIUM SERPL-MCNC: 8 MG/DL (ref 8.8–10.2)
CHLORIDE SERPL-SCNC: 95 MMOL/L (ref 98–107)
CREAT SERPL-MCNC: 0.7 MG/DL (ref 0.67–1.17)
DEPRECATED HCO3 PLAS-SCNC: 28 MMOL/L (ref 22–29)
EGFRCR SERPLBLD CKD-EPI 2021: >90 ML/MIN/1.73M2
ERYTHROCYTE [DISTWIDTH] IN BLOOD BY AUTOMATED COUNT: 13.4 % (ref 10–15)
GLUCOSE BLDC GLUCOMTR-MCNC: 121 MG/DL (ref 70–99)
GLUCOSE BLDC GLUCOMTR-MCNC: 145 MG/DL (ref 70–99)
GLUCOSE BLDC GLUCOMTR-MCNC: 209 MG/DL (ref 70–99)
GLUCOSE SERPL-MCNC: 130 MG/DL (ref 70–99)
HCT VFR BLD AUTO: 35 % (ref 40–53)
HGB BLD-MCNC: 12.3 G/DL (ref 13.3–17.7)
MCH RBC QN AUTO: 36 PG (ref 26.5–33)
MCHC RBC AUTO-ENTMCNC: 35.1 G/DL (ref 31.5–36.5)
MCV RBC AUTO: 102 FL (ref 78–100)
PLATELET # BLD AUTO: 83 10E3/UL (ref 150–450)
POTASSIUM SERPL-SCNC: 4 MMOL/L (ref 3.4–5.3)
RBC # BLD AUTO: 3.42 10E6/UL (ref 4.4–5.9)
SODIUM SERPL-SCNC: 131 MMOL/L (ref 135–145)
WBC # BLD AUTO: 13.7 10E3/UL (ref 4–11)

## 2023-11-14 PROCEDURE — 85027 COMPLETE CBC AUTOMATED: CPT | Performed by: INTERNAL MEDICINE

## 2023-11-14 PROCEDURE — 250N000013 HC RX MED GY IP 250 OP 250 PS 637: Performed by: INTERNAL MEDICINE

## 2023-11-14 PROCEDURE — 97530 THERAPEUTIC ACTIVITIES: CPT | Mod: GP

## 2023-11-14 PROCEDURE — 36415 COLL VENOUS BLD VENIPUNCTURE: CPT | Performed by: INTERNAL MEDICINE

## 2023-11-14 PROCEDURE — 80048 BASIC METABOLIC PNL TOTAL CA: CPT | Performed by: INTERNAL MEDICINE

## 2023-11-14 PROCEDURE — 99239 HOSP IP/OBS DSCHRG MGMT >30: CPT | Performed by: INTERNAL MEDICINE

## 2023-11-14 PROCEDURE — 250N000013 HC RX MED GY IP 250 OP 250 PS 637: Performed by: HOSPITALIST

## 2023-11-14 RX ORDER — FUROSEMIDE 20 MG
20 TABLET ORAL DAILY
DISCHARGE
Start: 2023-11-15 | End: 2024-04-08 | Stop reason: ALTCHOICE

## 2023-11-14 RX ORDER — SPIRONOLACTONE 25 MG/1
25 TABLET ORAL DAILY
Status: DISCONTINUED | OUTPATIENT
Start: 2023-11-15 | End: 2023-11-14 | Stop reason: HOSPADM

## 2023-11-14 RX ORDER — FUROSEMIDE 20 MG
20 TABLET ORAL DAILY
Status: DISCONTINUED | OUTPATIENT
Start: 2023-11-15 | End: 2023-11-14 | Stop reason: HOSPADM

## 2023-11-14 RX ORDER — SENNOSIDES 8.6 MG
1 TABLET ORAL 2 TIMES DAILY
DISCHARGE
Start: 2023-11-14

## 2023-11-14 RX ORDER — POLYETHYLENE GLYCOL 3350 17 G/17G
17 POWDER, FOR SOLUTION ORAL 2 TIMES DAILY PRN
DISCHARGE
Start: 2023-11-14 | End: 2024-07-24

## 2023-11-14 RX ADMIN — CARVEDILOL 1.56 MG: 3.12 TABLET, FILM COATED ORAL at 08:16

## 2023-11-14 RX ADMIN — MULTIPLE VITAMINS W/ MINERALS TAB 1 TABLET: TAB at 08:09

## 2023-11-14 RX ADMIN — PANTOPRAZOLE SODIUM 40 MG: 40 TABLET, DELAYED RELEASE ORAL at 06:44

## 2023-11-14 RX ADMIN — GLIPIZIDE 10 MG: 10 TABLET, EXTENDED RELEASE ORAL at 08:09

## 2023-11-14 RX ADMIN — SENNOSIDES 1 TABLET: 8.6 TABLET, FILM COATED ORAL at 08:08

## 2023-11-14 RX ADMIN — FOLIC ACID 1 MG: 1 TABLET ORAL at 08:09

## 2023-11-14 RX ADMIN — ASPIRIN 81 MG: 81 TABLET, COATED ORAL at 08:12

## 2023-11-14 RX ADMIN — THIAMINE HCL TAB 100 MG 100 MG: 100 TAB at 08:09

## 2023-11-14 RX ADMIN — SERTRALINE HYDROCHLORIDE 25 MG: 25 TABLET ORAL at 08:09

## 2023-11-14 RX ADMIN — INSULIN ASPART 4 UNITS: 100 INJECTION, SOLUTION INTRAVENOUS; SUBCUTANEOUS at 12:36

## 2023-11-14 RX ADMIN — SPIRONOLACTONE 50 MG: 25 TABLET ORAL at 08:08

## 2023-11-14 RX ADMIN — INSULIN ASPART 8 UNITS: 100 INJECTION, SOLUTION INTRAVENOUS; SUBCUTANEOUS at 08:11

## 2023-11-14 RX ADMIN — FUROSEMIDE 40 MG: 40 TABLET ORAL at 08:09

## 2023-11-14 ASSESSMENT — ACTIVITIES OF DAILY LIVING (ADL)
ADLS_ACUITY_SCORE: 38

## 2023-11-14 NOTE — PLAN OF CARE
"Goal Outcome Evaluation:      Overall Patient Progress: no change     Dr. Medrano updated face to face @ 0939  LABS Platelet admit 203 - today 88. No signs of bleeding.   LOC: alert/oriented. Flat and angry that staff interrupt him \"all the time\". Cares clustered. Refused OT this a.m.  Cardiac: BP lying in bed this a.m. 120/75 however, when working with PT at 0915, his blood pressure was 86/62 in a sitting position in bed. Per PT, the pt was symptomatic -dizzy and nauseated. He layed back down, resolved sx.  Discussed w/Dr. Medrano - decreasing lasix/aldactione.   Resp: anterior clear lung fields  Skin: L great toe w/old betadine, dry, intact. RL abdomen. Refused posterior body skin assessment.   GI/ Abd: soft, non-tender. BM stated by pt on 11/14. Bladder scan & straight cath if >500ml.  "

## 2023-11-14 NOTE — PROGRESS NOTES
Pt to discharge via transport to TCU. All belongings included - packed by the pt w/assist NA. Pt stated full understanding of discharge instructions.

## 2023-11-14 NOTE — PLAN OF CARE
Goal Outcome Evaluation:      Plan of Care Reviewed With: patient        VSS on RA. A&O x4. Assist of 1, GB, walker. Denies pain. B. Wound care daily. Was retaining urine throughout previous shift. Bladder scan q4 hr. Bladder scan: 108 at 0200 am, 278 at 0600 am. QUEENIE HENSLEY. Plan to potentially discharge today to TCU if urine and bowel issues resolve.

## 2023-11-14 NOTE — PROGRESS NOTES
Discharge Plan:     New referral sent to [facility] as patient is now able to go to TCU as he is no longer quarantine due to Covid. They are to accepted patient when medically stable.    AGGIE Torres   Inpatient Care Coordination   Supervisor  [facility]  [phone]      AGGIE Dewitt

## 2023-11-14 NOTE — PLAN OF CARE
Physical Therapy Discharge Summary     Reason for therapy discharge:    Discharged to transitional care facility.     Progress towards therapy goal(s). See goals on Care Plan in [facility] electronic health record for goal details.  Goals not met.  Barriers to achieving goals:   discharge from facility.     Therapy recommendation(s):    Continued therapy is recommended.  Rationale/Recommendations:  Patient would benefit from PT eval at TCU in order to increase strength, activity tolerance, balance and independence with mobility.

## 2023-11-14 NOTE — DISCHARGE SUMMARY
[facility]  Discharge Summary  Name: Saji Iqbal    MRN: [id number]  YOB: 1960    Age: 63 year old  Date of Discharge:  11/14/2023  Date of Admission: 10/24/2023  Primary Care Provider: Naseem Esparza  Discharge Physician:  Marcus Medrano M.D  Discharging Service:  Hospitalist      Discharge Diagnosis:  Saji Iqbal is a 63 year old male with a PMH significant for alcohol use disorder, CAD with 2 previous stents in the LAD, DM, HTN, HLP, NIKKI, left shoulder adhesive capsulitis treated with steroid injection during last hospitalization, ischemic and alcoholic cardiomyopathy with heart failure with reduced ejection fraction.  He was admitted on 10/24/2023. He was admitted here from 8/30 to 9/11 with adhesive capsulitis of the left shoulder and alcohol withdrawal.  During that hospitalization he was also found to have new alcoholic cardiomyopathy with ejection fraction 35 to 40%.  Upon discharge home he said he was able to walk 100 feet but got quite tired.  He thought he would get better at home but has slowly continued to worsen.  He has had progressive dyspnea on exertion.  He said he could maybe walk 25 steps now before having to stop and rest.  He also got short of breath just while dressing.  He did not feel he could go up or down stairs due to weakness and dyspnea.  He noticed some increased firmness of his abdomen but no definite swelling.  He had not noticed any lower extremity swelling and no pain to the abdomen or chest.  His left shoulder pain resolved since steroid injection.  His appetite was good but he had difficulty preparing his food as he is not able to stand at the stove and therefore had not been eating much.  His daughter requested a welfare check on him and he was brought in the hospital for further evaluation and treatment.  He said he had been sober for 5 weeks since his previous hospitalization. In the ER, his vital signs were stable.  Bilirubin is 8.2 and  AST is 206.  These were both stable from previous hospitalization.  Potassium was 3.2 and albumin was 2.7.  Chest x-ray was unremarkable.  Bedside ultrasound by ER physician showed ascites. He was  admitted for failure to thrive, severe dyspnea on exertion, new ascites secondary to decompensated end-stage liver disease, possible congstive heart failure, and severe generalized weakness.  He then also developed COVID-19 infection.     COVID-19 infection. recovered  -SARS-CoV-2 PCR negative on 11/2, isolation discontinued 11/13.  -Previously, requiring supplemental oxygen, currently on room air.  -Completed 5-day course of IV remdesivir on 11/7 and course of decadron  -CT of the chest on 11/4 with small left effusion and atelectasis.  -IV Zosyn on 11/3 to cover possible bacterial sepsis, stopped after 3 days     Decompensated cirrhosis.  Ascites.  Alcohol use disorder.  -Had reportedly stopped drinking alcohol recently.  -Required paracentesis on 10/25, 10/31, and 11/1.  -Needs alcohol cessation, will be on multivitamin, folic acid and thiamine.  -He will continue furosemide 40 mg a day, increased spironolactone to 50 mg a day   -S/p repeat paracentesis on  11/9 with 3.1L removed     DM II.  -Hemoglobin A1c only 5.7.  -cont carb count insulin and ISS  -Continue oral medications, including glipizide.     Hyperlipidemia.  CAD.  Cardiomyopathy.  -Atorvastatin, carvedilol, and aspirin have been on hold.  -Echocardiogram in August showed EF 35 to 40%.  Areas of hypokinesia noted.  -Lexiscan stress test in September did not show areas of reversible ischemia.  -Continue aspirin 81 mg a day.  -Home coreg dose reduced due to borderline hypotension  -Home atorvastatin held on discharge due to transaminitis.     Deconditioning.  Failure to thrive.  depression  -Continue to work with therapy.  -Current plan for TCU at discharge on 13th  -blunt affect, seems depressed. Trial of low dose zoloft initiated, some nausea so dose  "decreased to 25 daily     Severe malnutrition.  -Registered dietitian following.     Hyponatremia.  Hypokalemia.  -Recheck metabolic panel intermittently.  -furosemide and spironolactone as above.     Diarrhea.  Now with constipation  -Stop scheduled MiraLAX and senna S.  -C. difficile toxin negative.  -Started on Miralaax and senna.     ?Urinary retention:  C/o of difficulty to urinate.  -Will use intermittent catheterization if bladder scan is >500.  -May need outpatient Urology evaluation if it does not resolve.     Hematochezia.  -Gastroenterology consult appreciated, Hgb stable. Ok to cont ASA  -No active bleeding, plan for outpt colonoscopy.           Clinically Significant Risk Factors          # Hyponatremia: Lowest Na = 128 mmol/L in last 2 days, will monitor as appropriate      # Hypoalbuminemia: Lowest albumin = 2.4 g/dL at 11/5/2023  7:13 AM, will monitor as appropriate   # Thrombocytopenia: Lowest platelets = 100 in last 2 days, will monitor for bleeding   # Hypertension: Noted on problem list  # Chronic heart failure with reduced ejection fraction: last echo with EF <40%       # Overweight: Estimated body mass index is 27.04 kg/m  as calculated from the following:    Height as of this encounter: 1.803 m (5' 10.98\").    Weight as of this encounter: 87.9 kg (193 lb 12.6 oz).   # Severe Malnutrition: based on nutrition assessment    # Financial/Environmental Concerns:         Other Diagnosis:  none     Discharge Disposition:  Discharged to short-term care facility     Allergies:  Allergies   Allergen Reactions    Metformin Diarrhea    Honey Other (See Comments)     Throat irritation        Discharge Medications:   Current Discharge Medication List        START taking these medications    Details   folic acid (FOLVITE) 1 MG tablet Take 1 tablet (1 mg) by mouth daily    Associated Diagnoses: Alcoholic cirrhosis of liver with ascites (H)      furosemide (LASIX) 20 MG tablet Take 1 tablet (20 mg) by mouth " daily    Associated Diagnoses: Alcoholic cirrhosis of liver with ascites (H)      multivitamin w/minerals (THERA-VIT-M) tablet Take 1 tablet by mouth daily    Associated Diagnoses: Alcoholic cirrhosis of liver with ascites (H)      pantoprazole (PROTONIX) 40 MG EC tablet Take 1 tablet (40 mg) by mouth daily    Associated Diagnoses: Anemia, unspecified type      polyethylene glycol (MIRALAX) 17 g packet Take 17 g by mouth 2 times daily as needed (constipation)    Associated Diagnoses: Slow transit constipation      sennosides (SENOKOT) 8.6 MG tablet Take 1 tablet by mouth 2 times daily    Associated Diagnoses: Slow transit constipation      sertraline (ZOLOFT) 25 MG tablet Take 1 tablet (25 mg) by mouth daily    Associated Diagnoses: Moderate episode of recurrent major depressive disorder (H)      spironolactone (ALDACTONE) 25 MG tablet Take 1 tablet (25 mg) by mouth daily    Associated Diagnoses: Alcoholic cirrhosis of liver with ascites (H)      thiamine (B-1) 100 MG tablet Take 1 tablet (100 mg) by mouth daily    Associated Diagnoses: Alcoholic cirrhosis of liver with ascites (H)           CONTINUE these medications which have CHANGED    Details   carvedilol (COREG) 3.125 MG tablet Take 0.5 tablets (1.5625 mg) by mouth 2 times daily (with meals)    Associated Diagnoses: Coronary artery disease involving native coronary artery of native heart without angina pectoris           CONTINUE these medications which have NOT CHANGED    Details   aspirin 81 MG tablet Take by mouth daily      gabapentin (NEURONTIN) 100 MG capsule Take 200 mg by mouth daily      glipiZIDE (GLUCOTROL XL) 10 MG 24 hr tablet TAKE 1 TABLET (10 MG) BY MOUTH DAILY.  Qty: 90 tablet, Refills: 0    Associated Diagnoses: Uncontrolled type 2 diabetes mellitus with hyperglycemia (H)           STOP taking these medications       atorvastatin (LIPITOR) 20 MG tablet Comments:   Reason for Stopping:                Condition on Discharge:  Discharge  "condition: Fair   Discharge vitals: Blood pressure 108/69, pulse 86, temperature 98  F (36.7  C), temperature source Oral, resp. rate 16, height 1.803 m (5' 10.98\"), weight 84.3 kg (185 lb 14.4 oz), SpO2 96%.   Code status on discharge: Full Code     History of Illness:  See detailed admission note for full details.    Significant Physical Exam Findings:  GENERAL: Awake, alert, and cooperative  Eyes: pupils equal, round and reactive to light and conjunctiva normal  ENT: normocepalic, without obvious abnormality, atramatic  CHEST: No increased work of breathing, good air exchange, and clear to auscultation  CVS: regular rate and rhythm and no murmur noted  GI: normal bowel sounds, soft, and non-distended  Skin: no bleeding noted, scattered bruises on extremeties  Neurologic: alert, oriented x4, weak appearing, blunted affec    Procedures other than Imaging:  Paracentesis     Imaging:  No results found for this or any previous visit (from the past 24 hour(s)).     Consultations:  Consultation during this admission received from gastroenterology.     Recent Lab Results:  Recent Labs   Lab 11/14/23  0642 11/12/23  0741 11/11/23  0717   WBC 13.7* 13.4* 12.0*   HGB 12.3* 12.4* 12.1*   HCT 35.0* 36.4* 34.6*   * 103* 102*   PLT 83* 100* 107*     Recent Labs   Lab 11/14/23  0703 11/14/23  0642 11/14/23  0206 11/12/23  0811 11/12/23  0741 11/11/23  0811 11/11/23  0717   NA  --  131*  --   --  128*  --  129*   POTASSIUM  --  4.0  --   --  3.7  --  4.0   CHLORIDE  --  95*  --   --  95*  --  95*   CO2  --  28  --   --  25  --  24   ANIONGAP  --  8  --   --  8  --  10   * 130* 145*   < > 111*   < > 170*   BUN  --  24.4*  --   --  34.3*  --  38.4*   CR  --  0.70  --   --  0.76  --  0.76   GFRESTIMATED  --  >90  --   --  >90  --  >90   BENY  --  8.0*  --   --  7.7*  --  8.2*    < > = values in this interval not displayed.          Pending Results:    Unresulted Labs Ordered in the Past 30 Days of this Admission       " No orders found from 9/24/2023 to 10/25/2023.                Primary Care - Care Coordination Referral      Reason for your hospital stay    Decompensated liver failure, congstive heart failure     Follow-up and recommended labs and tests     Follow up with primary care provider, Naseem Esparza, within 7 days for hospital follow- up.  The following labs/tests are recommended: BMP, follow up edema/ascites, blood pressure.     Activity    Your activity upon discharge: activity as tolerated     General info for SNF    Length of Stay Estimate: Short Term Care: Estimated # of Days <30  Condition at Discharge: Stable  Level of care:skilled   Rehabilitation Potential: Good  Admission H&P remains valid and up-to-date: Yes  Recent Chemotherapy: N/A  Use Nursing Home Standing Orders: Yes     Mantoux instructions    Give two-step Mantoux (PPD) Per Facility Policy Yes     Reason for your hospital stay    Compensated liver failure, CHF     Glucose monitor nursing POCT    Before meals and at bedtime     Daily weights    Call Provider for weight gain of more than 2 pounds per day or 5 pounds per week.    Check weights Mon/Wed/Friday     Follow Up and recommended labs and tests    Follow up with halfway physician.  The following labs/tests are recommended: CBC, BMP in 5 days, result to NH provider.  Follow vitals/blood pressure daily before giving him medications.     Full Code     Physical Therapy Adult Consult    Evaluate and treat as clinically indicated.    Reason:  congstive heart failure, cirrhosis, weakness     Occupational Therapy Adult Consult    Evaluate and treat as clinically indicated.    Reason:  cognitive eval     Fall precautions     Diet    Follow this diet upon discharge: Orders Placed This Encounter      Low Saturated Fat Na <2400 mg          Total time spent in face to face contact with the patient and coordinating discharge was: >30 Minutes.

## 2023-11-14 NOTE — PLAN OF CARE
Goal Outcome Evaluation:      Plan of Care Reviewed With: patient    Overall Patient Progress: improvingOverall Patient Progress: improving     Aox4. Speaks slowly. Reports abdominal discomfort. Bladder scanned for 230 mL. Pt later incontinent of urine and reports decrease in abdominal fullness. Pt reports constipation and scheduled senna given. Pt reports frequent dry cough. PRN tessalon given w/ some relief. IS encouraged. LS dim but clear. PIV SL. A1 w/ GB and walker. Pt became dizzy after sitting on edge of bed. Pt laid back in bed and urinal at bedside. B. Plan: Discharge to TCU tomorrow if able to void and not retaining. Continue w/ POC.

## 2023-11-14 NOTE — PROGRESS NOTES
Care Management Discharge Note    Discharge Date: 11/14/2023       Discharge Disposition: Transitional Care    Discharge Services:      Discharge DME:      Discharge Transportation: family or friend will provide    Private pay costs discussed: Not applicable    Does the patient's insurance plan have a 3 day qualifying hospital stay waiver?  No    PAS Confirmation Code:    Patient/family educated on Medicare website which has current facility and service quality ratings: yes    Education Provided on the Discharge Plan:    Persons Notified of Discharge Plans: bedside nurse, MD  Patient/Family in Agreement with the Plan:      Handoff Referral Completed: No      Additional Information:  Sw following for discharge planning.     Sw updated that pt is medically ready for discharge. Spoke with facility and arranged transport to pick pt up between [phone]. Updated bedside nurse and requested orders from MD.    Social work will continue to follow and assist with discharge planning as needed.    PADILLA Lipscomb, [facility]  Inpatient Care Coordination  [facility]  [phone]      PADILLA Lipscomb

## 2023-11-15 ENCOUNTER — PATIENT OUTREACH (OUTPATIENT)
Dept: CARE COORDINATION | Facility: CLINIC | Age: 63
End: 2023-11-15
Payer: MEDICAID

## 2023-11-15 NOTE — PROGRESS NOTES
[facility]    Background: Transitional Care Management program identified per system criteria and reviewed by [facility] Resource [facility] team for possible outreach.    Assessment: Upon chart review, CCR Team member will not proceed with patient outreach related to this episode of Transitional Care Management program due to reason below:    [facility] TCU: Patient discharged to TCU/ARU/LTACH and is established within [facility]. Referral created for Primary Care-Care Coordination program.    Plan: Transitional Care Management episode addressed appropriately per reason noted above.      Melyssa Wolff RN  [facility], [facility]    *Connected Care Resource Team does NOT follow patient ongoing. Referrals are identified based on internal discharge reports and the outreach is to ensure patient has an understanding of their discharge instructions.

## 2023-11-15 NOTE — PLAN OF CARE
"Occupational Therapy Discharge Summary    Reason for therapy discharge:    Discharged to transitional care facility.    Progress towards therapy goal(s). See goals on Care Plan in [facility] electronic health record for goal details.  Goals not met.  Barriers to achieving goals:   discharge from facility.    Therapy recommendation(s):    Continued therapy is recommended.  Rationale/Recommendations:  per treating OT \"Pt remains below baseline level of functioning, limited by impaired activity tolerance and safety. Recommend ongoing skilled OT while IP to improve strength, functional activity tolerance, balance and safety needed for daily tasks. Anticipate pt would benefit from more supportive living environment in near future (senior living) primarily for IADL assistance. Scored 23/30 on SLUMS on 11/1/23. pt could discharge to home if he has assist x1 for all ADL/IADL otherwise rec TCU at this time\".    **This patient was not seen by writing OT, information for discharge summary taken from treating OT's notes.**    "

## 2023-11-16 ENCOUNTER — PATIENT OUTREACH (OUTPATIENT)
Dept: CARE COORDINATION | Facility: CLINIC | Age: 63
End: 2023-11-16
Payer: MEDICAID

## 2023-11-16 NOTE — LETTER
[facility]   To:   Miko at [facility]          Please give to facility    From:   Nathan Suazo  [facility]  Care Coordinator   [facility]   P: [phone]  [email or web address]   Patient Name:  Saji Iqbal YOB: 1960   Admit date: 10/28/2023      *Information Needed:  Please contact me when the patient will discharge (or if they will move to long term care)- include the discharge date, disposition, and main diagnosis   If the patient is discharged with home care services, please provide the name of the agency    Also- Please inform me if a care conference is being held.   Phone, Fax or Email with information                              Thank you

## 2023-11-16 NOTE — PROGRESS NOTES
Clinic Care Coordination Contact  Care Coordination Transition Communication    Clinical Data: Patient was hospitalized at [facility] from 10/24/2023 to 10/28/2023 with diagnosis of Sever dyspnea on exertion.     Assessment: Patient has transitioned to TCU/ARU for short term rehabilitation:    Facility Name[facility]  Transition Communication:  Notified facility of Primary Care- Care Coordination support via Epic fax.    Plan: Care Coordinator will await notification from facility staff informing of patient's discharge plans/needs. Care Coordinator will review chart and outreach to facility staff every 4 weeks and as needed.     SHAWNA Armas  Clinic Care Coordinator  [facility]  [phone]  [email or web address]

## 2023-11-30 ENCOUNTER — HOSPITAL ENCOUNTER (OUTPATIENT)
Dept: ULTRASOUND IMAGING | Facility: CLINIC | Age: 63
Discharge: HOME | End: 2023-11-30
Payer: MEDICAID

## 2023-11-30 VITALS
SYSTOLIC BLOOD PRESSURE: 124 MMHG | OXYGEN SATURATION: 98 % | HEART RATE: 83 BPM | DIASTOLIC BLOOD PRESSURE: 75 MMHG | RESPIRATION RATE: 16 BRPM

## 2023-11-30 DIAGNOSIS — K72.90 LIVER FAILURE (H): ICD-10-CM

## 2023-11-30 DIAGNOSIS — K70.31 ALCOHOLIC CIRRHOSIS OF LIVER WITH ASCITES (H): ICD-10-CM

## 2023-11-30 PROCEDURE — 272N000706 US PARACENTESIS WITHOUT ALBUMIN

## 2023-11-30 PROCEDURE — 250N000009 HC RX 250: Performed by: RADIOLOGY

## 2023-11-30 RX ADMIN — LIDOCAINE HYDROCHLORIDE 10 ML: 10 INJECTION, SOLUTION EPIDURAL; INFILTRATION; INTRACAUDAL; PERINEURAL at 09:37

## 2023-11-30 NOTE — PROGRESS NOTES
Paracentesis performed by Dr. Niño using image guidance. Pt tolerated the procedure well. 5,900ml of clear/jelly fluid drained. Vital signs stable throughout procedure. Patient left the department in stable condition with .

## 2023-12-15 ENCOUNTER — PATIENT OUTREACH (OUTPATIENT)
Dept: CARE COORDINATION | Facility: CLINIC | Age: 63
End: 2023-12-15
Payer: MEDICAID

## 2023-12-15 ASSESSMENT — ACTIVITIES OF DAILY LIVING (ADL): DEPENDENT_IADLS:: INDEPENDENT

## 2023-12-15 NOTE — PROGRESS NOTES
Clinic Care Coordination Contact    Situation: Patient chart reviewed by care coordinator.    Background: Care Coordination monitoring for discharge from TCU. Pt had previously been admitted to [facility]    Assessment: Upon Chart Review it appears pt presented to ED 12/13/2023 to 12/14/2023 for ascites. Pt was discharged back to previous TCU.     According to [facility] notes:     Discharge Disposition: Nursing Home Placement: TCU Name of Facility: [facility]  Phone of Facility: [phone]  Fax of Facility: [phone]  Discussed with patient/family payer source for Skilled Nursing Facility inclusive of any private pay costs STAFF INSTRUCTIONS:  Staff instructed to fax discharge orders and signed hard scripts for any controlled substances  Instructed Bedside RN to complete RN to RN handoff prior to discharge: Yes, Phone # [phone]       Plan/Recommendations: CC will follow up again with a chart review in 3-4 weeks to assess for discharge disposition from TCU    SHAWNA Armas  Clinic Care Coordinator  [facility]  [phone]  [email or web address]

## 2023-12-26 ENCOUNTER — MEDICAL CORRESPONDENCE (OUTPATIENT)
Dept: HEALTH INFORMATION MANAGEMENT | Facility: CLINIC | Age: 63
End: 2023-12-26
Payer: MEDICAID

## 2023-12-27 ENCOUNTER — TRANSCRIBE ORDERS (OUTPATIENT)
Dept: OTHER | Age: 63
End: 2023-12-27

## 2023-12-27 DIAGNOSIS — K74.60 CIRRHOSIS (H): Primary | ICD-10-CM

## 2024-01-03 ENCOUNTER — APPOINTMENT (OUTPATIENT)
Dept: ULTRASOUND IMAGING | Facility: CLINIC | Age: 64
End: 2024-01-03
Attending: EMERGENCY MEDICINE
Payer: MEDICAID

## 2024-01-03 ENCOUNTER — HOSPITAL ENCOUNTER (EMERGENCY)
Facility: CLINIC | Age: 64
Discharge: HOME | End: 2024-01-04
Attending: EMERGENCY MEDICINE | Admitting: EMERGENCY MEDICINE
Payer: MEDICAID

## 2024-01-03 VITALS
HEART RATE: 89 BPM | RESPIRATION RATE: 18 BRPM | SYSTOLIC BLOOD PRESSURE: 133 MMHG | OXYGEN SATURATION: 97 % | WEIGHT: 208 LBS | BODY MASS INDEX: 29.02 KG/M2 | TEMPERATURE: 98.5 F | DIASTOLIC BLOOD PRESSURE: 96 MMHG

## 2024-01-03 DIAGNOSIS — R18.8 OTHER ASCITES: ICD-10-CM

## 2024-01-03 LAB
ALBUMIN SERPL BCG-MCNC: 2.8 G/DL (ref 3.5–5.2)
ALP SERPL-CCNC: 111 U/L (ref 40–150)
ALT SERPL W P-5'-P-CCNC: 16 U/L (ref 0–70)
ANION GAP SERPL CALCULATED.3IONS-SCNC: 8 MMOL/L (ref 7–15)
AST SERPL W P-5'-P-CCNC: 34 U/L (ref 0–45)
BASOPHILS # BLD AUTO: 0.1 10E3/UL (ref 0–0.2)
BASOPHILS NFR BLD AUTO: 1 %
BILIRUB SERPL-MCNC: 1 MG/DL
BUN SERPL-MCNC: 11.1 MG/DL (ref 8–23)
CALCIUM SERPL-MCNC: 8.5 MG/DL (ref 8.8–10.2)
CHLORIDE SERPL-SCNC: 98 MMOL/L (ref 98–107)
CREAT SERPL-MCNC: 0.85 MG/DL (ref 0.67–1.17)
DEPRECATED HCO3 PLAS-SCNC: 27 MMOL/L (ref 22–29)
EGFRCR SERPLBLD CKD-EPI 2021: >90 ML/MIN/1.73M2
EOSINOPHIL # BLD AUTO: 0.4 10E3/UL (ref 0–0.7)
EOSINOPHIL NFR BLD AUTO: 5 %
ERYTHROCYTE [DISTWIDTH] IN BLOOD BY AUTOMATED COUNT: 13.8 % (ref 10–15)
GLUCOSE SERPL-MCNC: 113 MG/DL (ref 70–99)
HCT VFR BLD AUTO: 34.7 % (ref 40–53)
HGB BLD-MCNC: 11.4 G/DL (ref 13.3–17.7)
IMM GRANULOCYTES # BLD: 0 10E3/UL
IMM GRANULOCYTES NFR BLD: 0 %
INR PPP: 1.3 (ref 0.85–1.15)
LYMPHOCYTES # BLD AUTO: 1.4 10E3/UL (ref 0.8–5.3)
LYMPHOCYTES NFR BLD AUTO: 19 %
MCH RBC QN AUTO: 32.7 PG (ref 26.5–33)
MCHC RBC AUTO-ENTMCNC: 32.9 G/DL (ref 31.5–36.5)
MCV RBC AUTO: 99 FL (ref 78–100)
MONOCYTES # BLD AUTO: 0.7 10E3/UL (ref 0–1.3)
MONOCYTES NFR BLD AUTO: 10 %
NEUTROPHILS # BLD AUTO: 4.6 10E3/UL (ref 1.6–8.3)
NEUTROPHILS NFR BLD AUTO: 65 %
NRBC # BLD AUTO: 0 10E3/UL
NRBC BLD AUTO-RTO: 0 /100
PLATELET # BLD AUTO: 185 10E3/UL (ref 150–450)
POTASSIUM SERPL-SCNC: 4.1 MMOL/L (ref 3.4–5.3)
PROT SERPL-MCNC: 6.2 G/DL (ref 6.4–8.3)
RBC # BLD AUTO: 3.49 10E6/UL (ref 4.4–5.9)
SODIUM SERPL-SCNC: 133 MMOL/L (ref 135–145)
WBC # BLD AUTO: 7.2 10E3/UL (ref 4–11)

## 2024-01-03 PROCEDURE — 272N000706 US PARACENTESIS WITHOUT ALBUMIN

## 2024-01-03 PROCEDURE — 85025 COMPLETE CBC W/AUTO DIFF WBC: CPT | Performed by: EMERGENCY MEDICINE

## 2024-01-03 PROCEDURE — 85610 PROTHROMBIN TIME: CPT | Performed by: EMERGENCY MEDICINE

## 2024-01-03 PROCEDURE — 99285 EMERGENCY DEPT VISIT HI MDM: CPT | Mod: 25

## 2024-01-03 PROCEDURE — 80053 COMPREHEN METABOLIC PANEL: CPT | Performed by: EMERGENCY MEDICINE

## 2024-01-03 PROCEDURE — 36415 COLL VENOUS BLD VENIPUNCTURE: CPT | Performed by: EMERGENCY MEDICINE

## 2024-01-03 PROCEDURE — 250N000009 HC RX 250: Performed by: EMERGENCY MEDICINE

## 2024-01-03 RX ADMIN — LIDOCAINE HYDROCHLORIDE 10 ML: 10 INJECTION, SOLUTION EPIDURAL; INFILTRATION; INTRACAUDAL; PERINEURAL at 19:47

## 2024-01-03 ASSESSMENT — ACTIVITIES OF DAILY LIVING (ADL)
ADLS_ACUITY_SCORE: 35

## 2024-01-03 NOTE — ED TRIAGE NOTES
Pt arrives via EMS with abdominal pain that has been getting worse over the past few days. Pt states he has ascites and was last drained on December 11th. ABCS intact and Aox4.      Triage Assessment (Adult)       Row Name 01/03/24 1354          Triage Assessment    Airway WDL WDL        Respiratory WDL    Respiratory WDL WDL        Cardiac WDL    Cardiac WDL WDL

## 2024-01-04 NOTE — ED PROVIDER NOTES
Patient presents to the ED desiring paracentesis for fluid accumulation in the abdomen.  History     Chief Complaint:  Abdominal Pain     The history is provided by the patient.      Saji Iqbal is a 63 year old male with a stented coronary artery with a history of CAD, ETOH liver cirrhosis, type 2 diabetes, STEMI, hyperlipidemia, hypertension, and renal disease who presents to the emergency department for abdominal pain. The patient states that he is experiencing worsening right lower quadrant abdominal pain appetite change, and diarrhea. He reports that he recently was diagnosed with ascites and received a paracentesis on 12/11/23. He adds that he has pneumonia and is currently on antibiotics. Denies vomiting. Denies fever. Denies taking blood thinners.     Independent Historian:   None - Patient Only    Review of External Notes:   I reviewed the patient's note from 12/14 in which he was diagnosed with liver cirrhosis and received a paracentesis.     Medications:    Aspirin 81 mg  Carvedilol  Furosemide  Gabapentin  Glipizide  Pantoprazole  Sertraline  Spironolactone    Past Medical History:    CAD  Depression  Type 2 diabetes  GERD  STEMI  Hyperlipidemia  Hypertension  Renal disease  NIKKI  Stented coronary artery  ETOH liver cirrhosis  Tobacco abuse    Past Surgical History:    Paracentesis  Repair foot tendon  Stent coronary     Physical Exam   Patient Vitals for the past 24 hrs:   BP Temp Temp src Pulse Resp SpO2 Weight   01/03/24 1354 (!) 133/96 -- -- -- -- -- --   01/03/24 1352 -- 98.5  F (36.9  C) Oral 89 18 97 % 94.3 kg (208 lb)      Physical Exam  Gen: alert  HEENT: no acute abnl  Neck: normal ROM  CV: RRR,  Pulm: breath sounds equal, lungs clear  Abd: Soft, nontender. Distended abdomen with fluid wave.  Back: no evidence of injury, no cva tenderness  MSK: no deformity, moves all extremities  Skin: no rash  Neuro: alert, appropriate conversation and interaction    Emergency Department Course      Imaging:  US Paracentesis without Albumin   Final Result   IMPRESSION:   1.  Status post ultrasound-guided paracentesis.      Reference CPT Code: 72268         Read by radiologist.    Laboratory:  Labs Ordered and Resulted from Time of ED Arrival to Time of ED Departure   COMPREHENSIVE METABOLIC PANEL - Abnormal       Result Value    Sodium 133 (*)     Potassium 4.1      Carbon Dioxide (CO2) 27      Anion Gap 8      Urea Nitrogen 11.1      Creatinine 0.85      GFR Estimate >90      Calcium 8.5 (*)     Chloride 98      Glucose 113 (*)     Alkaline Phosphatase 111      AST 34      ALT 16      Protein Total 6.2 (*)     Albumin 2.8 (*)     Bilirubin Total 1.0     INR - Abnormal    INR 1.30 (*)    CBC WITH PLATELETS AND DIFFERENTIAL - Abnormal    WBC Count 7.2      RBC Count 3.49 (*)     Hemoglobin 11.4 (*)     Hematocrit 34.7 (*)     MCV 99      MCH 32.7      MCHC 32.9      RDW 13.8      Platelet Count 185      % Neutrophils 65      % Lymphocytes 19      % Monocytes 10      % Eosinophils 5      % Basophils 1      % Immature Granulocytes 0      NRBCs per 100 WBC 0      Absolute Neutrophils 4.6      Absolute Lymphocytes 1.4      Absolute Monocytes 0.7      Absolute Eosinophils 0.4      Absolute Basophils 0.1      Absolute Immature Granulocytes 0.0      Absolute NRBCs 0.0       Emergency Department Course & Assessments:    Interventions:  Medications   lidocaine 1 % 10 mL (10 mLs Subcutaneous $Given 1/3/24 1947)      Assessments:  1801 I obtained history and examined the patient as noted above.   2156 I rechecked the patient. He had his paracentesis performed by Dr. Hogan. His abdominal pain is completely alleviated. 7000 mL of fluid drained.    Independent Interpretation (X-rays, CTs, rhythm strip):  None    Consultations/Discussion of Management or Tests:  1858 I spoke with Dr. Hogan, IR, regarding the patient. He reports that he will perform a paracentesis tonight on the patient.    Social Determinants of Health  affecting care:   None    Disposition:  The patient was discharged to home.     Impression & Plan      Medical Decision Making:  Patient presents to ED desiring paracentesis.  He is accumulated significant ascites.  There is no fever or abdominal pain or tenderness to suggest SBP or other infectious rocess.  Laboratory studies at baseline.  Contacted Dr. Tony of IR who graciously performed ultrasound paracentesis.  Patient feeling markedly improved following paracentesis.  Discharge home.  I encouraged him to discuss paracentesis schedule with primary care and GI.  Discharge home    Diagnosis:    ICD-10-CM    1. Other ascites  R18.8          Scribe Disclosure:  I, Andrew Reza, am serving as a scribe at 6:05 PM on 1/3/2024 to document services personally performed by Lauren Garcia* based on my observations and the provider's statements to me.   1/3/2024   Lauren Garcia*        Lauren Garcia MD  01/04/24 0324

## 2024-01-05 ENCOUNTER — DOCUMENTATION ONLY (OUTPATIENT)
Dept: OTHER | Facility: CLINIC | Age: 64
End: 2024-01-05
Payer: MEDICAID

## 2024-01-09 ENCOUNTER — TRANSFERRED RECORDS (OUTPATIENT)
Dept: HEALTH INFORMATION MANAGEMENT | Facility: CLINIC | Age: 64
End: 2024-01-09
Payer: MEDICAID

## 2024-01-09 ENCOUNTER — MEDICAL CORRESPONDENCE (OUTPATIENT)
Dept: HEALTH INFORMATION MANAGEMENT | Facility: CLINIC | Age: 64
End: 2024-01-09
Payer: MEDICAID

## 2024-01-15 ENCOUNTER — REFERRAL (OUTPATIENT)
Dept: TRANSPLANT | Facility: CLINIC | Age: 64
End: 2024-01-15

## 2024-01-15 DIAGNOSIS — K70.31 ALCOHOLIC CIRRHOSIS OF LIVER WITH ASCITES (H): Primary | ICD-10-CM

## 2024-01-15 NOTE — LETTER
1/22/2024    Saji Iqbal  [address] [address]      Dear Saji,    Thank you for your interest in the Transplant Center at [facility]. We look forward to being a part of your care team and assisting you through the transplant process.    As we discussed, your transplant coordinator is Patrick Jackson Jr. (Liver).  You may call your coordinator at any time with questions or concerns.  Your first scheduled call will be on 1/31/2024 between 11 am - 12 pm.  If this needs to change, call [phone].    Please complete the following.    Fill out and return the enclosed forms  Authorization for Electronic Communication  Authorization to Discuss Protected Health Information  Authorization for Release of Protected Health Information    Sign up for:  [employer], access to your electronic medical record (see enclosed pamphlet)  [employer], a transplant education website        You can use these tools to learn more about your transplant, communicate with your care team, and track your medical details            Sincerely,      Solid Organ Transplant  [facility]    cc: Referring Physician PCP

## 2024-01-17 ENCOUNTER — PATIENT OUTREACH (OUTPATIENT)
Dept: CARE COORDINATION | Facility: CLINIC | Age: 64
End: 2024-01-17
Payer: MEDICAID

## 2024-01-17 ENCOUNTER — TRANSFERRED RECORDS (OUTPATIENT)
Dept: HEALTH INFORMATION MANAGEMENT | Facility: CLINIC | Age: 64
End: 2024-01-17
Payer: MEDICAID

## 2024-01-17 ASSESSMENT — ACTIVITIES OF DAILY LIVING (ADL): DEPENDENT_IADLS:: INDEPENDENT

## 2024-01-17 NOTE — PROGRESS NOTES
Clinic Care Coordination Contact  [facility]/Voicemail    Clinical Data: Care Coordinator Outreach    Outreach Documentation Number of Outreach Attempt   1/17/2024   3:05 PM 1       Left message on patient's voicemail with call back information and requested return call.    Plan:  Care Coordinator will try to reach patient again in 3-5 business days.    Nathan Suazo [facility]  Clinic Care Coordinator  [facility]  [phone]  [email or web address]

## 2024-01-19 VITALS — BODY MASS INDEX: 28.74 KG/M2 | WEIGHT: 206 LBS

## 2024-01-19 NOTE — TELEPHONE ENCOUNTER
January 19, 2024 5:22 PM - Seema Mukherjee LPN:      Patient was asked the following questions during liver intake call.       Referring Provider: Dr. Chris Eisenberg  Referring Diagnosis: alcoholic cirrhosis of liver with ascites  PCP:  Sybil Bailey MD     1)Do you know why you have liver disease: ETOH abuse       If Alcoholic Cirrhosis is present when was your last drink: Sober approx 2 years per Roxy       Have you ever been through treatment for alcohol: scheduling in process   2) Presence of Ascites: Yes  Paracentesis: Weekly   3) Presence of Hepatic Encephalopathy: No official dx, but care taker Roxy mentioned exhibition of Sx  Medications: No  4) History of GI Bleeding: None   5) Oxygen Use: No  6) EGD: None  7) Colonoscopy: None  8) MELD Score: 13 (1/3/2024)   9)  Labs available for review from PCP/GI:  CE  10)HCC Diagnosis: None         Abdominal CT: PACS          MRI: PACS          Bone Scan: no          PET: no          Chest CT: PACS          Treatment Notes w/ Images: 1/17/2024 [facility]                                     11)Insurance information: Medicaid [place]             Policy Justice: Self              Subscriber/Policy/ID Number: [id number]                  Referral intake process completed.  Patient is aware that after financial approval is received, medical records will be requested.   Patient confirmed for a callback from transplant coordinator on 1/31/2024.  Tentative evaluation date TBD.     Confirmed coordinator will discuss evaluation process in more detail at the time of their call.   Patient is aware of the need to arrange age appropriate cancer screening, vaccinations, and dental care.  Reminded patient to complete questionnaire, complete medical records release, and review packet prior to evaluation visit .  Assessed patient for special needs (ie--wheelchair, assistance, guardian, and ):   Uses walker, wheelchair   Patient instructed to call [phone] with questions.       Patient gave verbal consent during intake call to obtain medical records and documents outside of [facility]:   Yes

## 2024-01-22 NOTE — PROGRESS NOTES
"Clinic Care Coordination Contact  Care Coordination Clinician Chart Review    Situation[facility], Joycelyn Wiseman.     Background: Patient was discharged from the hospital to \[facility] at [facility], and RN/NEFTALI CC has been monitoring for TCU discharge to identify any outstanding Clinic Care Coordination needs/concerns. Refer to initial patient outreach encounter by this writer dated 11/16/2023 for additional details.    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 Pt discharge plan is projected to be either [facility] group home or [facility]. Pt is open to MA and has been SMRT'd. Pt has been instructed/encouraged to apply for social security disabilty but pt has not yet started this process. [facility] SW confirms pt will not be returning to the community or his previous home and he will establish with a long term supportive care discharge plan once an appropriate setting is secured.     Plan/Recommendations: Clinic Care Coordination Referral/order cancelled. RN/SW CC will perform no further monitoring/outreaches at this time and will remain available as needed. If new needs arise, a new Care Coordination Referral may be placed.    Nathan Suazo [facility]  Clinic Care Coordinator  [facility]  [phone]  [email or web address]      "

## 2024-01-31 NOTE — TELEPHONE ENCOUNTER
January 31, 2024 1:03 PM - Patrick Jackson Jr., RN:   Called group home at approx 11:20 am today per requested and was sent to RN, but got VM.    Left VM for RN to call me back and discuss referral

## 2024-02-12 ENCOUNTER — TRANSFERRED RECORDS (OUTPATIENT)
Dept: HEALTH INFORMATION MANAGEMENT | Facility: CLINIC | Age: 64
End: 2024-02-12
Payer: MEDICAID

## 2024-02-24 ENCOUNTER — HEALTH MAINTENANCE LETTER (OUTPATIENT)
Age: 64
End: 2024-02-24

## 2024-02-28 ENCOUNTER — TELEPHONE (OUTPATIENT)
Dept: GASTROENTEROLOGY | Facility: CLINIC | Age: 64
End: 2024-02-28
Payer: MEDICAID

## 2024-02-28 NOTE — TELEPHONE ENCOUNTER
"Referring Provider: Dr. Chris Eisenberg  Referring Diagnosis: alcoholic cirrhosis of liver with ascites  PCP:  Sybil Bailey MD      Spoke RN \"Roxy\" (ext [phone]) at [facility] ([phone]) - rehabilitation from hospital admission.  Possible discharge from TCU soon, touring Assisted Living facilities    Support - daughter (Richa) - may be possible support   following him: [phone]         MELD 12 (2/26/24 with INR from 1/30/24)    Ascites - yes  Taps - q. 7-10 days  HE - possible but is not on HE meds      Plan: consult only given MELD of 12, lack of support, and long standing alcohol use less than 6 mo.     call 1st - Group Home ([facility]) [phone] ext [phone]   - [phone]         "

## 2024-03-13 ENCOUNTER — TRANSFERRED RECORDS (OUTPATIENT)
Dept: HEALTH INFORMATION MANAGEMENT | Facility: CLINIC | Age: 64
End: 2024-03-13
Payer: MEDICAID

## 2024-03-13 LAB
ALT SERPL-CCNC: 12 IU/L (ref 0–44)
AST SERPL-CCNC: 36 IU/L (ref 0–40)

## 2024-03-15 ENCOUNTER — TRANSFERRED RECORDS (OUTPATIENT)
Dept: HEALTH INFORMATION MANAGEMENT | Facility: CLINIC | Age: 64
End: 2024-03-15
Payer: MEDICAID

## 2024-03-28 DIAGNOSIS — K70.31 ALCOHOLIC CIRRHOSIS OF LIVER WITH ASCITES (H): Primary | ICD-10-CM

## 2024-04-04 ENCOUNTER — LAB (OUTPATIENT)
Dept: LAB | Facility: CLINIC | Age: 64
End: 2024-04-04
Payer: MEDICAID

## 2024-04-04 ENCOUNTER — OFFICE VISIT (OUTPATIENT)
Dept: GASTROENTEROLOGY | Facility: CLINIC | Age: 64
End: 2024-04-04
Payer: MEDICAID

## 2024-04-04 VITALS
SYSTOLIC BLOOD PRESSURE: 119 MMHG | WEIGHT: 197.4 LBS | HEART RATE: 65 BPM | DIASTOLIC BLOOD PRESSURE: 74 MMHG | OXYGEN SATURATION: 98 % | BODY MASS INDEX: 27.54 KG/M2

## 2024-04-04 DIAGNOSIS — E11.65 UNCONTROLLED TYPE 2 DIABETES MELLITUS WITH HYPERGLYCEMIA (H): Primary | ICD-10-CM

## 2024-04-04 DIAGNOSIS — E61.1 IRON DEFICIENCY: ICD-10-CM

## 2024-04-04 DIAGNOSIS — F10.91 ALCOHOL USE, UNSPECIFIED, IN REMISSION: ICD-10-CM

## 2024-04-04 DIAGNOSIS — K70.31 ALCOHOLIC CIRRHOSIS OF LIVER WITH ASCITES (H): ICD-10-CM

## 2024-04-04 DIAGNOSIS — D68.9 COAGULOPATHY (H): ICD-10-CM

## 2024-04-04 DIAGNOSIS — K70.31 ALCOHOLIC CIRRHOSIS OF LIVER WITH ASCITES (H): Primary | ICD-10-CM

## 2024-04-04 DIAGNOSIS — D64.9 NORMOCYTIC ANEMIA: ICD-10-CM

## 2024-04-04 DIAGNOSIS — E87.1 HYPONATREMIA: ICD-10-CM

## 2024-04-04 DIAGNOSIS — E88.09 HYPOALBUMINEMIA: ICD-10-CM

## 2024-04-04 PROBLEM — L40.0 PLAQUE PSORIASIS: Status: ACTIVE | Noted: 2024-03-26

## 2024-04-04 PROBLEM — L40.9 PSORIASIS: Status: RESOLVED | Noted: 2019-01-11 | Resolved: 2024-04-04

## 2024-04-04 LAB
AFP SERPL-MCNC: 2.4 NG/ML
ALBUMIN SERPL BCG-MCNC: 3.4 G/DL (ref 3.5–5.2)
ALP SERPL-CCNC: 93 U/L (ref 40–150)
ALT SERPL W P-5'-P-CCNC: 21 U/L (ref 0–70)
ANION GAP SERPL CALCULATED.3IONS-SCNC: 9 MMOL/L (ref 7–15)
AST SERPL W P-5'-P-CCNC: 29 U/L (ref 0–45)
BILIRUB DIRECT SERPL-MCNC: 0.26 MG/DL (ref 0–0.3)
BILIRUB SERPL-MCNC: 0.7 MG/DL
BUN SERPL-MCNC: 21.6 MG/DL (ref 8–23)
CALCIUM SERPL-MCNC: 9.4 MG/DL (ref 8.8–10.2)
CHLORIDE SERPL-SCNC: 98 MMOL/L (ref 98–107)
CREAT SERPL-MCNC: 0.91 MG/DL (ref 0.67–1.17)
DEPRECATED HCO3 PLAS-SCNC: 27 MMOL/L (ref 22–29)
EGFRCR SERPLBLD CKD-EPI 2021: >90 ML/MIN/1.73M2
ERYTHROCYTE [DISTWIDTH] IN BLOOD BY AUTOMATED COUNT: 14.1 % (ref 10–15)
GLUCOSE SERPL-MCNC: 392 MG/DL (ref 70–99)
HAV AB SER QL IA: NONREACTIVE
HCT VFR BLD AUTO: 28.7 % (ref 40–53)
HGB BLD-MCNC: 9.1 G/DL (ref 13.3–17.7)
INR PPP: 1.31 (ref 0.85–1.15)
MCH RBC QN AUTO: 28.4 PG (ref 26.5–33)
MCHC RBC AUTO-ENTMCNC: 31.7 G/DL (ref 31.5–36.5)
MCV RBC AUTO: 90 FL (ref 78–100)
PLATELET # BLD AUTO: 159 10E3/UL (ref 150–450)
POTASSIUM SERPL-SCNC: 4.7 MMOL/L (ref 3.4–5.3)
PROT SERPL-MCNC: 6.2 G/DL (ref 6.4–8.3)
RBC # BLD AUTO: 3.2 10E6/UL (ref 4.4–5.9)
SODIUM SERPL-SCNC: 134 MMOL/L (ref 135–145)
WBC # BLD AUTO: 8 10E3/UL (ref 4–11)

## 2024-04-04 PROCEDURE — G0480 DRUG TEST DEF 1-7 CLASSES: HCPCS | Mod: 90

## 2024-04-04 PROCEDURE — 99000 SPECIMEN HANDLING OFFICE-LAB: CPT

## 2024-04-04 PROCEDURE — 82248 BILIRUBIN DIRECT: CPT

## 2024-04-04 PROCEDURE — 86708 HEPATITIS A ANTIBODY: CPT

## 2024-04-04 PROCEDURE — 82105 ALPHA-FETOPROTEIN SERUM: CPT

## 2024-04-04 PROCEDURE — 80053 COMPREHEN METABOLIC PANEL: CPT

## 2024-04-04 PROCEDURE — 85610 PROTHROMBIN TIME: CPT

## 2024-04-04 PROCEDURE — 36415 COLL VENOUS BLD VENIPUNCTURE: CPT

## 2024-04-04 PROCEDURE — 99205 OFFICE O/P NEW HI 60 MIN: CPT | Performed by: PHYSICIAN ASSISTANT

## 2024-04-04 PROCEDURE — 85027 COMPLETE CBC AUTOMATED: CPT

## 2024-04-04 RX ORDER — BUMETANIDE 2 MG/1
2 TABLET ORAL
COMMUNITY
Start: 2024-03-26 | End: 2024-04-09 | Stop reason: DRUGHIGH

## 2024-04-04 RX ORDER — IBUPROFEN 200 MG
CAPSULE ORAL
COMMUNITY
Start: 2024-03-26

## 2024-04-04 RX ORDER — FERROUS SULFATE 325(65) MG
325 TABLET ORAL
Qty: 90 TABLET | Refills: 1 | Status: SHIPPED | OUTPATIENT
Start: 2024-04-04 | End: 2024-08-20

## 2024-04-04 ASSESSMENT — PAIN SCALES - GENERAL: PAINLEVEL: MILD PAIN (3)

## 2024-04-04 NOTE — NURSING NOTE
Chief Complaint   Patient presents with    New Patient     New consult for alcoholic cirrhosis of liver with ascites.     He requests these members of his care team be copied on today's visit information:  PCP: Lynn Devine    Referring Provider:  Annika Baldwin MD  [address] [place] [address] [address]  [place] [address]    Vitals:    04/04/24 0836   BP: 119/74   BP Location: Left arm   Patient Position: Sitting   Cuff Size: Adult Regular   Pulse: 65   SpO2: 98%   Weight: 89.5 kg (197 lb 6.4 oz)     Body mass index is 27.54 kg/m .    Medications were reconciled.        Zina Arambula, CMA

## 2024-04-04 NOTE — LETTER
4/4/2024         RE: Saji Iqbal  [address]   [address]  [place] [address]        Dear Colleague,    Thank you for referring your patient, Saji Iqbal, to the [facility]. Please see a copy of my visit note below.    Hepatology Clinic Note  Saji Iqbal   Date of Birth 1960  Date of Service 4/4/2024    REASON FOR CONSULT: Alcoholic cirrhosis with ascites   REFERRING PROVIDER: Lynn Devine MD         Assessment/plan:   Saji Iqbal is a 64 YO M with PMHx significant for hypertriglyceridemia, NIKKI, type 2 DM, hyperlipidemia, GERD, obesity, plaque psoriasis, anxiety and depression, anemia, alcohol abuse, CAD, STEMI and benign essential HTN who presents today for consult regarding alcoholic cirrhosis.     Decompensated alcoholic cirrhosis with ascites   MELD 3.0: 11, MELD-Na: 9 at 4/4/2024    Ascites: last paracentesis at outside facility 3/29/24: 3.3 L of clear fluid removed    > No evidence of SBP based on most recent fluid analysis (Dec 2023)    > Currently on bumetanide 2 mg BID and spironolactone 25 mg     > Pt tells me lasix caused chills and aches (now resolved)    > Kidney WNL  HCC: US abd limited 2/7/24: Diffuse hepatic steatosis limits evaluation of hepatic parenchyma. Subtle nodular contour irregularity of liver. No focal mass.     > AFP in process    Varices: no hx EGD per chart review   Hepatic encephalopathy: no hx of; pt denies recent significant confusion   Alcohol abuse in early remission   > Last drink Oct 2023; no hx of completing CD tx/program    > Prior was drinking vodka daily (1-2 pints per day)   > Per pt report, his goal moving forward is to maintain sobriety   Normocytic anemia (no overt GI bleeding)     Iron deficiency (iron sat 8%)   > Hgb trending down, today hgb is 9.1  Coagulopathy, stable (INR 1.31)  Hypoalbuminemia, improving (3.4)  Hyponatremia, mild, improving (134)    PLAN:  - Reviewed recent labs and imaging  - Recommended Hepatitis B  vaccine with PCP  - Sent in Rx for iron supplement   - Ordered PETH  - Schedule EGD (screen for varices) and screening colonoscopy    - <2000 mg sodium per day  - Placed referral to Adult Mental Health for Substance Use Disorder Eval; pt agreed   - Highly encouraged completing some form of CD tx   - Continue abstinence from all alcohol use   - Outpatient fabrizio PRN    > Will order repeat fluid analysis to ensure no SBP   > Will try to change fabrizio orders from [facility] to [facility]   - For now, continue on current diuretic doses (bumex 2 mg BID and spironolactone 25 mg)  - Continue to monitor for HE  - HCC screening every 6 months    > US next due August 2024  - Monitor and trend MELD labs   - Follow up in ~3 months with labs same day    > Order DEXA scan and consider increasing diuretics   - Moving forward, consider referral to liver transplant eval clinic     Marcella Lentz PA-C  [facility] Hepatology   -----------------------------------------------------       HPI:     Patient presents today to clinic for consultation.  Tells me he was diagnosed with alcoholic cirrhosis October 2023.  States his last use of alcohol was October 2023.  Before then, was drinking vodka daily for many years.  Typically drinks 1-2 pints of vodka per day.  Is not currently having any cravings or urges to drink alcohol.  Has never completed chemical dependency treatment.  Before October 2023, never diagnosed with any liver issues or conditions.  No history of liver biopsy.  No known family history of liver disease.    Tells me he was hospitalized October through some of November 2023.  Tells me he had COVID during this time as well.  Upon discharge, was sent to [facility] in [place] and stayed there for about 6 months and 2 weeks.  Patient states he now lives in an assisted living in [place], [place].  States he has been here for about 2 weeks.  States they help him with his medications and meals.    Patient  "denies any recent nausea or vomiting.  No yellowing of eyes or skin.  Reports his appetite is \"okay \".  No recent significant confusion.  Patient tells me he is currently on oral steroids for psoriasis.  States he was recently on Lasix which caused chills and aches.  He does not believe he is currently on this medication.  The symptoms have resolved.  No recent prior blood per rectum or melena.  Has never had a colonoscopy.  States he does occasionally see some blood on the tissue with wiping because he has hemorrhoids.  Typically he passes stool once per day.  States in the past week, has had diarrhea for 4 or 5 days.  Is also having some abdominal discomfort.  Has been getting paracentesis through [place] [facility].  States his next paracentesis is scheduled for tomorrow.  Lately has been getting paracentesis weekly.  He does not necessarily think the assisted living where he currently lives is accommodating his dietary needs.    Former tobacco use.  No history of IV drug use.  No current illicit drug use.    Health Maintenance:  Dexa scan: no hx of per chart review   Hep A/B vaccines: Hep B Sab nonreactive Jan 2024; ordered HAV Ab total   Colonoscopy: has never had one     Medical hx Surgical hx   Past Medical History:   Diagnosis Date     Cluster headache      Coronary artery disease      Depression      Diabetes mellitus, type II (H)      Gastroesophageal reflux disease      Heart attack (H)      Hyperlipidemia      Hypertension      Renal disease      Rotator cuff arthropathy      Sleep apnea      Stented coronary artery     Past Surgical History:   Procedure Laterality Date     ARTHROSCOPY SHOULDER       IR PARACENTESIS  12/12/2023     IR PARACENTESIS  2/23/2024     IR PARACENTESIS  2/16/2024     IR PARACENTESIS  2/7/2024     IR PARACENTESIS  1/30/2024     IR PARACENTESIS  1/22/2024     IR PARACENTESIS  3/29/2024     IR PARACENTESIS  3/22/2024     IR PARACENTESIS  3/15/2024     IR PARACENTESIS  3/8/2024     " IR PARACENTESIS  3/1/2024     REPAIR TENDON FOOT Right 10/23/2020    Procedure: Repair of extensor tendon at the level of the metatarsophalangeal joint, right foot;  Surgeon: Gerard Diaz DPM;  Location: [facility] OR     STENT, CORONARY, ESAU  2014, 2015               Medications:     Current Outpatient Medications   Medication Sig Dispense Refill     aspirin 81 MG tablet Take by mouth daily       carvedilol (COREG) 3.125 MG tablet Take 0.5 tablets (1.5625 mg) by mouth 2 times daily (with meals)       folic acid (FOLVITE) 1 MG tablet Take 1 tablet (1 mg) by mouth daily       furosemide (LASIX) 20 MG tablet Take 1 tablet (20 mg) by mouth daily       gabapentin (NEURONTIN) 100 MG capsule Take 200 mg by mouth daily       glipiZIDE (GLUCOTROL XL) 10 MG 24 hr tablet TAKE 1 TABLET (10 MG) BY MOUTH DAILY. 90 tablet 0     multivitamin w/minerals (THERA-VIT-M) tablet Take 1 tablet by mouth daily       pantoprazole (PROTONIX) 40 MG EC tablet Take 1 tablet (40 mg) by mouth daily       polyethylene glycol (MIRALAX) 17 g packet Take 17 g by mouth 2 times daily as needed (constipation)       sennosides (SENOKOT) 8.6 MG tablet Take 1 tablet by mouth 2 times daily       sertraline (ZOLOFT) 25 MG tablet Take 1 tablet (25 mg) by mouth daily       spironolactone (ALDACTONE) 25 MG tablet Take 1 tablet (25 mg) by mouth daily       thiamine (B-1) 100 MG tablet Take 1 tablet (100 mg) by mouth daily       No current facility-administered medications for this visit.          Allergies:     Allergies   Allergen Reactions     Metformin Diarrhea     Honey Other (See Comments)     Throat irritation          Social History:     Social History     Socioeconomic History     Marital status: Single     Spouse name: Not on file     Number of children: Not on file     Years of education: Not on file     Highest education level: Not on file   Occupational History     Occupation: [employer]    Tobacco Use     Smoking status: Former      Packs/day: 0.33     Years: 20.00     Additional pack years: 0.00     Total pack years: 6.60     Types: Cigarettes     Quit date: 2015     Years since quittin.7     Passive exposure: Never     Smokeless tobacco: Never   Vaping Use     Vaping Use: Never used   Substance and Sexual Activity     Alcohol use: Not Currently     Alcohol/week: 1.0 standard drink of alcohol     Types: 1 Standard drinks or equivalent per week     Comment: Sober approx 2 years per Care taker (Roxy). 4-5 drinks nightly on weekend as of  per existing chart note.     Drug use: No     Sexual activity: Yes   Other Topics Concern      Service Not Asked     Blood Transfusions Not Asked     Caffeine Concern No     Comment: 0-1 coffee daily     Occupational Exposure Not Asked     Hobby Hazards Not Asked     Sleep Concern Not Asked     Stress Concern Not Asked     Weight Concern Not Asked     Special Diet Yes     Comment: low sodium, no red meat, no butter     Back Care Not Asked     Exercise Yes     Comment: walking 2-3 days per week     Bike Helmet Not Asked     Seat Belt Not Asked     Self-Exams Not Asked     Parent/sibling w/ CABG, MI or angioplasty before 65F 55M? No   Social History Narrative     Not on file     Social Determinants of Health     Financial Resource Strain: Low Risk  (2020)    Overall Financial Resource Strain (CARDIA)      Difficulty of Paying Living Expenses: Not hard at all   Food Insecurity: No Food Insecurity (2020)    Hunger Vital Sign      Worried About Running Out of Food in the Last Year: Never true      Ran Out of Food in the Last Year: Never true   Transportation Needs: No Transportation Needs (2020)    PRAPARE - Transportation      Lack of Transportation (Medical): No      Lack of Transportation (Non-Medical): No   Physical Activity: Unknown (2020)    Exercise Vital Sign      Days of Exercise per Week: 5 days      Minutes of Exercise per Session: Not on file   Stress:  "Stress Concern Present (6/19/2020)    [employer] - Occupational Stress Questionnaire      Feeling of Stress : To some extent   Social Connections: Not on file   Interpersonal Safety: Not At Risk (6/19/2020)    Humiliation, Afraid, Rape, and Kick questionnaire      Fear of Current or Ex-Partner: No      Emotionally Abused: No      Physically Abused: No      Sexually Abused: No   Housing Stability: Not on file          Family History:     Family History   Problem Relation Age of Onset     Coronary Artery Disease Mother      Coronary Artery Disease Father      Anuerysm Sister      Cerebrovascular Disease Brother      Glaucoma No family hx of      Macular Degeneration No family hx of      Liver Disease No family hx of           Review of Systems:   GEN: See HPI         Physical Exam:     Vital signs:      BP: 119/74 Pulse: 65     SpO2: 98 %       Weight: 89.5 kg (197 lb 6.4 oz)  Estimated body mass index is 27.54 kg/m  as calculated from the following:    Height as of 10/24/23: 1.803 m (5' 10.98\").    Weight as of this encounter: 89.5 kg (197 lb 6.4 oz).  Gen: A&Ox3, NAD  HEENT: Sclera anicteric   CV: RRR, no overt murmurs  Lung: CTA, no wheezing or crackles  Abd: soft, mildly distended, NT, BS+  Ext: no edema, intact pulses.   Skin: No rash or jaundice  Neuro: grossly intact, no asterixis   Psych: appropriate mood and affects         Data:   Reviewed in person and significant for:    Lab Results   Component Value Date     04/04/2024     12/16/2020      Lab Results   Component Value Date    POTASSIUM 4.7 04/04/2024    POTASSIUM 3.9 07/19/2022    POTASSIUM 4.4 12/16/2020     Lab Results   Component Value Date    CHLORIDE 98 04/04/2024    CHLORIDE 107 07/19/2022    CHLORIDE 104 12/16/2020     Lab Results   Component Value Date    CO2 27 04/04/2024    CO2 27 07/19/2022    CO2 23 12/16/2020     Lab Results   Component Value Date    BUN 21.6 04/04/2024    BUN 17 07/19/2022    BUN 19 " "12/16/2020     Lab Results   Component Value Date    CR 0.91 04/04/2024    CR 1.01 12/16/2020       Lab Results   Component Value Date    WBC 8.0 04/04/2024    WBC 5.2 12/16/2020     Lab Results   Component Value Date    HGB 9.1 04/04/2024    HGB 15.4 12/16/2020     Lab Results   Component Value Date    HCT 28.7 04/04/2024    HCT 44.0 12/16/2020     Lab Results   Component Value Date    MCV 90 04/04/2024    MCV 98 12/16/2020     Lab Results   Component Value Date     04/04/2024     12/16/2020     Lab Results   Component Value Date    AST 29 04/04/2024     09/17/2020     Lab Results   Component Value Date    ALT 21 04/04/2024     09/17/2020     No results found for: \"BILICONJ\"   Lab Results   Component Value Date    BILITOTAL 0.7 04/04/2024    BILITOTAL 0.8 09/17/2020       Lab Results   Component Value Date    ALBUMIN 3.4 04/04/2024    ALBUMIN 3.9 07/14/2022    ALBUMIN 4.1 09/17/2020     Lab Results   Component Value Date    PROTTOTAL 6.2 04/04/2024    PROTTOTAL 7.3 09/17/2020      Lab Results   Component Value Date    ALKPHOS 93 04/04/2024    ALKPHOS 71 09/17/2020       Lab Results   Component Value Date    INR 1.31 04/04/2024     IMAGING:    US ABDOMEN LIMITED LIVER   LOCATION: [facility]   DATE: 2/7/2024     INDICATION: Alcoholic cirrhosis of liver with ascites (HC).   COMPARISON: None.   TECHNIQUE: Limited abdominal ultrasound.     FINDINGS:     GALLBLADDER: No gallstones or sludge debris. Gallbladder wall thickening likely due to liver disease and ascites measuring up to 3.5 mm. Negative sonographic Fish's sign.     BILE DUCTS: No biliary dilatation. The common duct measures 3 mm.     LIVER: Diffuse hepatic steatosis limits evaluation of the hepatic parenchyma. Subtle nodular contour irregularity of the liver worrisome for cirrhosis. No focal mass.     RIGHT KIDNEY: No hydronephrosis.     PANCREAS: The visualized portions are normal.     Small amount of ascites in the right " upper abdomen.     1.  Diffuse hepatic steatosis limits evaluation of the hepatic parenchyma. Subtle nodular contour irregularity of the liver worrisome for cirrhosis. No focal mass.   2.  Small amount of ascites in the right upper abdomen.   3.  Mild gallbladder wall thickening likely due to liver disease and ascites. No gallstones or bile duct dilatation.       Again, thank you for allowing me to participate in the care of your patient.        Sincerely,        Marcella Lentz PA-C

## 2024-04-04 NOTE — PROGRESS NOTES
Hepatology Clinic Note  Saji Iqbal   Date of Birth 1960  Date of Service 4/4/2024    REASON FOR CONSULT: Alcoholic cirrhosis with ascites   REFERRING PROVIDER: Lynn Devine MD         Assessment/plan:   Saji Iqbal is a 62 YO M with PMHx significant for hypertriglyceridemia, NIKKI, type 2 DM, hyperlipidemia, GERD, obesity, plaque psoriasis, anxiety and depression, anemia, alcohol abuse, CAD, STEMI and benign essential HTN who presents today for consult regarding alcoholic cirrhosis.     Decompensated alcoholic cirrhosis with ascites   MELD 3.0: 11, MELD-Na: 9 at 4/4/2024    Ascites: last paracentesis at outside facility 3/29/24: 3.3 L of clear fluid removed    > No evidence of SBP based on most recent fluid analysis (Dec 2023)    > Currently on bumetanide 2 mg BID and spironolactone 25 mg     > Pt tells me lasix caused chills and aches (now resolved)    > Kidney WNL  HCC: US abd limited 2/7/24: Diffuse hepatic steatosis limits evaluation of hepatic parenchyma. Subtle nodular contour irregularity of liver. No focal mass.     > AFP in process    Varices: no hx EGD per chart review   Hepatic encephalopathy: no hx of; pt denies recent significant confusion   Alcohol abuse in early remission   > Last drink Oct 2023; no hx of completing CD tx/program    > Prior was drinking vodka daily (1-2 pints per day)   > Per pt report, his goal moving forward is to maintain sobriety   Normocytic anemia (no overt GI bleeding)     Iron deficiency (iron sat 8%)   > Hgb trending down, today hgb is 9.1  Coagulopathy, stable (INR 1.31)  Hypoalbuminemia, improving (3.4)  Hyponatremia, mild, improving (134)    PLAN:  - Reviewed recent labs and imaging  - Recommended Hepatitis B vaccine with PCP  - Sent in Rx for iron supplement   - Ordered PETH  - Schedule EGD (screen for varices) and screening colonoscopy    - <2000 mg sodium per day  - Placed referral to Adult Mental Health for Substance Use Disorder Eval; pt agreed   - Highly  "encouraged completing some form of CD tx   - Continue abstinence from all alcohol use   - Outpatient fabrizio PRN    > Will order repeat fluid analysis to ensure no SBP   > Will try to change fabrizio orders from [facility] to [facility]   - For now, continue on current diuretic doses (bumex 2 mg BID and spironolactone 25 mg)  - Continue to monitor for HE  - HCC screening every 6 months    > US next due August 2024  - Monitor and trend MELD labs   - Follow up in ~3 months with labs same day    > Order DEXA scan and consider increasing diuretics   - Moving forward, consider referral to liver transplant eval clinic     Marcella Lentz PA-C  [facility]   -----------------------------------------------------       HPI:     Patient presents today to clinic for consultation.  Tells me he was diagnosed with alcoholic cirrhosis October 2023.  States his last use of alcohol was October 2023.  Before then, was drinking vodka daily for many years.  Typically drinks 1-2 pints of vodka per day.  Is not currently having any cravings or urges to drink alcohol.  Has never completed chemical dependency treatment.  Before October 2023, never diagnosed with any liver issues or conditions.  No history of liver biopsy.  No known family history of liver disease.    Tells me he was hospitalized October through some of November 2023.  Tells me he had COVID during this time as well.  Upon discharge, was sent to [facility] in [place] and stayed there for about 6 months and 2 weeks.  Patient states he now lives in an assisted living in [place], [place].  States he has been here for about 2 weeks.  States they help him with his medications and meals.    Patient denies any recent nausea or vomiting.  No yellowing of eyes or skin.  Reports his appetite is \"okay \".  No recent significant confusion.  Patient tells me he is currently on oral steroids for psoriasis.  States he was recently on Lasix which caused chills and " aches.  He does not believe he is currently on this medication.  The symptoms have resolved.  No recent prior blood per rectum or melena.  Has never had a colonoscopy.  States he does occasionally see some blood on the tissue with wiping because he has hemorrhoids.  Typically he passes stool once per day.  States in the past week, has had diarrhea for 4 or 5 days.  Is also having some abdominal discomfort.  Has been getting paracentesis through [place] [facility].  States his next paracentesis is scheduled for tomorrow.  Lately has been getting paracentesis weekly.  He does not necessarily think the assisted living where he currently lives is accommodating his dietary needs.    Former tobacco use.  No history of IV drug use.  No current illicit drug use.    Health Maintenance:  Dexa scan: no hx of per chart review   Hep A/B vaccines: Hep B Sab nonreactive Jan 2024; ordered HAV Ab total   Colonoscopy: has never had one     Medical hx Surgical hx   Past Medical History:   Diagnosis Date    Cluster headache     Coronary artery disease     Depression     Diabetes mellitus, type II (H)     Gastroesophageal reflux disease     Heart attack (H)     Hyperlipidemia     Hypertension     Renal disease     Rotator cuff arthropathy     Sleep apnea     Stented coronary artery     Past Surgical History:   Procedure Laterality Date    ARTHROSCOPY SHOULDER      IR PARACENTESIS  12/12/2023    IR PARACENTESIS  2/23/2024    IR PARACENTESIS  2/16/2024    IR PARACENTESIS  2/7/2024    IR PARACENTESIS  1/30/2024    IR PARACENTESIS  1/22/2024    IR PARACENTESIS  3/29/2024    IR PARACENTESIS  3/22/2024    IR PARACENTESIS  3/15/2024    IR PARACENTESIS  3/8/2024    IR PARACENTESIS  3/1/2024    REPAIR TENDON FOOT Right 10/23/2020    Procedure: Repair of extensor tendon at the level of the metatarsophalangeal joint, right foot;  Surgeon: Gerard Diaz DPM;  Location: [facility] OR    STENT, CORONARY, ESAU  2014, 2015               Medications:      Current Outpatient Medications   Medication Sig Dispense Refill    aspirin 81 MG tablet Take by mouth daily      carvedilol (COREG) 3.125 MG tablet Take 0.5 tablets (1.5625 mg) by mouth 2 times daily (with meals)      folic acid (FOLVITE) 1 MG tablet Take 1 tablet (1 mg) by mouth daily      furosemide (LASIX) 20 MG tablet Take 1 tablet (20 mg) by mouth daily      gabapentin (NEURONTIN) 100 MG capsule Take 200 mg by mouth daily      glipiZIDE (GLUCOTROL XL) 10 MG 24 hr tablet TAKE 1 TABLET (10 MG) BY MOUTH DAILY. 90 tablet 0    multivitamin w/minerals (THERA-VIT-M) tablet Take 1 tablet by mouth daily      pantoprazole (PROTONIX) 40 MG EC tablet Take 1 tablet (40 mg) by mouth daily      polyethylene glycol (MIRALAX) 17 g packet Take 17 g by mouth 2 times daily as needed (constipation)      sennosides (SENOKOT) 8.6 MG tablet Take 1 tablet by mouth 2 times daily      sertraline (ZOLOFT) 25 MG tablet Take 1 tablet (25 mg) by mouth daily      spironolactone (ALDACTONE) 25 MG tablet Take 1 tablet (25 mg) by mouth daily      thiamine (B-1) 100 MG tablet Take 1 tablet (100 mg) by mouth daily       No current facility-administered medications for this visit.          Allergies:     Allergies   Allergen Reactions    Metformin Diarrhea    Honey Other (See Comments)     Throat irritation          Social History:     Social History     Socioeconomic History    Marital status: Single     Spouse name: Not on file    Number of children: Not on file    Years of education: Not on file    Highest education level: Not on file   Occupational History    Occupation: Manufactor    Tobacco Use    Smoking status: Former     Packs/day: 0.33     Years: 20.00     Additional pack years: 0.00     Total pack years: 6.60     Types: Cigarettes     Quit date: 2015     Years since quittin.7     Passive exposure: Never    Smokeless tobacco: Never   Vaping Use    Vaping Use: Never used   Substance and Sexual Activity     Alcohol use: Not Currently     Alcohol/week: 1.0 standard drink of alcohol     Types: 1 Standard drinks or equivalent per week     Comment: Sober approx 2 years per Care taker (Roxy). 4-5 drinks nightly on weekend as of 4/072023 per existing chart note.    Drug use: No    Sexual activity: Yes   Other Topics Concern     Service Not Asked    Blood Transfusions Not Asked    Caffeine Concern No     Comment: 0-1 coffee daily    Occupational Exposure Not Asked    Hobby Hazards Not Asked    Sleep Concern Not Asked    Stress Concern Not Asked    Weight Concern Not Asked    Special Diet Yes     Comment: low sodium, no red meat, no butter    Back Care Not Asked    Exercise Yes     Comment: walking 2-3 days per week    Bike Helmet Not Asked    Seat Belt Not Asked    Self-Exams Not Asked    Parent/sibling w/ CABG, MI or angioplasty before 65F 55M? No   Social History Narrative    Not on file     Social Determinants of Health     Financial Resource Strain: Low Risk  (6/19/2020)    Overall Financial Resource Strain (CARDIA)     Difficulty of Paying Living Expenses: Not hard at all   Food Insecurity: No Food Insecurity (6/19/2020)    Hunger Vital Sign     Worried About Running Out of Food in the Last Year: Never true     Ran Out of Food in the Last Year: Never true   Transportation Needs: No Transportation Needs (6/19/2020)    PRAPARE - Transportation     Lack of Transportation (Medical): No     Lack of Transportation (Non-Medical): No   Physical Activity: Unknown (6/19/2020)    Exercise Vital Sign     Days of Exercise per Week: 5 days     Minutes of Exercise per Session: Not on file   Stress: Stress Concern Present (6/19/2020)    [employer] [place] [employer] - Occupational Stress Questionnaire     Feeling of Stress : To some extent   Social Connections: Not on file   Interpersonal Safety: Not At Risk (6/19/2020)    Humiliation, Afraid, Rape, and Kick questionnaire     Fear of Current or Ex-Partner: No      "Emotionally Abused: No     Physically Abused: No     Sexually Abused: No   Housing Stability: Not on file          Family History:     Family History   Problem Relation Age of Onset    Coronary Artery Disease Mother     Coronary Artery Disease Father     Anuerysm Sister     Cerebrovascular Disease Brother     Glaucoma No family hx of     Macular Degeneration No family hx of     Liver Disease No family hx of           Review of Systems:   GEN: See HPI         Physical Exam:     Vital signs:      BP: 119/74 Pulse: 65     SpO2: 98 %       Weight: 89.5 kg (197 lb 6.4 oz)  Estimated body mass index is 27.54 kg/m  as calculated from the following:    Height as of 10/24/23: 1.803 m (5' 10.98\").    Weight as of this encounter: 89.5 kg (197 lb 6.4 oz).  Gen: A&Ox3, NAD  HEENT: Sclera anicteric   CV: RRR, no overt murmurs  Lung: CTA, no wheezing or crackles  Abd: soft, mildly distended, NT, BS+  Ext: no edema, intact pulses.   Skin: No rash or jaundice  Neuro: grossly intact, no asterixis   Psych: appropriate mood and affects         Data:   Reviewed in person and significant for:    Lab Results   Component Value Date     04/04/2024     12/16/2020      Lab Results   Component Value Date    POTASSIUM 4.7 04/04/2024    POTASSIUM 3.9 07/19/2022    POTASSIUM 4.4 12/16/2020     Lab Results   Component Value Date    CHLORIDE 98 04/04/2024    CHLORIDE 107 07/19/2022    CHLORIDE 104 12/16/2020     Lab Results   Component Value Date    CO2 27 04/04/2024    CO2 27 07/19/2022    CO2 23 12/16/2020     Lab Results   Component Value Date    BUN 21.6 04/04/2024    BUN 17 07/19/2022    BUN 19 12/16/2020     Lab Results   Component Value Date    CR 0.91 04/04/2024    CR 1.01 12/16/2020       Lab Results   Component Value Date    WBC 8.0 04/04/2024    WBC 5.2 12/16/2020     Lab Results   Component Value Date    HGB 9.1 04/04/2024    HGB 15.4 12/16/2020     Lab Results   Component Value Date    HCT 28.7 04/04/2024    HCT 44.0 " "12/16/2020     Lab Results   Component Value Date    MCV 90 04/04/2024    MCV 98 12/16/2020     Lab Results   Component Value Date     04/04/2024     12/16/2020     Lab Results   Component Value Date    AST 29 04/04/2024     09/17/2020     Lab Results   Component Value Date    ALT 21 04/04/2024     09/17/2020     No results found for: \"BILICONJ\"   Lab Results   Component Value Date    BILITOTAL 0.7 04/04/2024    BILITOTAL 0.8 09/17/2020       Lab Results   Component Value Date    ALBUMIN 3.4 04/04/2024    ALBUMIN 3.9 07/14/2022    ALBUMIN 4.1 09/17/2020     Lab Results   Component Value Date    PROTTOTAL 6.2 04/04/2024    PROTTOTAL 7.3 09/17/2020      Lab Results   Component Value Date    ALKPHOS 93 04/04/2024    ALKPHOS 71 09/17/2020       Lab Results   Component Value Date    INR 1.31 04/04/2024     IMAGING:    US ABDOMEN LIMITED LIVER   LOCATION: [facility]   DATE: 2/7/2024     INDICATION: Alcoholic cirrhosis of liver with ascites (HC).   COMPARISON: None.   TECHNIQUE: Limited abdominal ultrasound.     FINDINGS:     GALLBLADDER: No gallstones or sludge debris. Gallbladder wall thickening likely due to liver disease and ascites measuring up to 3.5 mm. Negative sonographic Fish's sign.     BILE DUCTS: No biliary dilatation. The common duct measures 3 mm.     LIVER: Diffuse hepatic steatosis limits evaluation of the hepatic parenchyma. Subtle nodular contour irregularity of the liver worrisome for cirrhosis. No focal mass.     RIGHT KIDNEY: No hydronephrosis.     PANCREAS: The visualized portions are normal.     Small amount of ascites in the right upper abdomen.     1.  Diffuse hepatic steatosis limits evaluation of the hepatic parenchyma. Subtle nodular contour irregularity of the liver worrisome for cirrhosis. No focal mass.   2.  Small amount of ascites in the right upper abdomen.   3.  Mild gallbladder wall thickening likely due to liver disease and ascites. No gallstones or " bile duct dilatation.

## 2024-04-05 ENCOUNTER — PATIENT OUTREACH (OUTPATIENT)
Dept: GASTROENTEROLOGY | Facility: CLINIC | Age: 64
End: 2024-04-05
Payer: MEDICAID

## 2024-04-05 DIAGNOSIS — K70.31 ALCOHOLIC CIRRHOSIS OF LIVER WITH ASCITES (H): Primary | ICD-10-CM

## 2024-04-06 LAB
LABORATORY REPORT: NORMAL
PETH INTERPRETATION: NORMAL
PLPETH BLD-MCNC: <10 NG/ML
POPETH BLD-MCNC: <10 NG/ML

## 2024-04-08 NOTE — PROGRESS NOTES
Pt to [address] [place] [address], RN  12/19/17 [id number] Spoke with pt to initiate liver care coordination program and review plan of care per visit with Marcella Lentz on 4/4/24:  - Continue bumex 2mg BID and spironolactone 25mg daily  - Complete CD assessment ([phone])  - Schedule EGD  - Diagnostic paracentesis (goes to [place])  - Start iron supplement  Pt reports mild abdominal distention, denies leg swelling. Last paracentesis on 4/5/24 with 3.95 L output, is scheduled for next para on 4/12. Orders for diagnostic paracentesis have been faxed to [facility]. Denies fever, chills or sweats. Denies melena, hematochezia, or hematemesis. Reminded pt to schedule EGD, he states that staff at his assisted living typically manages his appts. Attempted to call Roxy (KEIRY staff member noted in pt's chart), was told she is out of the office today, will try again tomorrow.  Discussed with pt that should he be considered for liver transplant in the future he will need to complete CD programming, information given to pt for completing CD assessment. Pt verbalized understanding, states he will complete this week.  Will attempt to connect with Roxy tomorrow.    Eliz Judge, RN Care Coordinator  [facility]  Hepatology Clinic/Specialty Program

## 2024-04-09 RX ORDER — BUMETANIDE 2 MG/1
2 TABLET ORAL 2 TIMES DAILY
COMMUNITY
Start: 2024-04-09 | End: 2024-04-16

## 2024-04-09 NOTE — PROGRESS NOTES
Spoke with pt's group home nurse, Sophie. Performed medication reconciliation and discussed need for pt to schedule EGD - Sophie states she will call to schedule this. Also discussed need for pt to complete CD assessment and treatment recommendations, she states she will remind pt to call for CD assessment.    Eliz Judge, RN Care Coordinator  [facility]  Hepatology Clinic/Specialty Program

## 2024-04-15 ENCOUNTER — TELEPHONE (OUTPATIENT)
Dept: GASTROENTEROLOGY | Facility: CLINIC | Age: 64
End: 2024-04-15
Payer: MEDICAID

## 2024-04-15 DIAGNOSIS — K70.31 ALCOHOLIC CIRRHOSIS OF LIVER WITH ASCITES (H): Primary | ICD-10-CM

## 2024-04-15 NOTE — TELEPHONE ENCOUNTER
"Endoscopy Scheduling Screen    Have you had a positive Covid test in the last 14 days?  No    What is your communication preference for Instructions and/or Bowel Prep?   MyChart    What insurance is in the chart?  Other:  Medicaid    Ordering/Referring Provider:   AUBREE NAIR        (If ordering provider performs procedure, schedule with ordering provider unless otherwise instructed. )    BMI: Estimated body mass index is 27.54 kg/m  as calculated from the following:    Height as of 10/24/23: 1.803 m (5' 10.98\").    Weight as of 4/4/24: 89.5 kg (197 lb 6.4 oz).     Sedation Ordered  MAC/deep sedation.   BMI<= 45 45 < BMI <= 48 48 < BMI < = 50  BMI > 50   No Restrictions No [facility]  No [facility]  [facility] with exceptions Hospital Only OR Only       Do you have a history of malignant hyperthermia?  No    (Females) Are you currently pregnant?   No     Have you been diagnosed or told you have pulmonary hypertension?   No    Do you have an LVAD?  No    Have you been told you have moderate to severe sleep apnea?  No    Have you been told you have COPD, asthma, or any other lung disease?  No    Do you have any heart conditions?  No     Have you ever had or are you waiting for an organ transplant?  No. Continue scheduling, no site restrictions.    Have you had a stroke or transient ischemic attack (TIA aka \"mini stroke\" in the last 6 months?   No    Have you been diagnosed with or been told you have cirrhosis of the liver?   Yes (RN Review required for scheduling unless scheduling in Hospital.)    Are you currently on dialysis?   No    Do you need assistance transferring?   No    BMI: Estimated body mass index is 27.54 kg/m  as calculated from the following:    Height as of 10/24/23: 1.803 m (5' 10.98\").    Weight as of 4/4/24: 89.5 kg (197 lb 6.4 oz).     Is patients BMI > 40 and scheduling location [facility]?  No    Do you take an injectable medication for weight loss or diabetes (excluding insulin)?  No    Do you take " the medication Naltrexone?  No    Do you take blood thinners?  No       Prep   Are you currently on dialysis or do you have chronic kidney disease?  No    Do you have a diagnosis of diabetes?  Yes (Golytely Prep)    Do you have a diagnosis of cystic fibrosis (CF)?  No    On a regular basis do you go 3 -5 days between bowel movements?  No    BMI > 40?  No    Preferred Pharmacy:      [facility] [place], [place] - [address] [address] [facility] [address]  [address]  [place] [address]  Phone: [phone] Fax: [phone]      Final Scheduling Details     Procedure scheduled  Colonoscopy / Upper endoscopy (EGD)    Surgeon:  Arron     Date of procedure:  10/17     Pre-OP / PAC:   No - Not required for this site.    Location  [facility] - Per exclusion criteria.    Sedation   MAC/Deep Sedation - Per order.      Patient Reminders:   You will receive a call from a Nurse to review instructions and health history.  This assessment must be completed prior to your procedure.  Failure to complete the Nurse assessment may result in the procedure being cancelled.      On the day of your procedure, please designate an adult(s) who can drive you home stay with you for the next 24 hours. The medicines used in the exam will make you sleepy. You will not be able to drive.      You cannot take public transportation, ride share services, or non-medical taxi service without a responsible caregiver.  Medical transport services are allowed with the requirement that a responsible caregiver will receive you at your destination.  We require that drivers and caregivers are confirmed prior to your procedure.

## 2024-04-16 ENCOUNTER — PATIENT OUTREACH (OUTPATIENT)
Dept: GASTROENTEROLOGY | Facility: CLINIC | Age: 64
End: 2024-04-16
Payer: MEDICAID

## 2024-04-16 DIAGNOSIS — K70.31 ALCOHOLIC CIRRHOSIS OF LIVER WITH ASCITES (H): ICD-10-CM

## 2024-04-16 RX ORDER — EPLERENONE 50 MG/1
50 TABLET, FILM COATED ORAL DAILY
Qty: 90 TABLET | Refills: 1 | Status: SHIPPED | OUTPATIENT
Start: 2024-04-16 | End: 2024-08-20

## 2024-04-16 RX ORDER — BUMETANIDE 2 MG/1
2 TABLET ORAL 2 TIMES DAILY
Qty: 180 TABLET | Refills: 3 | Status: SHIPPED | OUTPATIENT
Start: 2024-04-16 | End: 2024-05-14

## 2024-04-16 NOTE — PROGRESS NOTES
Spoke with pt for check in. Pt reports he has been experiencing nipple soreness and breast swelling, is currently taking spironolactone 25mg daily. Discussed with Marcella Lentz, recommends discontinuing spironolactone and starting eplerenone 50mg daily. Rx sent to pharmacy, called pharmacy to let them know eplerenone will be replacing spironolactone. Called and updated pt's group home staff as well.  Pt reports abdominal distention, denies any leg swelling. States he had more fluid drained at his para appt on 4/12 than he's ever had previously, needed albumin replacement for the first time. Denies any fever, chills, or sweats. Denies any melena, hematochezia, or hematemesis. Scheduled for EGD/colonoscopy on 10/17.  Will check back in with pt in 1-2 weeks.    Eliz Judge, RN Care Coordinator  [facility]  Hepatology Clinic/Specialty Program

## 2024-04-26 ENCOUNTER — TRANSFERRED RECORDS (OUTPATIENT)
Dept: HEALTH INFORMATION MANAGEMENT | Facility: CLINIC | Age: 64
End: 2024-04-26
Payer: MEDICAID

## 2024-05-07 ENCOUNTER — PATIENT OUTREACH (OUTPATIENT)
Dept: GASTROENTEROLOGY | Facility: CLINIC | Age: 64
End: 2024-05-07
Payer: MEDICAID

## 2024-05-07 DIAGNOSIS — K70.31 ALCOHOLIC CIRRHOSIS OF LIVER WITH ASCITES (H): ICD-10-CM

## 2024-05-07 NOTE — PROGRESS NOTES
Spoke with pt for check in. Pt reports some abdominal distention, denies any leg swelling. Pt last had paracentesis on 5/3/24 with 2.3L output. This is decrease compared to previous paracentesis output, pt wonders whether he should continue with weekly paracentesis. Advised pt that he should likely keep his weekly paracentesis appts until output shows a downward trend over several weeks, pt verbalized understanding, in agreement with plan.  Denies any melena, hematochezia, or hematemesis. Denies any fever, chills, or sweats.  Continues with bumex 2mg BID and eplerenone 50mg daily. Pt reports that he's been experiencing soft BPs (states yesterday's BP was 95/59) and intermittent dizziness/lightheadedness. Reports drinking ~ 90oz of fluid daily. States this has been going on for ~4-5 months. Review of pt chart shows carvedilol 1.5625mg BID on his medication list, had previously been taking carvedilol 6.25mg BID until hospitalization in 10/2023. Pt's home carvedilol was reduced upon discharge due to hypotension while taken with diuretics. Call placed to TATYANA Barrios with [facility] to confirm pt is taking carvedilol.  Per COREY Lizama for pt to decrease bumex to 1mg BID due to symptomatic hypotension. Rx sent to pt's pharmacy. Spoke with pt's group home nursing staff to notify, she verbalized understanding.    Eliz Judge, RN Care Coordinator  [facility]  Hepatology Clinic/Specialty Program

## 2024-05-13 ENCOUNTER — TELEPHONE (OUTPATIENT)
Dept: GASTROENTEROLOGY | Facility: CLINIC | Age: 64
End: 2024-05-13
Payer: MEDICAID

## 2024-05-13 NOTE — TELEPHONE ENCOUNTER
Per Marcella Lentz PA-C, OK for pt to take Skyrizi for psoriasis from liver standpoint. Called dermatology PA to notify.    Eliz Judge, RN Care Coordinator  [facility]  Hepatology Clinic/Specialty Program

## 2024-05-13 NOTE — TELEPHONE ENCOUNTER
[facility] Call Center    Phone Message    May a detailed message be left on voicemail: yes     Reason for Call: Medication Question or concern regarding medication   Prescription Clarification  Name of Medication: Skyrizi  Prescribing Provider: Lilian SMITH   Pharmacy: Unknown   What on the order needs clarification? Lilian is wanting to prescribe the Skyrizi for pt's sauriosis. She's needing a call-back if this is fine with his liver.       Action Taken: Message routed to:  [facility] Hep.    Travel Screening: Not Applicable

## 2024-05-14 RX ORDER — BUMETANIDE 1 MG/1
1 TABLET ORAL 2 TIMES DAILY
Qty: 180 TABLET | Refills: 1 | Status: SHIPPED | OUTPATIENT
Start: 2024-05-14

## 2024-05-15 ENCOUNTER — TELEPHONE (OUTPATIENT)
Dept: GASTROENTEROLOGY | Facility: CLINIC | Age: 64
End: 2024-05-15
Payer: MEDICAID

## 2024-05-15 NOTE — TELEPHONE ENCOUNTER
[facility] Call Center    Phone Message    May a detailed message be left on voicemail: yes     Reason for Call: Other: Called to inform that team that does paracentesis is apprehensive about doing the procedure. Pt would like to see a full week with med change to see what it has done. Pretty sure Marcella would concur with that. The med change was with a diuretic went from 4 mg a day to a total of 2 mg per day.      Action Taken: Other: hepa     Travel Screening: Not Applicable

## 2024-05-16 NOTE — TELEPHONE ENCOUNTER
Called pt to discuss. Pt last had paracentesis on 5/10 with 2.25L output, bumex was decreased to 1mg BID recently. Advised pt that he may notice an increase in fluid build-up as a result of diuretics adjustment, pt verbalized understanding and stated that he will plan to keep his weekly paracentesis appts at [place].  Pt states that dizziness has improved since decreasing bumex, denies any leg swelling or noticeable increase in abdominal distention at this time.      Eliz Judge, RN Care Coordinator  [facility]  Hepatology Clinic/Specialty Program

## 2024-05-28 ENCOUNTER — PATIENT OUTREACH (OUTPATIENT)
Dept: GASTROENTEROLOGY | Facility: CLINIC | Age: 64
End: 2024-05-28
Payer: MEDICAID

## 2024-05-28 NOTE — PROGRESS NOTES
Spoke with pt for check in. Pt reports some abdominal distention, last had paracentesis on 5/24 with 2.45L removed. Pt states his dizziness has improved since decreasing torsemide. Denies any fever, chills, or sweats. Denies any melena, hematochezia, or hematemesis. Discussed importance of following high-protein, low sodium diet. Pt is scheduled for follow-up visit with Marcella on 6/27.    Eliz Judge, RN Care Coordinator  [facility]  Hepatology Clinic/Specialty Program

## 2024-05-30 ENCOUNTER — TRANSFERRED RECORDS (OUTPATIENT)
Dept: HEALTH INFORMATION MANAGEMENT | Facility: CLINIC | Age: 64
End: 2024-05-30
Payer: MEDICAID

## 2024-06-20 ENCOUNTER — TELEPHONE (OUTPATIENT)
Dept: FAMILY MEDICINE | Facility: CLINIC | Age: 64
End: 2024-06-20

## 2024-06-20 NOTE — LETTER
[facility]  [address] [facility] [address] [facility] [address] [phone]       July 5, 2024    Saji Iqbal  [address] [place] [address] [address]  [place] [address]    Dear Mika,    We care about your health and have reviewed your health plan and are making recommendations based on this review, to optimize your health.    You are in particular need of attention regarding:  -Diabetes  -Colon Cancer Screening  -Wellness (Physical) Visit     We are recommending that you:  -schedule a WELLNESS (Physical) APPOINTMENT with me.   I will check fasting labs the same day - nothing to eat except water and meds for 8-10 hours prior.      -schedule a COLONOSCOPY to look for colon cancer (due every 10 years or 5 years in higher risk situations.)        Colon cancer is now the second leading cause of cancer-related deaths in the [place] for both men and women and there are over 130,000 new cases and 50,000 deaths per year from colon cancer.  Colonoscopies can prevent 90-95% of these deaths.  Problem lesions can be removed before they ever become cancer.  This test is not only looking for cancer, but also getting rid of precancerious lesions.    If you are under/uninsured, we recommend you contact the [employer] program. [facility] is a free colorectal cancer screening program that provides colonoscopies for eligible under/uninsured [place] men and women. If you are interested in receiving a free colonoscopy, please call [employer] at [phone] (mention code [employer]) to see if you re eligible.      If you do not wish to do a colonoscopy or cannot afford to do one, at this time, there is another option. It is called a FIT test or Fecal Immunochemical Occult Blood Test (take home stool sample kit).  It does not replace the colonoscopy for colorectal cancer screening, but it can detect hidden bleeding in the lower colon.  It does need to be repeated every year and if a positive result is obtained, you  would be referred for a colonoscopy.          If you have completed either one of these tests at another facility, please call with the details of when and where the tests were done and if they were normal or not. Or have the records sent to our clinic so that we can best coordinate your care.    -Diabetic Eye Exam is due.  If this was done within the last 12 months then please contact the clinic with the date and location so we can update your records.    In addition, here is a list of due or overdue Health Maintenance reminders.    Health Maintenance Due   Topic Date Due    Depression Action Plan  Never done    Colorectal Cancer Screening  Never done    Hepatitis A Vaccine (1 of 2 - Risk 2-dose series) Never done    Hepatitis B Vaccine (1 of 3 - Risk 3-dose series) Never done    RSV VACCINE (Pregnancy & 60+) (1 - 1-dose 60+ series) Never done    Zoster (Shingles) Vaccine (2 of 2) 09/02/2021    COVID-19 Vaccine (3 - Pfizer risk series) 03/09/2022    Kidney Microalbumin Urine Test  07/07/2023    Yearly Preventive Visit  07/19/2023    Depression Assessment  08/17/2023    A1C Lab  01/24/2024    Eye Exam  02/14/2024    Diabetic Foot Exam  03/30/2024    ANNUAL REVIEW OF HM ORDERS  07/14/2024       To address the above recommendations, we encourage you to contact us at [phone], via [employer] or by contacting Central Scheduling toll free at [phone] 24 hours a day. They will assist you with finding the most convenient time and location.    Thank you for trusting [facility] and we appreciate the opportunity to serve you.  We look forward to supporting your healthcare needs in the future.    Healthy Regards,    Your [facility] Team

## 2024-06-20 NOTE — CONFIDENTIAL NOTE
Patient Quality Outreach    Patient is due for the following:   Diabetes -  Eye Exam  Colon Cancer Screening  Depression  -  PHQ-9 needed  Physical Preventive Adult Physical    Next Steps:   Schedule a Adult Preventative    Type of outreach:    Sent Secoo message.      Questions for provider review:    None           Alfredo Brown MA

## 2024-06-20 NOTE — LETTER
[facility]  [address] [facility] [address] [facility] [address] [phone]       October 3, 2024    Saji Iqbal  [address]  [place] [address]    Dear Mika,    We care about your health and have reviewed your health plan and are making recommendations based on this review, to optimize your health.    You are in particular need of attention regarding:  -Diabetes  -Breast Cancer Screening  -Wellness (Physical) Visit     We are recommending that you:  -schedule a WELLNESS (Physical) APPOINTMENT with me.   I will check fasting labs the same day - nothing to eat except water and meds for 8-10 hours prior.    -schedule a MAMMOGRAM which is due.    1 in 8 women will develop invasive breast cancer during her lifetime and it is the most common non-skin cancer in [place] women.  EARLY detection, new treatments, and a better understanding of the disease have increased survival rates - the 5 year survival rate in the 1960s was 63% and today it is close to 90%.    If you are under/uninsured, we recommend you contact the [facility]. They offer mammograms at no charge or on a sliding fee charge. You can schedule with them at [phone]. Please have them send us the results.      Please disregard this reminder if you have had this exam elsewhere within the last year.  It would be helpful for us to have a copy of your mammogram report in your file so that we can best coordinate your care - please contact us with when your test was done so we can update your record.             -Diabetic Eye Exam is due.  If this was done within the last 12 months then please contact the clinic with the date and location so we can update your records.    In addition, here is a list of due or overdue Health Maintenance reminders.    Health Maintenance Due   Topic Date Due    Depression Action Plan  Never done    Colorectal Cancer Screening  Never done    Hepatitis A Vaccine (1 of 2 - Risk 2-dose series) Never  done    Hepatitis B Vaccine (1 of 3 - Risk 3-dose series) Never done    RSV VACCINE (1 - Risk 60-74 years 1-dose series) Never done    Zoster (Shingles) Vaccine (2 of 2) 09/02/2021    COVID-19 Vaccine (3 - Pfizer risk series) 03/09/2022    Kidney Microalbumin Urine Test  07/07/2023    Yearly Preventive Visit  07/19/2023    Depression Assessment  08/17/2023    A1C Lab  01/24/2024    Eye Exam  02/14/2024    Diabetic Foot Exam  03/30/2024    ANNUAL REVIEW OF HM ORDERS  07/14/2024    Cholesterol Lab  08/31/2024    Flu Vaccine (1) 09/01/2024    LUNG CANCER SCREENING  11/04/2024       To address the above recommendations, we encourage you to contact us at [phone], via [employer] or by contacting Central Scheduling toll free at [phone] 24 hours a day. They will assist you with finding the most convenient time and location.    Thank you for trusting [facility] and we appreciate the opportunity to serve you.  We look forward to supporting your healthcare needs in the future.    Healthy Regards,    Your [facility] Team

## 2024-06-25 ENCOUNTER — PATIENT OUTREACH (OUTPATIENT)
Dept: GASTROENTEROLOGY | Facility: CLINIC | Age: 64
End: 2024-06-25
Payer: MEDICAID

## 2024-06-25 NOTE — PROGRESS NOTES
Spoke with pt for check in. Pt denies any abdominal distention or leg swelling, last had paracentesis on 6/14/24 with 2.75 L output. Pt states he is now getting paracentesis every ~3 weeks (previously had fabrizio weekly), but did not have any discomfort leading up to his last para, plans to schedule once monthly from now on. Pt states he is doing well on bumex 1mg BID and eplerenone 50mg daily, has had fewer episodes of dizziness. Denies any fever, chills, or sweats. Denies any melena, hematochezia, or hematemesis.  Scheduled for follow-up with Marcella Lentz PA-C on 6/27/24.    Eliz Judge, RN Care Coordinator  [facility]  Hepatology Clinic/Specialty Program

## 2024-07-05 NOTE — CONFIDENTIAL NOTE
Patient Quality Outreach    Patient is due for the following:   Diabetes -  Eye Exam  Colon Cancer Screening  Physical Preventive Adult Physical    Next Steps:   Schedule a Adult Preventative    Type of outreach:    Sent letter.    Next Steps:  Reach out within 90 days via Letter.    Max number of attempts reached: No. Will try again in 90 days if patient still on fail list.    Questions for provider review:    None           Alfredo Brown MA

## 2024-07-13 ENCOUNTER — HEALTH MAINTENANCE LETTER (OUTPATIENT)
Age: 64
End: 2024-07-13

## 2024-07-16 ENCOUNTER — PATIENT OUTREACH (OUTPATIENT)
Dept: GASTROENTEROLOGY | Facility: CLINIC | Age: 64
End: 2024-07-16

## 2024-07-16 ENCOUNTER — LAB (OUTPATIENT)
Dept: LAB | Facility: CLINIC | Age: 64
End: 2024-07-16
Payer: COMMERCIAL

## 2024-07-16 DIAGNOSIS — K70.31 ALCOHOLIC CIRRHOSIS OF LIVER WITH ASCITES (H): ICD-10-CM

## 2024-07-16 LAB
ALBUMIN SERPL BCG-MCNC: 3.7 G/DL (ref 3.5–5.2)
ALP SERPL-CCNC: 139 U/L (ref 40–150)
ALT SERPL W P-5'-P-CCNC: 16 U/L (ref 0–70)
AST SERPL W P-5'-P-CCNC: 43 U/L (ref 0–45)
BILIRUB DIRECT SERPL-MCNC: 0.25 MG/DL (ref 0–0.3)
BILIRUB SERPL-MCNC: 0.7 MG/DL
ERYTHROCYTE [DISTWIDTH] IN BLOOD BY AUTOMATED COUNT: 16.2 % (ref 10–15)
HCT VFR BLD AUTO: 35.6 % (ref 40–53)
HGB BLD-MCNC: 11.8 G/DL (ref 13.3–17.7)
INR PPP: 1.22 (ref 0.85–1.15)
MCH RBC QN AUTO: 29.9 PG (ref 26.5–33)
MCHC RBC AUTO-ENTMCNC: 33.1 G/DL (ref 31.5–36.5)
MCV RBC AUTO: 90 FL (ref 78–100)
PLATELET # BLD AUTO: 99 10E3/UL (ref 150–450)
PROT SERPL-MCNC: 6.7 G/DL (ref 6.4–8.3)
RBC # BLD AUTO: 3.94 10E6/UL (ref 4.4–5.9)
WBC # BLD AUTO: 5.4 10E3/UL (ref 4–11)

## 2024-07-16 PROCEDURE — 80076 HEPATIC FUNCTION PANEL: CPT

## 2024-07-16 PROCEDURE — 85027 COMPLETE CBC AUTOMATED: CPT

## 2024-07-16 PROCEDURE — 36415 COLL VENOUS BLD VENIPUNCTURE: CPT

## 2024-07-16 PROCEDURE — 85610 PROTHROMBIN TIME: CPT

## 2024-07-16 NOTE — PROGRESS NOTES
Spoke with pt for check in. Pt denies any abdominal distention or leg swelling, continues with bumex 1mg BID and eplerenone 50mg daily. Last needed paracentesis on 6/14/24 with 2.75 L output. Denies any mental fogginess or overt confusion. Denies any fever, chills, or sweats. Denies any melena, hematochezia, or hematemesis.  Scheduled for follow-up with Jarvis Marie on 7/24/24, has lab appt today. Will check back in with pt in 1 month.    Eliz Judge, RN Care Coordinator  [facility]  Hepatology Clinic/Specialty Program

## 2024-07-24 ENCOUNTER — VIRTUAL VISIT (OUTPATIENT)
Dept: GASTROENTEROLOGY | Facility: CLINIC | Age: 64
End: 2024-07-24
Attending: PHYSICIAN ASSISTANT
Payer: COMMERCIAL

## 2024-07-24 DIAGNOSIS — E11.65 UNCONTROLLED TYPE 2 DIABETES MELLITUS WITH HYPERGLYCEMIA (H): ICD-10-CM

## 2024-07-24 DIAGNOSIS — K70.31 ALCOHOLIC CIRRHOSIS OF LIVER WITH ASCITES (H): Primary | ICD-10-CM

## 2024-07-24 PROCEDURE — 99205 OFFICE O/P NEW HI 60 MIN: CPT | Mod: 95 | Performed by: PHYSICIAN ASSISTANT

## 2024-07-24 NOTE — NURSING NOTE
Current patient location: [address] [place] [address] [address]  [place] [address]    Is the patient currently in the state of [place]? YES    Visit mode:VIDEO    If the visit is dropped, the patient can be reconnected by: VIDEO VISIT: Text to cell phone:   Telephone Information:   Mobile [phone]       Will anyone else be joining the visit? NO  (If patient encounters technical issues they should call [phone]    How would you like to obtain your AVS? MyChart    Are changes needed to the allergy or medication list? No    Are refills needed on medications prescribed by this physician? NO    Reason for visit: RECHECK    Soren GRACIA

## 2024-07-24 NOTE — PROGRESS NOTES
Hepatology Follow-up Clinic note  Saji Iqbal   Date of Birth 1960  Reason for follow-up: Cirrhosis     Virtual Visit Details    Type of service:  Video Visit   Joined the call at 7/24/2024, 2:09:40 pm.  Left the call at 7/24/2024, 2:51:56 pm.    Originating Location (pt. Location): Assisted Living  Distant Location (provider location):  Off-site  Platform used for Video Visit: Mona         Assessment/plan:   Saji Iqbal is a 64 year old male with history of EtOH/MASH cirrhosis, complicated by history of ascites requiring paracentesis (last in June 2024), which has improved over the past month now controlled with diuretic. No overt hepatic encephalopathy. Most recent MELD 3.0 was 11 (from April 2024), his liver function has continued to improve with INR down to 1.22.     # HCC screening, due:   - US abdomen at next convenience   - US abdomen in six months     # Variceal screening:   - EGD scheduled in October 2024   - Currently taking HALF tablet of 3.125 mg twice daily.  Recommend discontinuing if persistent hypotension    # Hepatic encephalopathy (No history, no overt HE today)     # Ascites, improved:  # YESSICA, improved  -Continue 1 mg Bumex twice daily  -Continue 50 mg eplerenone    # Protein calorie malnutrition  # Gynecomastia:   - Continue small frequent meals, increase protein intake to 110-120 gm protein day  - Can consider discussing testosterone testing and supplementation with his primary care provider.    # Metabolic associated fatty liver disease (MALFD):   - Maintain good control of cholesterol (No contraindication to starting a statin with LFT elevations)   - Recommend more aggressive optimization blood glucose/insulin resistance as needed. Primarily care can consider GLP-1 agonist (semliglutide or liraglutide) for both insulin resistance and help with weight loss or pioglitizone   - Improve nutrition: continue limiting carbohydrates, especially simple carbohydrates, and following  Mediterranean eating patten  - Increase physical activity as able, ideally 150 minutes weekly    - Follow-up in clinic in 6 months    Jarvis Marie PA-C   [facility] Hepatology clinic    Total time for E/M services performed on the date of the encounter 60 minutes.  This included review of previous: clinic visits, hospital records, lab results, imaging studies, and procedural documentation. Time also includes patient visit, documentation and discussion with other providers.  The findings from this review are summarized in the above note.    -----------------------------------------------------       HPI:   Saji Iqbal is a 64 year old male presenting for follow-up.     MetALD Cirrhosis  NIKKI, DM Type 2, HLD, obesity, CAD   Dx: 10/2023  Complicated by:  - ascites  - No History of HE or GI bleed  EGD: None previously  HCC: 2/7/2024    Patient was last seen by Marcella Darby 4/4/2024.   Recent hospitalizations or ER visits: none    He was started on Stelara - working well for skin for psoriasis.   Really struggling with walking around.   Pain Knees and hips and shoulders,   If go 500 yards, can hardly get out the next day.    Appetite is good. Needs less carbs and more protein.     Lost about 10 lbs in the last month, conscious efforts. Cut out pop.    History of paracentesis, last 6/14 w/ 2 L out.   Bumex 1 mg twice daily   Take 50 mg epleronone     Recently BP was 95/61 Lightheadness. Within the past week.     Primary care provider - Eunice Clements PA-C on August 18th -     No problems with confusion. Regular bowel movements.     Patient denies jaundice, lower extremity edema.  Patient also denies melena, hematochezia or hematemesis.  Patient denies fevers, sweats or chills.    Last drink Oct 2023. Prior was drinking vodka daily (1-2 pints per day)     Currently living at Assisted living facility.  Would like to go back to work, .          Medications:     Current Outpatient  Medications   Medication Sig Dispense Refill    aspirin 81 MG tablet Take by mouth daily      bumetanide (BUMEX) 1 MG tablet Take 1 tablet (1 mg) by mouth 2 times daily 180 tablet 1    calcium 600 MG tablet TAKE 1 TABLET (600 MG) BY MOUTH ONCE DAILY WITH A MEAL.      carvedilol (COREG) 3.125 MG tablet Take 0.5 tablets (1.5625 mg) by mouth 2 times daily (with meals)      eplerenone (INSPRA) 50 MG tablet Take 1 tablet (50 mg) by mouth daily 90 tablet 1    ferrous sulfate (FEROSUL) 325 (65 Fe) MG tablet Take 1 tablet (325 mg) by mouth daily (with breakfast) 90 tablet 1    folic acid (FOLVITE) 1 MG tablet Take 1 tablet (1 mg) by mouth daily      gabapentin (NEURONTIN) 100 MG capsule Take 200 mg by mouth daily      glipiZIDE (GLUCOTROL XL) 10 MG 24 hr tablet TAKE 1 TABLET (10 MG) BY MOUTH DAILY. 90 tablet 0    multivitamin w/minerals (THERA-VIT-M) tablet Take 1 tablet by mouth daily      pantoprazole (PROTONIX) 40 MG EC tablet Take 1 tablet (40 mg) by mouth daily      polyethylene glycol (MIRALAX) 17 g packet Take 17 g by mouth 2 times daily as needed (constipation)      sennosides (SENOKOT) 8.6 MG tablet Take 1 tablet by mouth 2 times daily      sertraline (ZOLOFT) 25 MG tablet Take 1 tablet (25 mg) by mouth daily      thiamine (B-1) 100 MG tablet Take 1 tablet (100 mg) by mouth daily       No current facility-administered medications for this visit.            Review of Systems:   10 points ROS was obtained and highlighted in the HPI, otherwise negative.          Physical Exam:   There were no vitals taken for this visit.    GENERAL: healthy, alert and no distress  EYES: Eyes grossly normal to inspection, conjunctivae and sclerae normal  RESP: no audible wheeze, cough, or visible cyanosis.  No visible retractions or increased work of breathing.  Able to speak fully in complete sentences  NEURO: Cranial nerves grossly intact, mentation intact and speech normal  PSYCH: mentation appears normal, affect normal/bright,  "judgement and insight intact, normal speech and appearance well-groomed           Data:   Reviewed in person and significant for:    Lab Results   Component Value Date     04/04/2024     12/16/2020      Lab Results   Component Value Date    POTASSIUM 4.7 04/04/2024    POTASSIUM 3.9 07/19/2022    POTASSIUM 4.4 12/16/2020     Lab Results   Component Value Date    CHLORIDE 98 04/04/2024    CHLORIDE 107 07/19/2022    CHLORIDE 104 12/16/2020     Lab Results   Component Value Date    CO2 27 04/04/2024    CO2 27 07/19/2022    CO2 23 12/16/2020     Lab Results   Component Value Date    BUN 21.6 04/04/2024    BUN 17 07/19/2022    BUN 19 12/16/2020     Lab Results   Component Value Date    CR 0.91 04/04/2024    CR 1.01 12/16/2020       Lab Results   Component Value Date    WBC 5.4 07/16/2024    WBC 5.2 12/16/2020     Lab Results   Component Value Date    HGB 11.8 07/16/2024    HGB 15.4 12/16/2020     Lab Results   Component Value Date    HCT 35.6 07/16/2024    HCT 44.0 12/16/2020     Lab Results   Component Value Date    MCV 90 07/16/2024    MCV 98 12/16/2020     Lab Results   Component Value Date    PLT 99 07/16/2024     12/16/2020       Lab Results   Component Value Date    AST 43 07/16/2024     09/17/2020     Lab Results   Component Value Date    ALT 16 07/16/2024     09/17/2020     No results found for: \"BILICONJ\"   Lab Results   Component Value Date    BILITOTAL 0.7 07/16/2024    BILITOTAL 0.8 09/17/2020       Lab Results   Component Value Date    ALBUMIN 3.7 07/16/2024    ALBUMIN 3.9 07/14/2022    ALBUMIN 4.1 09/17/2020     Lab Results   Component Value Date    PROTTOTAL 6.7 07/16/2024    PROTTOTAL 7.3 09/17/2020      Lab Results   Component Value Date    ALKPHOS 139 07/16/2024    ALKPHOS 71 09/17/2020       Lab Results   Component Value Date    INR 1.22 07/16/2024     Impression    1.  Diffuse hepatic steatosis limits evaluation of the hepatic parenchyma. Subtle nodular contour " irregularity of the liver worrisome for cirrhosis. No focal mass.  2.  Small amount of ascites in the right upper abdomen.  3.  Mild gallbladder wall thickening likely due to liver disease and ascites. No gallstones or bile duct dilatation.  Narrative    For Patients: As a result of the 21st Century Cures Act, medical imaging exams and procedure reports are released immediately into your electronic medical record. You may view this report before your referring provider. If you have questions, please contact your health care provider.    EXAM: US ABDOMEN LIMITED LIVER  LOCATION[facility]  DATE: 2/7/2024    INDICATION: Alcoholic cirrhosis of liver with ascites (HC).  COMPARISON: None.  TECHNIQUE: Limited abdominal ultrasound.    FINDINGS:    GALLBLADDER: No gallstones or sludge debris. Gallbladder wall thickening likely due to liver disease and ascites measuring up to 3.5 mm. Negative sonographic Fish's sign.    BILE DUCTS: No biliary dilatation. The common duct measures 3 mm.    LIVER: Diffuse hepatic steatosis limits evaluation of the hepatic parenchyma. Subtle nodular contour irregularity of the liver worrisome for cirrhosis. No focal mass.    RIGHT KIDNEY: No hydronephrosis.    PANCREAS: The visualized portions are normal.    Small amount of ascites in the right upper abdomen.

## 2024-07-24 NOTE — LETTER
7/24/2024      Saji Iqbal  [address] [place] [address] [address]  [place] [address]      Dear Colleague,    Thank you for referring your patient, Saji Iqbal, to the [facility]PATOLOG[facility] [place]. Please see a copy of my visit note below.    Hepatology Follow-up Clinic note  Saji Iqbal   Date of Birth 1960  Reason for follow-up: Cirrhosis     Virtual Visit Details    Type of service:  Video Visit   Joined the call at 7/24/2024, 2:09:40 pm.  Left the call at 7/24/2024, 2:51:56 pm.    Originating Location (pt. Location): Assisted Living  Distant Location (provider location):  Off-site  Platform used for Video Visit: Lumier         Assessment/plan:   Saji Iqbal is a 64 year old male with history of EtOH/MASH cirrhosis, complicated by history of ascites requiring paracentesis (last in June 2024), which has improved over the past month now controlled with diuretic. No overt hepatic encephalopathy. Most recent MELD 3.0 was 11 (from April 2024), his liver function has continued to improve with INR down to 1.22.     # HCC screening, due:   - US abdomen at next convenience   - US abdomen in six months     # Variceal screening:   - EGD scheduled in October 2024   - Currently taking HALF tablet of 3.125 mg twice daily.  Recommend discontinuing if persistent hypotension    # Hepatic encephalopathy (No history, no overt HE today)     # Ascites, improved:  # YESSICA, improved  -Continue 1 mg Bumex twice daily  -Continue 50 mg eplerenone    # Protein calorie malnutrition  # Gynecomastia:   - Continue small frequent meals, increase protein intake to 110-120 gm protein day  - Can consider discussing testosterone testing and supplementation with his primary care provider.    # Metabolic associated fatty liver disease (MALFD):   - Maintain good control of cholesterol (No contraindication to starting a statin with LFT elevations)   - Recommend more aggressive optimization blood glucose/insulin resistance as needed.  Primarily care can consider GLP-1 agonist (semliglutide or liraglutide) for both insulin resistance and help with weight loss or pioglitizone   - Improve nutrition: continue limiting carbohydrates, especially simple carbohydrates, and following Mediterranean eating patten  - Increase physical activity as able, ideally 150 minutes weekly    - Follow-up in clinic in 6 months    Jarvis Marie PA-C   [facility] Hepatology clinic    Total time for E/M services performed on the date of the encounter 60 minutes.  This included review of previous: clinic visits, hospital records, lab results, imaging studies, and procedural documentation. Time also includes patient visit, documentation and discussion with other providers.  The findings from this review are summarized in the above note.    -----------------------------------------------------       HPI:   Saji Iqbal is a 64 year old male presenting for follow-up.     MetALD Cirrhosis  NIKKI, DM Type 2, HLD, obesity, CAD   Dx: 10/2023  Complicated by:  - ascites  - No History of HE or GI bleed  EGD: None previously  HCC: 2/7/2024    Patient was last seen by Marcella Darby 4/4/2024.   Recent hospitalizations or ER visits: none    He was started on Stelara - working well for skin for psoriasis.   Really struggling with walking around.   Pain Knees and hips and shoulders,   If go 500 yards, can hardly get out the next day.    Appetite is good. Needs less carbs and more protein.     Lost about 10 lbs in the last month, conscious efforts. Cut out pop.    History of paracentesis, last 6/14 w/ 2 L out.   Bumex 1 mg twice daily   Take 50 mg epleronone     Recently BP was 95/61 Lightheadness. Within the past week.     Primary care provider - Eunice Clements PA-C on August 18th -     No problems with confusion. Regular bowel movements.     Patient denies jaundice, lower extremity edema.  Patient also denies melena, hematochezia or hematemesis.  Patient denies fevers, sweats  or chills.    Last drink Oct 2023. Prior was drinking vodka daily (1-2 pints per day)     Currently living at Assisted living facility.  Would like to go back to work, .          Medications:     Current Outpatient Medications   Medication Sig Dispense Refill     aspirin 81 MG tablet Take by mouth daily       bumetanide (BUMEX) 1 MG tablet Take 1 tablet (1 mg) by mouth 2 times daily 180 tablet 1     calcium 600 MG tablet TAKE 1 TABLET (600 MG) BY MOUTH ONCE DAILY WITH A MEAL.       carvedilol (COREG) 3.125 MG tablet Take 0.5 tablets (1.5625 mg) by mouth 2 times daily (with meals)       eplerenone (INSPRA) 50 MG tablet Take 1 tablet (50 mg) by mouth daily 90 tablet 1     ferrous sulfate (FEROSUL) 325 (65 Fe) MG tablet Take 1 tablet (325 mg) by mouth daily (with breakfast) 90 tablet 1     folic acid (FOLVITE) 1 MG tablet Take 1 tablet (1 mg) by mouth daily       gabapentin (NEURONTIN) 100 MG capsule Take 200 mg by mouth daily       glipiZIDE (GLUCOTROL XL) 10 MG 24 hr tablet TAKE 1 TABLET (10 MG) BY MOUTH DAILY. 90 tablet 0     multivitamin w/minerals (THERA-VIT-M) tablet Take 1 tablet by mouth daily       pantoprazole (PROTONIX) 40 MG EC tablet Take 1 tablet (40 mg) by mouth daily       polyethylene glycol (MIRALAX) 17 g packet Take 17 g by mouth 2 times daily as needed (constipation)       sennosides (SENOKOT) 8.6 MG tablet Take 1 tablet by mouth 2 times daily       sertraline (ZOLOFT) 25 MG tablet Take 1 tablet (25 mg) by mouth daily       thiamine (B-1) 100 MG tablet Take 1 tablet (100 mg) by mouth daily       No current facility-administered medications for this visit.            Review of Systems:   10 points ROS was obtained and highlighted in the HPI, otherwise negative.          Physical Exam:   There were no vitals taken for this visit.    GENERAL: healthy, alert and no distress  EYES: Eyes grossly normal to inspection, conjunctivae and sclerae normal  RESP: no audible wheeze, cough, or  "visible cyanosis.  No visible retractions or increased work of breathing.  Able to speak fully in complete sentences  NEURO: Cranial nerves grossly intact, mentation intact and speech normal  PSYCH: mentation appears normal, affect normal/bright, judgement and insight intact, normal speech and appearance well-groomed           Data:   Reviewed in person and significant for:    Lab Results   Component Value Date     04/04/2024     12/16/2020      Lab Results   Component Value Date    POTASSIUM 4.7 04/04/2024    POTASSIUM 3.9 07/19/2022    POTASSIUM 4.4 12/16/2020     Lab Results   Component Value Date    CHLORIDE 98 04/04/2024    CHLORIDE 107 07/19/2022    CHLORIDE 104 12/16/2020     Lab Results   Component Value Date    CO2 27 04/04/2024    CO2 27 07/19/2022    CO2 23 12/16/2020     Lab Results   Component Value Date    BUN 21.6 04/04/2024    BUN 17 07/19/2022    BUN 19 12/16/2020     Lab Results   Component Value Date    CR 0.91 04/04/2024    CR 1.01 12/16/2020       Lab Results   Component Value Date    WBC 5.4 07/16/2024    WBC 5.2 12/16/2020     Lab Results   Component Value Date    HGB 11.8 07/16/2024    HGB 15.4 12/16/2020     Lab Results   Component Value Date    HCT 35.6 07/16/2024    HCT 44.0 12/16/2020     Lab Results   Component Value Date    MCV 90 07/16/2024    MCV 98 12/16/2020     Lab Results   Component Value Date    PLT 99 07/16/2024     12/16/2020       Lab Results   Component Value Date    AST 43 07/16/2024     09/17/2020     Lab Results   Component Value Date    ALT 16 07/16/2024     09/17/2020     No results found for: \"BILICONJ\"   Lab Results   Component Value Date    BILITOTAL 0.7 07/16/2024    BILITOTAL 0.8 09/17/2020       Lab Results   Component Value Date    ALBUMIN 3.7 07/16/2024    ALBUMIN 3.9 07/14/2022    ALBUMIN 4.1 09/17/2020     Lab Results   Component Value Date    PROTTOTAL 6.7 07/16/2024    PROTTOTAL 7.3 09/17/2020      Lab Results   Component " Value Date    ALKPHOS 139 07/16/2024    ALKPHOS 71 09/17/2020       Lab Results   Component Value Date    INR 1.22 07/16/2024     Impression    1.  Diffuse hepatic steatosis limits evaluation of the hepatic parenchyma. Subtle nodular contour irregularity of the liver worrisome for cirrhosis. No focal mass.  2.  Small amount of ascites in the right upper abdomen.  3.  Mild gallbladder wall thickening likely due to liver disease and ascites. No gallstones or bile duct dilatation.  Narrative    For Patients: As a result of the 21st Century Cures Act, medical imaging exams and procedure reports are released immediately into your electronic medical record. You may view this report before your referring provider. If you have questions, please contact your health care provider.    EXAM: US ABDOMEN LIMITED LIVER  LOCATION: [facility]  DATE: 2/7/2024    INDICATION: Alcoholic cirrhosis of liver with ascites (HC).  COMPARISON: None.  TECHNIQUE: Limited abdominal ultrasound.    FINDINGS:    GALLBLADDER: No gallstones or sludge debris. Gallbladder wall thickening likely due to liver disease and ascites measuring up to 3.5 mm. Negative sonographic Fish's sign.    BILE DUCTS: No biliary dilatation. The common duct measures 3 mm.    LIVER: Diffuse hepatic steatosis limits evaluation of the hepatic parenchyma. Subtle nodular contour irregularity of the liver worrisome for cirrhosis. No focal mass.    RIGHT KIDNEY: No hydronephrosis.    PANCREAS: The visualized portions are normal.    Small amount of ascites in the right upper abdomen.      Again, thank you for allowing me to participate in the care of your patient.        Sincerely,        Jarvis Marie PA-C

## 2024-08-12 DIAGNOSIS — E61.1 IRON DEFICIENCY: ICD-10-CM

## 2024-08-12 DIAGNOSIS — K70.31 ALCOHOLIC CIRRHOSIS OF LIVER WITH ASCITES (H): ICD-10-CM

## 2024-08-12 DIAGNOSIS — D64.9 NORMOCYTIC ANEMIA: ICD-10-CM

## 2024-08-13 ENCOUNTER — ANCILLARY PROCEDURE (OUTPATIENT)
Dept: ULTRASOUND IMAGING | Facility: CLINIC | Age: 64
End: 2024-08-13
Attending: PHYSICIAN ASSISTANT
Payer: COMMERCIAL

## 2024-08-13 DIAGNOSIS — K70.31 ALCOHOLIC CIRRHOSIS OF LIVER WITH ASCITES (H): ICD-10-CM

## 2024-08-13 PROCEDURE — 76705 ECHO EXAM OF ABDOMEN: CPT | Performed by: STUDENT IN AN ORGANIZED HEALTH CARE EDUCATION/TRAINING PROGRAM

## 2024-08-20 ENCOUNTER — PATIENT OUTREACH (OUTPATIENT)
Dept: GASTROENTEROLOGY | Facility: CLINIC | Age: 64
End: 2024-08-20
Payer: COMMERCIAL

## 2024-08-20 RX ORDER — EPLERENONE 50 MG/1
50 TABLET, FILM COATED ORAL DAILY
Qty: 30 TABLET | Refills: 5 | Status: SHIPPED | OUTPATIENT
Start: 2024-08-20

## 2024-08-20 RX ORDER — FERROUS SULFATE 325(65) MG
325 TABLET ORAL
Qty: 30 TABLET | Refills: 5 | Status: SHIPPED | OUTPATIENT
Start: 2024-08-20

## 2024-08-20 NOTE — PROGRESS NOTES
Spoke with pt for check in. Pt denies any abdominal distention or leg swelling, has not needed paracentesis since 6/14/24. Continues with diuretics. Completed US abdomen, no evidence of HCC. Denies any mental fogginess or overt confusion. Denies any melena, hematochezia, or hematemesis. Denies any fever, chills, or sweats.  Scheduled for 6 month follow-up with Marcella Lentz on 1/9/25.    Eliz Judge, RN Care Coordinator  [facility]  Hepatology Clinic/Specialty Program

## 2024-09-09 ENCOUNTER — TELEPHONE (OUTPATIENT)
Dept: GASTROENTEROLOGY | Facility: CLINIC | Age: 64
End: 2024-09-09
Payer: COMMERCIAL

## 2024-09-09 NOTE — TELEPHONE ENCOUNTER
[facility] Call Center    Phone Message    May a detailed message be left on voicemail: yes     Reason for Call: Other: Pt's Primary Care clinic is calling in asking for a call back to get clarification on the Pt's medications, as they have it listed that the Pt is prescribed 2 similar medications and they would like to clarify which medication is correct. Please call back as soon as possible to discuss.     Action Taken: Message routed to:  [facility] Hep    Travel Screening: Not Applicable     Date of Service:

## 2024-09-09 NOTE — TELEPHONE ENCOUNTER
Attempted to call back primary care clinic,  stated no one available at this time, left message requesting callback, number provided.    Eliz Judge, RN Care Coordinator  [facility]  Hepatology Clinic/Specialty Program

## 2024-09-09 NOTE — TELEPHONE ENCOUNTER
Received callback from PCP office, notified them that pt is taking eplerenone 50mg daily, spironolactone has been discontinued.    Eliz Judge, RN Care Coordinator  [facility]  Hepatology Clinic/Specialty Program

## 2024-09-26 RX ORDER — BISACODYL 5 MG/1
TABLET, DELAYED RELEASE ORAL
Qty: 4 TABLET | Refills: 0 | Status: SHIPPED | OUTPATIENT
Start: 2024-09-26

## 2024-09-26 NOTE — TELEPHONE ENCOUNTER
Extended Golytely Bowel Prep  recommended due to GLP-1 agonist medication noted in chart.  Instructions were sent via Zase. Bowel prep was sent 9/26/2024 to    [place], [place] - [address] [place] [address]. [address].     Shilpa Craven RN Colorectal Cancer   Division of Gastroenterology at [facility]

## 2024-10-03 ENCOUNTER — TELEPHONE (OUTPATIENT)
Dept: GASTROENTEROLOGY | Facility: CLINIC | Age: 64
End: 2024-10-03
Payer: COMMERCIAL

## 2024-10-03 ENCOUNTER — DOCUMENTATION ONLY (OUTPATIENT)
Dept: GASTROENTEROLOGY | Facility: CLINIC | Age: 64
End: 2024-10-03
Payer: COMMERCIAL

## 2024-10-03 NOTE — LETTER
October 3, 2024      Saji Salasar  [address]  [place] [address]              Golytely Extended Bowel Prep   Prep instructions for your colonoscopy   For prep questions, please call: [facility] - [address] [place] [address], [place] [address] - [phone] option 2    Please read these instructions carefully at least 7 days prior to your colonoscopy procedure. Be sure to follow all directions completely. The inside of your colon must be clean to allow for a complete examination for the presence of any growths, polyps, and/or abnormalities, as well as their biopsy or removal. A number of tips are included in order to make this part of the procedure as comfortable as possible.    Getting ready      prescription at the pharmacy. Endoscopy team will order this about 2 weeks before to your scheduled procedure. Included in the kit will be the followin  Dulcolax (Bisacodyl) 5mg tablets  Two containers of Polyethylene glycol (Golytely, Colyte, Nulytely, Gavilyte-G)    A nurse will call you to go over your appointment details and prep instructions. Not completing the nurse call could result with your appointment being cancelled.    You must arrange for an adult to drive you home after your exam. Your colonoscopy cannot be done unless you have a ride. If you need to use public transportation, someone must ride with you and stay with you for up to 24 hours.       7 days before procedure     Consult with your prescribing provider about stopping any:     Diabetic/Weight Loss Injectable Medication GLP-1 agonist (such as Exenatide (Byetta, Bydureon),  Mounjaro (Tirzepatide).  Ozempic (Semaglutide). Semaglutide. Symlin (Pamlintide), Tanzeum (Albiglutide). Tirzepatide-Weight Management (Zepbound), Trulicity (Dulaglutide), Victoza (Saxenda, Liraglutide), Wegovy (Semaglutide) please follow below guidelines for holding:    For weekly injection HOLD 7 days before procedure.  For once or  twice a day injection HOLD the day before procedure and day of procedure.  For oral, daily dosing (Rybelsus) HOLD 7 days before procedure.    Blood thinning and/or anti-platelet medications: such as Coumadin, Plavix, Xarelto, Eliquis, Lovenox or others, these medications may need to be stopped temporarily before your procedure.     If you take insulin for diabetes, ask your prescribing provider for instructions on how to manage this medication while preparing for a colonoscopy.      Stop taking iron (ferrous sulfate), multivitamins that contain iron, and/or fiber supplements (Metamucil, Benefiber, Psyllium husk powder, Fibercon, Bran, etc.).      Stop eating whole kernel corn, popcorn, nuts, and foods that contain seeds. These can stay in the colon for many days, and they can clog up the colonoscope.    Begin a low-fiber diet. (See examples below). No Olestra (a fat substitute).   Consume no more than 10-15 grams of fiber each day.       LOW FIBER DIET   You may have: Do not have:    Starches: White bread, rolls, biscuits, croissants, [place] toast, white flour tortillas, waffles, pancakes, [place]i toast; white rice, noodles, pasta, macaroni; cooked and peeled potatoes; plain crackers, saltines; cooked farina or cream of rice; puffed rice, corn flakes, Rice Krispies, Special K      Vegetables: tender cooked and canned, vegetable broths     Fruits and fruit juices: Strained fruit juice, canned fruit without seeds or skin (not pineapple), applesauce, pear sauce, ripe bananas, melons (not watermelon)     Milk products: Milk (plain or flavored), cheese, cottage cheese, yogurt (no berries), custard, ice cream       Proteins: Tender, well-cooked ground beef, lamb, veal, ham, pork, chicken, turkey, fish or organ meat, Tofu, eggs, creamy peanut butter      Fats and condiments: Margarine, butter, oils, mayonnaise, sour cream, salad dressing, plain gravy; spices, cooked herbs; sugar, clear jelly, honey, syrup      Snacks, sweets  and drinks: Pretzels, hard candy; plain cakes and cookies (no nuts or seeds); gelatin, plain pudding, sherbet, Popsicles; coffee, tea, carbonated ( fizzy ) drinks  Starches: Breads or rolls that contain nuts, seeds or fruit; whole wheat or whole grain breads that contain more than 2 grams of fiber per serving; cornbread; corn or whole wheat tortillas; potatoes with skin; brown rice, wild rice, quinoa, kasha (buckwheat), and oatmeal      Vegetables: Any raw or steamed vegetables; vegetables with seeds; corn in any form      Fruits and fruit juices: Prunes, prune juice, raisins and other dried fruits, berries and other fruits with seeds, canned pineapple juices with pulp such as orange, grapefruit, pineapple or tomato juice     Milk products: Any yogurt with nuts, seeds or berries      Proteins: Tough, fibrous meats with gristle; cooked dried beans, peas or lentils; crunchy peanut butter     Fats and condiments: Pickles, olives, relish, horseradish; jam, marmalade, preserves      Snacks, sweets and drinks: Popcorn, nuts, seeds, granola, coconut, candies made with nuts or seeds; all desserts that contain nuts, seeds, raisins and other dried fruits, coconut, whole grains or bran.       2 days before procedure       You may have a light, low fiber breakfast and lunch. Begin a clear liquid diet AFTER 1 PM. Do not eat solid foods. (See examples below).    Drink at least eight to ten 8-ounce glasses of water throughout the day  ? ? ? ? ? ? ? ?    Fill one container of Golytely with a full gallon of warm water. Cover and shake until well mixed. Chill in refrigerator until it is time to drink solution.     CLEAR LIQUID DIET:  You can have: Do not have:    Water, tea, coffee (no milk or cream)   Soda pop, Gatorade (not red or purple)   Coconut water   Jell-O, Popsicles (no milk or fruit pieces - not red or purple)   Fat-free soup broth or bouillon   Plain hard candy, such as clear life savers (not red or purple)   Clear juices  and fruit-flavored drinks, such as apple juice, white grape juice, Hi-C, and Gerald-Aid (not red or purple)    Milk or milk products such as ice cream, malts or shakes, or coffee creamer   Red or purple drinks of any kind such as cranberry juice, grape juice, or Gerald-Aid. Avoid red or purple Jell-O, Popsicles, sorbet, sherbet and candy.   Juices with pulp such as orange, grapefruit, pineapple, or tomato juice   Cream soups of any kind   Alcohol and beer   Protein drinks or protein powder     Step 1     At 4 PM, take 2 Dulcolax (Bisacodyl) tablets.  At 5 PM, start drinking one 8-ounce glass of Golytely mixture every 15 minutes until the container is HALF empty. Drink each glass quickly. Store the rest in the refrigerator.   Continue to drink clear liquids.      Reminders While Drinking Laxatives:     After you start drinking the solution, stay near a toilet. You may have watery stools (diarrhea), mild cramping, bloating, and nausea. You may want to use Vaseline on the skin around your anus after each bowel movement or use wet wipes to prevent irritation. Bowel movements will be liquid and dark in color at first and then should turn clear yellow in color. Drinking the prepared solution may make you cold, wearing warm clothing can be helpful.    Some find it helpful to:  For added flavor, include a crystal light lemonade packet in each glass of Golytely, rather than mixing it into the entire prepared mixture.  In between each glass, try sucking on a lemon or a piece of hard candy to help diminish the flavor of the preparation.  Drinking from a straw can help minimize the taste of the prepared mixture.    If you have nausea or vomiting during drinking the solution, rinse your mouth with water and take a 15-30 minute break and then continue drinking solution.       1 day before procedure       Continue on a clear liquid diet. Do not eat solid foods.    Drink at least eight to ten 8-ounce glasses of water throughout the day   ? ? ? ? ? ? ? ?    Step 2     At 4 PM, take 2 Dulcolax (Bisacodyl) tablets.  At 5 PM, start drinking the 2nd half of Golytely mixture. Drink one 8-ounce glass of Golytely mixture every 15 minutes until the container is empty.  Drink each glass quickly.   Continue to drink clear liquids.    Before you go to bed mix the second container of Golytely and place in the refrigerator for the morning.     Day of procedure     Step 3     6 hours before your arrival time, start drinking one 8-ounce glass of Golytely mixture every 15 minutes until the container is HALF empty. You will not drink the entire container. The remaining solution can be discarded.     2 hours before your arrival time stop drinking all liquids, including water.   Do not smoke or swallow anything, including water or gum for at least 2 hours before your arrival time. This is a safety issue. Your procedure could be cancelled if you do not follow directions.  No chewing tobacco 6 hours prior to procedure arrival time.     You may take your necessary morning medications with sips of water (4 ounces).   Do not take diabetes medicine by mouth until after your exam.  If you have asthma, bring your inhalers.  Please perform your nebulizer treatments and airway clearance therapy in the morning prior to the procedure (if applicable).    Arrive with a responsible adult who can drive you home and stay with you for a minimum of 24 hours. The medications used during the procedure will make you sleepy, so you won't be able to drive yourself home.   You cannot use public transportation, ride-share services, or non-medical taxi services without a responsible caregiver. Medical transport services are okay, but a caregiver must be there to receive you at your destination.  Please check in with your  when you arrive. Drivers should stay on campus.    Expect to be at the procedure center for about 1.5-2.5 hours.    Do not wear jewelry (i.e. earrings, rings, necklaces,  watches, etc.). Leave your purse, billfold, credit cards, and other valuables at home.      Bring insurance card and ID.       Answers to Commonly Asked Questions     How soon can I eat after the procedure?  You may resume your normal diet when you feel ready, unless advised otherwise by the doctor performing your procedure. We recommend starting with a light meal.   Do not drink alcohol for 24 hours after your procedure.  You may resume normal activities (work, exercise, etc.) after 24 hours.    How will I feel after the procedure?  It is normal to feel bloated and gassy after your procedure. Walking will help move the air through your colon. You can take non-aspirin pain relievers that contain acetaminophen (Tylenol).  If you are having sedation, we require a responsible adult to take you home for your safety. The sedation medicines used to relax you during the procedure can impair your judgement and reaction time and make you forgetful and possibly a little unsteady.  Do not drive, make any important decisions, or sign any legal documents for 24 hours after your procedure.    When will I get my test results?  You should have your procedure results and any lab results (if applicable) by letter, Crispy Gamerhart message, or phone call within 2 weeks. If you have any questions, please call the doctor that referred you for the procedure.    How do I know if my colon is cleaned out?   After completing the bowel prep, your bowel movements should be all liquid and yellow. Your bowel movements will look similar to urine in the toilet. If there are pieces of stool (poop) in the toilet, or if you can't see to the bottom of the toilet, please call our office for advice. Call [phone] and ask to speak with a nurse.    Why is the Golytely bowel prep taken in several steps?   The stool is flushed out by a large wave of fluid going through the colon. Just sipping a large volume of the solution will not achieve the desired result.  Studies have shown that two smaller waves (or more in some cases) are better than one large one.      What if I need to cancel or reschedule my procedure?  Contact our endoscopy scheduling team at [phone], option 2. Monday through Friday, 7:00am-5:00pm.

## 2024-10-03 NOTE — TELEPHONE ENCOUNTER
Pre assessment completed per [facility] nurse, for upcoming procedure.   (Please see previous telephone encounter notes for complete details)      Procedure details:    Arrival time and facility location reviewed.    Pre op exam needed? No.    Designated  policy reviewed. Instructed to have someone stay 6  hours post procedure.       Medication review:    Medications reviewed. Please see supporting documentation below. Holding recommendations discussed (if applicable).   Oral diabetic medication(s): Glipizide (glucotrol): HOLD day of procedure.  Injectable diabetic medication(s): Exenatide (Byyao Bydureon).  Weekly dosing of medication.  HOLD 7 days before procedure.  Follow up with managing provider.   Ferrous Sulfate (iron supplement): HOLD 7 days before procedure.    ~pt is no longer taking Ozempic.     Prep for procedure:     Procedure prep instructions reviewed.        Any additional information needed:  N/A      Patient  verbalized understanding and had no questions or concerns at this time.    ~Instructions faxed to group [facility]      Joycelyn Baldwin RN  Endoscopy Procedure Pre Assessment   [phone] option 2

## 2024-10-03 NOTE — CONFIDENTIAL NOTE
Patient Quality Outreach    Patient is due for the following:   Diabetes -  Eye Exam  Breast Cancer Screening - Mammogram  Physical Preventive Adult Physical    Next Steps:   Schedule a Adult Preventative    Type of outreach:    Sent letter.      Questions for provider review:    None           Alfredo Brown MA

## 2024-10-03 NOTE — TELEPHONE ENCOUNTER
Pre visit planning completed.      Procedure details:    Patient scheduled for Colonoscopy/Upper endoscopy (EGD) on 10/17/2024.     Arrival time: 0915. Procedure time 1045    Facility location: [facility]; [address] [place] [address], 3rd Floor, [place] [address]. Check in location: Main entrance at registration desk.    Sedation type: MAC    Pre op exam needed? No.    Indication for procedure: Alcoholic cirrhosis of liver with ascites (H) [K70.31]  , screening, anemia      Chart review:     Electronic implanted devices? No    Recent diagnosis of diverticulitis within the last 6 weeks? No      Medication review:    Diabetic? Yes. Oral diabetic medications: Glipizide (glucotrol): HOLD day of procedure.  Diabetic injectables: Ozempic (Semaglutide). Weekly dosing of medication.  HOLD 7 days before procedure.  Follow up with managing provider.     Anticoagulants? No    Weight loss medication/injectable? No. Patient is on GLP-1 medication but for DM (see above).    Other medication HOLDING recommendations:  Ferrous sulfate (iron supplements): HOLD 7 days before procedure.      Prep for procedure:     Bowel prep recommendation: Extended Golytely. Bowel prep prescription sent to    [place], [place] - [address] [facility]. [address]   Due to: GLP-1 agonist medication noted in chart.     Prep instructions sent via [employer]         Joycelyn Baldwin RN  Endoscopy Procedure Pre Assessment RN  [phone] option 2

## 2024-10-03 NOTE — PROGRESS NOTES
Prep instructions was faxed to below address for upcoming procedure on 10/03/2024 @ 1:30 p.m     Facility name: [facility].   Facility main number: [phone]   ATTN: Sophie     Fax number: [phone]     Bowel prep instructions needed:  Extended prep Golytely

## 2024-10-15 DIAGNOSIS — K70.31 ALCOHOLIC CIRRHOSIS OF LIVER WITH ASCITES (H): ICD-10-CM

## 2024-10-16 RX ORDER — BUMETANIDE 1 MG/1
1 TABLET ORAL 2 TIMES DAILY
Qty: 60 TABLET | Refills: 1 | Status: ON HOLD | OUTPATIENT
Start: 2024-10-16 | End: 2024-10-17

## 2024-10-17 ENCOUNTER — ANESTHESIA EVENT (OUTPATIENT)
Dept: GASTROENTEROLOGY | Facility: CLINIC | Age: 64
End: 2024-10-17
Payer: COMMERCIAL

## 2024-10-17 ENCOUNTER — HOSPITAL ENCOUNTER (OUTPATIENT)
Facility: CLINIC | Age: 64
Discharge: HOME | End: 2024-10-17
Attending: INTERNAL MEDICINE | Admitting: INTERNAL MEDICINE
Payer: COMMERCIAL

## 2024-10-17 ENCOUNTER — TELEPHONE (OUTPATIENT)
Dept: GASTROENTEROLOGY | Facility: CLINIC | Age: 64
End: 2024-10-17

## 2024-10-17 ENCOUNTER — ANESTHESIA (OUTPATIENT)
Dept: GASTROENTEROLOGY | Facility: CLINIC | Age: 64
End: 2024-10-17
Payer: COMMERCIAL

## 2024-10-17 VITALS
DIASTOLIC BLOOD PRESSURE: 79 MMHG | OXYGEN SATURATION: 98 % | HEART RATE: 57 BPM | BODY MASS INDEX: 25.48 KG/M2 | SYSTOLIC BLOOD PRESSURE: 121 MMHG | WEIGHT: 182 LBS | TEMPERATURE: 98.2 F | HEIGHT: 71 IN | RESPIRATION RATE: 16 BRPM

## 2024-10-17 DIAGNOSIS — K70.31 ALCOHOLIC CIRRHOSIS OF LIVER WITH ASCITES (H): ICD-10-CM

## 2024-10-17 LAB
COLONOSCOPY: NORMAL
GLUCOSE BLDC GLUCOMTR-MCNC: 280 MG/DL (ref 70–99)
UPPER GI ENDOSCOPY: NORMAL

## 2024-10-17 PROCEDURE — 88305 TISSUE EXAM BY PATHOLOGIST: CPT | Mod: 26 | Performed by: STUDENT IN AN ORGANIZED HEALTH CARE EDUCATION/TRAINING PROGRAM

## 2024-10-17 PROCEDURE — 88305 TISSUE EXAM BY PATHOLOGIST: CPT | Mod: TC | Performed by: INTERNAL MEDICINE

## 2024-10-17 PROCEDURE — 250N000009 HC RX 250: Performed by: NURSE ANESTHETIST, CERTIFIED REGISTERED

## 2024-10-17 PROCEDURE — 45385 COLONOSCOPY W/LESION REMOVAL: CPT | Performed by: ANESTHESIOLOGY

## 2024-10-17 PROCEDURE — 250N000011 HC RX IP 250 OP 636: Performed by: NURSE ANESTHETIST, CERTIFIED REGISTERED

## 2024-10-17 PROCEDURE — 370N000017 HC ANESTHESIA TECHNICAL FEE, PER MIN: Performed by: INTERNAL MEDICINE

## 2024-10-17 PROCEDURE — 258N000003 HC RX IP 258 OP 636: Performed by: NURSE ANESTHETIST, CERTIFIED REGISTERED

## 2024-10-17 PROCEDURE — 45382 COLONOSCOPY W/CONTROL BLEED: CPT | Performed by: INTERNAL MEDICINE

## 2024-10-17 PROCEDURE — 45381 COLONOSCOPY SUBMUCOUS NJX: CPT | Mod: PT,XU | Performed by: INTERNAL MEDICINE

## 2024-10-17 PROCEDURE — 43235 EGD DIAGNOSTIC BRUSH WASH: CPT | Performed by: INTERNAL MEDICINE

## 2024-10-17 PROCEDURE — 45385 COLONOSCOPY W/LESION REMOVAL: CPT | Performed by: INTERNAL MEDICINE

## 2024-10-17 PROCEDURE — 45385 COLONOSCOPY W/LESION REMOVAL: CPT | Performed by: NURSE ANESTHETIST, CERTIFIED REGISTERED

## 2024-10-17 PROCEDURE — 82962 GLUCOSE BLOOD TEST: CPT

## 2024-10-17 RX ORDER — NALOXONE HYDROCHLORIDE 0.4 MG/ML
0.2 INJECTION, SOLUTION INTRAMUSCULAR; INTRAVENOUS; SUBCUTANEOUS
Status: DISCONTINUED | OUTPATIENT
Start: 2024-10-17 | End: 2024-10-17 | Stop reason: HOSPADM

## 2024-10-17 RX ORDER — SODIUM CHLORIDE, SODIUM LACTATE, POTASSIUM CHLORIDE, CALCIUM CHLORIDE 600; 310; 30; 20 MG/100ML; MG/100ML; MG/100ML; MG/100ML
INJECTION, SOLUTION INTRAVENOUS CONTINUOUS
Status: DISCONTINUED | OUTPATIENT
Start: 2024-10-17 | End: 2024-10-17 | Stop reason: HOSPADM

## 2024-10-17 RX ORDER — ONDANSETRON 4 MG/1
4 TABLET, ORALLY DISINTEGRATING ORAL EVERY 30 MIN PRN
Status: DISCONTINUED | OUTPATIENT
Start: 2024-10-17 | End: 2024-10-17 | Stop reason: HOSPADM

## 2024-10-17 RX ORDER — DEXAMETHASONE SODIUM PHOSPHATE 4 MG/ML
4 INJECTION, SOLUTION INTRA-ARTICULAR; INTRALESIONAL; INTRAMUSCULAR; INTRAVENOUS; SOFT TISSUE
Status: DISCONTINUED | OUTPATIENT
Start: 2024-10-17 | End: 2024-10-17 | Stop reason: HOSPADM

## 2024-10-17 RX ORDER — LIDOCAINE HYDROCHLORIDE 20 MG/ML
INJECTION, SOLUTION INFILTRATION; PERINEURAL PRN
Status: DISCONTINUED | OUTPATIENT
Start: 2024-10-17 | End: 2024-10-17

## 2024-10-17 RX ORDER — ONDANSETRON 2 MG/ML
INJECTION INTRAMUSCULAR; INTRAVENOUS PRN
Status: DISCONTINUED | OUTPATIENT
Start: 2024-10-17 | End: 2024-10-17

## 2024-10-17 RX ORDER — OXYCODONE HYDROCHLORIDE 5 MG/1
5 TABLET ORAL
Status: DISCONTINUED | OUTPATIENT
Start: 2024-10-17 | End: 2024-10-17 | Stop reason: HOSPADM

## 2024-10-17 RX ORDER — HYDROMORPHONE HCL IN WATER/PF 6 MG/30 ML
0.2 PATIENT CONTROLLED ANALGESIA SYRINGE INTRAVENOUS EVERY 5 MIN PRN
Status: DISCONTINUED | OUTPATIENT
Start: 2024-10-17 | End: 2024-10-17 | Stop reason: HOSPADM

## 2024-10-17 RX ORDER — PROCHLORPERAZINE MALEATE 5 MG/1
10 TABLET ORAL EVERY 6 HOURS PRN
Status: DISCONTINUED | OUTPATIENT
Start: 2024-10-17 | End: 2024-10-17 | Stop reason: HOSPADM

## 2024-10-17 RX ORDER — FENTANYL CITRATE 50 UG/ML
25 INJECTION, SOLUTION INTRAMUSCULAR; INTRAVENOUS EVERY 5 MIN PRN
Status: DISCONTINUED | OUTPATIENT
Start: 2024-10-17 | End: 2024-10-17 | Stop reason: HOSPADM

## 2024-10-17 RX ORDER — BUMETANIDE 1 MG/1
1 TABLET ORAL 2 TIMES DAILY
Qty: 60 TABLET | Refills: 5 | Status: SHIPPED | OUTPATIENT
Start: 2024-10-17

## 2024-10-17 RX ORDER — NALOXONE HYDROCHLORIDE 0.4 MG/ML
0.4 INJECTION, SOLUTION INTRAMUSCULAR; INTRAVENOUS; SUBCUTANEOUS
Status: DISCONTINUED | OUTPATIENT
Start: 2024-10-17 | End: 2024-10-17 | Stop reason: HOSPADM

## 2024-10-17 RX ORDER — PROPOFOL 10 MG/ML
INJECTION, EMULSION INTRAVENOUS CONTINUOUS PRN
Status: DISCONTINUED | OUTPATIENT
Start: 2024-10-17 | End: 2024-10-17

## 2024-10-17 RX ORDER — ONDANSETRON 2 MG/ML
4 INJECTION INTRAMUSCULAR; INTRAVENOUS EVERY 6 HOURS PRN
Status: DISCONTINUED | OUTPATIENT
Start: 2024-10-17 | End: 2024-10-17 | Stop reason: HOSPADM

## 2024-10-17 RX ORDER — ONDANSETRON 4 MG/1
4 TABLET, ORALLY DISINTEGRATING ORAL EVERY 6 HOURS PRN
Status: DISCONTINUED | OUTPATIENT
Start: 2024-10-17 | End: 2024-10-17 | Stop reason: HOSPADM

## 2024-10-17 RX ORDER — PROPOFOL 10 MG/ML
INJECTION, EMULSION INTRAVENOUS PRN
Status: DISCONTINUED | OUTPATIENT
Start: 2024-10-17 | End: 2024-10-17

## 2024-10-17 RX ORDER — FENTANYL CITRATE 50 UG/ML
50 INJECTION, SOLUTION INTRAMUSCULAR; INTRAVENOUS EVERY 5 MIN PRN
Status: DISCONTINUED | OUTPATIENT
Start: 2024-10-17 | End: 2024-10-17 | Stop reason: HOSPADM

## 2024-10-17 RX ORDER — HYDROMORPHONE HCL IN WATER/PF 6 MG/30 ML
0.4 PATIENT CONTROLLED ANALGESIA SYRINGE INTRAVENOUS EVERY 5 MIN PRN
Status: DISCONTINUED | OUTPATIENT
Start: 2024-10-17 | End: 2024-10-17 | Stop reason: HOSPADM

## 2024-10-17 RX ORDER — LIDOCAINE 40 MG/G
CREAM TOPICAL
Status: DISCONTINUED | OUTPATIENT
Start: 2024-10-17 | End: 2024-10-17 | Stop reason: HOSPADM

## 2024-10-17 RX ORDER — ONDANSETRON 2 MG/ML
4 INJECTION INTRAMUSCULAR; INTRAVENOUS EVERY 30 MIN PRN
Status: DISCONTINUED | OUTPATIENT
Start: 2024-10-17 | End: 2024-10-17 | Stop reason: HOSPADM

## 2024-10-17 RX ORDER — SODIUM CHLORIDE, SODIUM LACTATE, POTASSIUM CHLORIDE, CALCIUM CHLORIDE 600; 310; 30; 20 MG/100ML; MG/100ML; MG/100ML; MG/100ML
INJECTION, SOLUTION INTRAVENOUS CONTINUOUS PRN
Status: DISCONTINUED | OUTPATIENT
Start: 2024-10-17 | End: 2024-10-17

## 2024-10-17 RX ORDER — OXYCODONE HYDROCHLORIDE 10 MG/1
10 TABLET ORAL
Status: DISCONTINUED | OUTPATIENT
Start: 2024-10-17 | End: 2024-10-17 | Stop reason: HOSPADM

## 2024-10-17 RX ORDER — FLUMAZENIL 0.1 MG/ML
0.2 INJECTION, SOLUTION INTRAVENOUS
Status: DISCONTINUED | OUTPATIENT
Start: 2024-10-17 | End: 2024-10-17 | Stop reason: HOSPADM

## 2024-10-17 RX ORDER — NALOXONE HYDROCHLORIDE 0.4 MG/ML
0.1 INJECTION, SOLUTION INTRAMUSCULAR; INTRAVENOUS; SUBCUTANEOUS
Status: DISCONTINUED | OUTPATIENT
Start: 2024-10-17 | End: 2024-10-17 | Stop reason: HOSPADM

## 2024-10-17 RX ORDER — ONDANSETRON 2 MG/ML
4 INJECTION INTRAMUSCULAR; INTRAVENOUS
Status: DISCONTINUED | OUTPATIENT
Start: 2024-10-17 | End: 2024-10-17 | Stop reason: HOSPADM

## 2024-10-17 RX ADMIN — ONDANSETRON 4 MG: 2 INJECTION INTRAMUSCULAR; INTRAVENOUS at 11:24

## 2024-10-17 RX ADMIN — PROPOFOL 10 MG: 10 INJECTION, EMULSION INTRAVENOUS at 12:10

## 2024-10-17 RX ADMIN — PROPOFOL 20 MG: 10 INJECTION, EMULSION INTRAVENOUS at 11:01

## 2024-10-17 RX ADMIN — LIDOCAINE HYDROCHLORIDE 100 MG: 20 INJECTION, SOLUTION INFILTRATION; PERINEURAL at 11:00

## 2024-10-17 RX ADMIN — PHENYLEPHRINE HYDROCHLORIDE 100 MCG: 10 INJECTION INTRAVENOUS at 11:30

## 2024-10-17 RX ADMIN — PROPOFOL 20 MG: 10 INJECTION, EMULSION INTRAVENOUS at 12:14

## 2024-10-17 RX ADMIN — SODIUM CHLORIDE, POTASSIUM CHLORIDE, SODIUM LACTATE AND CALCIUM CHLORIDE: 600; 310; 30; 20 INJECTION, SOLUTION INTRAVENOUS at 12:00

## 2024-10-17 RX ADMIN — SODIUM CHLORIDE, POTASSIUM CHLORIDE, SODIUM LACTATE AND CALCIUM CHLORIDE: 600; 310; 30; 20 INJECTION, SOLUTION INTRAVENOUS at 10:54

## 2024-10-17 RX ADMIN — PHENYLEPHRINE HYDROCHLORIDE 200 MCG: 10 INJECTION INTRAVENOUS at 11:43

## 2024-10-17 RX ADMIN — PROPOFOL 30 MG: 10 INJECTION, EMULSION INTRAVENOUS at 11:00

## 2024-10-17 RX ADMIN — PROPOFOL 150 MCG/KG/MIN: 10 INJECTION, EMULSION INTRAVENOUS at 11:00

## 2024-10-17 RX ADMIN — PROPOFOL 100 MCG/KG/MIN: 10 INJECTION, EMULSION INTRAVENOUS at 12:20

## 2024-10-17 RX ADMIN — PROPOFOL 20 MG: 10 INJECTION, EMULSION INTRAVENOUS at 11:21

## 2024-10-17 RX ADMIN — PROPOFOL 30 MG: 10 INJECTION, EMULSION INTRAVENOUS at 11:02

## 2024-10-17 ASSESSMENT — ACTIVITIES OF DAILY LIVING (ADL)
ADLS_ACUITY_SCORE: 38

## 2024-10-17 NOTE — LETTER
October 22, 2024      Mika Iqbal  [address]  [place] [address]        Dear ,    The pathology results returned from the polyps removed at your recent colonoscopy.    All ten polyps removed were pre-cancerous but showed no active evidence of cancer.      Current guidelines recommend that you undergo a follow-up colonoscopy in 1 year.    I have sent a copy of this letter to your primary care doctor and hepatology provider.    Sincerely,               Robert Heller MD   [facility], [place], ENDOSCOPY  [address] [place] [address]  Phone: [phone]

## 2024-10-17 NOTE — ANESTHESIA POSTPROCEDURE EVALUATION
Patient: Saji GODWIN Rasheed    Procedure: Procedure(s):  COLONOSCOPY, FLEXIBLE, WITH LESION REMOVAL USING SNARE  Esophagoscopy, gastroscopy, duodenoscopy (EGD), combined  COLONOSCOPY, WITH HEMORRHAGE CONTROL       Anesthesia Type:  MAC    Note:  Disposition: Outpatient   Postop Pain Control: Uneventful            Sign Out: Well controlled pain   PONV: No   Neuro/Psych: Uneventful            Sign Out: Acceptable/Baseline neuro status   Airway/Respiratory: Uneventful            Sign Out: Acceptable/Baseline resp. status   CV/Hemodynamics: Uneventful            Sign Out: Acceptable CV status; No obvious hypovolemia; No obvious fluid overload   Other NRE: NONE   DID A NON-ROUTINE EVENT OCCUR? No       Last vitals:  Vitals Value Taken Time   /73 10/17/24 1340   Temp     Pulse     Resp     SpO2 98 % 10/17/24 1346   Vitals shown include unfiled device data.    Electronically Signed By: Saji Oseguera MD  October 17, 2024  1:47 PM

## 2024-10-17 NOTE — ANESTHESIA PREPROCEDURE EVALUATION
Anesthesia Pre-Procedure Evaluation    Patient: Saji Iqbal   MRN: [id number] : 1960        Procedure : Procedure(s):  Colonoscopy  Esophagoscopy, gastroscopy, duodenoscopy (EGD), combined          Past Medical History:   Diagnosis Date     Cluster headache      Coronary artery disease      Depression      Diabetes mellitus, type II (H)      Gastroesophageal reflux disease      Heart attack (H)      Hyperlipidemia      Hypertension      Renal disease      hx kidney stones     Rotator cuff arthropathy      Sleep apnea     doesn't use cpap-is broken     Stented coronary artery       Past Surgical History:   Procedure Laterality Date     ARTHROSCOPY SHOULDER       IR PARACENTESIS  2023     IR PARACENTESIS  2024     IR PARACENTESIS  2024     IR PARACENTESIS  2024     IR PARACENTESIS  2024     IR PARACENTESIS  3/22/2024     IR PARACENTESIS  3/15/2024     IR PARACENTESIS  3/8/2024     IR PARACENTESIS  3/1/2024     REPAIR TENDON FOOT Right 10/23/2020    Procedure: Repair of extensor tendon at the level of the metatarsophalangeal joint, right foot;  Surgeon: Gerard Diaz DPM;  Location: [facility] OR     STENT, CORONARY, ESAU  ,       Allergies   Allergen Reactions     Metformin Diarrhea     Honey Other (See Comments)     Throat irritation      Social History     Tobacco Use     Smoking status: Former     Current packs/day: 0.00     Average packs/day: 0.3 packs/day for 20.0 years (6.6 ttl pk-yrs)     Types: Cigarettes     Start date: 1995     Quit date: 2015     Years since quittin.2     Passive exposure: Never     Smokeless tobacco: Never   Substance Use Topics     Alcohol use: Not Currently     Alcohol/week: 1.0 standard drink of alcohol     Types: 1 Standard drinks or equivalent per week     Comment: Sober approx 2 years per Care taker (Roxy). 4-5 drinks nightly on weekend as of  per existing chart note.      Wt Readings from Last 1 Encounters:   10/17/24  82.6 kg (182 lb)        Anesthesia Evaluation   Pt has had prior anesthetic. Type: General.        ROS/MED HX  ENT/Pulmonary:     (+) sleep apnea, doesn't use CPAP,                                      Neurologic:       Cardiovascular:     (+)  hypertension- -  CAD -  - stent-2015. 2                                     METS/Exercise Tolerance:     Hematologic:       Musculoskeletal:       GI/Hepatic:     (+) GERD,            liver disease,       Renal/Genitourinary:     (+) renal disease,             Endo:     (+) type I DM,    Not using insulin,          Obesity,       Psychiatric/Substance Use:       Infectious Disease:       Malignancy:       Other:          Physical Exam    Airway        Mallampati: I    Neck ROM: full     Respiratory Devices and Support         Dental       (+) Modest Abnormalities - crowns, retainers, 1 or 2 missing teeth      Cardiovascular   cardiovascular exam normal          Pulmonary   pulmonary exam normal            OUTSIDE LABS:  CBC:   Lab Results   Component Value Date    WBC 5.4 07/16/2024    WBC 8.0 04/04/2024    HGB 11.8 (L) 07/16/2024    HGB 9.1 (L) 04/04/2024    HCT 35.6 (L) 07/16/2024    HCT 28.7 (L) 04/04/2024    PLT 99 (L) 07/16/2024     04/04/2024     BMP:   Lab Results   Component Value Date     (L) 04/04/2024     (L) 01/03/2024    POTASSIUM 4.7 04/04/2024    POTASSIUM 4.1 01/03/2024    CHLORIDE 98 04/04/2024    CHLORIDE 98 01/03/2024    CO2 27 04/04/2024    CO2 27 01/03/2024    BUN 21.6 04/04/2024    BUN 11.1 01/03/2024    CR 0.91 04/04/2024    CR 0.85 01/03/2024     (H) 10/17/2024     (H) 04/04/2024     COAGS:   Lab Results   Component Value Date    PTT 30 08/31/2023    INR 1.22 (H) 07/16/2024     POC:   Lab Results   Component Value Date     (H) 10/23/2020     HEPATIC:   Lab Results   Component Value Date    ALBUMIN 3.7 07/16/2024    PROTTOTAL 6.7 07/16/2024    ALT 16 07/16/2024    AST 43 07/16/2024    ALKPHOS 139 07/16/2024     "BILITOTAL 0.7 07/16/2024    DUSTY 53 09/06/2023     OTHER:   Lab Results   Component Value Date    PH 7.44 09/06/2023    LACT 2.0 11/03/2023    A1C 5.7 (H) 10/24/2023    BENY 9.4 04/04/2024    PHOS 2.9 09/11/2023    MAG 2.0 11/04/2023    TSH 1.69 11/23/2018       Anesthesia Plan    ASA Status:  3    NPO Status:  NPO Appropriate    Anesthesia Type: MAC.     - Reason for MAC: straight local not clinically adequate      Maintenance: Balanced.        Consents    Anesthesia Plan(s) and associated risks, benefits, and realistic alternatives discussed. Questions answered and patient/representative(s) expressed understanding.     - Discussed: Risks, Benefits and Alternatives for BOTH SEDATION and the PROCEDURE were discussed     - Discussed with:  Patient       Use of blood products discussed: Yes.     - Discussed with: Patient.     - Consented: consented to blood products     Postoperative Care    Pain management: IV analgesics.   PONV prophylaxis: Ondansetron (or other 5HT-3)     Comments:             Saji Oseguera MD    I have reviewed the pertinent notes and labs in the chart from the past 30 days and (re)examined the patient.  Any updates or changes from those notes are reflected in this note.             # Drug Induced Platelet Defect: home medication list includes an antiplatelet medication   # Hypertension: Noted on problem list  # Chronic heart failure with reduced ejection fraction: last echo with EF <40%        # Overweight: Estimated body mass index is 25.4 kg/m  as calculated from the following:    Height as of this encounter: 1.803 m (5' 10.98\").    Weight as of this encounter: 82.6 kg (182 lb).       # Financial/Environmental Concerns:          "

## 2024-10-17 NOTE — OR NURSING
Colonoscopy with 10 polyps removed via cold snare, ERBE hot snare (25 watt, 2 effect, forced coag).  Tattoo via sclero needle.  18 steris assurance hemoclips placed on post polypectomy sites d/t bleeding.  Tolerated well with MAC.

## 2024-10-17 NOTE — ANESTHESIA CARE TRANSFER NOTE
Patient: Saji Iqbal    Procedure: Procedure(s):  COLONOSCOPY, FLEXIBLE, WITH LESION REMOVAL USING SNARE  Esophagoscopy, gastroscopy, duodenoscopy (EGD), combined  COLONOSCOPY, WITH HEMORRHAGE CONTROL       Diagnosis: Alcoholic cirrhosis of liver with ascites (H) [K70.31]  Diagnosis Additional Information: No value filed.    Anesthesia Type:   MAC     Note:      Level of Consciousness: awake  Oxygen Supplementation: room air    Independent Airway: airway patency satisfactory and stable  Dentition: dentition unchanged  Vital Signs Stable: post-procedure vital signs reviewed and stable  Report to RN Given: handoff report given  Patient transferred to: Phase II          Vitals:  Vitals Value Taken Time   /80    Temp 96.0    Pulse 56    Resp 18    SpO2 98 % 10/17/24 1337   Vitals shown include unfiled device data.    Electronically Signed By: JACKIE Flores CRNA  October 17, 2024  1:38 PM

## 2024-10-17 NOTE — TELEPHONE ENCOUNTER
[facility] Call Center    Phone Message    May a detailed message be left on voicemail: yes     Reason for Call: Medication Refill Request    Has the patient contacted the pharmacy for the refill? Yes   Name of medication being requested: bumetanide (BUMEX) 1 MG tablet  AND   Provider who prescribed the medication: Marcella Lentz  Pharmacy:    [address]   Date medication is needed: 10-19-24      Action Taken: Message routed to:  Adult Clinics: Gastroenterology (GI) p 60492    Travel Screening: Not Applicable     Date of Service:

## 2024-10-22 LAB
PATH REPORT.COMMENTS IMP SPEC: NORMAL
PATH REPORT.COMMENTS IMP SPEC: NORMAL
PATH REPORT.FINAL DX SPEC: NORMAL
PATH REPORT.GROSS SPEC: NORMAL
PATH REPORT.MICROSCOPIC SPEC OTHER STN: NORMAL
PATH REPORT.RELEVANT HX SPEC: NORMAL
PHOTO IMAGE: NORMAL

## 2024-11-07 ENCOUNTER — PATIENT OUTREACH (OUTPATIENT)
Dept: GASTROENTEROLOGY | Facility: CLINIC | Age: 64
End: 2024-11-07
Payer: COMMERCIAL

## 2024-11-07 NOTE — PROGRESS NOTES
Called pt for check in. Pt reports he's doing well, denies any abdominal distention or leg swelling. Last paracentesis 6/14/24. Denies any fever, chills, or sweats. Denies any melena, hematochezia, or hematemesis. Pt had hepatic panel done yesterday for dermatology appt, discussed with pt that his liver enzymes look good. Pt is scheduled for follow-up with Marcella Lentz PA-C on 1/9/25.    Eliz Judge RN Care Coordinator  [facility]  Hepatology Clinic/Specialty Program

## 2024-11-19 ENCOUNTER — PATIENT OUTREACH (OUTPATIENT)
Dept: GASTROENTEROLOGY | Facility: CLINIC | Age: 64
End: 2024-11-19
Payer: COMMERCIAL

## 2024-11-19 NOTE — PROGRESS NOTES
"Received voicemail from pt asking whether he should be having ammonia level checked to monitor for hepatic encephalopathy, states that in the past at his previous group home he used to get medication to treat this.  Review of pt's chart shows that pt has not been taking lactulose, has not showed any signs or symptoms of hepatic encephalopathy recently.   Spoke with pt to discuss, he states he is wondering because he has been feeling \"more irritated over the past month,\" but expresses that he has been frustrated with trying to get clearance to live independently and thinks this could be related. Denies any mental fogginess, forgetfulness, or overt confusion. Reassured pt that he does not need to be on lactulose or have ammonia levels checked at this time, but advised pt to let writer know if he does experience any mental fogginess or forgetfulness. Pt verbalized understanding, in agreement with plan.    Eliz Judge, RN Care Coordinator  [facility]  Hepatology Clinic/Specialty Program    "

## 2024-11-24 ENCOUNTER — HEALTH MAINTENANCE LETTER (OUTPATIENT)
Age: 64
End: 2024-11-24

## 2024-12-13 ENCOUNTER — TELEPHONE (OUTPATIENT)
Dept: GASTROENTEROLOGY | Facility: CLINIC | Age: 64
End: 2024-12-13
Payer: COMMERCIAL

## 2024-12-13 NOTE — TELEPHONE ENCOUNTER
[facility] Call Center    Phone Message    May a detailed message be left on voicemail: yes     Reason for Call: Other: Pt is requesting a call back please to discuss getting his ammonia level checked. Please reach out to discuss. Thank you!     Action Taken: Message routed to:  [facility] Hepatology    Travel Screening: Not Applicable     Date of Service:

## 2024-12-16 NOTE — TELEPHONE ENCOUNTER
Called pt to discuss. Pt stated he was wondering what his current MELD score is, notified pt that based on his most recent labs via [facility] his MELD 3.0 score is 8, down from 11 earlier this year. Pt verbalized understanding. Scheduled for follow-up with Marcella Lentz PA-C on 1/9/25.    Eliz Judge, RN Care Coordinator  [facility]  Hepatology Clinic/Specialty Program

## 2024-12-18 ENCOUNTER — PATIENT OUTREACH (OUTPATIENT)
Dept: GASTROENTEROLOGY | Facility: CLINIC | Age: 64
End: 2024-12-18
Payer: COMMERCIAL

## 2024-12-18 NOTE — PROGRESS NOTES
Pt is stable from liver standpoint at this time, discontinuing liver care coordination program.    Eliz Judge, RN Care Coordinator  [facility]  Hepatology Clinic/Specialty Program

## 2024-12-26 DIAGNOSIS — K70.31 ALCOHOLIC CIRRHOSIS OF LIVER WITH ASCITES (H): Primary | ICD-10-CM

## 2025-01-09 ENCOUNTER — OFFICE VISIT (OUTPATIENT)
Dept: GASTROENTEROLOGY | Facility: CLINIC | Age: 65
End: 2025-01-09
Payer: COMMERCIAL

## 2025-01-09 ENCOUNTER — LAB (OUTPATIENT)
Dept: LAB | Facility: CLINIC | Age: 65
End: 2025-01-09
Payer: COMMERCIAL

## 2025-01-09 VITALS
WEIGHT: 202.2 LBS | DIASTOLIC BLOOD PRESSURE: 73 MMHG | BODY MASS INDEX: 28.31 KG/M2 | OXYGEN SATURATION: 98 % | HEIGHT: 71 IN | HEART RATE: 84 BPM | SYSTOLIC BLOOD PRESSURE: 119 MMHG

## 2025-01-09 DIAGNOSIS — F10.91 ALCOHOL USE DISORDER IN REMISSION: ICD-10-CM

## 2025-01-09 DIAGNOSIS — K70.31 ALCOHOLIC CIRRHOSIS OF LIVER WITH ASCITES (H): ICD-10-CM

## 2025-01-09 DIAGNOSIS — K70.31 ALCOHOLIC CIRRHOSIS OF LIVER WITH ASCITES (H): Primary | ICD-10-CM

## 2025-01-09 DIAGNOSIS — K76.6 PORTAL HYPERTENSION (H): ICD-10-CM

## 2025-01-09 PROBLEM — Z79.4 TYPE 2 DIABETES MELLITUS WITH HYPERGLYCEMIA, WITH LONG-TERM CURRENT USE OF INSULIN (H): Status: ACTIVE | Noted: 2024-12-10

## 2025-01-09 PROBLEM — E11.65 TYPE 2 DIABETES MELLITUS WITH HYPERGLYCEMIA, WITH LONG-TERM CURRENT USE OF INSULIN (H): Status: ACTIVE | Noted: 2024-12-10

## 2025-01-09 PROBLEM — S96.821A: Status: RESOLVED | Noted: 2020-10-13 | Resolved: 2025-01-09

## 2025-01-09 PROBLEM — G44.009 CLUSTER HEADACHES: Status: ACTIVE | Noted: 2024-11-11

## 2025-01-09 PROBLEM — K72.10 CHRONIC LIVER FAILURE WITHOUT HEPATIC COMA (H): Status: ACTIVE | Noted: 2024-06-26

## 2025-01-09 PROBLEM — I50.22 CHRONIC SYSTOLIC CHF (CONGESTIVE HEART FAILURE) (H): Status: ACTIVE | Noted: 2024-11-11

## 2025-01-09 LAB
AFP SERPL-MCNC: 2.2 NG/ML
ALBUMIN SERPL BCG-MCNC: 4 G/DL (ref 3.5–5.2)
ALP SERPL-CCNC: 145 U/L (ref 40–150)
ALT SERPL W P-5'-P-CCNC: 30 U/L (ref 0–70)
ANION GAP SERPL CALCULATED.3IONS-SCNC: 12 MMOL/L (ref 7–15)
AST SERPL W P-5'-P-CCNC: 37 U/L (ref 0–45)
BILIRUB DIRECT SERPL-MCNC: 0.27 MG/DL (ref 0–0.3)
BILIRUB SERPL-MCNC: 0.7 MG/DL
BUN SERPL-MCNC: 12.8 MG/DL (ref 8–23)
CALCIUM SERPL-MCNC: 9.4 MG/DL (ref 8.8–10.4)
CHLORIDE SERPL-SCNC: 101 MMOL/L (ref 98–107)
CREAT SERPL-MCNC: 1 MG/DL (ref 0.67–1.17)
EGFRCR SERPLBLD CKD-EPI 2021: 85 ML/MIN/1.73M2
ERYTHROCYTE [DISTWIDTH] IN BLOOD BY AUTOMATED COUNT: 14.9 % (ref 10–15)
FERRITIN SERPL-MCNC: 75 NG/ML (ref 31–409)
GLUCOSE SERPL-MCNC: 261 MG/DL (ref 70–99)
HCO3 SERPL-SCNC: 26 MMOL/L (ref 22–29)
HCT VFR BLD AUTO: 38.5 % (ref 40–53)
HGB BLD-MCNC: 13.1 G/DL (ref 13.3–17.7)
INR PPP: 1.06 (ref 0.85–1.15)
IRON BINDING CAPACITY (ROCHE): 313 UG/DL (ref 240–430)
IRON SATN MFR SERPL: 53 % (ref 15–46)
IRON SERPL-MCNC: 166 UG/DL (ref 61–157)
IRON SERPL-MCNC: 166 UG/DL (ref 61–157)
MCH RBC QN AUTO: 33.1 PG (ref 26.5–33)
MCHC RBC AUTO-ENTMCNC: 34 G/DL (ref 31.5–36.5)
MCV RBC AUTO: 97 FL (ref 78–100)
PLATELET # BLD AUTO: 87 10E3/UL (ref 150–450)
POTASSIUM SERPL-SCNC: 4.2 MMOL/L (ref 3.4–5.3)
PROT SERPL-MCNC: 6.9 G/DL (ref 6.4–8.3)
RBC # BLD AUTO: 3.96 10E6/UL (ref 4.4–5.9)
SODIUM SERPL-SCNC: 139 MMOL/L (ref 135–145)
WBC # BLD AUTO: 5.1 10E3/UL (ref 4–11)

## 2025-01-09 RX ORDER — LANCETS 28 GAUGE
EACH MISCELLANEOUS
COMMUNITY
Start: 2025-01-08

## 2025-01-09 RX ORDER — PROCHLORPERAZINE 25 MG/1
SUPPOSITORY RECTAL
COMMUNITY
Start: 2025-01-02

## 2025-01-09 RX ORDER — INSULIN GLARGINE 100 [IU]/ML
INJECTION, SOLUTION SUBCUTANEOUS
COMMUNITY
Start: 2024-12-03

## 2025-01-09 RX ORDER — EXENATIDE 2 MG/.85ML
INJECTION, SUSPENSION, EXTENDED RELEASE SUBCUTANEOUS
COMMUNITY
Start: 2024-12-09

## 2025-01-09 RX ORDER — CLOBETASOL PROPIONATE 0.5 MG/G
CREAM TOPICAL
COMMUNITY

## 2025-01-09 RX ORDER — INSULIN ASPART 100 [IU]/ML
INJECTION, SOLUTION INTRAVENOUS; SUBCUTANEOUS
COMMUNITY

## 2025-01-09 RX ORDER — USTEKINUMAB 90 MG/ML
INJECTION, SOLUTION SUBCUTANEOUS
COMMUNITY
Start: 2024-11-07

## 2025-01-09 RX ORDER — FLURBIPROFEN SODIUM 0.3 MG/ML
SOLUTION/ DROPS OPHTHALMIC
COMMUNITY
Start: 2024-12-31

## 2025-01-09 RX ORDER — BLOOD SUGAR DIAGNOSTIC
STRIP MISCELLANEOUS
COMMUNITY
Start: 2025-01-08

## 2025-01-09 RX ORDER — POTASSIUM CHLORIDE 1500 MG/1
TABLET, EXTENDED RELEASE ORAL
COMMUNITY

## 2025-01-09 RX ORDER — MOMETASONE FUROATE 1 MG/G
CREAM TOPICAL
COMMUNITY

## 2025-01-09 RX ORDER — PRENATAL VIT 91/IRON/FOLIC/DHA 28-975-200
COMBINATION PACKAGE (EA) ORAL
COMMUNITY
Start: 2024-12-10

## 2025-01-09 RX ORDER — USTEKINUMAB 45 MG/.5ML
INJECTION, SOLUTION SUBCUTANEOUS
COMMUNITY
Start: 2024-07-10

## 2025-01-09 RX ORDER — SEMAGLUTIDE 0.68 MG/ML
INJECTION, SOLUTION SUBCUTANEOUS
COMMUNITY
Start: 2024-12-30

## 2025-01-09 RX ORDER — HYDROXYZINE HYDROCHLORIDE 50 MG/1
TABLET, FILM COATED ORAL
COMMUNITY

## 2025-01-09 RX ORDER — FLUOCINOLONE ACETONIDE 0.11 MG/ML
OIL TOPICAL
COMMUNITY

## 2025-01-09 ASSESSMENT — PAIN SCALES - GENERAL: PAINLEVEL_OUTOF10: MILD PAIN (2)

## 2025-01-09 NOTE — NURSING NOTE
"Chief Complaint   Patient presents with    Follow Up     Follow up alcoholic cirrhosis of liver with ascites.     He requests these members of his care team be copied on today's visit information:  PCP: Roselyn Clements    Vitals:    01/09/25 1015   BP: 119/73   BP Location: Left arm   Patient Position: Sitting   Cuff Size: Adult Regular   Pulse: 84   SpO2: 98%   Weight: 91.7 kg (202 lb 3.2 oz)   Height: 1.803 m (5' 11\")     Body mass index is 28.2 kg/m .    Medications were reconciled.    Does patient need any medication refills at today's visit? No        Zina Arambula CMA    "

## 2025-01-09 NOTE — LETTER
1/9/2025      Saji Iqbal  [address]  [place] [address]      Dear Colleague,    Thank you for referring your patient, Saji Iqbal, to the [facility]. Please see a copy of my visit note below.    Hepatology Clinic Note  Saji Iqbal   Date of Birth 1960    REASON FOR FOLLOW UP: Cirrhosis         Assessment/plan:   65 YO M with PMHx of EtOH/MASH cirrhosis, complicated by hx of ascites requiring paracentesis (last in June 2024), which has now resolved and is controlled with diuretics. No overt hepatic encephalopathy. MELD 3.0: 7 (using INR from 9/6/24). His liver function has continued to improve based on labs. Last seen virtually by NOEMY Marie PA-C 7/24/24.    #AUD  -Sober from alcohol since 9/15/23; denies current cravings/urges  -Has never completed chemical dependency treatment/program but pt states he is still open to doing this    # HCC screening  - US abd limited 8/13/24: Cirrhosis w/o focal liver lesion; AFP 2.4 4/4/24  - US abdomen due Feb 2025; ordered     # Variceal screening:   - EGD 10/17/24: Normal esophagus. Portal hypertensive gastropathy. Normal examined duodenum. No specimens collected.    > Per Dr. Heller: doesn't need follow-up EGD as long as he takes non-selective BB  - Currently taking HALF tab of 3.125 mg BID; BP WNL today     # Hepatic encephalopathy (No history, no overt HE today)    > Does not take lactulose/rifaximin     # Ascites, improved:  # YESSICA, improved  -Continue 1 mg Bumex BID and 50 mg eplerenone daily  -Kidney function WNL  -Last paracentesis June 2024  -Low sodium diet     # Protein calorie malnutrition  # Gynecomastia:   - Continue small frequent meals; Protein intake of 110-120 gm protein day     # Metabolic associated fatty liver disease (MALFD):   -Continue to follow with Diabetic Educator  - Maintain good control of cholesterol (No contraindication to statin)   - Recommend more aggressive optimization blood glucose/insulin resistance  "as needed. Primarily care can consider GLP-1 agonist (semliglutide or liraglutide) for both insulin resistance and help with weight loss or pioglitizone    > Is going to changing from Bydureon to Ozempic in the near future  - Limit carbs, especially simple carbs, and follow Mediterranean eating patten  - Remain physically active as tolerated       -Will be due for next screening colonoscopy October of 2025  -Recommended Hep A/B vaccination with PCP  -Schedule US abd limited for Feb. 2025  -Continue sobriety; encouraged completing chem dep tx/program   -Avoid all NSAIDs, do not exceed 2000 mg Tylenol in 24 hrs   -Discussed that with low MELD score and compensated cirrhosis, no need to consider scheduling for liver transplant eval clinic at this time    RTC in ~6 months with labs same day/prior    Marcella Lentz PA-C  [facility]   -----------------------------------------------------         HPI:   Saji Iqbal is a 63 YO M who presents for follow-up regarding cirrhosis.  He was last seen in the hepatology clinic virtually July 2024.    Patient had labs done today.    Patient denies any recent fevers, chills, nausea, vomiting.  No lower extremity edema.  No swelling or distention in his abdomen.  No yellowing of the eyes or skin.  No recent severe confusion or changes in mentation.  Typically passing stool 1-2 times per day.  Denies melena or bright red blood per rectum.  He reports appetite is good.  He believes over the past 6 months he has gained 15 LBS due to \"eating more \".  Patient admits he has a sweet tooth and that sometimes this can get \"out of control \".  He does try to follow a low-sodium diet.  Patient states he really does not eat good amounts of protein unless he buys food himself.  Patient does still live in an assisted living facility and has meals cooked for him.  Patient states the facility also helps with his medications and with things like laundry.  He continues to " follow with a diabetic educator through [facility].  He does get routine exercise by going to a facility where he does aerobic exercises.  Patient states he also does leg/shoulder exercises at home and walks in stores on average 3 times per week.  He has gotten back into doing art work.  Patient states he loves the life he is living without alcohol.    Patient denies tobacco use.  Last use of alcohol was 9/15/2023.  He denies any current cravings or urges for alcohol.  Has not yet completed any form of chemical dependency treatment or program.  Patient tells me he is still open to doing this and does intend to.  Denies illicit drug use.    Patient tells me he is pretty sure that he will be moving out of the assisted living facility where he currently resides moving to [place], [place].  Patient states he got the okay from his PCP.  Patient states his PCPs only concern was in regards to patient being able to manage his medications on his own.    PMH:    has a past medical history of Cluster headache, Coronary artery disease, Depression, Diabetes mellitus, type II (H), Gastroesophageal reflux disease, Heart attack (H), Hyperlipidemia, Hypertension, Renal disease, Rotator cuff arthropathy, Sleep apnea, and Stented coronary artery.     SMH:    has a past surgical history that includes stent, coronary, yadira (2014, 2015); Arthroscopy shoulder; Repair tendon foot (Right, 10/23/2020); IR Paracentesis (12/12/2023); IR Paracentesis (2/23/2024); IR Paracentesis (2/16/2024); IR Paracentesis (1/30/2024); IR Paracentesis (1/22/2024); IR Paracentesis (3/22/2024); IR Paracentesis (3/15/2024); IR Paracentesis (3/8/2024); IR Paracentesis (3/1/2024); Colonoscopy (N/A, 10/17/2024); and Esophagoscopy, gastroscopy, duodenoscopy (EGD), combined (N/A, 10/17/2024).     Medications:   Current Outpatient Medications   Medication Sig Dispense Refill     ACCU-CHEK GUIDE TEST test strip        aspirin 81 MG tablet Take by mouth daily       B-D U/F  insulin pen needle USE TO INJECT INSULIN FOUR TIMES DAILY. REMOVE THE 2 COVERS ON THE PEN NEEDLE BEFORE ADMINISTERING MEDICATION DOSE.       bumetanide (BUMEX) 1 MG tablet Take 1 tablet (1 mg) by mouth 2 times daily. 60 tablet 5     BYDUREON BCISE 2 MG/0.85ML auto-injector INJECT 2 MG SUBCUTANEOUS ONCE WEEKLY. ADMINISTER IMMEDIATELY AFTER THE AUTOINJECTOR IS PREPARED. [Q7D]       calcium 600 MG tablet TAKE 1 TABLET (600 MG) BY MOUTH ONCE DAILY WITH A MEAL.       carvedilol (COREG) 3.125 MG tablet Take 0.5 tablets (1.5625 mg) by mouth 2 times daily (with meals)       Continuous Glucose Transmitter (DEXCOM G6 TRANSMITTER) MISC CHANGE EVERY 90 DAYS       Easy Touch Lancets 28G/Twist MISC        eplerenone (INSPRA) 50 MG tablet TAKE 1 TABLET (50 MG) BY MOUTH DAILY 30 tablet 5     ferrous sulfate (FEROSUL) 325 (65 Fe) MG tablet TAKE 1 TABLET (325 MG) BY MOUTH DAILY (WITH BREAKFAST) 30 tablet 5     folic acid (FOLVITE) 1 MG tablet Take 1 tablet (1 mg) by mouth daily       gabapentin (NEURONTIN) 100 MG capsule Take 200 mg by mouth daily       LANTUS SOLOSTAR 100 UNIT/ML soln Inject 36 units into the skin once daily at night. Adjust as directed up to 40 units daily. Product desired: LANTUS SOLOSTAR - may change based on insurance preference.       multivitamin w/minerals (THERA-VIT-M) tablet Take 1 tablet by mouth daily       OZEMPIC, 0.25 OR 0.5 MG/DOSE, 2 MG/3ML pen INJECT 0.5 MG SUBCUTANEOUS ONCE WEEKLY. START 1 WEEK AFTER YOUR LAST DOSE OF BCISE. [Q1W]       pantoprazole (PROTONIX) 40 MG EC tablet Take 1 tablet (40 mg) by mouth daily       sennosides (SENOKOT) 8.6 MG tablet Take 1 tablet by mouth 2 times daily       sertraline (ZOLOFT) 25 MG tablet Take 1 tablet (25 mg) by mouth daily       STELARA 45 MG/0.5ML injection Inject the contents of 1 syringe (45 mg) under the skin on week 0, 4, and then every 12 weeks after that. Rotate injection sites. Do not shake. Keep refrigerated.       terbinafine (LAMISIL) 1 % external  cream Apply topically.       ustekinumab (STELARA) 90 MG/ML Inject the contents of 1 syringe (90 mg) under the skin on week 0, 4, and then every 12 weeks after that. Rotate injection sites. Do not shake. Keep refrigerated.       clobetasol propionate (TEMOVATE) 0.05 % external cream APPLY TO AFFECTED AREAS 2 TIME(S) DAILY, AS NEEDED FOR PSORIASIS.       fluocinolone acetonide (DERMA SMOOTHE/FS BODY) 0.01 % external oil APPLY THIN LAYER TWO TIMES DAILY TO THE AFFECTED AREAS ON THE ARMS AND LEGS; MAY WRAP IN ACE WRAP OR GAUZE AFTER APPLICATION TO HELP PREVENT RUBBING ON CLOTH       hydrOXYzine HCl (ATARAX) 50 MG tablet TAKE 1 TABLET (50 MG) BY MOUTH EVERY 8 HOURS IF NEEDED (FOR ANXIETY).       Insulin Aspart FlexPen 100 UNIT/ML SOPN INJECT 11U SUBCUTANEOUSLY THREE TIMES DAILY WITH MEALS PLUS SCALE: <70 TREAT LOW;:0U ;150-200:1U; 201-250:2U;251-300: 3U; 301-350:4U >350:5U.MAX 48U/D       mometasone (ELOCON) 0.1 % external cream APPLY THIN LAYER TWO TIMES DAILY TO THE AFFECTED AREAS ON THE FACE.       potassium chloride roni ER (KLOR-CON M20) 20 MEQ CR tablet TAKE 2 TABLETS (40 MEQ) BY MOUTH ONCE DAILY WITH A EVENING MEAL       No current facility-administered medications for this visit.     Recent Labs   Lab Test 07/16/24  1022 04/04/24  0726 01/03/24  1822 11/06/23  0947 11/05/23  0713 11/04/23  0601 11/01/23  1011 10/24/23  1422 09/04/23  0752 09/02/23  0333   ALKPHOS 139 93 111 162* 200* 214* 260* 240* 239* 184*   ALT 16 21 16 58 57 60 51 61 46 44   AST 43 29 34 111* 131* 225* 125* 206* 128* 134*   BILITOTAL 0.7 0.7 1.0 2.2* 2.4* 2.9* 4.5* 8.2* 8.4* 8.2*           Allergies:     Allergies   Allergen Reactions     Metformin Diarrhea     Honey Other (See Comments)     Throat irritation          Social History:     Social History     Socioeconomic History     Marital status: Single     Spouse name: Not on file     Number of children: Not on file     Years of education: Not on file     Highest education level: Not  on file   Occupational History     Occupation: Manufactor    Tobacco Use     Smoking status: Former     Current packs/day: 0.00     Average packs/day: 0.3 packs/day for 20.0 years (6.6 ttl pk-yrs)     Types: Cigarettes     Start date: 1995     Quit date: 2015     Years since quittin.4     Passive exposure: Never     Smokeless tobacco: Never   Vaping Use     Vaping status: Never Used   Substance and Sexual Activity     Alcohol use: Not Currently     Alcohol/week: 1.0 standard drink of alcohol     Types: 1 Standard drinks or equivalent per week     Comment: Sober approx 2 years per Care taker (Roxy). 4-5 drinks nightly on weekend as of  per existing chart note.     Drug use: Not Currently     Comment: no hx IVDU     Sexual activity: Yes   Other Topics Concern      Service Not Asked     Blood Transfusions Not Asked     Caffeine Concern No     Comment: 0-1 coffee daily     Occupational Exposure Not Asked     Hobby Hazards Not Asked     Sleep Concern Not Asked     Stress Concern Not Asked     Weight Concern Not Asked     Special Diet Yes     Comment: low sodium, no red meat, no butter     Back Care Not Asked     Exercise Yes     Comment: walking 2-3 days per week     Bike Helmet Not Asked     Seat Belt Not Asked     Self-Exams Not Asked     Parent/sibling w/ CABG, MI or angioplasty before 65F 55M? No   Social History Narrative     Not on file     Social Drivers of Health     Financial Resource Strain: Low Risk  (2024)    Received from [facility]    Financial Resource Strain      Difficulty of Paying Living Expenses: 3      Difficulty of Paying Living Expenses: Not on file   Food Insecurity: No Food Insecurity (2024)    Received from [facility]    Food Insecurity      Do you worry your food will run out before you are able to buy more?: 1   Transportation Needs: No Transportation Needs (2024)  "   [facility]    Transportation Needs      Does lack of transportation keep you from medical appointments?: 1      Does lack of transportation keep you from work, meetings or getting things that you need?: 1   Physical Activity: Unknown (6/19/2020)    Exercise Vital Sign      Days of Exercise per Week: 5 days      Minutes of Exercise per Session: Not on file   Stress: Stress Concern Present (6/19/2020)    [facility] - Occupational Stress Questionnaire      Feeling of Stress : To some extent   Social Connections: Socially Integrated (6/4/2024)    [facility]    Social Connections      Do you often feel lonely or isolated from those around you?: 0   Interpersonal Safety: Low Risk  (10/17/2024)    Interpersonal Safety      Do you feel physically and emotionally safe where you currently live?: Yes      Within the past 12 months, have you been hit, slapped, kicked or otherwise physically hurt by someone?: No      Within the past 12 months, have you been humiliated or emotionally abused in other ways by your partner or ex-partner?: No   Housing Stability: Low Risk  (6/4/2024)    Received from [facility]    Housing Stability      What is your housing situation today?: 1          Family History:     Family History   Problem Relation Age of Onset     Coronary Artery Disease Mother      Coronary Artery Disease Father      Anuerysm Sister      Cerebrovascular Disease Brother      Pancreatic Cancer Paternal Uncle      Glaucoma No family hx of      Macular Degeneration No family hx of      Liver Disease No family hx of           Review of Systems:   Gen: See HPI          Physical Exam:   Vital signs:      BP: 119/73 Pulse: 84     SpO2: 98 %     Height: 180.3 cm (5' 11\") Weight: 91.7 kg (202 lb 3.2 oz)  Estimated body mass index is 28.2 kg/m  as calculated from the " "following:    Height as of this encounter: 1.803 m (5' 11\").    Weight as of this encounter: 91.7 kg (202 lb 3.2 oz).  Gen: A&Ox3, NAD  HEENT: Sclera anicteric  CV: RRR, no overt murmurs  Lung: CTA, non-labored  Abd: soft, NT, ND, BS+  Ext: no edema, intact pulses.   Skin: No jaundice, psoriasis lesions noted  Neuro: grossly intact, no asterixis   Psych: appropriate mood and affects         Data:   Reviewed in person and significant for:    Lab Results   Component Value Date     04/04/2024     12/16/2020      Lab Results   Component Value Date    POTASSIUM 4.7 04/04/2024    POTASSIUM 3.9 07/19/2022    POTASSIUM 4.4 12/16/2020     Lab Results   Component Value Date    CHLORIDE 98 04/04/2024    CHLORIDE 107 07/19/2022    CHLORIDE 104 12/16/2020     Lab Results   Component Value Date    CO2 27 04/04/2024    CO2 27 07/19/2022    CO2 23 12/16/2020     Lab Results   Component Value Date    BUN 21.6 04/04/2024    BUN 17 07/19/2022    BUN 19 12/16/2020     Lab Results   Component Value Date    CR 0.91 04/04/2024    CR 1.01 12/16/2020       Lab Results   Component Value Date    WBC 5.4 07/16/2024    WBC 5.2 12/16/2020     Lab Results   Component Value Date    HGB 11.8 07/16/2024    HGB 15.4 12/16/2020     Lab Results   Component Value Date    HCT 35.6 07/16/2024    HCT 44.0 12/16/2020     Lab Results   Component Value Date    MCV 90 07/16/2024    MCV 98 12/16/2020     Lab Results   Component Value Date    PLT 99 07/16/2024     12/16/2020       Lab Results   Component Value Date    AST 43 07/16/2024     09/17/2020     Lab Results   Component Value Date    ALT 16 07/16/2024     09/17/2020     No results found for: \"BILICONJ\"   Lab Results   Component Value Date    BILITOTAL 0.7 07/16/2024    BILITOTAL 0.8 09/17/2020       Lab Results   Component Value Date    ALBUMIN 3.7 07/16/2024    ALBUMIN 3.9 07/14/2022    ALBUMIN 4.1 09/17/2020     Lab Results   Component Value Date    PROTTOTAL 6.7 " 07/16/2024    PROTTOTAL 7.3 09/17/2020      Lab Results   Component Value Date    ALKPHOS 139 07/16/2024    ALKPHOS 71 09/17/2020       Lab Results   Component Value Date    INR 1.22 07/16/2024     PROCEDURES:    EGD 10/17/24:  Impression:            - Normal esophagus.                          - Portal hypertensive gastropathy.                          - Normal examined duodenum.                          - No specimens collected.   Recommendation:        - Discharge patient to home.                          - Resume previous diet.                          - Return to liver clinic as previously scheduled.                          - Patient does not need follow-up upper endoscopy as                          long as he is taking non-selective beta-blocker     COLONOSCOPY 10/17/24:   Impression:            - One 7 mm polyp in the ascending colon, removed with                          a cold snare. Resected and retrieved. Clip (MR                          conditional) was placed.                          - One 5 mm polyp in the transverse colon, removed with                          a cold snare. Resected and retrieved. Clip (MR                          conditional) was placed.                          - One 16 mm polyp in the descending colon, removed                          with a hot snare. Resected and retrieved. Clips (MR                          conditional) were placed. Tattooed.                          - Two 3 to 4 mm polyps in the descending colon,                          removed with a cold snare. Resected and retrieved.                          Clips (MR conditional) were placed.                          - Three 10 to 12 mm polyps in the sigmoid colon,                          removed with a hot snare. Resected and retrieved.                          Clips (MR conditional) were placed.                          - Two 3 to 6 mm polyps in the sigmoid colon, removed                          with a cold snare.  Resected and retrieved. Clips (MR                          conditional) were placed.                          - Congested mucosa in the entire examined colon.                          - Internal hemorrhoids.      A. ASCENDING COLON, 7MM POLYP:  - Sessile serrated adenomas; negative for cytologic dysplasia.'     B. TRANSVERSE COLON, 5MM POLYP:  - Fragments of tubular adenomas; negative for high-grade dysplasia.      C. DESCENDING COLON, 15MM, 3MM, AND 4MM POLYPS:  - Fragments of tubular adenomas; negative for high-grade dysplasia.   - One fragment of sessile serrated adenoma; negative for cytologic dysplasia.     D. SIGMOID COLON, 12MM, 10MM X2, 6MM, AND 3MM POLYPS:  - Fragments of tubular adenomas; negative for high-grade dysplasia.        Imaging:      US ABDOMEN LIMITED, 8/13/2024 9:25 AM      INDICATION: Patient at high risk for HCC. HCC screen; cirrhosis;  Alcoholic cirrhosis of liver with ascites (H)     COMPARISON: Abdominal ultrasound 8/30/2023     TECHNIQUE: The abdomen right upper quadrant was scanned in the  standard fashion with specialized ultrasound transducer(s) using both  gray scale and limited color/spectral Doppler techniques.     FINDINGS:   Fluid: Moderate abdominal ascites.     Liver: The liver demonstrates a coarsened echotexture and surface  nodularity, measuring 15.3 cm in craniocaudal dimension. No focal  hepatic mass. No intrahepatic biliary dilatation. The main portal vein  is patent with antegrade flow, measuring 1.1 cm in diameter.     US visualization score: A - No or minimal limitations     Gallbladder: The gallbladder is well distended and of normal  morphology. No wall thickening, pericholecystic fluid, sonographic  Fish's sign, or evidence of cholelithiasis.     Bile Ducts: Normal caliber intra and extrahepatic biliary tree. The  common bile duct measures 3 mm in diameter.     Pancreas: Visualized portions of the pancreas are unremarkable.      Kidney: The right kidney measures  11.3 cm in long dimension. No  hydronephrosis, hydroureter, shadowing renal calculi, or solid mass.  The capsule and parenchyma demonstrate normal echogenicity.                                                                   IMPRESSION:   1. Cirrhosis without focal liver lesion. Evidence of portal  hypertension with moderate abdominal ascites.  a. LI-RADS US Category: US-1 Negative: No US evidence of HCC  b. Recommend continued surveillance US.     *Recommendations above based on LI-RADS? v2017:  [email or web address].org/Clinical-Resources/Reporting-and-Data-Systems/[email or web address]-RADS-[email or web address]-[email or web address]     HAYDEN PEREZ MD             Again, thank you for allowing me to participate in the care of your patient.        Sincerely,        Marcella Lentz PA-C    Electronically signed

## 2025-01-09 NOTE — PROGRESS NOTES
Hepatology Clinic Note  Saji Iqbal   Date of Birth 1960    REASON FOR FOLLOW UP: Cirrhosis         Assessment/plan:   63 YO M with PMHx of EtOH/MASH cirrhosis, complicated by hx of ascites requiring paracentesis (last in June 2024), which has now resolved and is controlled with diuretics. No overt hepatic encephalopathy. MELD 3.0: 7 (using INR from 9/6/24). His liver function has continued to improve based on labs. Last seen virtually by NOEMY Marie PA-C 7/24/24.    #AUD  -Sober from alcohol since 9/15/23; denies current cravings/urges  -Has never completed chemical dependency treatment/program but pt states he is still open to doing this    # HCC screening  - US abd limited 8/13/24: Cirrhosis w/o focal liver lesion; AFP 2.4 4/4/24  - US abdomen due Feb 2025; ordered     # Variceal screening:   - EGD 10/17/24: Normal esophagus. Portal hypertensive gastropathy. Normal examined duodenum. No specimens collected.    > Per Dr. Heller: doesn't need follow-up EGD as long as he takes non-selective BB  - Currently taking HALF tab of 3.125 mg BID; BP WNL today     # Hepatic encephalopathy (No history, no overt HE today)    > Does not take lactulose/rifaximin     # Ascites, improved:  # YESSICA, improved  -Continue 1 mg Bumex BID and 50 mg eplerenone daily  -Kidney function WNL  -Last paracentesis June 2024  -Low sodium diet     # Protein calorie malnutrition  # Gynecomastia:   - Continue small frequent meals; Protein intake of 110-120 gm protein day     # Metabolic associated fatty liver disease (MALFD):   -Continue to follow with Diabetic Educator  - Maintain good control of cholesterol (No contraindication to statin)   - Recommend more aggressive optimization blood glucose/insulin resistance as needed. Primarily care can consider GLP-1 agonist (semliglutide or liraglutide) for both insulin resistance and help with weight loss or pioglitizone    > Is going to changing from Bydureon to Ozempic in the near future  - Limit carbs,  I reviewed the H&P, I examined the patient, and there are no changes in the patient's condition.   "especially simple carbs, and follow Mediterranean eating patten  - Remain physically active as tolerated       -Will be due for next screening colonoscopy October of 2025  -Recommended Hep A/B vaccination with PCP  -Schedule US [facility] limited for Feb. 2025  -Continue sobriety; encouraged completing chem dep tx/program   -Avoid all NSAIDs, do not exceed 2000 mg Tylenol in 24 hrs   -Discussed that with low MELD score and compensated cirrhosis, no need to consider scheduling for liver transplant eval clinic at this time    RTC in ~6 months with labs same day/prior    Marcella Lentz PA-C  [facility] Hepatology   -----------------------------------------------------         HPI:   Saji Iqbal is a 63 YO M who presents for follow-up regarding cirrhosis.  He was last seen in the hepatology clinic virtually July 2024.    Patient had labs done today.    Patient denies any recent fevers, chills, nausea, vomiting.  No lower extremity edema.  No swelling or distention in his abdomen.  No yellowing of the eyes or skin.  No recent severe confusion or changes in mentation.  Typically passing stool 1-2 times per day.  Denies melena or bright red blood per rectum.  He reports appetite is good.  He believes over the past 6 months he has gained 15 LBS due to \"eating more \".  Patient admits he has a sweet tooth and that sometimes this can get \"out of control \".  He does try to follow a low-sodium diet.  Patient states he really does not eat good amounts of protein unless he buys food himself.  Patient does still live in an assisted living facility and has meals cooked for him.  Patient states the facility also helps with his medications and with things like laundry.  He continues to follow with a diabetic educator through [facility].  He does get routine exercise by going to a facility where he does aerobic exercises.  Patient states he also does leg/shoulder exercises at home and walks in stores on average 3 times per " week.  He has gotten back into doing art work.  Patient states he loves the life he is living without alcohol.    Patient denies tobacco use.  Last use of alcohol was 9/15/2023.  He denies any current cravings or urges for alcohol.  Has not yet completed any form of chemical dependency treatment or program.  Patient tells me he is still open to doing this and does intend to.  Denies illicit drug use.    Patient tells me he is pretty sure that he will be moving out of the assisted living facility where he currently resides moving to [place], [place].  Patient states he got the okay from his PCP.  Patient states his PCPs only concern was in regards to patient being able to manage his medications on his own.    PMH:    has a past medical history of Cluster headache, Coronary artery disease, Depression, Diabetes mellitus, type II (H), Gastroesophageal reflux disease, Heart attack (H), Hyperlipidemia, Hypertension, Renal disease, Rotator cuff arthropathy, Sleep apnea, and Stented coronary artery.     SMH:    has a past surgical history that includes stent, coronary, yadira (2014, 2015); Arthroscopy shoulder; Repair tendon foot (Right, 10/23/2020); IR Paracentesis (12/12/2023); IR Paracentesis (2/23/2024); IR Paracentesis (2/16/2024); IR Paracentesis (1/30/2024); IR Paracentesis (1/22/2024); IR Paracentesis (3/22/2024); IR Paracentesis (3/15/2024); IR Paracentesis (3/8/2024); IR Paracentesis (3/1/2024); Colonoscopy (N/A, 10/17/2024); and Esophagoscopy, gastroscopy, duodenoscopy (EGD), combined (N/A, 10/17/2024).     Medications:   Current Outpatient Medications   Medication Sig Dispense Refill    ACCU-CHEK GUIDE TEST test strip       aspirin 81 MG tablet Take by mouth daily      B-D U/F insulin pen needle USE TO INJECT INSULIN FOUR TIMES DAILY. REMOVE THE 2 COVERS ON THE PEN NEEDLE BEFORE ADMINISTERING MEDICATION DOSE.      bumetanide (BUMEX) 1 MG tablet Take 1 tablet (1 mg) by mouth 2 times daily. 60 tablet 5    BYDUREON  BCISE 2 MG/0.85ML auto-injector INJECT 2 MG SUBCUTANEOUS ONCE WEEKLY. ADMINISTER IMMEDIATELY AFTER THE AUTOINJECTOR IS PREPARED. [Q7D]      calcium 600 MG tablet TAKE 1 TABLET (600 MG) BY MOUTH ONCE DAILY WITH A MEAL.      carvedilol (COREG) 3.125 MG tablet Take 0.5 tablets (1.5625 mg) by mouth 2 times daily (with meals)      Continuous Glucose Transmitter (DEXCOM G6 TRANSMITTER) MISC CHANGE EVERY 90 DAYS      Easy Touch Lancets 28G/Twist MISC       eplerenone (INSPRA) 50 MG tablet TAKE 1 TABLET (50 MG) BY MOUTH DAILY 30 tablet 5    ferrous sulfate (FEROSUL) 325 (65 Fe) MG tablet TAKE 1 TABLET (325 MG) BY MOUTH DAILY (WITH BREAKFAST) 30 tablet 5    folic acid (FOLVITE) 1 MG tablet Take 1 tablet (1 mg) by mouth daily      gabapentin (NEURONTIN) 100 MG capsule Take 200 mg by mouth daily      LANTUS SOLOSTAR 100 UNIT/ML soln Inject 36 units into the skin once daily at night. Adjust as directed up to 40 units daily. Product desired: LANTUS SOLOSTAR - may change based on insurance preference.      multivitamin w/minerals (THERA-VIT-M) tablet Take 1 tablet by mouth daily      OZEMPIC, 0.25 OR 0.5 MG/DOSE, 2 MG/3ML pen INJECT 0.5 MG SUBCUTANEOUS ONCE WEEKLY. START 1 WEEK AFTER YOUR LAST DOSE OF BCISE. [Q1W]      pantoprazole (PROTONIX) 40 MG EC tablet Take 1 tablet (40 mg) by mouth daily      sennosides (SENOKOT) 8.6 MG tablet Take 1 tablet by mouth 2 times daily      sertraline (ZOLOFT) 25 MG tablet Take 1 tablet (25 mg) by mouth daily      STELARA 45 MG/0.5ML injection Inject the contents of 1 syringe (45 mg) under the skin on week 0, 4, and then every 12 weeks after that. Rotate injection sites. Do not shake. Keep refrigerated.      terbinafine (LAMISIL) 1 % external cream Apply topically.      ustekinumab (STELARA) 90 MG/ML Inject the contents of 1 syringe (90 mg) under the skin on week 0, 4, and then every 12 weeks after that. Rotate injection sites. Do not shake. Keep refrigerated.      clobetasol propionate  (TEMOVATE) 0.05 % external cream APPLY TO AFFECTED AREAS 2 TIME(S) DAILY, AS NEEDED FOR PSORIASIS.      fluocinolone acetonide (DERMA SMOOTHE/FS BODY) 0.01 % external oil APPLY THIN LAYER TWO TIMES DAILY TO THE AFFECTED AREAS ON THE ARMS AND LEGS; MAY WRAP IN ACE WRAP OR GAUZE AFTER APPLICATION TO HELP PREVENT RUBBING ON CLOTH      hydrOXYzine HCl (ATARAX) 50 MG tablet TAKE 1 TABLET (50 MG) BY MOUTH EVERY 8 HOURS IF NEEDED (FOR ANXIETY).      Insulin Aspart FlexPen 100 UNIT/ML SOPN INJECT 11U SUBCUTANEOUSLY THREE TIMES DAILY WITH MEALS PLUS SCALE: <70 TREAT LOW;:0U ;150-200:1U; 201-250:2U;251-300: 3U; 301-350:4U >350:5U.MAX 48U/D      mometasone (ELOCON) 0.1 % external cream APPLY THIN LAYER TWO TIMES DAILY TO THE AFFECTED AREAS ON THE FACE.      potassium chloride roni ER (KLOR-CON M20) 20 MEQ CR tablet TAKE 2 TABLETS (40 MEQ) BY MOUTH ONCE DAILY WITH A EVENING MEAL       No current facility-administered medications for this visit.     Recent Labs   Lab Test 07/16/24  1022 04/04/24  0726 01/03/24  1822 11/06/23  0947 11/05/23  0713 11/04/23  0601 11/01/23  1011 10/24/23  1422 09/04/23  0752 09/02/23  0333   ALKPHOS 139 93 111 162* 200* 214* 260* 240* 239* 184*   ALT 16 21 16 58 57 60 51 61 46 44   AST 43 29 34 111* 131* 225* 125* 206* 128* 134*   BILITOTAL 0.7 0.7 1.0 2.2* 2.4* 2.9* 4.5* 8.2* 8.4* 8.2*           Allergies:     Allergies   Allergen Reactions    Metformin Diarrhea    Honey Other (See Comments)     Throat irritation          Social History:     Social History     Socioeconomic History    Marital status: Single     Spouse name: Not on file    Number of children: Not on file    Years of education: Not on file    Highest education level: Not on file   Occupational History    Occupation: Manufactor    Tobacco Use    Smoking status: Former     Current packs/day: 0.00     Average packs/day: 0.3 packs/day for 20.0 years (6.6 ttl pk-yrs)     Types: Cigarettes     Start date:  1995     Quit date: 2015     Years since quittin.4     Passive exposure: Never    Smokeless tobacco: Never   Vaping Use    Vaping status: Never Used   Substance and Sexual Activity    Alcohol use: Not Currently     Alcohol/week: 1.0 standard drink of alcohol     Types: 1 Standard drinks or equivalent per week     Comment: Sober approx 2 years per Care taker (Roxy). 4-5 drinks nightly on weekend as of  per existing chart note.    Drug use: Not Currently     Comment: no hx IVDU    Sexual activity: Yes   Other Topics Concern     Service Not Asked    Blood Transfusions Not Asked    Caffeine Concern No     Comment: 0-1 coffee daily    Occupational Exposure Not Asked    Hobby Hazards Not Asked    Sleep Concern Not Asked    Stress Concern Not Asked    Weight Concern Not Asked    Special Diet Yes     Comment: low sodium, no red meat, no butter    Back Care Not Asked    Exercise Yes     Comment: walking 2-3 days per week    Bike Helmet Not Asked    Seat Belt Not Asked    Self-Exams Not Asked    Parent/sibling w/ CABG, MI or angioplasty before 65F 55M? No   Social History Narrative    Not on file     Social Drivers of Health     Financial Resource Strain: Low Risk  (2024)    Received from [facility]    Financial Resource Strain     Difficulty of Paying Living Expenses: 3     Difficulty of Paying Living Expenses: Not on file   Food Insecurity: No Food Insecurity (2024)    Received from [facility]    Food Insecurity     Do you worry your food will run out before you are able to buy more?: 1   Transportation Needs: No Transportation Needs (2024)    Received from [facility]    Transportation Needs     Does lack of transportation keep you from medical appointments?: 1     Does lack of transportation keep you from work, meetings or getting things that you need?: 1   Physical Activity:  "Unknown (6/19/2020)    Exercise Vital Sign     Days of Exercise per Week: 5 days     Minutes of Exercise per Session: Not on file   Stress: Stress Concern Present (6/19/2020)    Citizen of [place] of Occupational Health - Occupational Stress Questionnaire     Feeling of Stress : To some extent   Social Connections: Socially Integrated (6/4/2024)    [facility]    Social Connections     Do you often feel lonely or isolated from those around you?: 0   Interpersonal Safety: Low Risk  (10/17/2024)    Interpersonal Safety     Do you feel physically and emotionally safe where you currently live?: Yes     Within the past 12 months, have you been hit, slapped, kicked or otherwise physically hurt by someone?: No     Within the past 12 months, have you been humiliated or emotionally abused in other ways by your partner or ex-partner?: No   Housing Stability: Low Risk  (6/4/2024)    Received from [facility]    Housing Stability     What is your housing situation today?: 1          Family History:     Family History   Problem Relation Age of Onset    Coronary Artery Disease Mother     Coronary Artery Disease Father     Anuerysm Sister     Cerebrovascular Disease Brother     Pancreatic Cancer Paternal Uncle     Glaucoma No family hx of     Macular Degeneration No family hx of     Liver Disease No family hx of           Review of Systems:   Gen: See HPI          Physical Exam:   Vital signs:      BP: 119/73 Pulse: 84     SpO2: 98 %     Height: 180.3 cm (5' 11\") Weight: 91.7 kg (202 lb 3.2 oz)  Estimated body mass index is 28.2 kg/m  as calculated from the following:    Height as of this encounter: 1.803 m (5' 11\").    Weight as of this encounter: 91.7 kg (202 lb 3.2 oz).  Gen: A&Ox3, NAD  HEENT: Sclera anicteric  CV: RRR, no overt murmurs  Lung: CTA, non-labored  Abd: soft, NT, ND, BS+  Ext: no edema, intact pulses.   Skin: No jaundice, psoriasis " "lesions noted  Neuro: grossly intact, no asterixis   Psych: appropriate mood and affects         Data:   Reviewed in person and significant for:    Lab Results   Component Value Date     04/04/2024     12/16/2020      Lab Results   Component Value Date    POTASSIUM 4.7 04/04/2024    POTASSIUM 3.9 07/19/2022    POTASSIUM 4.4 12/16/2020     Lab Results   Component Value Date    CHLORIDE 98 04/04/2024    CHLORIDE 107 07/19/2022    CHLORIDE 104 12/16/2020     Lab Results   Component Value Date    CO2 27 04/04/2024    CO2 27 07/19/2022    CO2 23 12/16/2020     Lab Results   Component Value Date    BUN 21.6 04/04/2024    BUN 17 07/19/2022    BUN 19 12/16/2020     Lab Results   Component Value Date    CR 0.91 04/04/2024    CR 1.01 12/16/2020       Lab Results   Component Value Date    WBC 5.4 07/16/2024    WBC 5.2 12/16/2020     Lab Results   Component Value Date    HGB 11.8 07/16/2024    HGB 15.4 12/16/2020     Lab Results   Component Value Date    HCT 35.6 07/16/2024    HCT 44.0 12/16/2020     Lab Results   Component Value Date    MCV 90 07/16/2024    MCV 98 12/16/2020     Lab Results   Component Value Date    PLT 99 07/16/2024     12/16/2020       Lab Results   Component Value Date    AST 43 07/16/2024     09/17/2020     Lab Results   Component Value Date    ALT 16 07/16/2024     09/17/2020     No results found for: \"BILICONJ\"   Lab Results   Component Value Date    BILITOTAL 0.7 07/16/2024    BILITOTAL 0.8 09/17/2020       Lab Results   Component Value Date    ALBUMIN 3.7 07/16/2024    ALBUMIN 3.9 07/14/2022    ALBUMIN 4.1 09/17/2020     Lab Results   Component Value Date    PROTTOTAL 6.7 07/16/2024    PROTTOTAL 7.3 09/17/2020      Lab Results   Component Value Date    ALKPHOS 139 07/16/2024    ALKPHOS 71 09/17/2020       Lab Results   Component Value Date    INR 1.22 07/16/2024     PROCEDURES:    EGD 10/17/24:  Impression:            - Normal esophagus.                          - " Portal hypertensive gastropathy.                          - Normal examined duodenum.                          - No specimens collected.   Recommendation:        - Discharge patient to home.                          - Resume previous diet.                          - Return to liver clinic as previously scheduled.                          - Patient does not need follow-up upper endoscopy as                          long as he is taking non-selective beta-blocker     COLONOSCOPY 10/17/24:   Impression:            - One 7 mm polyp in the ascending colon, removed with                          a cold snare. Resected and retrieved. Clip (MR                          conditional) was placed.                          - One 5 mm polyp in the transverse colon, removed with                          a cold snare. Resected and retrieved. Clip (MR                          conditional) was placed.                          - One 16 mm polyp in the descending colon, removed                          with a hot snare. Resected and retrieved. Clips (MR                          conditional) were placed. Tattooed.                          - Two 3 to 4 mm polyps in the descending colon,                          removed with a cold snare. Resected and retrieved.                          Clips (MR conditional) were placed.                          - Three 10 to 12 mm polyps in the sigmoid colon,                          removed with a hot snare. Resected and retrieved.                          Clips (MR conditional) were placed.                          - Two 3 to 6 mm polyps in the sigmoid colon, removed                          with a cold snare. Resected and retrieved. Clips (MR                          conditional) were placed.                          - Congested mucosa in the entire examined colon.                          - Internal hemorrhoids.      A. ASCENDING COLON, 7MM POLYP:  - Sessile serrated adenomas; negative for cytologic  dysplasia.'     B. TRANSVERSE COLON, 5MM POLYP:  - Fragments of tubular adenomas; negative for high-grade dysplasia.      C. DESCENDING COLON, 15MM, 3MM, AND 4MM POLYPS:  - Fragments of tubular adenomas; negative for high-grade dysplasia.   - One fragment of sessile serrated adenoma; negative for cytologic dysplasia.     D. SIGMOID COLON, 12MM, 10MM X2, 6MM, AND 3MM POLYPS:  - Fragments of tubular adenomas; negative for high-grade dysplasia.        Imaging:      US ABDOMEN LIMITED, 8/13/2024 9:25 AM      INDICATION: Patient at high risk for HCC. HCC screen; cirrhosis;  Alcoholic cirrhosis of liver with ascites (H)     COMPARISON: Abdominal ultrasound 8/30/2023     TECHNIQUE: The abdomen right upper quadrant was scanned in the  standard fashion with specialized ultrasound transducer(s) using both  gray scale and limited color/spectral Doppler techniques.     FINDINGS:   Fluid: Moderate abdominal ascites.     Liver: The liver demonstrates a coarsened echotexture and surface  nodularity, measuring 15.3 cm in craniocaudal dimension. No focal  hepatic mass. No intrahepatic biliary dilatation. The main portal vein  is patent with antegrade flow, measuring 1.1 cm in diameter.     US visualization score: A - No or minimal limitations     Gallbladder: The gallbladder is well distended and of normal  morphology. No wall thickening, pericholecystic fluid, sonographic  Fish's sign, or evidence of cholelithiasis.     Bile Ducts: Normal caliber intra and extrahepatic biliary tree. The  common bile duct measures 3 mm in diameter.     Pancreas: Visualized portions of the pancreas are unremarkable.      Kidney: The right kidney measures 11.3 cm in long dimension. No  hydronephrosis, hydroureter, shadowing renal calculi, or solid mass.  The capsule and parenchyma demonstrate normal echogenicity.                                                                   IMPRESSION:   1. Cirrhosis without focal liver lesion. Evidence of  portal  hypertension with moderate abdominal ascites.  a. LI-RADS US Category: US-1 Negative: No US evidence of HCC  b. Recommend continued surveillance US.     *Recommendations above based on LI-RADS? v2017:  https://www.acr.org/Clinical-Resources/Reporting-and-Data-Systems/LI-R  DS/LI-RADS-Ultrasound-v2017     HAYDEN PEREZ MD

## 2025-02-10 ENCOUNTER — ANCILLARY PROCEDURE (OUTPATIENT)
Dept: ULTRASOUND IMAGING | Facility: CLINIC | Age: 65
End: 2025-02-10
Attending: PHYSICIAN ASSISTANT
Payer: COMMERCIAL

## 2025-02-10 DIAGNOSIS — K70.31 ALCOHOLIC CIRRHOSIS OF LIVER WITH ASCITES (H): ICD-10-CM

## 2025-02-10 PROCEDURE — 76705 ECHO EXAM OF ABDOMEN: CPT

## 2025-03-09 ENCOUNTER — HEALTH MAINTENANCE LETTER (OUTPATIENT)
Age: 65
End: 2025-03-09

## 2025-03-13 DIAGNOSIS — K70.31 ALCOHOLIC CIRRHOSIS OF LIVER WITH ASCITES (H): ICD-10-CM

## 2025-03-13 RX ORDER — BUMETANIDE 1 MG/1
1 TABLET ORAL 2 TIMES DAILY
Qty: 60 TABLET | Refills: 6 | Status: SHIPPED | OUTPATIENT
Start: 2025-03-13

## 2025-03-24 DIAGNOSIS — K70.31 ALCOHOLIC CIRRHOSIS OF LIVER WITH ASCITES (H): ICD-10-CM

## 2025-03-24 RX ORDER — BUMETANIDE 1 MG/1
1 TABLET ORAL 2 TIMES DAILY
Qty: 60 TABLET | Refills: 6 | Status: SHIPPED | OUTPATIENT
Start: 2025-03-24

## 2025-05-19 DIAGNOSIS — K70.31 ALCOHOLIC CIRRHOSIS OF LIVER WITH ASCITES (H): Primary | ICD-10-CM

## 2025-06-26 ENCOUNTER — TELEPHONE (OUTPATIENT)
Dept: GASTROENTEROLOGY | Facility: CLINIC | Age: 65
End: 2025-06-26
Payer: COMMERCIAL

## 2025-06-26 DIAGNOSIS — K70.31 ALCOHOLIC CIRRHOSIS OF LIVER WITH ASCITES (H): Primary | ICD-10-CM

## 2025-06-26 NOTE — TELEPHONE ENCOUNTER
6/26/2025 1:29PM  Left Voicemail (1st Attempt) and Sent Mychart (1st Attempt) for the patient to call back and schedule the following:    Appointment type: Labs  Provider: Refugio Lentz PA-C  Return date: Prior to 7/10 appt w/ Marcella Lentz  Specialty phone number: [phone]  Additional appointment(s) needed: NA  Additonal Notes: 6/26 LVM and MYC for pt to schedule labs per Marcella Lentz before 7/10/2025. [facility]   Rheumatology, Gastroenterology, Nephrology, Infectious Disease, Pulmonology  [facility]        ----- Message from Concepción STEELE sent at 6/26/2025  1:21 PM CDT -----  Regarding: labs due prior to visit  Steve Byrd,  Patient due for Marcella's labs prior to appt at any [facility] location.    Thank you!    PEEWEE Beebe, RN, PHN  Hepatology Clinic  Clinics & Surgery Center  [facility]

## 2025-06-28 ENCOUNTER — HEALTH MAINTENANCE LETTER (OUTPATIENT)
Age: 65
End: 2025-06-28

## 2025-07-10 ENCOUNTER — OFFICE VISIT (OUTPATIENT)
Dept: GASTROENTEROLOGY | Facility: CLINIC | Age: 65
End: 2025-07-10
Payer: COMMERCIAL

## 2025-07-10 ENCOUNTER — LAB (OUTPATIENT)
Dept: LAB | Facility: CLINIC | Age: 65
End: 2025-07-10
Payer: COMMERCIAL

## 2025-07-10 VITALS
SYSTOLIC BLOOD PRESSURE: 113 MMHG | WEIGHT: 213.4 LBS | BODY MASS INDEX: 29.88 KG/M2 | HEIGHT: 71 IN | OXYGEN SATURATION: 97 % | HEART RATE: 73 BPM | DIASTOLIC BLOOD PRESSURE: 71 MMHG

## 2025-07-10 DIAGNOSIS — K70.31 ALCOHOLIC CIRRHOSIS OF LIVER WITH ASCITES (H): Primary | ICD-10-CM

## 2025-07-10 DIAGNOSIS — K70.31 ALCOHOLIC CIRRHOSIS OF LIVER WITH ASCITES (H): ICD-10-CM

## 2025-07-10 DIAGNOSIS — Z12.11 SCREENING FOR COLON CANCER: ICD-10-CM

## 2025-07-10 DIAGNOSIS — D36.9 TUBULAR ADENOMA: ICD-10-CM

## 2025-07-10 PROBLEM — I27.20 PULMONARY HYPERTENSION (H): Status: ACTIVE | Noted: 2025-03-31

## 2025-07-10 PROBLEM — D68.9 COAGULOPATHY: Status: ACTIVE | Noted: 2025-03-19

## 2025-07-10 PROBLEM — L40.50 PSORIATIC ARTHRITIS (H): Status: ACTIVE | Noted: 2025-03-19

## 2025-07-10 PROBLEM — G44.009 CLUSTER HEADACHES: Status: RESOLVED | Noted: 2024-11-11 | Resolved: 2025-07-10

## 2025-07-10 LAB
AFP SERPL-MCNC: 2.9 NG/ML
ALBUMIN SERPL BCG-MCNC: 4 G/DL (ref 3.5–5.2)
ALP SERPL-CCNC: 140 U/L (ref 40–150)
ALT SERPL W P-5'-P-CCNC: 33 U/L (ref 0–70)
ANION GAP SERPL CALCULATED.3IONS-SCNC: 10 MMOL/L (ref 7–15)
AST SERPL W P-5'-P-CCNC: 33 U/L (ref 0–45)
BILIRUB SERPL-MCNC: 0.7 MG/DL
BILIRUBIN DIRECT (ROCHE PRO & PURE): 0.36 MG/DL (ref 0–0.45)
BUN SERPL-MCNC: 18.6 MG/DL (ref 8–23)
CALCIUM SERPL-MCNC: 9.4 MG/DL (ref 8.8–10.4)
CHLORIDE SERPL-SCNC: 104 MMOL/L (ref 98–107)
CREAT SERPL-MCNC: 1.01 MG/DL (ref 0.67–1.17)
EGFRCR SERPLBLD CKD-EPI 2021: 83 ML/MIN/1.73M2
ERYTHROCYTE [DISTWIDTH] IN BLOOD BY AUTOMATED COUNT: 13 % (ref 10–15)
GLUCOSE SERPL-MCNC: 214 MG/DL (ref 70–99)
HCO3 SERPL-SCNC: 26 MMOL/L (ref 22–29)
HCT VFR BLD AUTO: 38.8 % (ref 40–53)
HGB BLD-MCNC: 14.1 G/DL (ref 13.3–17.7)
INR PPP: 1.15 (ref 0.85–1.15)
MCH RBC QN AUTO: 34.3 PG (ref 26.5–33)
MCHC RBC AUTO-ENTMCNC: 36.3 G/DL (ref 31.5–36.5)
MCV RBC AUTO: 94 FL (ref 78–100)
PLATELET # BLD AUTO: 70 10E3/UL (ref 150–450)
POTASSIUM SERPL-SCNC: 4 MMOL/L (ref 3.4–5.3)
PROT SERPL-MCNC: 6.6 G/DL (ref 6.4–8.3)
PROTHROMBIN TIME: 14.9 SECONDS (ref 11.8–14.8)
RBC # BLD AUTO: 4.11 10E6/UL (ref 4.4–5.9)
SODIUM SERPL-SCNC: 140 MMOL/L (ref 135–145)
WBC # BLD AUTO: 6 10E3/UL (ref 4–11)

## 2025-07-10 RX ORDER — ATORVASTATIN CALCIUM 40 MG/1
TABLET, FILM COATED ORAL
COMMUNITY
Start: 2025-07-09

## 2025-07-10 RX ORDER — GUSELKUMAB 100 MG/ML
INJECTION SUBCUTANEOUS
COMMUNITY
Start: 2025-06-26 | End: 2025-07-10

## 2025-07-10 RX ORDER — ACYCLOVIR 400 MG/1
TABLET ORAL
COMMUNITY
Start: 2025-01-29

## 2025-07-10 RX ORDER — FLUOCINONIDE 0.5 MG/G
CREAM TOPICAL
COMMUNITY
Start: 2025-05-09 | End: 2025-07-10

## 2025-07-10 RX ORDER — ACYCLOVIR 400 MG/1
TABLET ORAL
COMMUNITY
Start: 2025-06-16

## 2025-07-10 RX ORDER — FLUOCINONIDE 0.5 MG/G
CREAM TOPICAL
COMMUNITY
Start: 2025-05-08

## 2025-07-10 RX ORDER — SEMAGLUTIDE 1.34 MG/ML
INJECTION, SOLUTION SUBCUTANEOUS
COMMUNITY
Start: 2025-07-02

## 2025-07-10 RX ORDER — GUSELKUMAB 100 MG/ML
INJECTION SUBCUTANEOUS
COMMUNITY
Start: 2025-04-03

## 2025-07-10 ASSESSMENT — PAIN SCALES - GENERAL: PAINLEVEL_OUTOF10: NO PAIN (0)

## 2025-07-10 NOTE — PROGRESS NOTES
"Hepatology Clinic Note  Saji Iqbal   Date of Birth 1960    REASON FOR FOLLOW UP: Cirrhosis          Assessment/plan:     64 YO M with PMHx of EtOH/MASH cirrhosis, complicated by hx of ascites requiring paracentesis (last in June 2024), which has now resolved and is controlled with diuretics. No overt hepatic encephalopathy. MELD 3.0: 7 (remains stable). His liver function has continued to improve based on labs. Last seen 1/9/25.     # AUD  - Sober from alcohol since 9/15/23; denies current cravings/urges  - Has never completed chemical dependency tx/program but pt states he is still open to doing this  - PETH <10 Dalton 2025; repeat ordered      # HCC screening  - US abd limited 2/10/25: no focal liver lesion   - AFP 2.2 1/9/25; repeat in process   - US abdomen due August 2025; ordered     # Variceal screening:   - EGD 10/17/24: Normal esophagus. Portal hypertensive gastropathy. Normal examined duodenum. No specimens collected.               > Per Dr. Heller: doesn't need follow-up EGD as long as he takes non-selective BB  - Currently taking Coreg 3.125 mg BID; BP today 113/71      # Hepatic encephalopathy (No history, no overt HE today)    > No recent confusion               > Does not take lactulose/rifaximin     # Ascites, improved:  # YESSICA, improved  - Continue 1 mg Bumex BID and 50 mg eplerenone daily  - Kidney function WNL  - Last paracentesis June 2024  - US abd limited \"no ascites\" June 2025  - Follows low sodium diet     # Protein calorie malnutrition  # Gynecomastia:   - Continue small frequent meals; Protein intake of 110-120 gm protein day     # Metabolic associated fatty liver disease (MALFD):   - Continue to follow with Diabetic Educator  - Maintain good control of cholesterol-- normal July 2024  - Now on Ozempic  - Most recent A1C 7.8 March 2025  - Limit carbs, especially simple carbs, and follow Mediterranean eating patten  - Remain physically active as tolerated       - Ordered screening colonoscopy " due October 2025  - Complete Hep A/B vaccination series as scheduled   - Continue sobriety; previously encouraged completing chem dep tx/program   - Avoid all NSAIDs, do not exceed 2000 mg Tylenol in 24 hrs   - Discussed that with low MELD score and compensated cirrhosis, no need to consider scheduling for liver transplant eval clinic at this time     RTC in ~6 months with labs same day    Marcella Lentz PA-C  [facility] Hepatology   -----------------------------------------------------         HPI:   Saji Iqbal is a 64 YO M who presents for follow-up regarding cirrhosis.      Patient had labs done today.    Denies any recent ER visits or hospitalizations. Denies any recent surgeries.     Started Tremfya May 2025. Is now off Stelara. Started on Atorvastatin June 2025. Denies any other recent med changes.     Still resides in Assisted Living. He is still hopeful of moving out of the Assisted Living. Denies any recent fever or chills. Denies nausea or vomiting. No abdominal pain. No yellowing of eyes or skin. Appetite is good. Weight has gone up. Previously was 202 lbs at last clinic appointment. Now weight 213 lbs. Passing 1-2 Bms oer day. Denies BRBPR or melena. Denies any recent severe confusion. Tries to follow high protein, low sodium diet. Admits he doesn't always get to choose his own diet due to where he lives. Exercises by doing PT exercises and walking about 5 miles per day. Sees Dermatology next week. Typically tries his best to avoid OTC pain meds unless absolutely necessary.     Gets his last Hep A/B vaccine in October.     Continues to avoid alcohol. He does not think he will ever go back to drinking. Denies urges or cravings. Denies tobacco use. Denies illicit drug use.       PROCEDURES:     EGD 10/17/24:  Impression:            - Normal esophagus.                          - Portal hypertensive gastropathy.                          - Normal examined duodenum.                           - No specimens collected.   Recommendation:        - Discharge patient to home.                          - Resume previous diet.                          - Return to liver clinic as previously scheduled.                          - Patient does not need follow-up upper endoscopy as                          long as he is taking non-selective beta-blocker      COLONOSCOPY 10/17/24:   Impression:            - One 7 mm polyp in the ascending colon, removed with                          a cold snare. Resected and retrieved. Clip (MR                          conditional) was placed.                          - One 5 mm polyp in the transverse colon, removed with                          a cold snare. Resected and retrieved. Clip (MR                          conditional) was placed.                          - One 16 mm polyp in the descending colon, removed                          with a hot snare. Resected and retrieved. Clips (MR                          conditional) were placed. Tattooed.                          - Two 3 to 4 mm polyps in the descending colon,                          removed with a cold snare. Resected and retrieved.                          Clips (MR conditional) were placed.                          - Three 10 to 12 mm polyps in the sigmoid colon,                          removed with a hot snare. Resected and retrieved.                          Clips (MR conditional) were placed.                          - Two 3 to 6 mm polyps in the sigmoid colon, removed                          with a cold snare. Resected and retrieved. Clips (MR                          conditional) were placed.                          - Congested mucosa in the entire examined colon.                          - Internal hemorrhoids.       A. ASCENDING COLON, 7MM POLYP:  - Sessile serrated adenomas; negative for cytologic dysplasia.'     B. TRANSVERSE COLON, 5MM POLYP:  - Fragments of tubular adenomas; negative for high-grade  dysplasia.      C. DESCENDING COLON, 15MM, 3MM, AND 4MM POLYPS:  - Fragments of tubular adenomas; negative for high-grade dysplasia.   - One fragment of sessile serrated adenoma; negative for cytologic dysplasia.     D. SIGMOID COLON, 12MM, 10MM X2, 6MM, AND 3MM POLYPS:  - Fragments of tubular adenomas; negative for high-grade dysplasia.        PMH:    has a past medical history of Cluster headache, Coronary artery disease, Depression, Diabetes mellitus, type II (H), Gastroesophageal reflux disease, Heart attack (H), Hyperlipidemia, Hypertension, Renal disease, Rotator cuff arthropathy, Sleep apnea, and Stented coronary artery.     SMH:    has a past surgical history that includes stent, coronary, yadira (2014, 2015); Arthroscopy shoulder; Repair tendon foot (Right, 10/23/2020); IR Paracentesis (12/12/2023); IR Paracentesis (2/23/2024); IR Paracentesis (2/16/2024); IR Paracentesis (1/30/2024); IR Paracentesis (1/22/2024); IR Paracentesis (3/22/2024); IR Paracentesis (3/15/2024); IR Paracentesis (3/8/2024); IR Paracentesis (3/1/2024); Colonoscopy (N/A, 10/17/2024); and Esophagoscopy, gastroscopy, duodenoscopy (EGD), combined (N/A, 10/17/2024).     Medications:   Current Outpatient Medications   Medication Sig Dispense Refill    ACCU-CHEK GUIDE TEST test strip       aspirin 81 MG tablet Take by mouth daily      atorvastatin (LIPITOR) 40 MG tablet       B-D U/F insulin pen needle USE TO INJECT INSULIN FOUR TIMES DAILY. REMOVE THE 2 COVERS ON THE PEN NEEDLE BEFORE ADMINISTERING MEDICATION DOSE.      bumetanide (BUMEX) 1 MG tablet TAKE 1 TABLET (1 MG) BY MOUTH 2 TIMES DAILY. 60 tablet 6    BYDUREON BCISE 2 MG/0.85ML auto-injector INJECT 2 MG SUBCUTANEOUS ONCE WEEKLY. ADMINISTER IMMEDIATELY AFTER THE AUTOINJECTOR IS PREPARED. [Q7D]      carvedilol (COREG) 3.125 MG tablet Take 0.5 tablets (1.5625 mg) by mouth 2 times daily (with meals)      clobetasol propionate (TEMOVATE) 0.05 % external cream APPLY TO AFFECTED AREAS 2  TIME(S) DAILY, AS NEEDED FOR PSORIASIS.      Continuous Glucose  (DEXCOM G7 ) BETHANY       Continuous Glucose Sensor (DEXCOM G7 SENSOR) MISC CHANGE EVERY 10 DAYS.TO BE USED TO READ BLOOD SUGARS, FOLLOW  DIRECTIONS.      Easy Touch Lancets 28G/Twist MISC       eplerenone (INSPRA) 50 MG tablet TAKE 1 TABLET (50 MG) BY MOUTH DAILY 30 tablet 6    FEROSUL 325 (65 Fe) MG tablet TAKE 1 TABLET (325 MG) BY MOUTH DAILY (WITH BREAKFAST) 30 tablet 6    fluocinolone acetonide (DERMA SMOOTHE/FS BODY) 0.01 % external oil APPLY THIN LAYER TWO TIMES DAILY TO THE AFFECTED AREAS ON THE ARMS AND LEGS; MAY WRAP IN ACE WRAP OR GAUZE AFTER APPLICATION TO HELP PREVENT RUBBING ON CLOTH      fluocinonide (LIDEX) 0.05 % external cream Apply a thin layer to the affected areas of new rash (not psoriasis) twice daily for up to 2 weeks. Discontinue sooner if rash resolves.      folic acid (FOLVITE) 1 MG tablet Take 1 tablet (1 mg) by mouth daily      gabapentin (NEURONTIN) 100 MG capsule Take 200 mg by mouth daily      guselkumab (TREMFYA PEN) 100 MG/ML auto-injector Inject 100 mg SC at weeks 0, 4, and then every 8 weeks thereafter.      Insulin Aspart FlexPen 100 UNIT/ML SOPN INJECT 11U SUBCUTANEOUSLY THREE TIMES DAILY WITH MEALS PLUS SCALE: <70 TREAT LOW;:0U ;150-200:1U; 201-250:2U;251-300: 3U; 301-350:4U >350:5U.MAX 48U/D      LANTUS SOLOSTAR 100 UNIT/ML soln Inject 36 units into the skin once daily at night. Adjust as directed up to 40 units daily. Product desired: LANTUS SOLOSTAR - may change based on insurance preference.      mometasone (ELOCON) 0.1 % external cream APPLY THIN LAYER TWO TIMES DAILY TO THE AFFECTED AREAS ON THE FACE.      multivitamin w/minerals (THERA-VIT-M) tablet Take 1 tablet by mouth daily      OZEMPIC, 1 MG/DOSE, 4 MG/3ML pen inject 1 mg subcutaneously once weekly.      pantoprazole (PROTONIX) 40 MG EC tablet Take 1 tablet (40 mg) by mouth daily      potassium chloride roni ER (KLOR-CON  "M20) 20 MEQ CR tablet TAKE 2 TABLETS (40 MEQ) BY MOUTH ONCE DAILY WITH A EVENING MEAL      sennosides (SENOKOT) 8.6 MG tablet Take 1 tablet by mouth 2 times daily      sertraline (ZOLOFT) 50 MG tablet Take 1 tablet by mouth daily.      terbinafine (LAMISIL) 1 % external cream Apply topically.      calcium 600 MG tablet TAKE 1 TABLET (600 MG) BY MOUTH ONCE DAILY WITH A MEAL. (Patient not taking: Reported on 7/10/2025)      Continuous Glucose Transmitter (DEXCOM G6 TRANSMITTER) MISC CHANGE EVERY 90 DAYS (Patient not taking: Reported on 7/10/2025)      hydrOXYzine HCl (ATARAX) 50 MG tablet TAKE 1 TABLET (50 MG) BY MOUTH EVERY 8 HOURS IF NEEDED (FOR ANXIETY). (Patient not taking: Reported on 7/10/2025)       No current facility-administered medications for this visit.     Previous work-up:   Lab Results   Component Value Date    HEPBANG Nonreactive 10/10/2019    HBCAB Nonreactive 07/14/2022    AUSAB 0.03 10/10/2019    HCVAB Nonreactive 07/14/2022    BRISA 75 01/09/2025    IRONSAT 53 (H) 01/09/2025    TSH 1.69 11/23/2018    CHOL 245 (H) 08/31/2023    HDL 15 (L) 08/31/2023     (H) 08/31/2023    TRIG 337 (H) 08/31/2023    A1C 5.7 (H) 10/24/2023    POPETH <10 01/09/2025    PLPETH <10 01/09/2025      No results found for: \"SPECDES\", \"LDRESULTS\"    Recent Labs   Lab Test 07/10/25  1020 01/09/25  1004 07/16/24  1022 04/04/24  0726 01/03/24  1822 11/06/23  0947 11/05/23  0713 11/04/23  0601 11/01/23  1011 10/24/23  1422   ALKPHOS 140 145 139 93 111 162* 200* 214* 260* 240*   ALT 33 30 16 21 16 58 57 60 51 61   AST 33 37 43 29 34 111* 131* 225* 125* 206*   BILITOTAL 0.7 0.7 0.7 0.7 1.0 2.2* 2.4* 2.9* 4.5* 8.2*           Allergies:     Allergies   Allergen Reactions    Metformin Diarrhea    Honey Other (See Comments)     Throat irritation          Social History:     Social History     Socioeconomic History    Marital status: Single     Spouse name: Not on file    Number of children: Not on file    Years of education: Not on " file    Highest education level: Not on file   Occupational History    Occupation: Manufactor    Tobacco Use    Smoking status: Former     Current packs/day: 0.00     Average packs/day: 0.3 packs/day for 20.0 years (6.6 ttl pk-yrs)     Types: Cigarettes     Start date: 1995     Quit date: 2015     Years since quittin.9     Passive exposure: Never    Smokeless tobacco: Never   Vaping Use    Vaping status: Never Used   Substance and Sexual Activity    Alcohol use: Not Currently     Alcohol/week: 1.0 standard drink of alcohol     Types: 1 Standard drinks or equivalent per week     Comment: sober since 9/15/23    Drug use: Not Currently     Comment: no hx IVDU    Sexual activity: Yes   Other Topics Concern     Service Not Asked    Blood Transfusions Not Asked    Caffeine Concern No     Comment: 0-1 coffee daily    Occupational Exposure Not Asked    Hobby Hazards Not Asked    Sleep Concern Not Asked    Stress Concern Not Asked    Weight Concern Not Asked    Special Diet Yes     Comment: low sodium, no red meat, no butter    Back Care Not Asked    Exercise Yes     Comment: walking 2-3 days per week    Bike Helmet Not Asked    Seat Belt Not Asked    Self-Exams Not Asked    Parent/sibling w/ CABG, MI or angioplasty before 65F 55M? No   Social History Narrative    Not on file     Social Drivers of Health     Financial Resource Strain: Low Risk  (2024)    [facility]    Financial Resource Strain     Difficulty of Paying Living Expenses: 3     Difficulty of Paying Living Expenses: Not on file   Food Insecurity: No Food Insecurity (2024)    [facility]    Food Insecurity     Do you worry your food will run out before you are able to buy more?: 1   Transportation Needs: No Transportation Needs (2024)    Received from [facility]    Transportation  "Needs     Does lack of transportation keep you from medical appointments?: 1     Does lack of transportation keep you from work, meetings or getting things that you need?: 1   Physical Activity: Unknown (6/19/2020)    Exercise Vital Sign     Days of Exercise per Week: 5 days     Minutes of Exercise per Session: Not on file   Stress: Stress Concern Present (6/19/2020)    [facility] - Occupational Stress Questionnaire     Feeling of Stress : To some extent   Social Connections: Socially Integrated (6/4/2024)    Received from [facility]    Social Connections     Do you often feel lonely or isolated from those around you?: 0   Interpersonal Safety: Low Risk  (10/17/2024)    Interpersonal Safety     Do you feel physically and emotionally safe where you currently live?: Yes     Within the past 12 months, have you been hit, slapped, kicked or otherwise physically hurt by someone?: No     Within the past 12 months, have you been humiliated or emotionally abused in other ways by your partner or ex-partner?: No   Housing Stability: Low Risk  (6/4/2024)    Received from [facility] [facility] [employer]    Housing Stability     What is your housing situation today?: 1          Family History:     Family History   Problem Relation Age of Onset    Coronary Artery Disease Mother     Coronary Artery Disease Father     Anuerysm Sister     Cerebrovascular Disease Brother     Pancreatic Cancer Paternal Uncle     Glaucoma No family hx of     Macular Degeneration No family hx of     Liver Disease No family hx of           Review of Systems:   Gen: See HPI          Physical Exam:   VS:  /71 (BP Location: Left arm, Patient Position: Sitting, Cuff Size: Adult Regular)   Pulse 73   Ht 1.803 m (5' 11\")   Wt 96.8 kg (213 lb 6.4 oz)   SpO2 97%   BMI 29.76 kg/m      Gen: A&Ox3, NAD  HEENT: Sclera anicteric  Lung: non-labored breathing   Ext: no edema  Skin: " "No jaundice  Neuro: grossly intact  Psych: appropriate mood and affects         Data:   Reviewed in person and significant for:    Lab Results   Component Value Date     07/10/2025     12/16/2020      Lab Results   Component Value Date    POTASSIUM 4.0 07/10/2025    POTASSIUM 3.9 07/19/2022    POTASSIUM 4.4 12/16/2020     Lab Results   Component Value Date    CHLORIDE 104 07/10/2025    CHLORIDE 107 07/19/2022    CHLORIDE 104 12/16/2020     Lab Results   Component Value Date    CO2 26 07/10/2025    CO2 27 07/19/2022    CO2 23 12/16/2020     Lab Results   Component Value Date    BUN 18.6 07/10/2025    BUN 17 07/19/2022    BUN 19 12/16/2020     Lab Results   Component Value Date    CR 1.01 07/10/2025    CR 1.01 12/16/2020       Lab Results   Component Value Date    WBC 6.0 07/10/2025    WBC 5.2 12/16/2020     Lab Results   Component Value Date    HGB 14.1 07/10/2025    HGB 15.4 12/16/2020     Lab Results   Component Value Date    HCT 38.8 07/10/2025    HCT 44.0 12/16/2020     Lab Results   Component Value Date    MCV 94 07/10/2025    MCV 98 12/16/2020     Lab Results   Component Value Date    PLT 70 07/10/2025     12/16/2020       Lab Results   Component Value Date    AST 33 07/10/2025     09/17/2020     Lab Results   Component Value Date    ALT 33 07/10/2025     09/17/2020     No results found for: \"BILICONJ\"   Lab Results   Component Value Date    BILITOTAL 0.7 07/10/2025    BILITOTAL 0.8 09/17/2020       Lab Results   Component Value Date    ALBUMIN 4.0 07/10/2025    ALBUMIN 3.9 07/14/2022    ALBUMIN 4.1 09/17/2020     Lab Results   Component Value Date    PROTTOTAL 6.6 07/10/2025    PROTTOTAL 7.3 09/17/2020      Lab Results   Component Value Date    ALKPHOS 140 07/10/2025    ALKPHOS 71 09/17/2020       Lab Results   Component Value Date    INR 1.15 07/10/2025         Imaging:      US ABDOMEN LIMITED ASCITES 5/29/25    1.  No ascites  Narrative    For Patients: As a result of the " 21st Century Cures Act, medical imaging exams and procedure reports are released immediately into your electronic medical record. You may view this report before your referring provider. If you have questions, please contact your health care provider.    EXAM: US ABDOMEN LIMITED ASCITES  LOCATION: [facility]  DATE: 5/29/2025    INDICATION: Alcoholic Cirrhosis Of Liver With Ascites (hc)  COMPARISON: None.  TECHNIQUE: Limited abdominal ultrasound.    FINDINGS:  Four-quadrant ultrasound demonstrates no appreciable ascites. Therefore paracentesis was not performed.  Images on Order [id number]    Image Retrieve    The full-size image has not yet been retrieved from an outside organization. To retrieve, click the link below.  Scan on 5/29/2025: US ABDOMEN LIMITED ASCITES        Exam End: 05/29/25  1:33 PM    Specimen Collected: 05/29/25  1:33 PM Last Resulted: 05/29/25  1:36 PM   Received From: [facility]  Result Received: 07/10/25 10:14 AM           Right upper quadrant ultrasound 2/10/25     Comparisons: 8/13/2024     HISTORY:    HCC screening. Cirrhosis.     FINDINGS:    The liver measures a craniocaudal dimension of 16.2 cm.  No focal liver lesion. Liver parenchyma is coarsened with peripheral  nodularity. The gallbladder is normal. There is no gallbladder wall  thickening or pericholecystic fluid. No sonographic Fish's sign was  elicited.  The diameter of the common bile duct measures 4mm. The  pancreas is partially obscured but otherwise appears unremarkable. The  aorta and IVC are visualized. The right kidney measures 12.9cm. No  solid renal mass, stone or hydronephrosis.                                                                      IMPRESSION:    Cirrhotic appearing liver with no focal liver lesion.     FELI BENÍTEZ MD

## 2025-07-10 NOTE — NURSING NOTE
"Chief Complaint   Patient presents with    Follow Up     6 month follow up for alcoholic cirrhosis of liver with ascites.     He requests these members of his care team be copied on today's visit information:  PCP: Roselyn Clements    Vitals:    07/10/25 1053   BP: 113/71   BP Location: Left arm   Patient Position: Sitting   Cuff Size: Adult Regular   Pulse: 73   SpO2: 97%   Weight: 96.8 kg (213 lb 6.4 oz)   Height: 1.803 m (5' 11\")     Body mass index is 29.76 kg/m .    Medications were reconciled.    Does patient need any medication refills at today's visit? No        Zina Arambula CMA        "

## 2025-07-10 NOTE — LETTER
"7/10/2025      Saji Iqbal  [address]  [place] [address]      Dear Colleague,    Thank you for referring your patient, Saji Iqbal, to the [facility]. Please see a copy of my visit note below.    Hepatology Clinic Note  Saji Iqbal   Date of Birth 1960    REASON FOR FOLLOW UP: Cirrhosis          Assessment/plan:     64 YO M with PMHx of EtOH/MASH cirrhosis, complicated by hx of ascites requiring paracentesis (last in June 2024), which has now resolved and is controlled with diuretics. No overt hepatic encephalopathy. MELD 3.0: 7 (remains stable). His liver function has continued to improve based on labs. Last seen 1/9/25.     # AUD  - Sober from alcohol since 9/15/23; denies current cravings/urges  - Has never completed chemical dependency tx/program but pt states he is still open to doing this  - PETH <10 Dalton 2025; repeat ordered      # HCC screening  - US abd limited 2/10/25: no focal liver lesion   - AFP 2.2 1/9/25; repeat in process   - US abdomen due August 2025; ordered     # Variceal screening:   - EGD 10/17/24: Normal esophagus. Portal hypertensive gastropathy. Normal examined duodenum. No specimens collected.               > Per Dr. Heller: doesn't need follow-up EGD as long as he takes non-selective BB  - Currently taking Coreg 3.125 mg BID; BP today 113/71      # Hepatic encephalopathy (No history, no overt HE today)    > No recent confusion               > Does not take lactulose/rifaximin     # Ascites, improved:  # YESSICA, improved  - Continue 1 mg Bumex BID and 50 mg eplerenone daily  - Kidney function WNL  - Last paracentesis June 2024  - US abd limited \"no ascites\" June 2025  - Follows low sodium diet     # Protein calorie malnutrition  # Gynecomastia:   - Continue small frequent meals; Protein intake of 110-120 gm protein day     # Metabolic associated fatty liver disease (MALFD):   - Continue to follow with Diabetic Educator  - Maintain good control of " cholesterol-- normal July 2024  - Now on Ozempic  - Most recent A1C 7.8 March 2025  - Limit carbs, especially simple carbs, and follow Mediterranean eating patten  - Remain physically active as tolerated       - Ordered screening colonoscopy due October 2025  - Complete Hep A/B vaccination series as scheduled   - Continue sobriety; previously encouraged completing chem dep tx/program   - Avoid all NSAIDs, do not exceed 2000 mg Tylenol in 24 hrs   - Discussed that with low MELD score and compensated cirrhosis, no need to consider scheduling for liver transplant eval clinic at this time     RTC in ~6 months with labs same day    Marcella Lentz PA-C  [facility]   -----------------------------------------------------         HPI:   Saji Iqbal is a 66 YO M who presents for follow-up regarding cirrhosis.      Patient had labs done today.    Denies any recent ER visits or hospitalizations. Denies any recent surgeries.     Started Tremfya May 2025. Is now off Stelara. Started on Atorvastatin June 2025. Denies any other recent med changes.     Still resides in Assisted Living. He is still hopeful of moving out of the Assisted Living. Denies any recent fever or chills. Denies nausea or vomiting. No abdominal pain. No yellowing of eyes or skin. Appetite is good. Weight has gone up. Previously was 202 lbs at last clinic appointment. Now weight 213 lbs. Passing 1-2 Bms oer day. Denies BRBPR or melena. Denies any recent severe confusion. Tries to follow high protein, low sodium diet. Admits he doesn't always get to choose his own diet due to where he lives. Exercises by doing PT exercises and walking about 5 miles per day. Sees Dermatology next week. Typically tries his best to avoid OTC pain meds unless absolutely necessary.     Gets his last Hep A/B vaccine in October.     Continues to avoid alcohol. He does not think he will ever go back to drinking. Denies urges or cravings. Denies tobacco use.  Denies illicit drug use.       PROCEDURES:     EGD 10/17/24:  Impression:            - Normal esophagus.                          - Portal hypertensive gastropathy.                          - Normal examined duodenum.                          - No specimens collected.   Recommendation:        - Discharge patient to home.                          - Resume previous diet.                          - Return to liver clinic as previously scheduled.                          - Patient does not need follow-up upper endoscopy as                          long as he is taking non-selective beta-blocker      COLONOSCOPY 10/17/24:   Impression:            - One 7 mm polyp in the ascending colon, removed with                          a cold snare. Resected and retrieved. Clip (MR                          conditional) was placed.                          - One 5 mm polyp in the transverse colon, removed with                          a cold snare. Resected and retrieved. Clip (MR                          conditional) was placed.                          - One 16 mm polyp in the descending colon, removed                          with a hot snare. Resected and retrieved. Clips (MR                          conditional) were placed. Tattooed.                          - Two 3 to 4 mm polyps in the descending colon,                          removed with a cold snare. Resected and retrieved.                          Clips (MR conditional) were placed.                          - Three 10 to 12 mm polyps in the sigmoid colon,                          removed with a hot snare. Resected and retrieved.                          Clips (MR conditional) were placed.                          - Two 3 to 6 mm polyps in the sigmoid colon, removed                          with a cold snare. Resected and retrieved. Clips (MR                          conditional) were placed.                          - Congested mucosa in the entire examined colon.                           - Internal hemorrhoids.       A. ASCENDING COLON, 7MM POLYP:  - Sessile serrated adenomas; negative for cytologic dysplasia.'     B. TRANSVERSE COLON, 5MM POLYP:  - Fragments of tubular adenomas; negative for high-grade dysplasia.      C. DESCENDING COLON, 15MM, 3MM, AND 4MM POLYPS:  - Fragments of tubular adenomas; negative for high-grade dysplasia.   - One fragment of sessile serrated adenoma; negative for cytologic dysplasia.     D. SIGMOID COLON, 12MM, 10MM X2, 6MM, AND 3MM POLYPS:  - Fragments of tubular adenomas; negative for high-grade dysplasia.        PMH:    has a past medical history of Cluster headache, Coronary artery disease, Depression, Diabetes mellitus, type II (H), Gastroesophageal reflux disease, Heart attack (H), Hyperlipidemia, Hypertension, Renal disease, Rotator cuff arthropathy, Sleep apnea, and Stented coronary artery.     SMH:    has a past surgical history that includes stent, coronary, yadira (2014, 2015); Arthroscopy shoulder; Repair tendon foot (Right, 10/23/2020); IR Paracentesis (12/12/2023); IR Paracentesis (2/23/2024); IR Paracentesis (2/16/2024); IR Paracentesis (1/30/2024); IR Paracentesis (1/22/2024); IR Paracentesis (3/22/2024); IR Paracentesis (3/15/2024); IR Paracentesis (3/8/2024); IR Paracentesis (3/1/2024); Colonoscopy (N/A, 10/17/2024); and Esophagoscopy, gastroscopy, duodenoscopy (EGD), combined (N/A, 10/17/2024).     Medications:   Current Outpatient Medications   Medication Sig Dispense Refill     ACCU-CHEK GUIDE TEST test strip        aspirin 81 MG tablet Take by mouth daily       atorvastatin (LIPITOR) 40 MG tablet        B-D U/F insulin pen needle USE TO INJECT INSULIN FOUR TIMES DAILY. REMOVE THE 2 COVERS ON THE PEN NEEDLE BEFORE ADMINISTERING MEDICATION DOSE.       bumetanide (BUMEX) 1 MG tablet TAKE 1 TABLET (1 MG) BY MOUTH 2 TIMES DAILY. 60 tablet 6     BYDUREON BCISE 2 MG/0.85ML auto-injector INJECT 2 MG SUBCUTANEOUS ONCE WEEKLY. ADMINISTER  IMMEDIATELY AFTER THE AUTOINJECTOR IS PREPARED. [Q7D]       carvedilol (COREG) 3.125 MG tablet Take 0.5 tablets (1.5625 mg) by mouth 2 times daily (with meals)       clobetasol propionate (TEMOVATE) 0.05 % external cream APPLY TO AFFECTED AREAS 2 TIME(S) DAILY, AS NEEDED FOR PSORIASIS.       Continuous Glucose  (DEXCOM G7 ) BETHANY        Continuous Glucose Sensor (DEXCOM G7 SENSOR) MISC CHANGE EVERY 10 DAYS.TO BE USED TO READ BLOOD SUGARS, FOLLOW  DIRECTIONS.       Easy Touch Lancets 28G/Twist MISC        eplerenone (INSPRA) 50 MG tablet TAKE 1 TABLET (50 MG) BY MOUTH DAILY 30 tablet 6     FEROSUL 325 (65 Fe) MG tablet TAKE 1 TABLET (325 MG) BY MOUTH DAILY (WITH BREAKFAST) 30 tablet 6     fluocinolone acetonide (DERMA SMOOTHE/FS BODY) 0.01 % external oil APPLY THIN LAYER TWO TIMES DAILY TO THE AFFECTED AREAS ON THE ARMS AND LEGS; MAY WRAP IN ACE WRAP OR GAUZE AFTER APPLICATION TO HELP PREVENT RUBBING ON CLOTH       fluocinonide (LIDEX) 0.05 % external cream Apply a thin layer to the affected areas of new rash (not psoriasis) twice daily for up to 2 weeks. Discontinue sooner if rash resolves.       folic acid (FOLVITE) 1 MG tablet Take 1 tablet (1 mg) by mouth daily       gabapentin (NEURONTIN) 100 MG capsule Take 200 mg by mouth daily       guselkumab (TREMFYA PEN) 100 MG/ML auto-injector Inject 100 mg SC at weeks 0, 4, and then every 8 weeks thereafter.       Insulin Aspart FlexPen 100 UNIT/ML SOPN INJECT 11U SUBCUTANEOUSLY THREE TIMES DAILY WITH MEALS PLUS SCALE: <70 TREAT LOW;:0U ;150-200:1U; 201-250:2U;251-300: 3U; 301-350:4U >350:5U.MAX 48U/D       LANTUS SOLOSTAR 100 UNIT/ML soln Inject 36 units into the skin once daily at night. Adjust as directed up to 40 units daily. Product desired: LANTUS SOLOSTAR - may change based on insurance preference.       mometasone (ELOCON) 0.1 % external cream APPLY THIN LAYER TWO TIMES DAILY TO THE AFFECTED AREAS ON THE FACE.       multivitamin  "w/minerals (THERA-VIT-M) tablet Take 1 tablet by mouth daily       OZEMPIC, 1 MG/DOSE, 4 MG/3ML pen inject 1 mg subcutaneously once weekly.       pantoprazole (PROTONIX) 40 MG EC tablet Take 1 tablet (40 mg) by mouth daily       potassium chloride roni ER (KLOR-CON M20) 20 MEQ CR tablet TAKE 2 TABLETS (40 MEQ) BY MOUTH ONCE DAILY WITH A EVENING MEAL       sennosides (SENOKOT) 8.6 MG tablet Take 1 tablet by mouth 2 times daily       sertraline (ZOLOFT) 50 MG tablet Take 1 tablet by mouth daily.       terbinafine (LAMISIL) 1 % external cream Apply topically.       calcium 600 MG tablet TAKE 1 TABLET (600 MG) BY MOUTH ONCE DAILY WITH A MEAL. (Patient not taking: Reported on 7/10/2025)       Continuous Glucose Transmitter (DEXCOM G6 TRANSMITTER) MISC CHANGE EVERY 90 DAYS (Patient not taking: Reported on 7/10/2025)       hydrOXYzine HCl (ATARAX) 50 MG tablet TAKE 1 TABLET (50 MG) BY MOUTH EVERY 8 HOURS IF NEEDED (FOR ANXIETY). (Patient not taking: Reported on 7/10/2025)       No current facility-administered medications for this visit.     Previous work-up:   Lab Results   Component Value Date    HEPBANG Nonreactive 10/10/2019    HBCAB Nonreactive 07/14/2022    AUSAB 0.03 10/10/2019    HCVAB Nonreactive 07/14/2022    BRISA 75 01/09/2025    IRONSAT 53 (H) 01/09/2025    TSH 1.69 11/23/2018    CHOL 245 (H) 08/31/2023    HDL 15 (L) 08/31/2023     (H) 08/31/2023    TRIG 337 (H) 08/31/2023    A1C 5.7 (H) 10/24/2023    POPETH <10 01/09/2025    PLPETH <10 01/09/2025      No results found for: \"SPECDES\", \"LDRESULTS\"    Recent Labs   Lab Test 07/10/25  1020 01/09/25  1004 07/16/24  1022 04/04/24  0726 01/03/24  1822 11/06/23  0947 11/05/23  0713 11/04/23  0601 11/01/23  1011 10/24/23  1422   ALKPHOS 140 145 139 93 111 162* 200* 214* 260* 240*   ALT 33 30 16 21 16 58 57 60 51 61   AST 33 37 43 29 34 111* 131* 225* 125* 206*   BILITOTAL 0.7 0.7 0.7 0.7 1.0 2.2* 2.4* 2.9* 4.5* 8.2*           Allergies:     Allergies   Allergen " Reactions     Metformin Diarrhea     Honey Other (See Comments)     Throat irritation          Social History:     Social History     Socioeconomic History     Marital status: Single     Spouse name: Not on file     Number of children: Not on file     Years of education: Not on file     Highest education level: Not on file   Occupational History     Occupation: Manufactor    Tobacco Use     Smoking status: Former     Current packs/day: 0.00     Average packs/day: 0.3 packs/day for 20.0 years (6.6 ttl pk-yrs)     Types: Cigarettes     Start date: 1995     Quit date: 2015     Years since quittin.9     Passive exposure: Never     Smokeless tobacco: Never   Vaping Use     Vaping status: Never Used   Substance and Sexual Activity     Alcohol use: Not Currently     Alcohol/week: 1.0 standard drink of alcohol     Types: 1 Standard drinks or equivalent per week     Comment: sober since 9/15/23     Drug use: Not Currently     Comment: no hx IVDU     Sexual activity: Yes   Other Topics Concern      Service Not Asked     Blood Transfusions Not Asked     Caffeine Concern No     Comment: 0-1 coffee daily     Occupational Exposure Not Asked     Hobby Hazards Not Asked     Sleep Concern Not Asked     Stress Concern Not Asked     Weight Concern Not Asked     Special Diet Yes     Comment: low sodium, no red meat, no butter     Back Care Not Asked     Exercise Yes     Comment: walking 2-3 days per week     Bike Helmet Not Asked     Seat Belt Not Asked     Self-Exams Not Asked     Parent/sibling w/ CABG, MI or angioplasty before 65F 55M? No   Social History Narrative     Not on file     Social Drivers of Health     Financial Resource Strain: Low Risk  (2024)    [facility]    Financial Resource Strain      Difficulty of Paying Living Expenses: 3      Difficulty of Paying Living Expenses: Not on file   Food Insecurity: No Food Insecurity  (6/4/2024)    Received from [facility]    Food Insecurity      Do you worry your food will run out before you are able to buy more?: 1   Transportation Needs: No Transportation Needs (6/4/2024)    [facility]    Transportation Needs      Does lack of transportation keep you from medical appointments?: 1      Does lack of transportation keep you from work, meetings or getting things that you need?: 1   Physical Activity: Unknown (6/19/2020)    Exercise Vital Sign      Days of Exercise per Week: 5 days      Minutes of Exercise per Session: Not on file   Stress: Stress Concern Present (6/19/2020)    Citizen of [place] [employer] - Occupational Stress Questionnaire      Feeling of Stress : To some extent   Social Connections: Socially Integrated (6/4/2024)    [facility]    Social Connections      Do you often feel lonely or isolated from those around you?: 0   Interpersonal Safety: Low Risk  (10/17/2024)    Interpersonal Safety      Do you feel physically and emotionally safe where you currently live?: Yes      Within the past 12 months, have you been hit, slapped, kicked or otherwise physically hurt by someone?: No      Within the past 12 months, have you been humiliated or emotionally abused in other ways by your partner or ex-partner?: No   Housing Stability: Low Risk  (6/4/2024)    Received from [facility]    Housing Stability      What is your housing situation today?: 1          Family History:     Family History   Problem Relation Age of Onset     Coronary Artery Disease Mother      Coronary Artery Disease Father      Anuerysm Sister      Cerebrovascular Disease Brother      Pancreatic Cancer Paternal Uncle      Glaucoma No family hx of      Macular Degeneration No family hx of      Liver Disease No family hx of           Review of Systems:  "  Gen: See HPI          Physical Exam:   VS:  /71 (BP Location: Left arm, Patient Position: Sitting, Cuff Size: Adult Regular)   Pulse 73   Ht 1.803 m (5' 11\")   Wt 96.8 kg (213 lb 6.4 oz)   SpO2 97%   BMI 29.76 kg/m      Gen: A&Ox3, NAD  HEENT: Sclera anicteric  Lung: non-labored breathing   Ext: no edema  Skin: No jaundice  Neuro: grossly intact  Psych: appropriate mood and affects         Data:   Reviewed in person and significant for:    Lab Results   Component Value Date     07/10/2025     12/16/2020      Lab Results   Component Value Date    POTASSIUM 4.0 07/10/2025    POTASSIUM 3.9 07/19/2022    POTASSIUM 4.4 12/16/2020     Lab Results   Component Value Date    CHLORIDE 104 07/10/2025    CHLORIDE 107 07/19/2022    CHLORIDE 104 12/16/2020     Lab Results   Component Value Date    CO2 26 07/10/2025    CO2 27 07/19/2022    CO2 23 12/16/2020     Lab Results   Component Value Date    BUN 18.6 07/10/2025    BUN 17 07/19/2022    BUN 19 12/16/2020     Lab Results   Component Value Date    CR 1.01 07/10/2025    CR 1.01 12/16/2020       Lab Results   Component Value Date    WBC 6.0 07/10/2025    WBC 5.2 12/16/2020     Lab Results   Component Value Date    HGB 14.1 07/10/2025    HGB 15.4 12/16/2020     Lab Results   Component Value Date    HCT 38.8 07/10/2025    HCT 44.0 12/16/2020     Lab Results   Component Value Date    MCV 94 07/10/2025    MCV 98 12/16/2020     Lab Results   Component Value Date    PLT 70 07/10/2025     12/16/2020       Lab Results   Component Value Date    AST 33 07/10/2025     09/17/2020     Lab Results   Component Value Date    ALT 33 07/10/2025     09/17/2020     No results found for: \"BILICONJ\"   Lab Results   Component Value Date    BILITOTAL 0.7 07/10/2025    BILITOTAL 0.8 09/17/2020       Lab Results   Component Value Date    ALBUMIN 4.0 07/10/2025    ALBUMIN 3.9 07/14/2022    ALBUMIN 4.1 09/17/2020     Lab Results   Component Value Date    " PROTTOTAL 6.6 07/10/2025    PROTTOTAL 7.3 09/17/2020      Lab Results   Component Value Date    ALKPHOS 140 07/10/2025    ALKPHOS 71 09/17/2020       Lab Results   Component Value Date    INR 1.15 07/10/2025         Imaging:      US ABDOMEN LIMITED ASCITES 5/29/25    1.  No ascites  Narrative    For Patients: As a result of the 21st Century Cures Act, medical imaging exams and procedure reports are released immediately into your electronic medical record. You may view this report before your referring provider. If you have questions, please contact your health care provider.    EXAM: US ABDOMEN LIMITED ASCITES  LOCATION: [facility]  DATE: 5/29/2025    INDICATION: Alcoholic Cirrhosis Of Liver With Ascites (hc)  COMPARISON: None.  TECHNIQUE: Limited abdominal ultrasound.    FINDINGS:  Four-quadrant ultrasound demonstrates no appreciable ascites. Therefore paracentesis was not performed.  Images on Order [id number]    Image Retrieve    The full-size image has not yet been retrieved from an outside organization. To retrieve, click the link below.  Scan on 5/29/2025: US ABDOMEN LIMITED ASCITES        Exam End: 05/29/25  1:33 PM    Specimen Collected: 05/29/25  1:33 PM Last Resulted: 05/29/25  1:36 PM   Received From: [facility]  Result Received: 07/10/25 10:14 AM           Right upper quadrant ultrasound 2/10/25     Comparisons: 8/13/2024     HISTORY:    HCC screening. Cirrhosis.     FINDINGS:    The liver measures a craniocaudal dimension of 16.2 cm.  No focal liver lesion. Liver parenchyma is coarsened with peripheral  nodularity. The gallbladder is normal. There is no gallbladder wall  thickening or pericholecystic fluid. No sonographic Fish's sign was  elicited.  The diameter of the common bile duct measures 4mm. The  pancreas is partially obscured but otherwise appears unremarkable. The  aorta and IVC are visualized. The right kidney measures 12.9cm. No  solid renal mass,  stone or hydronephrosis.                                                                      IMPRESSION:    Cirrhotic appearing liver with no focal liver lesion.     FELI BENÍTEZ MD    Again, thank you for allowing me to participate in the care of your patient.        Sincerely,        Marcella Lentz PA-C    Electronically signed

## 2025-07-14 DIAGNOSIS — D64.9 NORMOCYTIC ANEMIA: ICD-10-CM

## 2025-07-14 DIAGNOSIS — K70.31 ALCOHOLIC CIRRHOSIS OF LIVER WITH ASCITES (H): Primary | ICD-10-CM

## 2025-07-30 ENCOUNTER — TELEPHONE (OUTPATIENT)
Dept: GASTROENTEROLOGY | Facility: CLINIC | Age: 65
End: 2025-07-30
Payer: COMMERCIAL

## 2025-07-30 NOTE — TELEPHONE ENCOUNTER
"Endoscopy Scheduling Screen    Caller: patient    Have you had any respiratory illness or flu-like symptoms in the last 10 days?  No         Patient is ACTIVE on [employer].  Inform patient that all appointment instructions will be sent via [employer].    Review patient's insurance for any non participating payor.    Ordering/Referring Provider:     AUBREE NAIR      (If ordering provider performs procedure, schedule with ordering provider unless otherwise instructed. )    BMI: Estimated body mass index is 29.76 kg/m  as calculated from the following:    Height as of 7/10/25: 1.803 m (5' 11\").    Weight as of 7/10/25: 96.8 kg (213 lb 6.4 oz).     Sedation Ordered  MAC/deep sedation.   BMI<= 45 45 < BMI <= 48 48 < BMI < = 50  BMI > 50   No Restrictions No [facility]  No [facility]  [facility] with exceptions Hospital Only OR Only       Do you have a history of malignant hyperthermia?  No    (Females) Are you currently pregnant?        Have you been diagnosed or told you have pulmonary hypertension?   No    Do you have an LVAD?  No    Have you been told you have moderate to severe sleep apnea?  Yes. Do you use a CPAP? No. (RN Review required for scheduling unless scheduling in Hospital.)     Have you been told you have COPD, asthma, or any other lung disease?  No    Has your doctor ordered any cardiac tests like echo, angiogram, stress test, ablation, or EKG, that you have not completed yet?  No    Do you  have a history of any heart conditions?  Yes     Have you had any hospitalizations  in the last year for heart related issues, for example a stent placement, heart attack, or cardiomyopathy?  No    Do you have any implantable devices in your body (pacemaker, ICD)?  No    Do you take nitroglycerine?  No    Have you ever had or are you waiting for an organ transplant?  No. Continue scheduling, no site restrictions.    Have you had a stroke or transient ischemic attack (TIA aka \"mini stroke\") in the last 2 " "years?   No.    Have you been diagnosed with or been told you have cirrhosis of the liver?   Yes. (RN Review required for scheduling unless scheduling in Hospital.)    Are you currently on dialysis?   No    Do you need assistance transferring?   No    BMI: Estimated body mass index is 29.76 kg/m  as calculated from the following:    Height as of 7/10/25: 1.803 m (5' 11\").    Weight as of 7/10/25: 96.8 kg (213 lb 6.4 oz).     Is patients BMI > 40 and scheduling location [facility]  NO    Take an injectable or oral medication for weight loss or diabetes (excluding insulin)?  Yes, hold time can be up to 7 days. Please consult with you prescribing provider to discuss endoscopy recommendations. (Please schedule at least 7 days out.)  Ozempic    Do you take the medication Naltrexone?  No    Do you take blood thinners?  No       Prep   Are you currently on dialysis or do you have chronic kidney disease?  No    Do you have a diagnosis of diabetes?  Yes ([facility] Prep)    Do you have a diagnosis of cystic fibrosis (CF)?  Yes (Extended Prep)    On a regular basis do you go 3 -5 days between bowel movements?  No    BMI > 40?  No    Preferred Pharmacy:        [place], [place] - [address]  [address]  [facility] [address]  Phone: [phone] Fax: [phone]      Final Scheduling Details     Procedure scheduled  Colonoscopy    Surgeon:   Derrick    Date of procedure:  10/8/25     Pre-OP / PAC:   Yes - Patient informed of pre-op requirement.    Location  [facility] - Per RN assessment.    Sedation   MAC/Deep Sedation - Per order.      Patient Reminders:   You will receive a call from a Nurse to review instructions and health history.  This assessment must be completed prior to your procedure.  Failure to complete the Nurse assessment may result in the procedure being cancelled.      On the day of your procedure, please designate an adult(s) who can drive you home stay with " you for the next 24 hours. The medicines used in the exam will make you sleepy. You will not be able to drive.      You cannot take public transportation, ride share services, or non-medical taxi service without a responsible caregiver.  Medical transport services are allowed with the requirement that a responsible caregiver will receive you at your destination.  We require that drivers and caregivers are confirmed prior to your procedure.

## 2025-08-12 ENCOUNTER — ANCILLARY PROCEDURE (OUTPATIENT)
Dept: ULTRASOUND IMAGING | Facility: CLINIC | Age: 65
End: 2025-08-12
Attending: PHYSICIAN ASSISTANT
Payer: COMMERCIAL

## 2025-08-12 DIAGNOSIS — K70.31 ALCOHOLIC CIRRHOSIS OF LIVER WITH ASCITES (H): ICD-10-CM

## 2025-08-12 PROCEDURE — 76705 ECHO EXAM OF ABDOMEN: CPT | Mod: TC | Performed by: RADIOLOGY

## 2025-08-17 DIAGNOSIS — I71.43 INFRARENAL ABDOMINAL AORTIC ANEURYSM (AAA) WITHOUT RUPTURE: Primary | ICD-10-CM

## (undated) DEVICE — GLOVE PROTEXIS POWDER FREE 7.5 ORTHOPEDIC 2D73ET75

## (undated) DEVICE — SU ETHILON 4-0 PS-2 18" BLACK 1667H

## (undated) DEVICE — LINEN FULL SHEET 5511

## (undated) DEVICE — SU FIBERWIRE 0 38" BLUE 22.2MM W/TAPER NDL  AR-7250

## (undated) DEVICE — LINEN HALF SHEET 5512

## (undated) DEVICE — SU LOOP #2 FIBERLOOP  AR-7234

## (undated) DEVICE — TOURNIQUET SGL BLADDER 18"X4" RED 5921-218-135

## (undated) DEVICE — GOWN IMPERVIOUS SPECIALTY XLG/XLONG 32474

## (undated) DEVICE — SU VICRYL 3-0 SH 27" UND J416H

## (undated) DEVICE — PACK LOWER EXTREMITY RIDGES

## (undated) DEVICE — LINEN ORTHO ACL PACK 5447

## (undated) DEVICE — SU VICRYL 4-0 PS-2 18" UND J496H

## (undated) DEVICE — SUCTION CANISTER MEDIVAC LINER 3000ML W/LID 65651-530

## (undated) DEVICE — BAG CLEAR TRASH 1.3M 39X33" P4040C

## (undated) DEVICE — BNDG KLING 4" 2236

## (undated) DEVICE — TOURNIQUET SGL  BLADDER 30"X4" BLUE 5921030135

## (undated) DEVICE — CAST PADDING 4" STERILE 9044S

## (undated) DEVICE — SYR 10ML FINGER CONTROL W/O NDL 309695

## (undated) DEVICE — DRSG KERLIX FLUFFS X5

## (undated) DEVICE — DRSG GAUZE 4X4" TRAY

## (undated) DEVICE — BNDG ELASTIC 4"X5YDS UNSTERILE 6611-40

## (undated) DEVICE — SU VICRYL 3-0 PS-2 27" UND J427H

## (undated) DEVICE — PREP CHLORAPREP 26ML TINTED HI-LITE ORANGE 930815

## (undated) DEVICE — SU FIBERWIRE 2-0 TAPER CUT AR-7220

## (undated) RX ORDER — BUPIVACAINE HYDROCHLORIDE 5 MG/ML
INJECTION, SOLUTION EPIDURAL; INTRACAUDAL
Status: DISPENSED
Start: 2023-09-11

## (undated) RX ORDER — BUPIVACAINE HYDROCHLORIDE 2.5 MG/ML
INJECTION, SOLUTION EPIDURAL; INFILTRATION; INTRACAUDAL
Status: DISPENSED
Start: 2020-10-23

## (undated) RX ORDER — ONDANSETRON 2 MG/ML
INJECTION INTRAMUSCULAR; INTRAVENOUS
Status: DISPENSED
Start: 2020-10-23

## (undated) RX ORDER — FENTANYL CITRATE 50 UG/ML
INJECTION, SOLUTION INTRAMUSCULAR; INTRAVENOUS
Status: DISPENSED
Start: 2020-10-23

## (undated) RX ORDER — KETOROLAC TROMETHAMINE 30 MG/ML
INJECTION, SOLUTION INTRAMUSCULAR; INTRAVENOUS
Status: DISPENSED
Start: 2020-10-23

## (undated) RX ORDER — TRIAMCINOLONE ACETONIDE 40 MG/ML
INJECTION, SUSPENSION INTRA-ARTICULAR; INTRAMUSCULAR
Status: DISPENSED
Start: 2023-09-11

## (undated) RX ORDER — LIDOCAINE HYDROCHLORIDE 10 MG/ML
INJECTION, SOLUTION EPIDURAL; INFILTRATION; INTRACAUDAL; PERINEURAL
Status: DISPENSED
Start: 2023-09-11

## (undated) RX ORDER — OXYCODONE HYDROCHLORIDE 5 MG/1
TABLET ORAL
Status: DISPENSED
Start: 2020-10-23

## (undated) RX ORDER — LIDOCAINE HYDROCHLORIDE 10 MG/ML
INJECTION, SOLUTION EPIDURAL; INFILTRATION; INTRACAUDAL; PERINEURAL
Status: DISPENSED
Start: 2020-10-23

## (undated) RX ORDER — PROPOFOL 10 MG/ML
INJECTION, EMULSION INTRAVENOUS
Status: DISPENSED
Start: 2024-10-17

## (undated) RX ORDER — SIMETHICONE 40MG/0.6ML
SUSPENSION, DROPS(FINAL DOSAGE FORM)(ML) ORAL
Status: DISPENSED
Start: 2024-10-17

## (undated) RX ORDER — GLYCOPYRROLATE 0.2 MG/ML
INJECTION INTRAMUSCULAR; INTRAVENOUS
Status: DISPENSED
Start: 2020-10-23

## (undated) RX ORDER — PROPOFOL 10 MG/ML
INJECTION, EMULSION INTRAVENOUS
Status: DISPENSED
Start: 2020-10-23

## (undated) RX ORDER — CEFAZOLIN SODIUM 2 G/100ML
INJECTION, SOLUTION INTRAVENOUS
Status: DISPENSED
Start: 2020-10-23